# Patient Record
Sex: FEMALE | Race: WHITE | NOT HISPANIC OR LATINO | Employment: FULL TIME | URBAN - METROPOLITAN AREA
[De-identification: names, ages, dates, MRNs, and addresses within clinical notes are randomized per-mention and may not be internally consistent; named-entity substitution may affect disease eponyms.]

---

## 2017-09-15 ENCOUNTER — HOSPITAL ENCOUNTER (OUTPATIENT)
Dept: RADIOLOGY | Facility: MEDICAL CENTER | Age: 47
End: 2017-09-15
Attending: FAMILY MEDICINE
Payer: COMMERCIAL

## 2017-09-15 ENCOUNTER — HOSPITAL ENCOUNTER (EMERGENCY)
Facility: MEDICAL CENTER | Age: 47
End: 2017-09-15
Attending: EMERGENCY MEDICINE
Payer: COMMERCIAL

## 2017-09-15 ENCOUNTER — OFFICE VISIT (OUTPATIENT)
Dept: URGENT CARE | Facility: PHYSICIAN GROUP | Age: 47
End: 2017-09-15
Payer: COMMERCIAL

## 2017-09-15 ENCOUNTER — APPOINTMENT (OUTPATIENT)
Dept: RADIOLOGY | Facility: MEDICAL CENTER | Age: 47
End: 2017-09-15
Attending: EMERGENCY MEDICINE
Payer: COMMERCIAL

## 2017-09-15 VITALS
HEART RATE: 74 BPM | WEIGHT: 215 LBS | TEMPERATURE: 96.4 F | OXYGEN SATURATION: 100 % | DIASTOLIC BLOOD PRESSURE: 68 MMHG | RESPIRATION RATE: 18 BRPM | BODY MASS INDEX: 33.67 KG/M2 | SYSTOLIC BLOOD PRESSURE: 145 MMHG

## 2017-09-15 VITALS
DIASTOLIC BLOOD PRESSURE: 100 MMHG | HEIGHT: 67 IN | OXYGEN SATURATION: 100 % | BODY MASS INDEX: 32.96 KG/M2 | SYSTOLIC BLOOD PRESSURE: 140 MMHG | WEIGHT: 210 LBS | TEMPERATURE: 97.2 F | HEART RATE: 70 BPM

## 2017-09-15 DIAGNOSIS — Q62.11 HYDRONEPHROSIS WITH URETEROPELVIC JUNCTION (UPJ) OBSTRUCTION: ICD-10-CM

## 2017-09-15 DIAGNOSIS — N23 RENAL COLIC: ICD-10-CM

## 2017-09-15 DIAGNOSIS — R30.0 DYSURIA: ICD-10-CM

## 2017-09-15 DIAGNOSIS — N28.9 RENAL INSUFFICIENCY: ICD-10-CM

## 2017-09-15 DIAGNOSIS — R10.84 GENERALIZED ABDOMINAL PAIN: ICD-10-CM

## 2017-09-15 DIAGNOSIS — R16.0 LIVER MASS: ICD-10-CM

## 2017-09-15 DIAGNOSIS — N20.1 URETEROLITHIASIS: ICD-10-CM

## 2017-09-15 LAB
ANION GAP SERPL CALC-SCNC: 11 MMOL/L (ref 0–11.9)
APPEARANCE UR: NORMAL
BASOPHILS # BLD AUTO: 0.2 % (ref 0–1.8)
BASOPHILS # BLD: 0.04 K/UL (ref 0–0.12)
BILIRUB UR STRIP-MCNC: NORMAL MG/DL
BUN SERPL-MCNC: 21 MG/DL (ref 8–22)
CALCIUM SERPL-MCNC: 9.7 MG/DL (ref 8.5–10.5)
CHLORIDE SERPL-SCNC: 106 MMOL/L (ref 96–112)
CO2 SERPL-SCNC: 20 MMOL/L (ref 20–33)
COLOR UR AUTO: YELLOW
CREAT SERPL-MCNC: 1.41 MG/DL (ref 0.5–1.4)
EOSINOPHIL # BLD AUTO: 0 K/UL (ref 0–0.51)
EOSINOPHIL NFR BLD: 0 % (ref 0–6.9)
ERYTHROCYTE [DISTWIDTH] IN BLOOD BY AUTOMATED COUNT: 38.5 FL (ref 35.9–50)
GFR SERPL CREATININE-BSD FRML MDRD: 40 ML/MIN/1.73 M 2
GLUCOSE SERPL-MCNC: 117 MG/DL (ref 65–99)
GLUCOSE UR STRIP.AUTO-MCNC: NORMAL MG/DL
HCT VFR BLD AUTO: 43.2 % (ref 37–47)
HGB BLD-MCNC: 15.1 G/DL (ref 12–16)
IMM GRANULOCYTES # BLD AUTO: 0.09 K/UL (ref 0–0.11)
IMM GRANULOCYTES NFR BLD AUTO: 0.4 % (ref 0–0.9)
KETONES UR STRIP.AUTO-MCNC: NORMAL MG/DL
LEUKOCYTE ESTERASE UR QL STRIP.AUTO: NORMAL
LYMPHOCYTES # BLD AUTO: 1.35 K/UL (ref 1–4.8)
LYMPHOCYTES NFR BLD: 6.4 % (ref 22–41)
MCH RBC QN AUTO: 31.1 PG (ref 27–33)
MCHC RBC AUTO-ENTMCNC: 35 G/DL (ref 33.6–35)
MCV RBC AUTO: 88.9 FL (ref 81.4–97.8)
MONOCYTES # BLD AUTO: 0.8 K/UL (ref 0–0.85)
MONOCYTES NFR BLD AUTO: 3.8 % (ref 0–13.4)
NEUTROPHILS # BLD AUTO: 18.78 K/UL (ref 2–7.15)
NEUTROPHILS NFR BLD: 89.2 % (ref 44–72)
NITRITE UR QL STRIP.AUTO: NORMAL
NRBC # BLD AUTO: 0 K/UL
NRBC BLD AUTO-RTO: 0 /100 WBC
PH UR STRIP.AUTO: 6 [PH] (ref 5–8)
PLATELET # BLD AUTO: 386 K/UL (ref 164–446)
PMV BLD AUTO: 11.2 FL (ref 9–12.9)
POTASSIUM SERPL-SCNC: 4.4 MMOL/L (ref 3.6–5.5)
PROT UR QL STRIP: 30 MG/DL
RBC # BLD AUTO: 4.86 M/UL (ref 4.2–5.4)
RBC UR QL AUTO: NORMAL
SODIUM SERPL-SCNC: 137 MMOL/L (ref 135–145)
SP GR UR STRIP.AUTO: 1.03
UROBILINOGEN UR STRIP-MCNC: NORMAL MG/DL
WBC # BLD AUTO: 21.1 K/UL (ref 4.8–10.8)

## 2017-09-15 PROCEDURE — 700105 HCHG RX REV CODE 258: Performed by: EMERGENCY MEDICINE

## 2017-09-15 PROCEDURE — 700111 HCHG RX REV CODE 636 W/ 250 OVERRIDE (IP): Performed by: EMERGENCY MEDICINE

## 2017-09-15 PROCEDURE — 80048 BASIC METABOLIC PNL TOTAL CA: CPT

## 2017-09-15 PROCEDURE — 74177 CT ABD & PELVIS W/CONTRAST: CPT

## 2017-09-15 PROCEDURE — 96375 TX/PRO/DX INJ NEW DRUG ADDON: CPT

## 2017-09-15 PROCEDURE — 74000 DX-ABDOMEN-1 VIEW: CPT

## 2017-09-15 PROCEDURE — 99214 OFFICE O/P EST MOD 30 MIN: CPT | Performed by: FAMILY MEDICINE

## 2017-09-15 PROCEDURE — 99285 EMERGENCY DEPT VISIT HI MDM: CPT

## 2017-09-15 PROCEDURE — 700117 HCHG RX CONTRAST REV CODE 255: Performed by: FAMILY MEDICINE

## 2017-09-15 PROCEDURE — 700102 HCHG RX REV CODE 250 W/ 637 OVERRIDE(OP): Performed by: EMERGENCY MEDICINE

## 2017-09-15 PROCEDURE — 85025 COMPLETE CBC W/AUTO DIFF WBC: CPT

## 2017-09-15 PROCEDURE — A9270 NON-COVERED ITEM OR SERVICE: HCPCS | Performed by: EMERGENCY MEDICINE

## 2017-09-15 PROCEDURE — 96374 THER/PROPH/DIAG INJ IV PUSH: CPT

## 2017-09-15 PROCEDURE — 81002 URINALYSIS NONAUTO W/O SCOPE: CPT | Performed by: FAMILY MEDICINE

## 2017-09-15 RX ORDER — OXYCODONE HYDROCHLORIDE AND ACETAMINOPHEN 5; 325 MG/1; MG/1
1-2 TABLET ORAL EVERY 6 HOURS PRN
Qty: 10 TAB | Refills: 0 | Status: ON HOLD | OUTPATIENT
Start: 2017-09-15 | End: 2017-09-18

## 2017-09-15 RX ORDER — ONDANSETRON 2 MG/ML
4 INJECTION INTRAMUSCULAR; INTRAVENOUS
Status: DISCONTINUED | OUTPATIENT
Start: 2017-09-15 | End: 2017-09-15 | Stop reason: HOSPADM

## 2017-09-15 RX ORDER — PROMETHAZINE HYDROCHLORIDE 25 MG/ML
25 INJECTION, SOLUTION INTRAMUSCULAR; INTRAVENOUS ONCE
Status: COMPLETED | OUTPATIENT
Start: 2017-09-15 | End: 2017-09-15

## 2017-09-15 RX ORDER — KETOROLAC TROMETHAMINE 30 MG/ML
30 INJECTION, SOLUTION INTRAMUSCULAR; INTRAVENOUS ONCE
Status: COMPLETED | OUTPATIENT
Start: 2017-09-15 | End: 2017-09-15

## 2017-09-15 RX ORDER — TAMSULOSIN HYDROCHLORIDE 0.4 MG/1
0.4 CAPSULE ORAL ONCE
Status: COMPLETED | OUTPATIENT
Start: 2017-09-15 | End: 2017-09-15

## 2017-09-15 RX ORDER — KETOROLAC TROMETHAMINE 30 MG/ML
60 INJECTION, SOLUTION INTRAMUSCULAR; INTRAVENOUS ONCE
Status: COMPLETED | OUTPATIENT
Start: 2017-09-15 | End: 2017-09-15

## 2017-09-15 RX ORDER — SULFAMETHOXAZOLE AND TRIMETHOPRIM 800; 160 MG/1; MG/1
1 TABLET ORAL EVERY 12 HOURS
Qty: 6 TAB | Refills: 0 | Status: SHIPPED | OUTPATIENT
Start: 2017-09-15 | End: 2017-09-15

## 2017-09-15 RX ORDER — SULFAMETHOXAZOLE AND TRIMETHOPRIM 800; 160 MG/1; MG/1
1 TABLET ORAL EVERY 12 HOURS
Qty: 6 TAB | Refills: 0 | Status: SHIPPED | OUTPATIENT
Start: 2017-09-15 | End: 2017-09-15 | Stop reason: SDUPTHER

## 2017-09-15 RX ORDER — TAMSULOSIN HYDROCHLORIDE 0.4 MG/1
0.4 CAPSULE ORAL DAILY
Qty: 5 CAP | Refills: 0 | Status: SHIPPED | OUTPATIENT
Start: 2017-09-15 | End: 2017-11-03

## 2017-09-15 RX ORDER — SODIUM CHLORIDE 9 MG/ML
1000 INJECTION, SOLUTION INTRAVENOUS ONCE
Status: COMPLETED | OUTPATIENT
Start: 2017-09-15 | End: 2017-09-15

## 2017-09-15 RX ORDER — ONDANSETRON 2 MG/ML
4 INJECTION INTRAMUSCULAR; INTRAVENOUS ONCE
Status: COMPLETED | OUTPATIENT
Start: 2017-09-15 | End: 2017-09-15

## 2017-09-15 RX ORDER — TAMSULOSIN HYDROCHLORIDE 0.4 MG/1
0.4 CAPSULE ORAL ONCE
Status: DISCONTINUED | OUTPATIENT
Start: 2017-09-15 | End: 2017-09-15 | Stop reason: HOSPADM

## 2017-09-15 RX ORDER — ONDANSETRON 4 MG/1
4 TABLET, ORALLY DISINTEGRATING ORAL EVERY 8 HOURS PRN
Qty: 10 TAB | Refills: 0 | Status: SHIPPED | OUTPATIENT
Start: 2017-09-15 | End: 2017-11-03

## 2017-09-15 RX ADMIN — SODIUM CHLORIDE 1000 ML: 9 INJECTION, SOLUTION INTRAVENOUS at 18:53

## 2017-09-15 RX ADMIN — PROMETHAZINE HYDROCHLORIDE 25 MG: 25 INJECTION, SOLUTION INTRAMUSCULAR; INTRAVENOUS at 13:19

## 2017-09-15 RX ADMIN — HYDROMORPHONE HYDROCHLORIDE 1 MG: 1 INJECTION, SOLUTION INTRAMUSCULAR; INTRAVENOUS; SUBCUTANEOUS at 18:56

## 2017-09-15 RX ADMIN — IOHEXOL 50 ML: 240 INJECTION, SOLUTION INTRATHECAL; INTRAVASCULAR; INTRAVENOUS; ORAL at 12:27

## 2017-09-15 RX ADMIN — TAMSULOSIN HYDROCHLORIDE 0.4 MG: 0.4 CAPSULE ORAL at 20:37

## 2017-09-15 RX ADMIN — ONDANSETRON 4 MG: 2 INJECTION INTRAMUSCULAR; INTRAVENOUS at 18:54

## 2017-09-15 RX ADMIN — IOHEXOL 100 ML: 350 INJECTION, SOLUTION INTRAVENOUS at 12:26

## 2017-09-15 RX ADMIN — KETOROLAC TROMETHAMINE 60 MG: 30 INJECTION, SOLUTION INTRAMUSCULAR; INTRAVENOUS at 11:14

## 2017-09-15 RX ADMIN — KETOROLAC TROMETHAMINE 30 MG: 30 INJECTION, SOLUTION INTRAMUSCULAR at 18:56

## 2017-09-15 RX ADMIN — ONDANSETRON 4 MG: 2 INJECTION INTRAMUSCULAR; INTRAVENOUS at 10:46

## 2017-09-15 ASSESSMENT — ENCOUNTER SYMPTOMS
CHILLS: 0
FEVER: 0
EYE PAIN: 0
ABDOMINAL PAIN: 1
SORE THROAT: 0
MYALGIAS: 0
NAUSEA: 1
DIZZINESS: 0
WEIGHT LOSS: 0
SHORTNESS OF BREATH: 0
VOMITING: 1

## 2017-09-15 ASSESSMENT — PAIN SCALES - GENERAL
PAINLEVEL_OUTOF10: 9
PAINLEVEL_OUTOF10: 3

## 2017-09-15 NOTE — PROGRESS NOTES
"  Subjective:     Aurora Skinner a 47 y.o. female who presents for Abdominal Pain       Abdominal Pain   This is a new problem. The current episode started today. The onset quality is sudden. The problem occurs constantly. The problem has been rapidly worsening. The pain is located in the generalized abdominal region. The quality of the pain is colicky, aching and sharp. Associated symptoms include dysuria, nausea and vomiting. Pertinent negatives include no fever, frequency, hematuria, myalgias or weight loss.   No past medical history on file.  Past Surgical History:   Procedure Laterality Date   • ABDOMINAL EXPLORATION     • ABDOMINAL HYSTERECTOMY TOTAL       Social History     Social History   • Marital status: Unknown     Spouse name: N/A   • Number of children: N/A   • Years of education: N/A     Occupational History   • Not on file.     Social History Main Topics   • Smoking status: Never Smoker   • Smokeless tobacco: Never Used   • Alcohol use Yes   • Drug use: No   • Sexual activity: Not on file     Other Topics Concern   • Not on file     Social History Narrative   • No narrative on file    History reviewed. No pertinent family history. Review of Systems   Constitutional: Negative for chills, fever and weight loss.   HENT: Negative for sore throat.    Eyes: Negative for pain.   Respiratory: Negative for shortness of breath.    Cardiovascular: Negative for chest pain.   Gastrointestinal: Positive for abdominal pain, nausea and vomiting.   Genitourinary: Positive for dysuria. Negative for frequency and hematuria.   Musculoskeletal: Negative for myalgias.   Skin: Negative for rash.   Neurological: Negative for dizziness.     Allergies   Allergen Reactions   • Levaquin       Objective:   /100   Pulse 70   Temp 36.2 °C (97.2 °F)   Ht 1.702 m (5' 7\")   Wt 95.3 kg (210 lb)   SpO2 100%   BMI 32.89 kg/m²   Physical Exam   Constitutional: She is oriented to person, place, and time. She appears " well-developed and well-nourished. No distress.   HENT:   Head: Normocephalic and atraumatic.   Eyes: Conjunctivae and EOM are normal. Pupils are equal, round, and reactive to light.   Cardiovascular: Normal rate, regular rhythm, normal heart sounds and intact distal pulses.    No murmur heard.  Pulmonary/Chest: Effort normal and breath sounds normal. No respiratory distress.   Abdominal: Soft. Bowel sounds are normal. She exhibits no distension. There is tenderness in the suprapubic area. There is CVA tenderness.   Musculoskeletal: Normal range of motion.   Neurological: She is alert and oriented to person, place, and time. She has normal reflexes. No sensory deficit.   Skin: Skin is warm and dry.   Psychiatric: She has a normal mood and affect. Her behavior is normal.   Vitals reviewed.        Assessment/Plan:   Assessment    1. Ureterolithiasis  Discussed with Dr. Jaspreet Gilbert at [facility] emergency room. Patient to go there via POV per patient's request for further evaluation and pain control. Differential diagnosis, natural history, supportive care, and indications for immediate follow-up discussed.   - POCT Urinalysis  - Urine Culture; Future  - sulfamethoxazole-trimethoprim (BACTRIM DS) 800-160 MG tablet; Take 1 Tab by mouth every 12 hours for 3 days.  Dispense: 6 Tab; Refill: 0  - CT-ABDOMEN-PELVIS WITH; Future  - ondansetron (ZOFRAN) syringe/vial injection 4 mg; 2 mL by Intravenous route Once.  - ketorolac (TORADOL) injection 60 mg; 2 mL by Intramuscular route Once.  - promethazine (PHENERGAN) injection 25 mg; 1 mL by Intramuscular route Once.    2. Hydronephrosis with ureteropelvic junction (UPJ) obstruction  Differential diagnosis, natural history, supportive care, and indications for immediate follow-up discussed.   - POCT Urinalysis  - Urine Culture; Future  - sulfamethoxazole-trimethoprim (BACTRIM DS) 800-160 MG tablet; Take 1 Tab by mouth every 12 hours for 3 days.  Dispense: 6 Tab; Refill: 0  -  CT-ABDOMEN-PELVIS WITH; Future  - ondansetron (ZOFRAN) syringe/vial injection 4 mg; 2 mL by Intravenous route Once.  - ketorolac (TORADOL) injection 60 mg; 2 mL by Intramuscular route Once.  - promethazine (PHENERGAN) injection 25 mg; 1 mL by Intramuscular route Once.

## 2017-09-15 NOTE — PROGRESS NOTES
Aurora Tan is a 47 y.o. female here for a non-provider visit for Toradol and Phenergen injection.    Reason for injection: Abd pain  Order in MAR?: Yes  Patient supplied?:No  Minimum interval has been met for this injection (per MAR order): Yes    Order and dose verified by: JUAN  Patient tolerated injection and no adverse effects were observed or reported: Yes    # of Administrations remaining in MAR: 0

## 2017-09-15 NOTE — ED NOTES
To triage via w/c with report of   Chief Complaint   Patient presents with   • Sent from Urgent Care     for kidney stone and possible need for urology   pt in obvious discomfort at this time.

## 2017-09-15 NOTE — PATIENT INSTRUCTIONS
Urinary Tract Infection  A urinary tract infection (UTI) can occur any place along the urinary tract. The tract includes the kidneys, ureters, bladder, and urethra. A type of germ called bacteria often causes a UTI. UTIs are often helped with antibiotic medicine.   HOME CARE   · If given, take antibiotics as told by your doctor. Finish them even if you start to feel better.  · Drink enough fluids to keep your pee (urine) clear or pale yellow.  · Avoid tea, drinks with caffeine, and bubbly (carbonated) drinks.  · Pee often. Avoid holding your pee in for a long time.  · Pee before and after having sex (intercourse).  · Wipe from front to back after you poop (bowel movement) if you are a woman. Use each tissue only once.  GET HELP RIGHT AWAY IF:   · You have back pain.  · You have lower belly (abdominal) pain.  · You have chills.  · You feel sick to your stomach (nauseous).  · You throw up (vomit).  · Your burning or discomfort with peeing does not go away.  · You have a fever.  · Your symptoms are not better in 3 days.  MAKE SURE YOU:   · Understand these instructions.  · Will watch your condition.  · Will get help right away if you are not doing well or get worse.     This information is not intended to replace advice given to you by your health care provider. Make sure you discuss any questions you have with your health care provider.     Document Released: 06/05/2009 Document Revised: 01/08/2016 Document Reviewed: 07/18/2013  [employer] Patient Education ©2016 [employer]

## 2017-09-16 NOTE — ED NOTES
ERP at bedside.  Pt reports right sided flank pain/abdomen pain and N/V starting at approximately 0700.  Pt seen at Urgent Care, had CT and sent here for further evaluation and treatment.

## 2017-09-16 NOTE — ED PROVIDER NOTES
ED Provider Note    Scribed for Geronimo Shine M.D. by Gretta Ingram. 9/15/2017  6:38 PM    Primary care provider: Chico Aguillon M.D.  Means of arrival: walk-in  History obtained from: patient  History limited by: none    CHIEF COMPLAINT  Chief Complaint   Patient presents with   • Sent from Urgent Care     for kidney stone and possible need for urology       HPI  Aurora Tan is a 47 y.o. female who presents from Urgent Care with a diagnosed kidney stone. Patient was evaluated today at  with a CT scan after she presented with severe right sided flank pain, right sided abdominal pain, nausea and vomiting(4x) onset around 7AM this morning. The pain has been constant throughout the day. No blood thinners on board, and patient states no drug, tobacco or alcohol use. Abdominal surgeries include hysterectomy, , cyst removal. No history of diabetes or immune compromise.  No complaints of hematuria, fever, dysuria, urgency, frequency, chest pain, shortness of breath, cough, rash, extremity swelling.    REVIEW OF SYSTEMS  Pertinent positives include: right flank pain, right abdominal pain, nausea, vomiting.  Pertinent negatives include: hematuria, fever, dysuria, urgency, frequency, chest pain, shortness of breath, cough, rash, extremity swelling.  10+ systems reviewed and negative.      PAST MEDICAL HISTORY  Denies prior kidney stone or UTI      SOCIAL HISTORY  Social History   Substance Use Topics   • Smoking status: Never Smoker   • Smokeless tobacco: Never Used   • Alcohol use Yes     History   Drug Use No       SURGICAL HISTORY  Past Surgical History:   Procedure Laterality Date   • ABDOMINAL EXPLORATION     • ABDOMINAL HYSTERECTOMY TOTAL         CURRENT MEDICATIONS  Current Facility-Administered Medications   Medication Dose Route Frequency Provider Last Rate Last Dose   • ondansetron (ZOFRAN) syringe/vial injection 4 mg  4 mg Intravenous Q HOUR PRN Geronimo Shine M.D.   4 mg at 09/15/17 0699      Current Outpatient Prescriptions   Medication Sig Dispense Refill   • tamsulosin (FLOMAX) 0.4 MG capsule Take 1 Cap by mouth every day. 5 Cap 0   • ondansetron (ZOFRAN ODT) 4 MG TABLET DISPERSIBLE Take 1 Tab by mouth every 8 hours as needed for Nausea/Vomiting. 10 Tab 0   • oxycodone-acetaminophen (PERCOCET) 5-325 MG Tab Take 1-2 Tabs by mouth every 6 hours as needed (moderate to severe pain). 10 Tab 0   • estradiol (VIVELLE-DOT) 0.075 MG/24HR PTTW Apply 1 Patch to skin as directed See Admin Instructions.     • ibuprofen (MOTRIN) 600 MG TABS Take 1 Tab by mouth every 6 hours as needed for Mild Pain. 25 Tab 0       ALLERGIES  Allergies   Allergen Reactions   • Levaquin        PHYSICAL EXAM  VITAL SIGNS: BP (!) 179/91   Pulse 73   Temp (!) 35.8 °C (96.4 °F)   Resp 17   Wt 97.5 kg (215 lb)   SpO2 100%   BMI 33.67 kg/m²   Reviewed and hypertension otherwise normal    Constitutional: Well developed, Well nourished,uncomfortable appearing.  HENT: Normocephalic, atraumatic, bilateral external ears normal, oropharynx moist, No exudates or erythema.   Eyes: PERRLA, conjunctiva pink, no scleral icterus.   Cardiovascular: Regular rate and rhythm. No murmurs, rubs or gallops.   Respiratory: Lungs clear to auscultation bilaterally. No wheezes, rales, or rhonchi.   Gastrointestinal:  Abdomen soft, bilateral LQ tenderness, non distended.   Skin: No erythema, no rash.   Genitourinary: right sided CVA tenderness  Neurologic: Alert & oriented x 3, cranial nerves 2-12 intact by passive exam.  No focal deficit noted.  Psychiatric: Affect normal, Judgment normal, Mood normal.     DIFFERENTIAL DIAGNOSIS:  Hepatic mass, kidney stone.    RADIOLOGY/PROCEDURES  [id number] VIEW   Final Result      Right hydroureteronephrosis with a delayed nephrogram.      Previously seen calculus is not visualized on the current examination and may be obscured by contrast within the bladder.          LABORATORY:  Results for orders placed or  performed during the hospital encounter of 09/15/17   CBC WITH DIFFERENTIAL   Result Value Ref Range    WBC 21.1 (H) 4.8 - 10.8 K/uL    RBC 4.86 4.20 - 5.40 M/uL    Hemoglobin 15.1 12.0 - 16.0 g/dL    Hematocrit 43.2 37.0 - 47.0 %    MCV 88.9 81.4 - 97.8 fL    MCH 31.1 27.0 - 33.0 pg    MCHC 35.0 33.6 - 35.0 g/dL    RDW 38.5 35.9 - 50.0 fL    Platelet Count 386 164 - 446 K/uL    MPV 11.2 9.0 - 12.9 fL    Neutrophils-Polys 89.20 (H) 44.00 - 72.00 %    Lymphocytes 6.40 (L) 22.00 - 41.00 %    Monocytes 3.80 0.00 - 13.40 %    Eosinophils 0.00 0.00 - 6.90 %    Basophils 0.20 0.00 - 1.80 %    Immature Granulocytes 0.40 0.00 - 0.90 %    Nucleated RBC 0.00 /100 WBC    Neutrophils (Absolute) 18.78 (H) 2.00 - 7.15 K/uL    Lymphs (Absolute) 1.35 1.00 - 4.80 K/uL    Monos (Absolute) 0.80 0.00 - 0.85 K/uL    Eos (Absolute) 0.00 0.00 - 0.51 K/uL    Baso (Absolute) 0.04 0.00 - 0.12 K/uL    Immature Granulocytes (abs) 0.09 0.00 - 0.11 K/uL    NRBC (Absolute) 0.00 K/uL   BASIC METABOLIC PANEL   Result Value Ref Range    Sodium 137 135 - 145 mmol/L    Potassium 4.4 3.6 - 5.5 mmol/L    Chloride 106 96 - 112 mmol/L    Co2 20 20 - 33 mmol/L    Glucose 117 (H) 65 - 99 mg/dL    Bun 21 8 - 22 mg/dL    Creatinine 1.41 (H) 0.50 - 1.40 mg/dL    Calcium 9.7 8.5 - 10.5 mg/dL    Anion Gap 11.0 0.0 - 11.9         INTERVENTIONS:  Medications   ondansetron (ZOFRAN) syringe/vial injection 4 mg (4 mg Intravenous Given 9/15/17 6732)   NS infusion 1,000 mL (0 mL Intravenous Stopped 9/15/17 1953)   ketorolac (TORADOL) injection 30 mg (30 mg Intravenous Given 9/15/17 1856)   HYDROmorphone (DILAUDID) injection 1 mg (1 mg Intravenous Given 9/15/17 1856)     Response:improved discomfort.    COURSE & MEDICAL DECISION MAKING    6:38 PM - Patient seen and examined at bedside. Patient will be treated with 30mg  IV Toradol, 1mg IV Dilaudid, and 4mg IV Zofran for her symptoms. The patient will be resuscitated with 1L NS IV for dehydration from vomiting and to  help facilitate kidney stone expulsion.    Ordered abdomen xray, CBC, BMP to evaluate.     Review of medical records showed a UA completed prior to arrival in ED today with trace blood, no leukocytes, and no nitrates. CT scan completed at  showed right hydro ureter nephrosis, 3mm calculus at Right UVJ. They recommend RUQ US for 2,5cm density that is most likely fat.    Narc score of 60. 1 Rx for Tramadol.    8:18 PM I re-evaluated patient at bedside. She reports improved discomfort and nausea. I explained that her labs showed mild dehydration and a slightly elevated WBC most likely caused by her vomiting and discomfort. I explained that if she develops a fever, she needs to return to the ED immediately. I explained that she will be discharged with a Urology follow up, urine filter, and pain medications so that she can manage the symptoms until she passes the stone. Patient agrees with plan and will be discharged.    Well-appearing patient presents with renal colic with hydronephrosis and hydroureter on the right side due to a 3 mm UVJ stone. There is no evidence of pyelonephritis based on review of urinalysis from urgent care. There is no evidence of diverticulitis or appendicitis. Patient also has a mass in the liver for which follow-up ultrasound was recommended. Patient was notified of this recommendation for outpatient ultrasound.    PLAN:    I reviewed prescription monitoring program for patient's narcotic use before prescribing a scheduled drug.The patient will not drink alcohol nor drive with prescribed medications. The patient will return for new or worsening symptoms and is stable at the time of discharge.    The patient is referred to a primary physician for blood pressure management, diabetic screening, and for all other preventative health concerns.      New Prescriptions    ONDANSETRON (ZOFRAN ODT) 4 MG TABLET DISPERSIBLE    Take 1 Tab by mouth every 8 hours as needed for Nausea/Vomiting.     OXYCODONE-ACETAMINOPHEN (PERCOCET) 5-325 MG TAB    Take 1-2 Tabs by mouth every 6 hours as needed (moderate to severe pain).    TAMSULOSIN (FLOMAX) 0.4 MG CAPSULE    Take 1 Cap by mouth every day.     Kidney stone handout given  Return for uncontrolled pain, uncontrolled vomiting or pain and fever  Follow-up for outpatient ultrasound of possible liver mass    Kiet Eid M.D.  [address]  [facility] [address]  [phone]    Schedule an appointment as soon as possible for a visit in 4 days  As needed      CONDITION: good.    FINAL IMPRESSION  1. Renal colic    2. Hydronephrosis with ureteropelvic junction (UPJ) obstruction    3. Liver mass          Electronically signed by: Gretta Ingram, 9/15/2017 6:38 PM    The note accurately reflects work and decisions made by me.  Geronimo Shine  9/16/2017  12:40 AM

## 2017-09-16 NOTE — DISCHARGE INSTRUCTIONS
Kidney Stones  (Ureteral Lithiasis)    Drink average fluids, filter the urine saving any stones, take flomax to help pass the stone, use tylenol or percocet for pain not ibuprofen or naproxen.  Return to [facility] for pain and fever, uncontrolled pain or vomiting or ill appearance.  Followup with urology if not better in 4-5 days. Take Zofran as needed for nausea or vomiting.    You also have a small mass in the liver that is likely fat. The radiologist recommends an outpatient ultrasound by your primary doctor to assess this further.    You had a borderline or high normal blood pressure reading today.  This does not necessarily mean you have hypertension.  Please followup with your/a primary physician for comprehensive blood pressure evaluation and yearly fasting cholesterol assessment.  BP Readings from Last 3 Encounters:   09/15/17 (!) 179/91   09/15/17 140/100   10/18/16 128/98     .    The pain you are experiencing is caused by a stone located in the urinary tract system. These are the channels that collect the urine in your kidneys and deliver it to your bladder. These stones usually pass through the urinary tract and are expelled in the urine. The intense pain is caused by stone movement in those channels when the ureter goes into spasm around the stone.    FOLLOW THE INSTRUCTIONS AS LISTED BELOW:  Ø Increase your fluid intake.  Ø Strain all urine (strainer will be provided). Keep all particulate matter and stones for your caregiver to see. The stone causing the pain may be smaller than a BB.  Ø Take your pain medication as directed.  Ø Make a follow-up appointment with your caregiver as directed.  Ø You may require specialized follow-up x-rays. The absence of pain does not always mean that the stone has passed. It may have just stopped moving. If the urine remains completely obstructed, it can cause loss of kidney function or even complete destruction of the kidney. It is your responsibility that  x-rays and follow-ups are completed.  Ø If your stone does not pass on its own, additional measures may be taken by your caregiver to ensure its removal.    seek immediate medical care if:  Ø Pain can not be controlled with the prescribed medication.  Ø Fever develops. (Backed up urine is more likely to become infected.) You may use ibuprofen and/or acetaminophen for fever until seen by a caregiver.  Ø Pain continues for more than 18 hours.    [employer]® Patient Information ©2007 [employer].

## 2017-09-17 ENCOUNTER — APPOINTMENT (OUTPATIENT)
Dept: RADIOLOGY | Facility: MEDICAL CENTER | Age: 47
DRG: 872 | End: 2017-09-17
Attending: UROLOGY
Payer: COMMERCIAL

## 2017-09-17 ENCOUNTER — HOSPITAL ENCOUNTER (INPATIENT)
Facility: MEDICAL CENTER | Age: 47
LOS: 1 days | DRG: 872 | End: 2017-09-18
Attending: EMERGENCY MEDICINE | Admitting: HOSPITALIST
Payer: COMMERCIAL

## 2017-09-17 ENCOUNTER — RESOLUTE PROFESSIONAL BILLING HOSPITAL PROF FEE (OUTPATIENT)
Dept: HOSPITALIST | Facility: MEDICAL CENTER | Age: 47
End: 2017-09-17
Payer: COMMERCIAL

## 2017-09-17 ENCOUNTER — APPOINTMENT (OUTPATIENT)
Dept: RADIOLOGY | Facility: MEDICAL CENTER | Age: 47
DRG: 872 | End: 2017-09-17
Attending: EMERGENCY MEDICINE
Payer: COMMERCIAL

## 2017-09-17 DIAGNOSIS — N17.9 AKI (ACUTE KIDNEY INJURY) (HCC): ICD-10-CM

## 2017-09-17 DIAGNOSIS — R31.9 URINARY TRACT INFECTION WITH HEMATURIA, SITE UNSPECIFIED: ICD-10-CM

## 2017-09-17 DIAGNOSIS — A41.9 SEPSIS, DUE TO UNSPECIFIED ORGANISM: ICD-10-CM

## 2017-09-17 DIAGNOSIS — N39.0 URINARY TRACT INFECTION WITH HEMATURIA, SITE UNSPECIFIED: ICD-10-CM

## 2017-09-17 DIAGNOSIS — N13.2 URETERAL STONE WITH HYDRONEPHROSIS: ICD-10-CM

## 2017-09-17 PROBLEM — N13.9 OBSTRUCTIVE UROPATHY: Status: ACTIVE | Noted: 2017-09-17

## 2017-09-17 LAB
ALBUMIN SERPL BCP-MCNC: 4.2 G/DL (ref 3.2–4.9)
ALBUMIN/GLOB SERPL: 1.2 G/DL
ALP SERPL-CCNC: 78 U/L (ref 30–99)
ALT SERPL-CCNC: 10 U/L (ref 2–50)
ANION GAP SERPL CALC-SCNC: 9 MMOL/L (ref 0–11.9)
APPEARANCE UR: ABNORMAL
AST SERPL-CCNC: 13 U/L (ref 12–45)
BACTERIA #/AREA URNS HPF: ABNORMAL /HPF
BASOPHILS # BLD AUTO: 0.2 % (ref 0–1.8)
BASOPHILS # BLD: 0.04 K/UL (ref 0–0.12)
BILIRUB SERPL-MCNC: 0.8 MG/DL (ref 0.1–1.5)
BILIRUB UR QL STRIP.AUTO: NEGATIVE
BUN SERPL-MCNC: 18 MG/DL (ref 8–22)
CALCIUM SERPL-MCNC: 9.1 MG/DL (ref 8.5–10.5)
CHLORIDE SERPL-SCNC: 101 MMOL/L (ref 96–112)
CO2 SERPL-SCNC: 24 MMOL/L (ref 20–33)
COLOR UR: ABNORMAL
CREAT SERPL-MCNC: 1.75 MG/DL (ref 0.5–1.4)
EOSINOPHIL # BLD AUTO: 0.09 K/UL (ref 0–0.51)
EOSINOPHIL NFR BLD: 0.5 % (ref 0–6.9)
EPI CELLS #/AREA URNS HPF: ABNORMAL /HPF
ERYTHROCYTE [DISTWIDTH] IN BLOOD BY AUTOMATED COUNT: 39.5 FL (ref 35.9–50)
GFR SERPL CREATININE-BSD FRML MDRD: 31 ML/MIN/1.73 M 2
GLOBULIN SER CALC-MCNC: 3.4 G/DL (ref 1.9–3.5)
GLUCOSE SERPL-MCNC: 104 MG/DL (ref 65–99)
GLUCOSE UR STRIP.AUTO-MCNC: NEGATIVE MG/DL
HCG UR QL: NEGATIVE
HCT VFR BLD AUTO: 42.6 % (ref 37–47)
HGB BLD-MCNC: 14.8 G/DL (ref 12–16)
IMM GRANULOCYTES # BLD AUTO: 0.07 K/UL (ref 0–0.11)
IMM GRANULOCYTES NFR BLD AUTO: 0.4 % (ref 0–0.9)
KETONES UR STRIP.AUTO-MCNC: NEGATIVE MG/DL
LACTATE BLD-SCNC: 0.9 MMOL/L (ref 0.5–2)
LACTATE BLD-SCNC: 1.2 MMOL/L (ref 0.5–2)
LEUKOCYTE ESTERASE UR QL STRIP.AUTO: ABNORMAL
LYMPHOCYTES # BLD AUTO: 1.65 K/UL (ref 1–4.8)
LYMPHOCYTES NFR BLD: 8.9 % (ref 22–41)
MCH RBC QN AUTO: 31.4 PG (ref 27–33)
MCHC RBC AUTO-ENTMCNC: 34.7 G/DL (ref 33.6–35)
MCV RBC AUTO: 90.3 FL (ref 81.4–97.8)
MICRO URNS: ABNORMAL
MONOCYTES # BLD AUTO: 1.21 K/UL (ref 0–0.85)
MONOCYTES NFR BLD AUTO: 6.5 % (ref 0–13.4)
MUCOUS THREADS #/AREA URNS HPF: ABNORMAL /HPF
NEUTROPHILS # BLD AUTO: 15.57 K/UL (ref 2–7.15)
NEUTROPHILS NFR BLD: 83.5 % (ref 44–72)
NITRITE UR QL STRIP.AUTO: NEGATIVE
NRBC # BLD AUTO: 0 K/UL
NRBC BLD AUTO-RTO: 0 /100 WBC
PH UR STRIP.AUTO: 5.5 [PH]
PLATELET # BLD AUTO: 328 K/UL (ref 164–446)
PMV BLD AUTO: 10.9 FL (ref 9–12.9)
POTASSIUM SERPL-SCNC: 3.9 MMOL/L (ref 3.6–5.5)
PROT SERPL-MCNC: 7.6 G/DL (ref 6–8.2)
PROT UR QL STRIP: 300 MG/DL
RBC # BLD AUTO: 4.72 M/UL (ref 4.2–5.4)
RBC # URNS HPF: >150 /HPF
RBC UR QL AUTO: ABNORMAL
SODIUM SERPL-SCNC: 134 MMOL/L (ref 135–145)
SP GR UR REFRACTOMETRY: 1.02
SP GR UR STRIP.AUTO: 1.02
TRANS CELLS #/AREA URNS HPF: ABNORMAL /HPF
UROBILINOGEN UR STRIP.AUTO-MCNC: 0.2 MG/DL
WBC # BLD AUTO: 18.6 K/UL (ref 4.8–10.8)
WBC #/AREA URNS HPF: ABNORMAL /HPF

## 2017-09-17 PROCEDURE — BT111ZZ FLUOROSCOPY OF RIGHT KIDNEY USING LOW OSMOLAR CONTRAST: ICD-10-PCS | Performed by: UROLOGY

## 2017-09-17 PROCEDURE — 700111 HCHG RX REV CODE 636 W/ 250 OVERRIDE (IP)

## 2017-09-17 PROCEDURE — 500879 HCHG PACK, CYSTO: Performed by: UROLOGY

## 2017-09-17 PROCEDURE — 501329 HCHG SET, CYSTO IRRIG Y TUR: Performed by: UROLOGY

## 2017-09-17 PROCEDURE — 74420 UROGRAPHY RTRGR +-KUB: CPT

## 2017-09-17 PROCEDURE — 85025 COMPLETE CBC W/AUTO DIFF WBC: CPT

## 2017-09-17 PROCEDURE — 160002 HCHG RECOVERY MINUTES (STAT): Performed by: UROLOGY

## 2017-09-17 PROCEDURE — 74176 CT ABD & PELVIS W/O CONTRAST: CPT

## 2017-09-17 PROCEDURE — 160009 HCHG ANES TIME/MIN: Performed by: UROLOGY

## 2017-09-17 PROCEDURE — 80053 COMPREHEN METABOLIC PANEL: CPT

## 2017-09-17 PROCEDURE — 0TF68ZZ FRAGMENTATION IN RIGHT URETER, VIA NATURAL OR ARTIFICIAL OPENING ENDOSCOPIC: ICD-10-PCS | Performed by: UROLOGY

## 2017-09-17 PROCEDURE — 700105 HCHG RX REV CODE 258: Performed by: EMERGENCY MEDICINE

## 2017-09-17 PROCEDURE — A9270 NON-COVERED ITEM OR SERVICE: HCPCS | Performed by: HOSPITALIST

## 2017-09-17 PROCEDURE — C2617 STENT, NON-COR, TEM W/O DEL: HCPCS | Performed by: UROLOGY

## 2017-09-17 PROCEDURE — 160029 HCHG SURGERY MINUTES - 1ST 30 MINS LEVEL 4: Performed by: UROLOGY

## 2017-09-17 PROCEDURE — 700102 HCHG RX REV CODE 250 W/ 637 OVERRIDE(OP): Performed by: EMERGENCY MEDICINE

## 2017-09-17 PROCEDURE — 700105 HCHG RX REV CODE 258: Performed by: HOSPITALIST

## 2017-09-17 PROCEDURE — 700111 HCHG RX REV CODE 636 W/ 250 OVERRIDE (IP): Performed by: EMERGENCY MEDICINE

## 2017-09-17 PROCEDURE — A9270 NON-COVERED ITEM OR SERVICE: HCPCS | Performed by: EMERGENCY MEDICINE

## 2017-09-17 PROCEDURE — 99291 CRITICAL CARE FIRST HOUR: CPT

## 2017-09-17 PROCEDURE — 160041 HCHG SURGERY MINUTES - EA ADDL 1 MIN LEVEL 4: Performed by: UROLOGY

## 2017-09-17 PROCEDURE — A4357 BEDSIDE DRAINAGE BAG: HCPCS | Performed by: UROLOGY

## 2017-09-17 PROCEDURE — 99223 1ST HOSP IP/OBS HIGH 75: CPT | Performed by: HOSPITALIST

## 2017-09-17 PROCEDURE — 90791 PSYCH DIAGNOSTIC EVALUATION: CPT

## 2017-09-17 PROCEDURE — 87040 BLOOD CULTURE FOR BACTERIA: CPT

## 2017-09-17 PROCEDURE — 0T768DZ DILATION OF RIGHT URETER WITH INTRALUMINAL DEVICE, VIA NATURAL OR ARTIFICIAL OPENING ENDOSCOPIC: ICD-10-PCS | Performed by: UROLOGY

## 2017-09-17 PROCEDURE — 81001 URINALYSIS AUTO W/SCOPE: CPT

## 2017-09-17 PROCEDURE — 81025 URINE PREGNANCY TEST: CPT

## 2017-09-17 PROCEDURE — 700111 HCHG RX REV CODE 636 W/ 250 OVERRIDE (IP): Performed by: HOSPITALIST

## 2017-09-17 PROCEDURE — C1769 GUIDE WIRE: HCPCS | Performed by: UROLOGY

## 2017-09-17 PROCEDURE — C1758 CATHETER, URETERAL: HCPCS | Performed by: UROLOGY

## 2017-09-17 PROCEDURE — 160048 HCHG OR STATISTICAL LEVEL 1-5: Performed by: UROLOGY

## 2017-09-17 PROCEDURE — 83605 ASSAY OF LACTIC ACID: CPT | Mod: 91

## 2017-09-17 PROCEDURE — 160035 HCHG PACU - 1ST 60 MINS PHASE I: Performed by: UROLOGY

## 2017-09-17 PROCEDURE — 71010 DX-CHEST-PORTABLE (1 VIEW): CPT

## 2017-09-17 PROCEDURE — 700101 HCHG RX REV CODE 250: Performed by: UROLOGY

## 2017-09-17 PROCEDURE — 87086 URINE CULTURE/COLONY COUNT: CPT

## 2017-09-17 PROCEDURE — 770006 HCHG ROOM/CARE - MED/SURG/GYN SEMI*

## 2017-09-17 PROCEDURE — 700102 HCHG RX REV CODE 250 W/ 637 OVERRIDE(OP): Performed by: HOSPITALIST

## 2017-09-17 PROCEDURE — 74000 DX-ABDOMEN-1 VIEW: CPT

## 2017-09-17 PROCEDURE — 502240 HCHG MISC OR SUPPLY RC 0272: Performed by: UROLOGY

## 2017-09-17 DEVICE — STENT UROLOGICAL POLARIS 6X26  ULTRA: Type: IMPLANTABLE DEVICE | Status: FUNCTIONAL

## 2017-09-17 RX ORDER — HYDROCODONE BITARTRATE AND ACETAMINOPHEN 5; 325 MG/1; MG/1
2 TABLET ORAL ONCE
Status: COMPLETED | OUTPATIENT
Start: 2017-09-17 | End: 2017-09-17

## 2017-09-17 RX ORDER — ACETAMINOPHEN 325 MG/1
650 TABLET ORAL EVERY 4 HOURS PRN
Status: DISCONTINUED | OUTPATIENT
Start: 2017-09-17 | End: 2017-09-18 | Stop reason: HOSPADM

## 2017-09-17 RX ORDER — SODIUM CHLORIDE 9 MG/ML
500 INJECTION, SOLUTION INTRAVENOUS
Status: DISCONTINUED | OUTPATIENT
Start: 2017-09-17 | End: 2017-09-17

## 2017-09-17 RX ORDER — TAMSULOSIN HYDROCHLORIDE 0.4 MG/1
0.4 CAPSULE ORAL
Status: DISCONTINUED | OUTPATIENT
Start: 2017-09-17 | End: 2017-09-18 | Stop reason: HOSPADM

## 2017-09-17 RX ORDER — OXYCODONE HYDROCHLORIDE AND ACETAMINOPHEN 5; 325 MG/1; MG/1
1-2 TABLET ORAL EVERY 6 HOURS PRN
Status: DISCONTINUED | OUTPATIENT
Start: 2017-09-17 | End: 2017-09-17

## 2017-09-17 RX ORDER — ONDANSETRON 2 MG/ML
4 INJECTION INTRAMUSCULAR; INTRAVENOUS EVERY 4 HOURS PRN
Status: DISCONTINUED | OUTPATIENT
Start: 2017-09-17 | End: 2017-09-17

## 2017-09-17 RX ORDER — CEFTRIAXONE 2 G/1
2 INJECTION, POWDER, FOR SOLUTION INTRAMUSCULAR; INTRAVENOUS ONCE
Status: COMPLETED | OUTPATIENT
Start: 2017-09-17 | End: 2017-09-17

## 2017-09-17 RX ORDER — PROMETHAZINE HYDROCHLORIDE 25 MG/1
12.5-25 SUPPOSITORY RECTAL EVERY 4 HOURS PRN
Status: DISCONTINUED | OUTPATIENT
Start: 2017-09-17 | End: 2017-09-18 | Stop reason: HOSPADM

## 2017-09-17 RX ORDER — ESTRADIOL 0.05 MG/D
1 PATCH, EXTENDED RELEASE TRANSDERMAL
COMMUNITY
End: 2018-07-31

## 2017-09-17 RX ORDER — OXYCODONE HYDROCHLORIDE 5 MG/1
5 TABLET ORAL
Status: DISCONTINUED | OUTPATIENT
Start: 2017-09-17 | End: 2017-09-18 | Stop reason: HOSPADM

## 2017-09-17 RX ORDER — MORPHINE SULFATE 4 MG/ML
2 INJECTION, SOLUTION INTRAMUSCULAR; INTRAVENOUS
Status: DISCONTINUED | OUTPATIENT
Start: 2017-09-17 | End: 2017-09-18 | Stop reason: HOSPADM

## 2017-09-17 RX ORDER — ACETAMINOPHEN 325 MG/1
650 TABLET ORAL EVERY 6 HOURS PRN
Status: DISCONTINUED | OUTPATIENT
Start: 2017-09-17 | End: 2017-09-17

## 2017-09-17 RX ORDER — PROMETHAZINE HYDROCHLORIDE 25 MG/1
12.5-25 TABLET ORAL EVERY 4 HOURS PRN
Status: DISCONTINUED | OUTPATIENT
Start: 2017-09-17 | End: 2017-09-18 | Stop reason: HOSPADM

## 2017-09-17 RX ORDER — DEXTROSE MONOHYDRATE, SODIUM CHLORIDE, AND POTASSIUM CHLORIDE 50; 1.49; 4.5 G/1000ML; G/1000ML; G/1000ML
INJECTION, SOLUTION INTRAVENOUS CONTINUOUS
Status: DISCONTINUED | OUTPATIENT
Start: 2017-09-17 | End: 2017-09-18 | Stop reason: HOSPADM

## 2017-09-17 RX ORDER — SODIUM CHLORIDE 9 MG/ML
INJECTION, SOLUTION INTRAVENOUS CONTINUOUS
Status: DISCONTINUED | OUTPATIENT
Start: 2017-09-17 | End: 2017-09-17

## 2017-09-17 RX ORDER — BISACODYL 10 MG
10 SUPPOSITORY, RECTAL RECTAL
Status: DISCONTINUED | OUTPATIENT
Start: 2017-09-17 | End: 2017-09-18 | Stop reason: HOSPADM

## 2017-09-17 RX ORDER — SODIUM CHLORIDE 9 MG/ML
30 INJECTION, SOLUTION INTRAVENOUS
Status: DISCONTINUED | OUTPATIENT
Start: 2017-09-17 | End: 2017-09-17

## 2017-09-17 RX ORDER — ONDANSETRON 4 MG/1
4 TABLET, ORALLY DISINTEGRATING ORAL EVERY 4 HOURS PRN
Status: DISCONTINUED | OUTPATIENT
Start: 2017-09-17 | End: 2017-09-18 | Stop reason: HOSPADM

## 2017-09-17 RX ORDER — SODIUM CHLORIDE 9 MG/ML
30 INJECTION, SOLUTION INTRAVENOUS ONCE
Status: COMPLETED | OUTPATIENT
Start: 2017-09-17 | End: 2017-09-17

## 2017-09-17 RX ORDER — AMOXICILLIN 250 MG
2 CAPSULE ORAL 2 TIMES DAILY
Status: DISCONTINUED | OUTPATIENT
Start: 2017-09-17 | End: 2017-09-18 | Stop reason: HOSPADM

## 2017-09-17 RX ORDER — OXYCODONE HYDROCHLORIDE 5 MG/1
2.5 TABLET ORAL
Status: DISCONTINUED | OUTPATIENT
Start: 2017-09-17 | End: 2017-09-18 | Stop reason: HOSPADM

## 2017-09-17 RX ORDER — POLYETHYLENE GLYCOL 3350 17 G/17G
1 POWDER, FOR SOLUTION ORAL
Status: DISCONTINUED | OUTPATIENT
Start: 2017-09-17 | End: 2017-09-18 | Stop reason: HOSPADM

## 2017-09-17 RX ADMIN — ACETAMINOPHEN 650 MG: 325 TABLET, FILM COATED ORAL at 14:00

## 2017-09-17 RX ADMIN — OXYCODONE HYDROCHLORIDE 5 MG: 5 TABLET ORAL at 19:38

## 2017-09-17 RX ADMIN — CEFEPIME 2 G: 2 INJECTION, POWDER, FOR SOLUTION INTRAMUSCULAR; INTRAVENOUS at 12:52

## 2017-09-17 RX ADMIN — OXYCODONE HYDROCHLORIDE 5 MG: 5 TABLET ORAL at 13:37

## 2017-09-17 RX ADMIN — TAMSULOSIN HYDROCHLORIDE 0.4 MG: 0.4 CAPSULE ORAL at 10:29

## 2017-09-17 RX ADMIN — POTASSIUM CHLORIDE, DEXTROSE MONOHYDRATE AND SODIUM CHLORIDE: 150; 5; 450 INJECTION, SOLUTION INTRAVENOUS at 10:29

## 2017-09-17 RX ADMIN — HYDROCODONE BITARTRATE AND ACETAMINOPHEN 2 TABLET: 5; 325 TABLET ORAL at 01:58

## 2017-09-17 RX ADMIN — SODIUM CHLORIDE 2919 ML: 9 INJECTION, SOLUTION INTRAVENOUS at 01:35

## 2017-09-17 RX ADMIN — CEFTRIAXONE SODIUM 2 G: 2 INJECTION, POWDER, FOR SOLUTION INTRAMUSCULAR; INTRAVENOUS at 03:29

## 2017-09-17 ASSESSMENT — PAIN SCALES - GENERAL
PAINLEVEL_OUTOF10: 0
PAINLEVEL_OUTOF10: 0
PAINLEVEL_OUTOF10: 8
PAINLEVEL_OUTOF10: 0

## 2017-09-17 ASSESSMENT — LIFESTYLE VARIABLES
HAVE YOU EVER FELT YOU SHOULD CUT DOWN ON YOUR DRINKING: NO
HOW MANY TIMES IN THE PAST YEAR HAVE YOU HAD 5 OR MORE DRINKS IN A DAY: 0
TOTAL SCORE: 0
CONSUMPTION TOTAL: NEGATIVE
ON A TYPICAL DAY WHEN YOU DRINK ALCOHOL HOW MANY DRINKS DO YOU HAVE: .5
EVER FELT BAD OR GUILTY ABOUT YOUR DRINKING: NO
AVERAGE NUMBER OF DAYS PER WEEK YOU HAVE A DRINK CONTAINING ALCOHOL: 0
EVER HAD A DRINK FIRST THING IN THE MORNING TO STEADY YOUR NERVES TO GET RID OF A HANGOVER: NO
DO YOU DRINK ALCOHOL: NO
EVER_SMOKED: YES
TOTAL SCORE: 0
HAVE PEOPLE ANNOYED YOU BY CRITICIZING YOUR DRINKING: NO
DO YOU DRINK ALCOHOL: YES
TOTAL SCORE: 0

## 2017-09-17 ASSESSMENT — ENCOUNTER SYMPTOMS
FEVER: 1
VOMITING: 0
DIARRHEA: 0
MYALGIAS: 0
PHOTOPHOBIA: 0
CHILLS: 0
NAUSEA: 0
WHEEZING: 0
FOCAL WEAKNESS: 0
DEPRESSION: 0
DIZZINESS: 0
TINGLING: 0
PALPITATIONS: 0
SORE THROAT: 0
COUGH: 0
FLANK PAIN: 1
HEADACHES: 0
ABDOMINAL PAIN: 0
SHORTNESS OF BREATH: 0

## 2017-09-17 NOTE — ASSESSMENT & PLAN NOTE
Patient has leukocytosis, she is febrile, she is tachycardic, and she has evidence of end organ damage with acute kidney injury, fortunately she has no lactic acidosis but her blood pressures are borderline low. She will be admitted under the sepsis protocol with empiric antibiotic therapy and goal-directed therapy with IV fluid resuscitation. We will monitor her urine cultures for sensitivities and speciation.

## 2017-09-17 NOTE — ED NOTES
Chief Complaint   Patient presents with   • Fever   • Flank Pain     R side     /66   Pulse (!) 106   Temp (!) 38.6 °C (101.4 °F)   Resp 14   Wt 97.3 kg (214 lb 8.1 oz)   SpO2 95%   BMI 33.60 kg/m²     Pt ambulated into triage, complaints as above. Sepsis score of 3, protocol initiated. Charge RN notified.

## 2017-09-17 NOTE — H&P
Hospital Medicine History and Physical    Date of Service  9/17/2017    Chief Complaint  Chief Complaint   Patient presents with   • Fever   • Flank Pain     R side       History of Presenting Illness  47 y.o. female who presented on 9/17/2017 withFever and right-sided flank pain. She has no significant medical issues. Prior to this, she was in her usual state of health which is good. Over the last day, the patient had acute onset constant right-sided flank pain with subjective fevers. She was actually seen in the emergency room yesterday and was diagnosed with a 2-3 mm right-sided UVJ stone. Upon assessment in the ER, the patient is noted to be febrile, tachycardic, has significant leukocytosis, and acute kidney injury. UA is positive. She will be admitted for special's consultation and treatment of sepsis. Otherwise, patient denies any chest pain, shortness of breath, abdominal pain, diarrhea, or dysuria.      Primary Care Physician  Chico Aguillon M.D.    Consultants  Urology    Code Status  Full    Review of Systems  Review of Systems   Constitutional: Positive for fever. Negative for chills.   HENT: Negative for congestion and sore throat.    Eyes: Negative for photophobia.   Respiratory: Negative for cough, shortness of breath and wheezing.    Cardiovascular: Negative for chest pain and palpitations.   Gastrointestinal: Negative for abdominal pain, diarrhea, nausea and vomiting.   Genitourinary: Positive for flank pain. Negative for dysuria.   Musculoskeletal: Negative for myalgias.   Skin: Negative.    Neurological: Negative for dizziness, tingling, focal weakness and headaches.   Psychiatric/Behavioral: Negative for depression and suicidal ideas.        Past Medical History  No past medical history on file.    Surgical History  Past Surgical History:   Procedure Laterality Date   • ABDOMINAL EXPLORATION     • ABDOMINAL HYSTERECTOMY TOTAL         Medications  No current facility-administered medications on  file prior to encounter.      Current Outpatient Prescriptions on File Prior to Encounter   Medication Sig Dispense Refill   • tamsulosin (FLOMAX) 0.4 MG capsule Take 1 Cap by mouth every day. 5 Cap 0   • ondansetron (ZOFRAN ODT) 4 MG TABLET DISPERSIBLE Take 1 Tab by mouth every 8 hours as needed for Nausea/Vomiting. 10 Tab 0   • oxycodone-acetaminophen (PERCOCET) 5-325 MG Tab Take 1-2 Tabs by mouth every 6 hours as needed (moderate to severe pain). 10 Tab 0   • estradiol (VIVELLE-DOT) 0.075 MG/24HR PTTW Apply 1 Patch to skin as directed See Admin Instructions.     • ibuprofen (MOTRIN) 600 MG TABS Take 1 Tab by mouth every 6 hours as needed for Mild Pain. 25 Tab 0       Family History  History reviewed. No pertinent family history.    Social History  Social History   Substance Use Topics   • Smoking status: Never Smoker   • Smokeless tobacco: Never Used   • Alcohol use Yes       Allergies  Allergies   Allergen Reactions   • Levaquin         Physical Exam  Laboratory   Hemodynamics  Temp (24hrs), Av.2 °C (100.7 °F), Min:37.7 °C (99.9 °F), Max:38.6 °C (101.4 °F)   Temperature: (!) 38.6 °C (101.4 °F)  Pulse  Av  Min: 89  Max: 106 Heart Rate (Monitored): 89  Blood Pressure: 109/66, NIBP: 103/55      Respiratory      Respiration: 15, Pulse Oximetry: 97 %             Physical Exam   Constitutional: She is oriented to person, place, and time. No distress.   HENT:   Head: Normocephalic and atraumatic.   Right Ear: External ear normal.   Left Ear: External ear normal.   Eyes: EOM are normal. Right eye exhibits no discharge. Left eye exhibits no discharge.   Neck: Neck supple. No JVD present.   Cardiovascular: Normal rate, regular rhythm and normal heart sounds.    Pulmonary/Chest: Effort normal and breath sounds normal. No respiratory distress. She exhibits no tenderness.   Abdominal: Soft. Bowel sounds are normal. She exhibits no distension. There is no tenderness.   Musculoskeletal: Normal range of motion. She  exhibits no edema.   Neurological: She is alert and oriented to person, place, and time. No cranial nerve deficit.   Skin: Skin is warm and dry. She is not diaphoretic. No erythema.   Psychiatric: She has a normal mood and affect. Her behavior is normal.   Nursing note and vitals reviewed.      Recent Labs      09/15/17   1850  09/17/17   0129   WBC  21.1*  18.6*   RBC  4.86  4.72   HEMOGLOBIN  15.1  14.8   HEMATOCRIT  43.2  42.6   MCV  88.9  90.3   MCH  31.1  31.4   MCHC  35.0  34.7   RDW  38.5  39.5   PLATELETCT  386  328   MPV  11.2  10.9     Recent Labs      09/15/17   1850  09/17/17   0129   SODIUM  137  134*   POTASSIUM  4.4  3.9   CHLORIDE  106  101   CO2  20  24   GLUCOSE  117*  104*   BUN  21  18   CREATININE  1.41*  1.75*   CALCIUM  9.7  9.1     Recent Labs      09/15/17   1850  09/17/17   0129   ALTSGPT   --   10   ASTSGOT   --   13   ALKPHOSPHAT   --   78   TBILIRUBIN   --   0.8   GLUCOSE  117*  104*                 No results found for: TROPONINI    Imaging  Ct-abdomen-pelvis W/o    Result Date: 9/17/2017 9/17/2017 3:51 AM HISTORY/REASON FOR EXAM:  RLQ Pain. RIGHT flank pain, fever TECHNIQUE/EXAM DESCRIPTION: CT scan of the abdomen and pelvis without contrast. Noncontrast helical scanning was obtained from the diaphragmatic domes through the pubic symphysis. Low dose optimization technique was utilized for this CT exam including automated exposure control and adjustment of the mA and/or kV according to patient size. COMPARISON: 9/15/2017 FINDINGS: CT Abdomen: Visualized lung bases show mild dependent atelectasis. The liver is unremarkable. The spleen is unremarkable. Adrenal glands are unremarkable. The pancreas is unremarkable. Dense material present within the gallbladder, likely vicarious excretion. LEFT kidney is unremarkable. RIGHT kidney shows dilated collecting system and stranding.  RIGHT ureter is dilated with 2 mm stone in the distal ureter. CT Pelvis: Bladder is unremarkable. Appendix has a  normal appearance. No peritoneal fluid or pneumoperitoneum. Abdominal aorta is unremarkable. Postoperative change of lower abdominal wall. Stranding is seen in the RIGHT lower quadrant with fluid present dependently.     1.  2 mm distal RIGHT ureteral stone with moderate obstructive changes, worse than on prior exam. 2.  Edema is seen along the RIGHT ureter with minimal fluid present.     Ct-abdomen-pelvis With    Result Date: 9/15/2017  9/15/2017 12:03 PM HISTORY/REASON FOR EXAM:  Pain. Right lower quadrant pain TECHNIQUE/EXAM DESCRIPTION:  CT scan of the abdomen and pelvis with contrast. Contrast-enhanced helical scanning was obtained from the diaphragmatic domes through the pubic symphysis following the bolus administration of nonionic contrast without complication. 100 mL of Omnipaque 350 nonionic contrast was administered without complication. Low dose optimization technique was utilized for this CT exam including automated exposure control and adjustment of the mA and/or kV according to patient size. COMPARISON: No prior studies available. FINDINGS: Lung bases: There is mild bibasilar atelectasis. Abdomen: No free air is seen in the abdomen or pelvis. Hypodensity along the falciform ligament of the liver is likely related to focal fat. This is however somewhat ovoid and measures 2.5 x 1.9 cm. Gallbladder is unremarkable. Spleen is not enlarged. There is no  adrenal mass. There is no evidence of hydronephrosis on the left. There is moderate right hydroureteronephrosis with a delayed nephrogram and perinephric as well as periureteric stranding. There is a 3 mm calculus at the right UVJ. Pancreas appears unremarkable. The aorta aorta is not aneurysmal. There are small inguinal, retroperitoneal and mesenteric lymph nodes. There is no evidence of bowel obstruction. The colon is relatively decompressed, limiting evaluation. Visualized appendix and terminal ileum are unremarkable. Bladder is decompressed. Uterus is  surgically absent. Calcific densities in the pelvis likely represent phleboliths. Postsurgical changes are noted of the anterior abdominal wall. Right inguinal lymph nodes measure up to 1 cm in short axis dimension. Sclerotic foci in the pelvis likely represent bone islands. Degenerative changes are seen in the spine.     Moderate right hydroureteronephrosis with a delayed nephrogram, perinephric as well as periureteric stranding and a 3 mm calculus at the right UVJ. 2.5 cm hypodensity along the falciform ligament of the liver likely represents focal fat. As this is somewhat ovoid in appearance, further evaluation with right upper quadrant ultrasound is recommended to exclude an additional process.    [id number] View    Result Date: 9/17/2017 9/17/2017 2:06 AM HISTORY/REASON FOR EXAM:  Kidney stone, possible infection. TECHNIQUE/EXAM DESCRIPTION AND NUMBER OF VIEWS:  1 view(s) of the abdomen. COMPARISON: 9/15/2017 FINDINGS: No evidence for small bowel obstruction. Minimal residual contrast in the RIGHT renal collecting system, diminished from prior exam. No definite renal or ureteral stone demonstrated. No major bony abnormality is seen.     1.  Minimal residual contrast in the RIGHT renal collecting system, decreased from prior exam. 2.  No definite renal or ureteral stone.    Kc-[id number] View    Result Date: 9/15/2017  9/15/2017 7:20 AM HISTORY/REASON FOR EXAM:  Right Flank Pain. TECHNIQUE/EXAM DESCRIPTION AND NUMBER OF VIEWS:   1 views of the abdomen. COMPARISON: CT 9/15/2017 FINDINGS: There is right hydroureteronephrosis with a delayed nephrogram. Contrast is seen in the bladder. There is nonspecific bowel gas pattern. Previously noted calculus is not visualized.     Right hydroureteronephrosis with a delayed nephrogram. Previously seen calculus is not visualized on the current examination and may be obscured by contrast within the bladder.    Dx-chest-portable (1 View)    Result Date: 9/17/2017 9/17/2017  2:06 AM HISTORY/REASON FOR EXAM:  Fever, RIGHT flank pain, possible sepsis. TECHNIQUE/EXAM DESCRIPTION AND NUMBER OF VIEWS: Single portable view of the chest. COMPARISON: None FINDINGS: Cardiomediastinal contour is within normal limits. No focal pulmonary consolidation. No pleural fluid collection or pneumothorax. No major bony abnormality is seen.     No acute cardiopulmonary disease.     Assessment/Plan     Anticipate that patient will need Greater than 2 midnights for management of the discussed medical issues.    This dictation was created using voice recognition software. The accuracy of the dictation is limited to the abilities of the software. I expect there may be some errors of grammar and possibly content.    * Sepsis (CMS-[facility])- (present on admission)   Assessment & Plan    Patient has leukocytosis, she is febrile, she is tachycardic, and she has evidence of end organ damage with acute kidney injury, fortunately she has no lactic acidosis but her blood pressures are borderline low. She will be admitted under the sepsis protocol with empiric antibiotic therapy and goal-directed therapy with IV fluid resuscitation. We will monitor her urine cultures for sensitivities and speciation.        UTI (urinary tract infection)- (present on admission)   Assessment & Plan    Treatment as noted above, secondary to obstructive uropathy. Patient will be on IV cefepime and will monitor her urine cultures.        CECILIO (acute kidney injury) (CMS-[facility])- (present on admission)   Assessment & Plan    Secondary to infection as well as prerenal azotemia, she will receiving copious IV fluids and we will repeat renal function in the morning.        Obstructive uropathy- (present on admission)   Assessment & Plan    Treatments as noted above, Dr. Resendiz of urology has been consulted by the ER physician and will take the patient directly to the OR for possible stenting. Continue aggressive IV fluid resuscitation and strain urine.           Prophylaxis: Sequential compression devices for DVT prophylaxis, no PPI indicated, stool softeners ordered

## 2017-09-17 NOTE — OR SURGEON
Operative Report    PreOp Diagnosis: right ureteral stone    PostOp Diagnosis: 1. Right ureteral stone  2. Right ureteral stricture    Procedure(s):  CYSTOSCOPY - Wound Class: Dirty or Infected  URETEROSCOPY - Wound Class: Dirty or Infected  LASERTRIPSY - Wound Class: Dirty or Infected  STENT PLACEMENT - Wound Class: Dirty or Infected    Surgeon(s):  Kiet Eid M.D.    Anesthesiologist/Type of Anesthesia:  Anesthesiologist: Oscar Quiroz M.D./General    Surgical Staff:  Circulator: Elkin Rivera R.N.  Scrub Person: Michael Hopson    Specimens:  * No specimens in log *    Estimated Blood Loss: none    Findings: impacted 3mm stone at stricture    Complications: none        9/17/2017 8:33 AM Kiet Eid

## 2017-09-17 NOTE — ED NOTES
Pt. Updated on the POC to be transferred upstairs. Call light within reach. Will continue to monitor.

## 2017-09-17 NOTE — CONSULTS
DATE OF SERVICE:  09/17/2017    REASON FOR CONSULTATION:  Right ureteral calculus.    HISTORY OF PRESENT ILLNESS:  The patient has had a week or longer of ongoing   abdominal pain.  She had a CT scan done before, which showed a distal stone.    She represents with increasing pain and fever.  Followup CT scan shows   persistence of stone in the distal ureter with worsening hydronephrosis.  She   has not had prior stones.    PAST MEDICAL HISTORY:  No major illnesses.    MEDICATIONS:  None.    PAST SURGICAL HISTORY:  Abdominal hysterectomy.    PHYSICAL EXAMINATION:  GENERAL:  She is awake, alert.  She is in mild distress.  VITAL SIGNS:  T-max 101.4.  ABDOMEN:  Soft.  1-2+ right lower quadrant tenderness.    LABORATORY DATA:  White count of 18.6.  Creatinine 1.75.  Cultures pending.    Urinalysis with large blood and leukocytes.  CT scan, 2 mm distal right   ureteral calculus.    ASSESSMENT:  Obstructing stone with infection.    PLAN:  Will be for cystoscopy, short attempt at ureteroscopy, lithotripsy if   the stone is easily accessible, otherwise we will just plan for stent.  If it   is only a stent placement today, we will then go back for lithotripsy once the   infection is cleared up.       ____________________________________     MD ANNA SEVERINO / JERILYN    DD:  09/17/2017 07:37:44  DT:  09/17/2017 08:40:13    D#:  [id number]  Job#:  [id number]

## 2017-09-17 NOTE — OP REPORT
DATE OF SERVICE:  09/17/2017    PREOPERATIVE DIAGNOSIS:  A 3 mm distal right ureteral calculus.    POSTOPERATIVE DIAGNOSES:  1.  A 3 mm right distal ureteral calculus.  2.  Ureteral stricture.    PROCEDURE:  1.  Right ureteroscopy, laser lithotripsy.  2.  Dilation of ureteral stricture.  3.  Cystoscopy, indwelling stent placement.    OPERATIVE REPORT:  Patient was brought to the operating room, was given a   general anesthetic, placed in lithotomy position, prepped and draped in   sterile fashion.  Cystoscopy demonstrates normal bladder, right orifice   identified.  Wires attempted to be passed through the orifice was not   successful.  I then took the 6.9-Palauan ureteroscope, was able pass in the   distal ureter.  I could see the stone peeking through what was a strictured   area.  I had still attempted to pass a wire through the ureteroscope alongside   the stone was not successful.  Therefore, I opted to fragment the stone in   situ.  With the stone visualized, a 200 micron holmium laser fiber at 600   millijoules and 5 Hz was broken into fragments that would create some space   around the stone.  With that, I was able to get a wire through the scope up   alongside the stone up towards the kidney.  Over this, I passed an open ended   catheter.  There was some proximal tortuosity, then I used a curved tip wire   and we were able to get wire all the way to the kidney.  The open ended   catheter was passed up to the kidney.  Contrast injected opacify the renal   pelvis.  I then replaced the sensor wire.  Over this wire, I then dilated the   distal stricture with the Lily dilator.  I then took the 6.9-Palauan   ureteroscope and then repassed that and showed that the stricture was not   dilated.  There was some disrupted urothelium, which were then planned to heal   over a stent.  The fragments with this did also flushed down into the   bladder.  Over the wire, a 26 cm 6-Palauan double pigtail stent was placed    coiling in the renal pelvis distally in the bladder.  She is sent to recovery   in stable condition.    FINAL DIAGNOSES:  Status post right ureteroscopy, laser lithotripsy, dilation   of ureteral stricture, indwelling stent placement.       ____________________________________     MD ANNA SEVERINO / JERILYN    DD:  09/17/2017 08:37:03  DT:  09/17/2017 08:58:04    D#:  [id number]  Job#:  [id number]

## 2017-09-17 NOTE — PROGRESS NOTES
Attempted to update  Aly via his cell phone, no answer, left a message to include assigned room number.

## 2017-09-17 NOTE — ED PROVIDER NOTES
ED Provider Note  ED Provider Note    Primary care provider: Chico Aguillon M.D.  Means of arrival: POV  History obtained from: Patient    CHIEF COMPLAINT  Chief Complaint   Patient presents with   • Fever   • Flank Pain     R side     Seen at 1:06 AM.   HPI  Aurora Tan is a 47 y.o. female who presents to the Emergency Department With a fever and right lower quadrant abdominal pain.  She was seen at this facility just over 24 hours ago and was diagnosed with 3 mm right-sided UVJ stone. The patient notes the right lower quadrant abdominal pain is intermittent without modifying factors. This has remained well controlled with the pain medications given. She did notice gradual onset of a fever today with a T-max of 100.4 at home. At triage temperature was 101.4.    She endorses a moderate to severe gradual onset of headache. She denies neck pain, visual changes, photophobia, chest pain, cough, congestion, shortness of breath, nausea, vomiting, flank pain, back pain, dysuria. No history of impaired immunity.        REVIEW OF SYSTEMS  See HPI,   Remainder of ROS negative.   C.  PAST MEDICAL HISTORY       SURGICAL HISTORY   has a past surgical history that includes abdominal hysterectomy total and abdominal exploration.    SOCIAL HISTORY  Social History   Substance Use Topics   • Smoking status: Never Smoker   • Smokeless tobacco: Never Used   • Alcohol use Yes      History   Drug Use No       FAMILY HISTORY  History reviewed. No pertinent family history.    CURRENT MEDICATIONS  Reviewed.  See Encounter Summary.     ALLERGIES  Allergies   Allergen Reactions   • Levaquin        PHYSICAL EXAM  VITAL SIGNS: /66   Pulse (!) 106   Temp (!) 38.6 °C (101.4 °F)   Resp 14   Wt 97.3 kg (214 lb 8.1 oz)   SpO2 95%   BMI 33.60 kg/m²   Constitutional: Awake, alert in no apparent distress.  HENT: Normocephalic, Bilateral external ears normal. Nose normal.Neck is supple, full range of motion. Eyes: Conjunctiva  normal, non-icteric, EOMI.    Thorax & Lungs: Easy unlabored respirations, Clear to ascultation bilaterally.  Cardiovascular: Tachycardic,   Regular rhythm, No murmurs, rubs or gallops.  Abdomen:  Soft, mild right lower quadrant abdominal tenderness without rebound or guarding, nondistended, normal active bowel sounds.   :  No CVA tenderness  Skin: Visualized skin is  Dry, No erythema, No rash.   Musculoskeletal:   No cyanosis, clubbing or edema.  Neurologic: Alert, Grossly non-focal.   Psychiatric: Normal affect, Normal mood  Lymphatic:  No cervical LAD      RADIOLOGY  CT-ABDOMEN-PELVIS W/O   Final Result      1.  2 mm distal RIGHT ureteral stone with moderate obstructive changes, worse than on prior exam.   2.  Edema is seen along the RIGHT ureter with minimal fluid present.         DX-CHEST-PORTABLE (1 VIEW)   Final Result      No acute cardiopulmonary disease.      [id number] VIEW   Final Result      1.  Minimal residual contrast in the RIGHT renal collecting system, decreased from prior exam.   2.  No definite renal or ureteral stone.        The radiologist's interpretation of all radiological studies have been reviewed by me.    COURSE & MEDICAL DECISION MAKING  Pertinent Labs & Imaging studies reviewed. (See chart for details)    Differential diagnoses include but are not limited to: Sepsis, UTI, infected stone    1:06 AM - Patient seen and examined at bedside.    2:51 AM - Urology paged.    3:27 AM- Urology re- paged.   I performed a bedside ultrasound, I do not see any Obvious evidence of hydronephrosis. The patient has persistent right lower quadrant tenderness.    3:50 AM- reviewed case with Dr. Eid, at this time he recommends to repeat the CT scan.    4:25 AM - repeat CT shows worsening hydronephrosis and hydroureter, the case was discussed with the hospitalist, Dr. Herrera and Dr. Eid.  The patient will require a urethral stent. She is to be nothing by mouth.  IV antibiotics have been  refused. Patient's systolic blood pressure is now 123.      Decision Making:  This is a 47 y.o. year old female who presents with fevers, persistent right lower quadrant pain and recent diagnosis of a 3 mm UVJ stone. Initially I was hesitant to repeat the CT as her last imaging was less than 36 hours ago. She does have a persistent leukocytosis and slightly worsening creatinine. Urinalysis shows obvious infection.  KUB did not visualize the stone. Because of the small nature of the stone and uncertainty, I felt compelled to repeat the CT after discussion with urology.  It shows worsening hydronephrosis and hydroureter. In the setting of a fever, leukocytosis and worsening acute kidney injury, she will require emergent stent placement.        Discharge Medications:  New Prescriptions    No medications on file       The patient will be admitted to the hospitalist in guarded condition.    FINAL IMPRESSION  1. Ureteral stone with hydronephrosis    2. Urinary tract infection with hematuria, site unspecified    3. CECILIO (acute kidney injury) (CMS-[facility])    4. Sepsis, due to unspecified organism (CMS-[facility])

## 2017-09-17 NOTE — ASSESSMENT & PLAN NOTE
Secondary to infection as well as prerenal azotemia, she will receiving copious IV fluids and we will repeat renal function in the morning.

## 2017-09-17 NOTE — ASSESSMENT & PLAN NOTE
Treatment as noted above, secondary to obstructive uropathy. Patient will be on IV cefepime and will monitor her urine cultures.

## 2017-09-17 NOTE — ED NOTES
Pt transferred to Pre-Op, Omayra FOURNIER aware of pt's pending arrival, all belongings accounted for.

## 2017-09-17 NOTE — PROGRESS NOTES
Presents with small distal right stone. Now with fever suggesting infected stone. To OR for ureteroscopy and laser lithotripsy.

## 2017-09-17 NOTE — ASSESSMENT & PLAN NOTE
Treatments as noted above, Dr. Resendiz of urology has been consulted by the ER physician and will take the patient directly to the OR for possible stenting. Continue aggressive IV fluid resuscitation and strain urine.

## 2017-09-18 VITALS
HEIGHT: 67 IN | SYSTOLIC BLOOD PRESSURE: 106 MMHG | TEMPERATURE: 98.7 F | OXYGEN SATURATION: 94 % | BODY MASS INDEX: 33.74 KG/M2 | DIASTOLIC BLOOD PRESSURE: 69 MMHG | RESPIRATION RATE: 18 BRPM | WEIGHT: 215 LBS | HEART RATE: 72 BPM

## 2017-09-18 PROBLEM — N39.0 UTI (URINARY TRACT INFECTION): Status: RESOLVED | Noted: 2017-09-17 | Resolved: 2017-09-18

## 2017-09-18 PROBLEM — N17.9 AKI (ACUTE KIDNEY INJURY) (HCC): Status: RESOLVED | Noted: 2017-09-17 | Resolved: 2017-09-18

## 2017-09-18 PROBLEM — N13.9 OBSTRUCTIVE UROPATHY: Status: RESOLVED | Noted: 2017-09-17 | Resolved: 2017-09-18

## 2017-09-18 PROBLEM — A41.9 SEPSIS, UNSPECIFIED: Status: RESOLVED | Noted: 2017-09-17 | Resolved: 2017-09-18

## 2017-09-18 LAB
ANION GAP SERPL CALC-SCNC: 5 MMOL/L (ref 0–11.9)
BACTERIA UR CULT: NORMAL
BUN SERPL-MCNC: 8 MG/DL (ref 8–22)
CALCIUM SERPL-MCNC: 8.3 MG/DL (ref 8.5–10.5)
CHLORIDE SERPL-SCNC: 108 MMOL/L (ref 96–112)
CO2 SERPL-SCNC: 23 MMOL/L (ref 20–33)
CREAT SERPL-MCNC: 0.94 MG/DL (ref 0.5–1.4)
ERYTHROCYTE [DISTWIDTH] IN BLOOD BY AUTOMATED COUNT: 41.1 FL (ref 35.9–50)
GFR SERPL CREATININE-BSD FRML MDRD: >60 ML/MIN/1.73 M 2
GLUCOSE SERPL-MCNC: 122 MG/DL (ref 65–99)
HCT VFR BLD AUTO: 32.4 % (ref 37–47)
HGB BLD-MCNC: 10.9 G/DL (ref 12–16)
MCH RBC QN AUTO: 30.9 PG (ref 27–33)
MCHC RBC AUTO-ENTMCNC: 33.6 G/DL (ref 33.6–35)
MCV RBC AUTO: 91.8 FL (ref 81.4–97.8)
PLATELET # BLD AUTO: 239 K/UL (ref 164–446)
PMV BLD AUTO: 11.4 FL (ref 9–12.9)
POTASSIUM SERPL-SCNC: 3.8 MMOL/L (ref 3.6–5.5)
RBC # BLD AUTO: 3.53 M/UL (ref 4.2–5.4)
SIGNIFICANT IND 70042: NORMAL
SITE SITE: NORMAL
SODIUM SERPL-SCNC: 136 MMOL/L (ref 135–145)
SOURCE SOURCE: NORMAL
WBC # BLD AUTO: 11.2 K/UL (ref 4.8–10.8)

## 2017-09-18 PROCEDURE — 700111 HCHG RX REV CODE 636 W/ 250 OVERRIDE (IP): Performed by: HOSPITALIST

## 2017-09-18 PROCEDURE — 700101 HCHG RX REV CODE 250: Performed by: UROLOGY

## 2017-09-18 PROCEDURE — 36415 COLL VENOUS BLD VENIPUNCTURE: CPT

## 2017-09-18 PROCEDURE — 85027 COMPLETE CBC AUTOMATED: CPT

## 2017-09-18 PROCEDURE — 80048 BASIC METABOLIC PNL TOTAL CA: CPT

## 2017-09-18 PROCEDURE — 99239 HOSP IP/OBS DSCHRG MGMT >30: CPT | Performed by: HOSPITALIST

## 2017-09-18 PROCEDURE — 700102 HCHG RX REV CODE 250 W/ 637 OVERRIDE(OP): Performed by: HOSPITALIST

## 2017-09-18 PROCEDURE — A9270 NON-COVERED ITEM OR SERVICE: HCPCS | Performed by: HOSPITALIST

## 2017-09-18 PROCEDURE — 700105 HCHG RX REV CODE 258: Performed by: HOSPITALIST

## 2017-09-18 PROCEDURE — 96365 THER/PROPH/DIAG IV INF INIT: CPT

## 2017-09-18 RX ORDER — OXYCODONE HYDROCHLORIDE AND ACETAMINOPHEN 5; 325 MG/1; MG/1
1-2 TABLET ORAL EVERY 6 HOURS PRN
Qty: 10 TAB | Refills: 0 | Status: SHIPPED | OUTPATIENT
Start: 2017-09-18 | End: 2017-11-03

## 2017-09-18 RX ORDER — CEFDINIR 300 MG/1
300 CAPSULE ORAL 2 TIMES DAILY
Qty: 20 CAP | Refills: 0 | Status: SHIPPED | OUTPATIENT
Start: 2017-09-18 | End: 2017-11-03

## 2017-09-18 RX ADMIN — TAMSULOSIN HYDROCHLORIDE 0.4 MG: 0.4 CAPSULE ORAL at 08:30

## 2017-09-18 RX ADMIN — ACETAMINOPHEN 650 MG: 325 TABLET, FILM COATED ORAL at 00:12

## 2017-09-18 RX ADMIN — POTASSIUM CHLORIDE, DEXTROSE MONOHYDRATE AND SODIUM CHLORIDE: 150; 5; 450 INJECTION, SOLUTION INTRAVENOUS at 00:33

## 2017-09-18 RX ADMIN — POTASSIUM CHLORIDE, DEXTROSE MONOHYDRATE AND SODIUM CHLORIDE: 150; 5; 450 INJECTION, SOLUTION INTRAVENOUS at 08:31

## 2017-09-18 RX ADMIN — CEFEPIME 2 G: 2 INJECTION, POWDER, FOR SOLUTION INTRAMUSCULAR; INTRAVENOUS at 00:33

## 2017-09-18 ASSESSMENT — PAIN SCALES - GENERAL
PAINLEVEL_OUTOF10: 5
PAINLEVEL_OUTOF10: 4

## 2017-09-18 NOTE — PROGRESS NOTES
Patient educated on the use of the bed alarm, continues to refuse.  Bed is locked and in the lowest position, call light is within reach.  Demonstrated proper use of the call light.

## 2017-09-18 NOTE — DISCHARGE INSTRUCTIONS
Discharge Instructions    Discharged to home by car with relative. Discharged via wheelchair, hospital escort: Yes.  Special equipment needed: Not Applicable    Be sure to schedule a follow-up appointment with your primary care doctor or any specialists as instructed.     Discharge Plan:   Influenza Vaccine Indication: Patient Refuses    I understand that a diet low in cholesterol, fat, and sodium is recommended for good health. Unless I have been given specific instructions below for another diet, I accept this instruction as my diet prescription.   Other diet: Regualr      Special Instructions: None    · Is patient discharged on Warfarin / Coumadin?   No     · Is patient Post Blood Transfusion?  No    Depression / Suicide Risk    As you are discharged from this [facility] facility, it is important to learn how to keep safe from harming yourself.    Recognize the warning signs:  · Abrupt changes in personality, positive or negative- including increase in energy   · Giving away possessions  · Change in eating patterns- significant weight changes-  positive or negative  · Change in sleeping patterns- unable to sleep or sleeping all the time   · Unwillingness or inability to communicate  · Depression  · Unusual sadness, discouragement and loneliness  · Talk of wanting to die  · Neglect of personal appearance   · Rebelliousness- reckless behavior  · Withdrawal from people/activities they love  · Confusion- inability to concentrate     If you or a loved one observes any of these behaviors or has concerns about self-harm, here's what you can do:  · Talk about it- your feelings and reasons for harming yourself  · Remove any means that you might use to hurt yourself (examples: pills, rope, extension cords, firearm)  · Get professional help from the community (Mental Health, Substance Abuse, psychological counseling)  · Do not be alone:Call your Safe Contact- someone whom you trust who will be there for you.  · Call your  local CRISIS HOTLINE 313-0939 or 196-630-1056  · Call your local Children's Mobile Crisis Response Team [place] (015) 935-3427 or www.[place]  · Call the toll free National Suicide Prevention Hotlines   · National Suicide Prevention Lifeline 240-442-IFZK (6535)  · [facility] Line Network 800-SUICIDE (432-6834)    Ureteral Stent Implantation, Care After  Refer to this sheet in the next few weeks. These instructions provide you with information on caring for yourself after your procedure. Your health care provider may also give you more specific instructions. Your treatment has been planned according to current medical practices, but problems sometimes occur. Call your health care provider if you have any problems or questions after your procedure.  WHAT TO EXPECT AFTER THE PROCEDURE  You should be back to normal activity within 48 hours after the procedure. Nausea and vomiting may occur and are commonly the result of anesthesia.  It is common to experience sharp pain in the back or lower abdomen and penis with voiding. This is caused by movement of the ends of the stent with the act of urinating. It usually goes away within minutes after you have stopped urinating.  HOME CARE INSTRUCTIONS  Make sure to drink plenty of fluids. You may have small amounts of bleeding, causing your urine to be red. This is normal. Certain movements may trigger pain or a feeling that you need to urinate. You may be given medicines to prevent infection or bladder spasms. Be sure to take all medicines as directed. Only take over-the-counter or prescription medicines for pain, discomfort, or fever as directed by your health care provider. Do not take aspirin, as this can make bleeding worse.  Your stent will be left in until the blockage is resolved. This may take 2 weeks or longer, depending on the reason for stent implantation. You may have an X-ray exam to make sure your ureter is open and that the stent has not moved  out of position (migrated). The stent can be removed by your health care provider in the office. Medicines may be given for comfort while the stent is being removed. Be sure to keep all follow-up appointments so your health care provider can check that you are healing properly.  SEEK MEDICAL CARE IF:  · You experience increasing pain.  · Your pain medicine is not working.  SEEK IMMEDIATE MEDICAL CARE IF:  · Your urine is dark red or has blood clots.  · You are leaking urine (incontinent).  · You have a fever, chills, feeling sick to your stomach (nausea), or vomiting.  · Your pain is not relieved by pain medicine.  · The end of the stent comes out of the urethra.  · You are unable to urinate.     This information is not intended to replace advice given to you by your health care provider. Make sure you discuss any questions you have with your health care provider.     Document Released: 08/20/2014 Document Revised: 12/23/2014 Document Reviewed: 08/20/2014  [employer] Interactive Patient Education ©2016 [employer]          Kidney Stones  Kidney stones (urolithiasis) are deposits that form inside your kidneys. The intense pain is caused by the stone moving through the urinary tract. When the stone moves, the ureter goes into spasm around the stone. The stone is usually passed in the urine.   CAUSES   · A disorder that makes certain neck glands produce too much parathyroid hormone (primary hyperparathyroidism).  · A buildup of uric acid crystals, similar to gout in your joints.  · Narrowing (stricture) of the ureter.  · A kidney obstruction present at birth (congenital obstruction).  · Previous surgery on the kidney or ureters.  · Numerous kidney infections.  SYMPTOMS   · Feeling sick to your stomach (nauseous).  · Throwing up (vomiting).  · Blood in the urine (hematuria).  · Pain that usually spreads (radiates) to the groin.  · Frequency or urgency of urination.  DIAGNOSIS   · Taking a history and physical  exam.  · Blood or urine tests.  · CT scan.  · Occasionally, an examination of the inside of the urinary bladder (cystoscopy) is performed.  TREATMENT   · Observation.  · Increasing your fluid intake.  · Extracorporeal shock wave lithotripsy--This is a noninvasive procedure that uses shock waves to break up kidney stones.  · Surgery may be needed if you have severe pain or persistent obstruction. There are various surgical procedures. Most of the procedures are performed with the use of small instruments. Only small incisions are needed to accommodate these instruments, so recovery time is minimized.  The size, location, and chemical composition are all important variables that will determine the proper choice of action for you. Talk to your health care provider to better understand your situation so that you will minimize the risk of injury to yourself and your kidney.   HOME CARE INSTRUCTIONS   · Drink enough water and fluids to keep your urine clear or pale yellow. This will help you to pass the stone or stone fragments.  · Strain all urine through the provided strainer. Keep all particulate matter and stones for your health care provider to see. The stone causing the pain may be as small as a grain of salt. It is very important to use the strainer each and every time you pass your urine. The collection of your stone will allow your health care provider to analyze it and verify that a stone has actually passed. The stone analysis will often identify what you can do to reduce the incidence of recurrences.  · Only take over-the-counter or prescription medicines for pain, discomfort, or fever as directed by your health care provider.  · Make a follow-up appointment with your health care provider as directed.  · Get follow-up X-rays if required. The absence of pain does not always mean that the stone has passed. It may have only stopped moving. If the urine remains completely obstructed, it can cause loss of kidney  function or even complete destruction of the kidney. It is your responsibility to make sure X-rays and follow-ups are completed. Ultrasounds of the kidney can show blockages and the status of the kidney. Ultrasounds are not associated with any radiation and can be performed easily in a matter of minutes.  SEEK MEDICAL CARE IF:  · You experience pain that is progressive and unresponsive to any pain medicine you have been prescribed.  SEEK IMMEDIATE MEDICAL CARE IF:   · Pain cannot be controlled with the prescribed medicine.  · You have a fever or shaking chills.  · The severity or intensity of pain increases over 18 hours and is not relieved by pain medicine.  · You develop a new onset of abdominal pain.  · You feel faint or pass out.  · You are unable to urinate.  MAKE SURE YOU:   · Understand these instructions.  · Will watch your condition.  · Will get help right away if you are not doing well or get worse.     This information is not intended to replace advice given to you by your health care provider. Make sure you discuss any questions you have with your health care provider.     Document Released: 12/18/2006 Document Revised: 01/08/2016 Document Reviewed: 05/21/2014  [employer] Interactive Patient Education ©2016 [employer]

## 2017-09-18 NOTE — PROGRESS NOTES
Pt care assumed, discussed plan of care, report received, call light within reach and safety precautions in place.

## 2017-09-18 NOTE — PROGRESS NOTES
"Assumed care of patient.  She is alert and oriented times 4, complaining of pain of 8/10 in the right side of her abdomen.  Medicated per MAR.  VSS BP (!) 99/49   Pulse 90   Temp 37.2 °C (99 °F)   Resp 16   Ht 1.702 m (5' 7\")   Wt 97.5 kg (215 lb)   SpO2 95%   Breastfeeding? No   BMI 33.67 kg/m² .  Patient states she feels like she has a fever, will recheck oral temp., found to be 99.3 F  Patient has been hypotensive since admission, will continue to monitor vitals.  PIV in the LAC, patent,  running NS with 20K at 125mL/hr.  SCDs in place and activated.  Bed is locked and in the lowest position, call light is within reach.  All needs are met at this time, hourly rounding is in place.      "

## 2017-09-18 NOTE — CARE PLAN
Problem: Safety  Goal: Will remain free from injury  Outcome: PROGRESSING AS EXPECTED  Educated on use of call light    Problem: Pain Management  Goal: Pain level will decrease to patient's comfort goal  Outcome: PROGRESSING AS EXPECTED  Medicated per MAR

## 2017-09-18 NOTE — PROGRESS NOTES
"Urology Progress Note    Post op Day # 1. R CULTS    Overnight Events: None    S: No fevers, chills, nausea or vomiting.  Passing flatus.    O:   Blood pressure (!) 96/61, pulse 73, temperature 37.1 °C (98.8 °F), resp. rate 18, height 1.702 m (5' 7\"), weight 97.5 kg (215 lb), SpO2 95 %, not currently breastfeeding.  Recent Labs      09/15/17   1850  09/17/17   0129 09/18/17   0226   SODIUM  137  134*  136   POTASSIUM  4.4  3.9  3.8   CHLORIDE  106  101  108   CO2  20  24  23   GLUCOSE  117*  104*  122*   BUN  21  18  8   CREATININE  1.41*  1.75*  0.94   CALCIUM  9.7  9.1  8.3*     Recent Labs      09/15/17   1850  09/17/17   0129  09/18/17   0226   WBC  21.1*  18.6*  11.2*   RBC  4.86  4.72  3.53*   HEMOGLOBIN  15.1  14.8  10.9*   HEMATOCRIT  43.2  42.6  32.4*   MCV  88.9  90.3  91.8   MCH  31.1  31.4  30.9   MCHC  35.0  34.7  33.6   RDW  38.5  39.5  41.1   PLATELETCT  386  328  239   MPV  11.2  10.9  11.4         Intake/Output Summary (Last 24 hours) at 09/18/17 0929  Last data filed at 09/18/17 0400   Gross per 24 hour   Intake             1285 ml   Output             1100 ml   Net              185 ml       Exam:  Abdomen soft, benign.    Urine: clear      A/P:    Active Hospital Problems    Diagnosis   • Sepsis (CMS-[facility]) [A41.9]     Priority: High   • UTI (urinary tract infection) [N39.0]     Priority: High   • Obstructive uropathy [N13.9]   • CECILIO (acute kidney injury) (CMS-HCC) [N17.9]       Stable.   Feeling much better  DC home today  F/U with Dr Eid 2 weeks for stent removal  "

## 2017-09-19 NOTE — DISCHARGE SUMMARY
CHIEF COMPLAINT ON ADMISSION  Chief Complaint   Patient presents with   • Fever   • Flank Pain     R side       CODE STATUS  Prior    HPI & HOSPITAL COURSE  History of Presenting Illness  47 y.o. female who presented on 9/17/2017 withFever and right-sided flank pain. She has no significant medical issues. Prior to this, she was in her usual state of health which is good. Over the last day, the patient had acute onset constant right-sided flank pain with subjective fevers. She was actually seen in the emergency room yesterday and was diagnosed with a 2-3 mm right-sided UVJ stone. Upon assessment in the ER, the patient is noted to be febrile, tachycardic, has significant leukocytosis, and acute kidney injury. UA is positive. She will be admitted for special's consultation and treatment of sepsis. Otherwise, patient denies any chest pain, shortness of breath, abdominal pain, diarrhea, or dysuria.    She was taken to OR on 9/17- see OP report below    She was hydrate, given pain management, and iv antibiotics    She made and unexpected recovery and was stable on discharge on 9/18    Therefore, she is discharged in good and stable condition with close outpatient follow-up.    SPECIFIC OUTPATIENT FOLLOW-UP  Dr levy    DISCHARGE PROBLEM LIST  Principal Problem (Resolved):    Sepsis (CMS-HCC) POA: Yes  Active Problems:    * No active hospital problems. *  Resolved Problems:    UTI (urinary tract infection) POA: Yes    Obstructive uropathy POA: Yes    CECILIO (acute kidney injury) (CMS-HCC) POA: Yes      FOLLOW UP  No future appointments.  Kiet Levy M.D.  [id number]  [facility] [address]  [phone]    Schedule an appointment as soon as possible for a visit in 1 week  As needed    Chico Aguillon M.D.  [address] [facility] [id number] [phone]            MEDICATIONS ON DISCHARGE   Aurora Tan   Home Medication Instructions RADHA:[id number]    Printed on:09/19/17 0808   Medication  Information                      cefdinir (OMNICEF) 300 MG Cap  Take 1 Cap by mouth 2 times a day.             estradiol (MINIVELLE) 0.05 MG/24HR PATCH BIWEEKLY  Apply 1 Patch to skin as directed 2X A WEEK.             ondansetron (ZOFRAN ODT) 4 MG TABLET DISPERSIBLE  Take 1 Tab by mouth every 8 hours as needed for Nausea/Vomiting.             oxycodone-acetaminophen (PERCOCET) 5-325 MG Tab  Take 1-2 Tabs by mouth every 6 hours as needed (moderate to severe pain).             tamsulosin (FLOMAX) 0.4 MG capsule  Take 1 Cap by mouth every day.                 DIET  No orders of the defined types were placed in this encounter.      ACTIVITY  As tolerated.  Weight bearing as tolerated      CONSULTATIONS  urology    PROCEDURES  DATE OF SERVICE:  09/17/2017     PREOPERATIVE DIAGNOSIS:  A 3 mm distal right ureteral calculus.     POSTOPERATIVE DIAGNOSES:  1.  A 3 mm right distal ureteral calculus.  2.  Ureteral stricture.     PROCEDURE:  1.  Right ureteroscopy, laser lithotripsy.  2.  Dilation of ureteral stricture.  3.  Cystoscopy, indwelling stent placement.      Radiology    Status: Final result (Exam End: 9/17/2017  4:02 AM)   Imaging Previous Results     Open Hard Copy Result Report (Order #[id number] - CT-ABDOMEN-PELVIS W/O)   Narrative       9/17/2017 3:51 AM    HISTORY/REASON FOR EXAM:  RLQ Pain.  RIGHT flank pain, fever    TECHNIQUE/EXAM DESCRIPTION:  CT scan of the abdomen and pelvis without contrast.    Noncontrast helical scanning was obtained from the diaphragmatic domes through the pubic symphysis.    Low dose optimization technique was utilized for this CT exam including automated exposure control and adjustment of the mA and/or kV according to patient size.    COMPARISON: 9/15/2017    FINDINGS:  CT Abdomen:  Visualized lung bases show mild dependent atelectasis.  The liver is unremarkable.  The spleen is unremarkable.  Adrenal glands are unremarkable.  The pancreas is unremarkable.  Dense material present  within the gallbladder, likely vicarious excretion.  LEFT kidney is unremarkable.  RIGHT kidney shows dilated collecting system and stranding.  RIGHT ureter is dilated with 2 mm stone in the distal ureter.    CT Pelvis:  Bladder is unremarkable.  Appendix has a normal appearance.  No peritoneal fluid or pneumoperitoneum.  Abdominal aorta is unremarkable.  Postoperative change of lower abdominal wall.  Stranding is seen in the RIGHT lower quadrant with fluid present dependently.   Impression       1.  2 mm distal RIGHT ureteral stone with moderate obstructive changes, worse than on prior exam.  2.  Edema is seen along          LABORATORY  Lab Results   Component Value Date/Time    SODIUM 136 09/18/2017 02:26 AM    POTASSIUM 3.8 09/18/2017 02:26 AM    CHLORIDE 108 09/18/2017 02:26 AM    CO2 23 09/18/2017 02:26 AM    GLUCOSE 122 (H) 09/18/2017 02:26 AM    BUN 8 09/18/2017 02:26 AM    CREATININE 0.94 09/18/2017 02:26 AM        Lab Results   Component Value Date/Time    WBC 11.2 (H) 09/18/2017 02:26 AM    HEMOGLOBIN 10.9 (L) 09/18/2017 02:26 AM    HEMATOCRIT 32.4 (L) 09/18/2017 02:26 AM    PLATELETCT 239 09/18/2017 02:26 AM        Total time of the discharge process exceeds 38 minutes

## 2017-09-22 LAB
BACTERIA BLD CULT: NORMAL
BACTERIA BLD CULT: NORMAL
SIGNIFICANT IND 70042: NORMAL
SIGNIFICANT IND 70042: NORMAL
SITE SITE: NORMAL
SITE SITE: NORMAL
SOURCE SOURCE: NORMAL
SOURCE SOURCE: NORMAL

## 2017-09-28 ENCOUNTER — HOSPITAL ENCOUNTER (OUTPATIENT)
Dept: RADIOLOGY | Facility: MEDICAL CENTER | Age: 47
End: 2017-09-28
Attending: UROLOGY
Payer: COMMERCIAL

## 2017-09-28 DIAGNOSIS — N20.1 CALCULUS OF URETER: ICD-10-CM

## 2017-09-28 PROCEDURE — 74000 DX-ABDOMEN-1 VIEW: CPT

## 2017-11-03 ENCOUNTER — HOSPITAL ENCOUNTER (OUTPATIENT)
Facility: MEDICAL CENTER | Age: 47
End: 2017-11-03
Payer: COMMERCIAL

## 2017-11-03 ENCOUNTER — HOSPITAL ENCOUNTER (INPATIENT)
Facility: MEDICAL CENTER | Age: 47
LOS: 4 days | DRG: 281 | End: 2017-11-07
Attending: HOSPITALIST | Admitting: HOSPITALIST
Payer: COMMERCIAL

## 2017-11-03 ENCOUNTER — APPOINTMENT (OUTPATIENT)
Dept: RADIOLOGY | Facility: MEDICAL CENTER | Age: 47
End: 2017-11-03
Attending: NURSE PRACTITIONER
Payer: COMMERCIAL

## 2017-11-03 ENCOUNTER — APPOINTMENT (OUTPATIENT)
Dept: RADIOLOGY | Facility: MEDICAL CENTER | Age: 47
End: 2017-11-03
Attending: HOSPITALIST
Payer: COMMERCIAL

## 2017-11-03 ENCOUNTER — APPOINTMENT (OUTPATIENT)
Dept: RADIOLOGY | Facility: MEDICAL CENTER | Age: 47
End: 2017-11-03
Attending: EMERGENCY MEDICINE
Payer: COMMERCIAL

## 2017-11-03 ENCOUNTER — RESOLUTE PROFESSIONAL BILLING HOSPITAL PROF FEE (OUTPATIENT)
Dept: HOSPITALIST | Facility: MEDICAL CENTER | Age: 47
End: 2017-11-03
Payer: COMMERCIAL

## 2017-11-03 ENCOUNTER — HOSPITAL ENCOUNTER (OUTPATIENT)
Facility: MEDICAL CENTER | Age: 47
End: 2017-11-03
Attending: EMERGENCY MEDICINE | Admitting: HOSPITALIST
Payer: COMMERCIAL

## 2017-11-03 VITALS
BODY MASS INDEX: 32.04 KG/M2 | SYSTOLIC BLOOD PRESSURE: 132 MMHG | TEMPERATURE: 98.9 F | HEIGHT: 67 IN | WEIGHT: 204.15 LBS | RESPIRATION RATE: 18 BRPM | OXYGEN SATURATION: 98 % | HEART RATE: 73 BPM | DIASTOLIC BLOOD PRESSURE: 72 MMHG

## 2017-11-03 DIAGNOSIS — R07.9 CHEST PAIN, UNSPECIFIED TYPE: ICD-10-CM

## 2017-11-03 LAB
ALBUMIN SERPL BCP-MCNC: 4.2 G/DL (ref 3.2–4.9)
ALBUMIN/GLOB SERPL: 1.4 G/DL
ALP SERPL-CCNC: 75 U/L (ref 30–99)
ALT SERPL-CCNC: 13 U/L (ref 2–50)
ANION GAP SERPL CALC-SCNC: 8 MMOL/L (ref 0–11.9)
APTT PPP: 29.7 SEC (ref 24.7–36)
AST SERPL-CCNC: 17 U/L (ref 12–45)
BASOPHILS # BLD AUTO: 0.3 % (ref 0–1.8)
BASOPHILS # BLD: 0.04 K/UL (ref 0–0.12)
BILIRUB SERPL-MCNC: 0.7 MG/DL (ref 0.1–1.5)
BNP SERPL-MCNC: 64 PG/ML (ref 0–100)
BUN SERPL-MCNC: 17 MG/DL (ref 8–22)
CALCIUM SERPL-MCNC: 9.2 MG/DL (ref 8.4–10.2)
CHLORIDE SERPL-SCNC: 107 MMOL/L (ref 96–112)
CO2 SERPL-SCNC: 22 MMOL/L (ref 20–33)
CREAT SERPL-MCNC: 1.17 MG/DL (ref 0.5–1.4)
EKG IMPRESSION: NORMAL
EOSINOPHIL # BLD AUTO: 0.22 K/UL (ref 0–0.51)
EOSINOPHIL NFR BLD: 1.9 % (ref 0–6.9)
ERYTHROCYTE [DISTWIDTH] IN BLOOD BY AUTOMATED COUNT: 38.7 FL (ref 35.9–50)
GFR SERPL CREATININE-BSD FRML MDRD: 49 ML/MIN/1.73 M 2
GLOBULIN SER CALC-MCNC: 3.1 G/DL (ref 1.9–3.5)
GLUCOSE SERPL-MCNC: 114 MG/DL (ref 65–99)
HCT VFR BLD AUTO: 39.8 % (ref 37–47)
HGB BLD-MCNC: 14 G/DL (ref 12–16)
IMM GRANULOCYTES # BLD AUTO: 0.03 K/UL (ref 0–0.11)
IMM GRANULOCYTES NFR BLD AUTO: 0.3 % (ref 0–0.9)
INR PPP: 0.96 (ref 0.87–1.13)
LIPASE SERPL-CCNC: 35 U/L (ref 7–58)
LV EJECT FRACT  99904: 60
LV EJECT FRACT MOD 2C 99903: 56.07
LV EJECT FRACT MOD 4C 99902: 55.98
LV EJECT FRACT MOD BP 99901: 57.09
LYMPHOCYTES # BLD AUTO: 4.22 K/UL (ref 1–4.8)
LYMPHOCYTES NFR BLD: 36.9 % (ref 22–41)
MAGNESIUM SERPL-MCNC: 2.1 MG/DL (ref 1.5–2.5)
MCH RBC QN AUTO: 30.4 PG (ref 27–33)
MCHC RBC AUTO-ENTMCNC: 35.2 G/DL (ref 33.6–35)
MCV RBC AUTO: 86.5 FL (ref 81.4–97.8)
MONOCYTES # BLD AUTO: 0.93 K/UL (ref 0–0.85)
MONOCYTES NFR BLD AUTO: 8.1 % (ref 0–13.4)
NEUTROPHILS # BLD AUTO: 6.01 K/UL (ref 2–7.15)
NEUTROPHILS NFR BLD: 52.5 % (ref 44–72)
NRBC # BLD AUTO: 0 K/UL
NRBC BLD AUTO-RTO: 0 /100 WBC
PLATELET # BLD AUTO: 400 K/UL (ref 164–446)
PMV BLD AUTO: 10.8 FL (ref 9–12.9)
POTASSIUM SERPL-SCNC: 3.4 MMOL/L (ref 3.6–5.5)
PROT SERPL-MCNC: 7.3 G/DL (ref 6–8.2)
PROTHROMBIN TIME: 12.6 SEC (ref 12–14.6)
RBC # BLD AUTO: 4.6 M/UL (ref 4.2–5.4)
SODIUM SERPL-SCNC: 137 MMOL/L (ref 135–145)
TROPONIN I SERPL-MCNC: 0.49 NG/ML (ref 0–0.04)
TROPONIN I SERPL-MCNC: 0.78 NG/ML (ref 0–0.04)
TROPONIN I SERPL-MCNC: 0.99 NG/ML (ref 0–0.04)
TROPONIN I SERPL-MCNC: <0.02 NG/ML (ref 0–0.04)
TSH SERPL DL<=0.005 MIU/L-ACNC: 1.33 UIU/ML (ref 0.35–5.5)
WBC # BLD AUTO: 11.5 K/UL (ref 4.8–10.8)

## 2017-11-03 PROCEDURE — 71020 DX-CHEST-2 VIEWS: CPT

## 2017-11-03 PROCEDURE — 700111 HCHG RX REV CODE 636 W/ 250 OVERRIDE (IP): Performed by: HOSPITALIST

## 2017-11-03 PROCEDURE — 700105 HCHG RX REV CODE 258: Performed by: HOSPITALIST

## 2017-11-03 PROCEDURE — 83735 ASSAY OF MAGNESIUM: CPT

## 2017-11-03 PROCEDURE — A9270 NON-COVERED ITEM OR SERVICE: HCPCS | Performed by: HOSPITALIST

## 2017-11-03 PROCEDURE — 700102 HCHG RX REV CODE 250 W/ 637 OVERRIDE(OP): Performed by: HOSPITALIST

## 2017-11-03 PROCEDURE — 700102 HCHG RX REV CODE 250 W/ 637 OVERRIDE(OP)

## 2017-11-03 PROCEDURE — 84443 ASSAY THYROID STIM HORMONE: CPT

## 2017-11-03 PROCEDURE — 93005 ELECTROCARDIOGRAM TRACING: CPT | Performed by: HOSPITALIST

## 2017-11-03 PROCEDURE — 85730 THROMBOPLASTIN TIME PARTIAL: CPT

## 2017-11-03 PROCEDURE — 83880 ASSAY OF NATRIURETIC PEPTIDE: CPT

## 2017-11-03 PROCEDURE — 94760 N-INVAS EAR/PLS OXIMETRY 1: CPT

## 2017-11-03 PROCEDURE — 99285 EMERGENCY DEPT VISIT HI MDM: CPT

## 2017-11-03 PROCEDURE — A9270 NON-COVERED ITEM OR SERVICE: HCPCS | Performed by: EMERGENCY MEDICINE

## 2017-11-03 PROCEDURE — 93005 ELECTROCARDIOGRAM TRACING: CPT | Performed by: EMERGENCY MEDICINE

## 2017-11-03 PROCEDURE — G0378 HOSPITAL OBSERVATION PER HR: HCPCS

## 2017-11-03 PROCEDURE — 700111 HCHG RX REV CODE 636 W/ 250 OVERRIDE (IP): Performed by: EMERGENCY MEDICINE

## 2017-11-03 PROCEDURE — A9270 NON-COVERED ITEM OR SERVICE: HCPCS

## 2017-11-03 PROCEDURE — 71010 DX-CHEST-PORTABLE (1 VIEW): CPT

## 2017-11-03 PROCEDURE — 36415 COLL VENOUS BLD VENIPUNCTURE: CPT

## 2017-11-03 PROCEDURE — 99223 1ST HOSP IP/OBS HIGH 75: CPT | Performed by: HOSPITALIST

## 2017-11-03 PROCEDURE — 93308 TTE F-UP OR LMTD: CPT

## 2017-11-03 PROCEDURE — 700111 HCHG RX REV CODE 636 W/ 250 OVERRIDE (IP)

## 2017-11-03 PROCEDURE — 83690 ASSAY OF LIPASE: CPT

## 2017-11-03 PROCEDURE — 700102 HCHG RX REV CODE 250 W/ 637 OVERRIDE(OP): Performed by: EMERGENCY MEDICINE

## 2017-11-03 PROCEDURE — 96375 TX/PRO/DX INJ NEW DRUG ADDON: CPT

## 2017-11-03 PROCEDURE — 93308 TTE F-UP OR LMTD: CPT | Mod: 26 | Performed by: INTERNAL MEDICINE

## 2017-11-03 PROCEDURE — 80053 COMPREHEN METABOLIC PANEL: CPT

## 2017-11-03 PROCEDURE — 96374 THER/PROPH/DIAG INJ IV PUSH: CPT

## 2017-11-03 PROCEDURE — 84484 ASSAY OF TROPONIN QUANT: CPT

## 2017-11-03 PROCEDURE — 85610 PROTHROMBIN TIME: CPT

## 2017-11-03 PROCEDURE — 85025 COMPLETE CBC W/AUTO DIFF WBC: CPT

## 2017-11-03 PROCEDURE — 770020 HCHG ROOM/CARE - TELE (206)

## 2017-11-03 PROCEDURE — 93010 ELECTROCARDIOGRAM REPORT: CPT | Performed by: INTERNAL MEDICINE

## 2017-11-03 RX ORDER — NITROGLYCERIN 0.4 MG/1
0.4 TABLET SUBLINGUAL
Status: DISCONTINUED | OUTPATIENT
Start: 2017-11-03 | End: 2017-11-03 | Stop reason: HOSPADM

## 2017-11-03 RX ORDER — MORPHINE SULFATE 4 MG/ML
4 INJECTION, SOLUTION INTRAMUSCULAR; INTRAVENOUS ONCE
Status: COMPLETED | OUTPATIENT
Start: 2017-11-03 | End: 2017-11-03

## 2017-11-03 RX ORDER — HEPARIN SODIUM 1000 [USP'U]/ML
3200 INJECTION, SOLUTION INTRAVENOUS; SUBCUTANEOUS PRN
Status: DISCONTINUED | OUTPATIENT
Start: 2017-11-03 | End: 2017-11-03 | Stop reason: HOSPADM

## 2017-11-03 RX ORDER — PROMETHAZINE HYDROCHLORIDE 25 MG/1
12.5-25 TABLET ORAL EVERY 4 HOURS PRN
Status: DISCONTINUED | OUTPATIENT
Start: 2017-11-03 | End: 2017-11-07 | Stop reason: HOSPADM

## 2017-11-03 RX ORDER — NITROGLYCERIN 0.4 MG/1
0.4 TABLET SUBLINGUAL
Status: DISCONTINUED | OUTPATIENT
Start: 2017-11-03 | End: 2017-11-07 | Stop reason: HOSPADM

## 2017-11-03 RX ORDER — PROMETHAZINE HYDROCHLORIDE 25 MG/1
12.5-25 SUPPOSITORY RECTAL EVERY 4 HOURS PRN
Status: CANCELLED | OUTPATIENT
Start: 2017-11-03

## 2017-11-03 RX ORDER — ATORVASTATIN CALCIUM 40 MG/1
40 TABLET, FILM COATED ORAL
Status: DISCONTINUED | OUTPATIENT
Start: 2017-11-04 | End: 2017-11-07 | Stop reason: HOSPADM

## 2017-11-03 RX ORDER — ATORVASTATIN CALCIUM 40 MG/1
40 TABLET, FILM COATED ORAL
Status: DISCONTINUED | OUTPATIENT
Start: 2017-11-03 | End: 2017-11-03 | Stop reason: HOSPADM

## 2017-11-03 RX ORDER — NITROGLYCERIN 0.4 MG/1
TABLET SUBLINGUAL
Status: COMPLETED
Start: 2017-11-03 | End: 2017-11-03

## 2017-11-03 RX ORDER — HEPARIN SODIUM 1000 [USP'U]/ML
6000 INJECTION, SOLUTION INTRAVENOUS; SUBCUTANEOUS ONCE
Status: DISCONTINUED | OUTPATIENT
Start: 2017-11-03 | End: 2017-11-04

## 2017-11-03 RX ORDER — ONDANSETRON 4 MG/1
4 TABLET, ORALLY DISINTEGRATING ORAL EVERY 4 HOURS PRN
Status: DISCONTINUED | OUTPATIENT
Start: 2017-11-03 | End: 2017-11-07 | Stop reason: HOSPADM

## 2017-11-03 RX ORDER — PROMETHAZINE HYDROCHLORIDE 25 MG/1
12.5-25 TABLET ORAL EVERY 4 HOURS PRN
Status: DISCONTINUED | OUTPATIENT
Start: 2017-11-03 | End: 2017-11-03 | Stop reason: HOSPADM

## 2017-11-03 RX ORDER — SODIUM CHLORIDE 9 MG/ML
INJECTION, SOLUTION INTRAVENOUS CONTINUOUS
Status: CANCELLED | OUTPATIENT
Start: 2017-11-03

## 2017-11-03 RX ORDER — ONDANSETRON 2 MG/ML
4 INJECTION INTRAMUSCULAR; INTRAVENOUS EVERY 4 HOURS PRN
Status: CANCELLED | OUTPATIENT
Start: 2017-11-03

## 2017-11-03 RX ORDER — PROMETHAZINE HYDROCHLORIDE 25 MG/1
12.5-25 SUPPOSITORY RECTAL EVERY 4 HOURS PRN
Status: DISCONTINUED | OUTPATIENT
Start: 2017-11-03 | End: 2017-11-07 | Stop reason: HOSPADM

## 2017-11-03 RX ORDER — MORPHINE SULFATE 4 MG/ML
2 INJECTION, SOLUTION INTRAMUSCULAR; INTRAVENOUS EVERY 4 HOURS PRN
Status: DISCONTINUED | OUTPATIENT
Start: 2017-11-03 | End: 2017-11-07 | Stop reason: HOSPADM

## 2017-11-03 RX ORDER — HEPARIN SODIUM 1000 [USP'U]/ML
3200 INJECTION, SOLUTION INTRAVENOUS; SUBCUTANEOUS PRN
Status: DISCONTINUED | OUTPATIENT
Start: 2017-11-03 | End: 2017-11-04

## 2017-11-03 RX ORDER — ATORVASTATIN CALCIUM 40 MG/1
40 TABLET, FILM COATED ORAL
Status: CANCELLED | OUTPATIENT
Start: 2017-11-03

## 2017-11-03 RX ORDER — ONDANSETRON 2 MG/ML
4 INJECTION INTRAMUSCULAR; INTRAVENOUS EVERY 4 HOURS PRN
Status: DISCONTINUED | OUTPATIENT
Start: 2017-11-03 | End: 2017-11-04

## 2017-11-03 RX ORDER — ONDANSETRON 4 MG/1
4 TABLET, ORALLY DISINTEGRATING ORAL EVERY 4 HOURS PRN
Status: DISCONTINUED | OUTPATIENT
Start: 2017-11-03 | End: 2017-11-03 | Stop reason: HOSPADM

## 2017-11-03 RX ORDER — ASPIRIN 325 MG
325 TABLET ORAL DAILY
Status: DISCONTINUED | OUTPATIENT
Start: 2017-11-04 | End: 2017-11-03

## 2017-11-03 RX ORDER — ASPIRIN 81 MG/1
324 TABLET, CHEWABLE ORAL DAILY
Status: DISCONTINUED | OUTPATIENT
Start: 2017-11-04 | End: 2017-11-03

## 2017-11-03 RX ORDER — ASPIRIN 325 MG
325 TABLET ORAL ONCE
Status: COMPLETED | OUTPATIENT
Start: 2017-11-03 | End: 2017-11-03

## 2017-11-03 RX ORDER — PROMETHAZINE HYDROCHLORIDE 25 MG/1
12.5-25 TABLET ORAL EVERY 4 HOURS PRN
Status: CANCELLED | OUTPATIENT
Start: 2017-11-03

## 2017-11-03 RX ORDER — SODIUM CHLORIDE 9 MG/ML
INJECTION, SOLUTION INTRAVENOUS CONTINUOUS
Status: DISCONTINUED | OUTPATIENT
Start: 2017-11-04 | End: 2017-11-03 | Stop reason: HOSPADM

## 2017-11-03 RX ORDER — ONDANSETRON 2 MG/ML
4 INJECTION INTRAMUSCULAR; INTRAVENOUS EVERY 4 HOURS PRN
Status: DISCONTINUED | OUTPATIENT
Start: 2017-11-03 | End: 2017-11-03 | Stop reason: HOSPADM

## 2017-11-03 RX ORDER — ONDANSETRON 2 MG/ML
4 INJECTION INTRAMUSCULAR; INTRAVENOUS ONCE
Status: COMPLETED | OUTPATIENT
Start: 2017-11-03 | End: 2017-11-03

## 2017-11-03 RX ORDER — ASPIRIN 600 MG/1
300 SUPPOSITORY RECTAL DAILY
Status: DISCONTINUED | OUTPATIENT
Start: 2017-11-04 | End: 2017-11-03

## 2017-11-03 RX ORDER — NITROGLYCERIN 0.4 MG/1
0.4 TABLET SUBLINGUAL
Status: CANCELLED | OUTPATIENT
Start: 2017-11-03

## 2017-11-03 RX ORDER — SODIUM CHLORIDE 9 MG/ML
INJECTION, SOLUTION INTRAVENOUS CONTINUOUS
Status: DISCONTINUED | OUTPATIENT
Start: 2017-11-03 | End: 2017-11-03 | Stop reason: HOSPADM

## 2017-11-03 RX ORDER — HYDROCODONE BITARTRATE AND ACETAMINOPHEN 5; 325 MG/1; MG/1
1 TABLET ORAL EVERY 6 HOURS PRN
Status: DISCONTINUED | OUTPATIENT
Start: 2017-11-03 | End: 2017-11-07 | Stop reason: HOSPADM

## 2017-11-03 RX ORDER — ONDANSETRON 4 MG/1
4 TABLET, ORALLY DISINTEGRATING ORAL EVERY 4 HOURS PRN
Status: CANCELLED | OUTPATIENT
Start: 2017-11-03

## 2017-11-03 RX ORDER — HEPARIN SODIUM 1000 [USP'U]/ML
6000 INJECTION, SOLUTION INTRAVENOUS; SUBCUTANEOUS ONCE
Status: COMPLETED | OUTPATIENT
Start: 2017-11-03 | End: 2017-11-03

## 2017-11-03 RX ORDER — PROMETHAZINE HYDROCHLORIDE 25 MG/1
12.5-25 SUPPOSITORY RECTAL EVERY 4 HOURS PRN
Status: DISCONTINUED | OUTPATIENT
Start: 2017-11-03 | End: 2017-11-03 | Stop reason: HOSPADM

## 2017-11-03 RX ORDER — SODIUM CHLORIDE 9 MG/ML
INJECTION, SOLUTION INTRAVENOUS CONTINUOUS
Status: DISCONTINUED | OUTPATIENT
Start: 2017-11-03 | End: 2017-11-07

## 2017-11-03 RX ADMIN — HEPARIN SODIUM 1200 UNITS: 5000 INJECTION, SOLUTION INTRAVENOUS at 23:08

## 2017-11-03 RX ADMIN — HEPARIN SODIUM 6000 UNITS: 1000 INJECTION, SOLUTION INTRAVENOUS; SUBCUTANEOUS at 17:51

## 2017-11-03 RX ADMIN — HEPARIN SODIUM 1200 UNITS/HR: 5000 INJECTION, SOLUTION INTRAVENOUS at 19:26

## 2017-11-03 RX ADMIN — ONDANSETRON 4 MG: 2 INJECTION, SOLUTION INTRAMUSCULAR; INTRAVENOUS at 09:39

## 2017-11-03 RX ADMIN — NITROGLYCERIN 0.4 MG: 0.4 TABLET SUBLINGUAL at 09:25

## 2017-11-03 RX ADMIN — ATORVASTATIN CALCIUM 40 MG: 40 TABLET, FILM COATED ORAL at 20:37

## 2017-11-03 RX ADMIN — SODIUM CHLORIDE: 9 INJECTION, SOLUTION INTRAVENOUS at 22:45

## 2017-11-03 RX ADMIN — MORPHINE SULFATE 4 MG: 4 INJECTION INTRAVENOUS at 09:40

## 2017-11-03 RX ADMIN — HEPARIN SODIUM 1200 UNITS/HR: 5000 INJECTION, SOLUTION INTRAVENOUS at 17:52

## 2017-11-03 RX ADMIN — ASPIRIN 325 MG ORAL TABLET 325 MG: 325 PILL ORAL at 09:39

## 2017-11-03 RX ADMIN — SODIUM CHLORIDE: 9 INJECTION, SOLUTION INTRAVENOUS at 13:07

## 2017-11-03 ASSESSMENT — ENCOUNTER SYMPTOMS
NAUSEA: 0
SHORTNESS OF BREATH: 0
BACK PAIN: 0
HEARTBURN: 0
SORE THROAT: 0
PND: 0
ORTHOPNEA: 0
TREMORS: 0
NECK PAIN: 0
HEMOPTYSIS: 0
SENSORY CHANGE: 0
WEAKNESS: 0
CHILLS: 0
BLURRED VISION: 0
DIZZINESS: 0
PALPITATIONS: 0
PHOTOPHOBIA: 0
DEPRESSION: 0
DOUBLE VISION: 0
HEADACHES: 0
NERVOUS/ANXIOUS: 0
VOMITING: 0
FEVER: 0
SPUTUM PRODUCTION: 0
COUGH: 0
SPEECH CHANGE: 0
MEMORY LOSS: 0
EYE PAIN: 0
BLOOD IN STOOL: 0
CLAUDICATION: 0
CONSTIPATION: 0
STRIDOR: 0
TINGLING: 0
MYALGIAS: 0

## 2017-11-03 ASSESSMENT — PATIENT HEALTH QUESTIONNAIRE - PHQ9
SUM OF ALL RESPONSES TO PHQ QUESTIONS 1-9: 0
SUM OF ALL RESPONSES TO PHQ9 QUESTIONS 1 AND 2: 0
SUM OF ALL RESPONSES TO PHQ9 QUESTIONS 1 AND 2: 0
1. LITTLE INTEREST OR PLEASURE IN DOING THINGS: NOT AT ALL
2. FEELING DOWN, DEPRESSED, IRRITABLE, OR HOPELESS: NOT AT ALL
SUM OF ALL RESPONSES TO PHQ QUESTIONS 1-9: 0
2. FEELING DOWN, DEPRESSED, IRRITABLE, OR HOPELESS: NOT AT ALL
1. LITTLE INTEREST OR PLEASURE IN DOING THINGS: NOT AT ALL

## 2017-11-03 ASSESSMENT — LIFESTYLE VARIABLES
HAVE YOU EVER FELT YOU SHOULD CUT DOWN ON YOUR DRINKING: NO
TOTAL SCORE: 0
CONSUMPTION TOTAL: NEGATIVE
HOW MANY TIMES IN THE PAST YEAR HAVE YOU HAD 5 OR MORE DRINKS IN A DAY: 0
ALCOHOL_USE: YES
TOTAL SCORE: 0
AVERAGE NUMBER OF DAYS PER WEEK YOU HAVE A DRINK CONTAINING ALCOHOL: 0
EVER HAD A DRINK FIRST THING IN THE MORNING TO STEADY YOUR NERVES TO GET RID OF A HANGOVER: NO
EVER FELT BAD OR GUILTY ABOUT YOUR DRINKING: NO
ON A TYPICAL DAY WHEN YOU DRINK ALCOHOL HOW MANY DRINKS DO YOU HAVE: 0
EVER_SMOKED: YES
HAVE PEOPLE ANNOYED YOU BY CRITICIZING YOUR DRINKING: NO
TOTAL SCORE: 0

## 2017-11-03 ASSESSMENT — COGNITIVE AND FUNCTIONAL STATUS - GENERAL
SUGGESTED CMS G CODE MODIFIER MOBILITY: CH
SUGGESTED CMS G CODE MODIFIER DAILY ACTIVITY: CH
MOBILITY SCORE: 24
DAILY ACTIVITIY SCORE: 24

## 2017-11-03 ASSESSMENT — PAIN SCALES - GENERAL
PAINLEVEL_OUTOF10: 0
PAINLEVEL_OUTOF10: 0
PAINLEVEL_OUTOF10: 5
PAINLEVEL_OUTOF10: 0

## 2017-11-03 NOTE — CONSULTS
Cardiology Consult Note:    Pauline John  Date & Time note created:    11/3/2017   4:40 PM     Referring MD:  Dr. Shannan Dunham M.D.    Patient ID:   Name:             Aurora Tan   YOB: 1970  Age:                 47 y.o.  female   MRN:               [id number]                                                             Reason for Consult:      Chest pain with dynamic EKG changes and elevated troponin    History of Present Illness:    Patient is a pleasant 47-year-old female with no significant past medical history presenting to emergency room with complaints of a different sitting on the chest secondary to EKG changes as well as elevated troponin cardiology being consulted.    White patient was driving she felt chest heaviness/tightness like an elephant sitting on the chest radiating down to the jaw as well as shoulder. Associated with diaphoresis and dizziness but no loss of consciousness did experience nausea associated with chest pain. Initial chest pain has gone down after sublingual nitroglycerin. But still continues to have mild chest discomfort. Sometimes pain worse with deep breaths. Never had this kind of chest pain before. Denied any complaints of palpitations orthopnea or PND. Past few days have been very stressful at work.      Review of Systems:      Constitutional: Denies fevers, Denies weight changes  Eyes: Denies changes in vision, no eye pain  Ears/Nose/Throat/Mouth: Denies nasal congestion or sore throat   Cardiovascular: +ve chest pain, +ve palpitations   Respiratory: -ve shortness of breath , Denies cough  Gastrointestinal/Hepatic: Denies abdominal pain, nausea, vomiting, diarrhea, constipation or GI bleeding   Genitourinary: Denies dysuria or frequency  Musculoskeletal/Rheum: Denies  joint pain and swelling, -ve edema  Skin: Denies rash  Neurological: Denies headache, confusion, memory loss or focal weakness/parasthesias  Psychiatric: denies mood disorder    Endocrine: Erum thyroid problems  Heme/Oncology/Lymph Nodes: Denies enlarged lymph nodes, denies brusing or known bleeding disorder  All other systems were reviewed and are negative (AMA/CMS criteria)                Past Medical History:   History reviewed. No pertinent past medical history.  Active Hospital Problems    Diagnosis   • Chest pain [R07.9]     Priority: High       Past Surgical History:  Past Surgical History:   Procedure Laterality Date   • CYSTOSCOPY Right 9/17/2017    Procedure: CYSTOSCOPY;  Surgeon: iKet Eid M.D.;  Location: SURGERY [facility];  Service: Urology   • URETEROSCOPY Right 9/17/2017    Procedure: URETEROSCOPY;  Surgeon: Kiet Eid M.D.;  Location: SURGERY [facility];  Service: Urology   • LASERTRIPSY Right 9/17/2017    Procedure: LASERTRIPSY;  Surgeon: Kiet Eid M.D.;  Location: SURGERY [facility];  Service: Urology   • STENT PLACEMENT Right 9/17/2017    Procedure: STENT PLACEMENT;  Surgeon: Kiet Eid M.D.;  Location: SURGERY [facility];  Service: Urology   • ABDOMINAL EXPLORATION     • ABDOMINAL HYSTERECTOMY TOTAL         Hospital Medications:    Current Facility-Administered Medications:   •  nitroglycerin (NITROSTAT) tablet 0.4 mg, 0.4 mg, Sublingual, Q5 MIN PRN, Perez Valentin M.D., 0.4 mg at 11/03/17 0925  •  enoxaparin (LOVENOX) inj 40 mg, 40 mg, Subcutaneous, DAILY, Shannan Dunham M.D.  •  NS infusion, , Intravenous, Continuous, Shannan Dunham M.D., Last Rate: 100 mL/hr at 11/03/17 1307  •  ondansetron (ZOFRAN) syringe/vial injection 4 mg, 4 mg, Intravenous, Q4HRS PRN, Shannan Dunham M.D.  •  ondansetron (ZOFRAN ODT) dispertab 4 mg, 4 mg, Oral, Q4HRS PRN, Shannan Dunham M.D.  •  promethazine (PHENERGAN) tablet 12.5-25 mg, 12.5-25 mg, Oral, Q4HRS PRN, Shannan Portilloanchyan, M.D.  •  promethazine (PHENERGAN) suppository 12.5-25 mg, 12.5-25 mg, Rectal, Q4HRS Shannan GEORGE M.D.  •  prochlorperazine  "(COMPAZINE) injection 5-10 mg, 5-10 mg, Intravenous, Q4HRS PRN, Shannan Dunham M.D.  •  [START ON 11/4/2017] aspirin EC (ECOTRIN) tablet 325 mg, 325 mg, Oral, DAILY, Shannan Dunham M.D.  •  atorvastatin (LIPITOR) tablet 40 mg, 40 mg, Oral, QHS, Shannan Dunham M.D.    Current Outpatient Medications:  Prescriptions Prior to Admission   Medication Sig Dispense Refill Last Dose   • estradiol (MINIVELLE) 0.05 MG/24HR PATCH BIWEEKLY Apply 1 Patch to skin as directed 2X A WEEK.   11/2/2017 at  abdomen       Medication Allergy:  Allergies   Allergen Reactions   • Levaquin Itching       Family History:  History reviewed. No pertinent family history.   Dad passed away from lymphoma  Mom passed away due to pancreatic cancer    Social History:  Social History     Social History   • Marital status:      Spouse name: N/A   • Number of children: N/A   • Years of education: N/A     Occupational History   • Not on file.     Social History Main Topics   • Smoking status: Never Smoker   • Smokeless tobacco: Never Used   • Alcohol use Yes   • Drug use: No   • Sexual activity: Not on file     Other Topics Concern   • Not on file     Social History Narrative   • No narrative on file         Physical Exam:  Vitals/ General Appearance:   Weight/BMI: Body mass index is 31.97 kg/m².  Blood pressure 140/62, pulse 65, temperature 36.8 °C (98.3 °F), resp. rate 18, height 1.702 m (5' 7\"), weight 92.6 kg (204 lb 2.3 oz), SpO2 99 %, not currently breastfeeding.  Vitals:    11/03/17 1257 11/03/17 1316 11/03/17 1355 11/03/17 1556   BP:    140/62   Pulse:  65  65   Resp:  16 18 18   Temp:    36.8 °C (98.3 °F)   SpO2:  97% 99% 99%   Weight: 92.6 kg (204 lb 2.3 oz)      Height: 1.702 m (5' 7\")        Oxygen Therapy:  Pulse Oximetry: 99 %, O2 (LPM): 0, O2 Delivery: None (Room Air)    Constitutional:   Well developed, Well nourished, No acute distress  HENMT:  Normocephalic, Atraumatic, Oropharynx moist mucous membranes, No oral " exudates, Nose normal.  No thyromegaly.  Eyes:  EOMI, Conjunctiva normal, No discharge.  Neck:  Normal range of motion, No cervical tenderness,  no JVD.  Cardiovascular:  Normal heart rate, Normal rhythm, No murmurs, No rubs, No gallops.     Nonreproducible chest pain   Extremitites with intact distal pulses, no cyanosis, or edema.  Lungs:  Normal breath sounds, breath sounds clear to auscultation bilaterally,  no crackles, no wheezing.   Abdomen: Bowel sounds normal, Soft, No tenderness, No guarding, No rebound, No masses, No hepatosplenomegaly.  Skin: Warm, Dry, No erythema, No rash, no induration.  Neurologic: Alert & oriented x 3, No focal deficits noted, cranial nerves II through X are grossly intact.  Psychiatric: Affect normal, Judgment normal, Mood normal.      MDM (Data Review):     Records reviewed and summarized in current documentation    Lab Data Review:  Recent Results (from the past 24 hour(s))   EKG (ER)    Collection Time: 17  9:14 AM   Result Value Ref Range    Report       [facility] Emergency Dept[facility]    Test Date:  2017  Pt Name:    STEVAN RODRIGUEZ              Department: [facility]  MRN:        [id number]                      [facility]  Gender:     F                            Technician: EDVIN  :        1970                   Requested By:RETA HERNANDEZ  Order #:    [id number]                    Reading MD: RETA HERNANDEZ MD    Measurements  Intervals                                Axis  Rate:       52                           P:          52  MN:         147                          QRS:        44  QRSD:       103                          T:          64  QT:         446  QTc:        415    Interpretive Statements  Sinus rhythm  nonspecific st elevation inferior leads  Probable RV involvement, suggest recording right precordial leads  No previous ECG available for comparison    Electronically Signed On 11-3-2017 10:21:42 PDT by RETA HERNANDEZ  MD     CBC with Differential    Collection Time: 11/03/17  9:29 AM   Result Value Ref Range    WBC 11.5 (H) 4.8 - 10.8 K/uL    RBC 4.60 4.20 - 5.40 M/uL    Hemoglobin 14.0 12.0 - 16.0 g/dL    Hematocrit 39.8 37.0 - 47.0 %    MCV 86.5 81.4 - 97.8 fL    MCH 30.4 27.0 - 33.0 pg    MCHC 35.2 (H) 33.6 - 35.0 g/dL    RDW 38.7 35.9 - 50.0 fL    Platelet Count 400 164 - 446 K/uL    MPV 10.8 9.0 - 12.9 fL    Neutrophils-Polys 52.50 44.00 - 72.00 %    Lymphocytes 36.90 22.00 - 41.00 %    Monocytes 8.10 0.00 - 13.40 %    Eosinophils 1.90 0.00 - 6.90 %    Basophils 0.30 0.00 - 1.80 %    Immature Granulocytes 0.30 0.00 - 0.90 %    Nucleated RBC 0.00 /100 WBC    Neutrophils (Absolute) 6.01 2.00 - 7.15 K/uL    Lymphs (Absolute) 4.22 1.00 - 4.80 K/uL    Monos (Absolute) 0.93 (H) 0.00 - 0.85 K/uL    Eos (Absolute) 0.22 0.00 - 0.51 K/uL    Baso (Absolute) 0.04 0.00 - 0.12 K/uL    Immature Granulocytes (abs) 0.03 0.00 - 0.11 K/uL    NRBC (Absolute) 0.00 K/uL   Complete Metabolic Panel (CMP)    Collection Time: 11/03/17  9:29 AM   Result Value Ref Range    Sodium 137 135 - 145 mmol/L    Potassium 3.4 (L) 3.6 - 5.5 mmol/L    Chloride 107 96 - 112 mmol/L    Co2 22 20 - 33 mmol/L    Anion Gap 8.0 0.0 - 11.9    Glucose 114 (H) 65 - 99 mg/dL    Bun 17 8 - 22 mg/dL    Creatinine 1.17 0.50 - 1.40 mg/dL    Calcium 9.2 8.4 - 10.2 mg/dL    AST(SGOT) 17 12 - 45 U/L    ALT(SGPT) 13 2 - 50 U/L    Alkaline Phosphatase 75 30 - 99 U/L    Total Bilirubin 0.7 0.1 - 1.5 mg/dL    Albumin 4.2 3.2 - 4.9 g/dL    Total Protein 7.3 6.0 - 8.2 g/dL    Globulin 3.1 1.9 - 3.5 g/dL    A-G Ratio 1.4 g/dL   Btype Natriuretic Peptide (BNP)    Collection Time: 11/03/17  9:29 AM   Result Value Ref Range    B Natriuretic Peptide 64 0 - 100 pg/mL   Prothrombin Time (PT/INR)    Collection Time: 11/03/17  9:29 AM   Result Value Ref Range    PT 12.6 12.0 - 14.6 sec    INR 0.96 0.87 - 1.13   APTT    Collection Time: 11/03/17  9:29 AM   Result Value Ref Range    APTT 29.7 24.7  - 36.0 sec   Lipase    Collection Time: 17  9:29 AM   Result Value Ref Range    Lipase 35 7 - 58 U/L   Troponin STAT    Collection Time: 17  9:29 AM   Result Value Ref Range    Troponin I <0.02 0.00 - 0.04 ng/mL   ESTIMATED GFR    Collection Time: 17  9:29 AM   Result Value Ref Range    GFR If African American 60 >60 mL/min/1.73 m 2    GFR If Non African American 49 (A) >60 mL/min/1.73 m 2   EKG (ER)    Collection Time: 17  9:32 AM   Result Value Ref Range    Report       [facility] Emergency Dept.    Test Date:  2017  Pt Name:    STEVAN RODRIGUEZ              Department: [facility]  MRN:        [id number]                      Room:       [place] [address]  Gender:     F                            Technician: SCE  :        1970                   Requested By:RETA HERNANDEZ  Order #:    [id number]                    Reading MD: RETA HERNANDEZ MD    Measurements  Intervals                                Axis  Rate:       70                           P:          37  NY:         154                          QRS:        32  QRSD:       107                          T:          55  QT:         429  QTc:        463    Interpretive Statements  Sinus rhythm  ST elev, probable normal early repol pattern  Baseline wander in lead(s) V5  Compared to ECG 2017 09:14:21  ST (T wave) deviation now present      Electronically Signed On 11-3-2017 10:22:03 PDT by RETA HERNANDEZ MD     EKG (ER)    Collection Time: 17 10:08 AM   Result Value Ref Range    Report       [facility]    Test Date:  2017  Pt Name:    STEVAN RODRIGUEZ              Department: [facility]  MRN:        [id number]                      Room:       [place] [address]  Gender:     F                            Technician: [id number]  :        1970                   Requested By:RETA HERNANDEZ  Order #:    [id number]                    Reading MD: RETA HERNANDEZ  MD    Measurements  Intervals                                Axis  Rate:       63                           P:          36  OK:         148                          QRS:        19  QRSD:       108                          T:          20  QT:         422  QTc:        433    Interpretive Statements  Sinus rhythm  Compared to ECG 11/03/2017 09:32:07  ST (T wave) deviation no longer present    Electronically Signed On 11-3-2017 10:22:07 PDT by RETA HERNANDEZ MD     Troponin - STAT Once    Collection Time: 11/03/17  1:33 PM   Result Value Ref Range    Troponin I 0.49 (H) 0.00 - 0.04 ng/mL   TSH (Thyroid Stimulating Hormone)    Collection Time: 11/03/17  1:33 PM   Result Value Ref Range    TSH 1.330 0.350 - 5.500 uIU/mL   Magnesium    Collection Time: 11/03/17  1:33 PM   Result Value Ref Range    Magnesium 2.1 1.5 - 2.5 mg/dL       Imaging/Procedures Review:    Chest Xray:  Reviewed  No radiographic evidence of acute cardiopulmonary process    EKG:   Initial EKG showed less than 1 mm ST elevation in inferior leads with ST depression in one aVL and lead II  Repeat EKG showed resolution of ST segment changes as well as ST depressions.    MDM (Assessment and Plan):     Active Hospital Problems    Diagnosis   • Chest pain [R07.9]     Priority: High       Patient is a pleasant 47-year-old female with no significant past medical history presenting to emergency room with complaints of a different sitting on the chest secondary to EKG changes as well as elevated troponin cardiology being consulted.    NSTEMI ?  Due to history with dynamic EKG changes and elevated troponin will transfer patient up to [facility].  Stat echo to rule out any wall motion abnormalities.  Patient will be started on anticoagulation with heparin or Lovenox.  Start aspirin 325 mg daily.  Start metoprolol 25 mg BID.  Continue Lipitor 40 mg qhs.    NPO after midnight.    Thank you for allowing me to participate in taking care of patient.    Pauline  Josephine GRIMALDO.

## 2017-11-03 NOTE — PROGRESS NOTES
The Medication Reconciliation process has been completed by interviewing the patient    Allergies have been reviewed  Antibiotic use in 30 days - none    Home Pharmacy:  [facility] - [place]

## 2017-11-03 NOTE — PROGRESS NOTES
Admit profile completed, assessment complete, skin check complete. Declines pain and nausea at this time. Plan of care discussed.

## 2017-11-03 NOTE — ED NOTES
Awaits room assignment in no distress.   at BS and call light in reach, aware to call for any needs/changes.

## 2017-11-03 NOTE — ED PROVIDER NOTES
"ED Provider Note    CHIEF COMPLAINT  Chief Complaint   Patient presents with   • Chest Pain     while driving this morning, pain for the \"last couple days\"   • Arm Pain     L arm pain        HPI  Aurora Tan is a 47 y.o. female who presentsTo the ED complaining of chest pain, left arm pain. Patient states that she's been feeling \"on\" over the past couple of days. She describes a sensation of nausea. She describes a sensation of chest pain like \"the world sitting on her.\" She states that is about an 8/10 type discomfort on her chest with radiation to her left arm. This been going on for couple of days. Today she was driving her car and she just felt more \"weird,\" so she decided to come to the ED for evaluation. Upon arrival she states she feels nauseated. She has muscle aches and pains describes a sensation of chest pressure and pain as above. This been continuous in nature for 2 days. Patient describes chills but no fevers. Denies any other symptoms. The patient presents for evaluation.    REVIEW OF SYSTEMS  See HPI for further details. All other systems are negative.     PAST MEDICAL HISTORY  History reviewed. No pertinent past medical history.    FAMILY HISTORY  No family history on file.  Patient's family history has been discussed and is been found to be noncontributory to his present illness  SOCIAL HISTORY  Social History     Social History   • Marital status:      Spouse name: N/A   • Number of children: N/A   • Years of education: N/A     Social History Main Topics   • Smoking status: Never Smoker   • Smokeless tobacco: Never Used   • Alcohol use Yes   • Drug use: No   • Sexual activity: Not on file     Other Topics Concern   • Not on file     Social History Narrative   • No narrative on file      Chico Aguillon M.D.      SURGICAL HISTORY  Past Surgical History:   Procedure Laterality Date   • CYSTOSCOPY Right 9/17/2017    Procedure: CYSTOSCOPY;  Surgeon: Kiet Eid M.D.;  Location: " "SURGERY [facility];  Service: Urology   • URETEROSCOPY Right 9/17/2017    Procedure: URETEROSCOPY;  Surgeon: Kiet Eid M.D.;  Location: SURGERY [facility];  Service: Urology   • LASERTRIPSY Right 9/17/2017    Procedure: LASERTRIPSY;  Surgeon: Kiet Eid M.D.;  Location: SURGERY [facility];  Service: Urology   • STENT PLACEMENT Right 9/17/2017    Procedure: STENT PLACEMENT;  Surgeon: Kiet Eid M.D.;  Location: SURGERY [facility];  Service: Urology   • ABDOMINAL EXPLORATION     • ABDOMINAL HYSTERECTOMY TOTAL         CURRENT MEDICATIONS  Home Medications     Reviewed by Rich Topete (Pharmacy Tech) on 11/03/17 at 0932  Med List Status: Complete   Medication Last Dose Status   estradiol (MINIVELLE) 0.05 MG/24HR PATCH BIWEEKLY 11/2/2017 Active              No current facility-administered medications on file prior to encounter.      Current Outpatient Prescriptions on File Prior to Encounter   Medication Sig Dispense Refill   • estradiol (MINIVELLE) 0.05 MG/24HR PATCH BIWEEKLY Apply 1 Patch to skin as directed 2X A WEEK.           ALLERGIES  Allergies   Allergen Reactions   • Levaquin Itching       PHYSICAL EXAM  VITAL SIGNS: /80   Pulse 65   Temp 35.9 °C (96.6 °F)   Resp 18   Ht 1.702 m (5' 7\")   Wt 92.6 kg (204 lb 2.3 oz)   SpO2 99%   Breastfeeding? No   BMI 31.97 kg/m²    Pulse Oximetry was obtained. It showed a reading of Pulse Oximetry: 100 %.  I interpreted this asNot hypoxic.     Constitutional: Well developed, Well nourished, No acute distress, Non-toxic appearance.   HENT: Normocephalic, Atraumatic, Bilateral external ears normal, bilateral tympanic membranes normal, Oropharynx moist, No oral exudates, Nose normal.   Eyes: Pupils are equal round and react to light, extraocular motions are intact, conjunctiva is normal, there are no signs of exudate.   Neck: Supple, no cervical lymphadenopathy, no meningeal signs..   Lymphatic: No " lymphadenopathy noted.   Cardiovascular: Regular rate and rhythm without murmurs, rubs, rubs  Thorax & Lungs: Clear to auscultation bilaterally. No rhonchi, wheezes or rales  Abdomen: Soft, nontender, nondistended. Bowel sounds are present  Skin: Warm, Dry, No erythema,   Back: No tenderness, No CVA tenderness.   Extremities: Intact distal pulses, no clubbing, no cyanosis, no edema, nontender. Negative Homans sign  Neurologic: Alert & oriented x 3, Normal motor function, Normal sensory function, No focal deficits noted.     EKG  Interpreted below by myself    RADIOLOGY/PROCEDURES  DX-CHEST-PORTABLE (1 VIEW)   Final Result      No radiographic evidence of acute cardiopulmonary process.      NM-CARDIAC STRESS TEST    (Results Pending)       Results for orders placed or performed during the hospital encounter of 11/03/17   CBC with Differential   Result Value Ref Range    WBC 11.5 (H) 4.8 - 10.8 K/uL    RBC 4.60 4.20 - 5.40 M/uL    Hemoglobin 14.0 12.0 - 16.0 g/dL    Hematocrit 39.8 37.0 - 47.0 %    MCV 86.5 81.4 - 97.8 fL    MCH 30.4 27.0 - 33.0 pg    MCHC 35.2 (H) 33.6 - 35.0 g/dL    RDW 38.7 35.9 - 50.0 fL    Platelet Count 400 164 - 446 K/uL    MPV 10.8 9.0 - 12.9 fL    Neutrophils-Polys 52.50 44.00 - 72.00 %    Lymphocytes 36.90 22.00 - 41.00 %    Monocytes 8.10 0.00 - 13.40 %    Eosinophils 1.90 0.00 - 6.90 %    Basophils 0.30 0.00 - 1.80 %    Immature Granulocytes 0.30 0.00 - 0.90 %    Nucleated RBC 0.00 /100 WBC    Neutrophils (Absolute) 6.01 2.00 - 7.15 K/uL    Lymphs (Absolute) 4.22 1.00 - 4.80 K/uL    Monos (Absolute) 0.93 (H) 0.00 - 0.85 K/uL    Eos (Absolute) 0.22 0.00 - 0.51 K/uL    Baso (Absolute) 0.04 0.00 - 0.12 K/uL    Immature Granulocytes (abs) 0.03 0.00 - 0.11 K/uL    NRBC (Absolute) 0.00 K/uL   Complete Metabolic Panel (CMP)   Result Value Ref Range    Sodium 137 135 - 145 mmol/L    Potassium 3.4 (L) 3.6 - 5.5 mmol/L    Chloride 107 96 - 112 mmol/L    Co2 22 20 - 33 mmol/L    Anion Gap 8.0 0.0 -  11.9    Glucose 114 (H) 65 - 99 mg/dL    Bun 17 8 - 22 mg/dL    Creatinine 1.17 0.50 - 1.40 mg/dL    Calcium 9.2 8.4 - 10.2 mg/dL    AST(SGOT) 17 12 - 45 U/L    ALT(SGPT) 13 2 - 50 U/L    Alkaline Phosphatase 75 30 - 99 U/L    Total Bilirubin 0.7 0.1 - 1.5 mg/dL    Albumin 4.2 3.2 - 4.9 g/dL    Total Protein 7.3 6.0 - 8.2 g/dL    Globulin 3.1 1.9 - 3.5 g/dL    A-G Ratio 1.4 g/dL   Btype Natriuretic Peptide (BNP)   Result Value Ref Range    B Natriuretic Peptide 64 0 - 100 pg/mL   Prothrombin Time (PT/INR)   Result Value Ref Range    PT 12.6 12.0 - 14.6 sec    INR 0.96 0.87 - 1.13   APTT   Result Value Ref Range    APTT 29.7 24.7 - 36.0 sec   Lipase   Result Value Ref Range    Lipase 35 7 - 58 U/L   Troponin STAT   Result Value Ref Range    Troponin I <0.02 0.00 - 0.04 ng/mL   ESTIMATED GFR   Result Value Ref Range    GFR If African American 60 >60 mL/min/1.73 m 2    GFR If Non African American 49 (A) >60 mL/min/1.73 m 2   EKG (ER)   Result Value Ref Range    Report       [facility]    Test Date:  2017  Pt Name:    STEVAN RODRIGUEZ              Department: [facility]  MRN:        [id number]                      [facility]  Gender:     F                            Technician: EDVIN  :        1970                   Requested By:RETA HERNANDEZ  Order #:    [id number]                    Reading MD: RETA HERNANDEZ MD    Measurements  Intervals                                Axis  Rate:       52                           P:          52  IA:         147                          QRS:        44  QRSD:       103                          T:          64  QT:         446  QTc:        415    Interpretive Statements  Sinus rhythm  nonspecific st elevation inferior leads  Probable RV involvement, suggest recording right precordial leads  No previous ECG available for comparison    Electronically Signed On 11-3-2017 10:21:42 PDT by RETA HERNANDEZ MD     EKG (ER)   Result  Value Ref Range    Report       [facility] Dept[facility]    Test Date:  2017  Pt Name:    STEVAN RODRIGUEZ              Department: EDS  MRN:        [id number]                      Room:       [facility] 7  Gender:     F                            Technician: SCE  :        1970                   Requested By:RETA HERNANDEZ  Order #:    [id number]                    Reading MD: RETA HERNANDEZ MD    Measurements  Intervals                                Axis  Rate:       70                           P:          37  IN:         154                          QRS:        32  QRSD:       107                          T:          55  QT:         429  QTc:        463    Interpretive Statements  Sinus rhythm  ST elev, probable normal early repol pattern  Baseline wander in lead(s) V5  Compared to ECG 2017 09:14:21  ST (T wave) deviation now present      Electronically Signed On 11-3-2017 10:22:03 PDT by RETA HERNANDEZ MD     EKG (ER)   Result Value Ref Range    Report       [facility]    Test Date:  2017  Pt Name:    STEVAN RODRIGUEZ              Department: [facility]  MRN:        [id number]                      Room:       [place] [address]  Gender:     F                            Technician: [id number]  :        1970                   Requested By:RETA HERNANDEZ  Order #:    [id number]                    Reading MD: RETA HERNANDEZ MD    Measurements  Intervals                                Axis  Rate:       63                           P:          36  IN:         148                          QRS:        19  QRSD:       108                          T:          20  QT:         422  QTc:        433    Interpretive Statements  Sinus rhythm  Compared to ECG 2017 09:32:07  ST (T wave) deviation no longer present    Electronically Signed On 11-3-2017 10:22:07 PDT by RETA HERNANDEZ MD             COURSE & MEDICAL DECISION  MAKING  Pertinent Labs & Imaging studies reviewed. (See chart for details)  Patient presents to ED for evaluation. The patient is complaining of severe chest pain. Initial EKG has nonspecific ST elevations inferior leads. The patient was given nitroglycerin, morphine, chest pain-free to [place]. EKG showed no signs of ST elevations. Initial troponin is negative. At this point because of her symptomatology, I do feel the patient should be admitted to the hospital further risk stratification. Patient was given aspirin. Currently is chest pain-free    FINAL IMPRESSION  1. Chest pain, unspecified type             Electronically signed by: Perez Valentin, 11/3/2017 9:23 AM

## 2017-11-03 NOTE — ED NOTES
Report called to SHANTANU Bullard.  Aware pt is on the way to floor via [employer] w/ tele.  No changes in condition on departing ED.

## 2017-11-03 NOTE — ASSESSMENT & PLAN NOTE
We will rule out cardiac etiology.  Patient denies having active chest pain at this time, her CP did resolve with single dose of nitro.   Denies pleurtic chest pain. Patient is not hypoxic.  Initial Troponin is negative, we will trend q4-6 hours.  Initial EKG showed mild ST elevation in right precordial reads, but repeated was normal.   We have ordered an Treadmill + Isotope stress test.  Patient will be given aspirin and high intensity statin for cardiac protective measures.  Check lipid panel and TSH

## 2017-11-03 NOTE — ED NOTES
"Chief Complaint   Patient presents with   • Chest Pain     while driving this morning, pain for the \"last couple days\"   • Arm Pain     L arm pain     /80   Pulse (!) 57   Temp 35.9 °C (96.6 °F)   Resp (!) 24   Ht 1.702 m (5' 7\")   Wt 94.5 kg (208 lb 5.4 oz)   SpO2 100%   BMI 32.63 kg/m²     "

## 2017-11-03 NOTE — PROGRESS NOTES
Lindsey [id number] from Lab called with critical result of tropinin 0.78 at 1649. Critical lab result read back to Lindsey.   Dr. Dunham notified of critical lab result at 1652.  Critical lab result read back by Dr. Dunham.

## 2017-11-03 NOTE — H&P
"Hospital Medicine History and Physical    Date of Service  11/3/2017    Chief Complaint  Chief Complaint   Patient presents with   • Chest Pain     while driving this morning, pain for the \"last couple days\"   • Arm Pain     L arm pain       History of Presenting Illness  47 y.o. female with no past medical history  presented 11/3/2017 withComplaint of chest discomfort which started this morning patient was sitting really doing nothing strenuous when she realized she needed to go to her nail appointment when at that time she was driving she suddenly developed chest discomfort described as pressure like sensation as of an elephant was sitting on her chest with discomfort radiation to her left arm patient noticed mild numbness of her left arm which lasted about  more than an hour until she got to the ER. She also noticed that her discomfort including nausea diaphoresis and chest discomfort worsened with exertion, such as walking. However does not remember having any relieving factors noted. Overall patient is fairly healthy is not a smoker no significant family history of cardiac disease. Her initial workup including troponins are negative her EKGs did show mild nonspecific ST elevation in the right precordial leads however after we repeated EKG several times this was no longer apparent. Patient does not have any active chest pain at this time. Patient will be admitted in telemetry for closer monitoring including stress test for risk stratification.              Primary Care Physician  Pcp Not In Computer    Consultants  [facility]    Code Status  Code: Full code    Review of Systems  Review of Systems   Constitutional: Positive for malaise/fatigue. Negative for chills and fever.   HENT: Negative for congestion, hearing loss, sore throat and tinnitus.    Eyes: Negative for blurred vision, double vision, photophobia and pain.   Respiratory: Negative for cough, hemoptysis, sputum production, shortness of breath and stridor.  "   Cardiovascular: Negative for chest pain, palpitations, orthopnea, claudication and PND.   Gastrointestinal: Negative for blood in stool, constipation, heartburn, melena, nausea and vomiting.   Genitourinary: Negative for dysuria, frequency and urgency.   Musculoskeletal: Negative for back pain, myalgias and neck pain.   Neurological: Negative for dizziness, tingling, tremors, sensory change, speech change, weakness and headaches.   Psychiatric/Behavioral: Negative for depression, memory loss and suicidal ideas. The patient is not nervous/anxious.           Past Medical History  No past medical history on file.    Surgical History  Past Surgical History:   Procedure Laterality Date   • CYSTOSCOPY Right 9/17/2017    Procedure: CYSTOSCOPY;  Surgeon: Kiet Eid M.D.;  Location: SURGERY [facility];  Service: Urology   • URETEROSCOPY Right 9/17/2017    Procedure: URETEROSCOPY;  Surgeon: Kiet Eid M.D.;  Location: SURGERY [facility];  Service: Urology   • LASERTRIPSY Right 9/17/2017    Procedure: LASERTRIPSY;  Surgeon: Kiet Eid M.D.;  Location: SURGERY [facility];  Service: Urology   • STENT PLACEMENT Right 9/17/2017    Procedure: STENT PLACEMENT;  Surgeon: Kiet Eid M.D.;  Location: SURGERY [facility];  Service: Urology   • ABDOMINAL EXPLORATION     • ABDOMINAL HYSTERECTOMY TOTAL         Medications  No current facility-administered medications on file prior to encounter.      Current Outpatient Prescriptions on File Prior to Encounter   Medication Sig Dispense Refill   • estradiol (MINIVELLE) 0.05 MG/24HR PATCH BIWEEKLY Apply 1 Patch to skin as directed 2X A WEEK.         Family History  No family history on file.    Social History  Social History   Substance Use Topics   • Smoking status: Never Smoker   • Smokeless tobacco: Never Used   • Alcohol use Yes       Allergies  Allergies   Allergen Reactions   • Levaquin Itching        Physical Exam  Laboratory    Hemodynamics  Temp (24hrs), Av.9 °C (96.6 °F), Min:35.9 °C (96.6 °F), Max:35.9 °C (96.6 °F)   Temperature: 35.9 °C (96.6 °F)  Pulse  Av.4  Min: 57  Max: 71 Heart Rate (Monitored): 65  Blood Pressure: 139/80, NIBP: 137/83      Respiratory      Respiration: 15, Pulse Oximetry: 97 %             Physical Exam   Constitutional: She is oriented to person, place, and time. She appears well-developed and well-nourished. No distress.   HENT:   Head: Normocephalic and atraumatic.   Mouth/Throat: No oropharyngeal exudate.   Eyes: Conjunctivae are normal. Pupils are equal, round, and reactive to light. Right eye exhibits no discharge. No scleral icterus.   Neck: Neck supple. No JVD present. No thyromegaly present.   Cardiovascular: Normal rate and intact distal pulses.  Exam reveals no friction rub.    No murmur heard.  Pulmonary/Chest: Effort normal and breath sounds normal. No stridor. No respiratory distress. She has no wheezes. She has no rales.   Abdominal: Soft. Bowel sounds are normal. She exhibits no distension. There is no tenderness. There is no rebound.   Musculoskeletal: Normal range of motion. She exhibits no edema.   Neurological: She is alert and oriented to person, place, and time. No cranial nerve deficit.   Skin: Skin is warm. She is not diaphoretic. No erythema.   Psychiatric: She has a normal mood and affect. Her behavior is normal. Thought content normal.         Assessment/Plan  * Chest pain- (present on admission)   Assessment & Plan    We will rule out cardiac etiology.  Patient denies having active chest pain at this time, her CP did resolve with single dose of nitro.   Denies pleurtic chest pain. Patient is not hypoxic.  Initial Troponin is negative, we will trend q4-6 hours.  Initial EKG showed mild ST elevation in right precordial reads, but repeated was normal.   We have ordered an Treadmill + Isotope stress test.  Patient will be given aspirin and high intensity statin for cardiac  protective measures.  Check lipid panel and TSH            I anticipate this patient is appropriate for observation status at this time.    Prophylaxis: lovenox    Recent Labs      11/03/17 0929   WBC  11.5*   RBC  4.60   HEMOGLOBIN  14.0   HEMATOCRIT  39.8   MCV  86.5   MCH  30.4   MCHC  35.2*   RDW  38.7   PLATELETCT  400   MPV  10.8     Recent Labs      11/03/17 0929   SODIUM  137   POTASSIUM  3.4*   CHLORIDE  107   CO2  22   GLUCOSE  114*   BUN  17   CREATININE  1.17   CALCIUM  9.2     Recent Labs      11/03/17 0929   ALTSGPT  13   ASTSGOT  17   ALKPHOSPHAT  75   TBILIRUBIN  0.7   LIPASE  35   GLUCOSE  114*     Recent Labs      11/03/17 0929   APTT  29.7   INR  0.96     Recent Labs      11/03/17 0929   BNPBTYPENAT  64         Lab Results   Component Value Date    TROPONINI <0.02 11/03/2017       Imaging  DX-CHEST-PORTABLE (1 VIEW)   Final Result      No radiographic evidence of acute cardiopulmonary process.      CARDIAC STRESS TEST TREADMILL ONLY    (Results Pending)

## 2017-11-03 NOTE — FLOWSHEET NOTE
11/03/17 1355   Events/Summary/Plan   Events/Summary/Plan O2 protocol completed   Chest Exam   Respiration 18   Heart Rate (Monitored) 67   Breath Sounds   RUL Breath Sounds Clear   RML Breath Sounds Clear;Diminished   RLL Breath Sounds Diminished   GIO Breath Sounds Clear   LLL Breath Sounds Diminished   Oximetry   #Pulse Oximetry (Single Determination) Yes   Oxygen   Home O2 Use Prior To Admission? No   Pulse Oximetry 99 %   O2 (LPM) 0   O2 (FiO2) 21   O2 Daily Delivery Respiratory  Room Air with O2 Available

## 2017-11-04 ENCOUNTER — APPOINTMENT (OUTPATIENT)
Dept: RADIOLOGY | Facility: MEDICAL CENTER | Age: 47
DRG: 281 | End: 2017-11-04
Attending: INTERNAL MEDICINE
Payer: COMMERCIAL

## 2017-11-04 ENCOUNTER — APPOINTMENT (OUTPATIENT)
Dept: RADIOLOGY | Facility: MEDICAL CENTER | Age: 47
DRG: 281 | End: 2017-11-04
Attending: FAMILY MEDICINE
Payer: COMMERCIAL

## 2017-11-04 PROBLEM — R79.89 ELEVATED TROPONIN: Status: ACTIVE | Noted: 2017-11-04

## 2017-11-04 PROBLEM — N13.30 HYDRONEPHROSIS: Status: ACTIVE | Noted: 2017-11-04

## 2017-11-04 LAB
ALBUMIN SERPL BCP-MCNC: 3.6 G/DL (ref 3.2–4.9)
ALBUMIN/GLOB SERPL: 1.5 G/DL
ALP SERPL-CCNC: 65 U/L (ref 30–99)
ALT SERPL-CCNC: 9 U/L (ref 2–50)
ANION GAP SERPL CALC-SCNC: 8 MMOL/L (ref 0–11.9)
APTT PPP: 44.3 SEC (ref 24.7–36)
APTT PPP: 84.7 SEC (ref 24.7–36)
AST SERPL-CCNC: 16 U/L (ref 12–45)
BASOPHILS # BLD AUTO: 0.5 % (ref 0–1.8)
BASOPHILS # BLD: 0.05 K/UL (ref 0–0.12)
BILIRUB SERPL-MCNC: 0.4 MG/DL (ref 0.1–1.5)
BUN SERPL-MCNC: 16 MG/DL (ref 8–22)
CALCIUM SERPL-MCNC: 8.5 MG/DL (ref 8.5–10.5)
CHLORIDE SERPL-SCNC: 108 MMOL/L (ref 96–112)
CHOLEST SERPL-MCNC: 153 MG/DL (ref 100–199)
CO2 SERPL-SCNC: 22 MMOL/L (ref 20–33)
CREAT SERPL-MCNC: 1.11 MG/DL (ref 0.5–1.4)
EKG IMPRESSION: NORMAL
EOSINOPHIL # BLD AUTO: 0.26 K/UL (ref 0–0.51)
EOSINOPHIL NFR BLD: 2.5 % (ref 0–6.9)
ERYTHROCYTE [DISTWIDTH] IN BLOOD BY AUTOMATED COUNT: 39.7 FL (ref 35.9–50)
GFR SERPL CREATININE-BSD FRML MDRD: 52 ML/MIN/1.73 M 2
GLOBULIN SER CALC-MCNC: 2.4 G/DL (ref 1.9–3.5)
GLUCOSE SERPL-MCNC: 92 MG/DL (ref 65–99)
HCT VFR BLD AUTO: 33.6 % (ref 37–47)
HDLC SERPL-MCNC: 36 MG/DL
HGB BLD-MCNC: 11.4 G/DL (ref 12–16)
IMM GRANULOCYTES # BLD AUTO: 0.03 K/UL (ref 0–0.11)
IMM GRANULOCYTES NFR BLD AUTO: 0.3 % (ref 0–0.9)
LDLC SERPL CALC-MCNC: 86 MG/DL
LYMPHOCYTES # BLD AUTO: 3.27 K/UL (ref 1–4.8)
LYMPHOCYTES NFR BLD: 31.6 % (ref 22–41)
MCH RBC QN AUTO: 29.8 PG (ref 27–33)
MCHC RBC AUTO-ENTMCNC: 33.9 G/DL (ref 33.6–35)
MCV RBC AUTO: 88 FL (ref 81.4–97.8)
MONOCYTES # BLD AUTO: 0.77 K/UL (ref 0–0.85)
MONOCYTES NFR BLD AUTO: 7.4 % (ref 0–13.4)
NEUTROPHILS # BLD AUTO: 5.97 K/UL (ref 2–7.15)
NEUTROPHILS NFR BLD: 57.7 % (ref 44–72)
NRBC # BLD AUTO: 0 K/UL
NRBC BLD AUTO-RTO: 0 /100 WBC
PLATELET # BLD AUTO: 305 K/UL (ref 164–446)
PMV BLD AUTO: 11.2 FL (ref 9–12.9)
POTASSIUM SERPL-SCNC: 3.6 MMOL/L (ref 3.6–5.5)
PROT SERPL-MCNC: 6 G/DL (ref 6–8.2)
RBC # BLD AUTO: 3.82 M/UL (ref 4.2–5.4)
SODIUM SERPL-SCNC: 138 MMOL/L (ref 135–145)
TRIGL SERPL-MCNC: 156 MG/DL (ref 0–149)
TROPONIN I SERPL-MCNC: 0.55 NG/ML (ref 0–0.04)
WBC # BLD AUTO: 10.4 K/UL (ref 4.8–10.8)

## 2017-11-04 PROCEDURE — 700102 HCHG RX REV CODE 250 W/ 637 OVERRIDE(OP): Performed by: HOSPITALIST

## 2017-11-04 PROCEDURE — 84484 ASSAY OF TROPONIN QUANT: CPT

## 2017-11-04 PROCEDURE — 36415 COLL VENOUS BLD VENIPUNCTURE: CPT

## 2017-11-04 PROCEDURE — 700102 HCHG RX REV CODE 250 W/ 637 OVERRIDE(OP)

## 2017-11-04 PROCEDURE — 99152 MOD SED SAME PHYS/QHP 5/>YRS: CPT

## 2017-11-04 PROCEDURE — 700111 HCHG RX REV CODE 636 W/ 250 OVERRIDE (IP): Performed by: INTERNAL MEDICINE

## 2017-11-04 PROCEDURE — B2151ZZ FLUOROSCOPY OF LEFT HEART USING LOW OSMOLAR CONTRAST: ICD-10-PCS | Performed by: INTERNAL MEDICINE

## 2017-11-04 PROCEDURE — 71250 CT THORAX DX C-: CPT

## 2017-11-04 PROCEDURE — 700102 HCHG RX REV CODE 250 W/ 637 OVERRIDE(OP): Performed by: INTERNAL MEDICINE

## 2017-11-04 PROCEDURE — 700111 HCHG RX REV CODE 636 W/ 250 OVERRIDE (IP)

## 2017-11-04 PROCEDURE — 700102 HCHG RX REV CODE 250 W/ 637 OVERRIDE(OP): Performed by: FAMILY MEDICINE

## 2017-11-04 PROCEDURE — B2111ZZ FLUOROSCOPY OF MULTIPLE CORONARY ARTERIES USING LOW OSMOLAR CONTRAST: ICD-10-PCS | Performed by: INTERNAL MEDICINE

## 2017-11-04 PROCEDURE — C1769 GUIDE WIRE: HCPCS

## 2017-11-04 PROCEDURE — 93458 L HRT ARTERY/VENTRICLE ANGIO: CPT

## 2017-11-04 PROCEDURE — 80061 LIPID PANEL: CPT

## 2017-11-04 PROCEDURE — 700105 HCHG RX REV CODE 258: Performed by: FAMILY MEDICINE

## 2017-11-04 PROCEDURE — 80053 COMPREHEN METABOLIC PANEL: CPT

## 2017-11-04 PROCEDURE — A9270 NON-COVERED ITEM OR SERVICE: HCPCS | Performed by: INTERNAL MEDICINE

## 2017-11-04 PROCEDURE — 76775 US EXAM ABDO BACK WALL LIM: CPT

## 2017-11-04 PROCEDURE — A9270 NON-COVERED ITEM OR SERVICE: HCPCS | Performed by: HOSPITALIST

## 2017-11-04 PROCEDURE — 4A023N7 MEASUREMENT OF CARDIAC SAMPLING AND PRESSURE, LEFT HEART, PERCUTANEOUS APPROACH: ICD-10-PCS | Performed by: INTERNAL MEDICINE

## 2017-11-04 PROCEDURE — C1894 INTRO/SHEATH, NON-LASER: HCPCS

## 2017-11-04 PROCEDURE — 93970 EXTREMITY STUDY: CPT

## 2017-11-04 PROCEDURE — 93970 EXTREMITY STUDY: CPT | Mod: 26 | Performed by: SURGERY

## 2017-11-04 PROCEDURE — 770020 HCHG ROOM/CARE - TELE (206)

## 2017-11-04 PROCEDURE — 307093 HCHG TR BAND RADIAL

## 2017-11-04 PROCEDURE — 85730 THROMBOPLASTIN TIME PARTIAL: CPT

## 2017-11-04 PROCEDURE — 99233 SBSQ HOSP IP/OBS HIGH 50: CPT | Mod: 25 | Performed by: FAMILY MEDICINE

## 2017-11-04 PROCEDURE — 700101 HCHG RX REV CODE 250

## 2017-11-04 PROCEDURE — 85025 COMPLETE CBC W/AUTO DIFF WBC: CPT

## 2017-11-04 PROCEDURE — C1887 CATHETER, GUIDING: HCPCS

## 2017-11-04 PROCEDURE — 700117 HCHG RX CONTRAST REV CODE 255: Performed by: INTERNAL MEDICINE

## 2017-11-04 PROCEDURE — A9270 NON-COVERED ITEM OR SERVICE: HCPCS

## 2017-11-04 PROCEDURE — A9270 NON-COVERED ITEM OR SERVICE: HCPCS | Performed by: FAMILY MEDICINE

## 2017-11-04 RX ORDER — CLOPIDOGREL BISULFATE 75 MG/1
300 TABLET ORAL ONCE
Status: COMPLETED | OUTPATIENT
Start: 2017-11-04 | End: 2017-11-04

## 2017-11-04 RX ORDER — HALOPERIDOL 5 MG/ML
1 INJECTION INTRAMUSCULAR EVERY 6 HOURS PRN
Status: DISCONTINUED | OUTPATIENT
Start: 2017-11-04 | End: 2017-11-07 | Stop reason: HOSPADM

## 2017-11-04 RX ORDER — ONDANSETRON 2 MG/ML
4 INJECTION INTRAMUSCULAR; INTRAVENOUS EVERY 4 HOURS PRN
Status: DISCONTINUED | OUTPATIENT
Start: 2017-11-04 | End: 2017-11-07 | Stop reason: HOSPADM

## 2017-11-04 RX ORDER — DEXAMETHASONE SODIUM PHOSPHATE 4 MG/ML
4 INJECTION, SOLUTION INTRA-ARTICULAR; INTRALESIONAL; INTRAMUSCULAR; INTRAVENOUS; SOFT TISSUE
Status: DISCONTINUED | OUTPATIENT
Start: 2017-11-04 | End: 2017-11-07 | Stop reason: HOSPADM

## 2017-11-04 RX ORDER — LIDOCAINE HYDROCHLORIDE 20 MG/ML
INJECTION, SOLUTION INFILTRATION; PERINEURAL
Status: COMPLETED
Start: 2017-11-04 | End: 2017-11-04

## 2017-11-04 RX ORDER — SCOLOPAMINE TRANSDERMAL SYSTEM 1 MG/1
1 PATCH, EXTENDED RELEASE TRANSDERMAL
Status: DISCONTINUED | OUTPATIENT
Start: 2017-11-04 | End: 2017-11-07 | Stop reason: HOSPADM

## 2017-11-04 RX ORDER — HEPARIN SODIUM,PORCINE 1000/ML
VIAL (ML) INJECTION
Status: COMPLETED
Start: 2017-11-04 | End: 2017-11-04

## 2017-11-04 RX ORDER — VERAPAMIL HYDROCHLORIDE 2.5 MG/ML
INJECTION, SOLUTION INTRAVENOUS
Status: COMPLETED
Start: 2017-11-04 | End: 2017-11-04

## 2017-11-04 RX ORDER — ALPRAZOLAM 0.5 MG/1
1 TABLET ORAL ONCE
Status: COMPLETED | OUTPATIENT
Start: 2017-11-04 | End: 2017-11-04

## 2017-11-04 RX ORDER — DIPHENHYDRAMINE HYDROCHLORIDE 50 MG/ML
25 INJECTION INTRAMUSCULAR; INTRAVENOUS EVERY 6 HOURS PRN
Status: DISCONTINUED | OUTPATIENT
Start: 2017-11-04 | End: 2017-11-07 | Stop reason: HOSPADM

## 2017-11-04 RX ORDER — CLOPIDOGREL 300 MG/1
TABLET, FILM COATED ORAL
Status: COMPLETED
Start: 2017-11-04 | End: 2017-11-04

## 2017-11-04 RX ORDER — MIDAZOLAM HYDROCHLORIDE 1 MG/ML
INJECTION INTRAMUSCULAR; INTRAVENOUS
Status: COMPLETED
Start: 2017-11-04 | End: 2017-11-04

## 2017-11-04 RX ORDER — CLOPIDOGREL BISULFATE 75 MG/1
75 TABLET ORAL DAILY
Status: DISCONTINUED | OUTPATIENT
Start: 2017-11-05 | End: 2017-11-06

## 2017-11-04 RX ADMIN — LIDOCAINE HYDROCHLORIDE: 20 INJECTION, SOLUTION INFILTRATION; PERINEURAL at 18:15

## 2017-11-04 RX ADMIN — MIDAZOLAM 2 MG: 1 INJECTION INTRAMUSCULAR; INTRAVENOUS at 18:15

## 2017-11-04 RX ADMIN — ATORVASTATIN CALCIUM 40 MG: 40 TABLET, FILM COATED ORAL at 18:47

## 2017-11-04 RX ADMIN — FENTANYL CITRATE 75 MCG: 50 INJECTION, SOLUTION INTRAMUSCULAR; INTRAVENOUS at 18:15

## 2017-11-04 RX ADMIN — SODIUM CHLORIDE: 9 INJECTION, SOLUTION INTRAVENOUS at 09:30

## 2017-11-04 RX ADMIN — CLOPIDOGREL BISULFATE 300 MG: 300 TABLET, FILM COATED ORAL at 18:30

## 2017-11-04 RX ADMIN — CLOPIDOGREL BISULFATE 300 MG: 300 TABLET, FILM COATED ORAL at 18:23

## 2017-11-04 RX ADMIN — NITROGLYCERIN: 20 INJECTION INTRAVENOUS at 17:45

## 2017-11-04 RX ADMIN — SODIUM CHLORIDE: 9 INJECTION, SOLUTION INTRAVENOUS at 16:46

## 2017-11-04 RX ADMIN — HYDROCODONE BITARTRATE AND ACETAMINOPHEN 1 TABLET: 5; 325 TABLET ORAL at 22:25

## 2017-11-04 RX ADMIN — HEPARIN SODIUM: 1000 INJECTION, SOLUTION INTRAVENOUS; SUBCUTANEOUS at 18:16

## 2017-11-04 RX ADMIN — IOHEXOL 68 ML: 350 INJECTION, SOLUTION INTRAVENOUS at 18:30

## 2017-11-04 RX ADMIN — ASPIRIN 325 MG: 325 TABLET, DELAYED RELEASE ORAL at 08:30

## 2017-11-04 RX ADMIN — ONDANSETRON 4 MG: 2 INJECTION INTRAMUSCULAR; INTRAVENOUS at 19:47

## 2017-11-04 RX ADMIN — ALPRAZOLAM 1 MG: 0.5 TABLET ORAL at 22:36

## 2017-11-04 RX ADMIN — HYDROCODONE BITARTRATE AND ACETAMINOPHEN 1 TABLET: 5; 325 TABLET ORAL at 00:04

## 2017-11-04 RX ADMIN — HEPARIN SODIUM: 200 INJECTION, SOLUTION INTRAVENOUS at 17:45

## 2017-11-04 RX ADMIN — VERAPAMIL HYDROCHLORIDE 2.5 MG: 2.5 INJECTION, SOLUTION INTRAVENOUS at 18:16

## 2017-11-04 ASSESSMENT — PAIN SCALES - GENERAL
PAINLEVEL_OUTOF10: 0
PAINLEVEL_OUTOF10: 8
PAINLEVEL_OUTOF10: 9
PAINLEVEL_OUTOF10: 3
PAINLEVEL_OUTOF10: 5
PAINLEVEL_OUTOF10: 0

## 2017-11-04 ASSESSMENT — ENCOUNTER SYMPTOMS
COUGH: 0
HEARTBURN: 0
HEADACHES: 0
BRUISES/BLEEDS EASILY: 0
WHEEZING: 0
BLURRED VISION: 0
SORE THROAT: 0
NAUSEA: 0
DIARRHEA: 0
FEVER: 0
DIZZINESS: 0
SHORTNESS OF BREATH: 0
FLANK PAIN: 0
CHILLS: 0
WEAKNESS: 1
VOMITING: 0
ABDOMINAL PAIN: 0

## 2017-11-04 NOTE — PROGRESS NOTES
Bedside handoff received from Alpa. Pt. Stable, no acute distress noted, no complaints of pain. Heparin drip verified. Family at bedside. Safety precautions maintained, call light within reach, bed in lowest position. Will continue to monitor.

## 2017-11-04 NOTE — PROGRESS NOTES
Rosanne from Lab called with critical result of Troponin (0.99) at 2050. Critical lab result read back to Rosanne.   Dr. Dunham notified of critical lab result at 2055.  Critical lab result read back by Dr. Dunham.    No new orders

## 2017-11-04 NOTE — DISCHARGE SUMMARY
"CHIEF COMPLAINT ON ADMISSION  Chief Complaint   Patient presents with   • Chest Pain     while driving this morning, pain for the \"last couple days\"   • Arm Pain     L arm pain       CODE STATUS  Full Code    HPI & HOSPITAL COURSE  47 y.o. female with no past medical history  presented 11/3/2017 withComplaint of chest discomfort which started this morning patient was sitting really doing nothing strenuous when she realized she needed to go to her nail appointment when at that time she was driving she suddenly developed chest discomfort described as pressure like sensation as of an elephant was sitting on her chest with discomfort radiation to her left arm patient noticed mild numbness of her left arm which lasted about  more than an hour until she got to the ER. She also noticed that her discomfort including nausea diaphoresis and chest discomfort worsened with exertion, such as walking. However does not remember having any relieving factors noted. Overall patient is fairly healthy is not a smoker no significant family history of cardiac disease. Her initial workup including troponins are negative her EKGs did show mild nonspecific ST elevation in the right precordial leads however after we repeated EKG several times this was no longer apparent. Patient does not have any active chest pain at this time. Patient was admitted in telemetry for closer monitoring including stress test for risk stratification.     However, her 2nd troponin's came up as .49, I was uncomfortable given that patient is fairly healthy, and with these findings I was concerned for NSTEMI. Cardiology  was consulted and recommended heparin gtt and transfer to [facility] for consideration for cardiac catheterization depending on what her cardiac troponin's will show in subsequent repeats.  At this point Patient denies fevers/chills, chest pain, shortness of breath or nausea/vommiting.          Therefore, she is discharged in fair and " stable condition with close outpatient follow-up.    Total time of the discharge process exceeds 38 minutes

## 2017-11-04 NOTE — PROGRESS NOTES
Pt. Left facility in stable condition via [facility]. No acute distress noted, Transfer documents given to paramedics. Notified Janine of last tropnin results and that pt. Is on her way.

## 2017-11-04 NOTE — PROGRESS NOTES
Called report to Janine at [facility]. Pt. transfering to #[phone]. Nurse made aware that pt. Does not have 2nd IV for NS infusion. Advised I would attempt before pt. Is transferred.

## 2017-11-04 NOTE — PROGRESS NOTES
Dr. Nugent on floor and assessing pt. MD aware of pt pain suddenly increasing, sharp in nature, worse with movement and 8/10.

## 2017-11-04 NOTE — PROGRESS NOTES
Pt to room with ems, alert, c/o chest pain 5/10. Sharp in nature worse with movement. SR on monitor. Cont to monitor

## 2017-11-04 NOTE — PROGRESS NOTES
Cardiology Progress Note               Author: Luis A LUCAS Ryanne Date & Time created: 2017  11:04 AM     Interval History:  Transferred from [facility] yesterday for cardiac catheterization. She presented there with sudden onset of severe chest discomfort like someone standing on her chest associated with pain in her arms and diaphoresis. Denies seeing history of coronary disease no overt risk factors. Troponin peaked at 0.99 last night. Repeat troponin is pending.  The patient has been under considerable stress.  EKG notable for normal J-point elevation in lead 3.    Review of Systems   Respiratory: Negative for shortness of breath.    Cardiovascular: Positive for leg swelling. Negative for chest pain.   Endo/Heme/Allergies: Does not bruise/bleed easily.   All other systems reviewed and are negative.      Physical Exam   Constitutional: She is oriented to person, place, and time. She appears well-developed and well-nourished. No distress.   HENT:   Head: Atraumatic.   Eyes: Conjunctivae and EOM are normal. Pupils are equal, round, and reactive to light.   Neck: Neck supple. No JVD present.   Cardiovascular: Normal rate and regular rhythm.    No murmur heard.  Pulmonary/Chest: Effort normal and breath sounds normal. No respiratory distress. She has no wheezes. She has no rales.   Abdominal: Soft. There is no tenderness.   Musculoskeletal: She exhibits no edema.   Neurological: She is alert and oriented to person, place, and time.   Skin: Skin is warm and dry. She is not diaphoretic.       Hemodynamics:  Temp (24hrs), Av °C (98.6 °F), Min:36.9 °C (98.4 °F), Max:37.1 °C (98.7 °F)  Temperature: 36.9 °C (98.5 °F)  Pulse  Av.8  Min: 61  Max: 84   Blood Pressure: 124/75     Respiratory:    Respiration: 16, Pulse Oximetry: 93 %           Fluids:     Weight: 94.7 kg (208 lb 12.4 oz)  GI/Nutrition:  Orders Placed This Encounter   Procedures   • Diet NPO     Standing Status:   Standing     Number of  Occurrences:   1     Order Specific Question:   Restrict to:     Answer:   Sips with Medications [3]     Lab Results:  Recent Labs      11/03/17   0929  11/04/17   0153   WBC  11.5*  10.4   RBC  4.60  3.82*   HEMOGLOBIN  14.0  11.4*   HEMATOCRIT  39.8  33.6*   MCV  86.5  88.0   MCH  30.4  29.8   MCHC  35.2*  33.9   RDW  38.7  39.7   PLATELETCT  400  305   MPV  10.8  11.2     Recent Labs      11/03/17   0929  11/04/17   0153   SODIUM  137  138   POTASSIUM  3.4*  3.6   CHLORIDE  107  108   CO2  22  22   GLUCOSE  114*  92   BUN  17  16   CREATININE  1.17  1.11   CALCIUM  9.2  8.5     Recent Labs      11/03/17   0929  11/04/17   0529   APTT  29.7  84.7*   INR  0.96   --      Recent Labs      11/03/17   0929   BNPBTYPENAT  64     Recent Labs      11/03/17   0929  11/03/17   1333  11/03/17   1614  11/03/17 2001   TROPONINI  <0.02  0.49*  0.78*  0.99*   BNPBTYPENAT  64   --    --    --      Recent Labs      11/04/17   0153   TRIGLYCERIDE  156*   HDL  36*   LDL  86         Medical Decision Making, by Problem:  Active Hospital Problems    Diagnosis   • *Chest pain [R07.9]   • Elevated troponin [R74.8]   • CECILIO (acute kidney injury) (CMS-HCC) [N17.9]   • Hydronephrosis [N13.30]       Plan:  Patient has a history of chest pain quite compatible with angina. She's been under severe stress and certainly takotsubo stress myopathy is as concern.  She is scheduled for angiography later today. The procedure and inherent risks were explained including the risk of conscious sedation, heart attack, bleeding, and the possible need for coronary intervention.      Quality-Core Measures

## 2017-11-04 NOTE — PROGRESS NOTES
[facility] Hospitalist Progress Note    Date of Service: 2017    Chief Complaint  47 y.o. female admitted 11/3/2017 with Chest pain, Elevated troponin, Renal failure.    Interval Problem Update  CP - for cardiac cath, V/Q scan pending  Renal failure - CECILIO from previous Ureterolithiasis  Hydronephrosis - noted on CT    Consultants/Specialty  Cardio -     Disposition  For cardiac cath        Review of Systems   Constitutional: Positive for malaise/fatigue. Negative for chills and fever.   HENT: Negative for sore throat.    Eyes: Negative for blurred vision.   Respiratory: Negative for cough, shortness of breath and wheezing.    Cardiovascular: Positive for chest pain and leg swelling.   Gastrointestinal: Negative for abdominal pain, diarrhea, heartburn, nausea and vomiting.   Genitourinary: Negative for dysuria and flank pain.   Neurological: Positive for weakness. Negative for dizziness and headaches.      Physical Exam  Laboratory/Imaging   Hemodynamics  Temp (24hrs), Av °C (98.6 °F), Min:36.9 °C (98.4 °F), Max:37.1 °C (98.7 °F)   Temperature: 36.9 °C (98.5 °F)  Pulse  Av.8  Min: 61  Max: 84    Blood Pressure: 124/75      Respiratory      Respiration: 16, Pulse Oximetry: 93 %             Fluids    Intake/Output Summary (Last 24 hours) at 17 1045  Last data filed at 17 0500   Gross per 24 hour   Intake             1108 ml   Output                0 ml   Net             1108 ml       Nutrition  Orders Placed This Encounter   Procedures   • Diet NPO     Standing Status:   Standing     Number of Occurrences:   1     Order Specific Question:   Restrict to:     Answer:   Sips with Medications [3]     Physical Exam   Constitutional: She is oriented to person, place, and time. She appears well-developed and well-nourished.   HENT:   Head: Normocephalic and atraumatic.   Eyes: Conjunctivae are normal. Pupils are equal, round, and reactive to light.   Neck: No tracheal deviation present. No thyromegaly  present.   Cardiovascular: Normal rate and regular rhythm.    Pulmonary/Chest: Effort normal and breath sounds normal.   Abdominal: Soft. Bowel sounds are normal. She exhibits no distension. There is no tenderness.   Musculoskeletal: She exhibits edema.   Lymphadenopathy:     She has no cervical adenopathy.   Neurological: She is alert and oriented to person, place, and time.   Skin: Skin is warm and dry.   Nursing note and vitals reviewed.      Recent Labs      11/03/17 0929 11/04/17   0153   WBC  11.5*  10.4   RBC  4.60  3.82*   HEMOGLOBIN  14.0  11.4*   HEMATOCRIT  39.8  33.6*   MCV  86.5  88.0   MCH  30.4  29.8   MCHC  35.2*  33.9   RDW  38.7  39.7   PLATELETCT  400  305   MPV  10.8  11.2     Recent Labs      11/03/17   0929  11/04/17   0153   SODIUM  137  138   POTASSIUM  3.4*  3.6   CHLORIDE  107  108   CO2  22  22   GLUCOSE  114*  92   BUN  17  16   CREATININE  1.17  1.11   CALCIUM  9.2  8.5     Recent Labs      11/03/17   0929  11/04/17   0529   APTT  29.7  84.7*   INR  0.96   --      Recent Labs      11/03/17   0929   BNPBTYPENAT  64     Recent Labs      11/04/17   0153   TRIGLYCERIDE  156*   HDL  36*   LDL  86          Assessment/Plan     * Chest pain- (present on admission)   Assessment & Plan    Heparin drip, ASA, Lipitor  For V/Q scan, venous duplex  For cardiac cath          Elevated troponin- (present on admission)   Assessment & Plan    ASA  For cardiac cath        CECILIO (acute kidney injury) (CMS-HCC)- (present on admission)   Assessment & Plan    IVF NS  Check PTH        Hydronephrosis- (present on admission)   Assessment & Plan    Check US renal            Reviewed items::  EKG reviewed, Radiology images reviewed, Labs reviewed and Medications reviewed  Seth catheter::  No Seth  DVT prophylaxis pharmacological::  Heparin  Ulcer Prophylaxis::  No

## 2017-11-04 NOTE — PROGRESS NOTES
Patient seen and examined. Agree with admitting physicians plan. Patient currently complains of pleuritic chest pain. Positional. Worsening with deep inspiration. Can be controlled by pain medication. Patient currently is already on heparin drip. Cardiologist will see patient.     Possible pericarditis.    Need to rule out PE, order VQ scan.

## 2017-11-04 NOTE — PROGRESS NOTES
Direct admit from [facility]. Dr. Dunham accepting for Chest pain, NSTEMI. ADT signed and held by Dr. Dunham and will need to be released upon patient arrival to unit. Patient arriving via ground transport.

## 2017-11-05 ENCOUNTER — APPOINTMENT (OUTPATIENT)
Dept: RADIOLOGY | Facility: MEDICAL CENTER | Age: 47
DRG: 281 | End: 2017-11-05
Attending: INTERNAL MEDICINE
Payer: COMMERCIAL

## 2017-11-05 ENCOUNTER — APPOINTMENT (OUTPATIENT)
Dept: RADIOLOGY | Facility: MEDICAL CENTER | Age: 47
DRG: 281 | End: 2017-11-05
Attending: FAMILY MEDICINE
Payer: COMMERCIAL

## 2017-11-05 PROBLEM — E87.6 HYPOKALEMIA: Status: ACTIVE | Noted: 2017-11-05

## 2017-11-05 PROBLEM — I25.10 CAD (CORONARY ARTERY DISEASE): Status: ACTIVE | Noted: 2017-11-05

## 2017-11-05 PROBLEM — I21.4 NSTEMI (NON-ST ELEVATED MYOCARDIAL INFARCTION) (HCC): Status: ACTIVE | Noted: 2017-11-05

## 2017-11-05 LAB
ANION GAP SERPL CALC-SCNC: 8 MMOL/L (ref 0–11.9)
BASOPHILS # BLD AUTO: 0.5 % (ref 0–1.8)
BASOPHILS # BLD: 0.05 K/UL (ref 0–0.12)
BUN SERPL-MCNC: 12 MG/DL (ref 8–22)
CALCIUM SERPL-MCNC: 8.7 MG/DL (ref 8.5–10.5)
CHLORIDE SERPL-SCNC: 109 MMOL/L (ref 96–112)
CO2 SERPL-SCNC: 21 MMOL/L (ref 20–33)
CREAT SERPL-MCNC: 1.05 MG/DL (ref 0.5–1.4)
EOSINOPHIL # BLD AUTO: 0.33 K/UL (ref 0–0.51)
EOSINOPHIL NFR BLD: 3.2 % (ref 0–6.9)
ERYTHROCYTE [DISTWIDTH] IN BLOOD BY AUTOMATED COUNT: 40 FL (ref 35.9–50)
GFR SERPL CREATININE-BSD FRML MDRD: 56 ML/MIN/1.73 M 2
GLUCOSE SERPL-MCNC: 88 MG/DL (ref 65–99)
HCT VFR BLD AUTO: 35 % (ref 37–47)
HGB BLD-MCNC: 12 G/DL (ref 12–16)
IMM GRANULOCYTES # BLD AUTO: 0.03 K/UL (ref 0–0.11)
IMM GRANULOCYTES NFR BLD AUTO: 0.3 % (ref 0–0.9)
LYMPHOCYTES # BLD AUTO: 2.64 K/UL (ref 1–4.8)
LYMPHOCYTES NFR BLD: 25.3 % (ref 22–41)
MCH RBC QN AUTO: 30.5 PG (ref 27–33)
MCHC RBC AUTO-ENTMCNC: 34.3 G/DL (ref 33.6–35)
MCV RBC AUTO: 89.1 FL (ref 81.4–97.8)
MONOCYTES # BLD AUTO: 0.8 K/UL (ref 0–0.85)
MONOCYTES NFR BLD AUTO: 7.7 % (ref 0–13.4)
NEUTROPHILS # BLD AUTO: 6.59 K/UL (ref 2–7.15)
NEUTROPHILS NFR BLD: 63 % (ref 44–72)
NRBC # BLD AUTO: 0 K/UL
NRBC BLD AUTO-RTO: 0 /100 WBC
PLATELET # BLD AUTO: 302 K/UL (ref 164–446)
PMV BLD AUTO: 11.5 FL (ref 9–12.9)
POTASSIUM SERPL-SCNC: 3.5 MMOL/L (ref 3.6–5.5)
PTH-INTACT SERPL-MCNC: 53.9 PG/ML (ref 14–72)
RBC # BLD AUTO: 3.93 M/UL (ref 4.2–5.4)
SODIUM SERPL-SCNC: 138 MMOL/L (ref 135–145)
WBC # BLD AUTO: 10.4 K/UL (ref 4.8–10.8)

## 2017-11-05 PROCEDURE — 83970 ASSAY OF PARATHORMONE: CPT

## 2017-11-05 PROCEDURE — 36415 COLL VENOUS BLD VENIPUNCTURE: CPT

## 2017-11-05 PROCEDURE — 700102 HCHG RX REV CODE 250 W/ 637 OVERRIDE(OP): Performed by: FAMILY MEDICINE

## 2017-11-05 PROCEDURE — 76775 US EXAM ABDO BACK WALL LIM: CPT

## 2017-11-05 PROCEDURE — 700102 HCHG RX REV CODE 250 W/ 637 OVERRIDE(OP): Performed by: INTERNAL MEDICINE

## 2017-11-05 PROCEDURE — A9270 NON-COVERED ITEM OR SERVICE: HCPCS | Performed by: HOSPITALIST

## 2017-11-05 PROCEDURE — A9270 NON-COVERED ITEM OR SERVICE: HCPCS | Performed by: INTERNAL MEDICINE

## 2017-11-05 PROCEDURE — 78582 LUNG VENTILAT&PERFUS IMAGING: CPT

## 2017-11-05 PROCEDURE — 700102 HCHG RX REV CODE 250 W/ 637 OVERRIDE(OP): Performed by: HOSPITALIST

## 2017-11-05 PROCEDURE — A9270 NON-COVERED ITEM OR SERVICE: HCPCS | Performed by: FAMILY MEDICINE

## 2017-11-05 PROCEDURE — 80048 BASIC METABOLIC PNL TOTAL CA: CPT

## 2017-11-05 PROCEDURE — 85025 COMPLETE CBC W/AUTO DIFF WBC: CPT

## 2017-11-05 PROCEDURE — 99233 SBSQ HOSP IP/OBS HIGH 50: CPT | Performed by: FAMILY MEDICINE

## 2017-11-05 PROCEDURE — 770020 HCHG ROOM/CARE - TELE (206)

## 2017-11-05 PROCEDURE — 71020 DX-CHEST-2 VIEWS: CPT

## 2017-11-05 RX ORDER — POTASSIUM CHLORIDE 20 MEQ/1
20 TABLET, EXTENDED RELEASE ORAL DAILY
Status: DISCONTINUED | OUTPATIENT
Start: 2017-11-05 | End: 2017-11-06

## 2017-11-05 RX ADMIN — POTASSIUM CHLORIDE 20 MEQ: 1500 TABLET, EXTENDED RELEASE ORAL at 12:01

## 2017-11-05 RX ADMIN — ATORVASTATIN CALCIUM 40 MG: 40 TABLET, FILM COATED ORAL at 21:46

## 2017-11-05 RX ADMIN — CLOPIDOGREL 75 MG: 75 TABLET, FILM COATED ORAL at 12:00

## 2017-11-05 RX ADMIN — ASPIRIN 81 MG: 81 TABLET ORAL at 12:01

## 2017-11-05 ASSESSMENT — ENCOUNTER SYMPTOMS
BLURRED VISION: 0
WHEEZING: 0
FEVER: 0
ABDOMINAL PAIN: 0
ORTHOPNEA: 0
VOMITING: 0
WEAKNESS: 1
PND: 0
DIARRHEA: 0
SHORTNESS OF BREATH: 0
HEMOPTYSIS: 0
SORE THROAT: 0
HEADACHES: 0
HEARTBURN: 0
NAUSEA: 0
DIZZINESS: 0
CLAUDICATION: 0
CHILLS: 0
FLANK PAIN: 0
COUGH: 0
SPUTUM PRODUCTION: 0
PALPITATIONS: 0
SHORTNESS OF BREATH: 1

## 2017-11-05 ASSESSMENT — PAIN SCALES - GENERAL
PAINLEVEL_OUTOF10: 0
PAINLEVEL_OUTOF10: 0
PAINLEVEL_OUTOF10: 2
PAINLEVEL_OUTOF10: 0

## 2017-11-05 ASSESSMENT — LIFESTYLE VARIABLES
TOTAL SCORE: 0
ON A TYPICAL DAY WHEN YOU DRINK ALCOHOL HOW MANY DRINKS DO YOU HAVE: 1
HAVE PEOPLE ANNOYED YOU BY CRITICIZING YOUR DRINKING: NO
AVERAGE NUMBER OF DAYS PER WEEK YOU HAVE A DRINK CONTAINING ALCOHOL: 1
TOTAL SCORE: 0
ALCOHOL_USE: YES
HAVE YOU EVER FELT YOU SHOULD CUT DOWN ON YOUR DRINKING: NO
TOTAL SCORE: 0
HOW MANY TIMES IN THE PAST YEAR HAVE YOU HAD 5 OR MORE DRINKS IN A DAY: 0
EVER FELT BAD OR GUILTY ABOUT YOUR DRINKING: NO
CONSUMPTION TOTAL: NEGATIVE
EVER HAD A DRINK FIRST THING IN THE MORNING TO STEADY YOUR NERVES TO GET RID OF A HANGOVER: NO

## 2017-11-05 NOTE — CARE PLAN
Problem: Safety  Goal: Will remain free from injury  Outcome: PROGRESSING AS EXPECTED  Fall risk assessed every shift and fall precautions in place.  Pt educated to not get up without assistance.  Verbalized understanding.       Problem: Knowledge Deficit  Goal: Knowledge of disease process/condition, treatment plan, diagnostic tests, and medications will improve  Outcome: PROGRESSING AS EXPECTED  Pt educated regarding activity, diet, meds and plan of care. Verbalized understanding.

## 2017-11-05 NOTE — PROGRESS NOTES
Pt back from cath lab, CMS intact, denies pain, numbness or tingling in R hand.  R radial band in place with 13 ml of air present per cath RN.  Pt awake and alert, placed on monitor, verified with monitor room.O2 sensor placed on r finger.

## 2017-11-05 NOTE — CATH LAB
Immediate Post-Operative Note      PreOp Diagnosis: non STEMI    PostOp Diagnosis: same    Procedure(s) :  Coronary Angiography, Left Heart Catheterization, Left Ventriculography    Surgeon(s):  Luis A Pearl M.D.    Type of Anesthesia: Moderate Sedation    Specimen: None    Estimated Blood Loss: 10 cc's    Contrast Media:  68 cc's    Fluoro Time: 1.7 min    Xray DAP: 4948    Findings: nonobstructive CAD in BRIGITTE. Small area of ectasia proximal to Crux.  Normal LV    Complications: none      Luis A Pearl M.D.  11/4/2017 6:21 PM

## 2017-11-05 NOTE — PROGRESS NOTES
[facility] Hospitalist Progress Note    Date of Service: 2017    Chief Complaint  47 y.o. female admitted 11/3/2017 with NSTEMI, Renal failure.    Interval Problem Update  NSTEMI - cardiac cath done  CP - V/Q scan and venous duplex negative  Renal failure - crea better  Hydronephrosis - severe on US  Low K    Consultants/Specialty  Cardio - Ryanne  Urology - Redden consulted    Disposition  For rpt US        Review of Systems   Constitutional: Positive for malaise/fatigue. Negative for chills and fever.   HENT: Negative for sore throat.    Eyes: Negative for blurred vision.   Respiratory: Negative for cough, shortness of breath and wheezing.    Cardiovascular: Positive for chest pain and leg swelling.   Gastrointestinal: Negative for abdominal pain, diarrhea, heartburn, nausea and vomiting.   Genitourinary: Negative for dysuria and flank pain.   Neurological: Positive for weakness. Negative for dizziness and headaches.      Physical Exam  Laboratory/Imaging   Hemodynamics  Temp (24hrs), Av.8 °C (98.3 °F), Min:36.6 °C (97.8 °F), Max:37.1 °C (98.7 °F)   Temperature: 36.6 °C (97.8 °F)  Pulse  Av.9  Min: 61  Max: 84    Blood Pressure: 134/78      Respiratory      Respiration: 20, Pulse Oximetry: 98 %             Fluids    Intake/Output Summary (Last 24 hours) at 17 1420  Last data filed at 17 0500   Gross per 24 hour   Intake              120 ml   Output                0 ml   Net              120 ml       Nutrition  Orders Placed This Encounter   Procedures   • Diet Order     Standing Status:   Standing     Number of Occurrences:   1     Order Specific Question:   Diet:     Answer:   Cardiac [6]     Physical Exam   Constitutional: She is oriented to person, place, and time. She appears well-developed and well-nourished.   HENT:   Head: Normocephalic and atraumatic.   Eyes: Conjunctivae are normal. Pupils are equal, round, and reactive to light.   Neck: No tracheal deviation present. No thyromegaly  present.   Cardiovascular: Normal rate and regular rhythm.    Pulmonary/Chest: Effort normal and breath sounds normal.   Abdominal: Soft. Bowel sounds are normal. She exhibits no distension. There is no tenderness.   Musculoskeletal: She exhibits edema.   Lymphadenopathy:     She has no cervical adenopathy.   Neurological: She is alert and oriented to person, place, and time.   Skin: Skin is warm and dry.   Nursing note and vitals reviewed.      Recent Labs      11/03/17   0929 11/04/17 0153 11/05/17   0119   WBC  11.5*  10.4  10.4   RBC  4.60  3.82*  3.93*   HEMOGLOBIN  14.0  11.4*  12.0   HEMATOCRIT  39.8  33.6*  35.0*   MCV  86.5  88.0  89.1   MCH  30.4  29.8  30.5   MCHC  35.2*  33.9  34.3   RDW  38.7  39.7  40.0   PLATELETCT  400  305  302   MPV  10.8  11.2  11.5     Recent Labs      11/03/17   0929 11/04/17 0153 11/05/17   0119   SODIUM  137  138  138   POTASSIUM  3.4*  3.6  3.5*   CHLORIDE  107  108  109   CO2  22  22  21   GLUCOSE  114*  92  88   BUN  17  16  12   CREATININE  1.17  1.11  1.05   CALCIUM  9.2  8.5  8.7     Recent Labs      11/03/17   0929  11/04/17   0529  11/04/17   1136   APTT  29.7  84.7*  44.3*   INR  0.96   --    --      Recent Labs      11/03/17   0929   BNPBTYPENAT  64     Recent Labs      11/04/17   0153   TRIGLYCERIDE  156*   HDL  36*   LDL  86          Assessment/Plan     CAD (coronary artery disease)- (present on admission)   Assessment & Plan    ASA, Plavix, Lipitor  Nonobstructive   elongated area of 40-50% stenosis, distal right coronary artery distal to the crux and involving the proximal segment of the posterior left ventricular artery.        NSTEMI (non-ST elevated myocardial infarction) (CMS-HCC)- (present on admission)   Assessment & Plan    ASA, Plavix, Lipitor        CECILIO (acute kidney injury) (CMS-HCC)- (present on admission)   Assessment & Plan    IVF NS          Hypokalemia   Assessment & Plan    Kdur, follow bmp        Hydronephrosis- (present on admission)    Assessment & Plan    Severe  Urology consulted - rpt US with evaluation of Ureteral jet            Reviewed items::  EKG reviewed, Radiology images reviewed, Labs reviewed and Medications reviewed  Seth catheter::  No Seth  DVT prophylaxis pharmacological::  Heparin  Ulcer Prophylaxis::  No

## 2017-11-05 NOTE — ASSESSMENT & PLAN NOTE
ASA, Plavix, Lipitor  Nonobstructive   elongated area of 40-50% stenosis, distal right coronary artery distal to the crux and involving the proximal segment of the posterior left ventricular artery.

## 2017-11-05 NOTE — PROGRESS NOTES
Cardiology Progress Note               Author: Nishant Mendoza Date & Time created: 2017  7:21 AM       Consultation for substernal chest pain and evidence of a small non-STEMI (troponin 0.99)    Admitted with chest pain and EKG changes    History of no significant past medical history    Labs reviewed  Potassium 3.5  BP = 116/74  HR = 60's NSR          Coronary angiography 17 showed nonobstructive CAD, elongated area of 40-50% stenosis in the distal RCA involving the proximal segment of the posterior LV artery      Echocardiogram 11/3/17, LVEF 60%, normal regional wall motion      US with severe R hydronephrosis   Review of Systems   Respiratory: Positive for shortness of breath. Negative for cough, hemoptysis, sputum production and wheezing.    Cardiovascular: Positive for chest pain. Negative for palpitations, orthopnea, claudication, leg swelling and PND.       Physical Exam   Constitutional: She is oriented to person, place, and time.   HENT:   Head: Normocephalic.   Eyes: Conjunctivae are normal.   Neck: No JVD present. No thyromegaly present.   Cardiovascular: Normal rate and regular rhythm.    Pulses:       Carotid pulses are 2+ on the right side, and 2+ on the left side.       Radial pulses are 2+ on the right side, and 2+ on the left side.        Posterior tibial pulses are 2+ on the right side, and 2+ on the left side.   Pulmonary/Chest: She has no wheezes.   Abdominal: Soft.   Musculoskeletal: She exhibits no edema.   Neurological: She is alert and oriented to person, place, and time.   Skin: Skin is warm and dry.   Right radial catheter site clean/dry/intact  Good circulation       Hemodynamics:  Temp (24hrs), Av.9 °C (98.5 °F), Min:36.8 °C (98.2 °F), Max:37.1 °C (98.7 °F)  Temperature: 37.1 °C (98.7 °F)  Pulse  Av.9  Min: 61  Max: 84   Blood Pressure: 116/74     Respiratory:    Respiration: 16, Pulse Oximetry: 95 %           Fluids:     Weight: 102.1 kg (225 lb 1.4  oz)  GI/Nutrition:  Orders Placed This Encounter   Procedures   • Diet Order     Standing Status:   Standing     Number of Occurrences:   1     Order Specific Question:   Diet:     Answer:   Cardiac [6]     Lab Results:  Recent Labs      11/03/17 0929 11/04/17 0153 11/05/17   0119   WBC  11.5*  10.4  10.4   RBC  4.60  3.82*  3.93*   HEMOGLOBIN  14.0  11.4*  12.0   HEMATOCRIT  39.8  33.6*  35.0*   MCV  86.5  88.0  89.1   MCH  30.4  29.8  30.5   MCHC  35.2*  33.9  34.3   RDW  38.7  39.7  40.0   PLATELETCT  400  305  302   MPV  10.8  11.2  11.5     Recent Labs      11/03/17   0929 11/04/17 0153 11/05/17   0119   SODIUM  137  138  138   POTASSIUM  3.4*  3.6  3.5*   CHLORIDE  107  108  109   CO2  22  22  21   GLUCOSE  114*  92  88   BUN  17  16  12   CREATININE  1.17  1.11  1.05   CALCIUM  9.2  8.5  8.7     Recent Labs      11/03/17   0929  11/04/17   0529  11/04/17   1136   APTT  29.7  84.7*  44.3*   INR  0.96   --    --      Recent Labs      11/03/17 0929   BNPBTYPENAT  64     Recent Labs      11/03/17   0929   11/03/17   1614  11/03/17   2001  11/04/17   1136   TROPONINI  <0.02   < >  0.78*  0.99*  0.55*   BNPBTYPENAT  64   --    --    --    --     < > = values in this interval not displayed.     Recent Labs      11/04/17   0153   TRIGLYCERIDE  156*   HDL  36*   LDL  86         Medical Decision Making, by Problem:  Active Hospital Problems    Diagnosis   • *Chest pain [R07.9]   • Elevated troponin [R74.8]   • CECILIO (acute kidney injury) (CMS-HCC) [N17.9]   • Hydronephrosis [N13.30]       Assessment/Plan:    NSTEMI s/p L heart cath 11/4/17 showed nonobstructive CAD in BRIGITTE    Continue with aspirin, Plavix, Lipitor    Low potassium this morning, was replaced    Well preserved EF, 60%    No rhythm issues overnight    Monitor blood pressure at home    Okay for discharge from cardiac stand    C/o dyspnea with deep breathing, better when sitting up, going VQ lung scan this am, neg for DVT    Will schedule follow-up  appt with our cardiology office in 2 weeks      Reviewed items::  Medications reviewed and Labs reviewed

## 2017-11-05 NOTE — PROGRESS NOTES
"PT feels better after zofran, able to eat dinner that family brought. Pt states she saw a medical person that worked with her now  mother. Pt has been very tearful, sobbing. States \"I just can't get it together\". Pt reports having a similar exp after anesthesia in past. Pt also hypertensive at 105 diastolic.Paged MD for suggestions. hemoband off without complications. Re instructed to minimize wrist movement. Pt verb understanding.    "

## 2017-11-05 NOTE — PROGRESS NOTES
Assumed care at 0700. Bedside report received from Janine. Patient's chart and MAR reviewed. Pt complains of ongoing chest pain with deep breathing at this time. Pt is A & O 4. Patient was updated on plan of care for the day. Questions answered and concerns addressed.  Pt denies any additional needs at this time. White board updated. Call light, phone and personal belongings within reach.

## 2017-11-05 NOTE — PROCEDURES
DATE OF SERVICE:  11/04/2017    DATE OF PROCEDURE:  11/04/2017.    PROCEDURES:  1.  Selective coronary angiography.  2.  Left heart catheterization.  3.  Left ventriculography.    INDICATIONS:  The patient is a 47-year-old female admitted with substernal   chest pain and evidence of a small non-STEMI with troponin of 0.99.  She is   undergoing angiography at this time to determine the extent of her coronary   artery disease.    DESCRIPTION OF PROCEDURE:  The right wrist was sterilely prepped and draped in   the usual fashion and the area of the right radial artery was anesthetized   with 2% lidocaine.  Conscious sedation was obtained using Versed 1.5 mg and   fentanyl 50 mcg.  Patient received additional fentanyl and Versed during the   procedure.  Please refer to the nurse's notes for dosages and timing.    The right radial artery was easily entered with the aid of a SonoSite and a   6-Chinese sheath was placed.  Through this, a 5-Chinese Miguel catheter was   placed and advanced into the ostium of the left main coronary artery where   selective angiograms were performed.  The catheter was then manipulated into   the ostium of the right coronary artery where additional selective angiograms   were performed.  Finally, the catheter was placed across the aortic valve with   the aid of a guidewire and a left heart catheterization was performed.  This   was followed by left ventriculogram using 24 mL of contrast over 3 seconds.  A   left heart pullback was performed.  The catheter was removed.  The patient   initially received 2.5 mg of Versed and 100 mcg of nitroglycerin   intra-arterially once the sheath was placed.  She also received 5000 units of   intravenous heparin.  Prior to sheath removal, she received 100 mcg of   intraarterial nitroglycerin.    COMPLICATIONS:  There were no complications.    ESTIMATED BLOOD LOSS:  10 mL    FLUOROSCOPY TIME:  1.7 minutes.    X-RAY DOSE-AREA PRODUCT:  4948.    TOTAL CONTRAST  MEDIA:  68 mL of Omnipaque 350.    HEMODYNAMICS:  Revealed ejection fraction of 68%.  Rhythm was sinus throughout   the procedure.  Aortic pressure was 150/94 mmHg.  LV pressure is 154/22 mmHg.    LV pullback does not demonstrate a gradient.    Fluoroscopy of the heart is unremarkable.    Left ventriculogram demonstrates normal segmental wall motion with a   calculated ejection fraction of 62%.  There is no mitral insufficiency.    The right coronary artery is a dominant vessel giving rise to a moderate sized   PDA and a moderate sized posterior left ventricular artery.  There is a minor   plaque in the right coronary artery at the acute margin.  Just before the   crux, there is a small area of mild ectasia in the right coronary artery.  The   PDA is unremarkable.  Distal to the crux, the proximal segment of the   posterior left ventricular branch has an elongated segment of stenosis   measuring 40-50%.  There is GERRY-3 flow throughout the vessel.    The left main is free of disease and trifurcates into a small ramus   intermedius, the LAD, and the circumflex.  The left coronary tree is free of   disease.  The LAD supplies the apex and gives rise to a single large caliber   bifurcating diagonal artery.  The ramus intermedius is less than 2 mm in   diameter and is unremarkable.    The circumflex is also unremarkable and is approximately 3.5 mm in diameter   and gives rise to a small proximal atrial branch.  A second atrial branch   arises in the mid vessel.  Distally, the circumflex gives rise to a large   caliber first marginal artery and a small second marginal artery which is the   terminus of the circumflex.    IMPRESSION:  1.  Non-obstructive coronary artery disease with elongated area of 40-50%   stenosis.  The distal right coronary artery distal to the crux and involving   the proximal segment of the posterior left ventricular artery.  2.  Elevated left ventricular end diastolic pressure.  3.  Systemic  hypertension.  4.  Normal left ventricular contractility.       ____________________________________     MD TRIXIE MARINELLI / JERILYN    DD:  11/04/2017 18:32:37  DT:  11/04/2017 19:20:41    D#:  [id number]  Job#:  [id number]

## 2017-11-05 NOTE — CARE PLAN
Problem: Nutritional:  Goal: Patient to verbalize or demonstrate understanding of diet  Outcome: MET Date Met: 11/05/17  Pt declined cardiac diet education. Pt accepted handout

## 2017-11-05 NOTE — CARE PLAN
Problem: Safety  Goal: Will remain free from injury  Outcome: PROGRESSING AS EXPECTED  Pt educated on the importance fall prevention methods, such as treaded sock and the bed alarm. Pt stated they will use the call light prior to any attempts of ambulation. Ambulatory ability assessed, treaded socks in place, bed locked and in low position, frequent trips to bathroom offered, and call light and phone within reach.    Problem: Knowledge Deficit  Goal: Knowledge of disease process/condition, treatment plan, diagnostic tests, and medications will improve  Outcome: PROGRESSING AS EXPECTED  Pt educated regarding plan of care and medications. All questions answered.

## 2017-11-05 NOTE — PROGRESS NOTES
2 RN skin check with Luann FOURNIER. Pt has no redness or open areas. Ears/elbows and heels intact. Sacrum intact

## 2017-11-06 ENCOUNTER — APPOINTMENT (OUTPATIENT)
Dept: RADIOLOGY | Facility: MEDICAL CENTER | Age: 47
DRG: 281 | End: 2017-11-06
Attending: UROLOGY
Payer: COMMERCIAL

## 2017-11-06 LAB
ANION GAP SERPL CALC-SCNC: 9 MMOL/L (ref 0–11.9)
APPEARANCE UR: CLEAR
BACTERIA #/AREA URNS HPF: NEGATIVE /HPF
BASOPHILS # BLD AUTO: 0.6 % (ref 0–1.8)
BASOPHILS # BLD: 0.05 K/UL (ref 0–0.12)
BILIRUB UR QL STRIP.AUTO: NEGATIVE
BUN SERPL-MCNC: 15 MG/DL (ref 8–22)
CALCIUM SERPL-MCNC: 9.1 MG/DL (ref 8.5–10.5)
CHLORIDE SERPL-SCNC: 109 MMOL/L (ref 96–112)
CO2 SERPL-SCNC: 22 MMOL/L (ref 20–33)
COLOR UR: YELLOW
CREAT SERPL-MCNC: 1.21 MG/DL (ref 0.5–1.4)
EOSINOPHIL # BLD AUTO: 0.34 K/UL (ref 0–0.51)
EOSINOPHIL NFR BLD: 3.8 % (ref 0–6.9)
EPI CELLS #/AREA URNS HPF: NEGATIVE /HPF
ERYTHROCYTE [DISTWIDTH] IN BLOOD BY AUTOMATED COUNT: 39.5 FL (ref 35.9–50)
GFR SERPL CREATININE-BSD FRML MDRD: 47 ML/MIN/1.73 M 2
GLUCOSE SERPL-MCNC: 101 MG/DL (ref 65–99)
GLUCOSE UR STRIP.AUTO-MCNC: NEGATIVE MG/DL
HCT VFR BLD AUTO: 36.4 % (ref 37–47)
HGB BLD-MCNC: 12.4 G/DL (ref 12–16)
HYALINE CASTS #/AREA URNS LPF: NORMAL /LPF
IMM GRANULOCYTES # BLD AUTO: 0.03 K/UL (ref 0–0.11)
IMM GRANULOCYTES NFR BLD AUTO: 0.3 % (ref 0–0.9)
KETONES UR STRIP.AUTO-MCNC: NEGATIVE MG/DL
LEUKOCYTE ESTERASE UR QL STRIP.AUTO: ABNORMAL
LYMPHOCYTES # BLD AUTO: 2.55 K/UL (ref 1–4.8)
LYMPHOCYTES NFR BLD: 28.2 % (ref 22–41)
MCH RBC QN AUTO: 30 PG (ref 27–33)
MCHC RBC AUTO-ENTMCNC: 34.1 G/DL (ref 33.6–35)
MCV RBC AUTO: 87.9 FL (ref 81.4–97.8)
MICRO URNS: ABNORMAL
MONOCYTES # BLD AUTO: 0.66 K/UL (ref 0–0.85)
MONOCYTES NFR BLD AUTO: 7.3 % (ref 0–13.4)
NEUTROPHILS # BLD AUTO: 5.42 K/UL (ref 2–7.15)
NEUTROPHILS NFR BLD: 59.8 % (ref 44–72)
NITRITE UR QL STRIP.AUTO: NEGATIVE
NRBC # BLD AUTO: 0 K/UL
NRBC BLD AUTO-RTO: 0 /100 WBC
PH UR STRIP.AUTO: 7 [PH]
PLATELET # BLD AUTO: 311 K/UL (ref 164–446)
PMV BLD AUTO: 11.5 FL (ref 9–12.9)
POTASSIUM SERPL-SCNC: 3.8 MMOL/L (ref 3.6–5.5)
PROT UR QL STRIP: NEGATIVE MG/DL
RBC # BLD AUTO: 4.14 M/UL (ref 4.2–5.4)
RBC # URNS HPF: NORMAL /HPF
RBC UR QL AUTO: NEGATIVE
SODIUM SERPL-SCNC: 140 MMOL/L (ref 135–145)
SP GR UR STRIP.AUTO: 1.01
UROBILINOGEN UR STRIP.AUTO-MCNC: 0.2 MG/DL
WBC # BLD AUTO: 9.1 K/UL (ref 4.8–10.8)
WBC #/AREA URNS HPF: NORMAL /HPF

## 2017-11-06 PROCEDURE — 770020 HCHG ROOM/CARE - TELE (206)

## 2017-11-06 PROCEDURE — 700117 HCHG RX CONTRAST REV CODE 255: Performed by: UROLOGY

## 2017-11-06 PROCEDURE — 36415 COLL VENOUS BLD VENIPUNCTURE: CPT

## 2017-11-06 PROCEDURE — A9270 NON-COVERED ITEM OR SERVICE: HCPCS | Performed by: HOSPITALIST

## 2017-11-06 PROCEDURE — 700102 HCHG RX REV CODE 250 W/ 637 OVERRIDE(OP): Performed by: FAMILY MEDICINE

## 2017-11-06 PROCEDURE — 85025 COMPLETE CBC W/AUTO DIFF WBC: CPT

## 2017-11-06 PROCEDURE — 99232 SBSQ HOSP IP/OBS MODERATE 35: CPT | Performed by: FAMILY MEDICINE

## 2017-11-06 PROCEDURE — A9270 NON-COVERED ITEM OR SERVICE: HCPCS | Performed by: FAMILY MEDICINE

## 2017-11-06 PROCEDURE — 80048 BASIC METABOLIC PNL TOTAL CA: CPT

## 2017-11-06 PROCEDURE — G8982 BODY POS GOAL STATUS: HCPCS | Mod: CI

## 2017-11-06 PROCEDURE — G8983 BODY POS D/C STATUS: HCPCS | Mod: CI

## 2017-11-06 PROCEDURE — G8981 BODY POS CURRENT STATUS: HCPCS | Mod: CI

## 2017-11-06 PROCEDURE — 700102 HCHG RX REV CODE 250 W/ 637 OVERRIDE(OP): Performed by: HOSPITALIST

## 2017-11-06 PROCEDURE — 700105 HCHG RX REV CODE 258: Performed by: FAMILY MEDICINE

## 2017-11-06 PROCEDURE — 74178 CT ABD&PLV WO CNTR FLWD CNTR: CPT

## 2017-11-06 PROCEDURE — 81001 URINALYSIS AUTO W/SCOPE: CPT

## 2017-11-06 PROCEDURE — 97161 PT EVAL LOW COMPLEX 20 MIN: CPT

## 2017-11-06 RX ADMIN — IOHEXOL 100 ML: 350 INJECTION, SOLUTION INTRAVENOUS at 12:44

## 2017-11-06 RX ADMIN — ATORVASTATIN CALCIUM 40 MG: 40 TABLET, FILM COATED ORAL at 20:25

## 2017-11-06 RX ADMIN — ASPIRIN 81 MG: 81 TABLET, COATED ORAL at 13:23

## 2017-11-06 RX ADMIN — POTASSIUM CHLORIDE 20 MEQ: 1500 TABLET, EXTENDED RELEASE ORAL at 09:38

## 2017-11-06 RX ADMIN — SODIUM CHLORIDE: 9 INJECTION, SOLUTION INTRAVENOUS at 20:25

## 2017-11-06 ASSESSMENT — ENCOUNTER SYMPTOMS
NAUSEA: 0
BLURRED VISION: 0
FLANK PAIN: 0
CHILLS: 0
FEVER: 0
COUGH: 0
HEARTBURN: 0
ABDOMINAL PAIN: 0
DIARRHEA: 0
WHEEZING: 0
SHORTNESS OF BREATH: 0
SORE THROAT: 0
WEAKNESS: 1
VOMITING: 0
DIZZINESS: 0
HEADACHES: 0

## 2017-11-06 ASSESSMENT — GAIT ASSESSMENTS
DISTANCE (FEET): 500
GAIT LEVEL OF ASSIST: INDEPENDENT

## 2017-11-06 ASSESSMENT — COGNITIVE AND FUNCTIONAL STATUS - GENERAL
MOBILITY SCORE: 24
SUGGESTED CMS G CODE MODIFIER MOBILITY: CH

## 2017-11-06 ASSESSMENT — PAIN SCALES - GENERAL
PAINLEVEL_OUTOF10: 0

## 2017-11-06 NOTE — CARE PLAN
Problem: Safety  Goal: Will remain free from injury  Outcome: PROGRESSING AS EXPECTED  Pt remains free from injury. Bed in lowest position and locked. Nonskid footwear in place. Call light and personal belongings within reach. Hourly rounding.    Problem: Infection  Goal: Will remain free from infection  Outcome: PROGRESSING AS EXPECTED  Remains free from signs and symptoms of infection. Standard precautions implemented. Hand hygiene before and after contact with pt. Educated about importance of hand hygiene.

## 2017-11-06 NOTE — PROGRESS NOTES
Assumed pt care. Pt up to shower. Plan of care discussed with pt. Verbalizes understanding. Bed in lowest position, locked, and call light within reach. SR on monitor.

## 2017-11-06 NOTE — CARE PLAN
Problem: Knowledge Deficit  Goal: Knowledge of disease process/condition, treatment plan, diagnostic tests, and medications will improve  Outcome: PROGRESSING AS EXPECTED  Pt educated regarding plan of care and medications. All questions answered.

## 2017-11-06 NOTE — THERAPY
"Physical Therapy Evaluation completed.   Bed Mobility:  Supine to Sit: Modified Independent  Transfers: Sit to Stand: Independent  Gait: Level Of Assist: Independent with No Equipment Needed       Plan of Care: Patient with no further skilled PT needs in the acute care setting at this time  Discharge Recommendations: Equipment: No Equipment Needed. Post-acute therapy Discharge to home with outpatient for cardiac rehab services.    Pt s/p NSTEMI seen for PT cardiac rehab phase I eval. Pt with progressively elevating diastolic BP during increase in activity. Has been educated on cardiac precautions, RPE scale, walking program, and talk test. Pt appeared to internalize information and was receptive to information given. At this time, has no further acute skilled PT need. Please refer to OP cardiac rehab program when in appropriate stage of recovery.     See \"Rehab Therapy-Acute\" Patient Summary Report for complete documentation.     "

## 2017-11-06 NOTE — PROGRESS NOTES
[facility] Hospitalist Progress Note    Date of Service: 2017    Chief Complaint  47 y.o. female admitted 11/3/2017 with NSTEMI, Renal failure.    Interval Problem Update  NSTEMI - cardiac cath done  CP - V/Q scan and venous duplex negative  Renal failure - crea stable  Hydronephrosis - severe on US, Urology consulted      Consultants/Specialty  Cardio - signed off  Urology - [facility]    Disposition  Will need surgery        Review of Systems   Constitutional: Positive for malaise/fatigue. Negative for chills and fever.   HENT: Negative for sore throat.    Eyes: Negative for blurred vision.   Respiratory: Negative for cough, shortness of breath and wheezing.    Cardiovascular: Positive for chest pain and leg swelling.   Gastrointestinal: Negative for abdominal pain, diarrhea, heartburn, nausea and vomiting.   Genitourinary: Negative for dysuria and flank pain.   Neurological: Positive for weakness. Negative for dizziness and headaches.      Physical Exam  Laboratory/Imaging   Hemodynamics  Temp (24hrs), Av.8 °C (98.3 °F), Min:36.6 °C (97.8 °F), Max:37.2 °C (99 °F)   Temperature: 37.2 °C (99 °F)  Pulse  Av.6  Min: 61  Max: 84    Blood Pressure: 147/86      Respiratory      Respiration: 16, Pulse Oximetry: 96 %             Fluids    Intake/Output Summary (Last 24 hours) at 17 1041  Last data filed at 17 2145   Gross per 24 hour   Intake              240 ml   Output                0 ml   Net              240 ml       Nutrition  Orders Placed This Encounter   Procedures   • Diet Order     Standing Status:   Standing     Number of Occurrences:   1     Order Specific Question:   Diet:     Answer:   Cardiac [6]   • DIET NPO     Standing Status:   Standing     Number of Occurrences:   8     Order Specific Question:   Restrict to:     Answer:   Sips with Medications [3]     Comments:   after breakfast     Physical Exam   Constitutional: She is oriented to person, place, and time. She appears  well-developed and well-nourished.   HENT:   Head: Normocephalic and atraumatic.   Eyes: Conjunctivae are normal. Pupils are equal, round, and reactive to light.   Neck: No tracheal deviation present. No thyromegaly present.   Cardiovascular: Normal rate and regular rhythm.    Pulmonary/Chest: Effort normal and breath sounds normal.   Abdominal: Soft. Bowel sounds are normal. She exhibits no distension. There is no tenderness.   Musculoskeletal: She exhibits edema.   Lymphadenopathy:     She has no cervical adenopathy.   Neurological: She is alert and oriented to person, place, and time.   Skin: Skin is warm and dry.   Nursing note and vitals reviewed.      Recent Labs      11/04/17   0153  11/05/17   0119 11/06/17   0142   WBC  10.4  10.4  9.1   RBC  3.82*  3.93*  4.14*   HEMOGLOBIN  11.4*  12.0  12.4   HEMATOCRIT  33.6*  35.0*  36.4*   MCV  88.0  89.1  87.9   MCH  29.8  30.5  30.0   MCHC  33.9  34.3  34.1   RDW  39.7  40.0  39.5   PLATELETCT  305  302  311   MPV  11.2  11.5  11.5     Recent Labs      11/04/17   0153  11/05/17   0119  11/06/17   0142   SODIUM  138  138  140   POTASSIUM  3.6  3.5*  3.8   CHLORIDE  108  109  109   CO2  22  21  22   GLUCOSE  92  88  101*   BUN  16  12  15   CREATININE  1.11  1.05  1.21   CALCIUM  8.5  8.7  9.1     Recent Labs      11/04/17   0529  11/04/17   1136   APTT  84.7*  44.3*         Recent Labs      11/04/17   0153   TRIGLYCERIDE  156*   HDL  36*   LDL  86          Assessment/Plan     CAD (coronary artery disease)- (present on admission)   Assessment & Plan    ASA, Plavix, Lipitor  Nonobstructive   elongated area of 40-50% stenosis, distal right coronary artery distal to the crux and involving the proximal segment of the posterior left ventricular artery.        NSTEMI (non-ST elevated myocardial infarction) (CMS-HCC)- (present on admission)   Assessment & Plan    ASA, Plavix, Lipitor        CECILIO (acute kidney injury) (CMS-HCC)- (present on admission)   Assessment & Plan     IVF NS          Hypokalemia   Assessment & Plan    stop Kdur, follow bmp        Hydronephrosis- (present on admission)   Assessment & Plan    Severe  Urology following - possible surgery tomorrow            Reviewed items::  EKG reviewed, Radiology images reviewed, Labs reviewed and Medications reviewed  Seth catheter::  No Seth  DVT prophylaxis pharmacological::  Heparin  Ulcer Prophylaxis::  No

## 2017-11-06 NOTE — CONSULTS
DATE OF SERVICE:  11/05/2017    REASON FOR CONSULTATION:  Right hydronephrosis.    HISTORY OF PRESENT ILLNESS:  Patient was brought into the hospital for   myocardial infarction.  She while here has not had problems with flank pain or   urinary difficulties.  She does have a recent history of ureteral stricture   and ureteral stone.  This was managed by dilation of stricture and laser   lithotripsy of the stone.  She then had a stent placed.  The stent was removed   and our plan was outpatient evaluation to check for recurrent obstruction   with ultrasound.  She was here in the hospital.  Ultrasound was performed,   which does show right hydronephrosis.  She currently has no flank pain, no   voiding difficulties, no fever or chills.    PHYSICAL EXAMINATION:  GENERAL:  She is awake, alert.  She is in no distress.  ABDOMEN:  Soft, nontender, no masses.    LABORATORY DATA:  Creatinine is 1.21.  Urinalysis pending.  White count 9.0.    IMAGING:  Ultrasound of right hydronephrosis.    ASSESSMENT:  Recurrent right renal obstruction.  It is not clear if this is   from a new stone or from the stricture.  It is more likely the latter.    PLAN:  Plan will be for a CT urogram to assess for stone and the location of   the stricture.  Likely it has that she has a recurrent distal stricture.  I   will then plan for incision and dilation of the stricture as another attempt   to maintain patency of the collecting system.  Ultimately, if it is not   working, she may require reimplantation of the ureter.       ____________________________________     MD ANNA SEVERINO / JERILYN    DD:  11/06/2017 07:33:21  DT:  11/06/2017 07:53:16    D#:  [id number]  Job#:  [id number]    cc: MARYCRUZ VILLAR MD

## 2017-11-07 ENCOUNTER — APPOINTMENT (OUTPATIENT)
Dept: RADIOLOGY | Facility: MEDICAL CENTER | Age: 47
DRG: 281 | End: 2017-11-07
Attending: UROLOGY
Payer: COMMERCIAL

## 2017-11-07 VITALS
BODY MASS INDEX: 35.09 KG/M2 | WEIGHT: 223.55 LBS | SYSTOLIC BLOOD PRESSURE: 139 MMHG | HEIGHT: 67 IN | TEMPERATURE: 97.7 F | DIASTOLIC BLOOD PRESSURE: 72 MMHG | HEART RATE: 66 BPM | OXYGEN SATURATION: 98 % | RESPIRATION RATE: 16 BRPM

## 2017-11-07 PROBLEM — E87.6 HYPOKALEMIA: Status: RESOLVED | Noted: 2017-11-05 | Resolved: 2017-11-07

## 2017-11-07 PROBLEM — E03.9 HYPOTHYROIDISM: Status: ACTIVE | Noted: 2017-11-07

## 2017-11-07 PROBLEM — R07.9 CHEST PAIN: Status: ACTIVE | Noted: 2017-11-07

## 2017-11-07 PROBLEM — R07.9 CHEST PAIN: Status: RESOLVED | Noted: 2017-11-07 | Resolved: 2017-11-07

## 2017-11-07 PROBLEM — N17.9 AKI (ACUTE KIDNEY INJURY) (HCC): Status: RESOLVED | Noted: 2017-09-17 | Resolved: 2017-11-07

## 2017-11-07 LAB
ANION GAP SERPL CALC-SCNC: 8 MMOL/L (ref 0–11.9)
BASOPHILS # BLD AUTO: 0.5 % (ref 0–1.8)
BASOPHILS # BLD: 0.05 K/UL (ref 0–0.12)
BUN SERPL-MCNC: 12 MG/DL (ref 8–22)
CALCIUM SERPL-MCNC: 8.9 MG/DL (ref 8.5–10.5)
CHLORIDE SERPL-SCNC: 109 MMOL/L (ref 96–112)
CO2 SERPL-SCNC: 21 MMOL/L (ref 20–33)
CREAT SERPL-MCNC: 1.03 MG/DL (ref 0.5–1.4)
EOSINOPHIL # BLD AUTO: 0.35 K/UL (ref 0–0.51)
EOSINOPHIL NFR BLD: 3.5 % (ref 0–6.9)
ERYTHROCYTE [DISTWIDTH] IN BLOOD BY AUTOMATED COUNT: 39.5 FL (ref 35.9–50)
GFR SERPL CREATININE-BSD FRML MDRD: 57 ML/MIN/1.73 M 2
GLUCOSE SERPL-MCNC: 97 MG/DL (ref 65–99)
HCT VFR BLD AUTO: 36.8 % (ref 37–47)
HGB BLD-MCNC: 12.6 G/DL (ref 12–16)
IMM GRANULOCYTES # BLD AUTO: 0.03 K/UL (ref 0–0.11)
IMM GRANULOCYTES NFR BLD AUTO: 0.3 % (ref 0–0.9)
LYMPHOCYTES # BLD AUTO: 2.45 K/UL (ref 1–4.8)
LYMPHOCYTES NFR BLD: 24.2 % (ref 22–41)
MCH RBC QN AUTO: 30 PG (ref 27–33)
MCHC RBC AUTO-ENTMCNC: 34.2 G/DL (ref 33.6–35)
MCV RBC AUTO: 87.6 FL (ref 81.4–97.8)
MONOCYTES # BLD AUTO: 0.75 K/UL (ref 0–0.85)
MONOCYTES NFR BLD AUTO: 7.4 % (ref 0–13.4)
NEUTROPHILS # BLD AUTO: 6.48 K/UL (ref 2–7.15)
NEUTROPHILS NFR BLD: 64.1 % (ref 44–72)
NRBC # BLD AUTO: 0 K/UL
NRBC BLD AUTO-RTO: 0 /100 WBC
PLATELET # BLD AUTO: 350 K/UL (ref 164–446)
PMV BLD AUTO: 11.1 FL (ref 9–12.9)
POTASSIUM SERPL-SCNC: 3.7 MMOL/L (ref 3.6–5.5)
RBC # BLD AUTO: 4.2 M/UL (ref 4.2–5.4)
SODIUM SERPL-SCNC: 138 MMOL/L (ref 135–145)
WBC # BLD AUTO: 10.1 K/UL (ref 4.8–10.8)

## 2017-11-07 PROCEDURE — C1769 GUIDE WIRE: HCPCS | Performed by: UROLOGY

## 2017-11-07 PROCEDURE — 501329 HCHG SET, CYSTO IRRIG Y TUR: Performed by: UROLOGY

## 2017-11-07 PROCEDURE — 160002 HCHG RECOVERY MINUTES (STAT): Performed by: UROLOGY

## 2017-11-07 PROCEDURE — 160048 HCHG OR STATISTICAL LEVEL 1-5: Performed by: UROLOGY

## 2017-11-07 PROCEDURE — 160041 HCHG SURGERY MINUTES - EA ADDL 1 MIN LEVEL 4: Performed by: UROLOGY

## 2017-11-07 PROCEDURE — 700102 HCHG RX REV CODE 250 W/ 637 OVERRIDE(OP): Performed by: FAMILY MEDICINE

## 2017-11-07 PROCEDURE — 500879 HCHG PACK, CYSTO: Performed by: UROLOGY

## 2017-11-07 PROCEDURE — 0T768DZ DILATION OF RIGHT URETER WITH INTRALUMINAL DEVICE, VIA NATURAL OR ARTIFICIAL OPENING ENDOSCOPIC: ICD-10-PCS | Performed by: UROLOGY

## 2017-11-07 PROCEDURE — 160035 HCHG PACU - 1ST 60 MINS PHASE I: Performed by: UROLOGY

## 2017-11-07 PROCEDURE — 36415 COLL VENOUS BLD VENIPUNCTURE: CPT

## 2017-11-07 PROCEDURE — 85025 COMPLETE CBC W/AUTO DIFF WBC: CPT

## 2017-11-07 PROCEDURE — 160029 HCHG SURGERY MINUTES - 1ST 30 MINS LEVEL 4: Performed by: UROLOGY

## 2017-11-07 PROCEDURE — A4357 BEDSIDE DRAINAGE BAG: HCPCS | Performed by: UROLOGY

## 2017-11-07 PROCEDURE — C1758 CATHETER, URETERAL: HCPCS | Performed by: UROLOGY

## 2017-11-07 PROCEDURE — 88305 TISSUE EXAM BY PATHOLOGIST: CPT

## 2017-11-07 PROCEDURE — A9270 NON-COVERED ITEM OR SERVICE: HCPCS | Performed by: FAMILY MEDICINE

## 2017-11-07 PROCEDURE — 99239 HOSP IP/OBS DSCHRG MGMT >30: CPT | Performed by: INTERNAL MEDICINE

## 2017-11-07 PROCEDURE — 88112 CYTOPATH CELL ENHANCE TECH: CPT

## 2017-11-07 PROCEDURE — 700101 HCHG RX REV CODE 250

## 2017-11-07 PROCEDURE — 80048 BASIC METABOLIC PNL TOTAL CA: CPT

## 2017-11-07 PROCEDURE — 700111 HCHG RX REV CODE 636 W/ 250 OVERRIDE (IP)

## 2017-11-07 PROCEDURE — C2617 STENT, NON-COR, TEM W/O DEL: HCPCS | Performed by: UROLOGY

## 2017-11-07 PROCEDURE — 160009 HCHG ANES TIME/MIN: Performed by: UROLOGY

## 2017-11-07 PROCEDURE — BT1D1ZZ FLUOROSCOPY OF RIGHT KIDNEY, URETER AND BLADDER USING LOW OSMOLAR CONTRAST: ICD-10-PCS | Performed by: UROLOGY

## 2017-11-07 DEVICE — STENT PERCUFLEX PLUS 4.8X26: Type: IMPLANTABLE DEVICE | Site: URETER | Status: FUNCTIONAL

## 2017-11-07 RX ORDER — PHENAZOPYRIDINE HYDROCHLORIDE 200 MG/1
200 TABLET, FILM COATED ORAL 3 TIMES DAILY PRN
Status: DISCONTINUED | OUTPATIENT
Start: 2017-11-07 | End: 2017-11-07 | Stop reason: HOSPADM

## 2017-11-07 RX ORDER — ASPIRIN 81 MG/1
81 TABLET ORAL DAILY
Qty: 30 TAB | Refills: 3 | Status: ON HOLD | OUTPATIENT
Start: 2017-11-08 | End: 2018-01-10

## 2017-11-07 RX ORDER — CLOPIDOGREL BISULFATE 75 MG/1
75 TABLET ORAL DAILY
Qty: 30 TAB | Refills: 3 | Status: ON HOLD | OUTPATIENT
Start: 2017-11-08 | End: 2017-12-09

## 2017-11-07 RX ORDER — ATORVASTATIN CALCIUM 40 MG/1
40 TABLET, FILM COATED ORAL
Qty: 30 TAB | Refills: 3 | Status: ON HOLD | OUTPATIENT
Start: 2017-11-07 | End: 2018-03-21

## 2017-11-07 RX ADMIN — ASPIRIN 81 MG: 81 TABLET, COATED ORAL at 13:16

## 2017-11-07 ASSESSMENT — PAIN SCALES - GENERAL
PAINLEVEL_OUTOF10: 0

## 2017-11-07 ASSESSMENT — LIFESTYLE VARIABLES: EVER_SMOKED: NEVER

## 2017-11-07 NOTE — PROGRESS NOTES
Bedside report received, assumed pt care @4906. Pt A&Ox4. She denies any pain. POC discussed, she verbalized understanding. Pt up self and steady on her feet. Call light within reach

## 2017-11-07 NOTE — PROGRESS NOTES
Discharge Note    Patient discharged home. Spouse at bedside. No complaints of pain or nausea at this time. Patient has been out of bed with no dizziness or unsteadiness.  Discharge instructions provided to patient along with discharge appointments.  Patient and family verbalized no further questions at this time.

## 2017-11-07 NOTE — DISCHARGE SUMMARY
CHIEF COMPLAINT ON ADMISSION  No chief complaint on file.      CODE STATUS  Full Code    HPI & HOSPITAL COURSE  This is a 47 y.o. female here with chest pain. Found with NSTEMI. Cath showed non-obstructive CAD. Patient was discharged on medical management and cardiology followup.  Patient also had h/o of R ureteral stricture with stone s/p dilatation, lithotripsy, and stent placement with removal. Repeat renal US showed worsening R hydronephrosis. Urology was consulted and placed R ureteral indwelling stent with dilatation.    Therefore, she is discharged in good and stable condition with close outpatient follow-up.    SPECIFIC OUTPATIENT FOLLOW-UP  F/u urology  F/u cardiology    DISCHARGE PROBLEM LIST  Active Problems:    NSTEMI (non-ST elevated myocardial infarction) (CMS-HCC) POA: Yes    CAD (coronary artery disease) POA: Yes    Hydronephrosis POA: Yes    Hypothyroidism POA: Unknown  Resolved Problems:    CECILIO (acute kidney injury) (CMS-HCC) POA: Yes    Hypokalemia POA: No    Chest pain POA: Unknown      FOLLOW UP  Future Appointments  Date Time Provider Department Center   11/15/2017 9:15 AM Thony Hernandez M.D. [facility] None     No follow-up provider specified.    MEDICATIONS ON DISCHARGE   Aurora Tan   Home Medication Instructions RADHA:[id number]    Printed on:11/07/17 1150   Medication Information                      aspirin EC 81 MG EC tablet  Take 1 Tab by mouth every day.             atorvastatin (LIPITOR) 40 MG Tab  Take 1 Tab by mouth every bedtime.             clopidogrel (PLAVIX) 75 MG Tab  Take 1 Tab by mouth every day.             estradiol (MINIVELLE) 0.05 MG/24HR PATCH BIWEEKLY  Apply 1 Patch to skin as directed 2X A WEEK.                 DIET  Orders Placed This Encounter   Procedures   • DIET ORDER     Standing Status:   Standing     Number of Occurrences:   1     Order Specific Question:   Diet:     Answer:   Regular [1]       ACTIVITY  As tolerated.  Weight bearing as  tolerated      CONSULTATIONS  Cardiology   Urology    PROCEDURES  Cardiac cath  Cystoscopy, retrograde pyelogram, ureteroscopy with dilation of   stricture, and indwelling ureteral stent placement.    LABORATORY  Lab Results   Component Value Date/Time    SODIUM 138 11/07/2017 01:55 AM    POTASSIUM 3.7 11/07/2017 01:55 AM    CHLORIDE 109 11/07/2017 01:55 AM    CO2 21 11/07/2017 01:55 AM    GLUCOSE 97 11/07/2017 01:55 AM    BUN 12 11/07/2017 01:55 AM    CREATININE 1.03 11/07/2017 01:55 AM        Lab Results   Component Value Date/Time    WBC 10.1 11/07/2017 01:55 AM    HEMOGLOBIN 12.6 11/07/2017 01:55 AM    HEMATOCRIT 36.8 (L) 11/07/2017 01:55 AM    PLATELETCT 350 11/07/2017 01:55 AM        Total time of the discharge process exceeds 40 minutes

## 2017-11-07 NOTE — CARE PLAN
Problem: Safety  Goal: Will remain free from injury  Outcome: PROGRESSING AS EXPECTED  Education provided to patient on using the call bell and not to get up with out assistance.      Bed left in low position.  Bed and Chair alarms used.  Hourly rounding completed.  Personal Items left within reach.    Goal: Will remain free from falls  Outcome: PROGRESSING AS EXPECTED  Pt educated on the importance of CALL BEFORE YOU FALL.  Call bell and all necessary items in reach.  Hourly rounding focusing on the 3Ps completed by this RN with the assistance of Brittny ESQUEDA      Problem: Infection  Goal: Will remain free from infection  Outcome: PROGRESSING AS EXPECTED  Education provided on Antibiotics, Wound Care, Hand Washing and Labs as applicable to patient.

## 2017-11-07 NOTE — CARE PLAN
Problem: Venous Thromboembolism (VTW)/Deep Vein Thrombosis (DVT) Prevention:  Goal: Patient will participate in Venous Thrombosis (VTE)/Deep Vein Thrombosis (DVT)Prevention Measures  Outcome: PROGRESSING SLOWER THAN EXPECTED   11/06/17 2025   Mechanical/VTE Prophylaxis   Mechanical Prophylaxis  SCDs, Sequential Compression Device   SCDs, Sequential Compression Device Refused   OTHER   Risk Assessment Score 2   VTE RISK Moderate   Pt is not participating in VTE/DVT prevention measures. Education provided, but continues to refuse SCD's. Pt ambulates frequently    Problem: Pain Management  Goal: Pain level will decrease to patient's comfort goal  Outcome: PROGRESSING AS EXPECTED   11/06/17 0400 11/06/17 0730 11/06/17 2000   OTHER   Nurse Pain Scale 0 - 10  --  --  0   Non Verbal Scale  Calm;Sleeping;Unlabored Breathing --  --    Therapist Pain Assessment --  0;Nurse Notified --    Comfort Goal --  --  0   Pt remains free from pain

## 2017-11-07 NOTE — OP REPORT
DATE OF SERVICE:  11/07/2017    PREOPERATIVE DIAGNOSIS:  Recurrent right ureteral stricture.    POSTOPERATIVE DIAGNOSIS:  Recurrent right ureteral stricture with findings of   2 new proximal ureteral strictures.    PROCEDURES:  Cystoscopy, retrograde pyelogram, ureteroscopy with dilation of   stricture, and indwelling ureteral stent placement.    OPERATIVE REPORT:  Patient was brought to the operating room.  She was given a   general anesthetic.  She was placed in lithotomy position, prepped and draped   in sterile fashion.  Cystoscopy shows normal bladder.  Right ureteral orifice   was normal.  Retrograde pyelogram was performed shows the distal ureter,   pelvic ureter, lower abdominal, and mid ureter is okay and then in the   proximal ureter, there were 2 distinct areas of stricturing with a severely   dilated renal pelvis proximally.  A wire was then passed through the lower   stricture easily, but then it does get stuck in the ureter between the 2   strictures and does not find a way up to the renal pelvis.  I then took the   6.9-Lithuanian rigid ureteroscope.  This passes easily through the distal   stricture where she had had previously a stricture with a stone stuck behind   the distal ureter that area looked wide open and looked like normal urothelium   and had recovered from prior dilation and stent placement.  I first reached   the lower stricture.  It appeared to be bland, it was just big enough to pass   the wire, pushing the beak of the ureteroscope against it, easily opened it to   get into the ureter just above that.  Similarly, about a centimeter or 2   above that, was another similar-appearing stricture.  Knowing wire was there,   I was able to navigate that alongside the scope to aim it in through the area   of stricturing to maintain proper orientation and then going alongside the   wire again the beak of the scope pushes through the area of stricture and   again appears to be fairly bland.  There was  not a huge amount of resistance   to passing the scope.  This then enters the renal pelvis.  I then emptied the   renal pelvis with the contrast and then re-lavaged it with saline and then   sending the specimen for cytology.  I did not see anything that specifically   looked like carcinoma along the urothelium.  With the wire and then readvanced   through the scope into the renal pelvis, then stent placement, I then slowly   brought the ureteroscope back down towards the bladder.  I did look at 2 areas   of stricturing.  They at this point now appeared to be well opened.  The   strictures appeared to be bland with no obvious carcinoma.  Given I did not   have complete certainty of what was going on, I did not want to incise ___   full thickness or dilate them for concern if there was carcinoma here, I did   not want to extravasate into the retroperitoneum, but did feel least we now   had a well opened ureter at least to the size of about an 8-10 Korean.  The   lower stricture appeared similar, then the scope was withdrawn back into the   bladder.  Over the previously placed wire, I took a 26 cm 4.8-Korean ureteral   stent.  I chose a smaller stent because she had intolerance to the larger size   stent before and I felt this would at least be something to maintain patency   of the ureter and then if she did need more definitive care later, we can   always get a larger stent.  With that accomplished, our procedure was   concluded.  She is sent to recovery in stable condition.    FINAL DIAGNOSES:  Status post cystoscopy, retrograde pyelograms, dilation of   ureteral stricture, and indwelling ureteral stent placement.       ____________________________________     MD ANNA SEVERINO / JERILYN    DD:  11/07/2017 08:51:39  DT:  11/07/2017 10:06:13    D#:  [id number]  Job#:  [id number]    cc: LEV CROWLEY MD

## 2017-11-07 NOTE — PROGRESS NOTES
CT with hydro. No stone.    To OR today for evaluation, possible stent.    No change in health since yesterday.

## 2017-11-07 NOTE — OR SURGEON
Immediate Post OP Note    PreOp Diagnosis: ureteral stricture    PostOp Diagnosis: same, proximal    Procedure(s):  CYSTOSCOPY STENT PLACEMENT - Wound Class: Clean Contaminated  URETEROSCOPY - Wound Class: Clean Contaminated  URETHRAL DILATATION - Wound Class: Clean Contaminated    Surgeon(s):  Kiet Eid M.D.    Anesthesiologist/Type of Anesthesia:  Anesthesiologist: Cory Cabrera M.D./General    Surgical Staff:  Circulator: Kristan Viera R.N.  Scrub Person: Janine Bauer    Specimens:  cytology  Estimated Blood Loss: none    Findings: two proximal ureteral strictures, bland    Complications: none        11/7/2017 8:45 AM Kiet Eid

## 2017-11-07 NOTE — DISCHARGE INSTRUCTIONS
Discharge Instructions    Discharged to home by car with relative. Discharged via wheelchair, hospital escort: Yes.  Special equipment needed: Not Applicable    Be sure to schedule a follow-up appointment with your primary care doctor or any specialists as instructed.     Discharge Plan:   Diet Plan: Discussed  Activity Level: Discussed  Confirmed Follow up Appointment: Appointment Scheduled  Confirmed Symptoms Management: Discussed  Medication Reconciliation Updated: Yes  Influenza Vaccine Indication: Patient Refuses    I understand that a diet low in cholesterol, fat, and sodium is recommended for good health. Unless I have been given specific instructions below for another diet, I accept this instruction as my diet prescription.   Other diet:     Special Instructions: None    · Is patient discharged on Warfarin / Coumadin?   No     · Is patient Post Blood Transfusion?  No    Depression / Suicide Risk    As you are discharged from this [facility] facility, it is important to learn how to keep safe from harming yourself.    Recognize the warning signs:  · Abrupt changes in personality, positive or negative- including increase in energy   · Giving away possessions  · Change in eating patterns- significant weight changes-  positive or negative  · Change in sleeping patterns- unable to sleep or sleeping all the time   · Unwillingness or inability to communicate  · Depression  · Unusual sadness, discouragement and loneliness  · Talk of wanting to die  · Neglect of personal appearance   · Rebelliousness- reckless behavior  · Withdrawal from people/activities they love  · Confusion- inability to concentrate     If you or a loved one observes any of these behaviors or has concerns about self-harm, here's what you can do:  · Talk about it- your feelings and reasons for harming yourself  · Remove any means that you might use to hurt yourself (examples: pills, rope, extension cords, firearm)  · Get professional help from  the community (Mental Health, Substance Abuse, psychological counseling)  · Do not be alone:Call your Safe Contact- someone whom you trust who will be there for you.  · Call your local CRISIS HOTLINE 814-1139 or 361-526-7485  · Call your local Children's Mobile Crisis Response Team [place] (841) 595-3505 or www.[employer]  · Call the toll free National Suicide Prevention Hotlines   · National Suicide Prevention Lifeline 448-628-LEVU (6989)  · National Hope Line Network 800-SUICIDE (185-9737)

## 2017-11-07 NOTE — DISCHARGE PLANNING
Care Transition Team Assessment    Pt was admitted due to MI, which she reports was a result of extreme stress in her life. Pt is a  and states she works approximately 14 hours per day, but has done little to take care of herself emotionally or physically. Pt states that she will be utilizing a psychiatrist through her employer so she can work through some of these struggles.     Pt is fully independent in activities of daily living and self-care. Her primary mode of transportation is her car and she is able to transport herself. Pt's support is her , Aly, who is present with her at bedside. Pt denies history of HH or SNF, and she also indicates she has never needed DME or O2. Pt's primary care doctor is Dr. Francis in [place].     Information Source  Orientation : Oriented x 4  Information Given By: Patient  Informant's Name: Aurora Tan  Who is responsible for making decisions for patient? : Patient    Readmission Evaluation  Is this a readmission?: No    Elopement Risk  Legal Hold: No  Ambulatory or Self Mobile in Wheelchair: Yes  Disoriented: No  Psychiatric Symptoms: None  History of Wandering: No  Elopement this Admit: No  Vocalizing Wanting to Leave: No  Displays Behaviors, Body Language Wanting to Leave: No-Not at Risk for Elopement  Elopement Risk: Not at Risk for Elopement    Interdisciplinary Discharge Planning  Does Admitting Nurse Feel This Could be a Complex Discharge?: No  Lives with - Patient's Self Care Capacity: Spouse  Patient or legal guardian wants to designate a caregiver (see row info): No  Support Systems: Family Member(s)  Do You Take your Prescribed Medications Regularly: Yes  Able to Return to Previous ADL's: Yes  Mobility Issues: No  Prior Services: None  Patient Expects to be Discharged to:: home  Assistance Needed: Unknown at this Time  Durable Medical Equipment: Not Applicable    Discharge Preparedness  What is your plan after discharge?: Home health  care  What are your discharge supports?: Spouse  Prior Functional Level: Ambulatory, Drives Self, Independent with Activities of Daily Living, Independent with Medication Management  Difficulity with ADLs: None  Difficulity with IADLs: None    Functional Assesment  Prior Functional Level: Ambulatory, Drives Self, Independent with Activities of Daily Living, Independent with Medication Management    Finances  Financial Barriers to Discharge: No  Prescription Coverage: Yes    Vision / Hearing Impairment  Vision Impairment : No  Hearing Impairment : No    Values / Beliefs / Concerns  Values / Beliefs Concerns : No    Domestic Abuse  Have you ever been the victim of abuse or violence?: No  Physical Abuse or Sexual Abuse: No  Verbal Abuse or Emotional Abuse: No  Possible Abuse Reported to:: Not Applicable    Psychological Assessment  History of Psychiatric Problems: No    Discharge Risks or Barriers  Discharge risks or barriers?: No    Anticipated Discharge Information  Anticipated discharge disposition: Home  Discharge Address: [facility] [address] [place]  Discharge Contact Phone Number: [phone]

## 2017-11-07 NOTE — PROGRESS NOTES
Assumed pt care. Pt resting in bed. Family at bedside. Plan of care discussed with pt. Verbalizes understanding. Bed in lowest position, locked, and call light within reach. SR on monitor.

## 2017-11-09 ENCOUNTER — PATIENT OUTREACH (OUTPATIENT)
Dept: HEALTH INFORMATION MANAGEMENT | Facility: OTHER | Age: 47
End: 2017-11-09

## 2017-11-10 ENCOUNTER — OFFICE VISIT (OUTPATIENT)
Dept: MEDICAL GROUP | Facility: CLINIC | Age: 47
End: 2017-11-10
Payer: COMMERCIAL

## 2017-11-10 VITALS
OXYGEN SATURATION: 99 % | TEMPERATURE: 97.4 F | WEIGHT: 208 LBS | HEIGHT: 67 IN | RESPIRATION RATE: 16 BRPM | SYSTOLIC BLOOD PRESSURE: 90 MMHG | BODY MASS INDEX: 32.65 KG/M2 | DIASTOLIC BLOOD PRESSURE: 62 MMHG | HEART RATE: 80 BPM

## 2017-11-10 DIAGNOSIS — F41.9 ANXIETY: ICD-10-CM

## 2017-11-10 DIAGNOSIS — E66.9 OBESITY (BMI 30-39.9): ICD-10-CM

## 2017-11-10 DIAGNOSIS — Q62.11 HYDRONEPHROSIS WITH URETEROPELVIC JUNCTION (UPJ) OBSTRUCTION: ICD-10-CM

## 2017-11-10 DIAGNOSIS — Z09 HOSPITAL DISCHARGE FOLLOW-UP: ICD-10-CM

## 2017-11-10 DIAGNOSIS — I25.118 CORONARY ARTERY DISEASE OF NATIVE HEART WITH STABLE ANGINA PECTORIS, UNSPECIFIED VESSEL OR LESION TYPE (HCC): ICD-10-CM

## 2017-11-10 PROCEDURE — 99496 TRANSJ CARE MGMT HIGH F2F 7D: CPT | Performed by: NURSE PRACTITIONER

## 2017-11-10 RX ORDER — ALPRAZOLAM 0.25 MG/1
.25-.5 TABLET ORAL NIGHTLY PRN
Qty: 30 TAB | Refills: 0 | Status: ON HOLD | OUTPATIENT
Start: 2017-11-10 | End: 2018-03-21

## 2017-11-10 ASSESSMENT — ENCOUNTER SYMPTOMS
FEVER: 0
ABDOMINAL PAIN: 0
SPUTUM PRODUCTION: 0
MYALGIAS: 0
EYE PAIN: 0
DIZZINESS: 0
VOMITING: 0
SHORTNESS OF BREATH: 0
BLURRED VISION: 0
NERVOUS/ANXIOUS: 1
HEADACHES: 0
HEARTBURN: 0
INSOMNIA: 1
PALPITATIONS: 0
DEPRESSION: 0
NAUSEA: 0
FLANK PAIN: 1
CHILLS: 0
WEAKNESS: 0
WHEEZING: 0
FALLS: 0
COUGH: 0
FOCAL WEAKNESS: 0
CONSTIPATION: 0

## 2017-11-10 ASSESSMENT — PATIENT HEALTH QUESTIONNAIRE - PHQ9
CLINICAL INTERPRETATION OF PHQ2 SCORE: 1
SUM OF ALL RESPONSES TO PHQ QUESTIONS 1-9: 9
5. POOR APPETITE OR OVEREATING: 2 - MORE THAN HALF THE DAYS

## 2017-11-10 NOTE — PROGRESS NOTES
Subjective:     Aurora Tan is a 47 y.o. female who presents for Hospital Follow-up.  Chart reviewed. Discharge summary available for review: Yes   Date of discharge 11/7/2017.  48- hour post discharge RN call completed on 11/9/2017 and documented in the medical record by Paradise Rogers.    HPI: Recently hospitalized for  9/17-9/18 right flank pain, found 2-3 mm right UVJ stone.  11/3-11/4 --> transfer to [facility] 11/4-11/7 chest pain, found NSTEMI. Repeated US showed worsening R hydronephrosis.    Hydronephrosis  9/17 s/p stent placement with removal, lithotripsy  11/6 worsening R hydronephrosis s/p ureteral stent with dilatation on 11/7.  11/7 Right renal pelvis patho:         Neutrophils and occasional benign urothelial cells.         No dysplasia or malignancy identified.    Follow up with urologist on 11/27.    Since returning home, patient reports feeling better but still having some pain to her right flank area, no hematuria. Pt reports taking tylenol as needed with little help. However, the anxiety is the one that bothers her most which is also related to her insomnia. Pt reports given xanax in the hospital which really help. She does not like to take meds but really would like to go back to her normal life as soon as possible. She works as  and she is self employed with high level of stress.     The patient denied weakness; no difficulty taking care of self at home.  Patient reports taking medications as instructed.    CAD (coronary artery disease)  s/p cath on 11/4 non obstructing CAD, on ASA, plavix, lipitor.   Normal Echo.  Cards follow up on 11/15.     Follow up with cardiologist on 11/15.    Obesity (BMI 30-39.9)  She and her  was having  but due to busy work that they stop doing it for a while. She reports will definitely resume her previous work out schedule after she feels better.     PCP follow up on 11/20.    Patient Active Problem List    Diagnosis  "Date Noted   • NSTEMI (non-ST elevated myocardial infarction) (CMS-[facility]) 11/05/2017     Priority: High   • CAD (coronary artery disease) 11/05/2017     Priority: High   • Hydronephrosis 11/04/2017     Priority: Medium   • Obesity (BMI 30-39.9) 11/10/2017         Allergies:   Levaquin    Social History:  Social History   Substance Use Topics   • Smoking status: Never Smoker   • Smokeless tobacco: Never Used   • Alcohol use Yes        ROS:  Review of Systems   Constitutional: Negative for chills, fever and malaise/fatigue.   HENT: Negative for hearing loss and tinnitus.    Eyes: Negative for blurred vision and pain.   Respiratory: Negative for cough, sputum production, shortness of breath and wheezing.    Cardiovascular: Negative for chest pain, palpitations and leg swelling.   Gastrointestinal: Negative for abdominal pain, constipation (When out of hospital, finally have BM yesterday. resolved.  ), heartburn, nausea and vomiting.   Genitourinary: Positive for flank pain. Negative for dysuria, frequency and urgency. Hematuria: still having some right flank pain but better.   Musculoskeletal: Positive for joint pain (some knee pain, might having arthritis, chronic, pt to follow up with PCP). Negative for falls and myalgias.   Skin: Negative for rash.   Neurological: Negative for dizziness, focal weakness, weakness and headaches.   Psychiatric/Behavioral: Negative for depression and suicidal ideas. The patient is nervous/anxious and has insomnia.         Objective:     Blood pressure (!) 90/62, pulse 80, temperature 36.3 °C (97.4 °F), resp. rate 16, height 1.702 m (5' 7\"), weight 94.3 kg (208 lb), SpO2 99 %.     Physical Exam:  Physical Exam   Constitutional: She is oriented to person, place, and time and well-developed, well-nourished, and in no distress.   HENT:   Head: Normocephalic and atraumatic.   Eyes: Conjunctivae are normal.   Neck: Neck supple. No JVD present. No thyromegaly present.   Cardiovascular: Normal " rate and regular rhythm.    No murmur heard.  Pulmonary/Chest: Effort normal and breath sounds normal. No respiratory distress. She has no wheezes.   Abdominal: Soft. Bowel sounds are normal. She exhibits no distension. There is no tenderness.   Musculoskeletal: Normal range of motion. She exhibits no edema.   Neurological: She is alert and oriented to person, place, and time. Gait normal.   Skin: Skin is warm. No erythema.   Nursing note and vitals reviewed.        Assessment and Plan:     1. Hospital discharge follow-up  Hospitalization and results reviewed with patient. High risk conditions requiring teaching or care coordination were identified and addressed.The patient demonstrate understanding of admission and underlying conditions. The patient understands discharge instructions and when to seek medical attention. Medications reviewed including instructions regarding high risk medications, dosing and side effects.    The patient is able to safely adhere to ADL/IADL, treatment and medication regimen, self-manage of high-risk conditions? Yes   The patient requires physical therapy/home health/DME referral? No   The patient requires referral to care coordination/behavioral health/social work?  No   Patient requires referral for pharmacy consult? No   Advance directive/POLST on file?  No   Required counseled on advance directive?  No     2. Obesity (BMI 30-39.9)  - Patient identified as having weight management issue.  Appropriate orders and counseling given.  Follow up with PCP on 11/20    3. Coronary artery disease of native heart with stable angina pectoris, unspecified vessel or lesion type (CMS-HCC)  ASA, Plavix, statin  No HTN, No DM. Obesity discussed.  - Patient identified as having weight management issue.  Appropriate orders and counseling given.  - Follow up with cardiologist on 11/15    4. Anxiety  - alprazolam (XANAX) 0.25 MG Tab; Take 1-2 Tabs by mouth at bedtime as needed for Sleep.  Dispense: 30  Tab; Refill: 0    5. Hydronephrosis with ureteropelvic junction (UPJ) obstruction  S/p stent placement. Urologist follow up on 11/27.      Medication Reconciliation  Medication list at end of encounter:   Current Outpatient Prescriptions   Medication Sig Dispense Refill   • alprazolam (XANAX) 0.25 MG Tab Take 1-2 Tabs by mouth at bedtime as needed for Sleep. 30 Tab 0   • aspirin EC 81 MG EC tablet Take 1 Tab by mouth every day. 30 Tab 3   • atorvastatin (LIPITOR) 40 MG Tab Take 1 Tab by mouth every bedtime. 30 Tab 3   • clopidogrel (PLAVIX) 75 MG Tab Take 1 Tab by mouth every day. 30 Tab 3   • estradiol (MINIVELLE) 0.05 MG/24HR PATCH BIWEEKLY Apply 1 Patch to skin as directed 2X A WEEK.       No current facility-administered medications for this visit.        Primary care follow-up:  New health conditions identified during hospitalization? Yes   Labs/pathology/imaging requires future PCP follow-up?  No   Changes to medications during hospitalization or today? Yes     Recommended followup: No Follow-up on file. with Pcp Pt States None   Future Appointments       Provider Department [place]    11/10/2017 8:40 AM ANDREA Drew [facility]    11/15/2017 9:15 AM Thony Hernandez M.D. [facility]           Patient Instruction  Patient offered educational material on discharge diagnosis and management of symptoms/red flags. Patient instructed to keep follow-up appointments and to bring written questions and and actual medications to each office visit. Patient instructed to call PCP/specialist with any problems/questions/concerns. Patient verbalizes understanding and has no further questions at this time.    Face-to-face transitional care management services with moderate complexity medical decision making.

## 2017-11-10 NOTE — ASSESSMENT & PLAN NOTE
s/p cath on 11/4 non obstructing CAD, on ASA, plavix, lipitor.   Normal Echo.  Cards follow up on 11/15.     Follow up with cardiologist on 11/15.

## 2017-11-10 NOTE — ASSESSMENT & PLAN NOTE
She and her  was having  but due to busy work that they stop doing it for a while. She reports will definitely resume her previous work out schedule after she feels better.

## 2017-11-10 NOTE — PROGRESS NOTES
POST DISCHARGE CALL MADE BY Paradise Rogers.  Discharge Date:11/7/2017   Date of Outreach Call: 11/9/2017 10:04 AM  Now that you're home, how are you doing? Fair  Comment:Patient reports a lot of stress related to her  job. States she is also having discomfort and pain where   urethral stent placed. Patient is waiting on call back from  urology office.  Do you have questions about your medications? No  Comment:Patient reports she is taking tylenol for pain  from urethral stent.    Did you fill your medications? Yes    Do you have a follow-up appointment scheduled?Yes  Comment:Discharge Clinic on 11/10/17, PCP on November 20th.    Discharging Department: Telemetry 8    Number of Attempts: 1  Current or previous attempts completed within two business days of discharge? Yes  Provided education regarding treatment plan, medication, self-management, ADLs? Yes  Has patient completed Advance Directive? If yes, advise them to bring to appointment. No  Care Manager phone number provided? Yes  Is there anything else I can help you with? No

## 2017-11-10 NOTE — ASSESSMENT & PLAN NOTE
9/17 s/p stent placement with removal, lithotripsy  11/6 worsening R hydronephrosis s/p ureteral stent with dilatation on 11/7.  11/7 Right renal pelvis patho:         Neutrophils and occasional benign urothelial cells.         No dysplasia or malignancy identified.    Follow up with urologist on 11/27.    Since returning home, patient reports feeling better but still having some pain to her right flank area, no hematuria. Pt reports taking tylenol as needed with little help. However, the anxiety is the one that bothers her most which is also related to her insomnia. Pt reports given xanax in the hospital which really help. She does not like to take meds but really would like to go back to her normal life as soon as possible. She works as  and she is self employed with high level of stress.     The patient denied weakness; no difficulty taking care of self at home.  Patient reports taking medications as instructed.

## 2017-11-10 NOTE — PATIENT INSTRUCTIONS
If you need further assistance, or have any questions; concerns or lingering symptoms before seeing your Primary Care Provider or specialist.     Do not hesitate to contact us.     Please contact us at the Post-Hospital Follow Up Program at [phone].   Our offices hours are Monday-Friday 8 am-5 pm.

## 2017-11-15 ENCOUNTER — TELEPHONE (OUTPATIENT)
Dept: CARDIOLOGY | Facility: MEDICAL CENTER | Age: 47
End: 2017-11-15

## 2017-11-15 ENCOUNTER — OFFICE VISIT (OUTPATIENT)
Dept: CARDIOLOGY | Facility: MEDICAL CENTER | Age: 47
End: 2017-11-15
Payer: COMMERCIAL

## 2017-11-15 VITALS
BODY MASS INDEX: 32.33 KG/M2 | DIASTOLIC BLOOD PRESSURE: 70 MMHG | HEART RATE: 85 BPM | OXYGEN SATURATION: 97 % | SYSTOLIC BLOOD PRESSURE: 104 MMHG | HEIGHT: 67 IN | WEIGHT: 206 LBS

## 2017-11-15 DIAGNOSIS — I21.4 NSTEMI (NON-ST ELEVATED MYOCARDIAL INFARCTION) (HCC): ICD-10-CM

## 2017-11-15 DIAGNOSIS — I25.10 CORONARY ARTERY DISEASE INVOLVING NATIVE HEART WITHOUT ANGINA PECTORIS, UNSPECIFIED VESSEL OR LESION TYPE: ICD-10-CM

## 2017-11-15 PROCEDURE — 99213 OFFICE O/P EST LOW 20 MIN: CPT | Performed by: INTERNAL MEDICINE

## 2017-11-15 NOTE — PROGRESS NOTES
Subjective:   Aurora Tan is a 47 y.o. female who presents today For hospital follow-up after being admitted for right hydronephrosis and concomitant demand non-STEMI. She underwent coronary angiography 11/2017 by Dr. Pearl showing nonobstructive CAD. She was started on medical therapy she has a preserved ejection fraction since discharge she has not had any cardiovascular symptoms.    Denies any other cardiovascular symptoms including chest pain, shortness of breath, dyspnea on exertion, lightheadedness, syncope or presyncope, lower extremity edema, PND, orthopnea or palpitations.      History reviewed. No pertinent past medical history.  Past Surgical History:   Procedure Laterality Date   • URETEROSCOPY Right 11/7/2017    Procedure: URETEROSCOPY;  Surgeon: Kiet Eid M.D.;  Location: [facility];  Service: Urology   • LASERTRIPSY Right 11/7/2017    Procedure: LASERTRIPSY- LITHO;  Surgeon: Kiet Eid M.D.;  Location: [facility];  Service: Urology   • URETHRAL DILATATION Right 11/7/2017    Procedure: URETHRAL DILATATION;  Surgeon: Kiet Eid M.D.;  Location: [facility];  Service: Urology   • CYSTOSCOPY STENT PLACEMENT Right 11/7/2017    Procedure: CYSTOSCOPY STENT PLACEMENT;  Surgeon: Kiet Eid M.D.;  Location: [facility];  Service: Urology   • CYSTOSCOPY Right 9/17/2017    Procedure: CYSTOSCOPY;  Surgeon: Kiet Eid M.D.;  Location: [facility];  Service: Urology   • URETEROSCOPY Right 9/17/2017    Procedure: URETEROSCOPY;  Surgeon: Kiet Eid M.D.;  Location: [facility];  Service: Urology   • LASERTRIPSY Right 9/17/2017    Procedure: LASERTRIPSY;  Surgeon: Kiet Eid M.D.;  Location: [facility];  Service: Urology   • STENT PLACEMENT Right 9/17/2017    Procedure: STENT PLACEMENT;  Surgeon: Kiet Eid M.D.;  Location: [facility];  Service:  "Urology   • ABDOMINAL EXPLORATION     • ABDOMINAL HYSTERECTOMY TOTAL       History reviewed. No pertinent family history.  History   Smoking Status   • Never Smoker   Smokeless Tobacco   • Never Used     Allergies   Allergen Reactions   • Levaquin Itching     Outpatient Encounter Prescriptions as of 11/15/2017   Medication Sig Dispense Refill   • alprazolam (XANAX) 0.25 MG Tab Take 1-2 Tabs by mouth at bedtime as needed for Sleep. 30 Tab 0   • aspirin EC 81 MG EC tablet Take 1 Tab by mouth every day. 30 Tab 3   • atorvastatin (LIPITOR) 40 MG Tab Take 1 Tab by mouth every bedtime. 30 Tab 3   • clopidogrel (PLAVIX) 75 MG Tab Take 1 Tab by mouth every day. 30 Tab 3   • estradiol (MINIVELLE) 0.05 MG/24HR PATCH BIWEEKLY Apply 1 Patch to skin as directed 2X A WEEK.       No facility-administered encounter medications on file as of 11/15/2017.      Review of Systems   All other systems reviewed and are negative.       Objective:   /70   Pulse 85   Ht 1.702 m (5' 7\")   Wt 93.4 kg (206 lb)   SpO2 97%   BMI 32.26 kg/m²     Physical Exam   Constitutional: She is oriented to person, place, and time. She appears well-developed and well-nourished. No distress.   HENT:   Head: Normocephalic and atraumatic.   Mouth/Throat: Oropharynx is clear and moist. No oropharyngeal exudate.   Eyes: Conjunctivae are normal. Pupils are equal, round, and reactive to light. No scleral icterus.   Neck: Normal range of motion. Neck supple. No JVD present. No thyromegaly present.   Cardiovascular: Normal rate, regular rhythm, normal heart sounds and intact distal pulses.  Exam reveals no gallop and no friction rub.    No murmur heard.  Pulses:       Carotid pulses are 2+ on the right side, and 2+ on the left side.       Radial pulses are 2+ on the right side, and 2+ on the left side.        Popliteal pulses are 2+ on the right side, and 2+ on the left side.        Dorsalis pedis pulses are 2+ on the right side, and 2+ on the left side.     "    Posterior tibial pulses are 2+ on the right side, and 2+ on the left side.   Pulmonary/Chest: Effort normal and breath sounds normal. She has no wheezes. She has no rales.   Abdominal: Soft. Bowel sounds are normal. She exhibits no distension. There is no tenderness.   Musculoskeletal: She exhibits no edema or tenderness.   Neurological: She is alert and oriented to person, place, and time. No cranial nerve deficit (Cranial nerves II through XII grossly intact).   Skin: Skin is warm and dry. No rash noted. She is not diaphoretic. No erythema.   Psychiatric: She has a normal mood and affect. Her behavior is normal.   Vitals reviewed.      Assessment:     1. Coronary artery disease involving native heart without angina pectoris, unspecified vessel or lesion type  REFERRAL TO INTENSIVE CARDIAC REHAB/CARDIAC REHAB    LIPID PROFILE    COMP METABOLIC PANEL   2. NSTEMI (non-ST elevated myocardial infarction) (CMS-HCC)  REFERRAL TO INTENSIVE CARDIAC REHAB/CARDIAC REHAB       Medical Decision Making:  Today's Assessment / Status / Plan:     She'll demand non-STEMI in the context of hydronephrosis with preserved ejection fraction and nonobstructive CAD. She is on dual antiplatelet therapy which I recommend she continue for 1 year and statin. Her blood pressure as low as she does not have a history of hypertension and therefore she is not on an ACE inhibitor or beta blocker at this time. She has no symptoms. We discussed pathophysiology of CAD and the events surrounding her event as well as her medical regimen in detail. She is a  and his return to work. I do recommend completing cardiac rehab.    1. Labs in 3 months  2. Cardiac rehab increased diet exercise and weight loss attempts  3. Follow-up in 6 months

## 2017-11-16 NOTE — TELEPHONE ENCOUNTER
Referral to Intensive Cardiac Rehab   Etienne Mir   Sent: Wed November 15, 2017  2:33 PM   To: Lou Case R.N.                  Message     The referral has been sent to [facility].      [phone]     Noted.

## 2017-11-21 ENCOUNTER — NON-PROVIDER VISIT (OUTPATIENT)
Dept: CARDIOLOGY | Facility: MEDICAL CENTER | Age: 47
End: 2017-11-21
Payer: COMMERCIAL

## 2017-11-21 ENCOUNTER — HOSPITAL ENCOUNTER (OUTPATIENT)
Dept: RADIOLOGY | Facility: MEDICAL CENTER | Age: 47
End: 2017-11-21
Attending: UROLOGY
Payer: COMMERCIAL

## 2017-11-21 ENCOUNTER — HOSPITAL ENCOUNTER (OUTPATIENT)
Dept: LAB | Facility: MEDICAL CENTER | Age: 47
End: 2017-11-21
Attending: UROLOGY
Payer: COMMERCIAL

## 2017-11-21 VITALS
SYSTOLIC BLOOD PRESSURE: 127 MMHG | DIASTOLIC BLOOD PRESSURE: 82 MMHG | WEIGHT: 205.5 LBS | HEART RATE: 86 BPM | BODY MASS INDEX: 32.25 KG/M2 | HEIGHT: 67 IN

## 2017-11-21 DIAGNOSIS — I21.4 NSTEMI (NON-ST ELEVATED MYOCARDIAL INFARCTION) (HCC): ICD-10-CM

## 2017-11-21 DIAGNOSIS — N20.1 CALCULUS OF URETER: ICD-10-CM

## 2017-11-21 DIAGNOSIS — I25.10 CORONARY ARTERY DISEASE INVOLVING NATIVE HEART WITHOUT ANGINA PECTORIS, UNSPECIFIED VESSEL OR LESION TYPE: ICD-10-CM

## 2017-11-21 LAB
ALBUMIN SERPL BCP-MCNC: 4.4 G/DL (ref 3.2–4.9)
ALBUMIN/GLOB SERPL: 1.4 G/DL
ALP SERPL-CCNC: 89 U/L (ref 30–99)
ALT SERPL-CCNC: 15 U/L (ref 2–50)
ANION GAP SERPL CALC-SCNC: 9 MMOL/L (ref 0–11.9)
AST SERPL-CCNC: 17 U/L (ref 12–45)
BASOPHILS # BLD AUTO: 0.8 % (ref 0–1.8)
BASOPHILS # BLD: 0.07 K/UL (ref 0–0.12)
BILIRUB SERPL-MCNC: 0.5 MG/DL (ref 0.1–1.5)
BUN SERPL-MCNC: 16 MG/DL (ref 8–22)
CALCIUM SERPL-MCNC: 9.8 MG/DL (ref 8.5–10.5)
CHLORIDE SERPL-SCNC: 104 MMOL/L (ref 96–112)
CO2 SERPL-SCNC: 24 MMOL/L (ref 20–33)
CREAT SERPL-MCNC: 1.04 MG/DL (ref 0.5–1.4)
EOSINOPHIL # BLD AUTO: 0.44 K/UL (ref 0–0.51)
EOSINOPHIL NFR BLD: 5.2 % (ref 0–6.9)
ERYTHROCYTE [DISTWIDTH] IN BLOOD BY AUTOMATED COUNT: 42.2 FL (ref 35.9–50)
GFR SERPL CREATININE-BSD FRML MDRD: 56 ML/MIN/1.73 M 2
GLOBULIN SER CALC-MCNC: 3.2 G/DL (ref 1.9–3.5)
GLUCOSE SERPL-MCNC: 93 MG/DL (ref 65–99)
HCT VFR BLD AUTO: 44.5 % (ref 37–47)
HGB BLD-MCNC: 14.4 G/DL (ref 12–16)
IMM GRANULOCYTES # BLD AUTO: 0.02 K/UL (ref 0–0.11)
IMM GRANULOCYTES NFR BLD AUTO: 0.2 % (ref 0–0.9)
LYMPHOCYTES # BLD AUTO: 2.08 K/UL (ref 1–4.8)
LYMPHOCYTES NFR BLD: 24.4 % (ref 22–41)
MCH RBC QN AUTO: 29.6 PG (ref 27–33)
MCHC RBC AUTO-ENTMCNC: 32.4 G/DL (ref 33.6–35)
MCV RBC AUTO: 91.4 FL (ref 81.4–97.8)
MONOCYTES # BLD AUTO: 0.47 K/UL (ref 0–0.85)
MONOCYTES NFR BLD AUTO: 5.5 % (ref 0–13.4)
NEUTROPHILS # BLD AUTO: 5.44 K/UL (ref 2–7.15)
NEUTROPHILS NFR BLD: 63.9 % (ref 44–72)
NRBC # BLD AUTO: 0 K/UL
NRBC BLD AUTO-RTO: 0 /100 WBC
PLATELET # BLD AUTO: 432 K/UL (ref 164–446)
PMV BLD AUTO: 11.4 FL (ref 9–12.9)
POTASSIUM SERPL-SCNC: 4.1 MMOL/L (ref 3.6–5.5)
PROT SERPL-MCNC: 7.6 G/DL (ref 6–8.2)
RBC # BLD AUTO: 4.87 M/UL (ref 4.2–5.4)
SODIUM SERPL-SCNC: 137 MMOL/L (ref 135–145)
T4 FREE SERPL-MCNC: 0.87 NG/DL (ref 0.53–1.43)
TSH SERPL DL<=0.005 MIU/L-ACNC: 1.72 UIU/ML (ref 0.3–3.7)
WBC # BLD AUTO: 8.5 K/UL (ref 4.8–10.8)

## 2017-11-21 PROCEDURE — 84439 ASSAY OF FREE THYROXINE: CPT

## 2017-11-21 PROCEDURE — 85025 COMPLETE CBC W/AUTO DIFF WBC: CPT

## 2017-11-21 PROCEDURE — G0422 INTENS CARDIAC REHAB W/EXERC: HCPCS | Performed by: FAMILY MEDICINE

## 2017-11-21 PROCEDURE — 80053 COMPREHEN METABOLIC PANEL: CPT

## 2017-11-21 PROCEDURE — 36415 COLL VENOUS BLD VENIPUNCTURE: CPT

## 2017-11-21 PROCEDURE — 74000 DX-ABDOMEN-1 VIEW: CPT

## 2017-11-21 PROCEDURE — G0423 INTENS CARDIAC REHAB NO EXER: HCPCS | Mod: 59 | Performed by: FAMILY MEDICINE

## 2017-11-21 PROCEDURE — 84443 ASSAY THYROID STIM HORMONE: CPT

## 2017-11-21 ASSESSMENT — PATIENT HEALTH QUESTIONNAIRE - PHQ9: SUM OF ALL RESPONSES TO PHQ QUESTIONS 1-9: 16

## 2017-11-21 ASSESSMENT — PAIN SCALES - GENERAL: PAINLEVEL: 5=MODERATE PAIN

## 2017-11-21 NOTE — Clinical Note
Initial orientation ITP.  Patient apprehensive about starting the program: see note PHQ-9 at 16, stress is an issue for her.

## 2017-11-22 NOTE — PROGRESS NOTES
Cardiac Rehab Individual Treatment Plan Assessment: Initial 17 Session #     1   MRN: [id number]   Allergies: Levaquin   Patient Name: Aurora Tan : 1970 Risk Stratification: High    Diagnoses:   1. Coronary artery disease involving native heart without angina pectoris, unspecified vessel or lesion type    2. NSTEMI (non-ST elevated myocardial infarction) (CMS-[facility])     Age: 47 y.o. Physician: ANDREA Valdez    Date of Event: 17 Specialist: David   Risk Factors:  Hyperlipidemia, Stress, Age, Female > 55   Exercise Nutrition Education Psychosocial   Stages of change Stages of change Stages of change Stages of change   Preparation Preparation Preparation Preparation   Fitness Test Lipids Learning Barriers Outcome Survey Tools   DIST: 1594  Available: Yes Learning Barriers: None     Max HR: 109 Date: 17 Family Support PHQ-9: 16   RPE: 7 Total: 153 mg/dL Yes Nutrition Screen: 61 %   SPO2: 97 % Tri mg/dL Lives: Spouse Intervention   MET: 4.38 HDL: 36 mg/dL Tobacco Use Behavioral Health Consult: Yes   EF= 60 (2017) LDL: 86 mg/dL History   Smoking Status   • Never Smoker   Smokeless Tobacco   • Never Used      Physician Referral: Yes   Ambulatory Status Diabetes Smoking Intervention Identifies Stressors: Yes   Fall Risk Assessed: Yes Diabetes: No Smoking Cessation Referral: N/A Drug Intervention: No   Exercise Ambulatory Status Assist Devices: None HbA1C:  (na) Date:  (na) Ind. Education / Counseling: N/A Education   Exercise Prescription Monitors BS at Home: No Tobacco Adjunct: N/A Psychosocial Education: Coping Techniques, Positive Support System, S/S Depression   Mode: Treadmill, NuStep, UBE, Airdyne   Frequency: na Random BS: 97 (2017) Education Intervention Target Goal   Frequency:  3 days/week Weight Management Healthy Heart Education: Class Schedule Given, Medications Reviewed, Patient Education Binder Provided, Risk Factors Discussed, Videos  "Viewed per Donnell Assess presence or absence of depression using a valid screening: Yes   Duration:  30-45 Weight: 93.2 kg (205 lb 8 oz) Target Goal Use Stress Management: Yes   Intensity:  11-13 RPE Height: 170.2 cm (5' 7.01\") Complete Tobacco Cessation: Complete Tobacco Cessation: N/A Adverse Events: Yes   METS:  3.0-5.0 BMI (Calculated): 32.18 Educate / Review and have understanding of cardiac disease prevention: Educate / Review and have understanding of cardiac disease prevention: Yes Unexpected Events: Yes   Progression:  ^ increments of 1-5 to THR/RPE as tolerated Goal weight: 180 Medication Compliance: Yes    THR: THR: Other (see comment) (119-129) History   Alcohol Use   • Yes         Angina with Exercise?  Angina with Exercise: No      Resistance Training?  Resistance Training: Yes      Hypertension      Diagnosed with HTN: Yes Intervention      Resting BP: 127/82 Dietician Consult/Class: Pending (scheduled)      Peak Ex BP: 132/82 Nurse/Patient Discussion: Yes     Intervention Diabetes Ed Referral: N/A     Home Exercise:  Yes Discuss Maintenance /Wt Loss: Yes     Mode: Walk, Gym Attend [employer]: Pending (scheduled)     Duration: 30 min Dietary Goal: >=58 rate your plate, low sodium     Frequency: 7 days active Education     Education Nutrition Education: Healthy Eating, Sodium Reduction     Equipment Orientation, Exercise Safety, S/S to Report, RPE Scale, Warm Up / Cool Down, Physically Active Target Goal     Target Goal LDL-C < 100 if trig. > 200:  N/A       Start Individual Exercise Rx Yes LDL-C < 70 for high risk patient:  Yes       BP < 140/90 or < 130/80 if DM or CKD Yes Non HDL-C Should be < 130:  Yes       Aerobic activity 30 + min / day 5 days / wk Yes HbA1C < 7%: Yes         BMI < 25: No (<30)       Notes: Monitor all, no limitations with ROM  Initial Assessment:  Heart Sounds: S1S2 WNL       Lung Sounds: clear throughout bilaterally       Edema: none present       Right Peripheral " "Pulses:  Carotid: 2+  Radial: 2+  Dorsalis Pedis: 2+  Posterior Tibialis: 2+   Left Peripheral Pulses:  Carotid: 2+  Radial: 2+  Dorsalis Pedis: 2+  Posterior Tibialis: 2+    Incision: none present      Color: pink       Frame Size: Medium        Cognitive Assessment: A&O X 4, no cognitive deficits reported by patient      Fall Assessment:    Gait: steady  Balance: steady  Upper Body: no pain or limitations with ROM  Lower Body: no pain or limitations with ROM   Recent Falls: none reported   Current Medications and Vaccinations: Reviewed  Patient Stated Goals: \"I need to start exercising, I would like to lose weight, I would like to eat healthier, I need ideas on how to reduce stress.\"  Other: Socorro comes in today apprehensive about the program and time contraints as her job is very busy.  She believes that her cardiac event was a result of her hydronephrosis entirely but understands after talking that she still had a cardiac event that needs to be addressed. She is wanting to start exercising but has not started yet, mostly because she has pain from the stent in her ureter which will be removed this Monday.  She is agreeable to start exercise here, has a gym membership and also has a treadmill at home that she is agreeable to use, using the same regimen that she is doing here.  She says that she doesn't eat regularly and that she doesn't eat unhealthy but skips several meals.  She needs ideas on how and what to eat on the run.  She looks forward to learning from our program.  She says that stress is a big issue for her and the only relief that she has is at the end of the day when she can relax and watch her shows in the evening.  Her PHQ-9 came out to 16.  She is encouraged to watch the mental health videos for ideas on how to manage stress and she is encouraged to take a few minutes in the morning to prepare for her day with some deep breathing.  She will have her stent removed from her ureter Monday and will start " the program this Wednesday.

## 2017-11-22 NOTE — PROGRESS NOTES
Date: 11/21/2017    Initial Individual Treatment Plan review for the [facility] Intensive Cardiac Rehabilitation (ICR) Program.    Aurora Tan, 47 y.o. female, with qualifying diagnosis/diagnoses of recent Myocardial Infarction.  Co-morbid conditions include Hyperlipidemia, kidney disease and stress.     Primary Care Provider: ANDREA Valdez    Heart Specialist (s): Dr. Hernandez    Patient has successfully completed 1 Exercise Sessions, and an appropriate number of corresponding Education Sessions.  Patient is (continues to be) an excellent candidate for the Replaced by [facility] Intensive Cardiac Rehabilitation (ICR) Program.    I will review the Individual Treatment Plan again at 30 day post-initiation of ICR.    Rommel Arboleda MD, Merged with [facility]  , [facility]

## 2017-11-29 ENCOUNTER — NON-PROVIDER VISIT (OUTPATIENT)
Dept: CARDIOLOGY | Facility: MEDICAL CENTER | Age: 47
End: 2017-11-29
Payer: COMMERCIAL

## 2017-11-29 VITALS
HEART RATE: 76 BPM | DIASTOLIC BLOOD PRESSURE: 61 MMHG | WEIGHT: 205.5 LBS | SYSTOLIC BLOOD PRESSURE: 118 MMHG | BODY MASS INDEX: 32.25 KG/M2 | HEIGHT: 67 IN

## 2017-11-29 DIAGNOSIS — I21.4 NSTEMI (NON-ST ELEVATED MYOCARDIAL INFARCTION) (HCC): ICD-10-CM

## 2017-11-29 PROCEDURE — G0422 INTENS CARDIAC REHAB W/EXERC: HCPCS | Performed by: FAMILY MEDICINE

## 2017-11-29 PROCEDURE — G0423 INTENS CARDIAC REHAB NO EXER: HCPCS | Mod: 59 | Performed by: FAMILY MEDICINE

## 2017-11-29 ASSESSMENT — PAIN SCALES - GENERAL: PAINLEVEL: 5=MODERATE PAIN

## 2017-11-30 ENCOUNTER — NON-PROVIDER VISIT (OUTPATIENT)
Dept: CARDIOLOGY | Facility: MEDICAL CENTER | Age: 47
End: 2017-11-30
Payer: COMMERCIAL

## 2017-11-30 VITALS
DIASTOLIC BLOOD PRESSURE: 88 MMHG | HEIGHT: 67 IN | WEIGHT: 203 LBS | HEART RATE: 90 BPM | SYSTOLIC BLOOD PRESSURE: 137 MMHG | BODY MASS INDEX: 31.86 KG/M2

## 2017-11-30 DIAGNOSIS — I21.4 NSTEMI (NON-ST ELEVATED MYOCARDIAL INFARCTION) (HCC): ICD-10-CM

## 2017-11-30 PROCEDURE — G0422 INTENS CARDIAC REHAB W/EXERC: HCPCS | Performed by: FAMILY MEDICINE

## 2017-11-30 PROCEDURE — G0423 INTENS CARDIAC REHAB NO EXER: HCPCS | Mod: 59 | Performed by: FAMILY MEDICINE

## 2017-11-30 ASSESSMENT — PAIN SCALES - GENERAL: PAINLEVEL: NO PAIN

## 2017-12-04 ENCOUNTER — NON-PROVIDER VISIT (OUTPATIENT)
Dept: CARDIOLOGY | Facility: MEDICAL CENTER | Age: 47
End: 2017-12-04
Payer: COMMERCIAL

## 2017-12-04 VITALS
SYSTOLIC BLOOD PRESSURE: 148 MMHG | WEIGHT: 206 LBS | DIASTOLIC BLOOD PRESSURE: 80 MMHG | HEIGHT: 67 IN | HEART RATE: 80 BPM | BODY MASS INDEX: 32.33 KG/M2

## 2017-12-04 DIAGNOSIS — I21.4 NSTEMI (NON-ST ELEVATED MYOCARDIAL INFARCTION) (HCC): ICD-10-CM

## 2017-12-04 PROCEDURE — G0423 INTENS CARDIAC REHAB NO EXER: HCPCS | Mod: 59 | Performed by: FAMILY MEDICINE

## 2017-12-04 PROCEDURE — G0422 INTENS CARDIAC REHAB W/EXERC: HCPCS | Performed by: FAMILY MEDICINE

## 2017-12-04 ASSESSMENT — PAIN SCALES - GENERAL: PAINLEVEL: NO PAIN

## 2017-12-05 NOTE — NON-PROVIDER
Aurora Tan attended the following workshop: 3:30-4:30pm  Label Reading     Workshop included education on:  How to read a nutrition label and determine accurate healthful information.     Lecture was attended and patient questions addressed. The patient will continue workshops and nutrition education.   I will follow-up with patient at one-on-one TBD.

## 2017-12-06 ENCOUNTER — NON-PROVIDER VISIT (OUTPATIENT)
Dept: CARDIOLOGY | Facility: MEDICAL CENTER | Age: 47
End: 2017-12-06
Payer: COMMERCIAL

## 2017-12-06 VITALS
DIASTOLIC BLOOD PRESSURE: 75 MMHG | WEIGHT: 205 LBS | BODY MASS INDEX: 32.18 KG/M2 | SYSTOLIC BLOOD PRESSURE: 141 MMHG | HEIGHT: 67 IN | HEART RATE: 85 BPM

## 2017-12-06 DIAGNOSIS — I21.4 NSTEMI (NON-ST ELEVATED MYOCARDIAL INFARCTION) (HCC): ICD-10-CM

## 2017-12-06 PROCEDURE — G0423 INTENS CARDIAC REHAB NO EXER: HCPCS | Mod: 59 | Performed by: FAMILY MEDICINE

## 2017-12-06 PROCEDURE — G0422 INTENS CARDIAC REHAB W/EXERC: HCPCS | Performed by: FAMILY MEDICINE

## 2017-12-06 ASSESSMENT — PAIN SCALES - GENERAL: PAINLEVEL: NO PAIN

## 2017-12-08 ENCOUNTER — APPOINTMENT (OUTPATIENT)
Dept: RADIOLOGY | Facility: MEDICAL CENTER | Age: 47
DRG: 694 | End: 2017-12-08
Attending: EMERGENCY MEDICINE
Payer: COMMERCIAL

## 2017-12-08 ENCOUNTER — HOSPITAL ENCOUNTER (INPATIENT)
Facility: MEDICAL CENTER | Age: 47
LOS: 1 days | DRG: 694 | End: 2017-12-09
Attending: EMERGENCY MEDICINE | Admitting: HOSPITALIST
Payer: COMMERCIAL

## 2017-12-08 ENCOUNTER — RESOLUTE PROFESSIONAL BILLING HOSPITAL PROF FEE (OUTPATIENT)
Dept: HOSPITALIST | Facility: MEDICAL CENTER | Age: 47
End: 2017-12-08
Payer: COMMERCIAL

## 2017-12-08 DIAGNOSIS — Q62.11 HYDRONEPHROSIS WITH URETEROPELVIC JUNCTION (UPJ) OBSTRUCTION: ICD-10-CM

## 2017-12-08 DIAGNOSIS — J11.1 INFLUENZA: ICD-10-CM

## 2017-12-08 DIAGNOSIS — N39.0 ACUTE UTI: ICD-10-CM

## 2017-12-08 PROBLEM — N13.9 OBSTRUCTIVE UROPATHY: Status: ACTIVE | Noted: 2017-12-08

## 2017-12-08 LAB
ALBUMIN SERPL BCP-MCNC: 4.8 G/DL (ref 3.2–4.9)
ALBUMIN/GLOB SERPL: 1.8 G/DL
ALP SERPL-CCNC: 80 U/L (ref 30–99)
ALT SERPL-CCNC: 16 U/L (ref 2–50)
ANION GAP SERPL CALC-SCNC: 10 MMOL/L (ref 0–11.9)
APPEARANCE UR: CLEAR
AST SERPL-CCNC: 17 U/L (ref 12–45)
BACTERIA #/AREA URNS HPF: ABNORMAL /HPF
BASOPHILS # BLD AUTO: 0.3 % (ref 0–1.8)
BASOPHILS # BLD: 0.02 K/UL (ref 0–0.12)
BILIRUB SERPL-MCNC: 0.5 MG/DL (ref 0.1–1.5)
BILIRUB UR QL STRIP.AUTO: NEGATIVE
BUN SERPL-MCNC: 9 MG/DL (ref 8–22)
CALCIUM SERPL-MCNC: 9.9 MG/DL (ref 8.5–10.5)
CHLORIDE SERPL-SCNC: 104 MMOL/L (ref 96–112)
CO2 SERPL-SCNC: 21 MMOL/L (ref 20–33)
COLOR UR: YELLOW
CREAT SERPL-MCNC: 1.1 MG/DL (ref 0.5–1.4)
EOSINOPHIL # BLD AUTO: 0.06 K/UL (ref 0–0.51)
EOSINOPHIL NFR BLD: 1 % (ref 0–6.9)
EPI CELLS #/AREA URNS HPF: ABNORMAL /HPF
ERYTHROCYTE [DISTWIDTH] IN BLOOD BY AUTOMATED COUNT: 39.8 FL (ref 35.9–50)
FLUAV RNA SPEC QL NAA+PROBE: POSITIVE
FLUBV RNA SPEC QL NAA+PROBE: NEGATIVE
GFR SERPL CREATININE-BSD FRML MDRD: 53 ML/MIN/1.73 M 2
GLOBULIN SER CALC-MCNC: 2.6 G/DL (ref 1.9–3.5)
GLUCOSE SERPL-MCNC: 92 MG/DL (ref 65–99)
GLUCOSE UR STRIP.AUTO-MCNC: NEGATIVE MG/DL
HCT VFR BLD AUTO: 42.2 % (ref 37–47)
HGB BLD-MCNC: 14.3 G/DL (ref 12–16)
HYALINE CASTS #/AREA URNS LPF: ABNORMAL /LPF
IMM GRANULOCYTES # BLD AUTO: 0.01 K/UL (ref 0–0.11)
IMM GRANULOCYTES NFR BLD AUTO: 0.2 % (ref 0–0.9)
KETONES UR STRIP.AUTO-MCNC: NEGATIVE MG/DL
LACTATE BLD-SCNC: 1.1 MMOL/L (ref 0.5–2)
LACTATE BLD-SCNC: 1.1 MMOL/L (ref 0.5–2)
LEUKOCYTE ESTERASE UR QL STRIP.AUTO: ABNORMAL
LYMPHOCYTES # BLD AUTO: 0.64 K/UL (ref 1–4.8)
LYMPHOCYTES NFR BLD: 11.1 % (ref 22–41)
MCH RBC QN AUTO: 29.5 PG (ref 27–33)
MCHC RBC AUTO-ENTMCNC: 33.9 G/DL (ref 33.6–35)
MCV RBC AUTO: 87 FL (ref 81.4–97.8)
MICRO URNS: ABNORMAL
MONOCYTES # BLD AUTO: 0.54 K/UL (ref 0–0.85)
MONOCYTES NFR BLD AUTO: 9.4 % (ref 0–13.4)
NEUTROPHILS # BLD AUTO: 4.47 K/UL (ref 2–7.15)
NEUTROPHILS NFR BLD: 78 % (ref 44–72)
NITRITE UR QL STRIP.AUTO: NEGATIVE
NRBC # BLD AUTO: 0 K/UL
NRBC BLD AUTO-RTO: 0 /100 WBC
PH UR STRIP.AUTO: 8.5 [PH]
PLATELET # BLD AUTO: 284 K/UL (ref 164–446)
PMV BLD AUTO: 10.8 FL (ref 9–12.9)
POTASSIUM SERPL-SCNC: 3.8 MMOL/L (ref 3.6–5.5)
PROT SERPL-MCNC: 7.4 G/DL (ref 6–8.2)
PROT UR QL STRIP: NEGATIVE MG/DL
RBC # BLD AUTO: 4.85 M/UL (ref 4.2–5.4)
RBC # URNS HPF: ABNORMAL /HPF
RBC UR QL AUTO: NEGATIVE
SODIUM SERPL-SCNC: 135 MMOL/L (ref 135–145)
SP GR UR STRIP.AUTO: 1.01
UROBILINOGEN UR STRIP.AUTO-MCNC: 0.2 MG/DL
WBC # BLD AUTO: 5.7 K/UL (ref 4.8–10.8)
WBC #/AREA URNS HPF: ABNORMAL /HPF

## 2017-12-08 PROCEDURE — 83605 ASSAY OF LACTIC ACID: CPT

## 2017-12-08 PROCEDURE — 96374 THER/PROPH/DIAG INJ IV PUSH: CPT

## 2017-12-08 PROCEDURE — 700102 HCHG RX REV CODE 250 W/ 637 OVERRIDE(OP)

## 2017-12-08 PROCEDURE — 700102 HCHG RX REV CODE 250 W/ 637 OVERRIDE(OP): Performed by: HOSPITALIST

## 2017-12-08 PROCEDURE — 85025 COMPLETE CBC W/AUTO DIFF WBC: CPT

## 2017-12-08 PROCEDURE — 71010 DX-CHEST-PORTABLE (1 VIEW): CPT

## 2017-12-08 PROCEDURE — A9270 NON-COVERED ITEM OR SERVICE: HCPCS | Performed by: HOSPITALIST

## 2017-12-08 PROCEDURE — 99223 1ST HOSP IP/OBS HIGH 75: CPT | Performed by: HOSPITALIST

## 2017-12-08 PROCEDURE — 87086 URINE CULTURE/COLONY COUNT: CPT

## 2017-12-08 PROCEDURE — 87502 INFLUENZA DNA AMP PROBE: CPT

## 2017-12-08 PROCEDURE — 80053 COMPREHEN METABOLIC PANEL: CPT

## 2017-12-08 PROCEDURE — 96361 HYDRATE IV INFUSION ADD-ON: CPT

## 2017-12-08 PROCEDURE — 700105 HCHG RX REV CODE 258: Performed by: EMERGENCY MEDICINE

## 2017-12-08 PROCEDURE — 700111 HCHG RX REV CODE 636 W/ 250 OVERRIDE (IP): Performed by: EMERGENCY MEDICINE

## 2017-12-08 PROCEDURE — 770006 HCHG ROOM/CARE - MED/SURG/GYN SEMI*

## 2017-12-08 PROCEDURE — 74176 CT ABD & PELVIS W/O CONTRAST: CPT

## 2017-12-08 PROCEDURE — 81001 URINALYSIS AUTO W/SCOPE: CPT

## 2017-12-08 PROCEDURE — 87040 BLOOD CULTURE FOR BACTERIA: CPT | Mod: 91

## 2017-12-08 PROCEDURE — 700105 HCHG RX REV CODE 258: Performed by: HOSPITALIST

## 2017-12-08 PROCEDURE — 99285 EMERGENCY DEPT VISIT HI MDM: CPT

## 2017-12-08 PROCEDURE — A9270 NON-COVERED ITEM OR SERVICE: HCPCS

## 2017-12-08 PROCEDURE — 94760 N-INVAS EAR/PLS OXIMETRY 1: CPT

## 2017-12-08 RX ORDER — PROMETHAZINE HYDROCHLORIDE 25 MG/1
12.5-25 SUPPOSITORY RECTAL EVERY 4 HOURS PRN
Status: DISCONTINUED | OUTPATIENT
Start: 2017-12-08 | End: 2017-12-09 | Stop reason: HOSPADM

## 2017-12-08 RX ORDER — POLYETHYLENE GLYCOL 3350 17 G/17G
1 POWDER, FOR SOLUTION ORAL
Status: DISCONTINUED | OUTPATIENT
Start: 2017-12-08 | End: 2017-12-09 | Stop reason: HOSPADM

## 2017-12-08 RX ORDER — SODIUM CHLORIDE 9 MG/ML
INJECTION, SOLUTION INTRAVENOUS CONTINUOUS
Status: DISCONTINUED | OUTPATIENT
Start: 2017-12-08 | End: 2017-12-09 | Stop reason: HOSPADM

## 2017-12-08 RX ORDER — ALPRAZOLAM 0.25 MG/1
0.25 TABLET ORAL NIGHTLY PRN
Status: DISCONTINUED | OUTPATIENT
Start: 2017-12-08 | End: 2017-12-09 | Stop reason: HOSPADM

## 2017-12-08 RX ORDER — SODIUM CHLORIDE 9 MG/ML
500 INJECTION, SOLUTION INTRAVENOUS
Status: DISCONTINUED | OUTPATIENT
Start: 2017-12-08 | End: 2017-12-09 | Stop reason: HOSPADM

## 2017-12-08 RX ORDER — ONDANSETRON 2 MG/ML
4 INJECTION INTRAMUSCULAR; INTRAVENOUS EVERY 4 HOURS PRN
Status: DISCONTINUED | OUTPATIENT
Start: 2017-12-08 | End: 2017-12-09 | Stop reason: HOSPADM

## 2017-12-08 RX ORDER — ACETAMINOPHEN 325 MG/1
650 TABLET ORAL ONCE
Status: COMPLETED | OUTPATIENT
Start: 2017-12-08 | End: 2017-12-08

## 2017-12-08 RX ORDER — ATORVASTATIN CALCIUM 40 MG/1
40 TABLET, FILM COATED ORAL
Status: DISCONTINUED | OUTPATIENT
Start: 2017-12-08 | End: 2017-12-09 | Stop reason: HOSPADM

## 2017-12-08 RX ORDER — ALPRAZOLAM 0.25 MG/1
.25-.5 TABLET ORAL NIGHTLY PRN
Status: DISCONTINUED | OUTPATIENT
Start: 2017-12-08 | End: 2017-12-08

## 2017-12-08 RX ORDER — ALPRAZOLAM 0.5 MG/1
0.5 TABLET ORAL NIGHTLY PRN
Status: DISCONTINUED | OUTPATIENT
Start: 2017-12-08 | End: 2017-12-09 | Stop reason: HOSPADM

## 2017-12-08 RX ORDER — MORPHINE SULFATE 4 MG/ML
4 INJECTION, SOLUTION INTRAMUSCULAR; INTRAVENOUS
Status: DISCONTINUED | OUTPATIENT
Start: 2017-12-08 | End: 2017-12-09

## 2017-12-08 RX ORDER — OSELTAMIVIR PHOSPHATE 75 MG/1
75 CAPSULE ORAL EVERY 12 HOURS
Status: DISCONTINUED | OUTPATIENT
Start: 2017-12-08 | End: 2017-12-09 | Stop reason: HOSPADM

## 2017-12-08 RX ORDER — CEFTRIAXONE 2 G/1
2 INJECTION, POWDER, FOR SOLUTION INTRAMUSCULAR; INTRAVENOUS ONCE
Status: COMPLETED | OUTPATIENT
Start: 2017-12-08 | End: 2017-12-08

## 2017-12-08 RX ORDER — OXYCODONE HYDROCHLORIDE 5 MG/1
5 TABLET ORAL
Status: DISCONTINUED | OUTPATIENT
Start: 2017-12-08 | End: 2017-12-09

## 2017-12-08 RX ORDER — ACETAMINOPHEN 325 MG/1
650 TABLET ORAL EVERY 6 HOURS PRN
Status: DISCONTINUED | OUTPATIENT
Start: 2017-12-08 | End: 2017-12-09

## 2017-12-08 RX ORDER — BISACODYL 10 MG
10 SUPPOSITORY, RECTAL RECTAL
Status: DISCONTINUED | OUTPATIENT
Start: 2017-12-08 | End: 2017-12-09 | Stop reason: HOSPADM

## 2017-12-08 RX ORDER — SODIUM CHLORIDE 9 MG/ML
1000 INJECTION, SOLUTION INTRAVENOUS ONCE
Status: COMPLETED | OUTPATIENT
Start: 2017-12-08 | End: 2017-12-08

## 2017-12-08 RX ORDER — ONDANSETRON 4 MG/1
4 TABLET, ORALLY DISINTEGRATING ORAL EVERY 4 HOURS PRN
Status: DISCONTINUED | OUTPATIENT
Start: 2017-12-08 | End: 2017-12-09 | Stop reason: HOSPADM

## 2017-12-08 RX ORDER — PROMETHAZINE HYDROCHLORIDE 25 MG/1
12.5-25 TABLET ORAL EVERY 4 HOURS PRN
Status: DISCONTINUED | OUTPATIENT
Start: 2017-12-08 | End: 2017-12-09 | Stop reason: HOSPADM

## 2017-12-08 RX ORDER — CLOPIDOGREL BISULFATE 75 MG/1
75 TABLET ORAL DAILY
Status: DISCONTINUED | OUTPATIENT
Start: 2017-12-09 | End: 2017-12-09 | Stop reason: HOSPADM

## 2017-12-08 RX ORDER — AMOXICILLIN 250 MG
2 CAPSULE ORAL 2 TIMES DAILY
Status: DISCONTINUED | OUTPATIENT
Start: 2017-12-08 | End: 2017-12-09 | Stop reason: HOSPADM

## 2017-12-08 RX ORDER — OXYCODONE HYDROCHLORIDE 10 MG/1
10 TABLET ORAL
Status: DISCONTINUED | OUTPATIENT
Start: 2017-12-08 | End: 2017-12-09

## 2017-12-08 RX ADMIN — OSELTAMIVIR PHOSPHATE 75 MG: 75 CAPSULE ORAL at 21:00

## 2017-12-08 RX ADMIN — OXYCODONE HYDROCHLORIDE 5 MG: 5 TABLET ORAL at 23:43

## 2017-12-08 RX ADMIN — ATORVASTATIN CALCIUM 40 MG: 40 TABLET, FILM COATED ORAL at 23:19

## 2017-12-08 RX ADMIN — SODIUM CHLORIDE 1000 ML: 9 INJECTION, SOLUTION INTRAVENOUS at 18:20

## 2017-12-08 RX ADMIN — ACETAMINOPHEN 650 MG: 325 TABLET, FILM COATED ORAL at 18:02

## 2017-12-08 RX ADMIN — SODIUM CHLORIDE: 9 INJECTION, SOLUTION INTRAVENOUS at 23:17

## 2017-12-08 RX ADMIN — CEFTRIAXONE SODIUM 2 G: 2 INJECTION, POWDER, FOR SOLUTION INTRAMUSCULAR; INTRAVENOUS at 18:16

## 2017-12-08 ASSESSMENT — PAIN SCALES - GENERAL
PAINLEVEL_OUTOF10: 8
PAINLEVEL_OUTOF10: 7

## 2017-12-08 ASSESSMENT — LIFESTYLE VARIABLES
TOTAL SCORE: 0
ALCOHOL_USE: YES
AVERAGE NUMBER OF DAYS PER WEEK YOU HAVE A DRINK CONTAINING ALCOHOL: 0
EVER FELT BAD OR GUILTY ABOUT YOUR DRINKING: NO
EVER HAD A DRINK FIRST THING IN THE MORNING TO STEADY YOUR NERVES TO GET RID OF A HANGOVER: NO
ON A TYPICAL DAY WHEN YOU DRINK ALCOHOL HOW MANY DRINKS DO YOU HAVE: 1
HAVE YOU EVER FELT YOU SHOULD CUT DOWN ON YOUR DRINKING: NO
CONSUMPTION TOTAL: NEGATIVE
EVER_SMOKED: YES
TOTAL SCORE: 0
HAVE PEOPLE ANNOYED YOU BY CRITICIZING YOUR DRINKING: NO
HOW MANY TIMES IN THE PAST YEAR HAVE YOU HAD 5 OR MORE DRINKS IN A DAY: 0
TOTAL SCORE: 0

## 2017-12-08 ASSESSMENT — PATIENT HEALTH QUESTIONNAIRE - PHQ9
2. FEELING DOWN, DEPRESSED, IRRITABLE, OR HOPELESS: NOT AT ALL
SUM OF ALL RESPONSES TO PHQ QUESTIONS 1-9: 0
SUM OF ALL RESPONSES TO PHQ9 QUESTIONS 1 AND 2: 0
1. LITTLE INTEREST OR PLEASURE IN DOING THINGS: NOT AT ALL

## 2017-12-08 ASSESSMENT — ENCOUNTER SYMPTOMS
FEVER: 1
FLANK PAIN: 1
SORE THROAT: 1
SPUTUM PRODUCTION: 0
COUGH: 1

## 2017-12-09 ENCOUNTER — APPOINTMENT (OUTPATIENT)
Dept: RADIOLOGY | Facility: MEDICAL CENTER | Age: 47
DRG: 694 | End: 2017-12-09
Attending: HOSPITALIST
Payer: COMMERCIAL

## 2017-12-09 VITALS
SYSTOLIC BLOOD PRESSURE: 130 MMHG | TEMPERATURE: 99.4 F | WEIGHT: 203.48 LBS | OXYGEN SATURATION: 95 % | HEART RATE: 82 BPM | RESPIRATION RATE: 17 BRPM | BODY MASS INDEX: 31.86 KG/M2 | DIASTOLIC BLOOD PRESSURE: 85 MMHG

## 2017-12-09 PROBLEM — G47.00 INSOMNIA DISORDER: Status: ACTIVE | Noted: 2017-12-09

## 2017-12-09 LAB
ANION GAP SERPL CALC-SCNC: 9 MMOL/L (ref 0–11.9)
BUN SERPL-MCNC: 8 MG/DL (ref 8–22)
CALCIUM SERPL-MCNC: 8.6 MG/DL (ref 8.5–10.5)
CHLORIDE SERPL-SCNC: 109 MMOL/L (ref 96–112)
CO2 SERPL-SCNC: 20 MMOL/L (ref 20–33)
CREAT SERPL-MCNC: 1.04 MG/DL (ref 0.5–1.4)
ERYTHROCYTE [DISTWIDTH] IN BLOOD BY AUTOMATED COUNT: 41 FL (ref 35.9–50)
GFR SERPL CREATININE-BSD FRML MDRD: 56 ML/MIN/1.73 M 2
GLUCOSE SERPL-MCNC: 95 MG/DL (ref 65–99)
HCT VFR BLD AUTO: 37.6 % (ref 37–47)
HGB BLD-MCNC: 12.8 G/DL (ref 12–16)
INR PPP: 1.1 (ref 0.87–1.13)
MCH RBC QN AUTO: 30 PG (ref 27–33)
MCHC RBC AUTO-ENTMCNC: 34 G/DL (ref 33.6–35)
MCV RBC AUTO: 88.1 FL (ref 81.4–97.8)
PLATELET # BLD AUTO: 241 K/UL (ref 164–446)
PMV BLD AUTO: 10.8 FL (ref 9–12.9)
POTASSIUM SERPL-SCNC: 3.8 MMOL/L (ref 3.6–5.5)
PROTHROMBIN TIME: 13.9 SEC (ref 12–14.6)
RBC # BLD AUTO: 4.27 M/UL (ref 4.2–5.4)
SODIUM SERPL-SCNC: 138 MMOL/L (ref 135–145)
WBC # BLD AUTO: 4.3 K/UL (ref 4.8–10.8)

## 2017-12-09 PROCEDURE — 700102 HCHG RX REV CODE 250 W/ 637 OVERRIDE(OP): Performed by: HOSPITALIST

## 2017-12-09 PROCEDURE — 99239 HOSP IP/OBS DSCHRG MGMT >30: CPT | Performed by: HOSPITALIST

## 2017-12-09 PROCEDURE — 36415 COLL VENOUS BLD VENIPUNCTURE: CPT

## 2017-12-09 PROCEDURE — 700111 HCHG RX REV CODE 636 W/ 250 OVERRIDE (IP): Performed by: HOSPITALIST

## 2017-12-09 PROCEDURE — A9270 NON-COVERED ITEM OR SERVICE: HCPCS | Performed by: HOSPITALIST

## 2017-12-09 PROCEDURE — 85027 COMPLETE CBC AUTOMATED: CPT

## 2017-12-09 PROCEDURE — 80048 BASIC METABOLIC PNL TOTAL CA: CPT

## 2017-12-09 PROCEDURE — 85610 PROTHROMBIN TIME: CPT

## 2017-12-09 RX ORDER — ACETAMINOPHEN 325 MG/1
650 TABLET ORAL EVERY 6 HOURS PRN
Qty: 30 TAB | Refills: 0 | COMMUNITY
Start: 2017-12-09 | End: 2018-03-14

## 2017-12-09 RX ORDER — OSELTAMIVIR PHOSPHATE 75 MG/1
75 CAPSULE ORAL EVERY 12 HOURS
Qty: 8 CAP | Refills: 0 | Status: ON HOLD | OUTPATIENT
Start: 2017-12-09 | End: 2018-01-10

## 2017-12-09 RX ORDER — CEFDINIR 250 MG/5ML
250 POWDER, FOR SUSPENSION ORAL 2 TIMES DAILY
Qty: 100 ML | Refills: 0 | Status: ON HOLD | OUTPATIENT
Start: 2017-12-09 | End: 2018-01-10

## 2017-12-09 RX ORDER — OXYCODONE HYDROCHLORIDE 5 MG/1
5 TABLET ORAL EVERY 6 HOURS PRN
Status: DISCONTINUED | OUTPATIENT
Start: 2017-12-09 | End: 2017-12-09 | Stop reason: HOSPADM

## 2017-12-09 RX ORDER — HEPARIN SODIUM 5000 [USP'U]/ML
5000 INJECTION, SOLUTION INTRAVENOUS; SUBCUTANEOUS EVERY 8 HOURS
Status: DISCONTINUED | OUTPATIENT
Start: 2017-12-09 | End: 2017-12-09 | Stop reason: HOSPADM

## 2017-12-09 RX ORDER — ACETAMINOPHEN 325 MG/1
650 TABLET ORAL EVERY 6 HOURS PRN
Status: DISCONTINUED | OUTPATIENT
Start: 2017-12-09 | End: 2017-12-09 | Stop reason: HOSPADM

## 2017-12-09 RX ORDER — OXYCODONE HYDROCHLORIDE 10 MG/1
10 TABLET ORAL EVERY 6 HOURS PRN
Status: DISCONTINUED | OUTPATIENT
Start: 2017-12-09 | End: 2017-12-09 | Stop reason: HOSPADM

## 2017-12-09 RX ORDER — MORPHINE SULFATE 4 MG/ML
1-3 INJECTION, SOLUTION INTRAMUSCULAR; INTRAVENOUS
Status: DISCONTINUED | OUTPATIENT
Start: 2017-12-09 | End: 2017-12-09 | Stop reason: HOSPADM

## 2017-12-09 RX ORDER — CLOPIDOGREL BISULFATE 75 MG/1
75 TABLET ORAL DAILY
Qty: 30 TAB | Refills: 3 | Status: ON HOLD | OUTPATIENT
Start: 2017-12-09 | End: 2018-01-10

## 2017-12-09 RX ADMIN — CEFTRIAXONE 2 G: 2 INJECTION, POWDER, FOR SOLUTION INTRAMUSCULAR; INTRAVENOUS at 08:48

## 2017-12-09 RX ADMIN — OSELTAMIVIR PHOSPHATE 75 MG: 75 CAPSULE ORAL at 08:45

## 2017-12-09 RX ADMIN — OXYCODONE HYDROCHLORIDE 5 MG: 5 TABLET ORAL at 11:43

## 2017-12-09 ASSESSMENT — ENCOUNTER SYMPTOMS
DIZZINESS: 0
FOCAL WEAKNESS: 0
SHORTNESS OF BREATH: 0
FEVER: 1
VOMITING: 0
ABDOMINAL PAIN: 0
WHEEZING: 0
DIARRHEA: 0
SORE THROAT: 0
FLANK PAIN: 1
DEPRESSION: 0
COUGH: 0
PHOTOPHOBIA: 0
PALPITATIONS: 0
HEADACHES: 0
MYALGIAS: 0
NAUSEA: 0
CHILLS: 1
TINGLING: 0

## 2017-12-09 ASSESSMENT — PAIN SCALES - GENERAL: PAINLEVEL_OUTOF10: 3

## 2017-12-09 NOTE — ASSESSMENT & PLAN NOTE
Patient was diagnosed with nonobstructive coronary artery disease during her last hospitalization which was complicated by a NSTEMI.  She is currently chest pain-free, continue home Lipitor and Plavix.

## 2017-12-09 NOTE — ED PROVIDER NOTES
ED Provider Note    Scribed for Oscar Acevedo M.D. by Lisa Da Silva. 12/8/2017, 6:07 PM.    Primary care provider: ANDERA Valdez  Means of arrival: walk in   History obtained from: patient   History limited by: none     CHIEF COMPLAINT  Chief Complaint   Patient presents with   • Fever   • Pyelonephritis       HPI  Aurora Tan is a 47 y.o. female who presents to the Emergency Department for evaluation of a fever with associated fatigue onset today. Patient was referred to the ED by her urologist out of concern for sepsis. Patient reports recent history of sepsis following kidney stone diagnosis in September. She has been closely followed by urology, Dr. Watson, since her sepsis diagnosis. Patient reports having a stent placed twice since September. Once for kidney stone removal and a second time for ureteral scarring. Her last urethral stent was removed 11 days ago. She denies pain with urination but reports frequent urination with associated right flank pain. Patient reports urinating multiple times every hour with very little output. She is currently taking Plavix and Aspirin. Patient denies runny nose but reports sore throat and dry cough. Patient also reports having an MI on November 3rd with cholesterol noted in her arteries but no stent placement.        REVIEW OF SYSTEMS  Review of Systems   Constitutional: Positive for fever and malaise/fatigue.   HENT: Positive for sore throat. Negative for congestion.    Respiratory: Positive for cough. Negative for sputum production.    Genitourinary: Positive for flank pain, frequency and urgency. Negative for dysuria.   All other systems reviewed and are negative.  C.       PAST MEDICAL HISTORY   has a past medical history of Heart attack and Renal disorder.      SURGICAL HISTORY   has a past surgical history that includes abdominal hysterectomy total; abdominal exploration; cystoscopy (Right, 9/17/2017); ureteroscopy (Right,  9/17/2017); lasertripsy (Right, 9/17/2017); stent placement (Right, 9/17/2017); ureteroscopy (Right, 11/7/2017); lasertripsy (Right, 11/7/2017); urethral dilatation (Right, 11/7/2017); and cystoscopy stent placement (Right, 11/7/2017).      SOCIAL HISTORY  Social History   Substance Use Topics   • Smoking status: Never Smoker   • Smokeless tobacco: Never Used   • Alcohol use Yes      Comment: socially/rare      History   Drug Use No       FAMILY HISTORY  History reviewed. No pertinent family history.      CURRENT MEDICATIONS  Home Medications     Reviewed by Rich Arambula (Pharmacy Tech) on 12/08/17 at 2003  Med List Status: Complete   Medication Last Dose Status   alprazolam (XANAX) 0.25 MG Tab 12/7/2017 Active   aspirin EC 81 MG EC tablet 12/8/2017 Active   atorvastatin (LIPITOR) 40 MG Tab 12/7/2017 Active   clopidogrel (PLAVIX) 75 MG Tab 12/8/2017 Active   estradiol (MINIVELLE) 0.05 MG/24HR PATCH BIWEEKLY 12/6/2017 Active                ALLERGIES  Allergies   Allergen Reactions   • Levofloxacin Itching         PHYSICAL EXAM  VITAL SIGNS: /105   Pulse (!) 106   Temp (!) 39.2 °C (102.5 °F) (Oral)   Resp 20   Wt 92.3 kg (203 lb 7.8 oz)   SpO2 96%   BMI 31.86 kg/m²   Vitals reviewed.  Constitutional: Well developed, Well nourished, No acute distress, Non-toxic appearance.   HENT: Normocephalic, Atraumatic, Bilateral external ears normal, Oropharynx moist, No oral exudates, Nose normal.   Eyes: PERRL, EOMI, Conjunctiva normal, No discharge.   Neck: Normal range of motion, No tenderness, Supple, No stridor.   Cardiovascular: Tachycardic, Normal rhythm, No murmurs, No rubs, No gallops.   Thorax & Lungs: Normal breath sounds, No respiratory distress, No wheezing, No chest tenderness.   Abdomen: Bowel sounds normal, Soft, No tenderness  Skin: Warm, Dry, No erythema, No rash.   Back: Right CVA tenderness.   Musculoskeletal: Good range of motion in all major joints. No edema. No tenderness to palpation or  major deformities noted.   Neurologic: Alert, Normal motor function, Normal sensory function, No focal deficits noted.   Psychiatric: Affect normal        LABS  Results for orders placed or performed during the hospital encounter of 12/08/17   Lactic acid (lactate)   Result Value Ref Range    Lactic Acid 1.1 0.5 - 2.0 mmol/L   CBC WITH DIFFERENTIAL   Result Value Ref Range    WBC 5.7 4.8 - 10.8 K/uL    RBC 4.85 4.20 - 5.40 M/uL    Hemoglobin 14.3 12.0 - 16.0 g/dL    Hematocrit 42.2 37.0 - 47.0 %    MCV 87.0 81.4 - 97.8 fL    MCH 29.5 27.0 - 33.0 pg    MCHC 33.9 33.6 - 35.0 g/dL    RDW 39.8 35.9 - 50.0 fL    Platelet Count 284 164 - 446 K/uL    MPV 10.8 9.0 - 12.9 fL    Neutrophils-Polys 78.00 (H) 44.00 - 72.00 %    Lymphocytes 11.10 (L) 22.00 - 41.00 %    Monocytes 9.40 0.00 - 13.40 %    Eosinophils 1.00 0.00 - 6.90 %    Basophils 0.30 0.00 - 1.80 %    Immature Granulocytes 0.20 0.00 - 0.90 %    Nucleated RBC 0.00 /100 WBC    Neutrophils (Absolute) 4.47 2.00 - 7.15 K/uL    Lymphs (Absolute) 0.64 (L) 1.00 - 4.80 K/uL    Monos (Absolute) 0.54 0.00 - 0.85 K/uL    Eos (Absolute) 0.06 0.00 - 0.51 K/uL    Baso (Absolute) 0.02 0.00 - 0.12 K/uL    Immature Granulocytes (abs) 0.01 0.00 - 0.11 K/uL    NRBC (Absolute) 0.00 K/uL   COMP METABOLIC PANEL   Result Value Ref Range    Sodium 135 135 - 145 mmol/L    Potassium 3.8 3.6 - 5.5 mmol/L    Chloride 104 96 - 112 mmol/L    Co2 21 20 - 33 mmol/L    Anion Gap 10.0 0.0 - 11.9    Glucose 92 65 - 99 mg/dL    Bun 9 8 - 22 mg/dL    Creatinine 1.10 0.50 - 1.40 mg/dL    Calcium 9.9 8.5 - 10.5 mg/dL    AST(SGOT) 17 12 - 45 U/L    ALT(SGPT) 16 2 - 50 U/L    Alkaline Phosphatase 80 30 - 99 U/L    Total Bilirubin 0.5 0.1 - 1.5 mg/dL    Albumin 4.8 3.2 - 4.9 g/dL    Total Protein 7.4 6.0 - 8.2 g/dL    Globulin 2.6 1.9 - 3.5 g/dL    A-G Ratio 1.8 g/dL   URINALYSIS   Result Value Ref Range    Color Yellow     Character Clear     Specific Gravity 1.008 <1.035    Ph 8.5 (A) 5.0 - 8.0    Glucose  Negative Negative mg/dL    Ketones Negative Negative mg/dL    Protein Negative Negative mg/dL    Bilirubin Negative Negative    Urobilinogen, Urine 0.2 Negative    Nitrite Negative Negative    Leukocyte Esterase Moderate (A) Negative    Occult Blood Negative Negative    Micro Urine Req Microscopic    URINE MICROSCOPIC (W/UA)   Result Value Ref Range    WBC 10-20 (A) /hpf    RBC 0-2 /hpf    Bacteria Few (A) None /hpf    Epithelial Cells Few /hpf    Hyaline Cast 0-2 /lpf   ESTIMATED GFR   Result Value Ref Range    GFR If African American >60 >60 mL/min/1.73 m 2    GFR If Non  53 (A) >60 mL/min/1.73 m 2   INFLUENZA A/B BY PCR   Result Value Ref Range    Influenza virus A RNA POSITIVE (A) Negative    Influenza virus B, PCR Negative Negative   All labs reviewed by me.        RADIOLOGY  CT-RENAL COLIC EVALUATION(A/P W/O)   Final Result      1.  Persistent moderate to severe RIGHT hydronephrosis.   2.  Wall thickening of the proximal RIGHT ureter again seen may indicate inflammation/infection.   3.  No obstructing ureteral stone identified.   4.  Normal appendix.      DX-CHEST-PORTABLE (1 VIEW)   Final Result      No acute cardiopulmonary disease.      The radiologist's interpretation of all radiological studies have been reviewed by me.      COURSE & MEDICAL DECISION MAKING  Pertinent Labs & Imaging studies reviewed. (See chart for details)    Obtained and reviewed past medical records.    6:07 PM Patient seen and examined at bedside. Ordered for CT renal, DX chest, lactic acid, urine microscopic, estimated gfr, lactic acid, CBC, CMP, urinalysis, urine culture and blood culture to evaluate. Patient will be treated with IV fluids secondary to tachycardia, Tylenol 650 mg and Rocephin 2 g for her symptoms.      7:45 PM Paged urology.     7:50 PM Spoke with Dr. Rangel, urology, who agrees to see the patient for percutaneous nephrostomy To be done in the morning.    Antibiotics.  He'll be admitted to the hospital  for continued workup and treatment.  No signs of sepsis at this time.    8:03 PM Consult with Dr. Herrera, hospitalist, who agrees to admit the patient.     8:04 PM Patient reevaluated at bedside. Discussed plan of care with the patient. She agrees to admission.       DISPOSITION:  Patient will be admitted to Dr. Herrera in guarded condition.      FINAL IMPRESSION  1. Hydronephrosis with ureteropelvic junction (UPJ) obstruction    2. Acute UTI    3. Influenza           I, Lisa Da Silva (Scribe), am scribing for, and in the presence of, Oscar Acevedo M.D..  Electronically signed by: Lisa Da Silva (Monicaibsisi), 12/8/2017  Oscar DAIGLE M.D. personally performed the services described in this documentation, as scribed by Lisa Da Silva in my presence, and it is both accurate and complete.    The note accurately reflects work and decisions made by me.  Oscar Acevedo  12/8/2017  8:29 PM

## 2017-12-09 NOTE — ASSESSMENT & PLAN NOTE
The patient has a history of obstructive uropathy with stones and has undergone previous stent placements. She may also have some type of underlying stricture.  of urology was consulted and recommended a percutaneous nephrostomy tube placement which I have ordered for the morning. I look forward to any further recommendations from him. In the meantime, I will place her on IV fluids and treat her underlying infection. She will receive symptomatic management for pain and we will monitor her urine cultures for sensitivities and speciation. She'll be kept nothing by mouth at midnight for the procedure tomorrow.

## 2017-12-09 NOTE — DISCHARGE INSTRUCTIONS
Discharge Instructions    Discharged to home by car with friend. Discharged via wheelchair, hospital escort: Yes.  Special equipment needed: Not Applicable    Be sure to schedule a follow-up appointment with your primary care doctor or any specialists as instructed.     Discharge Plan:   Diet Plan: Discussed  Activity Level: Discussed  Confirmed Follow up Appointment: Patient to Call and Schedule Appointment  Confirmed Symptoms Management: Discussed  Medication Reconciliation Updated: Yes    I understand that a diet low in cholesterol, fat, and sodium is recommended for good health. Unless I have been given specific instructions below for another diet, I accept this instruction as my diet prescription.   Other diet: Regular    Special Instructions:   1600 Kcal cardiac diet.   Gradual increase in activity.   Follow up with Dr. CYNTHIA Thacker (PCP) 2-3 wks, Dr. JUANA Eid () in 2-3 days.     · Is patient discharged on Warfarin / Coumadin?   No     · Is patient Post Blood Transfusion?  No    Depression / Suicide Risk    As [facility] are [facility], it is important to learn how to keep safe from harming yourself.    Recognize the warning signs:  · Abrupt changes in personality, positive or negative- including increase in energy   · Giving away possessions  · Change in eating patterns- significant weight changes-  positive or negative  · Change in sleeping patterns- unable to sleep or sleeping all the time   · Unwillingness or inability to communicate  · Depression  · Unusual sadness, discouragement and loneliness  · Talk of wanting to die  · Neglect of personal appearance   · Rebelliousness- reckless behavior  · Withdrawal from people/activities they love  · Confusion- inability to concentrate     If you or a loved one observes any of these behaviors or has concerns about self-harm, here's what you can do:  · Talk about it- your feelings and reasons for harming yourself  · Remove any means  "that you might use to hurt yourself (examples: pills, rope, extension cords, firearm)  · Get professional help from the community (Mental Health, Substance Abuse, psychological counseling)  · Do not be alone:Call your Safe Contact- someone whom you trust who will be there for you.  · Call your local CRISIS HOTLINE 535-6022 or 020-205-8244  · Call your local Children's Mobile Crisis Response Team [place] (927) 714-1812 or wwwVisiprise  · Call the toll free National Suicide Prevention Hotlines   · National Suicide Prevention Lifeline 995-088-BNNG (0379)  · National Hope Line Network 800-SUICIDE (154-8616)      Influenza A (H1N1)  H1N1 formerly called \"swine flu\" is a new influenza virus causing sickness in people. The H1N1 virus is different from seasonal influenza viruses. However, the H1N1 symptoms are similar to seasonal influenza and it is spread from person to person. You may be at higher risk for serious problems if you have underlying serious medical conditions. The CDC and the World Health Organization are following reported cases around the world.  CAUSES   · The flu is thought to spread mainly person-to-person through coughing or sneezing of infected people.  · A person may become infected by touching something with the virus on it and then touching their mouth or nose.  SYMPTOMS   · Fever.  · Headache.  · Tiredness.  · Cough.  · Sore throat.  · Runny or stuffy nose.  · Body aches.  · Diarrhea and vomiting  These symptoms are referred to as \"flu-like symptoms.\" A lot of different illnesses, including the common cold, may have similar symptoms.  DIAGNOSIS   · There are tests that can tell if you have the H1N1 virus.  · Confirmed cases of H1N1 will be reported to the state or local health department.  · A doctor's exam may be needed to tell whether you have an infection that is a complication of the flu.  HOME CARE INSTRUCTIONS   · Stay informed. Visit the CDC website for current recommendations. " Visit www.cdc.gov/G7N6ooy/. You may also call 1-830-ZUR-INFO (1-432.102.1960).  · Get help early if you develop any of the above symptoms.  · If you are at high risk from complications of the flu, talk to your caregiver as soon as you develop flu-like symptoms. Those at higher risk for complications include:  · People 65 years or older.  · People with chronic medical conditions.  · Pregnant women.  · Young children.  · Your caregiver may recommend antiviral medicine to help treat the flu.  · If you get the flu, get plenty of rest, drink enough water and fluids to keep your urine clear or pale yellow, and avoid using alcohol or tobacco.  · You may take over-the-counter medicine to relieve the symptoms of the flu if your caregiver approves. (Never give aspirin to children or teenagers who have flu-like symptoms, particularly fever).  TREATMENT   If you do get sick, antiviral drugs are available. These drugs can make your illness milder and make you feel better faster. Treatment should start soon after illness starts. It is only effective if taken within the first day of becoming ill. Only your caregiver can prescribe antiviral medication.   PREVENTION   · Cover your nose and mouth with a tissue or your arm when you cough or sneeze. Throw the tissue away.  · Wash your hands often with soap and warm water, especially after you cough or sneeze. Alcohol-based  are also effective against germs.  · Avoid touching your eyes, nose or mouth. This is one way germs spread.  · Try to avoid contact with sick people. Follow public health advice regarding school closures. Avoid crowds.  · Stay home if you get sick. Limit contact with others to keep from infecting them. People infected with the H1N1 virus may be able to infect others anywhere from 1 day before feeling sick to 5-7 days after getting flu symptoms.  · An H1N1 vaccine is available to help protect against the virus. In addition to the H1N1 vaccine, you will need  to be vaccinated for seasonal influenza. The H1N1 and seasonal vaccines may be given on the same day. The CDC especially recommends the H1N1 vaccine for:  · Pregnant women.  · People who live with or care for children younger than 6 months of age.  · Health care and emergency services personnel.  · Persons between the ages of 6 months through 24 years of age.  · People from ages 25 through 64 years who are at higher risk for H1N1 because of chronic health disorders or immune system problems.  FACEMASKS  In community and home settings, the use of facemasks and N95 respirators are not normally recommended. In certain circumstances, a facemask or N95 respirator may be used for persons at increased risk of severe illness from influenza. Your caregiver can give additional recommendations for facemask use.  IN CHILDREN, EMERGENCY WARNING SIGNS THAT NEED URGENT MEDICAL CARE:  · Fast breathing or trouble breathing.  · Bluish skin color.  · Not drinking enough fluids.  · Not waking up or not interacting normally.  · Being so fussy that the child does not want to be held.  · Your child has an oral temperature above 102° F (38.9° C), not controlled by medicine.  · Your baby is older than 3 months with a rectal temperature of 102° F (38.9° C) or higher.  · Your baby is 3 months old or younger with a rectal temperature of 100.4° F (38° C) or higher.  · Flu-like symptoms improve but then return with fever and worse cough.  IN ADULTS, EMERGENCY WARNING SIGNS THAT NEED URGENT MEDICAL CARE:  · Difficulty breathing or shortness of breath.  · Pain or pressure in the chest or abdomen.  · Sudden dizziness.  · Confusion.  · Severe or persistent vomiting.  · Bluish color.  · You have a oral temperature above 102° F (38.9° C), not controlled by medicine.  · Flu-like symptoms improve but return with fever and worse cough.  SEEK IMMEDIATE MEDICAL CARE IF:   You or someone you know is experiencing any of the above symptoms. When you arrive at  "the emergency center, report that you think you have the flu. You may be asked to wear a mask and/or sit in a secluded area to protect others from getting sick.  MAKE SURE YOU:   · Understand these instructions.  · Will watch your condition.  · Will get help right away if you are not doing well or get worse.  Some of this information courtesy of the CDC.   Document Released: 06/05/2009 Document Revised: 03/11/2013 Document Reviewed: 06/05/2009  ExitCare® Patient Information ©2014 [employer].    Influenza Facts  Flu (influenza) is a contagious respiratory illness caused by the influenza viruses. It can cause mild to severe illness. While most healthy people recover from the flu without specific treatment and without complications, older people, young children, and people with certain health conditions are at higher risk for serious complications from the flu, including death.  CAUSES   · The flu virus is spread from person to person by respiratory droplets from coughing and sneezing.   · A person can also become infected by touching an object or surface with a virus on it and then touching their mouth, eye or nose.   · Adults may be able to infect others from 1 day before symptoms occur and up to 7 days after getting sick. So it is possible to give someone the flu even before you know you are sick and continue to infect others while you are sick.   SYMPTOMS   · Fever (usually high).   · Headache.   · Tiredness (can be extreme).   · Cough.   · Sore throat.   · Runny or stuffy nose.   · Body aches.   · Diarrhea and vomiting may also occur, particularly in children.   · These symptoms are referred to as \"flu-like symptoms\". A lot of different illnesses, including the common cold, can have similar symptoms.   DIAGNOSIS   · There are tests that can determine if you have the flu as long you are tested within the first 2 or 3 days of illness.   · A doctor's exam and additional tests may be needed to identify if you have " a disease that is a complicating the flu.   RISKS AND COMPLICATIONS   Some of the complications caused by the flu include:  · Bacterial pneumonia or progressive pneumonia caused by the flu virus.   · Loss of body fluids (dehydration).   · Worsening of chronic medical conditions, such as heart failure, asthma, or diabetes.   · Sinus problems and ear infections.   HOME CARE INSTRUCTIONS   · Seek medical care early on.   · If you are at high risk from complications of the flu, consult your health-care provider as soon as you develop flu-like symptoms. Those at high risk for complications include:   · People 65 years or older.   · People with chronic medical conditions, including diabetes.   · Pregnant women.   · Young children.   · Your caregiver may recommend use of an antiviral medication to help treat the flu.   · If you get the flu, get plenty of rest, drink a lot of liquids, and avoid using alcohol and tobacco.   · You can take over-the-counter medications to relieve the symptoms of the flu if your caregiver approves. (Never give aspirin to children or teenagers who have flu-like symptoms, particularly fever).   PREVENTION   The single best way to prevent the flu is to get a flu vaccine each fall. Other measures that can help protect against the flu are:  · Antiviral Medications   · A number of antiviral drugs are approved for use in preventing the flu. These are prescription medications, and a doctor should be consulted before they are used.   · Habits for Good Health   · Cover your nose and mouth with a tissue when you cough or sneeze, throw the tissue away after you use it.   · Wash your hands often with soap and water, especially after you cough or sneeze. If you are not near water, use an alcohol-based hand .   · Avoid people who are sick.   · If you get the flu, stay home from work or school. Avoid contact with other people so that you do not make them sick, too.   · Try not to touch your eyes, nose,  or mouth as germs ore often spread this way.   IN CHILDREN, EMERGENCY WARNING SIGNS THAT NEED URGENT MEDICAL ATTENTION:  · Fast breathing or trouble breathing.   · Bluish skin color.   · Not drinking enough fluids.   · Not waking up or not interacting.   · Being so irritable that the child does not want to be held.   · Flu-like symptoms improve but then return with fever and worse cough.   · Fever with a rash.   IN ADULTS, EMERGENCY WARNING SIGNS THAT NEED URGENT MEDICAL ATTENTION:  · Difficulty breathing or shortness of breath.   · Pain or pressure in the chest or abdomen.   · Sudden dizziness.   · Confusion.   · Severe or persistent vomiting.   SEEK IMMEDIATE MEDICAL CARE IF:   You or someone you know is experiencing any of the symptoms above. When you arrive at the emergency center,report that you think you have the flu. You may be asked to wear a mask and/or sit in a secluded area to protect others from getting sick.  MAKE SURE YOU:   · Understand these instructions.   · Monitor your condition.   · Seek medical care if you are getting worse, or not improving.   Document Released: 12/20/2004 Document Revised: 03/11/2013 Document Reviewed: 09/16/2010  ExitCare® Patient Information ©2013 [employer].

## 2017-12-09 NOTE — PROGRESS NOTES
Report received from ER.  Assessment complete.  A&O x 4. Patient calls appropriately.  Denies numbness and tingling. Moves extremities.   Patient up with self. Steady gait.   Patient has 7/10 pain. MAR medication given.  Denies N&V. NPO at midnight.  + void  Patient denies SOB.  Review plan with of care with patient. Call light and personal belongings with in reach. Hourly rounding in place. All needs met at this time.

## 2017-12-09 NOTE — ED NOTES
Pt brought back from lobby.     C/o fever and increased weakness. Pt states hx of stent placement to R kidney in Sept and than again in Nov 2017. Pt states last stent was removed 11 days ago. Pt states she called her urologist and was told to come to ED for sepsis workup. Pt states she last took tylenol at noon today for fever.     Pt also c/o R flank pain and urinary urgency. A/o x4, speaking in full sentences.

## 2017-12-09 NOTE — ED NOTES
Pt greeted at change of shift, breathing even and unlabored, pt denies significant pain or discomfort at time of assessment. Updated with poc.

## 2017-12-09 NOTE — H&P
" Hospital Medicine History and Physical    Date of Service  12/8/2017    Chief Complaint  Chief Complaint   Patient presents with   • Fever   • Pyelonephritis       History of Presenting Illness  47 y.o. female who presented on 12/8/2017 with Back pain and fever. The patient carries a history of known kidney stone and was hospitalized one month ago for stent placement. Her hospitalization at that time had been uncomplicated by a NSTEMI and subsequent diagnosis of nonobstructive cardiomyopathy. Patient reports that yesterday, she has had right lower back pain and subjective fever. She's had decreased energy, malaise and fatigue. She tried to take all of yesterday off to recover and then attempted to go back to work today but just could not \"last\". She's had a dry nonproductive cough with congestion and shortness of breath. She states that she did not receive a flu shot this year. Upon assessment in the ER, the patient is noted to have right-sided hydronephrosis with a positive urinalysis. Additionally, she is influenza positive.        Primary Care Physician  MICHAELA ValdezRSTEPHANIE.    Consultants  Dr. Rangel    Code Status  Full    Review of Systems  Review of Systems   Constitutional: Positive for chills, fever and malaise/fatigue.   HENT: Negative for congestion and sore throat.    Eyes: Negative for photophobia.   Respiratory: Negative for cough, shortness of breath and wheezing.    Cardiovascular: Negative for chest pain and palpitations.   Gastrointestinal: Negative for abdominal pain, diarrhea, nausea and vomiting.   Genitourinary: Positive for flank pain. Negative for dysuria.   Musculoskeletal: Negative for myalgias.   Skin: Negative.    Neurological: Negative for dizziness, tingling, focal weakness and headaches.   Psychiatric/Behavioral: Negative for depression and suicidal ideas.        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       Surgical History  Past Surgical History:   Procedure Laterality Date   • URETEROSCOPY Right 11/7/2017    Procedure: URETEROSCOPY;  Surgeon: Kiet Eid M.D.;  Location: SURGERY [facility];  Service: Urology   • LASERTRIPSY Right 11/7/2017    Procedure: LASERTRIPSY- LITHO;  Surgeon: Kiet Eid M.D.;  Location: SURGERY [facility];  Service: Urology   • URETHRAL DILATATION Right 11/7/2017    Procedure: URETHRAL DILATATION;  Surgeon: Kiet Eid M.D.;  Location: SURGERY [facility];  Service: Urology   • CYSTOSCOPY STENT PLACEMENT Right 11/7/2017    Procedure: CYSTOSCOPY STENT PLACEMENT;  Surgeon: Kiet Eid M.D.;  Location: SURGERY [facility];  Service: Urology   • CYSTOSCOPY Right 9/17/2017    Procedure: CYSTOSCOPY;  Surgeon: Kiet Eid M.D.;  Location: SURGERY [facility];  Service: Urology   • URETEROSCOPY Right 9/17/2017    Procedure: URETEROSCOPY;  Surgeon: Kiet Eid M.D.;  Location: SURGERY [facility];  Service: Urology   • LASERTRIPSY Right 9/17/2017    Procedure: LASERTRIPSY;  Surgeon: Kiet Eid M.D.;  Location: SURGERY [facility];  Service: Urology   • STENT PLACEMENT Right 9/17/2017    Procedure: STENT PLACEMENT;  Surgeon: Kiet Eid M.D.;  Location: SURGERY [facility];  Service: Urology   • ABDOMINAL EXPLORATION     • ABDOMINAL HYSTERECTOMY TOTAL         Medications  No current facility-administered medications on file prior to encounter.      Current Outpatient Prescriptions on File Prior to Encounter   Medication Sig Dispense Refill   • alprazolam (XANAX) 0.25 MG Tab Take 1-2 Tabs by mouth at bedtime as needed for Sleep. 30 Tab 0   • aspirin EC 81 MG EC tablet Take 1 Tab by mouth every day. 30 Tab 3   • atorvastatin (LIPITOR) 40 MG Tab Take 1 Tab by mouth every bedtime. 30 Tab 3   • clopidogrel (PLAVIX) 75 MG Tab Take 1 Tab by mouth every day. 30 Tab 3   •  estradiol (MINIVELLE) 0.05 MG/24HR PATCH BIWEEKLY Apply 1 Patch to skin as directed every Wednesday.         Family History  History reviewed. No pertinent family history.    Social History  Social History   Substance Use Topics   • Smoking status: Never Smoker   • Smokeless tobacco: Never Used   • Alcohol use Yes      Comment: socially/rare       Allergies  Allergies   Allergen Reactions   • Levofloxacin Itching        Physical Exam  Laboratory   Hemodynamics  Temp (24hrs), Av.2 °C (102.5 °F), Min:39.2 °C (102.5 °F), Max:39.2 °C (102.5 °F)   Temperature: (!) 39.2 °C (102.5 °F)  Pulse  Av  Min: 92  Max: 106 Heart Rate (Monitored): 92  Blood Pressure: 144/105, NIBP: 143/87      Respiratory      Respiration: 16, Pulse Oximetry: 99 %             Physical Exam   Constitutional: She is oriented to person, place, and time. No distress.   HENT:   Head: Normocephalic and atraumatic.   Right Ear: External ear normal.   Left Ear: External ear normal.   Eyes: EOM are normal. Right eye exhibits no discharge. Left eye exhibits no discharge.   Neck: Neck supple. No JVD present.   Cardiovascular: Normal rate, regular rhythm and normal heart sounds.    Pulmonary/Chest: Effort normal and breath sounds normal. No respiratory distress. She exhibits no tenderness.   Abdominal: Soft. Bowel sounds are normal. She exhibits no distension. There is no tenderness.   Musculoskeletal: Normal range of motion. She exhibits no edema.   Neurological: She is alert and oriented to person, place, and time. No cranial nerve deficit.   Skin: Skin is warm and dry. She is not diaphoretic. No erythema.   Psychiatric: She has a normal mood and affect. Her behavior is normal.   Nursing note and vitals reviewed.    Capillary refill less than 3 seconds, distal pulses intact    Recent Labs      17   1750   WBC  5.7   RBC  4.85   HEMOGLOBIN  14.3   HEMATOCRIT  42.2   MCV  87.0   MCH  29.5   MCHC  33.9   RDW  39.8   PLATELETCT  284   MPV  10.8      Recent Labs      12/08/17   1750   SODIUM  135   POTASSIUM  3.8   CHLORIDE  104   CO2  21   GLUCOSE  92   BUN  9   CREATININE  1.10   CALCIUM  9.9     Recent Labs      12/08/17   1750   ALTSGPT  16   ASTSGOT  17   ALKPHOSPHAT  80   TBILIRUBIN  0.5   GLUCOSE  92                 Lab Results   Component Value Date    TROPONINI 0.55 (H) 11/04/2017       Imaging  Ct-renal Colic Evaluation(a/p W/o)    Result Date: 12/8/2017 12/8/2017 6:47 PM HISTORY/REASON FOR EXAM:  FEVER PYELONEPHRITIS. TECHNIQUE/EXAM DESCRIPTION AND NUMBER OF VIEWS: CT scan renal/colic without contrast. 5 mm helical images of the abdomen and pelvis were obtained from the diaphragmatic domes through the pubic symphysis. Low dose optimization technique was utilized for this CT exam including automated exposure control and adjustment of the mA and/or kV according to patient size. COMPARISON: 11/6/2017 FINDINGS: The liver is unremarkable. The spleen is unremarkable. Adrenal glands are unremarkable. The pancreas is unremarkable. The gallbladder is unremarkable. LEFT kidney is unremarkable. RIGHT kidney again shows moderate hydronephrosis with dilation and wall thickening of the proximal ureter.  No obstructing stone identified. Bladder is unremarkable. Appendix has a normal appearance. Sparse colonic diverticula. No peritoneal fluid or pneumoperitoneum. Abdominal aorta is unremarkable. No major bony abnormality is seen.     1.  Persistent moderate to severe RIGHT hydronephrosis. 2.  Wall thickening of the proximal RIGHT ureter again seen may indicate inflammation/infection. 3.  No obstructing ureteral stone identified. 4.  Normal appendix.    [id number] View    Result Date: 11/21/2017 11/21/2017 8:28 AM HISTORY/REASON FOR EXAM:  Ureteral stone.  History of ureteral strictures. TECHNIQUE/EXAM DESCRIPTION AND NUMBER OF VIEWS:  1 view(s) of the abdomen. COMPARISON: 9/28/2017 FINDINGS: RIGHT ureteral stent again present. Mildly increased colonic stool.  No evidence for small bowel obstruction. No definite renal or ureteral stone. Calcified phleboliths in the pelvis.     1.  RIGHT ureteral stent in place. 2.  No definite renal or ureteral stone.    Dx-chest-portable (1 View)    Result Date: 12/8/2017 12/8/2017 6:04 PM HISTORY/REASON FOR EXAM:  Possible sepsis. TECHNIQUE/EXAM DESCRIPTION AND NUMBER OF VIEWS: Single portable view of the chest. COMPARISON: 11/5/2017 FINDINGS: Cardiomediastinal contour is within normal limits. No focal pulmonary consolidation. No pleural fluid collection or pneumothorax. No major bony abnormality is seen.     No acute cardiopulmonary disease.     Assessment/Plan     Anticipate that patient will needGreater than 2 midnights for management of the discussed medical issues.    This dictation was created using voice recognition software. The accuracy of the dictation is limited to the abilities of the software. I expect there may be some errors of grammar and possibly content.    * Obstructive uropathy- (present on admission)   Assessment & Plan    The patient has a history of obstructive uropathy with stones and has undergone previous stent placements. She may also have some type of underlying stricture.  of urology was consulted and recommended a percutaneous nephrostomy tube placement which I have ordered for the morning. I look forward to any further recommendations from him. In the meantime, I will place her on IV fluids and treat her underlying infection. She will receive symptomatic management for pain and we will monitor her urine cultures for sensitivities and speciation. She'll be kept nothing by mouth at midnight for the procedure tomorrow.         UTI (urinary tract infection)- (present on admission)   Assessment & Plan    Treatment as noted above        Influenza   Assessment & Plan    Patient did not get a flu shot this year, I have started her on Tamiflu.        CAD (coronary artery disease)- (present on admission)    Assessment & Plan    Patient was diagnosed with nonobstructive coronary artery disease during her last hospitalization which was complicated by a NSTEMI.  She is currently chest pain-free, continue home Lipitor and Plavix.          Prophylaxis: Sequential compression devices for DVT prophylaxis, no PPI indicated, stool softeners ordered

## 2017-12-09 NOTE — DISCHARGE SUMMARY
"CHIEF COMPLAINT ON ADMISSION  Chief Complaint   Patient presents with   • Fever   • Pyelonephritis       CODE STATUS  Full Code    HPI & HOSPITAL COURSE  47 y.o. female who presented on 12/8/2017 with Back pain and fever. The patient carries a history of known kidney stone and was hospitalized one month ago for stent placement. Her hospitalization at that time had been uncomplicated by a NSTEMI and subsequent diagnosis of nonobstructive cardiomyopathy. Patient reports that yesterday, she has had right lower back pain and subjective fever. She's had decreased energy, malaise and fatigue. She tried to take all of yesterday off to recover and then attempted to go back to work today but just could not \"last\". She's had a dry nonproductive cough with congestion and shortness of breath. She states that she did not receive a flu shot this year. Upon assessment in the ER, the patient is noted to have right-sided hydronephrosis with a positive urinalysis. Additionally, she is influenza positive.    Pt went to IR suite for nephrostomy tube placement this AM.  However, because she had been on Plavix and ASA the procedure was deferred due to high risk of bleeding.  It was recommended that she'd be off of Plavix for 5-7 days before any surgical procedure.  As a consequence she requested that this procedure be deferred to outpatient after she's been off Plavix.  UA indicated infection.  UCX and blood cultures were drawn.  She was started on ceftriaxone.  She also was found to be positive for influenza A.  Tamiflu was initiated.  At her insistence we will discharge her home c PO antibiotic and follow up c here urologist, Dr. JUANA Eid in 2-3 days for possible surgical intervention.     Therefore, she is discharged in stable condition with close outpatient follow-up.    SPECIFIC OUTPATIENT FOLLOW-UP  Dr. JUANA Eid () in 2-3 days.    DISCHARGE PROBLEM LIST  Principal Problem:    Obstructive uropathy POA: Yes  Active " Problems:    UTI (urinary tract infection) POA: Yes    Influenza POA: Unknown    CAD (coronary artery disease) POA: Yes    Insomnia disorder POA: Unknown  Resolved Problems:    * No resolved hospital problems. *      FOLLOW UP  Future Appointments  Date Time Provider Department Center   12/11/2017 7:00 AM [facility]   12/11/2017 3:30 PM [facility] EDUCATION RM [facility]   12/11/2017 4:30 PM ICR RN [facility]   12/13/2017 3:30 PM [facility] EDUCATION RM [facility]   12/13/2017 4:30 PM ICR RN [facility]   12/15/2017 3:30 PM [facility] EDUCATION RM [facility]   12/15/2017 4:30 PM ICR RN [facility]   12/18/2017 3:30 PM [facility] EDUCATION RM [facility]   12/18/2017 4:30 PM ICR RN [facility]   12/20/2017 3:30 PM ICR RD [facility]   12/20/2017 4:30 PM ICR RN [facility]   12/22/2017 3:30 PM ICR EDUCATION RM [facility]CR [facility]   12/22/2017 4:30 PM ICR RN [facility]CR [facility]   12/27/2017 3:30 PM I[facility] EDUCATION RM [facility]   12/27/2017 4:30 PM ICR RN [facility]   12/29/2017 3:30 PM ICR EDUCATION RM [facility]CR [facility]   12/29/2017 4:30 PM ICR RN [facility]CR [facility]     CYNTHIA Shaver (PCP) 2-3 wks.      MEDICATIONS ON DISCHARGE   Aurora Tan   Home Medication Instructions RADHA:[id number]    Printed on:12/09/17 1032   Medication Information                      acetaminophen (TYLENOL) 325 MG Tab  Take 2 Tabs by mouth every 6 hours as needed for Fever or Moderate Pain (Temp >101.5F).             alprazolam (XANAX) 0.25 MG Tab  Take 1-2 Tabs by mouth at bedtime as needed for Sleep.             aspirin EC 81 MG EC tablet  Take 1 Tab by mouth every day.             atorvastatin (LIPITOR) 40 MG Tab  Take 1 Tab by mouth every bedtime.             clopidogrel (PLAVIX) 75 MG Tab  Take 1 Tab by mouth every day.             estradiol (MINIVELLE) 0.05 MG/24HR PATCH BIWEEKLY  Apply 1 Patch to skin as directed every Wednesday.             oseltamivir (TAMIFLU) 75 MG Cap  Take 1 Cap by mouth every 12 hours.                Cefdinir 250 mg/5mL PO BID x 10 days.      DIET  1600 Kcal cardiac diet.    ACTIVITY  Gradual increase in activity.      CONSULTATIONS  Dr. NORA Rangel -    PROCEDURES  CT-RENAL COLIC EVALUATION(A/P W/O)   Final Result      1.  Persistent moderate to severe RIGHT hydronephrosis.   2.  Wall thickening of the proximal RIGHT ureter again seen may indicate inflammation/infection.   3.  No obstructing ureteral stone identified.   4.  Normal appendix.      DX-CHEST-PORTABLE (1 VIEW)   Final Result      No acute cardiopulmonary disease.      IR-NEPHROSTOGRAM W/ NEW TUBE PLACEMENT (ALL RADIOLOGY) RIGHT    (Results Pending)         LABORATORY  Lab Results   Component Value Date/Time    SODIUM 138 12/09/2017 05:48 AM    POTASSIUM 3.8 12/09/2017 05:48 AM    CHLORIDE 109 12/09/2017 05:48 AM    CO2 20 12/09/2017 05:48 AM    GLUCOSE 95 12/09/2017 05:48 AM    BUN 8 12/09/2017 05:48 AM    CREATININE 1.04 12/09/2017 05:48 AM        Lab Results   Component Value Date/Time    WBC 4.3 (L) 12/09/2017 05:48 AM    HEMOGLOBIN 12.8 12/09/2017 05:48 AM    HEMATOCRIT 37.6 12/09/2017 05:48 AM    PLATELETCT 241 12/09/2017 05:48 AM        Total time of the discharge process exceeds 35 min.

## 2017-12-09 NOTE — ED NOTES
Med rec updated and complete.  Allergies reviewed.  Met with pt at bedside and dicussed  Current   Medications and last doses taken.  Pt stated that she only applied her estradiol  Once a week.  Pt denies antibiotic use in last 30 days.

## 2017-12-09 NOTE — PROGRESS NOTES
Following a long discussion of the risks percutaneous nephrostomy tube placement with the patient, the patient has elected to delay placement of a percutaneous nephrostomy tube until her Plavix has been held for the requisite 5 days to reduce the risk of bleeding complications. The procedure can be performed on outpatient basis this coming week. I felt that given her chronic ongoing obstruction and lack of active infection related to her obstruction, this was a safe and appropriate course of action. I recommended that she continue taking aspirin. This plan was discussed with Dr. Tristan on 12/9/2017 at 0951 hours. Dr. Rangel was also notified of this plan.

## 2017-12-09 NOTE — CARE PLAN
Problem: Pain Management  Goal: Pain level will decrease to patient's comfort goal  Outcome: PROGRESSING AS EXPECTED  Pt declines pain at this time. Will continue to monitor for any nonverbal/verbal signs of pain.       Problem: Safety  Goal: Will remain free from injury  Outcome: PROGRESSING AS EXPECTED  No injuries or falls at this time. Will continue to reinforce safety precautions at this time.       Problem: Infection  Goal: Will remain free from infection  Outcome: PROGRESSING AS EXPECTED  No signs of infection at this time. Pt afebrile. Will continue to monitor for S+S of infection.

## 2017-12-09 NOTE — ED NOTES
Pt to triage after being sent by urology for possible sepsis. Pt with fever and weakness. Pt states she had been treated for infected kidney stone. Charge RN aware of pt

## 2017-12-09 NOTE — PROGRESS NOTES
Discussed D/C instructions with patient and family. No further question at this time. Removed PIV. Pt to follow up with Dr. Eid in 2-3 days.

## 2017-12-10 LAB
BACTERIA UR CULT: NORMAL
SIGNIFICANT IND 70042: NORMAL
SITE SITE: NORMAL
SOURCE SOURCE: NORMAL

## 2017-12-11 ENCOUNTER — TELEPHONE (OUTPATIENT)
Dept: CARDIOLOGY | Facility: MEDICAL CENTER | Age: 47
End: 2017-12-11

## 2017-12-11 NOTE — LETTER
PROCEDURE/SURGERY CLEARANCE FORM      Encounter Date: 12/11/2017    Patient: Aurora Tan  YOB: 1970    CARDIOLOGIST:  Dr. Hernandez    REFERRING DOCTOR:  Dr. Eid  [facility] Fax #[phone]    The patient may proceed to planned surgical intervention with a low (<1%) risk of bj-procedural cardiovascular events.     OK to hold plavix per urology and resume following procedure.                                 MD Signature   Thony Hernandez MD

## 2017-12-11 NOTE — TELEPHONE ENCOUNTER
S/w Bryanna at [facility]. States that while pt was in hospital she was told to hold Plavix for 5 days prior to percutaneous nephrostomy tube. Per Bryanna, pt has been off plavix since 12/9 per Dr. Geronimo Lazaro's recommendation. They are unable to officially schedule pt until clearance is received from Dr. Hernandez. Educated that I will follow up tomorrow once TW is back in the office.     Dr. Trevino. Please advise on clearance. Thank you.     ----- Message from Kamala Ritchie sent at 12/11/2017  2:21 PM PST -----  Regarding: [facility] needs call back asap about clearance  CHING/Lou    Please call Bryanna at [facility] at [phone] asap. Their fax number is [phone]. Patient is scheduled for a procedure on 12/14 and they need to get clearance for the patient to stop Plavix.

## 2017-12-12 ENCOUNTER — PATIENT OUTREACH (OUTPATIENT)
Dept: HEALTH INFORMATION MANAGEMENT | Facility: OTHER | Age: 47
End: 2017-12-12

## 2017-12-12 ENCOUNTER — HOSPITAL ENCOUNTER (OUTPATIENT)
Facility: MEDICAL CENTER | Age: 47
End: 2017-12-12
Attending: UROLOGY | Admitting: UROLOGY
Payer: COMMERCIAL

## 2017-12-12 NOTE — PROGRESS NOTES
12/12/2017 0935 - Discharge Outreach - received call from patient - expressing frustration about her status with her kidney and lack of response from her urologist office. States she is afraid of losing her kidney. Allowed to vent and suggested she call urologist back and try to talk with [facility]. If unable to talk with [facility] or MD, to return to ED.

## 2017-12-12 NOTE — TELEPHONE ENCOUNTER
The patient may proceed to planned surgical intervention with a low (<1%) risk of bj-procedural cardiovascular events.    OK to hold plavix per urology and resume following procedure.    Thx

## 2017-12-12 NOTE — TELEPHONE ENCOUNTER
Clearance letter faxed to [facility] @[phone] attn: Bryanna.    Cath karina, Zabrina, notified of note and Dr. Hernandez's recommendation.

## 2017-12-13 ENCOUNTER — HOSPITAL ENCOUNTER (OUTPATIENT)
Dept: CARDIOLOGY | Facility: MEDICAL CENTER | Age: 47
End: 2017-12-13
Attending: UROLOGY | Admitting: UROLOGY
Payer: COMMERCIAL

## 2017-12-13 VITALS — WEIGHT: 200 LBS | BODY MASS INDEX: 31.39 KG/M2 | HEIGHT: 67 IN

## 2017-12-14 ENCOUNTER — APPOINTMENT (OUTPATIENT)
Dept: RADIOLOGY | Facility: MEDICAL CENTER | Age: 47
End: 2017-12-14
Attending: UROLOGY
Payer: COMMERCIAL

## 2017-12-14 ENCOUNTER — HOSPITAL ENCOUNTER (OUTPATIENT)
Facility: MEDICAL CENTER | Age: 47
End: 2017-12-14
Attending: UROLOGY | Admitting: UROLOGY
Payer: COMMERCIAL

## 2017-12-14 VITALS
HEART RATE: 68 BPM | DIASTOLIC BLOOD PRESSURE: 82 MMHG | BODY MASS INDEX: 31.39 KG/M2 | RESPIRATION RATE: 15 BRPM | SYSTOLIC BLOOD PRESSURE: 149 MMHG | HEIGHT: 67 IN | WEIGHT: 200 LBS | OXYGEN SATURATION: 99 %

## 2017-12-14 DIAGNOSIS — N13.39 OTHER HYDRONEPHROSIS: ICD-10-CM

## 2017-12-14 LAB
APPEARANCE FLD: CLEAR
BODY FLD TYPE: NORMAL
COLOR FLD: NORMAL
CSF COMMENTS 1658: NORMAL
CYTOLOGY REG CYTOL: NORMAL
EOSINOPHIL NFR FLD: 3 %
LYMPHOCYTES NFR FLD: 9 %
MONONUC CELLS NFR FLD: 7 %
NEUTROPHILS NFR FLD: 81 %
RBC # FLD: 0 CELLS/UL
WBC # FLD: 101 CELLS/UL

## 2017-12-14 PROCEDURE — 700111 HCHG RX REV CODE 636 W/ 250 OVERRIDE (IP)

## 2017-12-14 PROCEDURE — 700101 HCHG RX REV CODE 250

## 2017-12-14 PROCEDURE — 89051 BODY FLUID CELL COUNT: CPT

## 2017-12-14 PROCEDURE — 87205 SMEAR GRAM STAIN: CPT

## 2017-12-14 PROCEDURE — C1729 CATH, DRAINAGE: HCPCS

## 2017-12-14 PROCEDURE — 160002 HCHG RECOVERY MINUTES (STAT)

## 2017-12-14 PROCEDURE — 87086 URINE CULTURE/COLONY COUNT: CPT

## 2017-12-14 PROCEDURE — 88112 CYTOPATH CELL ENHANCE TECH: CPT

## 2017-12-14 RX ORDER — MIDAZOLAM HYDROCHLORIDE 1 MG/ML
INJECTION INTRAMUSCULAR; INTRAVENOUS
Status: COMPLETED
Start: 2017-12-14 | End: 2017-12-14

## 2017-12-14 RX ORDER — MIDAZOLAM HYDROCHLORIDE 1 MG/ML
.5-2 INJECTION INTRAMUSCULAR; INTRAVENOUS PRN
Status: DISCONTINUED | OUTPATIENT
Start: 2017-12-14 | End: 2017-12-14 | Stop reason: HOSPADM

## 2017-12-14 RX ORDER — CLINDAMYCIN PHOSPHATE 600 MG/50ML
600 INJECTION, SOLUTION INTRAVENOUS ONCE
Status: COMPLETED | OUTPATIENT
Start: 2017-12-14 | End: 2017-12-14

## 2017-12-14 RX ORDER — SODIUM CHLORIDE 9 MG/ML
500 INJECTION, SOLUTION INTRAVENOUS
Status: DISCONTINUED | OUTPATIENT
Start: 2017-12-14 | End: 2017-12-14 | Stop reason: HOSPADM

## 2017-12-14 RX ORDER — CLINDAMYCIN PHOSPHATE 150 MG/ML
INJECTION, SOLUTION INTRAVENOUS
Status: COMPLETED
Start: 2017-12-14 | End: 2017-12-14

## 2017-12-14 RX ORDER — OXYCODONE HYDROCHLORIDE 5 MG/1
10 TABLET ORAL
Status: DISCONTINUED | OUTPATIENT
Start: 2017-12-14 | End: 2017-12-14 | Stop reason: HOSPADM

## 2017-12-14 RX ORDER — OXYCODONE HYDROCHLORIDE 5 MG/1
5 TABLET ORAL
Status: DISCONTINUED | OUTPATIENT
Start: 2017-12-14 | End: 2017-12-14 | Stop reason: HOSPADM

## 2017-12-14 RX ORDER — ONDANSETRON 2 MG/ML
4 INJECTION INTRAMUSCULAR; INTRAVENOUS PRN
Status: DISCONTINUED | OUTPATIENT
Start: 2017-12-14 | End: 2017-12-14 | Stop reason: HOSPADM

## 2017-12-14 RX ORDER — HYDROMORPHONE HYDROCHLORIDE 2 MG/ML
0.5 INJECTION, SOLUTION INTRAMUSCULAR; INTRAVENOUS; SUBCUTANEOUS
Status: DISCONTINUED | OUTPATIENT
Start: 2017-12-14 | End: 2017-12-14 | Stop reason: HOSPADM

## 2017-12-14 RX ADMIN — FENTANYL CITRATE 25 MCG: 50 INJECTION, SOLUTION INTRAMUSCULAR; INTRAVENOUS at 13:52

## 2017-12-14 RX ADMIN — MIDAZOLAM HYDROCHLORIDE 2 MG: 1 INJECTION INTRAMUSCULAR; INTRAVENOUS at 13:52

## 2017-12-14 RX ADMIN — MIDAZOLAM 2 MG: 1 INJECTION INTRAMUSCULAR; INTRAVENOUS at 13:52

## 2017-12-14 RX ADMIN — CLINDAMYCIN PHOSPHATE 600 MG: 600 INJECTION, SOLUTION INTRAVENOUS at 13:54

## 2017-12-14 RX ADMIN — MIDAZOLAM HYDROCHLORIDE 2 MG: 1 INJECTION INTRAMUSCULAR; INTRAVENOUS at 14:02

## 2017-12-14 ASSESSMENT — PAIN SCALES - GENERAL: PAINLEVEL_OUTOF10: 0

## 2017-12-14 NOTE — OR SURGEON
Immediate Post- Operative Note        PostOp Diagnosis: Rt Renal Obstruction    Procedure(s):Rt NephTube    Estimated Blood Loss: Less than 5 ml        Complications: None            12/14/2017     1:30 PM     Esdras Newman

## 2017-12-14 NOTE — PROGRESS NOTES
Discharge instructions reviewed with patient and family, all questions answered. IV removed and belongings returned.  Patient ambulated out in a stable condition.       ACTIVITY: Rest and take it easy for the first 24 hours.  A responsible adult is recommended to remain with you during that time.  It is normal to feel sleepy.  We encourage you to not do anything that requires balance, judgment or coordination.    MILD FLU-LIKE SYMPTOMS ARE NORMAL. YOU MAY EXPERIENCE GENERALIZED MUSCLE ACHES, THROAT IRRITATION, HEADACHE AND/OR SOME NAUSEA.    FOR 24 HOURS DO NOT:  Drive, operate machinery or run household appliances.  Drink beer or alcoholic beverages.   Make important decisions or sign legal documents.    SPECIAL INSTRUCTIONS: No baths or hot tube while nephrostomy tube is in place. Keep site dry clean and free from infections. Do not pull on tube. Go to nearest emergency room if any fever or chills.      DIET: To avoid nausea, slowly advance diet as tolerated, avoiding spicy or greasy foods for the first day.  Add more substantial food to your diet according to your physician's instructions.  Babies can be fed formula or breast milk as soon as they are hungry.  INCREASE FLUIDS AND FIBER TO AVOID CONSTIPATION.    SURGICAL DRESSING/BATHING: No baths or hot tube while nephrostomy tube is in place. Keep site dry clean and free from infections    FOLLOW-UP APPOINTMENT:  A follow-up appointment should be arranged with your doctor in 1 week; call to schedule.    You should CALL YOUR PHYSICIAN if you develop:  Fever greater than 101 degrees F.  Pain not relieved by medication, or persistent nausea or vomiting.  Excessive bleeding (blood soaking through dressing) or unexpected drainage from the wound.  Extreme redness or swelling around the incision site, drainage of pus or foul smelling drainage.  Inability to urinate or empty your bladder within 8 hours.  Problems with breathing or chest pain.    You should call 911 if  you develop problems with breathing or chest pain.  If you are unable to contact your doctor or surgical center, you should go to the nearest emergency room or urgent care center.      If any questions arise, call your doctor.  If your doctor is not available, please feel free to call the Surgical Center at [phone]  .  The Center is open Monday through Friday from 7AM to 7PM.  You can also call the HEALTH HOTLINE open 24 hours/day, 7 days/week and speak to a nurse at [phone], or toll free at [phone].    A registered nurse may call you a few days after your surgery to see how you are doing after your procedure.    MEDICATIONS: Resume taking daily medication.  Take prescribed pain medication with food.  If no medication is prescribed, you may take non-aspirin pain medication if needed.  PAIN MEDICATION CAN BE VERY CONSTIPATING.  Take a stool softener or laxative such as senokot, pericolace, or milk of magnesia if needed.      If your physician has prescribed pain medication that includes Acetaminophen (Tylenol), do not take additional Acetaminophen (Tylenol) while taking the prescribed medication.    Depression / Suicide Risk    As you are discharged from this [facility] facility, it is important to learn how to keep safe from harming yourself.    Recognize the warning signs:  · Abrupt changes in personality, positive or negative- including increase in energy   · Giving away possessions  · Change in eating patterns- significant weight changes-  positive or negative  · Change in sleeping patterns- unable to sleep or sleeping all the time   · Unwillingness or inability to communicate  · Depression  · Unusual sadness, discouragement and loneliness  · Talk of wanting to die  · Neglect of personal appearance   · Rebelliousness- reckless behavior  · Withdrawal from people/activities they love  · Confusion- inability to concentrate     If you or a loved one observes any of these behaviors or has  concerns about self-harm, here's what you can do:  · Talk about it- your feelings and reasons for harming yourself  · Remove any means that you might use to hurt yourself (examples: pills, rope, extension cords, firearm)  · Get professional help from the community (Mental Health, Substance Abuse, psychological counseling)  · Do not be alone:Call your Safe Contact- someone whom you trust who will be there for you.  · Call your local CRISIS HOTLINE 668-8931 or 094-642-2974  · Call your local Children's Mobile Crisis Response Team [place] (967) 299-8632 or www.[employer] [place]  · Call the toll free National Suicide Prevention Hotlines   · National Suicide Prevention Lifeline 269-484-RXBO (8216)  · National Hope Line Network 800-SUICIDE (935-0560)    Nephrostomy   Care After  Refer to this sheet in the next few weeks. These instructions provide you with information on caring for yourself after your procedure. Your caregiver may also give you specific instructions. Your treatment has been planned according to current medical practices, but problems sometimes occur. Call your caregiver if you have any problems or questions after your procedure.  HOME CARE INSTRUCTIONS  · Avoid taking aspirin or non-steroidal anti-inflammatory drugs (NSAIDs).  · Only take over-the-counter or prescription medicines for pain, discomfort, or fever as directed by your caregiver.  · Take your antibiotics as directed. Finish them even if you start to feel better.  · Rest for the remainder of the day. Do not operate machinery, drive, or make legal decisions for 24 hours after your procedure.  · Have someone drive you home.  · You may resume your usual diet after the procedure. Drink extra fluids while the catheter is in place. Avoid alcoholic beverages for 24 hours after the procedure.  · Keep the skin around your catheter dry.  · You may shower. Cover the area with plastic wrap and tape the edges of the plastic wrap to your skin so your  skin remains dry under the plastic. If the area does get wet, dry the skin completely.  · Avoid baths or swimming.  SEEK MEDICAL CARE IF:   · Redness, increased soreness, or swelling develops.  · Your symptoms persist after several days or worsen.  · If you seeblood in the urine (hematuria) for over 48 hours.  SEEK IMMEDIATE MEDICAL CARE IF:   · Your flexible tube (catheter) accidentally gets pulled out.  · You have chills or increased pain.  · You have an oral temperature above 102° F (38.9° C), not controlled by medicine.  · The skin breaks down around the catheter.  · Your catheter stops draining.The catheter may be blocked.  · There is leakage of urine around catheter.  Document Released: 08/10/2005 Document Revised: 03/11/2013 Document Reviewed: 02/16/2010  ExitCare® Patient Information ©2014 [employer].      Percutaneous Nephrostomy Home Guide  A nephrostomy tube allows urine to leave your body when a medical condition prevents it from leaving your kidney normally. Urine is normally carried from the kidneys to the bladder through narrow tubes called ureters. The ureter can become obstructed due to conditions such as kidney stones, tumors, infection, or blood clots. A nephrostomy tube is a hollow, flexible tube placed into the kidney to restore the flow of urine. The tube is placed on the right or left side of your lower back and is connected to an external drainage bag.  PERSONAL HYGIENE  · You may shower unless otherwise told by your health care provider. Prepare for a shower by placing a plastic covering over the nephrostomy tube dressing.  · Change the dressing immediately after showering. Make sure your skin around the nephrostomy tube exit site is dry.  · Avoid immersing your nephrostomy tube in water, such as taking a bath or swimming.  NEPHROSTOMY TUBE CARE  · Your nephrostomy tube is connected to a leg bag or bedside drainage bag. Always keep your tubing, leg bag, or bedside drainage bag below the  level of your kidney so that your urine drains freely.  · Your activity is not restricted as long as your activity does not pull or tug on your tube.  · During the day, if you are connecting your nephrostomy tube to a leg bag, ensure that your tubing does not have any kinks and that your urine is draining freely. One helpful technique to prevent kinking or inadvertent dislodging of your nephrostomy tubing is to gently wrap an elastic bandage over the tubing. This will help to secure the tubing in place. Make sure there is no tension on the tubing so it does not become dislodged.  · At night, you may want to connect your nephrostomy tube to a larger bedside drainage bag.  EMPTYING THE LEG BAG OR BEDSIDE DRAINAGE BAG   The leg bag or bedside drainage bag should be emptied when it becomes  full and before going to sleep. Most leg bags and bedside drainage bags have a drain at the bottom that allows urine to be emptied.  2. Hold the leg bag or bedside drainage bag over a toilet or collection container. Use a measuring container if you are directed to measure your urine.  3. Open the drain and allow the urine to drain.  4. Once all the urine is drained from the leg bag or bedside drainage bag, close the drain fully to avoid urine leakage.  5. Flush urine down toilet. If a collection container was used, rinse the container.  NEPHROSTOMY TUBE EXIT SITE CARE  The exit site for your nephrostomy tube is covered with a bandage (dressing). Clean your exit site and change your dressing as directed by your health care provider or if your dressing becomes wet.  Supplies Needed:  · Mild soap and water.  · 4×4 inch (10x10 cm) split gauze pads.  · 4×4 inch (10x10 cm) gauze pads.  · Paper tape.  Exit Site Care and Dressing Change:For the first 2 weeks after having a nephrostomy tube inserted, you should change the dressing every day. After 2 weeks, you can change the dressing 2 times per week, whenever the dressing becomes wet, or as  told by your health care provider. Because of the location of your nephrostomy tube, you may need help from another person to complete dressing changes. The steps to changing a dressing are:  1. Wash hands well with soap and water.  2. Gently remove the tapes and dressing from around the nephrostomy tube. Be careful not to pull on the tube while removing the dressing. Avoid using scissors to remove the dressing since this may lead to accidental damage to the tube.  3. Wash the skin around the tube with the mild soap and water, rinse well, and dry with a clean cloth.  4. Inspect the skin around the drain for redness, swelling, and foul-smelling yellow or green discharge.  5. If the drain was sutured to the skin, inspect the suture to verify that it is still anchored in the skin.  6. Place two split gauze pads in and around the tube exit site. Do not apply ointments or alcohol to the site.  7. Place a gauze pad on top of the split gauze pad.  8. Coil the tube on top of the gauze. The tubing should rest on the gauze, not on the skin.  9. Place tape around each edge of the gauze pad.  10. Secure the nephrostomy tubing. Ensure that the nephrostomy tube does not kink or become pinched. The tubing should rest on the gauze pad and not on the skin.  11. Dispose of used supplies properly.  FLUSHING YOUR NEPHROSTOMY TUBE   Flush your nephrostomy tube as directed by your health care provider. Flushing of a nephrostomy tube is easier if a three-way stopcock is placed between the tube and the leg bag or bedside drainage bag. One connection of the three-way stopcock connects to your tube, the second connects to the leg bag or bedside drainage bag, and the third connection is usually covered with a cap. The three-way stopcock lever points to the direction on the stopcock that is closed to flow. Normally, the lever points in the direction of the cap to allow urine to drain from the tube to the leg bag or bedside drainage  bag.  Supplies Needed:  · Rubbing alcohol wipe.  · 10 mL 0.9% saline syringe.  Flushing Your Nephrostomy Tube  1. Gather needed supplies.  2. Move the lever of the three-way stopcock so that it points toward the leg bag or bedside drainage bag.  3. Clean the cap with a rubbing alcohol wipe and then screw the tip of a 10 mL 0.9% saline syringe onto the cap.  4. Using the syringe plunger, slowly push the 10 mL 0.9% saline in the syringe over 5-10 seconds. If resistance is met or pain occurs while pushing, stop pushing the saline immediately.  5. Remove the syringe from the cap.  6. Return the stopcock lever to the usual position which is pointing in the direction of the cap.  7. Dispose of used supplies properly.  REPLACING YOUR LEG BAG OR BEDSIDE DRAINAGE BAG   Replace your leg bag or bedside drainage bag, three-way stopcock, and any extension tubing as directed by your health care provider. Make sure you always have an extra drainage bag and connecting tubing available.  1. Empty urine from your leg bag or bedside drainage bag.  2. Gather new leg bag or bedside drainage bag, three-way stopcock, and any extension tubing.  3. Remove the leg bag or bedside drainage bag, three-way stopcock, and any extension tubing from the nephrostomy tube.  4. Attach the new leg bag or bedside drainage bag, three-way stopcock, and any extension tubing to the nephrostomy tube.  5. Dispose of the used leg bag or bedside drainage bag, three-way stopcock, and any extension tubing from the nephrostomy tube.  SEEK IMMEDIATE MEDICAL CARE IF:  · You have a fever.  · You have abdominal pain during the first week after nephrostomy tube insertion.  · You have new appearance of blood in your urine.  · You have drainage, redness, swelling or pain at your nephrostomy tube insertion site.  · You have difficulty or pain with flushing the tube.  · You notice a decrease in your urine output not explained by drinking less fluids.  · Your nephrostomy  tube comes out or the suture securing the tube comes free.     This information is not intended to replace advice given to you by your health care provider. Make sure you discuss any questions you have with your health care provider.     Document Released: 10/08/2014 Document Revised: 12/23/2014 Document Reviewed: 10/08/2014  Elsevier Interactive Patient Education ©2016 [employer]

## 2017-12-14 NOTE — PROGRESS NOTES
OP IR RN note:    Right nephrostomy tube placement by MD Newman assisted by RT Carlos,Right flank access site;  Tube placed   BS Flexima Regular Nephrostomy catheter system 10 Fr x 25 cm   REF# [id number]   LOT# [id number]  Samples sent to lab  Site Marked and Confirmed with MD and RN pre procedure   Patient tolerated procedure, hemodynamically stable; pt awake and talking post procedure; see MAR for vitals   patient transported back to OP IR pod 1 on monitor

## 2017-12-14 NOTE — DISCHARGE INSTRUCTIONS
ACTIVITY: Rest and take it easy for the first 24 hours.  A responsible adult is recommended to remain with you during that time.  It is normal to feel sleepy.  We encourage you to not do anything that requires balance, judgment or coordination.    MILD FLU-LIKE SYMPTOMS ARE NORMAL. YOU MAY EXPERIENCE GENERALIZED MUSCLE ACHES, THROAT IRRITATION, HEADACHE AND/OR SOME NAUSEA.    FOR 24 HOURS DO NOT:  Drive, operate machinery or run household appliances.  Drink beer or alcoholic beverages.   Make important decisions or sign legal documents.    SPECIAL INSTRUCTIONS: No baths or hot tube while nephrostomy tube is in place. Keep site dry clean and free from infections. Do not pull on tube. Go to nearest emergency room if any fever or chills.      DIET: To avoid nausea, slowly advance diet as tolerated, avoiding spicy or greasy foods for the first day.  Add more substantial food to your diet according to your physician's instructions.  Babies can be fed formula or breast milk as soon as they are hungry.  INCREASE FLUIDS AND FIBER TO AVOID CONSTIPATION.    SURGICAL DRESSING/BATHING: No baths or hot tube while nephrostomy tube is in place. Keep site dry clean and free from infections    FOLLOW-UP APPOINTMENT:  A follow-up appointment should be arranged with your doctor in 1 week; call to schedule.    You should CALL YOUR PHYSICIAN if you develop:  Fever greater than 101 degrees F.  Pain not relieved by medication, or persistent nausea or vomiting.  Excessive bleeding (blood soaking through dressing) or unexpected drainage from the wound.  Extreme redness or swelling around the incision site, drainage of pus or foul smelling drainage.  Inability to urinate or empty your bladder within 8 hours.  Problems with breathing or chest pain.    You should call 911 if you develop problems with breathing or chest pain.  If you are unable to contact your doctor or surgical center, you should go to the nearest emergency room or urgent  care center.      If any questions arise, call your doctor.  If your doctor is not available, please feel free to call the Surgical Center at [phone]  .  The Center is open Monday through Friday from 7AM to 7PM.  You can also call the HEALTH HOTLINE open 24 hours/day, 7 days/week and speak to a nurse at [phone], or toll free at [phone].    A registered nurse may call you a few days after your surgery to see how you are doing after your procedure.    MEDICATIONS: Resume taking daily medication.  Take prescribed pain medication with food.  If no medication is prescribed, you may take non-aspirin pain medication if needed.  PAIN MEDICATION CAN BE VERY CONSTIPATING.  Take a stool softener or laxative such as senokot, pericolace, or milk of magnesia if needed.      If your physician has prescribed pain medication that includes Acetaminophen (Tylenol), do not take additional Acetaminophen (Tylenol) while taking the prescribed medication.    Depression / Suicide Risk    As you are discharged from this [facility] facility, it is important to learn how to keep safe from harming yourself.    Recognize the warning signs:  · Abrupt changes in personality, positive or negative- including increase in energy   · Giving away possessions  · Change in eating patterns- significant weight changes-  positive or negative  · Change in sleeping patterns- unable to sleep or sleeping all the time   · Unwillingness or inability to communicate  · Depression  · Unusual sadness, discouragement and loneliness  · Talk of wanting to die  · Neglect of personal appearance   · Rebelliousness- reckless behavior  · Withdrawal from people/activities they love  · Confusion- inability to concentrate     If you or a loved one observes any of these behaviors or has concerns about self-harm, here's what you can do:  · Talk about it- your feelings and reasons for harming yourself  · Remove any means that you might use to hurt yourself  (examples: pills, rope, extension cords, firearm)  · Get professional help from the community (Mental Health, Substance Abuse, psychological counseling)  · Do not be alone:Call your Safe Contact- someone whom you trust who will be there for you.  · Call your local CRISIS HOTLINE 725-5454 or 688-444-8000  · Call your local Children's Mobile Crisis Response Team [place] (382) 209-7653 or www.AgileMD  · Call the toll free National Suicide Prevention Hotlines   · National Suicide Prevention Lifeline 627-678-XENA (3877)  · National Hope Line Network 800-SUICIDE (298-5026)    Nephrostomy   Care After  Refer to this sheet in the next few weeks. These instructions provide you with information on caring for yourself after your procedure. Your caregiver may also give you specific instructions. Your treatment has been planned according to current medical practices, but problems sometimes occur. Call your caregiver if you have any problems or questions after your procedure.  HOME CARE INSTRUCTIONS  · Avoid taking aspirin or non-steroidal anti-inflammatory drugs (NSAIDs).  · Only take over-the-counter or prescription medicines for pain, discomfort, or fever as directed by your caregiver.  · Take your antibiotics as directed. Finish them even if you start to feel better.  · Rest for the remainder of the day. Do not operate machinery, drive, or make legal decisions for 24 hours after your procedure.  · Have someone drive you home.  · You may resume your usual diet after the procedure. Drink extra fluids while the catheter is in place. Avoid alcoholic beverages for 24 hours after the procedure.  · Keep the skin around your catheter dry.  · You may shower. Cover the area with plastic wrap and tape the edges of the plastic wrap to your skin so your skin remains dry under the plastic. If the area does get wet, dry the skin completely.  · Avoid baths or swimming.  SEEK MEDICAL CARE IF:   · Redness, increased soreness, or  swelling develops.  · Your symptoms persist after several days or worsen.  · If you seeblood in the urine (hematuria) for over 48 hours.  SEEK IMMEDIATE MEDICAL CARE IF:   · Your flexible tube (catheter) accidentally gets pulled out.  · You have chills or increased pain.  · You have an oral temperature above 102° F (38.9° C), not controlled by medicine.  · The skin breaks down around the catheter.  · Your catheter stops draining.The catheter may be blocked.  · There is leakage of urine around catheter.  Document Released: 08/10/2005 Document Revised: 03/11/2013 Document Reviewed: 02/16/2010  ExitCare® Patient Information ©2014 [employer].      Percutaneous Nephrostomy Home Guide  A nephrostomy tube allows urine to leave your body when a medical condition prevents it from leaving your kidney normally. Urine is normally carried from the kidneys to the bladder through narrow tubes called ureters. The ureter can become obstructed due to conditions such as kidney stones, tumors, infection, or blood clots. A nephrostomy tube is a hollow, flexible tube placed into the kidney to restore the flow of urine. The tube is placed on the right or left side of your lower back and is connected to an external drainage bag.  PERSONAL HYGIENE  · You may shower unless otherwise told by your health care provider. Prepare for a shower by placing a plastic covering over the nephrostomy tube dressing.  · Change the dressing immediately after showering. Make sure your skin around the nephrostomy tube exit site is dry.  · Avoid immersing your nephrostomy tube in water, such as taking a bath or swimming.  NEPHROSTOMY TUBE CARE  · Your nephrostomy tube is connected to a leg bag or bedside drainage bag. Always keep your tubing, leg bag, or bedside drainage bag below the level of your kidney so that your urine drains freely.  · Your activity is not restricted as long as your activity does not pull or tug on your tube.  · During the day, if you  are connecting your nephrostomy tube to a leg bag, ensure that your tubing does not have any kinks and that your urine is draining freely. One helpful technique to prevent kinking or inadvertent dislodging of your nephrostomy tubing is to gently wrap an elastic bandage over the tubing. This will help to secure the tubing in place. Make sure there is no tension on the tubing so it does not become dislodged.  · At night, you may want to connect your nephrostomy tube to a larger bedside drainage bag.  EMPTYING THE LEG BAG OR BEDSIDE DRAINAGE BAG   The leg bag or bedside drainage bag should be emptied when it becomes  full and before going to sleep. Most leg bags and bedside drainage bags have a drain at the bottom that allows urine to be emptied.  2. Hold the leg bag or bedside drainage bag over a toilet or collection container. Use a measuring container if you are directed to measure your urine.  3. Open the drain and allow the urine to drain.  4. Once all the urine is drained from the leg bag or bedside drainage bag, close the drain fully to avoid urine leakage.  5. Flush urine down toilet. If a collection container was used, rinse the container.  NEPHROSTOMY TUBE EXIT SITE CARE  The exit site for your nephrostomy tube is covered with a bandage (dressing). Clean your exit site and change your dressing as directed by your health care provider or if your dressing becomes wet.  Supplies Needed:  · Mild soap and water.  · 4×4 inch (10x10 cm) split gauze pads.  · 4×4 inch (10x10 cm) gauze pads.  · Paper tape.  Exit Site Care and Dressing Change:For the first 2 weeks after having a nephrostomy tube inserted, you should change the dressing every day. After 2 weeks, you can change the dressing 2 times per week, whenever the dressing becomes wet, or as told by your health care provider. Because of the location of your nephrostomy tube, you may need help from another person to complete dressing changes. The steps to changing a  dressing are:  1. Wash hands well with soap and water.  2. Gently remove the tapes and dressing from around the nephrostomy tube. Be careful not to pull on the tube while removing the dressing. Avoid using scissors to remove the dressing since this may lead to accidental damage to the tube.  3. Wash the skin around the tube with the mild soap and water, rinse well, and dry with a clean cloth.  4. Inspect the skin around the drain for redness, swelling, and foul-smelling yellow or green discharge.  5. If the drain was sutured to the skin, inspect the suture to verify that it is still anchored in the skin.  6. Place two split gauze pads in and around the tube exit site. Do not apply ointments or alcohol to the site.  7. Place a gauze pad on top of the split gauze pad.  8. Coil the tube on top of the gauze. The tubing should rest on the gauze, not on the skin.  9. Place tape around each edge of the gauze pad.  10. Secure the nephrostomy tubing. Ensure that the nephrostomy tube does not kink or become pinched. The tubing should rest on the gauze pad and not on the skin.  11. Dispose of used supplies properly.  FLUSHING YOUR NEPHROSTOMY TUBE   Flush your nephrostomy tube as directed by your health care provider. Flushing of a nephrostomy tube is easier if a three-way stopcock is placed between the tube and the leg bag or bedside drainage bag. One connection of the three-way stopcock connects to your tube, the second connects to the leg bag or bedside drainage bag, and the third connection is usually covered with a cap. The three-way stopcock lever points to the direction on the stopcock that is closed to flow. Normally, the lever points in the direction of the cap to allow urine to drain from the tube to the leg bag or bedside drainage bag.  Supplies Needed:  · Rubbing alcohol wipe.  · 10 mL 0.9% saline syringe.  Flushing Your Nephrostomy Tube  1. Gather needed supplies.  2. Move the lever of the three-way stopcock so  that it points toward the leg bag or bedside drainage bag.  3. Clean the cap with a rubbing alcohol wipe and then screw the tip of a 10 mL 0.9% saline syringe onto the cap.  4. Using the syringe plunger, slowly push the 10 mL 0.9% saline in the syringe over 5-10 seconds. If resistance is met or pain occurs while pushing, stop pushing the saline immediately.  5. Remove the syringe from the cap.  6. Return the stopcock lever to the usual position which is pointing in the direction of the cap.  7. Dispose of used supplies properly.  REPLACING YOUR LEG BAG OR BEDSIDE DRAINAGE BAG   Replace your leg bag or bedside drainage bag, three-way stopcock, and any extension tubing as directed by your health care provider. Make sure you always have an extra drainage bag and connecting tubing available.  1. Empty urine from your leg bag or bedside drainage bag.  2. Gather new leg bag or bedside drainage bag, three-way stopcock, and any extension tubing.  3. Remove the leg bag or bedside drainage bag, three-way stopcock, and any extension tubing from the nephrostomy tube.  4. Attach the new leg bag or bedside drainage bag, three-way stopcock, and any extension tubing to the nephrostomy tube.  5. Dispose of the used leg bag or bedside drainage bag, three-way stopcock, and any extension tubing from the nephrostomy tube.  SEEK IMMEDIATE MEDICAL CARE IF:  · You have a fever.  · You have abdominal pain during the first week after nephrostomy tube insertion.  · You have new appearance of blood in your urine.  · You have drainage, redness, swelling or pain at your nephrostomy tube insertion site.  · You have difficulty or pain with flushing the tube.  · You notice a decrease in your urine output not explained by drinking less fluids.  · Your nephrostomy tube comes out or the suture securing the tube comes free.     This information is not intended to replace advice given to you by your health care provider. Make sure you discuss any  questions you have with your health care provider.     Document Released: 10/08/2014 Document Revised: 12/23/2014 Document Reviewed: 10/08/2014  Elsevier Interactive Patient Education ©2016 [employer]

## 2017-12-15 LAB
GRAM STN SPEC: NORMAL
SIGNIFICANT IND 70042: NORMAL
SITE SITE: NORMAL
SOURCE SOURCE: NORMAL

## 2017-12-16 LAB
BACTERIA UR CULT: NORMAL
SIGNIFICANT IND 70042: NORMAL
SITE SITE: NORMAL
SOURCE SOURCE: NORMAL

## 2017-12-27 ENCOUNTER — NON-PROVIDER VISIT (OUTPATIENT)
Dept: CARDIOLOGY | Facility: MEDICAL CENTER | Age: 47
End: 2017-12-27

## 2017-12-27 NOTE — PROGRESS NOTES
"Date: 12/27/2017    DIscharge Individual Treatment Plan review for the [facility] Intensive Cardiac Rehabilitation (ICR) Program.    Aurora Tan, 47 y.o. female, with qualifying diagnosis/diagnoses of recent Myocardial Infarction.  Co-morbid conditions include Hyperlipidemia, kidney disease and stress.      Primary Care Provider: ANDREA Valdez     Heart Specialist (s): Dr. Hernandez    Patient has successfully completed just 5 Exercise Sessions, and an appropriate number of corresponding Education Sessions.  Patient has been an excellent candidate for the [facility] Intensive Cardiac Rehabilitation (ICR) Program.    Per [facility] Staff: \"...Socorro is withdrawing from the program at this time related to her need for nephrology treatment at [place]...\"    Rommel Arboleda MD, [facility]  , [facility] Intensive Cardiac Rehabilitation (ICR) Program    "

## 2017-12-27 NOTE — PROGRESS NOTES
Individualized Treatment Plan      Cardiac Rehab Individual Treatment Plan Assessment: Discharge 17 Session #     5   MRN: [id number]   Allergies: Levaquin   Patient Name: Aurora Tan : 1970 Risk Stratification: High    Diagnoses: No diagnosis found. Age: 47 y.o. Physician: ANDREA Valdez    Date of Event: 17 Specialist: Trice   Risk Factors:  Hyperlipidemia, Stress, Age, Female > 55   Exercise Nutrition Education Psychosocial   Stages of change Stages of change Stages of change Stages of change   Relapse Relapse Relapse Relapse   Fitness Test Lipids Learning Barriers Outcome Survey Tools   DIST:            Max HR:   Date:   Family Support     RPE:   Total:         SPO2:   Trig:     Intervention   MET:   HDL:   Tobacco Use     EF=   LDL:   History   Smoking Status   • Former Smoker   • Packs/day: 0.25   • Years: 10.00   • Types: Cigarettes   • Quit date: 2013   Smokeless Tobacco   • Never Used          Ambulatory Status Diabetes Smoking Intervention                       Education   Exercise Prescription         Mode: Treadmill, NuStep, UBE, Airdyne       Education Intervention Target Goal   Frequency:  3 days/week Weight Management       Duration:  30-45   Target Goal     Intensity:  11-13 RPE   Complete Tobacco Cessation:       METS:  3.0-5.0   Educate / Review and have understanding of cardiac disease prevention:       Progression:  ^ increments of 1-5 to THR/RPE as tolerated        THR: THR: Other (see comment) (119-129) History   Alcohol Use   • Yes     Comment: socially/rare         Angina with Exercise?  Angina with Exercise: No      Resistance Training?  Resistance Training: Yes      Hypertension        Intervention                       Intervention       Home Exercise:          Mode:         Duration:         Frequency:   Education     Education         Target Goal     Target Goal LDL-C < 100 if trig.  > 200:          Start Individual Exercise Rx   LDL-C < 70 for high risk patient:          BP < 140/90 or < 130/80 if DM or CKD   Non HDL-C Should be < 130:          Aerobic activity 30 + min / day 5 days / wk                     Notes: Socorro is withdrawing from the program at this time related to her need for nephrology treatment at [place].

## 2018-01-05 ENCOUNTER — TELEPHONE (OUTPATIENT)
Dept: CARDIOLOGY | Facility: MEDICAL CENTER | Age: 48
End: 2018-01-05

## 2018-01-05 NOTE — TELEPHONE ENCOUNTER
S/w pt. At this time she explains that she is under a lot of stress from kidney issues. Per Urologist, she was told to stop taking plavix due to bleeding in urine. She now has Nephrostomy tube placed and once she restarted Plavix after procedure she noticed bloody urine. Pt is very frustrated in Urologist and feels that no one cares about her.     Pt reports that she has been trying to take Xanax at night to help her sleep, but has noticed even when she is just resting HR gets up to 116 bpm. Reassured pt that stress and anxiety can increase heart rate. She now has a fitbit that she plans on monitoring HR.     Pt would just like to know Dr. Hernandez's recommendation on Plavix. She is very worried about her heart. Reassured pt that I would FU with TW on questions and concerns.     To Dr. Hernandez. Please advise.     ----- Message from Chloé Ortiz sent at 1/5/2018 10:41 AM PST -----  Regarding: racing heart for a week  Contact: [phone]  CHING/shayna    Pt calling to report racing heart, pulse at rest 116 bpm & has been this way for a good week.  Pt has not been taking blood thinners due to blood in her urine that is frightening her.  Please call pt asap at [phone] or cell# [phone]

## 2018-01-09 ENCOUNTER — APPOINTMENT (OUTPATIENT)
Dept: RADIOLOGY | Facility: MEDICAL CENTER | Age: 48
End: 2018-01-09
Attending: UROLOGY
Payer: COMMERCIAL

## 2018-01-09 ENCOUNTER — HOSPITAL ENCOUNTER (OUTPATIENT)
Facility: MEDICAL CENTER | Age: 48
End: 2018-01-09
Attending: UROLOGY | Admitting: UROLOGY
Payer: COMMERCIAL

## 2018-01-09 VITALS
HEART RATE: 99 BPM | OXYGEN SATURATION: 92 % | BODY MASS INDEX: 31.38 KG/M2 | SYSTOLIC BLOOD PRESSURE: 118 MMHG | TEMPERATURE: 99.6 F | HEIGHT: 67 IN | WEIGHT: 199.96 LBS | RESPIRATION RATE: 17 BRPM | DIASTOLIC BLOOD PRESSURE: 67 MMHG

## 2018-01-09 DIAGNOSIS — N20.1 CALCULUS OF URETER: ICD-10-CM

## 2018-01-09 DIAGNOSIS — N13.30 HYDRONEPHROSIS, UNSPECIFIED HYDRONEPHROSIS TYPE: ICD-10-CM

## 2018-01-09 LAB
ERYTHROCYTE [DISTWIDTH] IN BLOOD BY AUTOMATED COUNT: 40.4 FL (ref 35.9–50)
HCT VFR BLD AUTO: 37.7 % (ref 37–47)
HGB BLD-MCNC: 12.9 G/DL (ref 12–16)
INR PPP: 0.99 (ref 0.87–1.13)
MCH RBC QN AUTO: 30.1 PG (ref 27–33)
MCHC RBC AUTO-ENTMCNC: 34.2 G/DL (ref 33.6–35)
MCV RBC AUTO: 87.9 FL (ref 81.4–97.8)
PLATELET # BLD AUTO: 335 K/UL (ref 164–446)
PMV BLD AUTO: 11 FL (ref 9–12.9)
PROTHROMBIN TIME: 12.8 SEC (ref 12–14.6)
RBC # BLD AUTO: 4.29 M/UL (ref 4.2–5.4)
WBC # BLD AUTO: 10.3 K/UL (ref 4.8–10.8)

## 2018-01-09 PROCEDURE — 700111 HCHG RX REV CODE 636 W/ 250 OVERRIDE (IP)

## 2018-01-09 PROCEDURE — 160002 HCHG RECOVERY MINUTES (STAT)

## 2018-01-09 PROCEDURE — 85027 COMPLETE CBC AUTOMATED: CPT

## 2018-01-09 PROCEDURE — 85610 PROTHROMBIN TIME: CPT

## 2018-01-09 PROCEDURE — C1729 CATH, DRAINAGE: HCPCS

## 2018-01-09 PROCEDURE — 88104 CYTOPATH FL NONGYN SMEARS: CPT

## 2018-01-09 RX ORDER — MIDAZOLAM HYDROCHLORIDE 1 MG/ML
.5-2 INJECTION INTRAMUSCULAR; INTRAVENOUS PRN
Status: ACTIVE | OUTPATIENT
Start: 2018-01-09 | End: 2018-01-09

## 2018-01-09 RX ORDER — SODIUM CHLORIDE 9 MG/ML
INJECTION, SOLUTION INTRAVENOUS
Status: DISCONTINUED | OUTPATIENT
Start: 2018-01-09 | End: 2018-01-09 | Stop reason: HOSPADM

## 2018-01-09 RX ORDER — MIDAZOLAM HYDROCHLORIDE 1 MG/ML
INJECTION INTRAMUSCULAR; INTRAVENOUS
Status: COMPLETED
Start: 2018-01-09 | End: 2018-01-09

## 2018-01-09 RX ORDER — HYDROMORPHONE HYDROCHLORIDE 2 MG/ML
0.5 INJECTION, SOLUTION INTRAMUSCULAR; INTRAVENOUS; SUBCUTANEOUS
Status: DISCONTINUED | OUTPATIENT
Start: 2018-01-09 | End: 2018-01-09 | Stop reason: HOSPADM

## 2018-01-09 RX ORDER — SODIUM CHLORIDE 9 MG/ML
500 INJECTION, SOLUTION INTRAVENOUS
Status: ACTIVE | OUTPATIENT
Start: 2018-01-09 | End: 2018-01-09

## 2018-01-09 RX ORDER — ONDANSETRON 2 MG/ML
4 INJECTION INTRAMUSCULAR; INTRAVENOUS PRN
Status: ACTIVE | OUTPATIENT
Start: 2018-01-09 | End: 2018-01-09

## 2018-01-09 RX ORDER — OXYCODONE HYDROCHLORIDE 5 MG/1
10 TABLET ORAL
Status: DISCONTINUED | OUTPATIENT
Start: 2018-01-09 | End: 2018-01-09 | Stop reason: HOSPADM

## 2018-01-09 RX ORDER — ONDANSETRON 2 MG/ML
INJECTION INTRAMUSCULAR; INTRAVENOUS
Status: COMPLETED
Start: 2018-01-09 | End: 2018-01-09

## 2018-01-09 RX ORDER — OXYCODONE HYDROCHLORIDE 5 MG/1
5 TABLET ORAL
Status: DISCONTINUED | OUTPATIENT
Start: 2018-01-09 | End: 2018-01-09 | Stop reason: HOSPADM

## 2018-01-09 RX ADMIN — MIDAZOLAM HYDROCHLORIDE 1 MG: 1 INJECTION INTRAMUSCULAR; INTRAVENOUS at 11:21

## 2018-01-09 RX ADMIN — SODIUM CHLORIDE: 9 INJECTION, SOLUTION INTRAVENOUS at 09:15

## 2018-01-09 RX ADMIN — MIDAZOLAM HYDROCHLORIDE 2 MG: 1 INJECTION INTRAMUSCULAR; INTRAVENOUS at 11:35

## 2018-01-09 RX ADMIN — MIDAZOLAM 1 MG: 1 INJECTION INTRAMUSCULAR; INTRAVENOUS at 11:21

## 2018-01-09 RX ADMIN — ONDANSETRON: 2 INJECTION INTRAMUSCULAR; INTRAVENOUS at 13:30

## 2018-01-09 RX ADMIN — MIDAZOLAM HYDROCHLORIDE 1 MG: 1 INJECTION INTRAMUSCULAR; INTRAVENOUS at 11:30

## 2018-01-09 RX ADMIN — FENTANYL CITRATE 50 MCG: 50 INJECTION, SOLUTION INTRAMUSCULAR; INTRAVENOUS at 11:21

## 2018-01-09 ASSESSMENT — PAIN SCALES - GENERAL
PAINLEVEL_OUTOF10: 0

## 2018-01-09 NOTE — DISCHARGE INSTRUCTIONS
ACTIVITY: Rest and take it easy for the first 24 hours.  A responsible adult is recommended to remain with you during that time.  It is normal to feel sleepy.  We encourage you to not do anything that requires balance, judgment or coordination.    MILD FLU-LIKE SYMPTOMS ARE NORMAL. YOU MAY EXPERIENCE GENERALIZED MUSCLE ACHES, THROAT IRRITATION, HEADACHE AND/OR SOME NAUSEA.    FOR 24 HOURS DO NOT:  Drive, operate machinery or run household appliances.  Drink beer or alcoholic beverages.   Make important decisions or sign legal documents.    SPECIAL INSTRUCTIONS: follow up with primary care physician as needed  If you experience chest pain, shortness of breath call 911 return to ER  Call Dr pedroza with any questions [phone]    DIET: To avoid nausea, slowly advance diet as tolerated, avoiding spicy or greasy foods for the first day.  Add more substantial food to your diet according to your physician's instructions.  Babies can be fed formula or breast milk as soon as they are hungry.  INCREASE FLUIDS AND FIBER TO AVOID CONSTIPATION.    SURGICAL DRESSING/BATHING: keep dressing clean dry intact.    FOLLOW-UP APPOINTMENT:  A follow-up appointment should be arranged with your doctor in [phone]; call to schedule.    You should CALL YOUR PHYSICIAN if you develop:  Fever greater than 101 degrees F.  Pain not relieved by medication, or persistent nausea or vomiting.  Excessive bleeding (blood soaking through dressing) or unexpected drainage from the wound.  Extreme redness or swelling around the incision site, drainage of pus or foul smelling drainage.  Inability to urinate or empty your bladder within 8 hours.  Problems with breathing or chest pain.    You should call 911 if you develop problems with breathing or chest pain.  If you are unable to contact your doctor or surgical center, you should go to the nearest emergency room or urgent care center.  Physician's telephone #: [phone]    If any questions arise, call your  doctor.  If your doctor is not available, please feel free to call the Surgical Center at [phone].  The Center is open Monday through Friday from 7AM to 7PM.  You can also call the HEALTH HOTLINE open 24 hours/day, 7 days/week and speak to a nurse at [phone], or toll free at [phone].    A registered nurse may call you a few days after your surgery to see how you are doing after your procedure.    MEDICATIONS: Resume taking daily medication.  Take prescribed pain medication with food.  If no medication is prescribed, you may take non-aspirin pain medication if needed.  PAIN MEDICATION CAN BE VERY CONSTIPATING.  Take a stool softener or laxative such as senokot, pericolace, or milk of magnesia if needed.  Biopsy  A biopsy is a procedure in which small samples of tissue are removed from the body. The tissue is examined under a microscope. A biopsy may be done to determine the cause (diagnosis) of a condition or mass (tumor). A biopsy may also be done to determine the best treatment for you. In some instances, a biopsy may be performed on normal tissue to determine if cancer has spread or if a transplanted organ is being rejected. There are 2 ways to obtain samples:  · Fine needle biopsy. Samples are removed using a thin needle inserted through the skin.   · Open biopsy. Samples are removed after a cut (incision) is made through the skin.   LET YOUR CAREGIVER KNOW ABOUT:  · Allergies to food or medicine.   · Medicines taken, including vitamins, herbs, eyedrops, over-the-counter medicines, and creams.   · Use of steroids (by mouth or creams).   · Previous problems with anesthetics or numbing medicines.   · History of bleeding problems or blood clots.   · Previous surgery.   · Other health problems, including diabetes and kidney problems.   · Possibility of pregnancy, if this applies.   RISKS AND COMPLICATIONS  · Bleeding from the biopsy site. The risk of bleeding is higher if you have a bleeding  disorder or are taking any blood thinning medicines (anticoagulants).   · Infection.   · Injury to organs or structures near the biopsy site.   · Chronic pain at the biopsy site. This is defined as pain that lasts for more than 3 months.   · Very rarely, a second biopsy may be required if not enough tissue was collected during the first biopsy.   BEFORE THE PROCEDURE  Ask your caregiver what time you need to arrive for your procedure. Ask your caregiver whether you need to stop eating or drinking (fast) before your procedure. Ask your caregiver about changing or stopping your regular medicines. A blood sample may be done to determine your blood clotting time. Medicine may be given to help you relax (sedative).  PROCEDURE  During a fine needle biopsy, you will be awake during the procedure. You will be positioned to allow the best possible access to the biopsy site. Let your caregiver know if the position is not comfortable. The biopsy site will be cleaned. A needle is inserted through your skin. You may feel mild discomfort during this procedure. The needle is withdrawn once tissue samples have been removed. Pressure may be applied to the biopsy site to reduce swelling and to ensure that bleeding has stopped. The samples will be sent to be examined.  During an open biopsy, you may be given medicine that numbs the area (local anesthetic) or medicine that makes you sleep (general anesthetic). An incision is made through the skin. A tissue sample or the entire mass is removed. The sample or mass will be sent to be examined. Sometimes, the sample or mass may be examined during the procedure. If the sample or mass contains cancer cells, further tissue or structures may be removed. The incision is then closed with stitches (sutures) or skin glue (adhesive).  AFTER THE PROCEDURE  Your recovery will be assessed and monitored. If there are no problems, you should be able to go home shortly after the procedure (outpatient).  You will need to arrange for someone to drive you home if you received a sedative or pain relieving medicine during the procedure. Ask when your test results will be ready. Make sure you get your test results.  Document Released: 12/15/2001 Document Revised: 03/11/2013 Document Reviewed: 06/14/2012  ExitCare® Patient Information ©2013 [employer].Nephrostomy   Care After  Refer to this sheet in the next few weeks. These instructions provide you with information on caring for yourself after your procedure. Your caregiver may also give you specific instructions. Your treatment has been planned according to current medical practices, but problems sometimes occur. Call your caregiver if you have any problems or questions after your procedure.  HOME CARE INSTRUCTIONS  · Avoid taking aspirin or non-steroidal anti-inflammatory drugs (NSAIDs).  · Only take over-the-counter or prescription medicines for pain, discomfort, or fever as directed by your caregiver.  · Take your antibiotics as directed. Finish them even if you start to feel better.  · Rest for the remainder of the day. Do not operate machinery, drive, or make legal decisions for 24 hours after your procedure.  · Have someone drive you home.  · You may resume your usual diet after the procedure. Drink extra fluids while the catheter is in place. Avoid alcoholic beverages for 24 hours after the procedure.  · Keep the skin around your catheter dry.  · You may shower. Cover the area with plastic wrap and tape the edges of the plastic wrap to your skin so your skin remains dry under the plastic. If the area does get wet, dry the skin completely.  · Avoid baths or swimming.  SEEK MEDICAL CARE IF:   · Redness, increased soreness, or swelling develops.  · Your symptoms persist after several days or worsen.  · If you seeblood in the urine (hematuria) for over 48 hours.  SEEK IMMEDIATE MEDICAL CARE IF:   · Your flexible tube (catheter) accidentally gets pulled out.  · You  have chills or increased pain.  · You have an oral temperature above 102° F (38.9° C), not controlled by medicine.  · The skin breaks down around the catheter.  · Your catheter stops draining.The catheter may be blocked.  · There is leakage of urine around catheter.  Document Released: 08/10/2005 Document Revised: 03/11/2013 Document Reviewed: 02/16/2010  ExitCare® Patient Information ©2014 [employer].      If your physician has prescribed pain medication that includes Acetaminophen (Tylenol), do not take additional Acetaminophen (Tylenol) while taking the prescribed medication.    Depression / Suicide Risk    As you are discharged from this [facility] facility, it is important to learn how to keep safe from harming yourself.    Recognize the warning signs:  · Abrupt changes in personality, positive or negative- including increase in energy   · Giving away possessions  · Change in eating patterns- significant weight changes-  positive or negative  · Change in sleeping patterns- unable to sleep or sleeping all the time   · Unwillingness or inability to communicate  · Depression  · Unusual sadness, discouragement and loneliness  · Talk of wanting to die  · Neglect of personal appearance   · Rebelliousness- reckless behavior  · Withdrawal from people/activities they love  · Confusion- inability to concentrate     If you or a loved one observes any of these behaviors or has concerns about self-harm, here's what you can do:  · Talk about it- your feelings and reasons for harming yourself  · Remove any means that you might use to hurt yourself (examples: pills, rope, extension cords, firearm)  · Get professional help from the community (Mental Health, Substance Abuse, psychological counseling)  · Do not be alone:Call your Safe Contact- someone whom you trust who will be there for you.  · Call your local CRISIS HOTLINE 056-3042 or 247-185-4302  · Call your local Children's Mobile Crisis Response Team [facility]  (692) 424-2715 or [email or web address]  · Call the toll free National Suicide Prevention Hotlines   · National Suicide Prevention Lifeline 918-550-EVYD (8370)  · National [place] Line Network 800-SUICIDE (067-1780)

## 2018-01-09 NOTE — OR SURGEON
Immediate Post- Operative Note        PostOp Diagnosis: RT URETERAL STRICTURE      Procedure(s): RT NEPH TUBE EXCHANGE AND BRUSH BIOPSY OF RT UPJ STRICTURE      Estimated Blood Loss: Less than 5 ml        Complications: None            1/9/2018     12:42 PM     Esdras Newman

## 2018-01-09 NOTE — PROGRESS NOTES
IR Procedure Note:    Site Marked and Confirmed with MD, patient and RN pre procedure. Consents signed for right nephrostogram and right nephrostomy tube exchange, and brush biopsy of right ureter.    Pt to IR2 conscious alert and oriented, without supplemental oxygen. No shortness of breath or dyspnea noted. Pt's skin is pink warm dry with pulses palpable to all extremities.    Dr. Newman performed right nephrostogram and right nephrostomy tube exchange, and brush biopsy of right ureter. Brush biopsy slides and brush to pathology  The pt tolerated the procedure well; ETCo2 baseline 41, with consistent waveform during the procedure.  Sutures, tegaderm and gauze applied to right back, CDI and soft.  Pt alert, oriented and verbally appropriate post procedure, vital signs stable during procedure and transport, see flow sheet for vital signs.  Report given to SHANTANU Hawkins.  RN transported pt to PPU with Sao2 monitor = 95%.

## 2018-01-09 NOTE — OR NURSING
1215 patient arrived from IR s/p RT NEPH TUBE EXCHANGE AND BRUSH BIOPSY OF RT UPJ STRICTURE patient aaox4, denies pain, s.o.b, plan of care discussed with patient and family agreeable. Vss, tube in place with minimal bloody drainage.  1346 patient tolerating oral fluids and snacks.  1350 criteria met to dc patient out of ppu. Ok to dc per Dr pedroza.  1402 discharge instructions given to patient, patient and family verbalize understanding of the orders, iv discontinued tip intact.   1414 patient escorted via w/c with all her personal belongings.

## 2018-01-10 ENCOUNTER — RESOLUTE PROFESSIONAL BILLING HOSPITAL PROF FEE (OUTPATIENT)
Dept: HOSPITALIST | Facility: MEDICAL CENTER | Age: 48
End: 2018-01-10
Payer: COMMERCIAL

## 2018-01-10 ENCOUNTER — APPOINTMENT (OUTPATIENT)
Dept: RADIOLOGY | Facility: MEDICAL CENTER | Age: 48
DRG: 871 | End: 2018-01-10
Attending: UROLOGY
Payer: COMMERCIAL

## 2018-01-10 ENCOUNTER — HOSPITAL ENCOUNTER (INPATIENT)
Facility: MEDICAL CENTER | Age: 48
LOS: 1 days | DRG: 871 | End: 2018-01-11
Attending: EMERGENCY MEDICINE | Admitting: HOSPITALIST
Payer: COMMERCIAL

## 2018-01-10 DIAGNOSIS — N12 PYELONEPHRITIS: ICD-10-CM

## 2018-01-10 PROBLEM — R52 ACUTE PAIN: Status: ACTIVE | Noted: 2018-01-10

## 2018-01-10 LAB
ALBUMIN SERPL BCP-MCNC: 4.4 G/DL (ref 3.2–4.9)
ALBUMIN/GLOB SERPL: 1.5 G/DL
ALP SERPL-CCNC: 78 U/L (ref 30–99)
ALT SERPL-CCNC: 13 U/L (ref 2–50)
ANION GAP SERPL CALC-SCNC: 8 MMOL/L (ref 0–11.9)
APPEARANCE UR: ABNORMAL
APPEARANCE UR: CLEAR
AST SERPL-CCNC: 20 U/L (ref 12–45)
BACTERIA #/AREA URNS HPF: NEGATIVE /HPF
BASOPHILS # BLD AUTO: 0.5 % (ref 0–1.8)
BASOPHILS # BLD: 0.05 K/UL (ref 0–0.12)
BILIRUB SERPL-MCNC: 0.7 MG/DL (ref 0.1–1.5)
BILIRUB UR QL STRIP.AUTO: NEGATIVE
BILIRUB UR QL STRIP.AUTO: NEGATIVE
BUN SERPL-MCNC: 15 MG/DL (ref 8–22)
CALCIUM SERPL-MCNC: 9.4 MG/DL (ref 8.5–10.5)
CHLORIDE SERPL-SCNC: 101 MMOL/L (ref 96–112)
CO2 SERPL-SCNC: 25 MMOL/L (ref 20–33)
COLOR UR: YELLOW
COLOR UR: YELLOW
CREAT SERPL-MCNC: 1.12 MG/DL (ref 0.5–1.4)
CRP SERPL HS-MCNC: 13.33 MG/DL (ref 0–0.75)
CULTURE IF INDICATED INDCX: NO UA CULTURE
EOSINOPHIL # BLD AUTO: 0.03 K/UL (ref 0–0.51)
EOSINOPHIL NFR BLD: 0.3 % (ref 0–6.9)
EPI CELLS #/AREA URNS HPF: NEGATIVE /HPF
ERYTHROCYTE [DISTWIDTH] IN BLOOD BY AUTOMATED COUNT: 41.3 FL (ref 35.9–50)
ERYTHROCYTE [SEDIMENTATION RATE] IN BLOOD BY WESTERGREN METHOD: 41 MM/HOUR (ref 0–20)
GLOBULIN SER CALC-MCNC: 3 G/DL (ref 1.9–3.5)
GLUCOSE SERPL-MCNC: 90 MG/DL (ref 65–99)
GLUCOSE UR STRIP.AUTO-MCNC: NEGATIVE MG/DL
GLUCOSE UR STRIP.AUTO-MCNC: NEGATIVE MG/DL
HCT VFR BLD AUTO: 41.8 % (ref 37–47)
HGB BLD-MCNC: 13.8 G/DL (ref 12–16)
HYALINE CASTS #/AREA URNS LPF: ABNORMAL /LPF
IMM GRANULOCYTES # BLD AUTO: 0.04 K/UL (ref 0–0.11)
IMM GRANULOCYTES NFR BLD AUTO: 0.4 % (ref 0–0.9)
KETONES UR STRIP.AUTO-MCNC: NEGATIVE MG/DL
KETONES UR STRIP.AUTO-MCNC: NEGATIVE MG/DL
LACTATE BLD-SCNC: 1.3 MMOL/L (ref 0.5–2)
LEUKOCYTE ESTERASE UR QL STRIP.AUTO: ABNORMAL
LEUKOCYTE ESTERASE UR QL STRIP.AUTO: NEGATIVE
LYMPHOCYTES # BLD AUTO: 1.16 K/UL (ref 1–4.8)
LYMPHOCYTES NFR BLD: 11.4 % (ref 22–41)
MCH RBC QN AUTO: 29.3 PG (ref 27–33)
MCHC RBC AUTO-ENTMCNC: 33 G/DL (ref 33.6–35)
MCV RBC AUTO: 88.7 FL (ref 81.4–97.8)
MICRO URNS: ABNORMAL
MICRO URNS: NORMAL
MONOCYTES # BLD AUTO: 0.39 K/UL (ref 0–0.85)
MONOCYTES NFR BLD AUTO: 3.8 % (ref 0–13.4)
NEUTROPHILS # BLD AUTO: 8.5 K/UL (ref 2–7.15)
NEUTROPHILS NFR BLD: 83.6 % (ref 44–72)
NITRITE UR QL STRIP.AUTO: NEGATIVE
NITRITE UR QL STRIP.AUTO: NEGATIVE
NRBC # BLD AUTO: 0 K/UL
NRBC BLD-RTO: 0 /100 WBC
PH UR STRIP.AUTO: 6.5 [PH]
PH UR STRIP.AUTO: 6.5 [PH]
PLATELET # BLD AUTO: 313 K/UL (ref 164–446)
PMV BLD AUTO: 10.9 FL (ref 9–12.9)
POTASSIUM SERPL-SCNC: 4.1 MMOL/L (ref 3.6–5.5)
PROT SERPL-MCNC: 7.4 G/DL (ref 6–8.2)
PROT UR QL STRIP: 100 MG/DL
PROT UR QL STRIP: NEGATIVE MG/DL
RBC # BLD AUTO: 4.71 M/UL (ref 4.2–5.4)
RBC # URNS HPF: ABNORMAL /HPF
RBC UR QL AUTO: ABNORMAL
RBC UR QL AUTO: NEGATIVE
SODIUM SERPL-SCNC: 134 MMOL/L (ref 135–145)
SP GR UR STRIP.AUTO: 1.01
SP GR UR STRIP.AUTO: 1.01
UROBILINOGEN UR STRIP.AUTO-MCNC: 0.2 MG/DL
UROBILINOGEN UR STRIP.AUTO-MCNC: 0.2 MG/DL
WBC # BLD AUTO: 10.2 K/UL (ref 4.8–10.8)
WBC #/AREA URNS HPF: ABNORMAL /HPF

## 2018-01-10 PROCEDURE — 96374 THER/PROPH/DIAG INJ IV PUSH: CPT

## 2018-01-10 PROCEDURE — 86038 ANTINUCLEAR ANTIBODIES: CPT

## 2018-01-10 PROCEDURE — 83605 ASSAY OF LACTIC ACID: CPT

## 2018-01-10 PROCEDURE — A9270 NON-COVERED ITEM OR SERVICE: HCPCS | Performed by: HOSPITALIST

## 2018-01-10 PROCEDURE — 86225 DNA ANTIBODY NATIVE: CPT

## 2018-01-10 PROCEDURE — 86140 C-REACTIVE PROTEIN: CPT

## 2018-01-10 PROCEDURE — 700105 HCHG RX REV CODE 258: Performed by: HOSPITALIST

## 2018-01-10 PROCEDURE — 700111 HCHG RX REV CODE 636 W/ 250 OVERRIDE (IP): Performed by: INTERNAL MEDICINE

## 2018-01-10 PROCEDURE — 80053 COMPREHEN METABOLIC PANEL: CPT

## 2018-01-10 PROCEDURE — 700102 HCHG RX REV CODE 250 W/ 637 OVERRIDE(OP): Performed by: HOSPITALIST

## 2018-01-10 PROCEDURE — 99223 1ST HOSP IP/OBS HIGH 75: CPT | Performed by: HOSPITALIST

## 2018-01-10 PROCEDURE — 99285 EMERGENCY DEPT VISIT HI MDM: CPT

## 2018-01-10 PROCEDURE — 36415 COLL VENOUS BLD VENIPUNCTURE: CPT

## 2018-01-10 PROCEDURE — 83036 HEMOGLOBIN GLYCOSYLATED A1C: CPT

## 2018-01-10 PROCEDURE — 770006 HCHG ROOM/CARE - MED/SURG/GYN SEMI*

## 2018-01-10 PROCEDURE — 700105 HCHG RX REV CODE 258: Performed by: EMERGENCY MEDICINE

## 2018-01-10 PROCEDURE — A9270 NON-COVERED ITEM OR SERVICE: HCPCS | Performed by: INTERNAL MEDICINE

## 2018-01-10 PROCEDURE — 86235 NUCLEAR ANTIGEN ANTIBODY: CPT | Mod: 91

## 2018-01-10 PROCEDURE — 87086 URINE CULTURE/COLONY COUNT: CPT

## 2018-01-10 PROCEDURE — 96375 TX/PRO/DX INJ NEW DRUG ADDON: CPT

## 2018-01-10 PROCEDURE — 700111 HCHG RX REV CODE 636 W/ 250 OVERRIDE (IP): Performed by: HOSPITALIST

## 2018-01-10 PROCEDURE — 87040 BLOOD CULTURE FOR BACTERIA: CPT

## 2018-01-10 PROCEDURE — 700111 HCHG RX REV CODE 636 W/ 250 OVERRIDE (IP): Performed by: EMERGENCY MEDICINE

## 2018-01-10 PROCEDURE — 85652 RBC SED RATE AUTOMATED: CPT

## 2018-01-10 PROCEDURE — 85025 COMPLETE CBC W/AUTO DIFF WBC: CPT

## 2018-01-10 PROCEDURE — 700102 HCHG RX REV CODE 250 W/ 637 OVERRIDE(OP): Performed by: INTERNAL MEDICINE

## 2018-01-10 PROCEDURE — 81003 URINALYSIS AUTO W/O SCOPE: CPT

## 2018-01-10 RX ORDER — SODIUM CHLORIDE, SODIUM LACTATE, POTASSIUM CHLORIDE, CALCIUM CHLORIDE 600; 310; 30; 20 MG/100ML; MG/100ML; MG/100ML; MG/100ML
1000 INJECTION, SOLUTION INTRAVENOUS ONCE
Status: COMPLETED | OUTPATIENT
Start: 2018-01-10 | End: 2018-01-10

## 2018-01-10 RX ORDER — ASPIRIN 325 MG
325 TABLET ORAL DAILY
Status: ON HOLD | COMMUNITY
End: 2018-01-11

## 2018-01-10 RX ORDER — OXYCODONE HYDROCHLORIDE 10 MG/1
10 TABLET ORAL
Status: DISCONTINUED | OUTPATIENT
Start: 2018-01-10 | End: 2018-01-11 | Stop reason: HOSPADM

## 2018-01-10 RX ORDER — BISACODYL 10 MG
10 SUPPOSITORY, RECTAL RECTAL
Status: DISCONTINUED | OUTPATIENT
Start: 2018-01-10 | End: 2018-01-11 | Stop reason: HOSPADM

## 2018-01-10 RX ORDER — ONDANSETRON 2 MG/ML
4 INJECTION INTRAMUSCULAR; INTRAVENOUS ONCE
Status: COMPLETED | OUTPATIENT
Start: 2018-01-10 | End: 2018-01-10

## 2018-01-10 RX ORDER — ZOLPIDEM TARTRATE 5 MG/1
5 TABLET ORAL
Status: DISCONTINUED | OUTPATIENT
Start: 2018-01-10 | End: 2018-01-11 | Stop reason: HOSPADM

## 2018-01-10 RX ORDER — KETOROLAC TROMETHAMINE 30 MG/ML
30 INJECTION, SOLUTION INTRAMUSCULAR; INTRAVENOUS EVERY 6 HOURS
Status: DISPENSED | OUTPATIENT
Start: 2018-01-10 | End: 2018-01-11

## 2018-01-10 RX ORDER — CLONIDINE HYDROCHLORIDE 0.1 MG/1
0.1 TABLET ORAL EVERY 6 HOURS PRN
Status: DISCONTINUED | OUTPATIENT
Start: 2018-01-10 | End: 2018-01-11 | Stop reason: HOSPADM

## 2018-01-10 RX ORDER — OXYCODONE HYDROCHLORIDE 5 MG/1
5 TABLET ORAL
Status: DISCONTINUED | OUTPATIENT
Start: 2018-01-10 | End: 2018-01-10

## 2018-01-10 RX ORDER — OSELTAMIVIR PHOSPHATE 75 MG/1
75 CAPSULE ORAL EVERY 12 HOURS
Status: DISCONTINUED | OUTPATIENT
Start: 2018-01-10 | End: 2018-01-10

## 2018-01-10 RX ORDER — MORPHINE SULFATE 4 MG/ML
2 INJECTION, SOLUTION INTRAMUSCULAR; INTRAVENOUS
Status: DISCONTINUED | OUTPATIENT
Start: 2018-01-10 | End: 2018-01-10

## 2018-01-10 RX ORDER — AMOXICILLIN 250 MG
2 CAPSULE ORAL 2 TIMES DAILY
Status: DISCONTINUED | OUTPATIENT
Start: 2018-01-10 | End: 2018-01-11 | Stop reason: HOSPADM

## 2018-01-10 RX ORDER — OXYCODONE HYDROCHLORIDE 5 MG/1
2.5 TABLET ORAL
Status: DISCONTINUED | OUTPATIENT
Start: 2018-01-10 | End: 2018-01-10

## 2018-01-10 RX ORDER — CLOPIDOGREL BISULFATE 75 MG/1
75 TABLET ORAL DAILY
Status: DISCONTINUED | OUTPATIENT
Start: 2018-01-10 | End: 2018-01-11 | Stop reason: HOSPADM

## 2018-01-10 RX ORDER — OXYCODONE HYDROCHLORIDE 5 MG/1
5 TABLET ORAL ONCE
Status: COMPLETED | OUTPATIENT
Start: 2018-01-10 | End: 2018-01-10

## 2018-01-10 RX ORDER — PROMETHAZINE HYDROCHLORIDE 25 MG/1
12.5-25 SUPPOSITORY RECTAL EVERY 4 HOURS PRN
Status: DISCONTINUED | OUTPATIENT
Start: 2018-01-10 | End: 2018-01-11 | Stop reason: HOSPADM

## 2018-01-10 RX ORDER — POLYETHYLENE GLYCOL 3350 17 G/17G
1 POWDER, FOR SOLUTION ORAL
Status: DISCONTINUED | OUTPATIENT
Start: 2018-01-10 | End: 2018-01-11 | Stop reason: HOSPADM

## 2018-01-10 RX ORDER — ATORVASTATIN CALCIUM 40 MG/1
40 TABLET, FILM COATED ORAL
Status: DISCONTINUED | OUTPATIENT
Start: 2018-01-10 | End: 2018-01-11 | Stop reason: HOSPADM

## 2018-01-10 RX ORDER — SODIUM CHLORIDE 9 MG/ML
INJECTION, SOLUTION INTRAVENOUS CONTINUOUS
Status: DISCONTINUED | OUTPATIENT
Start: 2018-01-10 | End: 2018-01-11 | Stop reason: HOSPADM

## 2018-01-10 RX ORDER — ONDANSETRON 4 MG/1
4 TABLET, ORALLY DISINTEGRATING ORAL EVERY 4 HOURS PRN
Status: DISCONTINUED | OUTPATIENT
Start: 2018-01-10 | End: 2018-01-11 | Stop reason: HOSPADM

## 2018-01-10 RX ORDER — ONDANSETRON 2 MG/ML
4 INJECTION INTRAMUSCULAR; INTRAVENOUS EVERY 4 HOURS PRN
Status: DISCONTINUED | OUTPATIENT
Start: 2018-01-10 | End: 2018-01-11 | Stop reason: HOSPADM

## 2018-01-10 RX ORDER — HYDROMORPHONE HYDROCHLORIDE 2 MG/ML
1 INJECTION, SOLUTION INTRAMUSCULAR; INTRAVENOUS; SUBCUTANEOUS
Status: DISCONTINUED | OUTPATIENT
Start: 2018-01-10 | End: 2018-01-11 | Stop reason: HOSPADM

## 2018-01-10 RX ORDER — CEFTRIAXONE 2 G/1
2 INJECTION, POWDER, FOR SOLUTION INTRAMUSCULAR; INTRAVENOUS ONCE
Status: COMPLETED | OUTPATIENT
Start: 2018-01-10 | End: 2018-01-10

## 2018-01-10 RX ORDER — OXYCODONE HYDROCHLORIDE 10 MG/1
20 TABLET ORAL
Status: DISCONTINUED | OUTPATIENT
Start: 2018-01-10 | End: 2018-01-11 | Stop reason: HOSPADM

## 2018-01-10 RX ORDER — PROMETHAZINE HYDROCHLORIDE 25 MG/1
12.5-25 TABLET ORAL EVERY 4 HOURS PRN
Status: DISCONTINUED | OUTPATIENT
Start: 2018-01-10 | End: 2018-01-11 | Stop reason: HOSPADM

## 2018-01-10 RX ORDER — CELECOXIB 200 MG/1
200 CAPSULE ORAL 2 TIMES DAILY WITH MEALS
Status: DISCONTINUED | OUTPATIENT
Start: 2018-01-11 | End: 2018-01-11 | Stop reason: HOSPADM

## 2018-01-10 RX ORDER — ACETAMINOPHEN 500 MG
1000 TABLET ORAL EVERY 6 HOURS
Status: DISCONTINUED | OUTPATIENT
Start: 2018-01-10 | End: 2018-01-11 | Stop reason: HOSPADM

## 2018-01-10 RX ORDER — ACETAMINOPHEN 325 MG/1
650 TABLET ORAL EVERY 6 HOURS PRN
Status: DISCONTINUED | OUTPATIENT
Start: 2018-01-10 | End: 2018-01-11 | Stop reason: HOSPADM

## 2018-01-10 RX ADMIN — KETOROLAC TROMETHAMINE 30 MG: 30 INJECTION, SOLUTION INTRAMUSCULAR at 13:19

## 2018-01-10 RX ADMIN — CEFEPIME 2 G: 2 INJECTION, POWDER, FOR SOLUTION INTRAMUSCULAR; INTRAVENOUS at 11:37

## 2018-01-10 RX ADMIN — ACETAMINOPHEN 650 MG: 325 TABLET, FILM COATED ORAL at 06:12

## 2018-01-10 RX ADMIN — ASPIRIN 81 MG: 81 TABLET, COATED ORAL at 08:40

## 2018-01-10 RX ADMIN — ACETAMINOPHEN 1000 MG: 500 TABLET ORAL at 18:00

## 2018-01-10 RX ADMIN — ACETAMINOPHEN 650 MG: 325 TABLET, FILM COATED ORAL at 11:44

## 2018-01-10 RX ADMIN — OXYCODONE HYDROCHLORIDE 5 MG: 5 TABLET ORAL at 08:40

## 2018-01-10 RX ADMIN — GENTAMICIN SULFATE 300 MG: 40 INJECTION, SOLUTION INTRAMUSCULAR; INTRAVENOUS at 06:44

## 2018-01-10 RX ADMIN — CEFTRIAXONE SODIUM 2 G: 2 INJECTION, POWDER, FOR SOLUTION INTRAMUSCULAR; INTRAVENOUS at 04:34

## 2018-01-10 RX ADMIN — OXYCODONE HYDROCHLORIDE 5 MG: 5 TABLET ORAL at 11:36

## 2018-01-10 RX ADMIN — KETOROLAC TROMETHAMINE 30 MG: 30 INJECTION, SOLUTION INTRAMUSCULAR at 18:00

## 2018-01-10 RX ADMIN — OXYCODONE HYDROCHLORIDE 5 MG: 5 TABLET ORAL at 11:47

## 2018-01-10 RX ADMIN — ONDANSETRON 4 MG: 2 INJECTION INTRAMUSCULAR; INTRAVENOUS at 02:50

## 2018-01-10 RX ADMIN — ONDANSETRON 4 MG: 2 INJECTION INTRAMUSCULAR; INTRAVENOUS at 18:17

## 2018-01-10 RX ADMIN — CEFEPIME 2 G: 2 INJECTION, POWDER, FOR SOLUTION INTRAMUSCULAR; INTRAVENOUS at 21:12

## 2018-01-10 RX ADMIN — SODIUM CHLORIDE: 9 INJECTION, SOLUTION INTRAVENOUS at 18:09

## 2018-01-10 RX ADMIN — ATORVASTATIN CALCIUM 40 MG: 40 TABLET, FILM COATED ORAL at 21:11

## 2018-01-10 RX ADMIN — OXYCODONE HYDROCHLORIDE 10 MG: 10 TABLET ORAL at 15:57

## 2018-01-10 RX ADMIN — SODIUM CHLORIDE: 9 INJECTION, SOLUTION INTRAVENOUS at 06:12

## 2018-01-10 RX ADMIN — ZOLPIDEM TARTRATE 5 MG: 5 TABLET, FILM COATED ORAL at 21:11

## 2018-01-10 RX ADMIN — SODIUM CHLORIDE, POTASSIUM CHLORIDE, SODIUM LACTATE AND CALCIUM CHLORIDE 1000 ML: 600; 310; 30; 20 INJECTION, SOLUTION INTRAVENOUS at 03:05

## 2018-01-10 RX ADMIN — FENTANYL CITRATE 50 MCG: 50 INJECTION, SOLUTION INTRAMUSCULAR; INTRAVENOUS at 02:50

## 2018-01-10 ASSESSMENT — PAIN SCALES - GENERAL
PAINLEVEL_OUTOF10: 7
PAINLEVEL_OUTOF10: 9
PAINLEVEL_OUTOF10: 9
PAINLEVEL_OUTOF10: 3
PAINLEVEL_OUTOF10: 10
PAINLEVEL_OUTOF10: 4

## 2018-01-10 ASSESSMENT — ENCOUNTER SYMPTOMS
CONSTIPATION: 0
DIARRHEA: 0
CHILLS: 1
FEVER: 1
VOMITING: 1
PALPITATIONS: 0
COUGH: 0
SPUTUM PRODUCTION: 0
SHORTNESS OF BREATH: 0
DOUBLE VISION: 0
HEADACHES: 0
NAUSEA: 1
ABDOMINAL PAIN: 1
BLURRED VISION: 0

## 2018-01-10 ASSESSMENT — PATIENT HEALTH QUESTIONNAIRE - PHQ9
SUM OF ALL RESPONSES TO PHQ QUESTIONS 1-9: 0
1. LITTLE INTEREST OR PLEASURE IN DOING THINGS: NOT AT ALL
2. FEELING DOWN, DEPRESSED, IRRITABLE, OR HOPELESS: NOT AT ALL
SUM OF ALL RESPONSES TO PHQ9 QUESTIONS 1 AND 2: 0

## 2018-01-10 ASSESSMENT — LIFESTYLE VARIABLES
EVER_SMOKED: YES
ALCOHOL_USE: NO

## 2018-01-10 NOTE — PROGRESS NOTES
Received shift report from lani RN and assumed care of this pt at 0715. Pt AOx4, calm, resting in bed. Pt denies pain. Pt is up self, steady. Does call appropriately. Bed alarm is off per pt refusal. PIV assessed and is patent, CDI, running IVF. Pt is on RA with SaO2 >90%. Pt denies and needs, questions, concerns at this time. Discussed POC for pain management, monitoring VS/labs, mobility, day time routine, comfort, and safety. Patient has call light and personal belongings within reach. Safety and fall precautions in place. Reviewed orders, notes, labs, and test results. Hourly rounding in place with RN rounding on odd hours and CNA on even hours.

## 2018-01-10 NOTE — DIETARY
Nutrition Services:  Pt seen for poor PO/wt loss per admit screen. Pt reports poor intake and wt loss for past several months d/t decreased appetite and related to current illness of pyelonephritis, UBW is ~ 220#. Current wt 92.3 kg/ 203 lbs per stand up scale wt today. Pt with 8% wt loss and decreased intake over past couple months which are characteristics of non severe (moderate) malnutrition. Pt reports PO intake remains poor while in hospital, minimal intake today. Discussed tips for optimizing nutrition, pt agreeable to snacks. Labs/Meds reviewed. No skin breakdown documented.     Plan/Rec: Will add high protein snacks three times daily.  Nutrition Representative to see pt daily for snacks/meal preferences as appropriate with diet order. RD following.

## 2018-01-10 NOTE — ASSESSMENT & PLAN NOTE
Right hydronephrosis  Likely due to recently replaced nephrostomy.  Antibiotic therapy and follow up culture results to adjust.

## 2018-01-10 NOTE — TELEPHONE ENCOUNTER
She does not have a cardiac stent and her Plavix is for medical therapy only. Therefore it is completely reasonable to stop it if there is any bleeding issue whatsoever until the bleeding issue is resolved.    I hope she feels better.

## 2018-01-10 NOTE — ASSESSMENT & PLAN NOTE
Due to possible scar tissue. Has had multiple bouts of pyelonephritis/UTI from the same.  Nephrostomy tube in place. Plan for further outpatient specialty follow up at [place].

## 2018-01-10 NOTE — ASSESSMENT & PLAN NOTE
Patient is intolerant to morphine causes itching  5 mg oxycodone thus far has not addressed her pain adequately  Pain orders revised  Morphine intolerance added to her allergy list

## 2018-01-10 NOTE — PROGRESS NOTES
Admitted earlier this morning-  47-year-old  female with complicated urologic history.  She originally had obstructive nephrolithiasis, she then had distal ureteral stricture presumed secondary to scar tissue  More recently she was diagnosed with proximal ureteral stricture  She has had stents  She recently had nephrostomy  She was having bloody output from nephrostomy and fevers greater than 102 with increasing flank pain  was already on Omnicef when she presented    RN indicates patient does not tolerate morphine-itching  Discussed plan of care, ALOS with patient-spouse at bedside   Clarified diagnostic studies  Pain regimen altered.  Output from urostomy is now clear yellow

## 2018-01-10 NOTE — ASSESSMENT & PLAN NOTE
DURING PRIOR ADMISSION- presumed demand ischemia.  Follows cardiology who has recommended anticoagulation for at least 1 year.

## 2018-01-10 NOTE — ED PROVIDER NOTES
ED Provider Note    ER PROVIDER NOTE      CHIEF COMPLAINT  Chief Complaint   Patient presents with   • Post-Op Pain     replaced RT side nephrostomy yesterday (1/9)   • Fever     102.9F at home, took tylenol    • Abdominal Pain     RT sided abd pain   • Flank Pain     RT side at surgery site       HPI  Aurora Tan is a 47 y.o. female who presents to the emergency department complaining ofRight sided flank pain as well as fever. Patient had her right sided nephrostomy tube replaced yesterday, has had some increasing pain at that site since. She does have some sharp radiation to the front as well She also developed fever yesterday that has persisted to today, 102.9 at home. She denies any carol dysuria but states has had a strong odor to her urine and she actually urinates, not from the nephrostomy tube and bag.  She's had some nausea as well as an episode of emesis. She denies any cough, chest pain, sore throat or other symptoms    REVIEW OF SYSTEMS  Pertinent positives include fever. Pertinent negatives include no cough. See HPI for details. All other systems reviewed and are negative.    PAST MEDICAL HISTORY   has a past medical history of Heart attack (11/03/2017) and Renal disorder.    SURGICAL HISTORY   has a past surgical history that includes abdominal hysterectomy total; abdominal exploration; cystoscopy (Right, 9/17/2017); ureteroscopy (Right, 9/17/2017); lasertripsy (Right, 9/17/2017); stent placement (Right, 9/17/2017); ureteroscopy (Right, 11/7/2017); lasertripsy (Right, 11/7/2017); urethral dilatation (Right, 11/7/2017); and cystoscopy stent placement (Right, 11/7/2017).    FAMILY HISTORY  Family History   Problem Relation Age of Onset   • Cancer Mother    • Cancer Father    • Heart Disease Brother    • Diabetes Maternal Grandmother    • Cancer Maternal Grandfather    • Cancer Paternal Grandmother    • Cancer Paternal Grandfather        SOCIAL HISTORY  Social History     Social History   •  "Marital status:      Spouse name: N/A   • Number of children: N/A   • Years of education: N/A     Social History Main Topics   • Smoking status: Former Smoker     Packs/day: 0.25     Years: 10.00     Types: Cigarettes     Quit date: 4/13/2013   • Smokeless tobacco: Never Used   • Alcohol use Yes      Comment: socially/rare   • Drug use: No   • Sexual activity: Not on file     Other Topics Concern   • Not on file     Social History Narrative   • No narrative on file      History   Drug Use No       CURRENT MEDICATIONS  Home Medications    **Home medications have not yet been reviewed for this encounter**         ALLERGIES  Allergies   Allergen Reactions   • Levaquin Hives, Shortness of Breath and Itching       PHYSICAL EXAM  VITAL SIGNS: /68   Pulse 92   Temp 37.9 °C (100.2 °F) Comment: 100.5F Oral  Resp (!) 0   Ht 1.702 m (5' 7\")   Wt 92.3 kg (203 lb 7.8 oz)   SpO2 90%   BMI 31.87 kg/m²   Pulse ox interpretation: I interpret this pulse ox as normal.    Constitutional: Alert, Uncomfortable  HENT: No signs of trauma, Bilateral external ears normal, Nose normal, mildly dry mucous membranes.   Eyes: Pupils are equal and reactive, Conjunctiva normal, Non-icteric.   Neck: Normal range of motion, No tenderness, Supple, No stridor.   Lymphatic: No lymphadenopathy noted.   Cardiovascular: Tachycardic, no murmurs.   Thorax & Lungs: Normal breath sounds, No respiratory distress, No wheezing, No chest tenderness.   Abdomen: Bowel sounds normal, Soft, No tenderness, No masses, No pulsatile masses. No peritoneal signs.  Skin: Warm, Dry, No erythema, No rash.   Back: No bony tenderness, right-sided nephrostomy tube in place, no erythema, blood-tinged urine in draining into the bag, mildly tender on site, no left-sided CVA tenderness  Extremities: Intact distal pulses, No edema, No tenderness, No cyanosis,   Musculoskeletal: Good range of motion in all major joints. No tenderness to palpation or major " "deformities noted.   Neurologic: Alert , Normal motor function, Normal sensory function, No focal deficits noted.   Psychiatric: Affect normal, Judgment normal, Mood normal.     DIAGNOSTIC STUDIES / PROCEDURES        LABS  Labs Reviewed   CBC WITH DIFFERENTIAL - Abnormal; Notable for the following:        Result Value    MCHC 33.0 (*)     Neutrophils-Polys 83.60 (*)     Lymphocytes 11.40 (*)     Neutrophils (Absolute) 8.50 (*)     All other components within normal limits   COMP METABOLIC PANEL - Abnormal; Notable for the following:     Sodium 134 (*)     All other components within normal limits   URINALYSIS - Abnormal; Notable for the following:     Character Cloudy (*)     Protein 100 (*)     Leukocyte Esterase Large (*)     Occult Blood Large (*)     All other components within normal limits    Narrative:     Indication for culture:->Emergency Room Patient   URINE MICROSCOPIC (W/UA) - Abnormal; Notable for the following:     RBC 20-50 (*)     All other components within normal limits    Narrative:     Indication for culture:->Emergency Room Patient   ESTIMATED GFR - Abnormal; Notable for the following:     GFR If Non  52 (*)     All other components within normal limits   LACTIC ACID   BLOOD CULTURE    Narrative:     1 of 2 for Blood Culture x 2 sites order. Per Hospital  Policy: Only change Specimen Src: to \"Line\" if specified by  physician order.   URINE CULTURE(NEW)    Narrative:     Indication for culture:->Emergency Room Patient   URINALYSIS,CULTURE IF INDICATED   LACTIC ACID   BLOOD CULTURE       All labs reviewed by me.    RADIOLOGY  No orders to display     The radiologist's interpretation of all radiological studies have been reviewed by me.    COURSE & MEDICAL DECISION MAKING  Nursing notes, VS, PMSFHx reviewed in chart.    1:50 AM Patient seen and examined at bedside. Patient will be treated withFentanyl and Zofran, as well as IV fluids as she does appear dehydrated with her " tachycardia and dry mucous membranes as well as her nausea and vomiting. Ordered for sepsis bundle to evaluate her symptoms.     Patient reevaluated, she is much more comfortable, still pending urinalysis    Urinalysis resulted, discussed case with Dr. Pardo, he recommends admission and IV antibiotics    Ordered for ceftriaxone, discussed plan for admission and course with patient    Discussed case with Dr. Baron for admission    Decision Making:  This is a 47 y.o. female presenting with fever and flank pain after recent nephrostomy tube change. She does appear to have an upper urinary tract/kidney infection and with her indwelling nephrostomy tube, she'll be admitted for IV antibiotics and further care.  Initially tachycardic as well as febrile but no hypotension or lactic acidosis to suggest severe sepsis at this time      Patient admitted in stable condition    FINAL IMPRESSION  1. Pyelonephritis         The note accurately reflects work and decisions made by me.  Darren Rivera  1/10/2018  5:35 AM

## 2018-01-10 NOTE — H&P
Hospital Medicine History and Physical    Date of Service  1/10/2018    Chief Complaint  Chief Complaint   Patient presents with   • Post-Op Pain     replaced RT side nephrostomy yesterday (1/9)   • Fever     102.9F at home, took tylenol    • Abdominal Pain     RT sided abd pain   • Flank Pain     RT side at surgery site       History of Presenting Illness  47 y.o. female With history of recurrent urinary tract infection in the setting of obstructive uropathy, was in her usual state of health until approximately one day prior to admission. She was scheduled to have replacement of her nephrostomy tube on the right side, and post-procedurally she noted severe fever. This concerned her, as she has in the past had sepsis from the same, and presented to the emergency department for further evaluation. She currently denies any headache or vision changes, she has no chest pain, she does feel short of breath, she denies any nausea or vomiting currently but did have some earlier, she denies any diarrhea or constipation. She has recurrent fever and chills.       Primary Care Physician  Shawnee Shaver, A.P.R.N.    Consultants  None    Code Status  Full code    Review of Systems  Review of Systems   Constitutional: Positive for chills and fever.   Eyes: Negative for blurred vision and double vision.   Respiratory: Negative for cough, sputum production and shortness of breath.    Cardiovascular: Negative for chest pain and palpitations.   Gastrointestinal: Positive for abdominal pain, nausea and vomiting. Negative for constipation and diarrhea.   Genitourinary: Negative for dysuria.   Neurological: Negative for headaches.        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       Surgical History  Past Surgical History:   Procedure Laterality Date   • URETEROSCOPY Right 11/7/2017    Procedure: URETEROSCOPY;   Surgeon: Kiet Eid M.D.;  Location: SURGERY [facility];  Service: Urology   • LASERTRIPSY Right 11/7/2017    Procedure: LASERTRIPSY- LITHO;  Surgeon: Kiet Eid M.D.;  Location: SURGERY [facility];  Service: Urology   • URETHRAL DILATATION Right 11/7/2017    Procedure: URETHRAL DILATATION;  Surgeon: Kiet Eid M.D.;  Location: SURGERY [facility];  Service: Urology   • CYSTOSCOPY STENT PLACEMENT Right 11/7/2017    Procedure: CYSTOSCOPY STENT PLACEMENT;  Surgeon: Kiet Eid M.D.;  Location: SURGERY [facility];  Service: Urology   • CYSTOSCOPY Right 9/17/2017    Procedure: CYSTOSCOPY;  Surgeon: Kiet Eid M.D.;  Location: SURGERY [facility];  Service: Urology   • URETEROSCOPY Right 9/17/2017    Procedure: URETEROSCOPY;  Surgeon: Kiet Eid M.D.;  Location: SURGERY [facility];  Service: Urology   • LASERTRIPSY Right 9/17/2017    Procedure: LASERTRIPSY;  Surgeon: Kiet Eid M.D.;  Location: SURGERY [facility];  Service: Urology   • STENT PLACEMENT Right 9/17/2017    Procedure: STENT PLACEMENT;  Surgeon: Kiet Eid M.D.;  Location: SURGERY [facility];  Service: Urology   • ABDOMINAL EXPLORATION     • ABDOMINAL HYSTERECTOMY TOTAL         Medications  No current facility-administered medications on file prior to encounter.      Current Outpatient Prescriptions on File Prior to Encounter   Medication Sig Dispense Refill   • oseltamivir (TAMIFLU) 75 MG Cap Take 1 Cap by mouth every 12 hours. 8 Cap 0   • acetaminophen (TYLENOL) 325 MG Tab Take 2 Tabs by mouth every 6 hours as needed for Fever or Moderate Pain (Temp >101.5F). 30 Tab 0   • clopidogrel (PLAVIX) 75 MG Tab Take 1 Tab by mouth every day. 30 Tab 3   • cefdinir (OMNICEF) 250 MG/5ML suspension Take 5 mL by mouth 2 times a day. 100 mL 0   • alprazolam (XANAX) 0.25 MG Tab Take 1-2 Tabs by mouth at bedtime as needed for Sleep. 30 Tab 0   • aspirin EC 81 MG EC tablet  Take 1 Tab by mouth every day. 30 Tab 3   • atorvastatin (LIPITOR) 40 MG Tab Take 1 Tab by mouth every bedtime. 30 Tab 3   • estradiol (MINIVELLE) 0.05 MG/24HR PATCH BIWEEKLY Apply 1 Patch to skin as directed every Wednesday.         Family History  Family History   Problem Relation Age of Onset   • Cancer Mother    • Cancer Father    • Heart Disease Brother    • Diabetes Maternal Grandmother    • Cancer Maternal Grandfather    • Cancer Paternal Grandmother    • Cancer Paternal Grandfather        Social History  Social History   Substance Use Topics   • Smoking status: Former Smoker     Packs/day: 0.25     Years: 10.00     Types: Cigarettes     Quit date: 2013   • Smokeless tobacco: Never Used   • Alcohol use Yes      Comment: socially/rare       Allergies  Allergies   Allergen Reactions   • Levaquin Hives, Shortness of Breath and Itching        Physical Exam  Laboratory   Hemodynamics  Temp (24hrs), Av.9 °C (100.2 °F), Min:37.9 °C (100.2 °F), Max:37.9 °C (100.2 °F)   Temperature: 37.9 °C (100.2 °F) (100.5F Oral)  Pulse  Av  Min: 95  Max: 103 Heart Rate (Monitored): 97  Blood Pressure: 123/68, NIBP: 118/60      Respiratory      Respiration: (!) 22, Pulse Oximetry: 95 %             Physical Exam   Constitutional: She is oriented to person, place, and time. She appears well-developed and well-nourished. No distress.   HENT:   Head: Normocephalic and atraumatic.   Eyes: Pupils are equal, round, and reactive to light.   Neck: Normal range of motion. Neck supple. No tracheal deviation present. No thyromegaly present.   Cardiovascular: Normal rate, regular rhythm and normal heart sounds.  Exam reveals no gallop and no friction rub.    No murmur heard.  Pulmonary/Chest: Effort normal and breath sounds normal. No respiratory distress. She has no wheezes. She has no rales.   Abdominal: Soft. Bowel sounds are normal. She exhibits no distension. There is no tenderness. There is no rebound.   Nephrostomy tube in  place on right side   Musculoskeletal: Normal range of motion. She exhibits no edema.   Lymphadenopathy:     She has no cervical adenopathy.   Neurological: She is alert and oriented to person, place, and time. No cranial nerve deficit.   Skin: Skin is warm and dry. She is not diaphoretic.   Nursing note and vitals reviewed.      Recent Labs      01/09/18   0905  01/10/18   0215   WBC  10.3  10.2   RBC  4.29  4.71   HEMOGLOBIN  12.9  13.8   HEMATOCRIT  37.7  41.8   MCV  87.9  88.7   MCH  30.1  29.3   MCHC  34.2  33.0*   RDW  40.4  41.3   PLATELETCT  335  313   MPV  11.0  10.9     Recent Labs      01/10/18   0215   SODIUM  134*   POTASSIUM  4.1   CHLORIDE  101   CO2  25   GLUCOSE  90   BUN  15   CREATININE  1.12   CALCIUM  9.4     Recent Labs      01/10/18   0215   ALTSGPT  13   ASTSGOT  20   ALKPHOSPHAT  78   TBILIRUBIN  0.7   GLUCOSE  90     Recent Labs      01/09/18   0905   INR  0.99             Lab Results   Component Value Date    TROPONINI 0.55 (H) 11/04/2017     Urinalysis:    Lab Results  Component Value Date/Time   SPECGRAVITY 1.010 01/10/2018 0215   SPECGRAVITY 1.006 01/10/2018 0215   GLUCOSEUR Negative 01/10/2018 0215   GLUCOSEUR Negative 01/10/2018 0215   KETONES Negative 01/10/2018 0215   KETONES Negative 01/10/2018 0215   NITRITE Negative 01/10/2018 0215   NITRITE Negative 01/10/2018 0215   WBCURINE Packed WBC 01/10/2018 0215   RBCURINE 20-50 (A) 01/10/2018 0215   BACTERIA Negative 01/10/2018 0215   EPITHELCELL Negative 01/10/2018 0215        Imaging  No new imaging for review.    Assessment/Plan     I anticipate this patient will require at least two midnights for appropriate medical management, necessitating inpatient admission.    * Pyelonephritis   Assessment & Plan    Likely due to recently replaced nephrostomy.  Antibiotic therapy and follow up culture results to adjust.         Obstructive uropathy- (present on admission)   Assessment & Plan    Due to possible scar tissue. Has had multiple bouts  of pyelonephritis/UTI from the same.  Nephrostomy tube in place. Plan for further outpatient specialty follow up at [place].          NSTEMI (non-ST elevated myocardial infarction) (CMS-HCC)- (present on admission)   Assessment & Plan    Likely demand ischemia from sepsis on prior admission.  Follows cardiology who has recommended anticoagulation for at least 1 year.          Obesity (BMI 30-39.9)- (present on admission)   Assessment & Plan    Body mass index is 31.87 kg/m².              VTE prophylaxis: SCDS Lovenox.

## 2018-01-10 NOTE — PROGRESS NOTES
Med rec complete per pt. Pt states she was advised to resume plavix 2 days after procedure, however, was told to stop by cardiologist.

## 2018-01-10 NOTE — ED NOTES
"Chief Complaint   Patient presents with   • Post-Op Pain     replaced RT side nephrostomy yesterday (1/9)   • Fever     102.9F at home, took tylenol    • Abdominal Pain     RT sided abd pain   • Flank Pain     RT side at surgery site     Pt amb to triage with above.   HR noted. Pt returned to lobby. Educated on triage process and to inform staff of any changes.     /68   Pulse (!) 103   Temp 37.9 °C (100.2 °F) Comment: 100.5F Oral  Resp 20   Ht 1.702 m (5' 7\")   Wt 92.3 kg (203 lb 7.8 oz)   SpO2 97%   BMI 31.87 kg/m²     "

## 2018-01-10 NOTE — ASSESSMENT & PLAN NOTE
This is sepsis (without associated acute organ dysfunction).   Patient met criteria- temperature, HR, BP  Source complicated UTI and Pyelonephritis

## 2018-01-11 ENCOUNTER — APPOINTMENT (OUTPATIENT)
Dept: RADIOLOGY | Facility: MEDICAL CENTER | Age: 48
DRG: 871 | End: 2018-01-11
Attending: UROLOGY
Payer: COMMERCIAL

## 2018-01-11 VITALS
HEIGHT: 67 IN | TEMPERATURE: 98.8 F | RESPIRATION RATE: 16 BRPM | BODY MASS INDEX: 31.94 KG/M2 | HEART RATE: 74 BPM | WEIGHT: 203.48 LBS | SYSTOLIC BLOOD PRESSURE: 130 MMHG | OXYGEN SATURATION: 97 % | DIASTOLIC BLOOD PRESSURE: 62 MMHG

## 2018-01-11 LAB
ANION GAP SERPL CALC-SCNC: 6 MMOL/L (ref 0–11.9)
BASOPHILS # BLD AUTO: 0.6 % (ref 0–1.8)
BASOPHILS # BLD: 0.03 K/UL (ref 0–0.12)
BUN SERPL-MCNC: 10 MG/DL (ref 8–22)
CALCIUM SERPL-MCNC: 8.3 MG/DL (ref 8.5–10.5)
CHLORIDE SERPL-SCNC: 106 MMOL/L (ref 96–112)
CO2 SERPL-SCNC: 25 MMOL/L (ref 20–33)
CREAT SERPL-MCNC: 1.04 MG/DL (ref 0.5–1.4)
EOSINOPHIL # BLD AUTO: 0.19 K/UL (ref 0–0.51)
EOSINOPHIL NFR BLD: 3.9 % (ref 0–6.9)
ERYTHROCYTE [DISTWIDTH] IN BLOOD BY AUTOMATED COUNT: 41.8 FL (ref 35.9–50)
EST. AVERAGE GLUCOSE BLD GHB EST-MCNC: 100 MG/DL
GLUCOSE SERPL-MCNC: 103 MG/DL (ref 65–99)
HBA1C MFR BLD: 5.1 % (ref 0–5.6)
HCT VFR BLD AUTO: 35.7 % (ref 37–47)
HGB BLD-MCNC: 11.7 G/DL (ref 12–16)
IMM GRANULOCYTES # BLD AUTO: 0.02 K/UL (ref 0–0.11)
IMM GRANULOCYTES NFR BLD AUTO: 0.4 % (ref 0–0.9)
LYMPHOCYTES # BLD AUTO: 0.99 K/UL (ref 1–4.8)
LYMPHOCYTES NFR BLD: 20.1 % (ref 22–41)
MCH RBC QN AUTO: 29.5 PG (ref 27–33)
MCHC RBC AUTO-ENTMCNC: 32.8 G/DL (ref 33.6–35)
MCV RBC AUTO: 89.9 FL (ref 81.4–97.8)
MONOCYTES # BLD AUTO: 0.51 K/UL (ref 0–0.85)
MONOCYTES NFR BLD AUTO: 10.4 % (ref 0–13.4)
NEUTROPHILS # BLD AUTO: 3.18 K/UL (ref 2–7.15)
NEUTROPHILS NFR BLD: 64.6 % (ref 44–72)
NRBC # BLD AUTO: 0 K/UL
NRBC BLD-RTO: 0 /100 WBC
PLATELET # BLD AUTO: 245 K/UL (ref 164–446)
PMV BLD AUTO: 11.1 FL (ref 9–12.9)
POTASSIUM SERPL-SCNC: 3.7 MMOL/L (ref 3.6–5.5)
RBC # BLD AUTO: 3.97 M/UL (ref 4.2–5.4)
SODIUM SERPL-SCNC: 137 MMOL/L (ref 135–145)
WBC # BLD AUTO: 4.9 K/UL (ref 4.8–10.8)

## 2018-01-11 PROCEDURE — 700111 HCHG RX REV CODE 636 W/ 250 OVERRIDE (IP): Performed by: INTERNAL MEDICINE

## 2018-01-11 PROCEDURE — 36415 COLL VENOUS BLD VENIPUNCTURE: CPT

## 2018-01-11 PROCEDURE — 700102 HCHG RX REV CODE 250 W/ 637 OVERRIDE(OP): Performed by: INTERNAL MEDICINE

## 2018-01-11 PROCEDURE — 700101 HCHG RX REV CODE 250: Performed by: INTERNAL MEDICINE

## 2018-01-11 PROCEDURE — 85025 COMPLETE CBC W/AUTO DIFF WBC: CPT

## 2018-01-11 PROCEDURE — A9270 NON-COVERED ITEM OR SERVICE: HCPCS | Performed by: INTERNAL MEDICINE

## 2018-01-11 PROCEDURE — 99239 HOSP IP/OBS DSCHRG MGMT >30: CPT | Performed by: INTERNAL MEDICINE

## 2018-01-11 PROCEDURE — 700105 HCHG RX REV CODE 258: Performed by: HOSPITALIST

## 2018-01-11 PROCEDURE — A9270 NON-COVERED ITEM OR SERVICE: HCPCS | Performed by: HOSPITALIST

## 2018-01-11 PROCEDURE — 700117 HCHG RX CONTRAST REV CODE 255: Performed by: UROLOGY

## 2018-01-11 PROCEDURE — 80048 BASIC METABOLIC PNL TOTAL CA: CPT

## 2018-01-11 PROCEDURE — 700102 HCHG RX REV CODE 250 W/ 637 OVERRIDE(OP): Performed by: HOSPITALIST

## 2018-01-11 PROCEDURE — 50431 NJX PX NFROSGRM &/URTRGRM: CPT | Mod: RT

## 2018-01-11 RX ORDER — GRANULES FOR ORAL 3 G/1
3 POWDER ORAL ONCE
Status: COMPLETED | OUTPATIENT
Start: 2018-01-11 | End: 2018-01-11

## 2018-01-11 RX ADMIN — CEFEPIME 2 G: 2 INJECTION, POWDER, FOR SOLUTION INTRAMUSCULAR; INTRAVENOUS at 09:07

## 2018-01-11 RX ADMIN — ACETAMINOPHEN 1000 MG: 500 TABLET ORAL at 00:31

## 2018-01-11 RX ADMIN — FOSFOMYCIN TROMETHAMINE 3 G: 3 POWDER ORAL at 15:24

## 2018-01-11 RX ADMIN — OXYCODONE HYDROCHLORIDE 10 MG: 10 TABLET ORAL at 14:32

## 2018-01-11 RX ADMIN — OXYCODONE HYDROCHLORIDE 10 MG: 10 TABLET ORAL at 06:13

## 2018-01-11 RX ADMIN — ASPIRIN 81 MG: 81 TABLET, COATED ORAL at 08:58

## 2018-01-11 RX ADMIN — KETOROLAC TROMETHAMINE 30 MG: 30 INJECTION, SOLUTION INTRAMUSCULAR at 00:31

## 2018-01-11 RX ADMIN — IOHEXOL 30 ML: 300 INJECTION, SOLUTION INTRAVENOUS at 10:50

## 2018-01-11 RX ADMIN — SODIUM CHLORIDE: 9 INJECTION, SOLUTION INTRAVENOUS at 06:14

## 2018-01-11 ASSESSMENT — PAIN SCALES - GENERAL
PAINLEVEL_OUTOF10: 8
PAINLEVEL_OUTOF10: 7
PAINLEVEL_OUTOF10: ASSUMED PAIN PRESENT
PAINLEVEL_OUTOF10: 3

## 2018-01-11 NOTE — TELEPHONE ENCOUNTER
L/m educating pt on Dr. Hernandez's recommendations. Informed her to please call to discuss more in detail or if she has any questions.

## 2018-01-11 NOTE — CARE PLAN
Problem: Safety  Goal: Will remain free from falls  Outcome: PROGRESSING AS EXPECTED  Pt has had no fall. Pt is up self with a steady gait.    Problem: Venous Thromboembolism (VTW)/Deep Vein Thrombosis (DVT) Prevention:  Goal: Patient will participate in Venous Thrombosis (VTE)/Deep Vein Thrombosis (DVT)Prevention Measures  Outcome: PROGRESSING AS EXPECTED  No s/s of DVT. Pt has SCDs on. Pt will start lovenox tomorrow. Pt walks occasionally.

## 2018-01-11 NOTE — WOUND TEAM
Wound consult placed for urostomy and patient has nephrostomy tube.  Discussed with staff RN directing her to follow drain protocol for care.  No involvement by wound team at this time.

## 2018-01-11 NOTE — PROGRESS NOTES
"pAt 0730 Patient was in tears requesting another room. Her roommate was on the phone scheduling a \"cruize\" and she \"can't take it anymore\". This patient has been upset throughout the day regarding the scheduling of the procedure, medications that have bee prescribed and still the roommate situation. Charge RN notified of the need to move and there is a plan. Pt informed of POC and agrees  "

## 2018-01-11 NOTE — PROGRESS NOTES
Reviewed patients complicate history and images   · S/p URS in September, 2017 for small 3mm ureteral stone, tight distal UO vs distal stricture dilated with scope.   · Developed MI in 11/2017 found to have hydroureter, no stone, thickening proximal ureter - possible stenosis?  · RPG showed no distal stricture, two proximal ureteral strictures which were passivley dilated with scope, stent place.  · Recurrent strictures and hydro after stent removed requiring neph tube on 12/2017.    A/P   47F s/p URS in 9/2017 with two short proximal ureteral strictures, neph tube in place, has not had stricture incision or dilation. Discussed options including balloon dilation vs laser incision of strictures from both antegrade and retrograde approaches, discussed back up procedures including ileal ureter/autotransplant.  Before planning these we need to get a good nephrostogram done to help better stage the strictures and operative planning. Discussed history with patient and cousre of stricture disease and the sometimes difficult nature of treating.  For now I will schedule nephrostogram for tomorrow and ask IR to get good images with distended pelvis and hopeful staging of at least the most proximal stricture. I noted rare etiologies, namely RPF and have ordered ESR, CRP and SHIRA, but I think this is unlikely cause.   - schedule nephrostogrma in AM with images saved  - ESR, CRP, SHIRA  - Will discuss endoscopic dilation/incision of stricture

## 2018-01-11 NOTE — CARE PLAN
Problem: Knowledge Deficit  Goal: Knowledge of disease process/condition, treatment plan, diagnostic tests, and medications will improve  Outcome: PROGRESSING AS EXPECTED  Urology MD in to see pt; Pt will be NPO 1/11 @ 0000 for IR procedure; Pt states understanding and agreement w POC    Problem: Pain Management  Goal: Pain level will decrease to patient's comfort goal  Outcome: PROGRESSING AS EXPECTED  Pt reports pain is well managed w oxycodone 10mg PO ~Q4 hours prn per MAR

## 2018-01-11 NOTE — CARE PLAN
Problem: Communication  Goal: The ability to communicate needs accurately and effectively will improve    Intervention: Educate patient and significant other/support system about the plan of care, procedures, treatments, medications and allow for questions  White board updated with [facility] staff and return time.  PT notified of hourly rounding and unit routine.   Appropriate signs in place at doorway for pt.       Problem: Discharge Barriers/Planning  Goal: Patient's continuum of care needs will be met  Outcome: PROGRESSING AS EXPECTED

## 2018-01-12 LAB
BACTERIA UR CULT: NORMAL
SIGNIFICANT IND 70042: NORMAL
SITE SITE: NORMAL
SOURCE SOURCE: NORMAL

## 2018-01-12 NOTE — DISCHARGE SUMMARY
CHIEF COMPLAINT ON ADMISSION  Chief Complaint   Patient presents with   • Post-Op Pain     replaced RT side nephrostomy yesterday (1/9)   • Fever     102.9F at home, took tylenol    • Abdominal Pain     RT sided abd pain   • Flank Pain     RT side at surgery site       CODE STATUS  Prior    HPI & HOSPITAL COURSE  47-year-old  female with complicated urologic history.  She originally had obstructive nephrolithiasis, she then had distal ureteral stricture presumed secondary to scar tissue. More recently she was diagnosed with proximal ureteral stricture.She has had ureteral stents.  She recently had nephrostomy. In November she haslo had NSTEMI (max trop .99) normal EF and single vessel 40-50% disease. She was placed on plavix, asa and lipitor. Her urologist feels that hematuria is not desirable in setting of ureteral stricture and would prefer she not be on the antiplatelet agents, given the findings on Echo and catheterization, I think the stoppage of these antiplatelet agents is reasonable.   She was having bloody output from nephrostomy and fevers greater than 102 with increasing flank pain  was already on Omnicef when she presented-    Outpatient cultures grew enterobacter. Resistant to Cefuroxime but sensitive to Cefepime, Rocephin.  She is allergic to Levaquin.  Bactrim was the only other PO option listed on the report.  Options discussed w/ Dr Maldonado, single dose Monurol was given before DC. Culture in house was negative (in vitro growth may have been suppressed by Omnicef)    Patient's hematuria cleared and her fever resolved. She was seen by Terry Maldonado MD - Urology. She had contrast via the nephrostomy confirming proximal obstruction.    Of note 3 hrs after receiving Monurol she had emesis at home- 3 hrs is more than sufficient time for the Monurol to have passed beyond her stomach.    Therefore, she is discharged in good and stable condition with close outpatient follow-up.    SPECIFIC  OUTPATIENT FOLLOW-UP  Urology    DISCHARGE PROBLEM LIST  Principal Problem:    Pyelonephritis POA: Yes  Active Problems:    Sepsis(995.91) POA: Yes    NSTEMI (non-ST elevated myocardial infarction) (CMS-HCC) POA: Yes    Obstructive uropathy POA: Yes    Obesity (BMI 30-39.9) POA: Yes    Acute pain POA: Yes  Resolved Problems:    * No resolved hospital problems. *      FOLLOW UP  No future appointments.  SHIRA Valdez.P.R.NHeena  [address] [place] [id number]  [phone]            MEDICATIONS ON DISCHARGE   Aurora Tan   Home Medication Instructions RADHA:[id number]    Printed on:01/12/18 1028   Medication Information                      acetaminophen (TYLENOL) 325 MG Tab  Take 2 Tabs by mouth every 6 hours as needed for Fever or Moderate Pain (Temp >101.5F).             alprazolam (XANAX) 0.25 MG Tab  Take 1-2 Tabs by mouth at bedtime as needed for Sleep.             atorvastatin (LIPITOR) 40 MG Tab  Take 1 Tab by mouth every bedtime.             estradiol (MINIVELLE) 0.05 MG/24HR PATCH BIWEEKLY  Apply 1 Patch to skin as directed every Wednesday.                 DIET  Regular    ACTIVITY  As tolerated      CONSULTATIONS  Urology- Terry Maldonado MD    PROCEDURES  none      Lab Results   Component Value Date/Time    SODIUM 137 01/11/2018 01:59 AM    POTASSIUM 3.7 01/11/2018 01:59 AM    CHLORIDE 106 01/11/2018 01:59 AM    CO2 25 01/11/2018 01:59 AM    GLUCOSE 103 (H) 01/11/2018 01:59 AM    BUN 10 01/11/2018 01:59 AM    CREATININE 1.04 01/11/2018 01:59 AM        Lab Results   Component Value Date/Time    WBC 4.9 01/11/2018 01:59 AM    HEMOGLOBIN 11.7 (L) 01/11/2018 01:59 AM    HEMATOCRIT 35.7 (L) 01/11/2018 01:59 AM    PLATELETCT 245 01/11/2018 01:59 AM        Total time of the discharge process exceeds 45 minutes

## 2018-01-12 NOTE — PROGRESS NOTES
"Urology Progress Note      This progress note is for 1/11/2017-Late chart entry - I was unable to access chart and write note on that day.        Interval Events: Pt sitting upright in bed states she feels good.  Had nephrostogram earlier today that showed obstruction of the proximal right ureter     S: No fevers, chills, nausea or vomiting. + flatus.    O:   Blood pressure 130/62, pulse 74, temperature 37.1 °C (98.8 °F), resp. rate 16, height 1.702 m (5' 7\"), weight 92.3 kg (203 lb 7.8 oz), SpO2 97 %, not currently breastfeeding.  Recent Labs      01/10/18   0215  01/11/18   0159   SODIUM  134*  137   POTASSIUM  4.1  3.7   CHLORIDE  101  106   CO2  25  25   GLUCOSE  90  103*   BUN  15  10   CREATININE  1.12  1.04   CALCIUM  9.4  8.3*     Recent Labs      01/10/18   0215  01/11/18   0159   WBC  10.2  4.9   RBC  4.71  3.97*   HEMOGLOBIN  13.8  11.7*   HEMATOCRIT  41.8  35.7*   MCV  88.7  89.9   MCH  29.3  29.5   MCHC  33.0*  32.8*   RDW  41.3  41.8   PLATELETCT  313  245   MPV  10.9  11.1       No intake or output data in the 24 hours ending 01/12/18 0954    Exam:  Abdomen soft, benign.  Incisions clean, dry, intact.   Urine: clear, nephrostomy tube clear. No pain , fever or chills.       A/P:    Active Hospital Problems    Diagnosis   • Obstructive uropathy [N13.9]     Priority: High   • NSTEMI (non-ST elevated myocardial infarction) (CMS-HCC) [I21.4]     Priority: High   • Sepsis(995.91) [A41.9]     Priority: High   • Pyelonephritis [N12]   • Acute pain [R52]   • Obesity (BMI 30-39.9) [E66.9]       Stable.   Okay to DC home with follow up in office with Dr. Maldonado [facility] [place].  Discussed plan of care with pt Md, Rn.  Pt will likely require intervention at [facility]. Dr. Maldonado to coordinate.    Home instruction/education discussed.  Pt will be DC'd by hospitalist- Medications per hospitalist.   CRISTOPHER Bingham.  "

## 2018-01-12 NOTE — PROGRESS NOTES
Pt discharged home, escorted out by nursing. All personal belongings sent with the patient upon discharge. Discharge instructions and prescriptions explained with verbal understanding provided by the patient. All needs met and all questions answered. PIV removed and documented.

## 2018-01-12 NOTE — DISCHARGE INSTRUCTIONS
Follow up with Dr Stewart in 2 weeks  If you have any fevers, chills, Nephrostomy tube does not drain pleas call Dr Stewart [phone]   Dr Stewart's office is located at [address] [place] [address]      Discharge Instructions    Discharged to home by car with relative. Discharged via wheelchair, hospital escort: Yes.  Special equipment needed: Not Applicable and Cane    Be sure to schedule a follow-up appointment with your primary care doctor or any specialists as instructed.     Discharge Plan:   Influenza Vaccine Indication: Patient Refuses    I understand that a diet low in cholesterol, fat, and sodium is recommended for good health. Unless I have been given specific instructions below for another diet, I accept this instruction as my diet prescription.   Other diet: regular    Special Instructions: None    · Is patient discharged on Warfarin / Coumadin?   No     · Is patient Post Blood Transfusion?  No    Depression / Suicide Risk    As you are discharged from this [facility] facility, it is important to learn how to keep safe from harming yourself.    Recognize the warning signs:  · Abrupt changes in personality, positive or negative- including increase in energy   · Giving away possessions  · Change in eating patterns- significant weight changes-  positive or negative  · Change in sleeping patterns- unable to sleep or sleeping all the time   · Unwillingness or inability to communicate  · Depression  · Unusual sadness, discouragement and loneliness  · Talk of wanting to die  · Neglect of personal appearance   · Rebelliousness- reckless behavior  · Withdrawal from people/activities they love  · Confusion- inability to concentrate     If you or a loved one observes any of these behaviors or has concerns about self-harm, here's what you can do:  · Talk about it- your feelings and reasons for harming yourself  · Remove any means that you might use to hurt yourself (examples: pills, rope, extension cords,  firearm)  · Get professional help from the community (Mental Health, Substance Abuse, psychological counseling)  · Do not be alone:Call your Safe Contact- someone whom you trust who will be there for you.  · Call your local CRISIS HOTLINE 237-8096 or 170-295-9205  · Call your local Children's Mobile Crisis Response Team [place] (497) 668-8487 or www.[employer]  · Call the toll free National Suicide Prevention Hotlines   · National Suicide Prevention Lifeline 530-584-TQYF (5752)  · National Hope Line Network 800-SUICIDE (229-5685)

## 2018-01-13 LAB — NUCLEAR IGG SER QL IA: NORMAL

## 2018-01-15 LAB
BACTERIA BLD CULT: NORMAL
SIGNIFICANT IND 70042: NORMAL
SITE SITE: NORMAL
SOURCE SOURCE: NORMAL

## 2018-01-22 NOTE — ED NOTES
Admitting doctor at bedside.   Patient resting in bed with eyes closed, lying supine. Respirations regular and unlabored.

## 2018-01-31 ENCOUNTER — HOSPITAL ENCOUNTER (OUTPATIENT)
Facility: MEDICAL CENTER | Age: 48
End: 2018-01-31
Attending: UROLOGY | Admitting: UROLOGY
Payer: COMMERCIAL

## 2018-01-31 ENCOUNTER — APPOINTMENT (OUTPATIENT)
Dept: RADIOLOGY | Facility: MEDICAL CENTER | Age: 48
End: 2018-01-31
Attending: UROLOGY
Payer: COMMERCIAL

## 2018-01-31 VITALS
DIASTOLIC BLOOD PRESSURE: 70 MMHG | HEIGHT: 67 IN | RESPIRATION RATE: 14 BRPM | TEMPERATURE: 98.2 F | OXYGEN SATURATION: 98 % | BODY MASS INDEX: 31.21 KG/M2 | SYSTOLIC BLOOD PRESSURE: 111 MMHG | WEIGHT: 198.85 LBS | HEART RATE: 69 BPM

## 2018-01-31 DIAGNOSIS — N13.5 URETERAL STRICTURE, RIGHT: Primary | ICD-10-CM

## 2018-01-31 DIAGNOSIS — N13.9 OBSTRUCTIVE UROPATHY: ICD-10-CM

## 2018-01-31 DIAGNOSIS — N13.1 HYDRONEPHROSIS WITH URETERAL STRICTURE, NOT ELSEWHERE CLASSIFIED: ICD-10-CM

## 2018-01-31 PROCEDURE — C1769 GUIDE WIRE: HCPCS | Performed by: UROLOGY

## 2018-01-31 PROCEDURE — C2617 STENT, NON-COR, TEM W/O DEL: HCPCS | Performed by: UROLOGY

## 2018-01-31 PROCEDURE — 160048 HCHG OR STATISTICAL LEVEL 1-5: Performed by: UROLOGY

## 2018-01-31 PROCEDURE — 160009 HCHG ANES TIME/MIN: Performed by: UROLOGY

## 2018-01-31 PROCEDURE — 700101 HCHG RX REV CODE 250

## 2018-01-31 PROCEDURE — 160035 HCHG PACU - 1ST 60 MINS PHASE I: Performed by: UROLOGY

## 2018-01-31 PROCEDURE — 160036 HCHG PACU - EA ADDL 30 MINS PHASE I: Performed by: UROLOGY

## 2018-01-31 PROCEDURE — 700111 HCHG RX REV CODE 636 W/ 250 OVERRIDE (IP)

## 2018-01-31 PROCEDURE — 160041 HCHG SURGERY MINUTES - EA ADDL 1 MIN LEVEL 4: Performed by: UROLOGY

## 2018-01-31 PROCEDURE — 500880 HCHG PACK, CYSTO W/SEP LEGGINGS: Performed by: UROLOGY

## 2018-01-31 PROCEDURE — 160029 HCHG SURGERY MINUTES - 1ST 30 MINS LEVEL 4: Performed by: UROLOGY

## 2018-01-31 PROCEDURE — A9270 NON-COVERED ITEM OR SERVICE: HCPCS

## 2018-01-31 PROCEDURE — 160046 HCHG PACU - 1ST 60 MINS PHASE II: Performed by: UROLOGY

## 2018-01-31 PROCEDURE — 160025 RECOVERY II MINUTES (STATS): Performed by: UROLOGY

## 2018-01-31 PROCEDURE — 700102 HCHG RX REV CODE 250 W/ 637 OVERRIDE(OP)

## 2018-01-31 PROCEDURE — C1758 CATHETER, URETERAL: HCPCS | Performed by: UROLOGY

## 2018-01-31 PROCEDURE — 160002 HCHG RECOVERY MINUTES (STAT): Performed by: UROLOGY

## 2018-01-31 PROCEDURE — 74420 UROGRAPHY RTRGR +-KUB: CPT

## 2018-01-31 PROCEDURE — 502240 HCHG MISC OR SUPPLY RC 0272: Performed by: UROLOGY

## 2018-01-31 PROCEDURE — 501329 HCHG SET, CYSTO IRRIG Y TUR: Performed by: UROLOGY

## 2018-01-31 DEVICE — STENT UROLOGICAL POLARIS 8X26  ULTRA: Type: IMPLANTABLE DEVICE | Status: FUNCTIONAL

## 2018-01-31 RX ORDER — PHENAZOPYRIDINE HYDROCHLORIDE 200 MG/1
200 TABLET, FILM COATED ORAL 3 TIMES DAILY PRN
Qty: 9 TAB | Refills: 0 | Status: SHIPPED | OUTPATIENT
Start: 2018-01-31 | End: 2018-02-03

## 2018-01-31 RX ORDER — POLYETHYLENE GLYCOL 3350 17 G/17G
17 POWDER, FOR SOLUTION ORAL DAILY
Qty: 30 EACH | Refills: 0 | Status: SHIPPED | OUTPATIENT
Start: 2018-01-31 | End: 2018-03-14

## 2018-01-31 RX ORDER — OXYBUTYNIN CHLORIDE 10 MG/1
10 TABLET, EXTENDED RELEASE ORAL
Qty: 60 TAB | Refills: 1 | Status: SHIPPED | OUTPATIENT
Start: 2018-01-31 | End: 2018-03-14

## 2018-01-31 RX ORDER — LIDOCAINE HYDROCHLORIDE 10 MG/ML
INJECTION, SOLUTION INFILTRATION; PERINEURAL
Status: COMPLETED
Start: 2018-01-31 | End: 2018-01-31

## 2018-01-31 RX ORDER — MEPERIDINE HYDROCHLORIDE 25 MG/ML
INJECTION INTRAMUSCULAR; INTRAVENOUS; SUBCUTANEOUS
Status: COMPLETED
Start: 2018-01-31 | End: 2018-01-31

## 2018-01-31 RX ORDER — SODIUM CHLORIDE, SODIUM LACTATE, POTASSIUM CHLORIDE, CALCIUM CHLORIDE 600; 310; 30; 20 MG/100ML; MG/100ML; MG/100ML; MG/100ML
INJECTION, SOLUTION INTRAVENOUS CONTINUOUS
Status: DISCONTINUED | OUTPATIENT
Start: 2018-01-31 | End: 2018-01-31 | Stop reason: HOSPADM

## 2018-01-31 RX ORDER — LIDOCAINE AND PRILOCAINE 25; 25 MG/G; MG/G
1 CREAM TOPICAL
Status: DISCONTINUED | OUTPATIENT
Start: 2018-01-31 | End: 2018-01-31 | Stop reason: HOSPADM

## 2018-01-31 RX ORDER — LIDOCAINE HYDROCHLORIDE 10 MG/ML
0.5 INJECTION, SOLUTION INFILTRATION; PERINEURAL
Status: DISCONTINUED | OUTPATIENT
Start: 2018-01-31 | End: 2018-01-31 | Stop reason: HOSPADM

## 2018-01-31 RX ORDER — TAMSULOSIN HYDROCHLORIDE 0.4 MG/1
0.4 CAPSULE ORAL DAILY
Qty: 30 CAP | Refills: 1 | Status: ON HOLD | OUTPATIENT
Start: 2018-01-31 | End: 2018-03-21

## 2018-01-31 RX ORDER — NITROFURANTOIN 25; 75 MG/1; MG/1
100 CAPSULE ORAL DAILY
Status: ON HOLD | COMMUNITY
Start: 2018-01-17 | End: 2018-03-21

## 2018-01-31 RX ORDER — OXYCODONE HCL 5 MG/5 ML
SOLUTION, ORAL ORAL
Status: COMPLETED
Start: 2018-01-31 | End: 2018-01-31

## 2018-01-31 RX ADMIN — LIDOCAINE HYDROCHLORIDE 0.5 ML: 10 INJECTION, SOLUTION INFILTRATION; PERINEURAL at 09:30

## 2018-01-31 RX ADMIN — SODIUM CHLORIDE, SODIUM LACTATE, POTASSIUM CHLORIDE, CALCIUM CHLORIDE: 600; 310; 30; 20 INJECTION, SOLUTION INTRAVENOUS at 09:30

## 2018-01-31 RX ADMIN — OXYCODONE HYDROCHLORIDE 5 MG: 5 SOLUTION ORAL at 12:20

## 2018-01-31 RX ADMIN — MEPERIDINE HYDROCHLORIDE 25 MG: 25 INJECTION INTRAMUSCULAR; INTRAVENOUS; SUBCUTANEOUS at 12:23

## 2018-01-31 ASSESSMENT — PAIN SCALES - GENERAL
PAINLEVEL_OUTOF10: 2
PAINLEVEL_OUTOF10: 0
PAINLEVEL_OUTOF10: 0
PAINLEVEL_OUTOF10: 2

## 2018-01-31 NOTE — DISCHARGE INSTRUCTIONS
ACTIVITY: Rest and take it easy for the first 24 hours.  A responsible adult is recommended to remain with you during that time.  It is normal to feel sleepy.  We encourage you to not do anything that requires balance, judgment or coordination.    MILD FLU-LIKE SYMPTOMS ARE NORMAL. YOU MAY EXPERIENCE GENERALIZED MUSCLE ACHES, THROAT IRRITATION, HEADACHE AND/OR SOME NAUSEA.    FOR 24 HOURS DO NOT:  Drive, operate machinery or run household appliances.  Drink beer or alcoholic beverages.   Make important decisions or sign legal documents.    SPECIAL INSTRUCTIONS & SURGICAL DRESSING/BATHING: Dr. Maldonado' Discharge Instructions FOLLOWING Ureteroscopy and dilation of ureteral stricture       DIET:   You can resume your regular diet. We encourage you to eat well-balanced and nutritious meals.       ACTIVITY:   Please restrain from strenuous activity or heavy lifting (more than 10 pounds) for the next week.  Please walk daily as much as tolerated, making exercise a part of your daily life. Do not drive while using narcotics for pain control.       WOUND CARE:   1. Please keep your nephrostomy tube capped and care for it as you were before, but it does not need to hooked to bag.  If you have fevers or severe R flank pain this can be returned to bag drainage.   2. You have an internal ureteral stent OFF strings. We will need to remove this stent in in 6-8 weeks at repeat surgery if you have good drainage of the kidney. Ideally we will have you keep your nephrostomy tube until that time.     MEDICATIONS:   1. Please use over the counter Tylenol or Ibuprofen for pain control as needed   2. You may take 3 days of pyridium as prescribed for numbing the bladder and burning with urination.   3. You have been prescribed oxybutynin prn to help with bladder spasms or burning with urination. Please hold this if having difficulty urinating however.   4. If you are using aspirin, Plavix, or coumadin, please don't restart these  medications until two days after your discharge if you are not having large amounts of blood in the urine.     FOLLOW-UP:   We will call you to schedule your follow up surgery in about 6-8 weeks with a preop visit to discuss operative plan to hopefully remove stent. If you have not heard from us in 1-2 business days, please call [phone] to schedule your follow-up appointment. You may also contact this number if you have questions or concerns that can be answered by Dr. Maldonado' staff.       WARNING SIGNS:   Fever greater than 101 degrees Fahrenheit, chills, nausea or vomiting, Large amount of clots in urine that make it difficult to urinate or for urine to drain from poe, increasing pain, or abdominal swelling. If you are experiencing these symptoms, call the Urology Clinic or go to your local PCP or emergency room.     It is normal to see blood in your urine for up to 2 weeks even from surgery. The urine may clear up entirely, and then turn bloody again a few days later depending on your activity level; do not be alarmed. However, if you experience severe pain or tenderness, have a lot of increased bleeding, or find that you are unable to urinate because of large clots, please notify your doctor immediately       MEDICAL HELP DURING NORMAL BUSINESS HOURS   Between the hours of 7 AM and 7 PM, please call [phone] to speak with Dr. Terry Maldonado' staff.     MEDICAL HELP AFTER HOURS   If you have a serious emergency such as chest pain, shortness of breath, relentless pain you should call [phone]. For other urgent problems after hours you may contact the urology physician on call by phoning the [phone]. You may also visit the Emergency room at local hospital for help.   For non-emergent problems such as prescription refills or routine questions, please do your best to contact us during normal business hours. This after-hours number should be used for urgent or emergent questions only.    DIET: To avoid  nausea, slowly advance diet as tolerated, avoiding spicy or greasy foods for the first day.  Add more substantial food to your diet according to your physician's instructions.  Babies can be fed formula or breast milk as soon as they are hungry.  INCREASE FLUIDS AND FIBER TO AVOID CONSTIPATION.    FOLLOW-UP APPOINTMENT:  A follow-up appointment should be arranged with your doctor in 1-2 weeks; call to schedule.      You should call 911 if you develop problems with breathing or chest pain.  If you are unable to contact your doctor or surgical center, you should go to the nearest emergency room or urgent care center.  Physician's telephone #: Dr. Maldonado [phone]    If any questions arise, call your doctor.  If your doctor is not available, please feel free to call the Surgical Center at [phone].  The Center is open Monday through Friday from 7AM to 7PM.  You can also call the [employer] HOTLINE open 24 hours/day, 7 days/week and speak to a nurse at [phone], or toll free at [phone].    A registered nurse may call you a few days after your surgery to see how you are doing after your procedure.    MEDICATIONS: Resume taking daily medication.  Take prescribed pain medication with food.  If no medication is prescribed, you may take non-aspirin pain medication if needed.  PAIN MEDICATION CAN BE VERY CONSTIPATING.  Take a stool softener or laxative such as senokot, pericolace, or milk of magnesia if needed.    Prescription given for *medications sent to pharmacy*.  Last pain medication given at *12:20pm*.    If your physician has prescribed pain medication that includes Acetaminophen (Tylenol), do not take additional Acetaminophen (Tylenol) while taking the prescribed medication.    Depression / Suicide Risk    As you are discharged from this [facility] facility, it is important to learn how to keep safe from harming yourself.    Recognize the warning signs:  · Abrupt changes in personality, positive  or negative- including increase in energy   · Giving away possessions  · Change in eating patterns- significant weight changes-  positive or negative  · Change in sleeping patterns- unable to sleep or sleeping all the time   · Unwillingness or inability to communicate  · Depression  · Unusual sadness, discouragement and loneliness  · Talk of wanting to die  · Neglect of personal appearance   · Rebelliousness- reckless behavior  · Withdrawal from people/activities they love  · Confusion- inability to concentrate     If you or a loved one observes any of these behaviors or has concerns about self-harm, here's what you can do:  · Talk about it- your feelings and reasons for harming yourself  · Remove any means that you might use to hurt yourself (examples: pills, rope, extension cords, firearm)  · Get professional help from the community (Mental Health, Substance Abuse, psychological counseling)  · Do not be alone:Call your Safe Contact- someone whom you trust who will be there for you.  · Call your local CRISIS HOTLINE 407-2578 or 351-514-8571  · Call your local Children's Mobile Crisis Response Team [place] (512) 989-5524 or www.[employer]  · Call the toll free National Suicide Prevention Hotlines   · National Suicide Prevention Lifeline 115-532-NDAN (1865)  · National Hope Line Network 800-SUICIDE (658-9751)

## 2018-01-31 NOTE — OR NURSING
VSS.  Oximetry WNL on RA.  Patient c/o mild but napoleon urinary discomfort.  Taking PO without incident.  Right nephrostomy tube intact.

## 2018-01-31 NOTE — OP REPORT
Urologic Surgery Operative Report     Date of Procedure: 1/31/2018    Pre-op Diagnosis: 1. Right proximal ureteral stricture  2. Right renal colic   3. Status post right nephrostomy tube for obstructive uropathy   4. Heart attack status post cardiac catheterization in November 2017    Post-op Diagnosis: Same as above   Procedure: 1. Cystoscopy, Diagnostic right Ureteroscopy, dilation of ureteral stricture with baloon and with laser incision. - COMPLEX  2. Right retrograde pyelogram with simultaneous antegrade nephrostogram    Surgeon Terry Maldonado MD   Assistant: None   Anesthesia: LMA, General   Estimated Blood Loss: Minimal   IV fluids Proximally 2 L Crystalloid   Specimens: 1. None    Complications: None   Condition: Stable, procedure well tolerated    Drains: 1. 8Fx 26 F JJ stent (off strings)  2. No poe   Disposition:  1. PACU, discharge after voiding  2. f/u 6-8 weeks for repeat procedure with antegrade nephrostogram and removal of stent. If good antegrade drainage will leave stent removed for drainage will redilate and replace stent. Nephrostomy tube capped for now but to return to gravity drainage if fevers or severe flank pain develop postop.   Findings 1. Right persistent severe ureteral stricture and narrowing for about 7-9 cm in length right up to the UPJ.  There is following on retrograde pyelogram of the right ureter with a wisp of contrast which does not go up to the kidney.  The kidney remains hydronephrotic on antegrade nephrostogram.  2. Very complex case involving an excessive amount of time trying to get a wire up into the kidney from retrograde. Eventually with a wire rigid ureteroscope the wire was descended into the right renal pelvis. More meticulous time was spent dilating the ureter twice with a 18 Kosovan and 21 Kosovan balloon dilator as well as incising with the laser 3 stenotic areas along the stricture which had a web of whitish scar tissue short caliber.       Indication:    Aurora Tan is a 47 y.o. female with complex history including initially presenting with a small 3 mm stone in her distal right ureter with postoperative complications including an MI a month later as well as recurrent right ureteral stenosis and stricture which has recurred despite intervention in the past. She has been with nephrostomy tube to gravity drainage for the last several months, and we discussed proceeding with diagnostic ureteroscopy possible balloon or laser dilation of her ureteral stricture.  After a full discussion of alternatives, risks, and benefits the patient consented to proceeding with Ureteroscopy, laser lithotripsy and possible JJ stent placement on the respective side.  She understands the risk of inability to access ureter, the need for secondary procedures,  that she may have stent discomfort until this is removed, bleeding, infection, ureteral injury or stricture, bladder injury, post op urinary retention requiring poe catheter, and the general pulmonary and cardiovascular risks associated with anesthesia.     Procedure Details:   The risks, benefits, treatment options, potential complications and expected outcomes were discussed with the patient. The patient concurred with the proposed plan, giving informed consent. The patient was taken to the operating room, identified as Aurora Tan and the procedure verified. A timeout was held and the above information confirmed.      2G ancef was administered prior to the start of the procedure based on previous urine cultures. Sequential compression devices were placed for deep venous thrombosis prophylaxis. The patient was placed supine after induction of a general anesthetic. Both legs were placed in Juan stirrups in the standard lithotomy position. The perineal area was prepped and draped in a sterile fashion. A 22 Swedish rigid cystoscope was inserted into the urethra and the bladder was emptied and then distended with  sterile saline.     I 1st attempted placement of both a sensor wire and angled zip wire up into the renal pelvis however there was clearly an area of narrowing in the axillary ureter and neither wire wouldn't get past this.  Using a dual-lumen access catheter I did shoot a retrograde pyelogram and antegrade nephrostogram simultaneously which did show findings of a 7-9 cm picture from the right UPJ down to the mid proximal ureter.  A wire was placed to the point of stricture, and our semirigid ureteroscope was inserted up the ureter to this point. This point about 15-20 minutes was spent trying to get the scope up the very narrowed ureter with aid of the zip wire which was eventually successful and her scope was navigated through the narrowed area over the wire. It was clearly narrow and did take some pressure not excessive.    At this point our wire was left in place as a safety wire. Then used the 18 Montenegrin by 10 cm ureteral balloon dilator to dilate the area of stricture under fluoroscopic guidance it was no waste noted in the balloon and after a minute of pressure this was taken down and removed.  I did reinsert the semirigid ureteroscope to reevaluate the area which did show repeat pyelogram that there was contrast freely flowing that the area was sterile still slightly narrowed compared to the rest of the ureter.  I did note about 3 areas with mild scar tissue and I used the laser to incise these areas to help fully open the ureter.  I then re-dated the area using a 21 Montenegrin by 10 cm balloon dilator. Again there was no waste balloon dilator was taken down and removed. Antegrade nephrostogram at this point did show some antegrade drainage of contrast.    Returning to rigid cystoscopy, an 8 Montenegrin by 26 cm double-J stent was placed off strings. A good curl noted in the renal pelvis fluoroscopically and in the bladder endoscopically.  The bladder was emptied in the case was terminated the patient brought PACU in  stable condition and I was present and scrubbed for the entirety of the procedure.    I  was present, scrubbed, and participated in all aspects of the operation.         Terry Maldonado MD  [address] [place] [address] [id number]  [place] [id number] [phone]

## 2018-03-08 ENCOUNTER — HOSPITAL ENCOUNTER (OUTPATIENT)
Facility: MEDICAL CENTER | Age: 48
End: 2018-03-08
Attending: UROLOGY
Payer: COMMERCIAL

## 2018-03-08 PROCEDURE — 87186 SC STD MICRODIL/AGAR DIL: CPT | Mod: 91

## 2018-03-08 PROCEDURE — 87077 CULTURE AEROBIC IDENTIFY: CPT

## 2018-03-08 PROCEDURE — 87205 SMEAR GRAM STAIN: CPT

## 2018-03-08 PROCEDURE — 87086 URINE CULTURE/COLONY COUNT: CPT | Mod: 91

## 2018-03-11 LAB
BACTERIA UR CULT: NORMAL
GRAM STN SPEC: NORMAL
SIGNIFICANT IND 70042: NORMAL
SIGNIFICANT IND 70042: NORMAL
SITE SITE: NORMAL
SITE SITE: NORMAL
SOURCE SOURCE: NORMAL
SOURCE SOURCE: NORMAL

## 2018-03-14 DIAGNOSIS — Z01.810 PRE-OPERATIVE CARDIOVASCULAR EXAMINATION: ICD-10-CM

## 2018-03-14 DIAGNOSIS — Z01.812 PRE-OPERATIVE LABORATORY EXAMINATION: ICD-10-CM

## 2018-03-14 LAB
ANION GAP SERPL CALC-SCNC: 7 MMOL/L (ref 0–11.9)
APPEARANCE UR: ABNORMAL
BACTERIA #/AREA URNS HPF: NEGATIVE /HPF
BILIRUB UR QL STRIP.AUTO: NEGATIVE
BUN SERPL-MCNC: 16 MG/DL (ref 8–22)
CALCIUM SERPL-MCNC: 10 MG/DL (ref 8.5–10.5)
CHLORIDE SERPL-SCNC: 103 MMOL/L (ref 96–112)
CO2 SERPL-SCNC: 27 MMOL/L (ref 20–33)
COLOR UR: YELLOW
CREAT SERPL-MCNC: 0.95 MG/DL (ref 0.5–1.4)
EKG IMPRESSION: NORMAL
EPI CELLS #/AREA URNS HPF: ABNORMAL /HPF
ERYTHROCYTE [DISTWIDTH] IN BLOOD BY AUTOMATED COUNT: 43.3 FL (ref 35.9–50)
GLUCOSE SERPL-MCNC: 86 MG/DL (ref 65–99)
GLUCOSE UR STRIP.AUTO-MCNC: NEGATIVE MG/DL
HCT VFR BLD AUTO: 47.3 % (ref 37–47)
HGB BLD-MCNC: 15.5 G/DL (ref 12–16)
HYALINE CASTS #/AREA URNS LPF: ABNORMAL /LPF
KETONES UR STRIP.AUTO-MCNC: NEGATIVE MG/DL
LEUKOCYTE ESTERASE UR QL STRIP.AUTO: ABNORMAL
MCH RBC QN AUTO: 29.8 PG (ref 27–33)
MCHC RBC AUTO-ENTMCNC: 32.8 G/DL (ref 33.6–35)
MCV RBC AUTO: 91 FL (ref 81.4–97.8)
MICRO URNS: ABNORMAL
NITRITE UR QL STRIP.AUTO: NEGATIVE
PH UR STRIP.AUTO: 6.5 [PH]
PLATELET # BLD AUTO: 388 K/UL (ref 164–446)
PMV BLD AUTO: 11.1 FL (ref 9–12.9)
POTASSIUM SERPL-SCNC: 4.1 MMOL/L (ref 3.6–5.5)
PROT UR QL STRIP: NEGATIVE MG/DL
RBC # BLD AUTO: 5.2 M/UL (ref 4.2–5.4)
RBC # URNS HPF: ABNORMAL /HPF
RBC UR QL AUTO: ABNORMAL
SODIUM SERPL-SCNC: 137 MMOL/L (ref 135–145)
SP GR UR STRIP.AUTO: 1.01
UROBILINOGEN UR STRIP.AUTO-MCNC: 0.2 MG/DL
WBC # BLD AUTO: 8 K/UL (ref 4.8–10.8)
WBC #/AREA URNS HPF: ABNORMAL /HPF

## 2018-03-14 PROCEDURE — 36415 COLL VENOUS BLD VENIPUNCTURE: CPT

## 2018-03-14 PROCEDURE — 85027 COMPLETE CBC AUTOMATED: CPT

## 2018-03-14 PROCEDURE — 87086 URINE CULTURE/COLONY COUNT: CPT

## 2018-03-14 PROCEDURE — 93010 ELECTROCARDIOGRAM REPORT: CPT | Performed by: INTERNAL MEDICINE

## 2018-03-14 PROCEDURE — 81001 URINALYSIS AUTO W/SCOPE: CPT

## 2018-03-14 PROCEDURE — 93005 ELECTROCARDIOGRAM TRACING: CPT

## 2018-03-14 PROCEDURE — 80048 BASIC METABOLIC PNL TOTAL CA: CPT

## 2018-03-14 NOTE — OR NURSING
Spoke with Luann at Dr Maldonado office to tell her we do not do urine culture from nephrostomy tube. She will call the patient and do the sample in the office

## 2018-03-15 LAB
BACTERIA UR CULT: ABNORMAL
SIGNIFICANT IND 70042: ABNORMAL
SITE SITE: ABNORMAL
SOURCE SOURCE: ABNORMAL

## 2018-03-16 LAB
BACTERIA UR CULT: NORMAL
SIGNIFICANT IND 70042: NORMAL
SITE SITE: NORMAL
SOURCE SOURCE: NORMAL

## 2018-03-21 ENCOUNTER — HOSPITAL ENCOUNTER (OUTPATIENT)
Facility: MEDICAL CENTER | Age: 48
End: 2018-03-21
Attending: UROLOGY | Admitting: UROLOGY
Payer: COMMERCIAL

## 2018-03-21 ENCOUNTER — APPOINTMENT (OUTPATIENT)
Dept: RADIOLOGY | Facility: MEDICAL CENTER | Age: 48
End: 2018-03-21
Attending: UROLOGY
Payer: COMMERCIAL

## 2018-03-21 VITALS
HEART RATE: 82 BPM | OXYGEN SATURATION: 96 % | RESPIRATION RATE: 18 BRPM | DIASTOLIC BLOOD PRESSURE: 61 MMHG | HEIGHT: 67 IN | SYSTOLIC BLOOD PRESSURE: 115 MMHG | WEIGHT: 206.79 LBS | TEMPERATURE: 97.7 F | BODY MASS INDEX: 32.46 KG/M2

## 2018-03-21 DIAGNOSIS — N13.5 URETERAL STRICTURE, RIGHT: Primary | ICD-10-CM

## 2018-03-21 DIAGNOSIS — N13.1 HYDRONEPHROSIS WITH URETERAL STRICTURE, NOT ELSEWHERE CLASSIFIED: ICD-10-CM

## 2018-03-21 PROCEDURE — 160048 HCHG OR STATISTICAL LEVEL 1-5: Performed by: UROLOGY

## 2018-03-21 PROCEDURE — C1769 GUIDE WIRE: HCPCS | Performed by: UROLOGY

## 2018-03-21 PROCEDURE — 501329 HCHG SET, CYSTO IRRIG Y TUR: Performed by: UROLOGY

## 2018-03-21 PROCEDURE — 700101 HCHG RX REV CODE 250

## 2018-03-21 PROCEDURE — 160035 HCHG PACU - 1ST 60 MINS PHASE I: Performed by: UROLOGY

## 2018-03-21 PROCEDURE — 160009 HCHG ANES TIME/MIN: Performed by: UROLOGY

## 2018-03-21 PROCEDURE — 74420 UROGRAPHY RTRGR +-KUB: CPT

## 2018-03-21 PROCEDURE — 160039 HCHG SURGERY MINUTES - EA ADDL 1 MIN LEVEL 3: Performed by: UROLOGY

## 2018-03-21 PROCEDURE — 160046 HCHG PACU - 1ST 60 MINS PHASE II: Performed by: UROLOGY

## 2018-03-21 PROCEDURE — 160002 HCHG RECOVERY MINUTES (STAT): Performed by: UROLOGY

## 2018-03-21 PROCEDURE — 160028 HCHG SURGERY MINUTES - 1ST 30 MINS LEVEL 3: Performed by: UROLOGY

## 2018-03-21 PROCEDURE — 700111 HCHG RX REV CODE 636 W/ 250 OVERRIDE (IP)

## 2018-03-21 PROCEDURE — 160025 RECOVERY II MINUTES (STATS): Performed by: UROLOGY

## 2018-03-21 PROCEDURE — 500880 HCHG PACK, CYSTO W/SEP LEGGINGS: Performed by: UROLOGY

## 2018-03-21 RX ORDER — POLYETHYLENE GLYCOL 3350 17 G/17G
17 POWDER, FOR SOLUTION ORAL DAILY
Qty: 30 EACH | Refills: 0 | Status: SHIPPED | OUTPATIENT
Start: 2018-03-21 | End: 2018-04-02

## 2018-03-21 RX ORDER — SULFAMETHOXAZOLE AND TRIMETHOPRIM 800; 160 MG/1; MG/1
1 TABLET ORAL 2 TIMES DAILY
COMMUNITY
Start: 2018-03-16 | End: 2018-04-02

## 2018-03-21 RX ORDER — PHENAZOPYRIDINE HYDROCHLORIDE 200 MG/1
200 TABLET, FILM COATED ORAL 3 TIMES DAILY PRN
Qty: 9 TAB | Refills: 0 | Status: SHIPPED | OUTPATIENT
Start: 2018-03-21 | End: 2018-03-24

## 2018-03-21 RX ORDER — SODIUM CHLORIDE, SODIUM LACTATE, POTASSIUM CHLORIDE, CALCIUM CHLORIDE 600; 310; 30; 20 MG/100ML; MG/100ML; MG/100ML; MG/100ML
INJECTION, SOLUTION INTRAVENOUS CONTINUOUS
Status: DISCONTINUED | OUTPATIENT
Start: 2018-03-21 | End: 2018-03-21 | Stop reason: HOSPADM

## 2018-03-21 RX ORDER — TAMSULOSIN HYDROCHLORIDE 0.4 MG/1
0.4 CAPSULE ORAL DAILY
Qty: 30 CAP | Refills: 1 | Status: SHIPPED | OUTPATIENT
Start: 2018-03-21 | End: 2018-04-25

## 2018-03-21 RX ORDER — LIDOCAINE HYDROCHLORIDE 10 MG/ML
INJECTION, SOLUTION INFILTRATION; PERINEURAL
Status: COMPLETED
Start: 2018-03-21 | End: 2018-03-21

## 2018-03-21 RX ORDER — OXYCODONE HYDROCHLORIDE 5 MG/1
5-10 TABLET ORAL EVERY 4 HOURS PRN
Qty: 30 TAB | Refills: 0 | Status: SHIPPED | OUTPATIENT
Start: 2018-03-21 | End: 2018-03-26

## 2018-03-21 RX ORDER — LIDOCAINE HYDROCHLORIDE 10 MG/ML
0.5 INJECTION, SOLUTION INFILTRATION; PERINEURAL
Status: DISCONTINUED | OUTPATIENT
Start: 2018-03-21 | End: 2018-03-21 | Stop reason: HOSPADM

## 2018-03-21 RX ORDER — OXYBUTYNIN CHLORIDE 10 MG/1
10 TABLET, EXTENDED RELEASE ORAL
Qty: 30 TAB | Refills: 1 | Status: SHIPPED | OUTPATIENT
Start: 2018-03-21 | End: 2018-04-25

## 2018-03-21 RX ORDER — LIDOCAINE AND PRILOCAINE 25; 25 MG/G; MG/G
1 CREAM TOPICAL
Status: DISCONTINUED | OUTPATIENT
Start: 2018-03-21 | End: 2018-03-21 | Stop reason: HOSPADM

## 2018-03-21 RX ADMIN — LIDOCAINE HYDROCHLORIDE 0.5 ML: 10 INJECTION, SOLUTION INFILTRATION; PERINEURAL at 11:30

## 2018-03-21 RX ADMIN — SODIUM CHLORIDE, SODIUM LACTATE, POTASSIUM CHLORIDE, CALCIUM CHLORIDE: 600; 310; 30; 20 INJECTION, SOLUTION INTRAVENOUS at 11:45

## 2018-03-21 ASSESSMENT — PAIN SCALES - GENERAL
PAINLEVEL_OUTOF10: 2
PAINLEVEL_OUTOF10: 2
PAINLEVEL_OUTOF10: 0
PAINLEVEL_OUTOF10: 2
PAINLEVEL_OUTOF10: 0

## 2018-03-21 NOTE — DISCHARGE INSTRUCTIONS
ACTIVITY: Rest and take it easy for the first 24 hours.  A responsible adult is recommended to remain with you during that time.  It is normal to feel sleepy.  We encourage you to not do anything that requires balance, judgment or coordination.    MILD FLU-LIKE SYMPTOMS ARE NORMAL. YOU MAY EXPERIENCE GENERALIZED MUSCLE ACHES, THROAT IRRITATION, HEADACHE AND/OR SOME NAUSEA.    FOR 24 HOURS DO NOT:  Drive, operate machinery or run household appliances.  Drink beer or alcoholic beverages.   Make important decisions or sign legal documents.    SPECIAL INSTRUCTIONS: Cystoscopy, Care After  Refer to this sheet in the next few weeks. These instructions provide you with information on caring for yourself after your procedure. Your caregiver may also give you more specific instructions. Your treatment has been planned according to current medical practices, but problems sometimes occur. Call your caregiver if you have any problems or questions after your procedure.  HOME CARE INSTRUCTIONS   Things you can do to ease any discomfort after your procedure include:  · Drinking enough water and fluids to keep your urine clear or pale yellow.  · Taking a warm bath to relieve any burning feelings.  SEEK IMMEDIATE MEDICAL CARE IF:   · You have an increase in blood in your urine.  · You notice blood clots in your urine.  · You have difficulty passing urine.  · You have the chills.  · You have abdominal pain.  · You have a fever or persistent symptoms for more than 2-3 days.  · You have a fever and your symptoms suddenly get worse.  MAKE SURE YOU:   · Understand these instructions.  · Will watch your condition.  · Will get help right away if you are not doing well or get worse.     This information is not intended to replace advice given to you by your health care provider. Make sure you discuss any questions you have with your health care provider.     Document Released: 07/07/2006 Document Revised: 01/08/2016 Document Reviewed:  06/10/2013  [employer] Patient Education ©2016 [employer]      DIET: To avoid nausea, slowly advance diet as tolerated, avoiding spicy or greasy foods for the first day.  Add more substantial food to your diet according to your physician's instructions.  Babies can be fed formula or breast milk as soon as they are hungry.  INCREASE FLUIDS AND FIBER TO AVOID CONSTIPATION.    SURGICAL DRESSING/BATHING: Follow MD instruction    FOLLOW-UP APPOINTMENT:  A follow-up appointment should be arranged with your doctor in 1-2 weeks; call to schedule.    You should CALL YOUR PHYSICIAN if you develop:  Fever greater than 101 degrees F.  Pain not relieved by medication, or persistent nausea or vomiting.  Excessive bleeding (blood soaking through dressing) or unexpected drainage from the wound.  Extreme redness or swelling around the incision site, drainage of pus or foul smelling drainage.  Inability to urinate or empty your bladder within 8 hours.  Problems with breathing or chest pain.    You should call 911 if you develop problems with breathing or chest pain.  If you are unable to contact your doctor or surgical center, you should go to the nearest emergency room or urgent care center.  Physician's telephone #: Dr. Maldonado [phone]    If any questions arise, call your doctor.  If your doctor is not available, please feel free to call the Surgical Center at [phone].  The Center is open Monday through Friday from 7AM to 7PM.  You can also call the [employer] HOTLINE open 24 hours/day, 7 days/week and speak to a nurse at [phone], or toll free at [phone].    A registered nurse may call you a few days after your surgery to see how you are doing after your procedure.    MEDICATIONS: Resume taking daily medication.  Take prescribed pain medication with food.  If no medication is prescribed, you may take non-aspirin pain medication if needed.  PAIN MEDICATION CAN BE VERY CONSTIPATING.  Take a stool  softener or laxative such as senokot, pericolace, or milk of magnesia if needed.    Prescription given for Ditropan, Oxycodone, Pyridium, Miralax, Flomax . Pain medication may be taken anytime.     If your physician has prescribed pain medication that includes Acetaminophen (Tylenol), do not take additional Acetaminophen (Tylenol) while taking the prescribed medication.    Depression / Suicide Risk    As you are discharged from this [facility] facility, it is important to learn how to keep safe from harming yourself.    Recognize the warning signs:  · Abrupt changes in personality, positive or negative- including increase in energy   · Giving away possessions  · Change in eating patterns- significant weight changes-  positive or negative  · Change in sleeping patterns- unable to sleep or sleeping all the time   · Unwillingness or inability to communicate  · Depression  · Unusual sadness, discouragement and loneliness  · Talk of wanting to die  · Neglect of personal appearance   · Rebelliousness- reckless behavior  · Withdrawal from people/activities they love  · Confusion- inability to concentrate     If you or a loved one observes any of these behaviors or has concerns about self-harm, here's what you can do:  · Talk about it- your feelings and reasons for harming yourself  · Remove any means that you might use to hurt yourself (examples: pills, rope, extension cords, firearm)  · Get professional help from the community (Mental Health, Substance Abuse, psychological counseling)  · Do not be alone:Call your Safe Contact- someone whom you trust who will be there for you.  · Call your local CRISIS HOTLINE 743-5058 or 414-895-5415  · Call your local Children's Mobile Crisis Response Team [place] (781) 394-5959 or www.[employer]  · Call the toll free National Suicide Prevention Hotlines   · National Suicide Prevention Lifeline 691-318-IUPX (5694)  · National Hope Line Network 800-SUICIDE (813-6299)

## 2018-03-21 NOTE — OR NURSING
D/c orders received. IV dc'd. Pt changed into clothing with assistance. Discharge instructions given; pt and family verbalized understanding and questions answered. Prescriptions with pt. Pt dc'd in w/c with hospital escort in stable condition.

## 2018-03-21 NOTE — OP REPORT
Urologic Surgery Operative Report     Date of Procedure: 3/21/2018    Pre-op Diagnosis: 1. Right proximal ureteral stricture  2. Right renal colic   3. Status post right nephrostomy tube for obstructive uropathy   4. Heart attack status post cardiac catheterization in November 2017    Post-op Diagnosis: Same as above   Procedure: 1. Cystoscopy, Diagnostic right Ureteroscopy  2. Right retrograde pyelogram  3. Right antegrade nephrostogram   Surgeon Terry Maldonado MD   Assistant: None   Anesthesia: LMA, General   Estimated Blood Loss: Minimal   IV fluids <1L Crystalloid   Specimens: 1. none   Complications: None   Condition: Stable, procedure well tolerated    Drains: 1. No stent  2. Right nephrostomy tube   Disposition:  1. PACU, discharge after voiding  2. f/u nephrostogram in 2 weeks with exchnage of nephrostomy tube if there is no good antegrade drainage vs removal of tube if no pain with it clamped and some antegrade drainage noted.    Findings 1. R mild presisten narrowing for about 7cm of proximal ureter on pyelograms, but with some antegrade drainage. Scope was able to be passed up ureter without any resistance, and after removal of wires and stents there was some drainage of contrast antegrade.  Her neph tube was left clamped. Ureteroscopy still showed some areas of mild narrowing but without any resistance for repassage of scope.       Indication:   Aurora Tan is a 48 y.o. female with initially presenting with a small 3 mm stone in her distal right ureter with postoperative complications including an MI a month later as well as recurrent right ureteral stenosis and stricture which has recurred despite intervention in the past. She has been with nephrostomy tube to gravity drainage for the last several months.  Longitudinally 31st she did undergo cystoscopy right ureteroscopy with balloon dilation of long proximal ureteral stricture as well as laser incision of 3 small stenotic areas of her  ureter. She has had a 8 x 26 Haitian stent in since that time without medications. She presents today for removal of stent with retrograde pyelogram and possible removal stent if we can confirm antegrade drainage.  After a full discussion of alternatives, risks, and benefits the patient consented to proceeding with Ureteroscopy, laser lithotripsy and possible JJ stent placement on the respective side.  She understands the risk of inability to access ureter, the need for secondary procedures,  that she may have stent discomfort until this is removed, bleeding, infection, ureteral injury or stricture, bladder injury, post op urinary retention requiring poe catheter, and the general pulmonary and cardiovascular risks associated with anesthesia.     Procedure Details:   The risks, benefits, treatment options, potential complications and expected outcomes were discussed with the patient. The patient concurred with the proposed plan, giving informed consent. The patient was taken to the operating room, identified as Aurora Moodye and the procedure verified. A timeout was held and the above information confirmed.      1g ampicillin and 240 of gent was administered prior to the start of the procedure based on previous urine cultures. Sequential compression devices were placed for deep venous thrombosis prophylaxis. The patient was placed supine after induction of a general anesthetic. Both legs were placed in Juan stirrups in the standard lithotomy position. The perineal area was prepped and draped in a sterile fashion. A 22 Haitian rigid cystoscope was inserted into the urethra and the bladder was emptied and then distended with sterile saline.     We then grasped the distal end of JJ stent and removed it until distal tip was just outside urethral meatus.  We then passed a wire through stent up into renal pelvis under flouroscopic vision.  We then used a dual lumen catheter to and her nephrostomy tube to shoot an  up and down o gram and I did note that there was although limited patency of the previously strictured area through which some contrast did seem to pass. I did decide to survey the area using the flexible ureteroscope which was inserted up over a 2nd wire.  This showed no areas of suspicious tumor or other mucosal abnormalities. The scope did pass up without any resistance. Repeat surveillance of the previously strictured area showed that the scope would pass repeatedly without any resistance.  At this point I decided to give her a trial of observation was removed the scope was removed the wire removed and the case terminated. I will leave her nephrostomy tube capped if she has flank pain infection or other issues will have this put to gravity bag.    I  was present, scrubbed, and participated in all aspects of the operation.         Terry Maldonado MD  [facility] [place] [facility] [id number]  [place] [id number] [phone]

## 2018-03-21 NOTE — PROGRESS NOTES
The Medication Reconciliation process has been completed by interviewing the patient    Allergies have been reviewed  Antibiotic use in 30 days - Septra     Home [facility]:  [facility]

## 2018-03-23 ENCOUNTER — APPOINTMENT (OUTPATIENT)
Dept: RADIOLOGY | Facility: MEDICAL CENTER | Age: 48
End: 2018-03-23
Attending: EMERGENCY MEDICINE
Payer: COMMERCIAL

## 2018-03-23 ENCOUNTER — HOSPITAL ENCOUNTER (EMERGENCY)
Facility: MEDICAL CENTER | Age: 48
End: 2018-03-23
Attending: EMERGENCY MEDICINE
Payer: COMMERCIAL

## 2018-03-23 VITALS
HEIGHT: 67 IN | TEMPERATURE: 97.4 F | WEIGHT: 209.88 LBS | SYSTOLIC BLOOD PRESSURE: 112 MMHG | RESPIRATION RATE: 16 BRPM | HEART RATE: 78 BPM | DIASTOLIC BLOOD PRESSURE: 58 MMHG | BODY MASS INDEX: 32.94 KG/M2 | OXYGEN SATURATION: 94 %

## 2018-03-23 DIAGNOSIS — N39.0 ACUTE UTI: ICD-10-CM

## 2018-03-23 DIAGNOSIS — Q62.10 URETERAL STENOSIS: ICD-10-CM

## 2018-03-23 DIAGNOSIS — M79.604 LEG PAIN, DIFFUSE, RIGHT: ICD-10-CM

## 2018-03-23 LAB
ANION GAP SERPL CALC-SCNC: 9 MMOL/L (ref 0–11.9)
APPEARANCE UR: CLEAR
BACTERIA #/AREA URNS HPF: NEGATIVE /HPF
BASOPHILS # BLD AUTO: 0.7 % (ref 0–1.8)
BASOPHILS # BLD: 0.04 K/UL (ref 0–0.12)
BILIRUB UR QL STRIP.AUTO: NEGATIVE
BUN SERPL-MCNC: 14 MG/DL (ref 8–22)
CALCIUM SERPL-MCNC: 9.4 MG/DL (ref 8.5–10.5)
CHLORIDE SERPL-SCNC: 105 MMOL/L (ref 96–112)
CO2 SERPL-SCNC: 21 MMOL/L (ref 20–33)
COLOR UR: ABNORMAL
CREAT SERPL-MCNC: 1.2 MG/DL (ref 0.5–1.4)
CULTURE IF INDICATED INDCX: YES UA CULTURE
EOSINOPHIL # BLD AUTO: 0.24 K/UL (ref 0–0.51)
EOSINOPHIL NFR BLD: 3.9 % (ref 0–6.9)
EPI CELLS #/AREA URNS HPF: ABNORMAL /HPF
ERYTHROCYTE [DISTWIDTH] IN BLOOD BY AUTOMATED COUNT: 41.7 FL (ref 35.9–50)
GLUCOSE SERPL-MCNC: 99 MG/DL (ref 65–99)
GLUCOSE UR STRIP.AUTO-MCNC: NEGATIVE MG/DL
HCT VFR BLD AUTO: 42.2 % (ref 37–47)
HGB BLD-MCNC: 14.4 G/DL (ref 12–16)
HYALINE CASTS #/AREA URNS LPF: ABNORMAL /LPF
IMM GRANULOCYTES # BLD AUTO: 0.03 K/UL (ref 0–0.11)
IMM GRANULOCYTES NFR BLD AUTO: 0.5 % (ref 0–0.9)
KETONES UR STRIP.AUTO-MCNC: NEGATIVE MG/DL
LACTATE BLD-SCNC: 0.9 MMOL/L (ref 0.5–2)
LEUKOCYTE ESTERASE UR QL STRIP.AUTO: ABNORMAL
LYMPHOCYTES # BLD AUTO: 2.11 K/UL (ref 1–4.8)
LYMPHOCYTES NFR BLD: 34.6 % (ref 22–41)
MCH RBC QN AUTO: 30.4 PG (ref 27–33)
MCHC RBC AUTO-ENTMCNC: 34.1 G/DL (ref 33.6–35)
MCV RBC AUTO: 89 FL (ref 81.4–97.8)
MICRO URNS: ABNORMAL
MONOCYTES # BLD AUTO: 0.91 K/UL (ref 0–0.85)
MONOCYTES NFR BLD AUTO: 14.9 % (ref 0–13.4)
NEUTROPHILS # BLD AUTO: 2.76 K/UL (ref 2–7.15)
NEUTROPHILS NFR BLD: 45.4 % (ref 44–72)
NITRITE UR QL STRIP.AUTO: POSITIVE
NRBC # BLD AUTO: 0 K/UL
NRBC BLD-RTO: 0 /100 WBC
PH UR STRIP.AUTO: 5.5 [PH]
PLATELET # BLD AUTO: 311 K/UL (ref 164–446)
PMV BLD AUTO: 10.6 FL (ref 9–12.9)
POTASSIUM SERPL-SCNC: 4.4 MMOL/L (ref 3.6–5.5)
PROT UR QL STRIP: 30 MG/DL
RBC # BLD AUTO: 4.74 M/UL (ref 4.2–5.4)
RBC # URNS HPF: ABNORMAL /HPF
RBC UR QL AUTO: ABNORMAL
SODIUM SERPL-SCNC: 135 MMOL/L (ref 135–145)
SP GR UR STRIP.AUTO: 1.01
UROBILINOGEN UR STRIP.AUTO-MCNC: 1 MG/DL
WBC # BLD AUTO: 6.1 K/UL (ref 4.8–10.8)
WBC #/AREA URNS HPF: ABNORMAL /HPF

## 2018-03-23 PROCEDURE — 80048 BASIC METABOLIC PNL TOTAL CA: CPT

## 2018-03-23 PROCEDURE — 36415 COLL VENOUS BLD VENIPUNCTURE: CPT

## 2018-03-23 PROCEDURE — 93970 EXTREMITY STUDY: CPT

## 2018-03-23 PROCEDURE — 87040 BLOOD CULTURE FOR BACTERIA: CPT

## 2018-03-23 PROCEDURE — 99284 EMERGENCY DEPT VISIT MOD MDM: CPT

## 2018-03-23 PROCEDURE — 81001 URINALYSIS AUTO W/SCOPE: CPT

## 2018-03-23 PROCEDURE — 83605 ASSAY OF LACTIC ACID: CPT

## 2018-03-23 PROCEDURE — 700111 HCHG RX REV CODE 636 W/ 250 OVERRIDE (IP): Performed by: EMERGENCY MEDICINE

## 2018-03-23 PROCEDURE — 96374 THER/PROPH/DIAG INJ IV PUSH: CPT

## 2018-03-23 PROCEDURE — 85025 COMPLETE CBC W/AUTO DIFF WBC: CPT

## 2018-03-23 PROCEDURE — 87086 URINE CULTURE/COLONY COUNT: CPT

## 2018-03-23 PROCEDURE — 76775 US EXAM ABDO BACK WALL LIM: CPT

## 2018-03-23 RX ORDER — AMOXICILLIN AND CLAVULANATE POTASSIUM 875; 125 MG/1; MG/1
1 TABLET, FILM COATED ORAL 2 TIMES DAILY
Qty: 14 TAB | Refills: 0 | Status: SHIPPED | OUTPATIENT
Start: 2018-03-23 | End: 2018-03-30

## 2018-03-23 RX ORDER — CEFTRIAXONE 2 G/1
2 INJECTION, POWDER, FOR SOLUTION INTRAMUSCULAR; INTRAVENOUS ONCE
Status: COMPLETED | OUTPATIENT
Start: 2018-03-23 | End: 2018-03-23

## 2018-03-23 RX ADMIN — CEFTRIAXONE SODIUM 2 G: 2 INJECTION, POWDER, FOR SOLUTION INTRAMUSCULAR; INTRAVENOUS at 11:07

## 2018-03-23 ASSESSMENT — PAIN SCALES - GENERAL
PAINLEVEL_OUTOF10: 5
PAINLEVEL_OUTOF10: 5

## 2018-03-23 NOTE — ED PROVIDER NOTES
ED Provider Note    Scribed for Zabrina Haley M.D. by Akbar Ta. 3/23/2018, 8:55 AM.    Primary care provider: ANDREA Valdez  Means of arrival: walk in  History obtained from: patient  History limited by: none    CHIEF COMPLAINT  Chief Complaint   Patient presents with   • Post-Op Complications   • Flank Pain     R flank   • Leg Pain     R leg       HPI  Aurora Tan is a 48 y.o. female who presents to the Emergency Department complaining of severe right leg pain starting yesterday. She reports associated right flank pain.  Alejandro has a history of right sided kidney stones after which she became septic. Her urologist performed a stent placement and noted a ureter narrowing. He recommended that the patient attach her nephrostomy bag. Patient reports that after she attached the bag, she devolved severe left leg pain. She consulted with her urologist, Dr. Regalado, who recommended that she come to the ED for evaluation. Patient has a history of hysterectomy, oophorectomy. She denies fever.     REVIEW OF SYSTEMS  Pertinent positives include leg pain, flank pain. Pertinent negatives include no fever. All other systems reviewed and negative.   C.    PAST MEDICAL HISTORY   has a past medical history of Heart attack (11/03/2017); Heart burn; High cholesterol; and Renal disorder.    SURGICAL HISTORY   has a past surgical history that includes abdominal exploration; cystoscopy (Right, 9/17/2017); ureteroscopy (Right, 9/17/2017); lasertripsy (Right, 9/17/2017); stent placement (Right, 9/17/2017); ureteroscopy (Right, 11/7/2017); lasertripsy (Right, 11/7/2017); urethral dilatation (Right, 11/7/2017); cystoscopy stent placement (Right, 11/7/2017); abdominal hysterectomy total; primary c section; cystoscopy (N/A, 1/31/2018); retrogrades (Right, 1/31/2018); pyelogram (Right, 1/31/2018); ureteroscopy (Right, 1/31/2018); lasertripsy (Right, 1/31/2018); stent placement (Right, 1/31/2018);  cystoscopy stent placement (Right, 3/21/2018); ureteroscopy (Right, 3/21/2018); retrogrades (Right, 3/21/2018); and pyelogram (Right, 3/21/2018).    SOCIAL HISTORY  Social History   Substance Use Topics   • Smoking status: Former Smoker     Packs/day: 0.25     Years: 10.00     Types: Cigarettes     Quit date: 4/13/2013   • Smokeless tobacco: Never Used   • Alcohol use No      Comment: socially/rare      History   Drug Use No       FAMILY HISTORY  Family History   Problem Relation Age of Onset   • Cancer Mother    • Cancer Father    • Heart Disease Brother    • Diabetes Maternal Grandmother    • Cancer Maternal Grandfather    • Cancer Paternal Grandmother    • Cancer Paternal Grandfather        CURRENT MEDICATIONS  Current Outpatient Prescriptions:   •  sulfamethoxazole-trimethoprim (BACTRIM DS) 800-160 MG tablet, Take 1 Tab by mouth 2 times a day. 7 day course started 3/16/18, Disp: , Rfl:   •  phenazopyridine (PYRIDIUM) 200 MG Tab, Take 1 Tab by mouth 3 times a day as needed for up to 3 days., Disp: 9 Tab, Rfl: 0  •  oxyCODONE immediate-release (ROXICODONE) 5 MG Tab, Take 1-2 Tabs by mouth every four hours as needed for Severe Pain (moderate to severe pain.) for up to 5 days., Disp: 30 Tab, Rfl: 0  •  oxybutynin SR (DITROPAN-XL) 10 MG CR tablet, Take 1 Tab by mouth 1 time daily as needed (bladder spasms/stent pain.). For stent pain/bladder spasms. Stop if having difficulty peeing., Disp: 30 Tab, Rfl: 1  •  tamsulosin (FLOMAX) 0.4 MG capsule, Take 1 Cap by mouth every day. For relief from stent or ureteral pain., Disp: 30 Cap, Rfl: 1  •  polyethylene glycol/lytes (MIRALAX) Pack, Take 1 Packet by mouth every day. Please take to prevent constipation following your procedure.  If having loose stools this can be held., Disp: 30 Each, Rfl: 0  •  estradiol (MINIVELLE) 0.05 MG/24HR PATCH BIWEEKLY, Apply 1 Patch to skin as directed every Tuesday., Disp: , Rfl:     ALLERGIES  Allergies   Allergen Reactions   • Levaquin  "Hives, Shortness of Breath and Itching     Rxn - 9/11/01   • Morphine Itching       PHYSICAL EXAM  VITAL SIGNS: /69   Pulse 75   Temp 36.3 °C (97.4 °F) (Temporal)   Resp 14   Ht 1.702 m (5' 7\")   Wt 95.2 kg (209 lb 14.1 oz)   LMP  (LMP Unknown)   SpO2 98%   BMI 32.87 kg/m²     Constitutional:  Sitting up in bed, appears uncomfortable, able to answer questions  HENT: Nose is normal in appearance without rhinorrhea, external ears are normal,  moist mucous membranes  Eyes: Anicteric,  pupils are equal round and reactive, there is no conjunctival drainage or pallor   Neck: The trachea is midline, there is no obvious mass or meningeal signs  Cardiovascular: Equal radial pulsation,   Thorax & Lungs: Respiratory rate and effort are normal. There is normal chest excursion with respiration.   Abdomen: Abdomen is normal in appearance, Nephrostomy tube in place in right flank.  :  No CVA tenderness to palpation  Musculoskeletal: No deformities noted in all 4 extremities. Actively moves all 4 extremities  Skin: Visualized skin is warm, no erythema, no rash.  Neurologic:  Cranial nerves II through XII are intact there is no focal abnormality noted.  Psychiatric: Normal mood and mentation    DIAGNOSTIC STUDIES / PROCEDURES    LABS  Results for orders placed or performed during the hospital encounter of 03/23/18   CBC WITH DIFFERENTIAL   Result Value Ref Range    WBC 6.1 4.8 - 10.8 K/uL    RBC 4.74 4.20 - 5.40 M/uL    Hemoglobin 14.4 12.0 - 16.0 g/dL    Hematocrit 42.2 37.0 - 47.0 %    MCV 89.0 81.4 - 97.8 fL    MCH 30.4 27.0 - 33.0 pg    MCHC 34.1 33.6 - 35.0 g/dL    RDW 41.7 35.9 - 50.0 fL    Platelet Count 311 164 - 446 K/uL    MPV 10.6 9.0 - 12.9 fL    Neutrophils-Polys 45.40 44.00 - 72.00 %    Lymphocytes 34.60 22.00 - 41.00 %    Monocytes 14.90 (H) 0.00 - 13.40 %    Eosinophils 3.90 0.00 - 6.90 %    Basophils 0.70 0.00 - 1.80 %    Immature Granulocytes 0.50 0.00 - 0.90 %    Nucleated RBC 0.00 /100 WBC    " Neutrophils (Absolute) 2.76 2.00 - 7.15 K/uL    Lymphs (Absolute) 2.11 1.00 - 4.80 K/uL    Monos (Absolute) 0.91 (H) 0.00 - 0.85 K/uL    Eos (Absolute) 0.24 0.00 - 0.51 K/uL    Baso (Absolute) 0.04 0.00 - 0.12 K/uL    Immature Granulocytes (abs) 0.03 0.00 - 0.11 K/uL    NRBC (Absolute) 0.00 K/uL   LACTIC ACID   Result Value Ref Range    Lactic Acid 0.9 0.5 - 2.0 mmol/L   URINALYSIS,CULTURE IF INDICATED   Result Value Ref Range    Color DK Yellow     Character Clear     Specific Gravity 1.009 <1.035    Ph 5.5 5.0 - 8.0    Glucose Negative Negative mg/dL    Ketones Negative Negative mg/dL    Protein 30 (A) Negative mg/dL    Bilirubin Negative Negative    Urobilinogen, Urine 1.0 Negative    Nitrite Positive (A) Negative    Leukocyte Esterase Moderate (A) Negative    Occult Blood Moderate (A) Negative    Micro Urine Req Microscopic     Culture Indicated Yes UA Culture   URINE MICROSCOPIC (W/UA)   Result Value Ref Range    WBC 5-10 (A) /hpf    RBC 5-10 (A) /hpf    Bacteria Negative None /hpf    Epithelial Cells Few /hpf    Hyaline Cast 6-10 (A) /lpf   BASIC METABOLIC PANEL   Result Value Ref Range    Sodium 135 135 - 145 mmol/L    Potassium 4.4 3.6 - 5.5 mmol/L    Chloride 105 96 - 112 mmol/L    Co2 21 20 - 33 mmol/L    Glucose 99 65 - 99 mg/dL    Bun 14 8 - 22 mg/dL    Creatinine 1.20 0.50 - 1.40 mg/dL    Calcium 9.4 8.5 - 10.5 mg/dL    Anion Gap 9.0 0.0 - 11.9   ESTIMATED GFR   Result Value Ref Range    GFR If  58 (A) >60 mL/min/1.73 m 2    GFR If Non  48 (A) >60 mL/min/1.73 m 2   All labs reviewed by me.    RADIOLOGY  LE VENOUS DUPLEX (Specify in Comments Left, Right Or Bilateral)   Final Result      US-RENAL   Final Result      Placement right ureteral stent in decompression right renal collecting system. Mild residual right upper pole caliectasis.   No left hydronephrosis.      The radiologist's interpretation of all radiological studies have been reviewed by me.    COURSE & MEDICAL  DECISION MAKING  Nursing notes and vital signs were reviewed. (See chart for details)  The patient's  records were reviewed, history was obtained from the patient;     The patient presents with leg pain, flank pain, and the differential diagnosis includes but is not limited to post operative complications, DVT, kidney stone, hydro, ureteral obstruction, pyelo.       8:55 AM - Based on her complex medical history including a history of cancer, she will be evaluated with ultrasound and radiology exposure avoided. I will consult with her urologist to determine plan of care. Patient verbalizes understanding and agreement to this plan of care. Initial orders in the Emergency Department included LE venous duplex, US renal, BMP, CBC with differential, Blood culture, Lactic acid, Urine culture, Urinalysis, Urine microscope, Urine culture.    10:10 AM - ED testing reveals no elevated WBC, Lactic, or hydronephrosis. Mildly infected urine, with nitrites. Paged urology    10:33 AM - Patient reevaluated at bedside. Discussed her lab results thus far. We are still waiting for urology consult. She is waiting with no requests or needs at this time.     10:47 AM - I discussed the patient's case and the above findings with Dr. Castillo (urology) who recommends that she be treated with Rocephin placed on Augmentin and follow up with Fabricio next week    11:00 AM - Recheck: Patient re-evaluated at beside. Discussed treatment plan. Patient will be discharged with instructions and provided with strict return precautions. Patient will be sent home with a prescription for Augmentin. Advised to follow up with Dr. Regalado next week. Instructed to return to Emergency Department immediately if any new or worsening symptoms.    The patient was discharged home with an information sheet on UTI and told to return immediately for any signs or symptoms listed, but specifically if she develops fever, numbness. The patient agreed to the discharge  precautions and follow-up plan which is documented in EPIC.    The patient will return for new or worsening symptoms and is stable at the time of discharge.    DISPOSITION:  Patient will be discharged home in stable condition.    FOLLOW UP:  Terry Maldonado M.D.  [address] [place] [id number]  [phone]    Schedule an appointment as soon as possible for a visit  appoitnment for next week, sooner if any worsening      OUTPATIENT MEDICATIONS:  New Prescriptions    AMOXICILLIN-CLAVULANATE (AUGMENTIN) 875-125 MG TAB    Take 1 Tab by mouth 2 times a day for 7 days.     FINAL IMPRESSION  1. Ureteral stenosis    2. Acute UTI    3. Leg pain, diffuse, right          I, Akbar Ta (Scribe), am scribing for, and in the presence of, Zabrina Haley M.D..    Electronically signed by: Akbar Ta (Scribe), 3/23/2018    I, Zabrina Haley M.D. personally performed the services described in this documentation, as scribed by Akbar Ta in my presence, and it is both accurate and complete.    The note accurately reflects work and decisions made by me.  Zabrina Haley  3/23/2018  11:23 AM

## 2018-03-23 NOTE — DISCHARGE INSTRUCTIONS
Acute Urinary Retention, Female  Urinary retention means you are unable to pee completely or at all (empty your bladder).  Follow these instructions at home:  · Drink enough fluids to keep your pee (urine) clear or pale yellow.  · If you are sent home with a tube that drains the bladder (catheter), there will be a drainage bag attached to it. There are two types of bags. One is big that you can wear at night without having to empty it. One is smaller and needs to be emptied more often.  ¨ Keep the drainage bag emptied.  ¨ Keep the drainage bag lower than the tube.  · Only take medicine as told by your doctor.  Contact a doctor if:  · You have a low-grade fever.  · You have spasms or you are leaking pee when you have spasms.  Get help right away if:  · You have chills or a fever.  · Your catheter stops draining pee.  · Your catheter falls out.  · You have increased bleeding that does not stop after you have rested and increased the amount of fluids you had been drinking.  This information is not intended to replace advice given to you by your health care provider. Make sure you discuss any questions you have with your health care provider.  Document Released: 06/05/2009 Document Revised: 05/25/2017 Document Reviewed: 05/29/2014  [employer] Interactive Patient Education © 2017 [employer]

## 2018-03-23 NOTE — ED TRIAGE NOTES
Ambulates to triage  Chief Complaint   Patient presents with   • Post-Op Complications   • Flank Pain     R flank   • Leg Pain     R leg     Pt has been having pain since September, had a kidney stone removed, but since then has had stents and nephrostomy tubes.  Pt said she had her stent removed and had the nephrostomy tube in without a bag attached.  She had a fever yesterday, attached the bag, the fever went away, is having good output, but her R flank and R leg is in pain.  Pt spoke with her urologist and was told to come here.

## 2018-03-23 NOTE — ED NOTES
Patient given discharge instructions, rx x 1, return precautions, and follow up recommendations. All questions answered.

## 2018-03-25 LAB
BACTERIA UR CULT: NORMAL
SIGNIFICANT IND 70042: NORMAL
SITE SITE: NORMAL
SOURCE SOURCE: NORMAL

## 2018-03-28 LAB
BACTERIA BLD CULT: NORMAL
SIGNIFICANT IND 70042: NORMAL
SITE SITE: NORMAL
SOURCE SOURCE: NORMAL

## 2018-04-02 ENCOUNTER — APPOINTMENT (OUTPATIENT)
Dept: ADMISSIONS | Facility: MEDICAL CENTER | Age: 48
End: 2018-04-02
Attending: UROLOGY
Payer: COMMERCIAL

## 2018-04-02 RX ORDER — ACETAMINOPHEN 500 MG
500-1000 TABLET ORAL EVERY 6 HOURS PRN
COMMUNITY
End: 2018-04-25

## 2018-04-04 ENCOUNTER — APPOINTMENT (OUTPATIENT)
Dept: RADIOLOGY | Facility: MEDICAL CENTER | Age: 48
End: 2018-04-04
Attending: UROLOGY
Payer: COMMERCIAL

## 2018-04-04 ENCOUNTER — HOSPITAL ENCOUNTER (OUTPATIENT)
Facility: MEDICAL CENTER | Age: 48
End: 2018-04-04
Attending: UROLOGY | Admitting: UROLOGY
Payer: COMMERCIAL

## 2018-04-04 VITALS
HEIGHT: 67 IN | OXYGEN SATURATION: 97 % | TEMPERATURE: 97.1 F | BODY MASS INDEX: 32.94 KG/M2 | SYSTOLIC BLOOD PRESSURE: 114 MMHG | RESPIRATION RATE: 19 BRPM | DIASTOLIC BLOOD PRESSURE: 67 MMHG | WEIGHT: 209.88 LBS | HEART RATE: 56 BPM

## 2018-04-04 DIAGNOSIS — N13.1 HYDRONEPHROSIS WITH URETERAL STRICTURE: ICD-10-CM

## 2018-04-04 PROCEDURE — 99153 MOD SED SAME PHYS/QHP EA: CPT

## 2018-04-04 PROCEDURE — 700111 HCHG RX REV CODE 636 W/ 250 OVERRIDE (IP)

## 2018-04-04 PROCEDURE — 160002 HCHG RECOVERY MINUTES (STAT)

## 2018-04-04 PROCEDURE — 700101 HCHG RX REV CODE 250

## 2018-04-04 RX ORDER — ONDANSETRON 2 MG/ML
4 INJECTION INTRAMUSCULAR; INTRAVENOUS PRN
Status: ACTIVE | OUTPATIENT
Start: 2018-04-04 | End: 2018-04-04

## 2018-04-04 RX ORDER — MIDAZOLAM HYDROCHLORIDE 1 MG/ML
.5-2 INJECTION INTRAMUSCULAR; INTRAVENOUS PRN
Status: ACTIVE | OUTPATIENT
Start: 2018-04-04 | End: 2018-04-04

## 2018-04-04 RX ORDER — MIDAZOLAM HYDROCHLORIDE 1 MG/ML
INJECTION INTRAMUSCULAR; INTRAVENOUS
Status: COMPLETED
Start: 2018-04-04 | End: 2018-04-04

## 2018-04-04 RX ORDER — SODIUM CHLORIDE 9 MG/ML
INJECTION, SOLUTION INTRAVENOUS
Status: DISCONTINUED | OUTPATIENT
Start: 2018-04-04 | End: 2018-04-09 | Stop reason: HOSPADM

## 2018-04-04 RX ORDER — LIDOCAINE HYDROCHLORIDE 10 MG/ML
INJECTION, SOLUTION INFILTRATION; PERINEURAL
Status: DISPENSED
Start: 2018-04-04 | End: 2018-04-04

## 2018-04-04 RX ORDER — SODIUM CHLORIDE 9 MG/ML
500 INJECTION, SOLUTION INTRAVENOUS
Status: ACTIVE | OUTPATIENT
Start: 2018-04-04 | End: 2018-04-04

## 2018-04-04 RX ADMIN — MIDAZOLAM HYDROCHLORIDE 1 MG: 1 INJECTION INTRAMUSCULAR; INTRAVENOUS at 11:05

## 2018-04-04 RX ADMIN — MIDAZOLAM HYDROCHLORIDE 1 MG: 1 INJECTION INTRAMUSCULAR; INTRAVENOUS at 11:14

## 2018-04-04 RX ADMIN — FENTANYL CITRATE 50 MCG: 50 INJECTION, SOLUTION INTRAMUSCULAR; INTRAVENOUS at 10:50

## 2018-04-04 RX ADMIN — MIDAZOLAM 2 MG: 1 INJECTION INTRAMUSCULAR; INTRAVENOUS at 10:50

## 2018-04-04 RX ADMIN — MIDAZOLAM HYDROCHLORIDE 2 MG: 1 INJECTION INTRAMUSCULAR; INTRAVENOUS at 10:57

## 2018-04-04 RX ADMIN — MIDAZOLAM HYDROCHLORIDE 2 MG: 1 INJECTION INTRAMUSCULAR; INTRAVENOUS at 10:50

## 2018-04-04 ASSESSMENT — PAIN SCALES - GENERAL
PAINLEVEL_OUTOF10: 0
PAINLEVEL_OUTOF10: 0

## 2018-04-04 NOTE — DISCHARGE INSTRUCTIONS
ACTIVITY: Rest and take it easy for the first 24 hours.  A responsible adult is recommended to remain with you during that time.  It is normal to feel sleepy.  We encourage you to not do anything that requires balance, judgment or coordination.    MILD FLU-LIKE SYMPTOMS ARE NORMAL. YOU MAY EXPERIENCE GENERALIZED MUSCLE ACHES, THROAT IRRITATION, HEADACHE AND/OR SOME NAUSEA.    FOR 24 HOURS DO NOT:  Drive, operate machinery or run household appliances.  Drink beer or alcoholic beverages.   Make important decisions or sign legal documents.    SPECIAL INSTRUCTIONS: follow up with primary care physician as needed  If you experience chest pain, shortness of breath call 911 return to ER   Resume your home medications  Education Cart   Action                  Actions Text Size Language Print Duplicate Transcripts       Personalize with patient's name:   Personalize with special instructions:           31933  Percutaneous Nephrostomy     Percutaneous nephrostomy is a procedure where a small tube (catheter) is put through your skin into your kidney to drain your urine. This procedure is done by a specially trained doctor called an interventional radiologist.  Why percutaneous nephrostomy is done  Percutaneous nephrostomy may be needed when a kidney or a tube (ureter) leading from a kidney to your bladder gets blocked. This can happen because of kidney stones, tumors, or another cause. The blockage can cause a backup of urine in the kidney. This procedure is done to stop pain, infection, and kidney damage.  Risks of percutaneous nephrostomy  All procedures have some risks. Possible risks of this procedure include:  · Bleeding of your kidney  · Blockage of the catheter  · Blood infection (sepsis)  · Kidney infection  · Problems because of the X-ray dye (contrast medium). These include allergic reaction or kidney damage.  · Skin infection around the catheter site  · The catheter may need to be replaced if it is used for a long  time. The same procedure is used.  · Urine leak  · X-ray radiation exposure, which is considered to be low level and safe   Getting ready for your procedure  Tell your healthcare provider if you:  · Are allergic to X-ray dye or other medicines  · Are breastfeeding  · Are pregnant or think you may be pregnant  Tell your healthcare provider about any recent illnesses, all medical conditions, and all medicines you take. You may need to stop taking some or all of them before your procedure. This includes:  · All prescription medicines  · Any street drugs you may use  · Herbs, vitamins, and other supplements   · Over-the-counter medicines that don’t need a prescription, including aspirin and ibuprofen   Also be sure to:  · Follow any directions you’re given for not eating or drinking before your procedure.  · Follow any other instructions from your healthcare provider.  · Plan to have a relative or friend drive you home after your procedure. You can’t drive yourself.  During your procedure  · You will change into a hospital gown.  · An IV line is put into your hand or arm to give you fluids and medicines. You will then lie on your stomach on an X-ray table. You may be given medicine to help you relax and make you feel sleepy.  · The skin on your lower back is numbed with an injection of local anesthesia.  · The radiologist will use CT scan, ultrasound, fluoroscopy, or X-ray images as a guide. He or she will insert a needle through your lower back into your kidney. X-ray dye may be injected through this needle into your kidney. This fluid makes your kidney easier to see on X-ray images. The X-ray images can show exactly where your kidney or ureter is blocked.  · The needle is then replaced with a thin tube called a drainage catheter. The catheter is attached to a drainage bag. This bag collects the urine that drains from your kidney. The catheter may be stitched (sutured) or taped to your skin. This helps keep it in place  and stop it from moving.  · The entire procedure takes about 1 to 2 hours.  After your procedure  The catheter will stay in place until the problem that caused the urine buildup is treated. This may be for as little as a day or as long as a few weeks or months. The bag is secured to your leg so you can walk around. During the time the catheter is in place you should:  · Keep the skin around the catheter clean and dry.  · Be careful not to move or knock the catheter out of place. Make sure that the drainage bag is secured firmly to your leg.  · Empty the drainage bag often. This keeps the weight of the bag from pulling on the catheter.  When to call your healthcare provider  Call your healthcare provider if your urine becomes cloudy or smells bad, or if you develop fever or chills.   © 1722-7555 [employer]. [address] [place] [address]. All rights reserved. This information is not intended as a substitute for professional medical care. Always follow your healthcare professional's instructions.             Education Cart   Action                  Actions Text Size Language Print Duplicate Transcripts       Personalize with patient's name:   Personalize with special instructions:           [id number]  Percutaneous Nephrostomy     Percutaneous nephrostomy is a procedure where a small tube (catheter) is put through your skin into your kidney to drain your urine. This procedure is done by a specially trained doctor called an interventional radiologist.  Why percutaneous nephrostomy is done  Percutaneous nephrostomy may be needed when a kidney or a tube (ureter) leading from a kidney to your bladder gets blocked. This can happen because of kidney stones, tumors, or another cause. The blockage can cause a backup of urine in the kidney. This procedure is done to stop pain, infection, and kidney damage.  Risks of percutaneous nephrostomy  All procedures have some risks. Possible risks of this procedure  include:  · Bleeding of your kidney  · Blockage of the catheter  · Blood infection (sepsis)  · Kidney infection  · Problems because of the X-ray dye (contrast medium). These include allergic reaction or kidney damage.  · Skin infection around the catheter site  · The catheter may need to be replaced if it is used for a long time. The same procedure is used.  · Urine leak  · X-ray radiation exposure, which is considered to be low level and safe   Getting ready for your procedure  Tell your healthcare provider if you:  · Are allergic to X-ray dye or other medicines  · Are breastfeeding  · Are pregnant or think you may be pregnant  Tell your healthcare provider about any recent illnesses, all medical conditions, and all medicines you take. You may need to stop taking some or all of them before your procedure. This includes:  · All prescription medicines  · Any street drugs you may use  · Herbs, vitamins, and other supplements   · Over-the-counter medicines that don’t need a prescription, including aspirin and ibuprofen   Also be sure to:  · Follow any directions you’re given for not eating or drinking before your procedure.  · Follow any other instructions from your healthcare provider.  · Plan to have a relative or friend drive you home after your procedure. You can’t drive yourself.  During your procedure  · You will change into a hospital gown.  · An IV line is put into your hand or arm to give you fluids and medicines. You will then lie on your stomach on an X-ray table. You may be given medicine to help you relax and make you feel sleepy.  · The skin on your lower back is numbed with an injection of local anesthesia.  · The radiologist will use CT scan, ultrasound, fluoroscopy, or X-ray images as a guide. He or she will insert a needle through your lower back into your kidney. X-ray dye may be injected through this needle into your kidney. This fluid makes your kidney easier to see on X-ray images. The X-ray  images can show exactly where your kidney or ureter is blocked.  · The needle is then replaced with a thin tube called a drainage catheter. The catheter is attached to a drainage bag. This bag collects the urine that drains from your kidney. The catheter may be stitched (sutured) or taped to your skin. This helps keep it in place and stop it from moving.  · The entire procedure takes about 1 to 2 hours.  After your procedure  The catheter will stay in place until the problem that caused the urine buildup is treated. This may be for as little as a day or as long as a few weeks or months. The bag is secured to your leg so you can walk around. During the time the catheter is in place you should:  · Keep the skin around the catheter clean and dry.  · Be careful not to move or knock the catheter out of place. Make sure that the drainage bag is secured firmly to your leg.  · Empty the drainage bag often. This keeps the weight of the bag from pulling on the catheter.  When to call your healthcare provider  Call your healthcare provider if your urine becomes cloudy or smells bad, or if you develop fever or chills.   © 1406-5651 [employer]. [address] [place], [place] [id number]. All rights reserved. This information is not intended as a substitute for professional medical care. Always follow your healthcare professional's instructions.               DIET: To avoid nausea, slowly advance diet as tolerated, avoiding spicy or greasy foods for the first day.  Add more substantial food to your diet according to your physician's instructions.  Babies can be fed formula or breast milk as soon as they are hungry.  INCREASE FLUIDS AND FIBER TO AVOID CONSTIPATION.    SURGICAL DRESSING/BATHING: keep dressing clean dry intact    FOLLOW-UP APPOINTMENT:  A follow-up appointment should be arranged with your doctor in [phone]; call to schedule.    You should CALL YOUR PHYSICIAN if you develop:  Fever greater than 101  degrees F.  Pain not relieved by medication, or persistent nausea or vomiting.  Excessive bleeding (blood soaking through dressing) or unexpected drainage from the wound.  Extreme redness or swelling around the incision site, drainage of pus or foul smelling drainage.  Inability to urinate or empty your bladder within 8 hours.  Problems with breathing or chest pain.    You should call 911 if you develop problems with breathing or chest pain.  If you are unable to contact your doctor or surgical center, you should go to the nearest emergency room or urgent care center.  Physician's telephone #: [phone]    If any questions arise, call your doctor.  If your doctor is not available, please feel free to call the Surgical Center at [phone].  The Center is open Monday through Friday from 7AM to 7PM.  You can also call the [facility]OTLINE open 24 hours/day, 7 days/week and speak to a nurse at [phone], or toll free at [phone].    A registered nurse may call you a few days after your surgery to see how you are doing after your procedure.    MEDICATIONS: Resume taking daily medication.  Take prescribed pain medication with food.  If no medication is prescribed, you may take non-aspirin pain medication if needed.  PAIN MEDICATION CAN BE VERY CONSTIPATING.  Take a stool softener or laxative such as senokot, pericolace, or milk of magnesia if needed.t    If your physician has prescribed pain medication that includes Acetaminophen (Tylenol), do not take additional Acetaminophen (Tylenol) while taking the prescribed medication.    Depression / Suicide Risk    As you are discharged from this [facility] facility, it is important to learn how to keep safe from harming yourself.    Recognize the warning signs:  · Abrupt changes in personality, positive or negative- including increase in energy   · Giving away possessions  · Change in eating patterns- significant weight changes-  positive or negative  · Change in  sleeping patterns- unable to sleep or sleeping all the time   · Unwillingness or inability to communicate  · Depression  · Unusual sadness, discouragement and loneliness  · Talk of wanting to die  · Neglect of personal appearance   · Rebelliousness- reckless behavior  · Withdrawal from people/activities they love  · Confusion- inability to concentrate     If you or a loved one observes any of these behaviors or has concerns about self-harm, here's what you can do:  · Talk about it- your feelings and reasons for harming yourself  · Remove any means that you might use to hurt yourself (examples: pills, rope, extension cords, firearm)  · Get professional help from the community (Mental Health, Substance Abuse, psychological counseling)  · Do not be alone:Call your Safe Contact- someone whom you trust who will be there for you.  · Call your local CRISIS HOTLINE 288-7348 or 268-065-1300  · Call your local Children's Mobile Crisis Response Team [place] (483) 592-3988 or www.[employer]  · Call the toll free National Suicide Prevention Hotlines   · National Suicide Prevention Lifeline 141-171-MIUV (9714)  · National Hope Line Network 800-SUICIDE (140-1039)

## 2018-04-04 NOTE — OR SURGEON
Immediate Post- Operative Note    PostOp Diagnosis: HX UROLITHIASIS, RIGHT PROXIMAL URETERAL STRICTURE      Procedure(s): RIGHT PERCUTANEOUS NEPHROSTOMY EXCHANGE AND NEPHROSTOGRAM WITH FLUOROSCOPIC   GUIDANCE    RIGHT PROXIMAL URETERAL STRICTURE AGAIN SEEN ON NEPHROSTOGRAM AND URETEROGRAM PERFORMED VIA 4F KUMPE CATHETER    NEW 10F PERC NEPH PLACED VIA EXISTING ACCESS (EXCHANGE)      Estimated Blood Loss:  0 CC        Complications: NONE          4/4/2018     11:46 AM     Pato Moran

## 2018-04-04 NOTE — PROGRESS NOTES
IR Procedure RN's Note:     RIGHT nephrostogram with possible tube exchange or upsize done by Dr. Moran; existing RIGHT nephrostomy tube access site; pt assessed upon arrival, pt A/Ox4, well aware of the procedure; existing 10F nephrostomy tube d/c and exchanged to a "[employer] Flexima Regular 10F x 25cm REF#[id number] LOT#[id number]; pt appears comfortable throughout the procedure with no signs of distress or discomfort; ETCO2 with the use of anesthesia machine used, and ranged between 14 mmHg to 33 mmHg throughout the procedure, baseline is 33 mmHg with good waveform; access site CDI, soft, with no signs of hematoma or bleeding at this time; nephrostomy tube connected to a collection bag; telephone report given to Briseyda HIGUERA pt awake post procedure and during transport and on RA; pt and family transported back to [facility] for recovery.

## 2018-04-04 NOTE — OR NURSING
1129 patient arrived from IR s/p RIGHT PERCUTANEOUS NEPHROSTOMY EXCHANGE AND NEPHROSTOGRAM WITH FLUOROSCOPIC   GUIDANCE, patient awake alert, denies pain, s.o.b, vss, plan of care discussed with patient and family agreeable.  1203 patient tolerating oral fluids  1234 discharge instructions given to patient, patient verbalize understanding of the orders, iv discontinued tip intact, surgical site dressing clean  1308 patient escorted via w/c with all her personal belongings.

## 2018-04-25 DIAGNOSIS — Z01.812 PRE-PROCEDURAL LABORATORY EXAMINATION: ICD-10-CM

## 2018-04-25 LAB
ALBUMIN SERPL BCP-MCNC: 4.4 G/DL (ref 3.2–4.9)
ALBUMIN/GLOB SERPL: 1.3 G/DL
ALP SERPL-CCNC: 89 U/L (ref 30–99)
ALT SERPL-CCNC: 13 U/L (ref 2–50)
ANION GAP SERPL CALC-SCNC: 9 MMOL/L (ref 0–11.9)
APTT PPP: 35.1 SEC (ref 24.7–36)
AST SERPL-CCNC: 18 U/L (ref 12–45)
BASOPHILS # BLD AUTO: 0.5 % (ref 0–1.8)
BASOPHILS # BLD: 0.05 K/UL (ref 0–0.12)
BILIRUB SERPL-MCNC: 0.4 MG/DL (ref 0.1–1.5)
BUN SERPL-MCNC: 11 MG/DL (ref 8–22)
CALCIUM SERPL-MCNC: 9.9 MG/DL (ref 8.5–10.5)
CHLORIDE SERPL-SCNC: 104 MMOL/L (ref 96–112)
CO2 SERPL-SCNC: 25 MMOL/L (ref 20–33)
CREAT SERPL-MCNC: 0.95 MG/DL (ref 0.5–1.4)
EOSINOPHIL # BLD AUTO: 0.3 K/UL (ref 0–0.51)
EOSINOPHIL NFR BLD: 2.9 % (ref 0–6.9)
ERYTHROCYTE [DISTWIDTH] IN BLOOD BY AUTOMATED COUNT: 41 FL (ref 35.9–50)
GLOBULIN SER CALC-MCNC: 3.5 G/DL (ref 1.9–3.5)
GLUCOSE SERPL-MCNC: 85 MG/DL (ref 65–99)
HCT VFR BLD AUTO: 44 % (ref 37–47)
HGB BLD-MCNC: 14.7 G/DL (ref 12–16)
IMM GRANULOCYTES # BLD AUTO: 0.04 K/UL (ref 0–0.11)
IMM GRANULOCYTES NFR BLD AUTO: 0.4 % (ref 0–0.9)
INR PPP: 1.05 (ref 0.87–1.13)
LYMPHOCYTES # BLD AUTO: 2.34 K/UL (ref 1–4.8)
LYMPHOCYTES NFR BLD: 22.5 % (ref 22–41)
MCH RBC QN AUTO: 30.2 PG (ref 27–33)
MCHC RBC AUTO-ENTMCNC: 33.4 G/DL (ref 33.6–35)
MCV RBC AUTO: 90.5 FL (ref 81.4–97.8)
MONOCYTES # BLD AUTO: 0.74 K/UL (ref 0–0.85)
MONOCYTES NFR BLD AUTO: 7.1 % (ref 0–13.4)
NEUTROPHILS # BLD AUTO: 6.93 K/UL (ref 2–7.15)
NEUTROPHILS NFR BLD: 66.6 % (ref 44–72)
NRBC # BLD AUTO: 0 K/UL
NRBC BLD-RTO: 0 /100 WBC
PLATELET # BLD AUTO: 471 K/UL (ref 164–446)
PMV BLD AUTO: 11.2 FL (ref 9–12.9)
POTASSIUM SERPL-SCNC: 4.4 MMOL/L (ref 3.6–5.5)
PROT SERPL-MCNC: 7.9 G/DL (ref 6–8.2)
PROTHROMBIN TIME: 13.4 SEC (ref 12–14.6)
RBC # BLD AUTO: 4.86 M/UL (ref 4.2–5.4)
SODIUM SERPL-SCNC: 138 MMOL/L (ref 135–145)
WBC # BLD AUTO: 10.4 K/UL (ref 4.8–10.8)

## 2018-04-25 PROCEDURE — 85025 COMPLETE CBC W/AUTO DIFF WBC: CPT

## 2018-04-25 PROCEDURE — 36415 COLL VENOUS BLD VENIPUNCTURE: CPT

## 2018-04-25 PROCEDURE — 85730 THROMBOPLASTIN TIME PARTIAL: CPT

## 2018-04-25 PROCEDURE — 80053 COMPREHEN METABOLIC PANEL: CPT

## 2018-04-25 PROCEDURE — 85610 PROTHROMBIN TIME: CPT

## 2018-04-25 NOTE — OR NURSING
Pt refused to give a urine sample today at her pre-admit appt. stating she is currently doing a 24 hour urine collection & that she gave a urine sample at Dr. Rangel's office on 4-19-18. Spoke with  Sujatha at [facility] regarding pt. not leaving a urine sample today who in turn stated she verbally spoke with Sariah Rangel's MA & asked if they want it repeated. Sujatha stated that Sariah said that she is waiting to hear from Dr. Rangel & will contact patient at home if they want another urine sample & that they will have her come in to their office for this.

## 2018-04-30 ENCOUNTER — HOSPITAL ENCOUNTER (INPATIENT)
Facility: MEDICAL CENTER | Age: 48
LOS: 7 days | DRG: 660 | End: 2018-05-07
Attending: UROLOGY | Admitting: UROLOGY
Payer: COMMERCIAL

## 2018-04-30 DIAGNOSIS — G89.18 POSTOPERATIVE PAIN: ICD-10-CM

## 2018-04-30 LAB
ABO GROUP BLD: NORMAL
ABO GROUP BLD: NORMAL
ANION GAP SERPL CALC-SCNC: 11 MMOL/L (ref 0–11.9)
BLD GP AB SCN SERPL QL: NORMAL
BUN SERPL-MCNC: 16 MG/DL (ref 8–22)
CALCIUM SERPL-MCNC: 8.2 MG/DL (ref 8.5–10.5)
CHLORIDE SERPL-SCNC: 104 MMOL/L (ref 96–112)
CO2 SERPL-SCNC: 19 MMOL/L (ref 20–33)
CREAT SERPL-MCNC: 1.15 MG/DL (ref 0.5–1.4)
ERYTHROCYTE [DISTWIDTH] IN BLOOD BY AUTOMATED COUNT: 38.5 FL (ref 35.9–50)
GLUCOSE SERPL-MCNC: 215 MG/DL (ref 65–99)
HCT VFR BLD AUTO: 44.4 % (ref 37–47)
HGB BLD-MCNC: 15 G/DL (ref 12–16)
MCH RBC QN AUTO: 29.9 PG (ref 27–33)
MCHC RBC AUTO-ENTMCNC: 33.8 G/DL (ref 33.6–35)
MCV RBC AUTO: 88.6 FL (ref 81.4–97.8)
PLATELET # BLD AUTO: 384 K/UL (ref 164–446)
PMV BLD AUTO: 10.5 FL (ref 9–12.9)
POTASSIUM SERPL-SCNC: 5.5 MMOL/L (ref 3.6–5.5)
RBC # BLD AUTO: 5.01 M/UL (ref 4.2–5.4)
RH BLD: NORMAL
RH BLD: NORMAL
SODIUM SERPL-SCNC: 134 MMOL/L (ref 135–145)
WBC # BLD AUTO: 15.5 K/UL (ref 4.8–10.8)

## 2018-04-30 PROCEDURE — 700102 HCHG RX REV CODE 250 W/ 637 OVERRIDE(OP)

## 2018-04-30 PROCEDURE — 700111 HCHG RX REV CODE 636 W/ 250 OVERRIDE (IP): Performed by: UROLOGY

## 2018-04-30 PROCEDURE — 700105 HCHG RX REV CODE 258: Performed by: UROLOGY

## 2018-04-30 PROCEDURE — 500122 HCHG BOVIE, BLADE: Performed by: UROLOGY

## 2018-04-30 PROCEDURE — 0DNU0ZZ RELEASE OMENTUM, OPEN APPROACH: ICD-10-PCS | Performed by: UROLOGY

## 2018-04-30 PROCEDURE — 160041 HCHG SURGERY MINUTES - EA ADDL 1 MIN LEVEL 4: Performed by: UROLOGY

## 2018-04-30 PROCEDURE — 85027 COMPLETE CBC AUTOMATED: CPT

## 2018-04-30 PROCEDURE — 160029 HCHG SURGERY MINUTES - 1ST 30 MINS LEVEL 4: Performed by: UROLOGY

## 2018-04-30 PROCEDURE — 0DNW0ZZ RELEASE PERITONEUM, OPEN APPROACH: ICD-10-PCS | Performed by: UROLOGY

## 2018-04-30 PROCEDURE — 700111 HCHG RX REV CODE 636 W/ 250 OVERRIDE (IP)

## 2018-04-30 PROCEDURE — 500371 HCHG DRAIN, BLAKE 10MM: Performed by: UROLOGY

## 2018-04-30 PROCEDURE — C1758 CATHETER, URETERAL: HCPCS | Performed by: UROLOGY

## 2018-04-30 PROCEDURE — C1765 ADHESION BARRIER: HCPCS | Performed by: UROLOGY

## 2018-04-30 PROCEDURE — 88305 TISSUE EXAM BY PATHOLOGIST: CPT

## 2018-04-30 PROCEDURE — A9270 NON-COVERED ITEM OR SERVICE: HCPCS

## 2018-04-30 PROCEDURE — 700102 HCHG RX REV CODE 250 W/ 637 OVERRIDE(OP): Performed by: UROLOGY

## 2018-04-30 PROCEDURE — 80048 BASIC METABOLIC PNL TOTAL CA: CPT

## 2018-04-30 PROCEDURE — 160002 HCHG RECOVERY MINUTES (STAT): Performed by: UROLOGY

## 2018-04-30 PROCEDURE — 110454 HCHG SHELL REV 250: Performed by: UROLOGY

## 2018-04-30 PROCEDURE — 700101 HCHG RX REV CODE 250

## 2018-04-30 PROCEDURE — 86901 BLOOD TYPING SEROLOGIC RH(D): CPT

## 2018-04-30 PROCEDURE — 501838 HCHG SUTURE GENERAL: Performed by: UROLOGY

## 2018-04-30 PROCEDURE — C2617 STENT, NON-COR, TEM W/O DEL: HCPCS | Performed by: UROLOGY

## 2018-04-30 PROCEDURE — 86900 BLOOD TYPING SEROLOGIC ABO: CPT

## 2018-04-30 PROCEDURE — 160009 HCHG ANES TIME/MIN: Performed by: UROLOGY

## 2018-04-30 PROCEDURE — 86850 RBC ANTIBODY SCREEN: CPT

## 2018-04-30 PROCEDURE — 500697 HCHG HEMOCLIP, LARGE (ORANGE): Performed by: UROLOGY

## 2018-04-30 PROCEDURE — 500389 HCHG DRAIN, RESERVOIR SUCT JP 100CC: Performed by: UROLOGY

## 2018-04-30 PROCEDURE — A4358 URINARY LEG OR ABDOMEN BAG: HCPCS | Performed by: UROLOGY

## 2018-04-30 PROCEDURE — 501445 HCHG STAPLER, SKIN DISP: Performed by: UROLOGY

## 2018-04-30 PROCEDURE — 0T730DZ DILATION OF RIGHT KIDNEY PELVIS WITH INTRALUMINAL DEVICE, OPEN APPROACH: ICD-10-PCS | Performed by: UROLOGY

## 2018-04-30 PROCEDURE — 0TB60ZZ EXCISION OF RIGHT URETER, OPEN APPROACH: ICD-10-PCS | Performed by: UROLOGY

## 2018-04-30 PROCEDURE — 501461: Performed by: UROLOGY

## 2018-04-30 PROCEDURE — 770001 HCHG ROOM/CARE - MED/SURG/GYN PRIV*

## 2018-04-30 PROCEDURE — 160036 HCHG PACU - EA ADDL 30 MINS PHASE I: Performed by: UROLOGY

## 2018-04-30 PROCEDURE — 501460 HCHG STAPLER, TA55 35LD: Performed by: UROLOGY

## 2018-04-30 PROCEDURE — C1769 GUIDE WIRE: HCPCS | Performed by: UROLOGY

## 2018-04-30 PROCEDURE — 36415 COLL VENOUS BLD VENIPUNCTURE: CPT

## 2018-04-30 PROCEDURE — 501631 HCHG TUBE, NG FEEDING 8FR 15: Performed by: UROLOGY

## 2018-04-30 PROCEDURE — 0T160ZC BYPASS RIGHT URETER TO ILEOCUTANEOUS, OPEN APPROACH: ICD-10-PCS | Performed by: UROLOGY

## 2018-04-30 PROCEDURE — 700101 HCHG RX REV CODE 250: Performed by: UROLOGY

## 2018-04-30 PROCEDURE — A6402 STERILE GAUZE <= 16 SQ IN: HCPCS | Performed by: UROLOGY

## 2018-04-30 PROCEDURE — A9270 NON-COVERED ITEM OR SERVICE: HCPCS | Performed by: UROLOGY

## 2018-04-30 PROCEDURE — 500698 HCHG HEMOCLIP, MEDIUM: Performed by: UROLOGY

## 2018-04-30 PROCEDURE — 160048 HCHG OR STATISTICAL LEVEL 1-5: Performed by: UROLOGY

## 2018-04-30 PROCEDURE — 160035 HCHG PACU - 1ST 60 MINS PHASE I: Performed by: UROLOGY

## 2018-04-30 PROCEDURE — A4338 INDWELLING CATHETER LATEX: HCPCS | Performed by: UROLOGY

## 2018-04-30 PROCEDURE — 502704 HCHG DEVICE, LIGASURE IMPACT: Performed by: UROLOGY

## 2018-04-30 DEVICE — STENT UROLOGICAL POLARIS 6X26  ULTRA: Type: IMPLANTABLE DEVICE | Status: FUNCTIONAL

## 2018-04-30 RX ORDER — HALOPERIDOL 5 MG/ML
1 INJECTION INTRAMUSCULAR EVERY 6 HOURS PRN
Status: DISCONTINUED | OUTPATIENT
Start: 2018-04-30 | End: 2018-05-07 | Stop reason: HOSPADM

## 2018-04-30 RX ORDER — LIDOCAINE HYDROCHLORIDE 10 MG/ML
INJECTION, SOLUTION INFILTRATION; PERINEURAL
Status: COMPLETED
Start: 2018-04-30 | End: 2018-04-30

## 2018-04-30 RX ORDER — ACETAMINOPHEN 500 MG
1000 TABLET ORAL EVERY 6 HOURS
Status: DISCONTINUED | OUTPATIENT
Start: 2018-04-30 | End: 2018-04-30

## 2018-04-30 RX ORDER — DEXAMETHASONE SODIUM PHOSPHATE 4 MG/ML
4 INJECTION, SOLUTION INTRA-ARTICULAR; INTRALESIONAL; INTRAMUSCULAR; INTRAVENOUS; SOFT TISSUE
Status: DISCONTINUED | OUTPATIENT
Start: 2018-04-30 | End: 2018-05-07 | Stop reason: HOSPADM

## 2018-04-30 RX ORDER — ACETAMINOPHEN 650 MG/1
SUPPOSITORY RECTAL
Status: COMPLETED
Start: 2018-04-30 | End: 2018-04-30

## 2018-04-30 RX ORDER — HEPARIN SODIUM 5000 [USP'U]/ML
5000 INJECTION, SOLUTION INTRAVENOUS; SUBCUTANEOUS EVERY 8 HOURS
Status: DISCONTINUED | OUTPATIENT
Start: 2018-04-30 | End: 2018-05-07 | Stop reason: HOSPADM

## 2018-04-30 RX ORDER — ACETAMINOPHEN 650 MG/1
975 SUPPOSITORY RECTAL EVERY 6 HOURS
Status: DISCONTINUED | OUTPATIENT
Start: 2018-04-30 | End: 2018-04-30

## 2018-04-30 RX ORDER — SODIUM CHLORIDE, SODIUM LACTATE, POTASSIUM CHLORIDE, CALCIUM CHLORIDE 600; 310; 30; 20 MG/100ML; MG/100ML; MG/100ML; MG/100ML
INJECTION, SOLUTION INTRAVENOUS CONTINUOUS
Status: DISCONTINUED | OUTPATIENT
Start: 2018-04-30 | End: 2018-05-03

## 2018-04-30 RX ORDER — NALOXONE HYDROCHLORIDE 0.4 MG/ML
0.1 INJECTION, SOLUTION INTRAMUSCULAR; INTRAVENOUS; SUBCUTANEOUS
Status: DISCONTINUED | OUTPATIENT
Start: 2018-04-30 | End: 2018-05-07 | Stop reason: HOSPADM

## 2018-04-30 RX ORDER — OXYCODONE HYDROCHLORIDE 10 MG/1
10 TABLET ORAL
Status: DISCONTINUED | OUTPATIENT
Start: 2018-04-30 | End: 2018-05-07 | Stop reason: HOSPADM

## 2018-04-30 RX ORDER — MAGNESIUM HYDROXIDE 1200 MG/15ML
LIQUID ORAL
Status: COMPLETED | OUTPATIENT
Start: 2018-04-30 | End: 2018-04-30

## 2018-04-30 RX ORDER — SODIUM CHLORIDE, SODIUM LACTATE, POTASSIUM CHLORIDE, CALCIUM CHLORIDE 600; 310; 30; 20 MG/100ML; MG/100ML; MG/100ML; MG/100ML
INJECTION, SOLUTION INTRAVENOUS CONTINUOUS
Status: DISCONTINUED | OUTPATIENT
Start: 2018-04-30 | End: 2018-05-06

## 2018-04-30 RX ORDER — HYDROMORPHONE HYDROCHLORIDE 2 MG/ML
0.5 INJECTION, SOLUTION INTRAMUSCULAR; INTRAVENOUS; SUBCUTANEOUS
Status: DISCONTINUED | OUTPATIENT
Start: 2018-04-30 | End: 2018-04-30

## 2018-04-30 RX ORDER — METOCLOPRAMIDE HYDROCHLORIDE 5 MG/ML
10 INJECTION INTRAMUSCULAR; INTRAVENOUS EVERY 4 HOURS PRN
Status: DISCONTINUED | OUTPATIENT
Start: 2018-04-30 | End: 2018-05-03

## 2018-04-30 RX ORDER — ACETAMINOPHEN 500 MG
1000 TABLET ORAL EVERY 6 HOURS
Status: DISPENSED | OUTPATIENT
Start: 2018-05-01 | End: 2018-05-05

## 2018-04-30 RX ORDER — ACETAMINOPHEN 500 MG
1000 TABLET ORAL
COMMUNITY
End: 2018-05-21

## 2018-04-30 RX ORDER — ONDANSETRON 2 MG/ML
4 INJECTION INTRAMUSCULAR; INTRAVENOUS EVERY 4 HOURS PRN
Status: DISCONTINUED | OUTPATIENT
Start: 2018-04-30 | End: 2018-04-30

## 2018-04-30 RX ORDER — LIDOCAINE HYDROCHLORIDE 10 MG/ML
0.5 INJECTION, SOLUTION INFILTRATION; PERINEURAL
Status: ACTIVE | OUTPATIENT
Start: 2018-04-30 | End: 2018-05-01

## 2018-04-30 RX ORDER — BISACODYL 10 MG
10 SUPPOSITORY, RECTAL RECTAL
Status: DISCONTINUED | OUTPATIENT
Start: 2018-04-30 | End: 2018-05-07 | Stop reason: HOSPADM

## 2018-04-30 RX ORDER — SODIUM CHLORIDE, SODIUM LACTATE, POTASSIUM CHLORIDE, CALCIUM CHLORIDE 600; 310; 30; 20 MG/100ML; MG/100ML; MG/100ML; MG/100ML
250 INJECTION, SOLUTION INTRAVENOUS PRN
Status: DISCONTINUED | OUTPATIENT
Start: 2018-04-30 | End: 2018-05-07 | Stop reason: HOSPADM

## 2018-04-30 RX ORDER — ESTRADIOL 0.05 MG/D
1 PATCH, EXTENDED RELEASE TRANSDERMAL
Status: DISCONTINUED | OUTPATIENT
Start: 2018-05-03 | End: 2018-05-02

## 2018-04-30 RX ORDER — SCOLOPAMINE TRANSDERMAL SYSTEM 1 MG/1
1 PATCH, EXTENDED RELEASE TRANSDERMAL
Status: DISCONTINUED | OUTPATIENT
Start: 2018-04-30 | End: 2018-05-07 | Stop reason: HOSPADM

## 2018-04-30 RX ORDER — ONDANSETRON 2 MG/ML
4 INJECTION INTRAMUSCULAR; INTRAVENOUS EVERY 6 HOURS PRN
Status: DISCONTINUED | OUTPATIENT
Start: 2018-04-30 | End: 2018-05-07 | Stop reason: HOSPADM

## 2018-04-30 RX ORDER — SULFAMETHOXAZOLE AND TRIMETHOPRIM 800; 160 MG/1; MG/1
1 TABLET ORAL 2 TIMES DAILY
Status: ON HOLD | COMMUNITY
Start: 2018-04-26 | End: 2018-05-07

## 2018-04-30 RX ORDER — KETOROLAC TROMETHAMINE 30 MG/ML
30 INJECTION, SOLUTION INTRAMUSCULAR; INTRAVENOUS EVERY 6 HOURS
Status: COMPLETED | OUTPATIENT
Start: 2018-05-01 | End: 2018-05-03

## 2018-04-30 RX ORDER — POLYETHYLENE GLYCOL 3350 17 G/17G
1 POWDER, FOR SOLUTION ORAL DAILY
Status: DISCONTINUED | OUTPATIENT
Start: 2018-05-01 | End: 2018-05-07 | Stop reason: HOSPADM

## 2018-04-30 RX ORDER — AMOXICILLIN 250 MG
2 CAPSULE ORAL
Status: DISCONTINUED | OUTPATIENT
Start: 2018-04-30 | End: 2018-05-07 | Stop reason: HOSPADM

## 2018-04-30 RX ORDER — GABAPENTIN 300 MG/1
300 CAPSULE ORAL EVERY EVENING
Status: DISCONTINUED | OUTPATIENT
Start: 2018-04-30 | End: 2018-05-07 | Stop reason: HOSPADM

## 2018-04-30 RX ORDER — DIPHENHYDRAMINE HYDROCHLORIDE 50 MG/ML
25 INJECTION INTRAMUSCULAR; INTRAVENOUS EVERY 6 HOURS PRN
Status: DISCONTINUED | OUTPATIENT
Start: 2018-04-30 | End: 2018-05-01

## 2018-04-30 RX ORDER — OXYCODONE HYDROCHLORIDE 5 MG/1
5 TABLET ORAL
Status: DISCONTINUED | OUTPATIENT
Start: 2018-04-30 | End: 2018-05-07 | Stop reason: HOSPADM

## 2018-04-30 RX ADMIN — ACETAMINOPHEN 325 MG: 650 SUPPOSITORY RECTAL at 21:15

## 2018-04-30 RX ADMIN — ROPIVACAINE HYDROCHLORIDE: 10 INJECTION, SOLUTION EPIDURAL at 21:05

## 2018-04-30 RX ADMIN — KETOROLAC TROMETHAMINE 30 MG: 30 INJECTION, SOLUTION INTRAMUSCULAR at 23:37

## 2018-04-30 RX ADMIN — CEFAZOLIN SODIUM 1 G: 1 INJECTION, SOLUTION INTRAVENOUS at 23:38

## 2018-04-30 RX ADMIN — HEPARIN SODIUM 5000 UNITS: 5000 INJECTION, SOLUTION INTRAVENOUS; SUBCUTANEOUS at 23:37

## 2018-04-30 RX ADMIN — SODIUM CHLORIDE, POTASSIUM CHLORIDE, SODIUM LACTATE AND CALCIUM CHLORIDE: 600; 310; 30; 20 INJECTION, SOLUTION INTRAVENOUS at 23:39

## 2018-04-30 RX ADMIN — LIDOCAINE HYDROCHLORIDE 0.5 ML: 10 INJECTION, SOLUTION INFILTRATION; PERINEURAL at 12:43

## 2018-04-30 RX ADMIN — ACETAMINOPHEN 650 MG: 650 SUPPOSITORY RECTAL at 21:15

## 2018-04-30 RX ADMIN — ONDANSETRON 4 MG: 2 INJECTION, SOLUTION INTRAMUSCULAR; INTRAVENOUS at 23:37

## 2018-04-30 RX ADMIN — SODIUM CHLORIDE, POTASSIUM CHLORIDE, SODIUM LACTATE AND CALCIUM CHLORIDE: 600; 310; 30; 20 INJECTION, SOLUTION INTRAVENOUS at 12:43

## 2018-04-30 RX ADMIN — GABAPENTIN 300 MG: 300 CAPSULE ORAL at 23:38

## 2018-04-30 ASSESSMENT — PAIN SCALES - GENERAL
PAINLEVEL_OUTOF10: 3
PAINLEVEL_OUTOF10: 0
PAINLEVEL_OUTOF10: 5

## 2018-04-30 ASSESSMENT — PATIENT HEALTH QUESTIONNAIRE - PHQ9
1. LITTLE INTEREST OR PLEASURE IN DOING THINGS: NOT AT ALL
SUM OF ALL RESPONSES TO PHQ9 QUESTIONS 1 AND 2: 0
2. FEELING DOWN, DEPRESSED, IRRITABLE, OR HOPELESS: NOT AT ALL

## 2018-04-30 ASSESSMENT — LIFESTYLE VARIABLES
PACK_YEARS: 12
CONSUMPTION TOTAL: INCOMPLETE
HAVE YOU EVER FELT YOU SHOULD CUT DOWN ON YOUR DRINKING: NO
ALCOHOL_USE: YES
TOTAL SCORE: 0
HAVE PEOPLE ANNOYED YOU BY CRITICIZING YOUR DRINKING: NO
TOTAL SCORE: 0
EVER FELT BAD OR GUILTY ABOUT YOUR DRINKING: NO
TOTAL SCORE: 0
EVER HAD A DRINK FIRST THING IN THE MORNING TO STEADY YOUR NERVES TO GET RID OF A HANGOVER: NO
EVER_SMOKED: YES

## 2018-05-01 LAB
ANION GAP SERPL CALC-SCNC: 8 MMOL/L (ref 0–11.9)
BUN SERPL-MCNC: 19 MG/DL (ref 8–22)
CALCIUM SERPL-MCNC: 8.6 MG/DL (ref 8.5–10.5)
CHLORIDE SERPL-SCNC: 103 MMOL/L (ref 96–112)
CO2 SERPL-SCNC: 23 MMOL/L (ref 20–33)
CREAT SERPL-MCNC: 1.35 MG/DL (ref 0.5–1.4)
ERYTHROCYTE [DISTWIDTH] IN BLOOD BY AUTOMATED COUNT: 40.2 FL (ref 35.9–50)
GLUCOSE SERPL-MCNC: 147 MG/DL (ref 65–99)
HCT VFR BLD AUTO: 39.5 % (ref 37–47)
HGB BLD-MCNC: 13.2 G/DL (ref 12–16)
MCH RBC QN AUTO: 30 PG (ref 27–33)
MCHC RBC AUTO-ENTMCNC: 33.4 G/DL (ref 33.6–35)
MCV RBC AUTO: 89.8 FL (ref 81.4–97.8)
PLATELET # BLD AUTO: 452 K/UL (ref 164–446)
PMV BLD AUTO: 10.5 FL (ref 9–12.9)
POTASSIUM SERPL-SCNC: 5.1 MMOL/L (ref 3.6–5.5)
RBC # BLD AUTO: 4.4 M/UL (ref 4.2–5.4)
SODIUM SERPL-SCNC: 134 MMOL/L (ref 135–145)
WBC # BLD AUTO: 15.1 K/UL (ref 4.8–10.8)

## 2018-05-01 PROCEDURE — A9270 NON-COVERED ITEM OR SERVICE: HCPCS | Performed by: UROLOGY

## 2018-05-01 PROCEDURE — 770001 HCHG ROOM/CARE - MED/SURG/GYN PRIV*

## 2018-05-01 PROCEDURE — 85027 COMPLETE CBC AUTOMATED: CPT

## 2018-05-01 PROCEDURE — 700102 HCHG RX REV CODE 250 W/ 637 OVERRIDE(OP): Performed by: UROLOGY

## 2018-05-01 PROCEDURE — 80048 BASIC METABOLIC PNL TOTAL CA: CPT

## 2018-05-01 PROCEDURE — 700111 HCHG RX REV CODE 636 W/ 250 OVERRIDE (IP): Performed by: UROLOGY

## 2018-05-01 PROCEDURE — 700105 HCHG RX REV CODE 258: Performed by: UROLOGY

## 2018-05-01 PROCEDURE — 36415 COLL VENOUS BLD VENIPUNCTURE: CPT

## 2018-05-01 RX ORDER — DIPHENHYDRAMINE HCL 25 MG
25 TABLET ORAL EVERY 6 HOURS PRN
Status: DISCONTINUED | OUTPATIENT
Start: 2018-05-01 | End: 2018-05-01

## 2018-05-01 RX ORDER — DIPHENHYDRAMINE HYDROCHLORIDE 50 MG/ML
25 INJECTION INTRAMUSCULAR; INTRAVENOUS EVERY 6 HOURS PRN
Status: DISCONTINUED | OUTPATIENT
Start: 2018-05-01 | End: 2018-05-07 | Stop reason: HOSPADM

## 2018-05-01 RX ORDER — DIPHENHYDRAMINE HCL 25 MG
25 TABLET ORAL EVERY 6 HOURS PRN
Status: DISCONTINUED | OUTPATIENT
Start: 2018-05-01 | End: 2018-05-07 | Stop reason: HOSPADM

## 2018-05-01 RX ADMIN — ACETAMINOPHEN 1000 MG: 500 TABLET ORAL at 20:48

## 2018-05-01 RX ADMIN — DIPHENHYDRAMINE HYDROCHLORIDE 25 MG: 50 INJECTION, SOLUTION INTRAMUSCULAR; INTRAVENOUS at 03:01

## 2018-05-01 RX ADMIN — ACETAMINOPHEN 1000 MG: 500 TABLET ORAL at 15:02

## 2018-05-01 RX ADMIN — CEFAZOLIN SODIUM 1 G: 1 INJECTION, SOLUTION INTRAVENOUS at 14:15

## 2018-05-01 RX ADMIN — KETOROLAC TROMETHAMINE 30 MG: 30 INJECTION, SOLUTION INTRAMUSCULAR at 17:57

## 2018-05-01 RX ADMIN — KETOROLAC TROMETHAMINE 30 MG: 30 INJECTION, SOLUTION INTRAMUSCULAR at 23:40

## 2018-05-01 RX ADMIN — KETOROLAC TROMETHAMINE 30 MG: 30 INJECTION, SOLUTION INTRAMUSCULAR at 12:17

## 2018-05-01 RX ADMIN — ACETAMINOPHEN 1000 MG: 500 TABLET ORAL at 05:32

## 2018-05-01 RX ADMIN — ACETAMINOPHEN 1000 MG: 500 TABLET ORAL at 23:41

## 2018-05-01 RX ADMIN — DIPHENHYDRAMINE HCL 25 MG: 25 TABLET ORAL at 12:17

## 2018-05-01 RX ADMIN — CEFAZOLIN SODIUM 1 G: 1 INJECTION, SOLUTION INTRAVENOUS at 05:32

## 2018-05-01 RX ADMIN — HEPARIN SODIUM 5000 UNITS: 5000 INJECTION, SOLUTION INTRAVENOUS; SUBCUTANEOUS at 05:32

## 2018-05-01 RX ADMIN — ONDANSETRON 4 MG: 2 INJECTION, SOLUTION INTRAMUSCULAR; INTRAVENOUS at 20:49

## 2018-05-01 RX ADMIN — SODIUM CHLORIDE, POTASSIUM CHLORIDE, SODIUM LACTATE AND CALCIUM CHLORIDE: 600; 310; 30; 20 INJECTION, SOLUTION INTRAVENOUS at 23:41

## 2018-05-01 RX ADMIN — HEPARIN SODIUM 5000 UNITS: 5000 INJECTION, SOLUTION INTRAVENOUS; SUBCUTANEOUS at 23:40

## 2018-05-01 RX ADMIN — CEFAZOLIN SODIUM 1 G: 1 INJECTION, SOLUTION INTRAVENOUS at 20:49

## 2018-05-01 RX ADMIN — METOCLOPRAMIDE 10 MG: 5 INJECTION, SOLUTION INTRAMUSCULAR; INTRAVENOUS at 16:16

## 2018-05-01 RX ADMIN — HEPARIN SODIUM 5000 UNITS: 5000 INJECTION, SOLUTION INTRAVENOUS; SUBCUTANEOUS at 16:17

## 2018-05-01 RX ADMIN — GABAPENTIN 300 MG: 300 CAPSULE ORAL at 20:49

## 2018-05-01 RX ADMIN — SODIUM CHLORIDE, POTASSIUM CHLORIDE, SODIUM LACTATE AND CALCIUM CHLORIDE: 600; 310; 30; 20 INJECTION, SOLUTION INTRAVENOUS at 14:14

## 2018-05-01 RX ADMIN — KETOROLAC TROMETHAMINE 30 MG: 30 INJECTION, SOLUTION INTRAMUSCULAR at 05:31

## 2018-05-01 ASSESSMENT — LIFESTYLE VARIABLES
ALCOHOL_USE: NO
HAVE PEOPLE ANNOYED YOU BY CRITICIZING YOUR DRINKING: NO
TOTAL SCORE: 0
EVER FELT BAD OR GUILTY ABOUT YOUR DRINKING: NO
CONSUMPTION TOTAL: NEGATIVE
ON A TYPICAL DAY WHEN YOU DRINK ALCOHOL HOW MANY DRINKS DO YOU HAVE: 0
TOTAL SCORE: 0
HOW MANY TIMES IN THE PAST YEAR HAVE YOU HAD 5 OR MORE DRINKS IN A DAY: 0
AVERAGE NUMBER OF DAYS PER WEEK YOU HAVE A DRINK CONTAINING ALCOHOL: 0
HAVE YOU EVER FELT YOU SHOULD CUT DOWN ON YOUR DRINKING: NO
EVER HAD A DRINK FIRST THING IN THE MORNING TO STEADY YOUR NERVES TO GET RID OF A HANGOVER: NO
TOTAL SCORE: 0

## 2018-05-01 ASSESSMENT — PAIN SCALES - GENERAL
PAINLEVEL_OUTOF10: 5
PAINLEVEL_OUTOF10: 4
PAINLEVEL_OUTOF10: 5
PAINLEVEL_OUTOF10: 5
PAINLEVEL_OUTOF10: 4

## 2018-05-01 ASSESSMENT — ENCOUNTER SYMPTOMS
NEUROLOGICAL NEGATIVE: 1
CARDIOVASCULAR NEGATIVE: 1
EYES NEGATIVE: 1
RESPIRATORY NEGATIVE: 1
PSYCHIATRIC NEGATIVE: 1
CONSTITUTIONAL NEGATIVE: 1
GASTROINTESTINAL NEGATIVE: 1

## 2018-05-01 NOTE — CARE PLAN
Problem: Safety  Goal: Will remain free from falls  Outcome: PROGRESSING AS EXPECTED  Educated pt and Spouse about calling for any and all needs.     Problem: Infection  Goal: Will remain free from infection    Intervention: Implement standard precautions and perform hand washing before and after patient contact  Educated pt and spouse to perform hand hygiene after any and all activities.

## 2018-05-01 NOTE — PROGRESS NOTES
Bedside report completed.   Pt AOx4. Fatigued.  at bedside.   Pain rated 5/10. Bolus button used as needed.     Epidural assessed- scant bloody drainage present around insertion site. Tape reinforced. PCEA settings verified. Bolus button within reach- pt educated on bolus button use. Q4 neuro checks in place. + CMS. Strong pulses. No numbness or tingling     Anesthesiologist and surgeon rounding at bedside this morning.     FIDEL assessed- moderate serosanguineous drainage, dressing is CDI. Bulb compressed.     Nephrostomy tube assessed- dressing reinforced. Draining scant, clear, yellow fluid.     Urinary poe assessed- bag dated, stat lock on, good urine output.    Abdomen assessed- initial dressings in place- CDI. Abdomen soft and appropriately tender. Bowel sounds normoactive x4. + gas    SCD's on. Bed in lowest and locked position. Hospital socks on. Hourly rounding in place. Personal belongings within reach.     POC discussed and all questions answered at this time.         + No PIV, infiltrated, awaiting US guided IV+

## 2018-05-01 NOTE — PROGRESS NOTES
"Pt arrived to the floor received report from PACU RN. /77   Pulse 91   Temp 37.1 °C (98.8 °F)   Resp 13   Ht 1.702 m (5' 7\")   Wt 91.5 kg (201 lb 11.5 oz)   LMP  (LMP Unknown)   SpO2 95%   BMI 31.59 kg/m²     Pt awake and responding appropriately. Bed in lowest position.     2 RN skin check performed. Right elbow has very small Excoriated spot. Midline incision, RLQ FIDEL. R Flank nephrostomy tube from Dep 2017, And an Epidural.   "

## 2018-05-01 NOTE — OR NURSING
Pt calm and sleeping at this time, otherwise AA/Ox4. VSS. Dressing to abdomen, CDI. CMS+. Pt moves all extremities. Pt denies pain at this time. Epidural drip in place. Pt denies nausea or vomiting. Intact FIDEL drain to abdomen with minimal drainage in collection bulb. Intact nephrostomy to right flank area. Intact indwelling catheter with adequate urine output. SCDs in place. Report given to SHANTANU Peterson. Pt's  updated.    Pt via bed, accompanied by transport, was transferred to [facility]. All belongings sent with Pt.

## 2018-05-01 NOTE — OP REPORT
DATE OF SERVICE:  04/30/2018    PREOPERATIVE DIAGNOSES:  1.  Right multifocal proximal ureteral stricture with failed laser treatment   and balloon dilatation.  2.  Indwelling right nephrostomy tube.  3.  Recurrent urinary tract infection.  4.  History of urolithiasis.    POSTOPERATIVE DIAGNOSES:  1.  Right multifocal proximal ureteral stricture with failed laser treatment   and balloon dilatation.  2.  Indwelling right nephrostomy tube.  3.  Recurrent urinary tract infection.  4.  History of urolithiasis.    PROCEDURES PERFORMED:  1.  Exploratory laparotomy with extensive lysis of intra-abdominal and   intrapelvic bowel adhesions.  2.  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  3.  Right 6-Ugandan x 26 cm double-J stent placement.    SURGEON:  Kapil Rangel MD    ASSISTING SURGEON:  Terry Strange MD    ANESTHESIA:  General and epidural.    ANESTHESIOLOGIST:  Tanja Meza MD    COMPLICATIONS:  None.    ESTIMATED BLOOD LOSS:  150 mL.    SPECIMENS:  Right proximal ureteral segment and ureter to pathology for   permanent section.    DRAINS:  A 10 mm flat fluted Fito-Marsh drain in the right retroperitoneum   and 6-Ugandan x 26 cm double-J stent in the right ileal ureter and a 16-Ugandan   Seth to gravity and then the 12-Ugandan indwelling nephrostomy tube previously   positioned to gravity drainage.    INDICATIONS:  The patient is a 48-year-old woman with a history of urinary   stone disease.  She underwent treatment and subsequently had developed   ureteral obstruction where she was found to have a stricture.  This stricture   has been previously treated with balloon dilatation and laser incision with   stent placement.  Despite this, the stricture has recurred and she now has a   3.5-4 cm segment of proximal ureter, which is strictured.  She has an   indwelling nephrostomy tube and she is seen in consultation  by myself for   discussion regarding management options.    At the consultation, I discussed with the patient her options are nephrectomy,   autotransplantation or an ileal ureter.  Autotransplantation may be difficult   because the proximal ureter is involved and because of the prior treatment,   this may lead to a complicated reimplant with autotransplantation.  We did   discuss the option of ileal ureter or nephrectomy.  A creatinine clearance was   determined from the right kidney, which was 31 mL per minute, suggesting the   kidney is worth saving and she has preferred to have the kidney saved.  Prior   to surgery, I had a lengthy discussion in the presence of her family and with   the patient regarding the risks of the procedure including, but not limited to   the fact that it is a major operative procedure and there are both   intraoperative, perioperative and late term risks.  We discussed that with the   surgery in general it is performed through a midline incision.  Because she   has had 4  sections and 2 other abdominal surgeries as well as the   ureteral work, there is likely to be adhesions and this would require open   surgery.  I discussed the fact that a midline incision will be used.  With the   incision, there is a risk of wound infection in 2-3% of cases.  There is a   risk of hernia as a late complication of major abdominal surgery.  With the   prior operation, she may have dense adhesions putting her at risk to have   recurrent adhesions or enterotomies, which can lead to fistula formation or   other problems.  Within an ileal ureter, it is necessary to have adequate   length to bridge from the renal pelvis to the bladder and with the anastomosis   of the kidney, there is a risk of urine leak or stricture and subsequently   urinary fistula.  With connection to the bladder, there is a potential risk of   leak and fistula and with the ileal ureter, it does require a bowel   anastomosis and  certainly there is a risk of obstruction or anastomotic   failure with subsequent leak requiring reoperation.  With a major surgery   involving both the genitourinary and gastrointestinal system, there is the   potential risk for enteral vesicle or fistula and we discussed the fact that   with ileal ureter, she may have chronic colonization of enterococcus in the   urinary system as well as a risk of mucusuria, which can be lifelong.  In   addition, we discussed the fact that technically it may not be possible to   perform the procedure in which case a nephrectomy would be performed with the   subsequent risk of bleeding and the potential requirement of transfusion with   the risk of acquiring hepatitis B, C, or HIV.  With removal of the kidney,   there is a potential risk of requiring transient hemodialysis, although this   would be rare as I calculated her creatinine clearance on the other side to be   44 mL per minute.  In addition, the patient has sought second opinion at   [facility] with Dr. Clarke Cuello who also discussed the treatment   options and recommended proceeding with ileal ureter.  Finally, I discussed   with the patient the perioperative risk of deep vein thrombosis, pulmonary   embolism, aspiration pneumonia, heart attack, stroke and death.  An informed   consent was given to me by the patient in the presence of her family to   proceed with exploratory laparotomy, lysis of adhesions and ileal ureter   reconstruction versus nephrectomy.    DESCRIPTION OF DETAIL:  After informed consent was obtained, the patient had   an epidural catheter placed by Dr. Meza in the preoperative holding area.    She was marked on her right thigh with my initial letter Y for proper site   surgery.  She is brought to the operating room and placed in supine.    Bilateral sequential compression devices are in place and activated and a   16-Northern Irish Seth catheter was inserted with sterile technique.  The  operative   area was Betadine prepped and draped in the usual sterile fashion and she was   positioned supine on the table with a slight break at the level of the   anterior superior iliac spine.  At this point in time, after a general   endotracheal anesthetic had been administered in a balanced fashion and the   patient received cefotetan 2 g IV piggyback, a surgical time-out was called   and all members of the operative team agree as to the patient's name and   procedure to be performed and the fact that an exploratory laparotomy, lysis   of adhesions and ileal ureter.  Her fluoroscopic images are available   confirming the right side with a moderate hydronephrosis and it was confirmed   that she also has a nephrostomy tube in place, which is left to gravity   drainage.  At this point in time, without objections to the time-out,   attention was directed towards procedure.  I performed a midline incision,   dissecting from the pubic symphysis and her old Pfannenstiel incision where   she had had 4 C-sections up around the umbilicus with a curvilinear incision   around the umbilicus on her right side and extended the incision up to   approximately 1/3rd of the distance from the xiphoid to the umbilicus.  I left   it at this area and dissected near the umbilicus to enter the abdomen to try   and avoid the adhesions down in the pelvis where she had had the bulk of her   surgery and had another transverse right lower quadrant incision previously   performed.  Upon entering the abdomen, it is clear that the patient has dense   adhesions or omentum stuck to the abdominal wall.  It was taken down with   sharp dissection.  Retraction was placed and then dissection was carried.  She   is noted to have extensive adhesions to the abdominal wall and then in the   pelvis or bowel, much of the small bowel was stuck down in the pelvis.  This   was completely mobilized.  Her bladder was actually filled with 400 mL of   sterile  water to distend the bladder and allow us to create a plane mobilizing   the bowel out of the pelvis and taking down all of the adhesions in the   ileum.  At this point in time, the colon was identified.  Her appendix   identified and is normal.  Bowel was swept out and then the right side of the   bladder was mobilized as this is the side where the reimplant will be   performed.  At this point in time, I proceeded to find the ureter at the level   of the bifurcation of iliac vessels and a [place] retractor was positioned   and dissection was carried around the right side of the bladder.  I did take   down the superior vesicle artery, which is basically obliterated on the right   side.  This was taken down with the LigaSure and this allowed mobility of the   right bladder.  The bladder was then allowed to drain and attention was   directed towards placing a vessel loop around the ureter and then I used this   to dissect up superiorly.  The patient had dense fibrosis.  I incised the   white line of Toldt, mobilizing the colon and the appendix superiorly and then   dissected up almost to the ligament of Treitz and the mesentery to free this   up anteriorly.  I then proceeded to incise the peritoneum between Gerota's   fascia and then dissected and swept the duodenum from a lateral to medial   position, dissecting down to the level of the vena cava.  I was able to   identify the gonadal vein on the cava and then mobilized the tissues.  At this   point in time, retraction was positioned.  Moist laps were placed and the   inferior of the pole of the kidney was dissected, I could see the parenchyma   and then utilizing ureter with a vessel loop, I dissected around this and   dissected superiorly.  There was dense fibrosis involving about 4.5 cm of the   proximal ureter.  The ureter was stuck what appeared to be lateral to the   cava.  Because the gonadal vein was present, it was dissected free.  It was   triply clipped on  the cava side and doubly clipped on the other side to avoid   any traction pull outs due to a very difficult dissection.  I would note that   the amount of dissection in the pelvis of the small bowel was significant, but   no enterotomies were encountered and the dissection of the ureter was   prolonged and exceedingly difficult as it extended up into the hilum with   dense periureteral fibrosis essentially utilizing a right angle in the tissue   and cautery very carefully next to the cava.  However, I was able to mobilize   superiorly, at one point entered the ureter at which point we were able to   insert a guidewire into the ureter and advance it up in the renal pelvis.  I   then used this and incised the ureter all the way up to the renal pelvis.    Once the renal pelvis was identified, the proximal ureter was transected and   the renal pelvis was itself very inflamed and a dense rind was present, I had   to sharply dissect with sharp tenotomies and use a scalpel to create enough   tissue around the renal pelvis including the normal renal pelvis tissue to   create an anastomotic site.  After performing all this dissection, I evaluated   this and felt that an anastomosis could be accomplished, although it would be   extremely difficult and proceeded now at this point in time to evaluate the   mobility of the bowel.  The bowel was highly mobile.  I used a Sylvester on the   terminal ileum, the avascular plane of Treves was identified, it was incised   with Bovie cautery and utilizing LigaSure, the mesentery was taken down and   approximately a 25 cm segment of ileum was isolated.  LAWRENCE gastrointestinal   stapler was used to fire across the bowel edges and then the bowel was kept in   its orientation, marked for the purposes of evaluation, actually took off the   staple line on the proximal portion of bowel, which would be anastomosed to   the kidney and then irrigated the bowel and the bowel prep was good.  There    was some mag citrate type material and some mucus removed, but after this had   been irrigated in the kidney basin, attention was directed towards the bowel   anastomosis.  I did a stapled anastomosis incising the bowel on the   antimesenteric border, LAWRENCE stapler was advanced, it was fired, it was about a   3 or 3.5 cm anastomosis and the end was closed with the TIA stapler.  After   placing 3 Allis on the end, it was fired, the redundant tissue was cut and   then utilizing 3-0 silk sutures in an interrupted fashion, the fat overlying   the anastomotic site was reapproximated, then proceeded to close the   mesenteric trap with a running 3-0 Vicryl.  After this was closed, the ileum,   which was kept in a retroperitoneal position, was now positioned superiorly   and I proceeded to dissect the remaining portion of the distal ureter down   into the pelvis, at which point it was doubly clipped and then the remaining   ureter was transected, so the ureter was submitted as a proximal ureteral   stricture, which had actually been entered surgically and then the distal   ureter was sent in association as the specimen.  At this point in time,   attention was directed towards evaluation of the anastomosis, it was clear   that this would need to be accomplished with a parachute type ring down due to   the inflammation to avoid tearing the tissue, and at this point in time, the   ends of the bowel were prepared and then sutures were placed, approximately 12   sutures, in the renal pelvis.  The opening was approximately 2.5 cm in size.    These sutures were all position from outside in and marked and then on the   bowel, they were placed accordingly from the inside out.  I then proceeded to   open the distal end of the ileum, which would be anastomosed to the bladder   and passed a 16-Danish Seth in through the bowel and put it down into the   kidney, I inflated the balloon with 3 mL of sterile water, also had the nurse    irrigate the nephrostomy tube and could see that the only extravasation was at   the level of the renal pelvis.  At this point in time, with the Seth in   place, I took a [place] and brought this down to the anastomosis to take   tension off it.  I then tied the posterior sutures down, which were 4-0 Vicryl   on a RB1 needle.  All positioned with loupe magnification.  These sutures   were tied down posteriorly, approximately 6 of them and then we tied down the   anterior sutures, after which irrigation was performed.  There was one leak   medially where there was slight leak, which was closed with a figure-of-eight   on a 4-0 Vicryl on a RB1 needle.  After this was placed, there was really   minimal leakage despite high pressure distention.  I left the Seth in place   and then secured the ileum to take any traction off the superior anastomosis   at which point attention was directed towards the bladder.  The bladder was   distended.  The detrusor serosa was incised and then the bladder was emptied.    Methylene blue was positioned in the bladder.  This allowed the mucosal edges   to be seen nicely.  The cut end of the ileum was then transected to remove   the staple line and then anastomosis was performed with interrupted sutures   mucosal to mucosal apposition with 3-0 Vicryl suture in an interrupted   fashion.  However, prior to completing this anastomosis, I did remove the   Seth catheter and passed up a 6-Macedonian x 26 cm double-J stent, it was passed   up and palpated into the renal pelvis after the guidewire was withdrawn, this   appeared to go across the anastomosis and then after the posterior component   of the vesical ileal anastomosis was complete, the stent was positioned in the   bladder.  The anastomosis was then complete after which the bladder was   refilled.  There was no significant leak.  Seth catheter was left to gravity   and then attention was directed towards placing a 10 mm flat fluted  drain into   the right retroperitoneum.  This was tunneled through a separate stab   incision inferiorly and then cut to an appropriate length, was placed in the   retroperitoneum at which point attention was now directed towards evaluating   the anastomosis, which appeared intact.  There was no significant leakage.    The colon was now placed with the appendix over its anatomic position.  The   anastomosis was kept superiorly and the majority of the bowel was kept out of   the pelvis.  The omentum was brought down over the bowel as much as could be   performed and then at this point in time because of the dense adhesions, I   elected to place a Seprafilm over the bowel.  Once the Seprafilm was   positioned and hemostasis was excellent, attention was directed towards   closure.  The drain was secured to the skin with a 3-0 nylon suture and then   the skin or the fascia was closed with a looped 0 PDS suture after this was   tied and the knot at the superior portion of the wound, it was buried with a   3-0 Vicryl suture.  I then proceeded to reapproximate the skin with staples.    After the staples were applied, sponge, instrument and needle count had   previously performed, which were correct x2.  The nephrostomy tube was left to   gravity drainage.  The Seth catheter was left to gravity drainage and the   Fito-Marsh drain was connected to bulb suction.  At the end of the case, a   dressing was applied.  The patient tolerated the procedure well without   complication.  She was extubated in the operating room and transferred to   recovery room where she arrived in stable condition.  The estimated blood loss   was 150 mL.       ____________________________________     MD OXANA ADKINS / JERILYN    DD:  05/01/2018 13:23:20  DT:  05/01/2018 15:23:52    D#:  [id number]  Job#:  [id number]

## 2018-05-01 NOTE — OR SURGEON
Immediate Post OP Note    PreOp Diagnosis: Right Proximal ureteral stricture    PostOp Diagnosis: As above    Procedure(s):  EXPLORATORY LAPAROTOMY WITH EXTENSIVE LYSIS OF ADHESIONS - Clean Contaminated  ILEAL URETER INTERPOSITION WITH COMPLEX DISSECTION OF THE RENAL PELVIS  6 Kiswahili x 26 cm stent    Surgeon(s):  BRIANNA Gage M.D.    Anesthesiologist/Type of Anesthesia:  Anesthesiologist: Tanja Meza M.D./General ETT/Epidural catheter    Surgical Staff:  Circulator: Lizzette Valerio RSTEPHANIE.  Relief Circulator: Rebecca Allne R.N.; Coco Tejeda R.N.; Irasema Winters R.N.  Relief Scrub: Erendira Figueroa  Scrub Person: Daphney Beltre; Terry Pressley    Specimens removed if any:  A: Proximal ureter    Estimated Blood Loss: 150ml    Findings: Extensive adhesion in the pelvis and dense periureteral fibrosis adjacent to the renal pelvis    Complications: None  Drains: 16 Iraqi poe to gravity                10mm flat fluted drain to bulb suction in the right retroperitoneum                6 Iraqi x 26cm stent in the right ureter.        4/30/2018 8:44 PM Kapil Rangel M.D.

## 2018-05-01 NOTE — PROGRESS NOTES
Pt reporting some neck pain this evening. Epidural assessed- it appears to have moved out one line. Dressing reinforced. Pt reporting a new onset of a headache when sitting up in bed.             Hugo Elizalde paged to update on condition.

## 2018-05-01 NOTE — PROGRESS NOTES
Surgical Progress Note    Author: Kapil Rangel Date & Time created: 2018   7:57 AM     Interval Events:  Post operative day #1 status post extensive lysis of adhesions and ileal ureter.  Review of Systems   Constitutional: Negative.    HENT: Negative.    Eyes: Negative.    Respiratory: Negative.    Cardiovascular: Negative.    Gastrointestinal: Negative.    Genitourinary: Negative.    Skin: Positive for itching.   Neurological: Negative.    Endo/Heme/Allergies: Negative.    Psychiatric/Behavioral: Negative.      Hemodynamics:  Temp (24hrs), Av.7 °C (98 °F), Min:36.1 °C (97 °F), Max:37.1 °C (98.8 °F)  Temperature: 36.8 °C (98.3 °F)  Pulse  Av  Min: 65  Max: 96Heart Rate (Monitored): 92  Blood Pressure: 110/54, NIBP: 108/53     Respiratory:    Respiration: 18, Pulse Oximetry: 94 %           Neuro:  GCS       Fluids:    Intake/Output Summary (Last 24 hours) at 18 0757  Last data filed at 18 0700   Gross per 24 hour   Intake           4252.8 ml   Output              560 ml   Net           3692.8 ml     Weight: 91.5 kg (201 lb 11.5 oz)  Current Diet Order   Procedures   • DIET ORDER     Physical Exam  Labs:  Recent Results (from the past 24 hour(s))   COD (ADULT)    Collection Time: 18 12:43 PM   Result Value Ref Range    ABO Grouping Only B     Rh Grouping Only POS     Antibody Screen-Cod NEG    ABO AND RH CONFIRMATION    Collection Time: 18 12:43 PM   Result Value Ref Range    ABO Grouping Only B     Second Rh Group POS    CBC WITHOUT DIFFERENTIAL    Collection Time: 18  9:50 PM   Result Value Ref Range    WBC 15.5 (H) 4.8 - 10.8 K/uL    RBC 5.01 4.20 - 5.40 M/uL    Hemoglobin 15.0 12.0 - 16.0 g/dL    Hematocrit 44.4 37.0 - 47.0 %    MCV 88.6 81.4 - 97.8 fL    MCH 29.9 27.0 - 33.0 pg    MCHC 33.8 33.6 - 35.0 g/dL    RDW 38.5 35.9 - 50.0 fL    Platelet Count 384 164 - 446 K/uL    MPV 10.5 9.0 - 12.9 fL   BASIC METABOLIC PANEL    Collection Time: 18  9:50 PM   Result Value  Ref Range    Sodium 134 (L) 135 - 145 mmol/L    Potassium 5.5 3.6 - 5.5 mmol/L    Chloride 104 96 - 112 mmol/L    Co2 19 (L) 20 - 33 mmol/L    Glucose 215 (H) 65 - 99 mg/dL    Bun 16 8 - 22 mg/dL    Creatinine 1.15 0.50 - 1.40 mg/dL    Calcium 8.2 (L) 8.5 - 10.5 mg/dL    Anion Gap 11.0 0.0 - 11.9   ESTIMATED GFR    Collection Time: 04/30/18  9:50 PM   Result Value Ref Range    GFR If African American >60 >60 mL/min/1.73 m 2    GFR If Non African American 50 (A) >60 mL/min/1.73 m 2   CBC WITHOUT DIFFERENTIAL    Collection Time: 05/01/18  4:39 AM   Result Value Ref Range    WBC 15.1 (H) 4.8 - 10.8 K/uL    RBC 4.40 4.20 - 5.40 M/uL    Hemoglobin 13.2 12.0 - 16.0 g/dL    Hematocrit 39.5 37.0 - 47.0 %    MCV 89.8 81.4 - 97.8 fL    MCH 30.0 27.0 - 33.0 pg    MCHC 33.4 (L) 33.6 - 35.0 g/dL    RDW 40.2 35.9 - 50.0 fL    Platelet Count 452 (H) 164 - 446 K/uL    MPV 10.5 9.0 - 12.9 fL   BASIC METABOLIC PANEL    Collection Time: 05/01/18  4:39 AM   Result Value Ref Range    Sodium 134 (L) 135 - 145 mmol/L    Potassium 5.1 3.6 - 5.5 mmol/L    Chloride 103 96 - 112 mmol/L    Co2 23 20 - 33 mmol/L    Glucose 147 (H) 65 - 99 mg/dL    Bun 19 8 - 22 mg/dL    Creatinine 1.35 0.50 - 1.40 mg/dL    Calcium 8.6 8.5 - 10.5 mg/dL    Anion Gap 8.0 0.0 - 11.9   ESTIMATED GFR    Collection Time: 05/01/18  4:39 AM   Result Value Ref Range    GFR If African American 50 (A) >60 mL/min/1.73 m 2    GFR If Non  42 (A) >60 mL/min/1.73 m 2     Medical Decision Making, by Problem:  There are no active hospital problems to display for this patient.    Plan:  Status post ileal ureter.  Doing well with poe, nephrostomy and drain in place.  Diet: clears     Quality Measures:  Quality-Core Measures    Discussed patient condition with Family and RN

## 2018-05-02 LAB
ANION GAP SERPL CALC-SCNC: 3 MMOL/L (ref 0–11.9)
BUN SERPL-MCNC: 14 MG/DL (ref 8–22)
CALCIUM SERPL-MCNC: 8.5 MG/DL (ref 8.5–10.5)
CHLORIDE SERPL-SCNC: 104 MMOL/L (ref 96–112)
CO2 SERPL-SCNC: 29 MMOL/L (ref 20–33)
CREAT SERPL-MCNC: 1.18 MG/DL (ref 0.5–1.4)
ERYTHROCYTE [DISTWIDTH] IN BLOOD BY AUTOMATED COUNT: 41.1 FL (ref 35.9–50)
GLUCOSE SERPL-MCNC: 99 MG/DL (ref 65–99)
HCT VFR BLD AUTO: 32.8 % (ref 37–47)
HGB BLD-MCNC: 10.7 G/DL (ref 12–16)
MCH RBC QN AUTO: 30 PG (ref 27–33)
MCHC RBC AUTO-ENTMCNC: 32.6 G/DL (ref 33.6–35)
MCV RBC AUTO: 91.9 FL (ref 81.4–97.8)
PLATELET # BLD AUTO: 331 K/UL (ref 164–446)
PMV BLD AUTO: 10.7 FL (ref 9–12.9)
POTASSIUM SERPL-SCNC: 4.2 MMOL/L (ref 3.6–5.5)
RBC # BLD AUTO: 3.57 M/UL (ref 4.2–5.4)
SODIUM SERPL-SCNC: 136 MMOL/L (ref 135–145)
WBC # BLD AUTO: 12 K/UL (ref 4.8–10.8)

## 2018-05-02 PROCEDURE — 700102 HCHG RX REV CODE 250 W/ 637 OVERRIDE(OP): Performed by: UROLOGY

## 2018-05-02 PROCEDURE — 85027 COMPLETE CBC AUTOMATED: CPT

## 2018-05-02 PROCEDURE — 700111 HCHG RX REV CODE 636 W/ 250 OVERRIDE (IP): Performed by: UROLOGY

## 2018-05-02 PROCEDURE — 700102 HCHG RX REV CODE 250 W/ 637 OVERRIDE(OP): Performed by: ANESTHESIOLOGY

## 2018-05-02 PROCEDURE — A9270 NON-COVERED ITEM OR SERVICE: HCPCS | Performed by: UROLOGY

## 2018-05-02 PROCEDURE — 700105 HCHG RX REV CODE 258: Performed by: UROLOGY

## 2018-05-02 PROCEDURE — A9270 NON-COVERED ITEM OR SERVICE: HCPCS | Performed by: ANESTHESIOLOGY

## 2018-05-02 PROCEDURE — 770001 HCHG ROOM/CARE - MED/SURG/GYN PRIV*

## 2018-05-02 PROCEDURE — 80048 BASIC METABOLIC PNL TOTAL CA: CPT

## 2018-05-02 PROCEDURE — 36415 COLL VENOUS BLD VENIPUNCTURE: CPT

## 2018-05-02 RX ORDER — ESTRADIOL 0.05 MG/D
1 PATCH, EXTENDED RELEASE TRANSDERMAL
Status: DISCONTINUED | OUTPATIENT
Start: 2018-05-03 | End: 2018-05-07 | Stop reason: HOSPADM

## 2018-05-02 RX ORDER — CYCLOBENZAPRINE HCL 10 MG
10 TABLET ORAL EVERY 6 HOURS PRN
Status: DISCONTINUED | OUTPATIENT
Start: 2018-05-02 | End: 2018-05-07 | Stop reason: HOSPADM

## 2018-05-02 RX ADMIN — CEFAZOLIN SODIUM 1 G: 1 INJECTION, SOLUTION INTRAVENOUS at 23:56

## 2018-05-02 RX ADMIN — ONDANSETRON 4 MG: 2 INJECTION, SOLUTION INTRAMUSCULAR; INTRAVENOUS at 11:34

## 2018-05-02 RX ADMIN — ROPIVACAINE HYDROCHLORIDE: 10 INJECTION, SOLUTION EPIDURAL at 04:37

## 2018-05-02 RX ADMIN — ACETAMINOPHEN 1000 MG: 500 TABLET ORAL at 05:49

## 2018-05-02 RX ADMIN — HEPARIN SODIUM 5000 UNITS: 5000 INJECTION, SOLUTION INTRAVENOUS; SUBCUTANEOUS at 10:00

## 2018-05-02 RX ADMIN — ACETAMINOPHEN 1000 MG: 500 TABLET ORAL at 11:28

## 2018-05-02 RX ADMIN — CEFAZOLIN SODIUM 1 G: 1 INJECTION, SOLUTION INTRAVENOUS at 05:49

## 2018-05-02 RX ADMIN — ACETAMINOPHEN 1000 MG: 500 TABLET ORAL at 18:17

## 2018-05-02 RX ADMIN — KETOROLAC TROMETHAMINE 30 MG: 30 INJECTION, SOLUTION INTRAMUSCULAR at 05:49

## 2018-05-02 RX ADMIN — SODIUM CHLORIDE, POTASSIUM CHLORIDE, SODIUM LACTATE AND CALCIUM CHLORIDE: 600; 310; 30; 20 INJECTION, SOLUTION INTRAVENOUS at 07:47

## 2018-05-02 RX ADMIN — DIPHENHYDRAMINE HCL 25 MG: 25 TABLET ORAL at 16:16

## 2018-05-02 RX ADMIN — CEFAZOLIN SODIUM 1 G: 1 INJECTION, SOLUTION INTRAVENOUS at 13:59

## 2018-05-02 RX ADMIN — CYCLOBENZAPRINE 10 MG: 10 TABLET, FILM COATED ORAL at 10:00

## 2018-05-02 RX ADMIN — GABAPENTIN 300 MG: 300 CAPSULE ORAL at 20:56

## 2018-05-02 RX ADMIN — KETOROLAC TROMETHAMINE 30 MG: 30 INJECTION, SOLUTION INTRAMUSCULAR at 11:29

## 2018-05-02 RX ADMIN — SODIUM CHLORIDE, POTASSIUM CHLORIDE, SODIUM LACTATE AND CALCIUM CHLORIDE: 600; 310; 30; 20 INJECTION, SOLUTION INTRAVENOUS at 16:14

## 2018-05-02 RX ADMIN — KETOROLAC TROMETHAMINE 30 MG: 30 INJECTION, SOLUTION INTRAMUSCULAR at 18:17

## 2018-05-02 RX ADMIN — KETOROLAC TROMETHAMINE 30 MG: 30 INJECTION, SOLUTION INTRAMUSCULAR at 23:56

## 2018-05-02 RX ADMIN — ACETAMINOPHEN 1000 MG: 500 TABLET ORAL at 23:56

## 2018-05-02 ASSESSMENT — PAIN SCALES - GENERAL
PAINLEVEL_OUTOF10: 4
PAINLEVEL_OUTOF10: 3
PAINLEVEL_OUTOF10: 7
PAINLEVEL_OUTOF10: 3
PAINLEVEL_OUTOF10: 6
PAINLEVEL_OUTOF10: 3

## 2018-05-02 ASSESSMENT — PATIENT HEALTH QUESTIONNAIRE - PHQ9
2. FEELING DOWN, DEPRESSED, IRRITABLE, OR HOPELESS: NOT AT ALL
SUM OF ALL RESPONSES TO PHQ9 QUESTIONS 1 AND 2: 0
1. LITTLE INTEREST OR PLEASURE IN DOING THINGS: NOT AT ALL

## 2018-05-02 ASSESSMENT — ENCOUNTER SYMPTOMS
GASTROINTESTINAL NEGATIVE: 1
RESPIRATORY NEGATIVE: 1
CONSTITUTIONAL NEGATIVE: 1
MUSCULOSKELETAL NEGATIVE: 1
HEADACHES: 1
EYES NEGATIVE: 1
CARDIOVASCULAR NEGATIVE: 1
PSYCHIATRIC NEGATIVE: 1

## 2018-05-02 NOTE — PROGRESS NOTES
Pt complained of a Head ache and numbness at upper lower extremities (upper thigh). Dr. Hernandez contacted. No new orders provided.  Stated as long as she is still on a Clear liquid diet and getting relief from the epidural catheter that we should leave it in and have an assessment done during morning rounds.

## 2018-05-02 NOTE — PROGRESS NOTES
Report received.  Assumed Care.  Pt in bed, 402. AOx4, responds appropriately.  Denies pain, SOB.  Plan of care discussed.  Explained importance of calling before getting OOB and pt verbalizes understanding.  Call light and belongings within reach, treaded slipper socks on, bed alarm in use, bed in lowest position.  Will monitor frequently.

## 2018-05-02 NOTE — PROGRESS NOTES
"Assumed care of patient from night shift RN.  Patient is alert and oriented times 4, states pain of 7/10, epidural with bolus button available.  VSS /53   Pulse 81   Temp 36.2 °C (97.2 °F)   Resp 18   Ht 1.702 m (5' 7\")   Wt 91.5 kg (201 lb 11.5 oz)   LMP 09/01/2009   SpO2 99%   Breastfeeding? No   BMI 31.59 kg/m²   PIV in the R forearm, patent and running LR at 125mL/hr.  Epidural in place, Dilaudid and Ropivicaine, 6mL/hr.  Epidural location noted to have some old drainage and slight fluid around insertion site.  Observed with night RN, anesthesia was notified this AM, no changes since MD notified.  Midline incision with island dressing, CDI.  FIDEL drain to the R abdomen.  Nephrostomy tube to the R.  Seth catheter, stat lock in place and draining to gravity.  Clear liquid diet, unable to tolerate due to n/v.  Last BM 4/30, bowel protocol in place when patient is tolerating PO intake.  On 1L with saturations in the mid to high 90s.  Patient was reported to be a standby assist.  POC discussed for the day, bed is locked and in the lowest position, call light is within reach.  All needs are met at this time, hourly rounding is in place.  "

## 2018-05-02 NOTE — PROGRESS NOTES
"Urology Progress Note    Post op Day # 2  .Right multifocal proximal ureteral stricture with failed laser treatment   and balloon dilatation.  2.  Indwelling right nephrostomy tube.  3.  Recurrent urinary tract infection.  4.  History of urolithiasis.     PROCEDURES PERFORMED:  1.  Exploratory laparotomy with extensive lysis of intra-abdominal and   intrapelvic bowel adhesions.  2.  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  3.  Right 6-Georgian x 26 cm double-J stent placement    Interval Events: nausea last night.      S: No fevers, chills,+  nausea some vomiting last night.   + flatus. Pt complaining of head ache and neck pain that range from 2-8 on scale (1-10).      O:   Blood pressure 112/55, pulse 85, temperature 36.5 °C (97.7 °F), resp. rate 17, height 1.702 m (5' 7\"), weight 91.5 kg (201 lb 11.5 oz), last menstrual period 09/01/2009, SpO2 96 %, not currently breastfeeding.  Recent Labs      04/30/18 2150  05/01/18   0439  05/02/18   0453   SODIUM  134*  134*  136   POTASSIUM  5.5  5.1  4.2   CHLORIDE  104  103  104   CO2  19*  23  29   GLUCOSE  215*  147*  99   BUN  16  19  14   CREATININE  1.15  1.35  1.18   CALCIUM  8.2*  8.6  8.5     Recent Labs      04/30/18 2150 05/01/18   0439  05/02/18   0453   WBC  15.5*  15.1*  12.0*   RBC  5.01  4.40  3.57*   HEMOGLOBIN  15.0  13.2  10.7*   HEMATOCRIT  44.4  39.5  32.8*   MCV  88.6  89.8  91.9   MCH  29.9  30.0  30.0   MCHC  33.8  33.4*  32.6*   RDW  38.5  40.2  41.1   PLATELETCT  384  452*  331   MPV  10.5  10.5  10.7         Intake/Output Summary (Last 24 hours) at 05/02/18 1003  Last data filed at 05/02/18 0739   Gross per 24 hour   Intake             2740 ml   Output             3670 ml   Net             -930 ml       Physical Exam   Constitutional: She is oriented to person, place, and time and well-developed, well-nourished, and in no distress. No " distress.   HENT:   Head: Normocephalic and atraumatic.   Eyes: Pupils are equal, round, and reactive to light.   Neck: Normal range of motion. Neck supple.   Cardiovascular: Normal rate, regular rhythm, normal heart sounds and intact distal pulses.  Exam reveals no friction rub.    No murmur heard.  Pulmonary/Chest: Breath sounds normal. No respiratory distress. She has no wheezes. She has no rales. She exhibits tenderness.   Chest tender to deep inspiration   Abdominal: Bowel sounds are normal. There is no tenderness.   Slightly distended. Sutures intact    Genitourinary:   Genitourinary Comments: Nephrostomy tube R side.  Draining clear yellow urine.  Seth to down drain clear yellow urine.  FIDEL draining clear red fluid   Musculoskeletal: She exhibits no edema or deformity.   Neurological: She is alert and oriented to person, place, and time.   Skin: Skin is warm and dry. She is not diaphoretic.   Psychiatric: Affect normal.     A/P:    .Right multifocal proximal ureteral stricture with failed laser treatment   and balloon dilatation.  2.  Indwelling right nephrostomy tube.  3.  Recurrent urinary tract infection.  4.  History of urolithiasis.    Stable.   Ambulate, IS.  Clear fluids until nausea resolves.    Once epidural Dcd then we will start her on Toradol with narcotics.    Discussed plan of care with PT, MD Rangel and RN.  Care is directed by ANDREA Jacobs

## 2018-05-02 NOTE — CARE PLAN
Problem: Knowledge Deficit  Goal: Knowledge of disease process/condition, treatment plan, diagnostic tests, and medications will improve  Outcome: PROGRESSING AS EXPECTED  Discussed Epidural function and the importance of being carefull and mindful while changing positions. Epidural has already moved 1cm since insertion. Pt stated understanding. Epidural will not change position throughout shift.     Problem: Mobility  Goal: Risk for activity intolerance will decrease    Intervention: Encourage patient to increase activity level in collaboration with Interdisciplinary Team  Pt ambulated x1 today. Educated pt to increase her effort. Pt stated understanding. Pt agreed to ambulate x3 during day tomorrow.

## 2018-05-02 NOTE — CARE PLAN
Problem: Safety  Goal: Will remain free from injury  Outcome: PROGRESSING AS EXPECTED  Patient educated on the importance of calling a staff member before getting out of bed. Patient verbalizes understanding and patient calling appropriately. Bed in lowest position, call light and other belongings within reach, treaded socks on, and hourly rounding in place.     Problem: Pain Management  Goal: Pain level will decrease to patient's comfort goal  Outcome: PROGRESSING AS EXPECTED  Pt educated on the 1-10 pain scale. Pt verbalizes understanding. Pt states that current drug regimen is effectively controlling pain.     Epidural and oral pain meds in place

## 2018-05-02 NOTE — CARE PLAN
Problem: Safety  Goal: Will remain free from injury  Outcome: PROGRESSING AS EXPECTED  Educated on use of call light and not to get out of bed without staff present.  Patient demonstrates proper use of call light    Problem: Pain Management  Goal: Pain level will decrease to patient's comfort goal  Outcome: PROGRESSING AS EXPECTED  Patient with headache, medicated per MAR.  Will consult anesthesia today if continues.

## 2018-05-03 LAB
ANION GAP SERPL CALC-SCNC: 4 MMOL/L (ref 0–11.9)
BUN SERPL-MCNC: 10 MG/DL (ref 8–22)
CALCIUM SERPL-MCNC: 8.3 MG/DL (ref 8.5–10.5)
CHLORIDE SERPL-SCNC: 108 MMOL/L (ref 96–112)
CO2 SERPL-SCNC: 27 MMOL/L (ref 20–33)
CREAT SERPL-MCNC: 1 MG/DL (ref 0.5–1.4)
ERYTHROCYTE [DISTWIDTH] IN BLOOD BY AUTOMATED COUNT: 41.2 FL (ref 35.9–50)
GLUCOSE SERPL-MCNC: 82 MG/DL (ref 65–99)
HCT VFR BLD AUTO: 28.9 % (ref 37–47)
HGB BLD-MCNC: 9.6 G/DL (ref 12–16)
MCH RBC QN AUTO: 30.4 PG (ref 27–33)
MCHC RBC AUTO-ENTMCNC: 33.2 G/DL (ref 33.6–35)
MCV RBC AUTO: 91.5 FL (ref 81.4–97.8)
PLATELET # BLD AUTO: 324 K/UL (ref 164–446)
PMV BLD AUTO: 10.5 FL (ref 9–12.9)
POTASSIUM SERPL-SCNC: 4 MMOL/L (ref 3.6–5.5)
RBC # BLD AUTO: 3.16 M/UL (ref 4.2–5.4)
SODIUM SERPL-SCNC: 139 MMOL/L (ref 135–145)
WBC # BLD AUTO: 9.5 K/UL (ref 4.8–10.8)

## 2018-05-03 PROCEDURE — 700111 HCHG RX REV CODE 636 W/ 250 OVERRIDE (IP): Performed by: UROLOGY

## 2018-05-03 PROCEDURE — 700105 HCHG RX REV CODE 258: Performed by: UROLOGY

## 2018-05-03 PROCEDURE — 700102 HCHG RX REV CODE 250 W/ 637 OVERRIDE(OP): Performed by: ANESTHESIOLOGY

## 2018-05-03 PROCEDURE — 700102 HCHG RX REV CODE 250 W/ 637 OVERRIDE(OP): Performed by: UROLOGY

## 2018-05-03 PROCEDURE — A9270 NON-COVERED ITEM OR SERVICE: HCPCS | Performed by: UROLOGY

## 2018-05-03 PROCEDURE — 770001 HCHG ROOM/CARE - MED/SURG/GYN PRIV*

## 2018-05-03 PROCEDURE — 36415 COLL VENOUS BLD VENIPUNCTURE: CPT

## 2018-05-03 PROCEDURE — A9270 NON-COVERED ITEM OR SERVICE: HCPCS | Performed by: ANESTHESIOLOGY

## 2018-05-03 PROCEDURE — 80048 BASIC METABOLIC PNL TOTAL CA: CPT

## 2018-05-03 PROCEDURE — 85027 COMPLETE CBC AUTOMATED: CPT

## 2018-05-03 RX ORDER — METOCLOPRAMIDE 10 MG/1
10 TABLET ORAL EVERY 6 HOURS PRN
Status: DISCONTINUED | OUTPATIENT
Start: 2018-05-03 | End: 2018-05-07 | Stop reason: HOSPADM

## 2018-05-03 RX ORDER — OMEPRAZOLE 20 MG/1
20 CAPSULE, DELAYED RELEASE ORAL DAILY
Status: COMPLETED | OUTPATIENT
Start: 2018-05-03 | End: 2018-05-07

## 2018-05-03 RX ADMIN — ACETAMINOPHEN 1000 MG: 500 TABLET ORAL at 06:03

## 2018-05-03 RX ADMIN — METOCLOPRAMIDE HYDROCHLORIDE 10 MG: 10 TABLET ORAL at 17:34

## 2018-05-03 RX ADMIN — OXYCODONE HYDROCHLORIDE 10 MG: 10 TABLET ORAL at 18:20

## 2018-05-03 RX ADMIN — OXYCODONE HYDROCHLORIDE 10 MG: 10 TABLET ORAL at 21:45

## 2018-05-03 RX ADMIN — CYCLOBENZAPRINE 10 MG: 10 TABLET, FILM COATED ORAL at 14:10

## 2018-05-03 RX ADMIN — OMEPRAZOLE 20 MG: 20 CAPSULE, DELAYED RELEASE ORAL at 17:34

## 2018-05-03 RX ADMIN — ACETAMINOPHEN 1000 MG: 500 TABLET ORAL at 11:23

## 2018-05-03 RX ADMIN — KETOROLAC TROMETHAMINE 30 MG: 30 INJECTION, SOLUTION INTRAMUSCULAR at 11:23

## 2018-05-03 RX ADMIN — ESTRADIOL 1 PATCH: 0.05 PATCH, EXTENDED RELEASE TRANSDERMAL at 10:11

## 2018-05-03 RX ADMIN — KETOROLAC TROMETHAMINE 30 MG: 30 INJECTION, SOLUTION INTRAMUSCULAR at 06:03

## 2018-05-03 RX ADMIN — CYCLOBENZAPRINE 10 MG: 10 TABLET, FILM COATED ORAL at 21:45

## 2018-05-03 RX ADMIN — CEFAZOLIN SODIUM 1 G: 1 INJECTION, SOLUTION INTRAVENOUS at 14:06

## 2018-05-03 RX ADMIN — SODIUM CHLORIDE, POTASSIUM CHLORIDE, SODIUM LACTATE AND CALCIUM CHLORIDE: 600; 310; 30; 20 INJECTION, SOLUTION INTRAVENOUS at 12:51

## 2018-05-03 RX ADMIN — KETOROLAC TROMETHAMINE 30 MG: 30 INJECTION, SOLUTION INTRAMUSCULAR at 17:34

## 2018-05-03 RX ADMIN — OXYCODONE HYDROCHLORIDE 10 MG: 10 TABLET ORAL at 14:55

## 2018-05-03 RX ADMIN — SODIUM CHLORIDE, POTASSIUM CHLORIDE, SODIUM LACTATE AND CALCIUM CHLORIDE: 600; 310; 30; 20 INJECTION, SOLUTION INTRAVENOUS at 21:52

## 2018-05-03 RX ADMIN — SODIUM CHLORIDE, POTASSIUM CHLORIDE, SODIUM LACTATE AND CALCIUM CHLORIDE: 600; 310; 30; 20 INJECTION, SOLUTION INTRAVENOUS at 06:04

## 2018-05-03 RX ADMIN — GABAPENTIN 300 MG: 300 CAPSULE ORAL at 21:45

## 2018-05-03 RX ADMIN — POLYETHYLENE GLYCOL 3350 1 PACKET: 17 POWDER, FOR SOLUTION ORAL at 10:11

## 2018-05-03 RX ADMIN — ACETAMINOPHEN 1000 MG: 500 TABLET ORAL at 23:43

## 2018-05-03 RX ADMIN — ACETAMINOPHEN 1000 MG: 500 TABLET ORAL at 17:34

## 2018-05-03 RX ADMIN — CEFAZOLIN SODIUM 1 G: 1 INJECTION, SOLUTION INTRAVENOUS at 06:05

## 2018-05-03 RX ADMIN — CEFAZOLIN SODIUM 1 G: 1 INJECTION, SOLUTION INTRAVENOUS at 21:45

## 2018-05-03 RX ADMIN — DIPHENHYDRAMINE HCL 25 MG: 25 TABLET ORAL at 00:04

## 2018-05-03 RX ADMIN — HEPARIN SODIUM 5000 UNITS: 5000 INJECTION, SOLUTION INTRAVENOUS; SUBCUTANEOUS at 23:42

## 2018-05-03 ASSESSMENT — ENCOUNTER SYMPTOMS
HEARTBURN: 1
PSYCHIATRIC NEGATIVE: 1
MUSCULOSKELETAL NEGATIVE: 1
HEADACHES: 1
RESPIRATORY NEGATIVE: 1
CONSTITUTIONAL NEGATIVE: 1
EYES NEGATIVE: 1
CARDIOVASCULAR NEGATIVE: 1

## 2018-05-03 ASSESSMENT — PAIN SCALES - GENERAL
PAINLEVEL_OUTOF10: 8
PAINLEVEL_OUTOF10: 8
PAINLEVEL_OUTOF10: 4
PAINLEVEL_OUTOF10: 3
PAINLEVEL_OUTOF10: 2
PAINLEVEL_OUTOF10: 4
PAINLEVEL_OUTOF10: 7
PAINLEVEL_OUTOF10: 4
PAINLEVEL_OUTOF10: 6
PAINLEVEL_OUTOF10: 3

## 2018-05-03 NOTE — PROGRESS NOTES
Surgical Progress Note    Author: Kapil Rangel Date & Time created: 2018   7:58 PM     Interval Events:  Patient has had a headache for last 12 hours. Passing flatus all day and has clear urine from the folye and the nephrostomy. Denies severe pain.  Review of Systems   Constitutional: Negative.    HENT: Negative.    Eyes: Negative.    Respiratory: Negative.    Cardiovascular: Negative.    Gastrointestinal: Negative.    Genitourinary: Negative.    Musculoskeletal: Negative.    Skin: Positive for itching.   Neurological: Positive for headaches.   Endo/Heme/Allergies: Negative.    Psychiatric/Behavioral: Negative.      Hemodynamics:  Temp (24hrs), Av.4 °C (97.5 °F), Min:36.1 °C (97 °F), Max:36.7 °C (98 °F)  Temperature: 36.7 °C (98 °F)  Pulse  Av.6  Min: 65  Max: 96   Blood Pressure: 109/66     Respiratory:    Respiration: 17, Pulse Oximetry: 100 %           Neuro:  GCS       Fluids:    Intake/Output Summary (Last 24 hours) at 18  Last data filed at 18   Gross per 24 hour   Intake           3625.9 ml   Output             3495 ml   Net            130.9 ml        Current Diet Order   Procedures   • DIET ORDER     Physical Exam   Constitutional: She is oriented to person, place, and time. She appears well-developed and well-nourished.   HENT:   Head: Normocephalic and atraumatic.   Mouth/Throat: Oropharynx is clear and moist.   Eyes: Conjunctivae and EOM are normal. Pupils are equal, round, and reactive to light.   Neck: Normal range of motion. Neck supple.   Cardiovascular: Normal rate, regular rhythm and normal heart sounds.    Pulmonary/Chest: Effort normal and breath sounds normal.   Abdominal: Soft. Bowel sounds are normal.   Musculoskeletal: Normal range of motion.   Neurological: She is alert and oriented to person, place, and time.   Skin: Skin is warm.   Psychiatric: She has a normal mood and affect. Her behavior is normal. Judgment and thought content normal.     Labs:  Recent  Results (from the past 24 hour(s))   CBC WITHOUT DIFFERENTIAL    Collection Time: 05/02/18  4:53 AM   Result Value Ref Range    WBC 12.0 (H) 4.8 - 10.8 K/uL    RBC 3.57 (L) 4.20 - 5.40 M/uL    Hemoglobin 10.7 (L) 12.0 - 16.0 g/dL    Hematocrit 32.8 (L) 37.0 - 47.0 %    MCV 91.9 81.4 - 97.8 fL    MCH 30.0 27.0 - 33.0 pg    MCHC 32.6 (L) 33.6 - 35.0 g/dL    RDW 41.1 35.9 - 50.0 fL    Platelet Count 331 164 - 446 K/uL    MPV 10.7 9.0 - 12.9 fL   BASIC METABOLIC PANEL    Collection Time: 05/02/18  4:53 AM   Result Value Ref Range    Sodium 136 135 - 145 mmol/L    Potassium 4.2 3.6 - 5.5 mmol/L    Chloride 104 96 - 112 mmol/L    Co2 29 20 - 33 mmol/L    Glucose 99 65 - 99 mg/dL    Bun 14 8 - 22 mg/dL    Creatinine 1.18 0.50 - 1.40 mg/dL    Calcium 8.5 8.5 - 10.5 mg/dL    Anion Gap 3.0 0.0 - 11.9   ESTIMATED GFR    Collection Time: 05/02/18  4:53 AM   Result Value Ref Range    GFR If  59 (A) >60 mL/min/1.73 m 2    GFR If Non African American 49 (A) >60 mL/min/1.73 m 2     Medical Decision Making, by Problem:  There are no active hospital problems to display for this patient.    Plan:  Patient is passing flatus and will advance diet in AM.  Remove epidural in AM and then use torodol and IV morphine PRN.  Plan is to clamp the nephrostomy tube in the next 24-48 hours and if FIDEL output increases can put back to gravity drainage.  Seth to gravity and monitor outputs from drains    Quality Measures:  Quality-Core Measures   Reviewed items::  Labs reviewed and Medications reviewed      Discussed patient condition with patient, RN and .

## 2018-05-03 NOTE — PROGRESS NOTES
Epidural assessed- appears to have moved out another cm.   Pt has headache. Experiencing hot flashes.     Associated anesthesiologist paged.     Dr. Wolf to call back      [id number]- No call back. No MD has rounded. Page sent out again    [id number]-  called. Given the OK to pull epidural and start PO pain meds.   Flexeril on MAR as needed for neck pain. 1500 dose of schedule Heparin will be held- resume at 2300

## 2018-05-03 NOTE — CARE PLAN
Problem: Safety  Goal: Will remain free from falls  Outcome: PROGRESSING AS EXPECTED  Patient educated on the importance of calling a staff member before getting out of bed. Patient verbalizes understanding and patient calling appropriately. Bed in lowest position, call light and other belongings within reach, treaded socks on, and hourly rounding in place. Bed alarm off- pt calling appropriately.     Problem: Urinary Elimination:  Goal: Ability to reestablish a normal urinary elimination pattern will improve  Outcome: PROGRESSING SLOWER THAN EXPECTED  Urinary poe catheter is intermittently occluded with mucus- irrigation needed.     Problem: Mobility  Goal: Risk for activity intolerance will decrease  Outcome: PROGRESSING SLOWER THAN EXPECTED  Pt not ambulating as often as needed- will evaluate today

## 2018-05-03 NOTE — CARE PLAN
Problem: Urinary Elimination:  Goal: Ability to reestablish a normal urinary elimination pattern will improve    Intervention: Evaluate need to continue indwelling urinary catheter  The patient's poe catheter stops draining urine.  The  Is aware and told the day RN to flush it periodically.  This RN has had to flush it twice.        Problem: Pain Management  Goal: Pain level will decrease to patient's comfort goal  Outcome: PROGRESSING AS EXPECTED  Patient's pain is well controlled with epidural.

## 2018-05-03 NOTE — PROGRESS NOTES
Pt alert and oriented. Epidural catheter remains intact with some old drainage around the site. Running at 6ml/hr. Pain controlled. Denies any numbness/tingling. Able to ambulate.

## 2018-05-03 NOTE — PROGRESS NOTES
"Assumed care at 1900 received report from day shift RN.  SHIRA&Ox4.    4/10 Pain patient encouraged to use epidural button as needed--patient states her pain is a \"head ache\", denies nausea,   tolerating clear liquid diet,   Patient has a poe that gets clogged and needs to be flushed frequently, + flatus and hyperactive bowel sounds,  FIDEL drain draining sanguinous fluid  Changed midline line dressing.        ambulating with one assist.  Patient's  is in the room      Patient call light within reach, bed in the lowest position, hourly rounding in place.  POC updated.    "

## 2018-05-03 NOTE — PROGRESS NOTES
Epidural discontinued by this RN.   Pt tolerated well. No resistance met. Sterile dressing in place.

## 2018-05-03 NOTE — PROGRESS NOTES
Patient with no urine output of the poe for 2 hours.  Bladder scan reveals 300mL in bladder.  MD paged.  [id number]:  Dr Eid notified, orders to irrigate the poe and if ineffective, replace the poe.

## 2018-05-03 NOTE — PROGRESS NOTES
Bedside report completed.   AOx4  Pain rated 8/10 in her head- pt repositioned for comfort. Eating her breakfast now. Requesting caffeine.     Lungs clear- On room air this morning. IS education completed.     Tingling reported in her right upper thigh. All other CMS intact. Q4 neuro checks in place.     Epidural assessed- dressing was reinforced again. No new drainage noted around epidural site. No redness. No swelling. PCEA settings verified. Pt not requiring bolus button.     Nephrostomy tube assessed- scant drainage present in bag- dressing reinforced.     FIDEL drain assessed- scant serousangineous drainage present. Dressing is CDI.     Urinary poe assessed- stat lock in place. Draining small, yellow urine with some mucus present in bag. Will monitor urine output closely and irrigate as needed.     SCD's on. Call light within reach.     POC- possible d/c of epidural following orders from MD

## 2018-05-04 LAB
ERYTHROCYTE [DISTWIDTH] IN BLOOD BY AUTOMATED COUNT: 41 FL (ref 35.9–50)
HCT VFR BLD AUTO: 30.5 % (ref 37–47)
HGB BLD-MCNC: 9.9 G/DL (ref 12–16)
MCH RBC QN AUTO: 29.5 PG (ref 27–33)
MCHC RBC AUTO-ENTMCNC: 32.5 G/DL (ref 33.6–35)
MCV RBC AUTO: 90.8 FL (ref 81.4–97.8)
PLATELET # BLD AUTO: 392 K/UL (ref 164–446)
PMV BLD AUTO: 10.3 FL (ref 9–12.9)
RBC # BLD AUTO: 3.36 M/UL (ref 4.2–5.4)
WBC # BLD AUTO: 9.6 K/UL (ref 4.8–10.8)

## 2018-05-04 PROCEDURE — A9270 NON-COVERED ITEM OR SERVICE: HCPCS | Performed by: ANESTHESIOLOGY

## 2018-05-04 PROCEDURE — 700111 HCHG RX REV CODE 636 W/ 250 OVERRIDE (IP): Performed by: NURSE PRACTITIONER

## 2018-05-04 PROCEDURE — 700111 HCHG RX REV CODE 636 W/ 250 OVERRIDE (IP): Performed by: UROLOGY

## 2018-05-04 PROCEDURE — 700105 HCHG RX REV CODE 258: Performed by: UROLOGY

## 2018-05-04 PROCEDURE — 700102 HCHG RX REV CODE 250 W/ 637 OVERRIDE(OP): Performed by: UROLOGY

## 2018-05-04 PROCEDURE — 770001 HCHG ROOM/CARE - MED/SURG/GYN PRIV*

## 2018-05-04 PROCEDURE — 36415 COLL VENOUS BLD VENIPUNCTURE: CPT

## 2018-05-04 PROCEDURE — 85027 COMPLETE CBC AUTOMATED: CPT

## 2018-05-04 PROCEDURE — 700102 HCHG RX REV CODE 250 W/ 637 OVERRIDE(OP): Performed by: ANESTHESIOLOGY

## 2018-05-04 PROCEDURE — A9270 NON-COVERED ITEM OR SERVICE: HCPCS | Performed by: UROLOGY

## 2018-05-04 RX ORDER — KETOROLAC TROMETHAMINE 30 MG/ML
30 INJECTION, SOLUTION INTRAMUSCULAR; INTRAVENOUS EVERY 6 HOURS PRN
Status: DISCONTINUED | OUTPATIENT
Start: 2018-05-04 | End: 2018-05-07 | Stop reason: HOSPADM

## 2018-05-04 RX ADMIN — HYDROMORPHONE HYDROCHLORIDE 0.5 MG: 10 INJECTION, SOLUTION INTRAMUSCULAR; INTRAVENOUS; SUBCUTANEOUS at 17:34

## 2018-05-04 RX ADMIN — SODIUM CHLORIDE, POTASSIUM CHLORIDE, SODIUM LACTATE AND CALCIUM CHLORIDE: 600; 310; 30; 20 INJECTION, SOLUTION INTRAVENOUS at 17:33

## 2018-05-04 RX ADMIN — OXYCODONE HYDROCHLORIDE 10 MG: 10 TABLET ORAL at 15:32

## 2018-05-04 RX ADMIN — ACETAMINOPHEN 1000 MG: 500 TABLET ORAL at 17:32

## 2018-05-04 RX ADMIN — CYCLOBENZAPRINE 10 MG: 10 TABLET, FILM COATED ORAL at 04:44

## 2018-05-04 RX ADMIN — CYCLOBENZAPRINE 10 MG: 10 TABLET, FILM COATED ORAL at 21:09

## 2018-05-04 RX ADMIN — DIPHENHYDRAMINE HYDROCHLORIDE 25 MG: 50 INJECTION, SOLUTION INTRAMUSCULAR; INTRAVENOUS at 09:08

## 2018-05-04 RX ADMIN — HEPARIN SODIUM 5000 UNITS: 5000 INJECTION, SOLUTION INTRAVENOUS; SUBCUTANEOUS at 15:32

## 2018-05-04 RX ADMIN — GABAPENTIN 300 MG: 300 CAPSULE ORAL at 21:09

## 2018-05-04 RX ADMIN — HYDROMORPHONE HYDROCHLORIDE 0.5 MG: 10 INJECTION, SOLUTION INTRAMUSCULAR; INTRAVENOUS; SUBCUTANEOUS at 10:46

## 2018-05-04 RX ADMIN — OXYCODONE HYDROCHLORIDE 10 MG: 10 TABLET ORAL at 19:19

## 2018-05-04 RX ADMIN — OXYCODONE HYDROCHLORIDE 10 MG: 10 TABLET ORAL at 00:50

## 2018-05-04 RX ADMIN — KETOROLAC TROMETHAMINE 30 MG: 30 INJECTION, SOLUTION INTRAMUSCULAR at 21:08

## 2018-05-04 RX ADMIN — OXYCODONE HYDROCHLORIDE 10 MG: 10 TABLET ORAL at 12:21

## 2018-05-04 RX ADMIN — HYDROMORPHONE HYDROCHLORIDE 0.5 MG: 10 INJECTION, SOLUTION INTRAMUSCULAR; INTRAVENOUS; SUBCUTANEOUS at 23:03

## 2018-05-04 RX ADMIN — SCOPALAMINE 1 PATCH: 1 PATCH, EXTENDED RELEASE TRANSDERMAL at 19:20

## 2018-05-04 RX ADMIN — POLYETHYLENE GLYCOL 3350 1 PACKET: 17 POWDER, FOR SOLUTION ORAL at 09:06

## 2018-05-04 RX ADMIN — CEFAZOLIN SODIUM 1 G: 1 INJECTION, SOLUTION INTRAVENOUS at 15:33

## 2018-05-04 RX ADMIN — CEFAZOLIN SODIUM 1 G: 1 INJECTION, SOLUTION INTRAVENOUS at 21:09

## 2018-05-04 RX ADMIN — SODIUM CHLORIDE, POTASSIUM CHLORIDE, SODIUM LACTATE AND CALCIUM CHLORIDE 1000 ML: 600; 310; 30; 20 INJECTION, SOLUTION INTRAVENOUS at 09:10

## 2018-05-04 RX ADMIN — ACETAMINOPHEN 1000 MG: 500 TABLET ORAL at 12:21

## 2018-05-04 RX ADMIN — ONDANSETRON 4 MG: 2 INJECTION, SOLUTION INTRAMUSCULAR; INTRAVENOUS at 05:54

## 2018-05-04 RX ADMIN — OMEPRAZOLE 20 MG: 20 CAPSULE, DELAYED RELEASE ORAL at 09:06

## 2018-05-04 RX ADMIN — OXYCODONE HYDROCHLORIDE 10 MG: 10 TABLET ORAL at 09:06

## 2018-05-04 RX ADMIN — HYDROMORPHONE HYDROCHLORIDE 0.5 MG: 10 INJECTION, SOLUTION INTRAMUSCULAR; INTRAVENOUS; SUBCUTANEOUS at 05:53

## 2018-05-04 RX ADMIN — CEFAZOLIN SODIUM 1 G: 1 INJECTION, SOLUTION INTRAVENOUS at 06:02

## 2018-05-04 RX ADMIN — HEPARIN SODIUM 5000 UNITS: 5000 INJECTION, SOLUTION INTRAVENOUS; SUBCUTANEOUS at 06:02

## 2018-05-04 RX ADMIN — ONDANSETRON 4 MG: 2 INJECTION, SOLUTION INTRAMUSCULAR; INTRAVENOUS at 17:33

## 2018-05-04 ASSESSMENT — PAIN SCALES - GENERAL
PAINLEVEL_OUTOF10: 8
PAINLEVEL_OUTOF10: 7
PAINLEVEL_OUTOF10: 7
PAINLEVEL_OUTOF10: 3
PAINLEVEL_OUTOF10: 8
PAINLEVEL_OUTOF10: 4

## 2018-05-04 NOTE — PROGRESS NOTES
Assumed care at 1900 received report from day shift RN.  SHIRA&Ox4.    8/10 Pain in her head and abdomen, denies nausea,   tolerating full liquid diet,   patient has poe, + flatus,    FIDEL drain has large clot in the bulb but continues to drain serous fluid to suction.    Nephrostomy tube has no output.      Patient's  is in room    Midline dressing is clean dry and intact.      Patient is a stand by assist       Patient call light within reach, bed in the lowest position, hourly rounding in place.  POC updated on white board.

## 2018-05-04 NOTE — PROGRESS NOTES
"Urology Progress Note    Post op Day # 4   status post right ileal ureter.   Interval Events: Pt with headache during the night and nausea.  Pt poe clogged off with mucus and required flushing.     S: No fevers, chills, denies nausea at this time. + for abdominal pain.  + flatus.    O:   Blood pressure 131/78, pulse 82, temperature 36.3 °C (97.4 °F), resp. rate 17, height 1.702 m (5' 7\"), weight 91.5 kg (201 lb 11.5 oz), last menstrual period 09/01/2009, SpO2 98 %, not currently breastfeeding.  Recent Labs      05/02/18   0453  05/03/18   0509   SODIUM  136  139   POTASSIUM  4.2  4.0   CHLORIDE  104  108   CO2  29  27   GLUCOSE  99  82   BUN  14  10   CREATININE  1.18  1.00   CALCIUM  8.5  8.3*     Recent Labs      05/02/18   0453  05/03/18   0509  05/04/18   0326   WBC  12.0*  9.5  9.6   RBC  3.57*  3.16*  3.36*   HEMOGLOBIN  10.7*  9.6*  9.9*   HEMATOCRIT  32.8*  28.9*  30.5*   MCV  91.9  91.5  90.8   MCH  30.0  30.4  29.5   MCHC  32.6*  33.2*  32.5*   RDW  41.1  41.2  41.0   PLATELETCT  331  324  392   MPV  10.7  10.5  10.3         Intake/Output Summary (Last 24 hours) at 05/04/18 1436  Last data filed at 05/04/18 1201   Gross per 24 hour   Intake             3000 ml   Output             3975 ml   Net             -975 ml       Physical Exam   Constitutional: She is oriented to person, place, and time and well-developed, well-nourished, and in no distress. No distress.   HENT:   Head: Normocephalic and atraumatic.   Eyes: Pupils are equal, round, and reactive to light.   Neck: Normal range of motion. Neck supple.   Cardiovascular: Normal rate, regular rhythm, normal heart sounds and intact distal pulses.  Exam reveals no friction rub.    No murmur heard.  Pulmonary/Chest: Breath sounds normal. No respiratory distress. She has no wheezes. She has no rales.     Abdominal: Bowel sounds are normal. + tenderness  Slightly distended. Sutures intact    Genitourinary:   Genitourinary Comments: Nephrostomy tube R side.  " Draining scant clear yellow urine.  Poe to down drain clear yellow urine with sediment. FIDEL draining serosang fluid   Musculoskeletal: She exhibits no edema or deformity.   Neurological: She is alert and oriented to person, place, and time.   Skin: Skin is warm and dry. She is not diaphoretic.   Psychiatric: Affect normal.     A/P:  There are no active hospital problems to display for this patient.      Stable.   Ambulate, IS.  Advance diet as tolerated  Flush poe cath BID.  RN to teach pt and family how to flush catheter. We will start B& O suppository for bladder spasms/abdominal pain.  Will consider Toradol as well.   FIDEL drain will be evaluated for serum creatinine.  Pt will DC home with poe and Nephrostomy tube    Discussed plan of care with PT, MD Rangel and RN.  Care is directed by MD Claire Palacios, [email or web address].

## 2018-05-04 NOTE — CARE PLAN
Problem: Urinary Elimination:  Goal: Ability to reestablish a normal urinary elimination pattern will improve  Outcome: PROGRESSING SLOWER THAN EXPECTED  When patient feels that her bladder is full, RN will flush sterile water into catheter and try to break up the mucus.  Dr. Rangel is aware.      Problem: Pain Management  Goal: Pain level will decrease to patient's comfort goal  Outcome: PROGRESSING AS EXPECTED  Patient's pain is relieved by the medications on the MAR.  Educated patient that pain medication is not scheduled but if she needs them just to call.

## 2018-05-04 NOTE — PROGRESS NOTES
Pt not feeling well this morning. Pt experiencing bladder distention, pain in her vagina. Seth drainage is minimal.     Headache has persisted over the last few days. New drainage noted on her epidural dressing.     Pt and family concerned and requesting a doctor at bedside.     Mel Palacios page sent out.

## 2018-05-04 NOTE — PROGRESS NOTES
"Pt not feeling very well this morning.  Patient had emesis on the floor by her bed.  This RN medicated per MAR for the nausea as well as medicated per MAR for 8/10 pain.  Patient refused  Acetaminophen at this time because she did not want to take anything by mouth.      Patient has a lot anxiety this morning, \"I can't get sick, something is wrong, I shouldn't feel like this,\"    RN sat with patient listening to her concerns.   RN told the patient's blood results this morning with the decrease in WBC and an increase in RBC.  And this RN will write down patient's concern for the day RN to talk to doctor about  "

## 2018-05-04 NOTE — PROGRESS NOTES
Bedside report completed  AOx4  Pain rated 8/10- medicated per MAR    Midline assessed- dressing is CDI  FIDEL assessed- dressing is CDI, putting out moderate, serous fluid. No blood anymore.   Nephrostomy tube assessed- dressing is CDI, taped in place. No drainage.     Urinary poe irrigated- mucus present in drainage tube. Good urine output.       Bowel sounds hypoactive x4. + gas. LBM:4/30. Bowel protocol in place and pt walking.       Lungs clear. On room air.       SCD's on.     POC discussed. Questions answered.

## 2018-05-04 NOTE — PROGRESS NOTES
Surgical Progress Note    Author: Kapil Rangel Date & Time created: 5/3/2018   5:05 PM     Interval Events:  Post operative day # 3 status post right ileal ureter. Patient has less pain but still with headache  Review of Systems   Constitutional: Negative.    HENT: Negative.    Eyes: Negative.    Respiratory: Negative.    Cardiovascular: Negative.    Gastrointestinal: Positive for heartburn.   Genitourinary: Negative.    Musculoskeletal: Negative.    Skin: Positive for itching.   Neurological: Positive for headaches.   Endo/Heme/Allergies: Negative.    Psychiatric/Behavioral: Negative.      Hemodynamics:  Temp (24hrs), Av.7 °C (98 °F), Min:36.3 °C (97.4 °F), Max:37.1 °C (98.7 °F)  Temperature: 36.6 °C (97.9 °F)  Pulse  Av.4  Min: 65  Max: 96   Blood Pressure: 118/72     Respiratory:    Respiration: 18, Pulse Oximetry: 98 %           Neuro:  GCS       Fluids:    Intake/Output Summary (Last 24 hours) at 18 1705  Last data filed at 18 1600   Gross per 24 hour   Intake           2248.9 ml   Output             3950 ml   Net          -1701.1 ml        Current Diet Order   Procedures   • DIET ORDER     Physical Exam   Constitutional: She is oriented to person, place, and time. She appears well-developed and well-nourished.   HENT:   Head: Normocephalic and atraumatic.   Eyes: Conjunctivae are normal. Pupils are equal, round, and reactive to light.   Neck: Normal range of motion.   Cardiovascular: Normal rate, regular rhythm, normal heart sounds and intact distal pulses.    Pulmonary/Chest: Effort normal and breath sounds normal.   Abdominal: Soft. Bowel sounds are normal.   Musculoskeletal: Normal range of motion.   Neurological: She is alert and oriented to person, place, and time.   Skin: Skin is warm and dry.   Psychiatric: She has a normal mood and affect. Her behavior is normal. Judgment and thought content normal.     Labs:  Recent Results (from the past 24 hour(s))   CBC WITHOUT DIFFERENTIAL     Collection Time: 05/03/18  5:09 AM   Result Value Ref Range    WBC 9.5 4.8 - 10.8 K/uL    RBC 3.16 (L) 4.20 - 5.40 M/uL    Hemoglobin 9.6 (L) 12.0 - 16.0 g/dL    Hematocrit 28.9 (L) 37.0 - 47.0 %    MCV 91.5 81.4 - 97.8 fL    MCH 30.4 27.0 - 33.0 pg    MCHC 33.2 (L) 33.6 - 35.0 g/dL    RDW 41.2 35.9 - 50.0 fL    Platelet Count 324 164 - 446 K/uL    MPV 10.5 9.0 - 12.9 fL   BASIC METABOLIC PANEL    Collection Time: 05/03/18  5:09 AM   Result Value Ref Range    Sodium 139 135 - 145 mmol/L    Potassium 4.0 3.6 - 5.5 mmol/L    Chloride 108 96 - 112 mmol/L    Co2 27 20 - 33 mmol/L    Glucose 82 65 - 99 mg/dL    Bun 10 8 - 22 mg/dL    Creatinine 1.00 0.50 - 1.40 mg/dL    Calcium 8.3 (L) 8.5 - 10.5 mg/dL    Anion Gap 4.0 0.0 - 11.9   ESTIMATED GFR    Collection Time: 05/03/18  5:09 AM   Result Value Ref Range    GFR If African American >60 >60 mL/min/1.73 m 2    GFR If Non African American 59 (A) >60 mL/min/1.73 m 2     Medical Decision Making, by Problem:  There are no active hospital problems to display for this patient.    Plan:  The right percutaneous nephrostomy is not putting out much urine so will leave open as urine is going down ileal ureter.  Developed mucous retention last night and is having intermittent bladder irrigation.      Quality Measures:  Quality-Core Measures    Discussed patient condition with RN and Patient

## 2018-05-05 LAB
ERYTHROCYTE [DISTWIDTH] IN BLOOD BY AUTOMATED COUNT: 40.7 FL (ref 35.9–50)
HCT VFR BLD AUTO: 32 % (ref 37–47)
HGB BLD-MCNC: 10.4 G/DL (ref 12–16)
MCH RBC QN AUTO: 29.4 PG (ref 27–33)
MCHC RBC AUTO-ENTMCNC: 32.5 G/DL (ref 33.6–35)
MCV RBC AUTO: 90.4 FL (ref 81.4–97.8)
PLATELET # BLD AUTO: 447 K/UL (ref 164–446)
PMV BLD AUTO: 10 FL (ref 9–12.9)
RBC # BLD AUTO: 3.54 M/UL (ref 4.2–5.4)
WBC # BLD AUTO: 8.2 K/UL (ref 4.8–10.8)

## 2018-05-05 PROCEDURE — 700105 HCHG RX REV CODE 258: Performed by: UROLOGY

## 2018-05-05 PROCEDURE — A9270 NON-COVERED ITEM OR SERVICE: HCPCS | Performed by: ANESTHESIOLOGY

## 2018-05-05 PROCEDURE — A9270 NON-COVERED ITEM OR SERVICE: HCPCS | Performed by: UROLOGY

## 2018-05-05 PROCEDURE — 700102 HCHG RX REV CODE 250 W/ 637 OVERRIDE(OP): Performed by: ANESTHESIOLOGY

## 2018-05-05 PROCEDURE — 700102 HCHG RX REV CODE 250 W/ 637 OVERRIDE(OP): Performed by: UROLOGY

## 2018-05-05 PROCEDURE — 700111 HCHG RX REV CODE 636 W/ 250 OVERRIDE (IP): Performed by: UROLOGY

## 2018-05-05 PROCEDURE — 770001 HCHG ROOM/CARE - MED/SURG/GYN PRIV*

## 2018-05-05 PROCEDURE — 85027 COMPLETE CBC AUTOMATED: CPT

## 2018-05-05 PROCEDURE — 36415 COLL VENOUS BLD VENIPUNCTURE: CPT

## 2018-05-05 RX ADMIN — ACETAMINOPHEN 1000 MG: 500 TABLET ORAL at 11:32

## 2018-05-05 RX ADMIN — OXYCODONE HYDROCHLORIDE 10 MG: 10 TABLET ORAL at 05:29

## 2018-05-05 RX ADMIN — ACETAMINOPHEN 1000 MG: 500 TABLET ORAL at 17:32

## 2018-05-05 RX ADMIN — CYCLOBENZAPRINE 10 MG: 10 TABLET, FILM COATED ORAL at 16:22

## 2018-05-05 RX ADMIN — OXYCODONE HYDROCHLORIDE 10 MG: 10 TABLET ORAL at 20:41

## 2018-05-05 RX ADMIN — OXYCODONE HYDROCHLORIDE 10 MG: 10 TABLET ORAL at 08:27

## 2018-05-05 RX ADMIN — CEFAZOLIN SODIUM 1 G: 1 INJECTION, SOLUTION INTRAVENOUS at 05:33

## 2018-05-05 RX ADMIN — ACETAMINOPHEN 1000 MG: 500 TABLET ORAL at 03:25

## 2018-05-05 RX ADMIN — GABAPENTIN 300 MG: 300 CAPSULE ORAL at 20:41

## 2018-05-05 RX ADMIN — HEPARIN SODIUM 5000 UNITS: 5000 INJECTION, SOLUTION INTRAVENOUS; SUBCUTANEOUS at 14:25

## 2018-05-05 RX ADMIN — POLYETHYLENE GLYCOL 3350 1 PACKET: 17 POWDER, FOR SOLUTION ORAL at 08:27

## 2018-05-05 RX ADMIN — HEPARIN SODIUM 5000 UNITS: 5000 INJECTION, SOLUTION INTRAVENOUS; SUBCUTANEOUS at 20:41

## 2018-05-05 RX ADMIN — OXYCODONE HYDROCHLORIDE 10 MG: 10 TABLET ORAL at 03:25

## 2018-05-05 RX ADMIN — ACETAMINOPHEN 1000 MG: 500 TABLET ORAL at 05:29

## 2018-05-05 RX ADMIN — OXYCODONE HYDROCHLORIDE 10 MG: 10 TABLET ORAL at 11:32

## 2018-05-05 RX ADMIN — OXYCODONE HYDROCHLORIDE 10 MG: 10 TABLET ORAL at 17:33

## 2018-05-05 RX ADMIN — OXYCODONE HYDROCHLORIDE 10 MG: 10 TABLET ORAL at 14:27

## 2018-05-05 RX ADMIN — SODIUM CHLORIDE, POTASSIUM CHLORIDE, SODIUM LACTATE AND CALCIUM CHLORIDE: 600; 310; 30; 20 INJECTION, SOLUTION INTRAVENOUS at 08:27

## 2018-05-05 RX ADMIN — OMEPRAZOLE 20 MG: 20 CAPSULE, DELAYED RELEASE ORAL at 08:27

## 2018-05-05 RX ADMIN — CYCLOBENZAPRINE 10 MG: 10 TABLET, FILM COATED ORAL at 05:29

## 2018-05-05 RX ADMIN — STANDARDIZED SENNA CONCENTRATE AND DOCUSATE SODIUM 2 TABLET: 8.6; 5 TABLET, FILM COATED ORAL at 03:25

## 2018-05-05 ASSESSMENT — PAIN SCALES - GENERAL
PAINLEVEL_OUTOF10: 7
PAINLEVEL_OUTOF10: 7
PAINLEVEL_OUTOF10: 5
PAINLEVEL_OUTOF10: 4
PAINLEVEL_OUTOF10: 7
PAINLEVEL_OUTOF10: 7
PAINLEVEL_OUTOF10: 9

## 2018-05-05 NOTE — PROGRESS NOTES
Report received, poc discussed, assumed care of pt.   Call light in reach, hourly rounding in place.   Pt gets up SBA.   Full liq diet.  + output from poe. LBM 4/30.   Oxy/dilaudid/flexeril for pain.  R neph tube. R FIDEL drain.  No further needs.

## 2018-05-05 NOTE — PROGRESS NOTES
Assume pt care. Pt a/o x3, VSS, No acute issues overnight. Pt rounds Q1-2hr with assessment of 4p's. Seth care given. IV assessment and care given. Drain care and assessment given. Pt education provided base on admission, care plan, current medications, and PRN. Pt has right neph tube. Right FIDEL drain to self suction. Seth present with other for urology sx. epideral PRN removed from previous shifts, pt CO headaches, some relief with laying down and PRN meds given. Mid abd incision closed with staples, island dressing CDI. Pt denies SOB. Pt + flatus and hypoactive BS. Seth irrigated per order.     PLAN - DCP for home.

## 2018-05-05 NOTE — PROGRESS NOTES
"Urology Progress Note       Post op Day # 5    Right multifocal proximal ureteral stricture with failed laser treatment   and balloon dilatation.  2.  Indwelling right nephrostomy tube.  3.  Recurrent urinary tract infection.  4.  History of urolithiasis.     PROCEDURES PERFORMED:  1.  Exploratory laparotomy with extensive lysis of intra-abdominal and   intrapelvic bowel adhesions.  2.  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  3.  Right 6-Yoruba x 26 cm double-J stent placement.         Interval Events: Pt with headache during the night and nausea.  Pt poe clogged off with mucus and required flushing.      S: No fevers, chills, denies nausea at this time. + for abdominal pain.  + flatus. Right arm appears infiltrated from IV.  Pt states she feels good     O:   Blood pressure 112/65, pulse 65, temperature 36.3 °C (97.3 °F), resp. rate 18, height 1.702 m (5' 7\"), weight 91.5 kg (201 lb 11.5 oz), last menstrual period 09/01/2009, SpO2 99 %, not currently breastfeeding.  Recent Labs      05/03/18   0509   SODIUM  139   POTASSIUM  4.0   CHLORIDE  108   CO2  27   GLUCOSE  82   BUN  10   CREATININE  1.00   CALCIUM  8.3*     Recent Labs      05/03/18   0509  05/04/18   0326  05/05/18   0226   WBC  9.5  9.6  8.2   RBC  3.16*  3.36*  3.54*   HEMOGLOBIN  9.6*  9.9*  10.4*   HEMATOCRIT  28.9*  30.5*  32.0*   MCV  91.5  90.8  90.4   MCH  30.4  29.5  29.4   MCHC  33.2*  32.5*  32.5*   RDW  41.2  41.0  40.7   PLATELETCT  324  392  447*   MPV  10.5  10.3  10.0         Intake/Output Summary (Last 24 hours) at 05/05/18 1319  Last data filed at 05/05/18 0806   Gross per 24 hour   Intake             1920 ml   Output             2940 ml   Net            -1020 ml       Exam:  Abdomen soft, benign.  Incisions clean, dry, intact.   Urine: clear to down drain, FIDEL draining serosang. Nephrostomy tube draining clear yellow " urine.    A/P:  There are no active hospital problems to display for this patient.      Stable.   Ambulate, IS.  diet as tolerated  Continue to flush poe cath BID.    RN to teach pt and family how to flush catheter.    FIDEL drain will be evaluated for serum creatinine- pending   Pt will DC home with poe and Nephrostomy tube     Discussed plan of care with PT, MD Rangel and RN.  Care is directed by MD Claire Palacios, SHIRA.P.R.N.

## 2018-05-05 NOTE — CARE PLAN
Problem: Pain Management  Goal: Pain level will decrease to patient’s comfort goal  Outcome: PROGRESSING AS EXPECTED  Pt has pain in abdomen, receiving PRN pain meds per MAR, repositioned for comfort. Non-pharmacologic options offered.    Problem: Urinary Elimination:  Goal: Ability to reestablish a normal urinary elimination pattern will improve  Outcome: PROGRESSING AS EXPECTED  Poe in place for uro surgery with active order. Poe care given as needed. Irrigated qshift. No signs of infection. To educate family about irrigating poe when pt goes home.

## 2018-05-06 LAB
BODY FLD TYPE: NORMAL
CREAT FLD-MCNC: 0.7 MG/DL
ERYTHROCYTE [DISTWIDTH] IN BLOOD BY AUTOMATED COUNT: 40.6 FL (ref 35.9–50)
HCT VFR BLD AUTO: 33.4 % (ref 37–47)
HGB BLD-MCNC: 10.9 G/DL (ref 12–16)
MCH RBC QN AUTO: 29.6 PG (ref 27–33)
MCHC RBC AUTO-ENTMCNC: 32.6 G/DL (ref 33.6–35)
MCV RBC AUTO: 90.8 FL (ref 81.4–97.8)
PLATELET # BLD AUTO: 455 K/UL (ref 164–446)
PMV BLD AUTO: 10.2 FL (ref 9–12.9)
RBC # BLD AUTO: 3.68 M/UL (ref 4.2–5.4)
WBC # BLD AUTO: 10.5 K/UL (ref 4.8–10.8)

## 2018-05-06 PROCEDURE — A9270 NON-COVERED ITEM OR SERVICE: HCPCS | Performed by: NURSE PRACTITIONER

## 2018-05-06 PROCEDURE — A9270 NON-COVERED ITEM OR SERVICE: HCPCS | Performed by: ANESTHESIOLOGY

## 2018-05-06 PROCEDURE — 82570 ASSAY OF URINE CREATININE: CPT

## 2018-05-06 PROCEDURE — 700102 HCHG RX REV CODE 250 W/ 637 OVERRIDE(OP): Performed by: NURSE PRACTITIONER

## 2018-05-06 PROCEDURE — 700102 HCHG RX REV CODE 250 W/ 637 OVERRIDE(OP): Performed by: ANESTHESIOLOGY

## 2018-05-06 PROCEDURE — 770001 HCHG ROOM/CARE - MED/SURG/GYN PRIV*

## 2018-05-06 PROCEDURE — 700111 HCHG RX REV CODE 636 W/ 250 OVERRIDE (IP): Performed by: UROLOGY

## 2018-05-06 PROCEDURE — 700102 HCHG RX REV CODE 250 W/ 637 OVERRIDE(OP): Performed by: UROLOGY

## 2018-05-06 PROCEDURE — 85027 COMPLETE CBC AUTOMATED: CPT

## 2018-05-06 PROCEDURE — A9270 NON-COVERED ITEM OR SERVICE: HCPCS | Performed by: UROLOGY

## 2018-05-06 PROCEDURE — 36415 COLL VENOUS BLD VENIPUNCTURE: CPT

## 2018-05-06 RX ORDER — ACETAMINOPHEN 325 MG/1
650 TABLET ORAL EVERY 4 HOURS PRN
Status: DISCONTINUED | OUTPATIENT
Start: 2018-05-06 | End: 2018-05-07 | Stop reason: HOSPADM

## 2018-05-06 RX ORDER — GUAIFENESIN 600 MG/1
600 TABLET, EXTENDED RELEASE ORAL EVERY 12 HOURS
Status: DISCONTINUED | OUTPATIENT
Start: 2018-05-06 | End: 2018-05-07 | Stop reason: HOSPADM

## 2018-05-06 RX ORDER — ALPRAZOLAM 0.5 MG/1
0.5 TABLET ORAL 2 TIMES DAILY PRN
Status: DISCONTINUED | OUTPATIENT
Start: 2018-05-06 | End: 2018-05-07 | Stop reason: HOSPADM

## 2018-05-06 RX ADMIN — OXYCODONE HYDROCHLORIDE 10 MG: 10 TABLET ORAL at 08:32

## 2018-05-06 RX ADMIN — BISACODYL 10 MG: 10 SUPPOSITORY RECTAL at 11:32

## 2018-05-06 RX ADMIN — GABAPENTIN 300 MG: 300 CAPSULE ORAL at 21:11

## 2018-05-06 RX ADMIN — HEPARIN SODIUM 5000 UNITS: 5000 INJECTION, SOLUTION INTRAVENOUS; SUBCUTANEOUS at 04:55

## 2018-05-06 RX ADMIN — POLYETHYLENE GLYCOL 3350 1 PACKET: 17 POWDER, FOR SOLUTION ORAL at 08:32

## 2018-05-06 RX ADMIN — HEPARIN SODIUM 5000 UNITS: 5000 INJECTION, SOLUTION INTRAVENOUS; SUBCUTANEOUS at 14:44

## 2018-05-06 RX ADMIN — OXYCODONE HYDROCHLORIDE 10 MG: 10 TABLET ORAL at 11:42

## 2018-05-06 RX ADMIN — ALPRAZOLAM 0.5 MG: 0.5 TABLET ORAL at 12:11

## 2018-05-06 RX ADMIN — OXYCODONE HYDROCHLORIDE 10 MG: 10 TABLET ORAL at 21:11

## 2018-05-06 RX ADMIN — HEPARIN SODIUM 5000 UNITS: 5000 INJECTION, SOLUTION INTRAVENOUS; SUBCUTANEOUS at 21:11

## 2018-05-06 RX ADMIN — GUAIFENESIN 600 MG: 600 TABLET, EXTENDED RELEASE ORAL at 21:11

## 2018-05-06 RX ADMIN — ACETAMINOPHEN 650 MG: 325 TABLET, FILM COATED ORAL at 14:47

## 2018-05-06 RX ADMIN — OMEPRAZOLE 20 MG: 20 CAPSULE, DELAYED RELEASE ORAL at 08:32

## 2018-05-06 RX ADMIN — CYCLOBENZAPRINE 10 MG: 10 TABLET, FILM COATED ORAL at 00:02

## 2018-05-06 RX ADMIN — OXYCODONE HYDROCHLORIDE 10 MG: 10 TABLET ORAL at 00:02

## 2018-05-06 RX ADMIN — OXYCODONE HYDROCHLORIDE 10 MG: 10 TABLET ORAL at 14:43

## 2018-05-06 RX ADMIN — ESTRADIOL 1 PATCH: 0.05 PATCH, EXTENDED RELEASE TRANSDERMAL at 21:15

## 2018-05-06 RX ADMIN — OXYCODONE HYDROCHLORIDE 10 MG: 10 TABLET ORAL at 04:55

## 2018-05-06 RX ADMIN — GUAIFENESIN 600 MG: 600 TABLET, EXTENDED RELEASE ORAL at 12:10

## 2018-05-06 RX ADMIN — OXYCODONE HYDROCHLORIDE 10 MG: 10 TABLET ORAL at 17:46

## 2018-05-06 ASSESSMENT — PATIENT HEALTH QUESTIONNAIRE - PHQ9
SUM OF ALL RESPONSES TO PHQ9 QUESTIONS 1 AND 2: 0
2. FEELING DOWN, DEPRESSED, IRRITABLE, OR HOPELESS: NOT AT ALL
1. LITTLE INTEREST OR PLEASURE IN DOING THINGS: NOT AT ALL

## 2018-05-06 ASSESSMENT — PAIN SCALES - GENERAL
PAINLEVEL_OUTOF10: 7

## 2018-05-06 NOTE — PROGRESS NOTES
Pt A&O x's 4, VSS, pain is a 7/10 per pain scale, medicated per MAR. Pt has moderate cloudy output from poe, irrigated per active order, scant +output from nephro tube with DIP in place to R flank area, CDI. R FIDEL drain with purulent output, sample sent per active order. Midline abdominal incision with staples, MARIAH, well approximated. Pt has + large BM, denies any N/V.  Pt became extremely anxious when giving poe education,  arrived then arrived at bedside. Family called to see if they could visit, entered room to see if pt was ok to have visitors and pt's  became verbally aggressive and began to shout. After leaving the room, pt's  approached me in the hallway and continued yelling. He claimed that his wife was not woken up for her am pain medication and continued to yell obscenities in the olsen. Charge nurse and Urology APN notified and intervened. Pt and  both consoled and assured that pt will be woken up for pain medication and that night shift will also accommodate.  POC discussed with pt, all questions a concerns have been addressed. Bed in locked/lowest position, call light and personal items within reach, will continue to monitor.

## 2018-05-06 NOTE — CARE PLAN
Problem: Pain Management  Goal: Pain level will decrease to patient’s comfort goal  Outcome: PROGRESSING AS EXPECTED  Pt has pain in abdomen, receiving PRN pain meds per MAR, tolerating PO pain meds at this time     Problem: Urinary Elimination:  Goal: Ability to reestablish a normal urinary elimination pattern will improve  Outcome: PROGRESSING AS EXPECTED  Seth and nephro tube in place with + UO

## 2018-05-06 NOTE — PROGRESS NOTES
Assume pt care. Pt a/o x3, VSS, No acute issues overnight. Pt rounds Q1-2hr with assessment of 4p's. Poe care given. IV assessment and care given. Drain care and assessment given. Pt education provided base on admission, care plan, current medications, and PRN. Pt has right neph tube. Right FIDEL drain to self suction. Poe present with other for urology sx. epideral PRN removed from previous shifts, pt CO headaches, some relief with laying down and PRN meds given. Mid abd incision closed with staples, island dressing CDI. Pt denies SOB. Pt + flatus and hypoactive BS. Poe irrigated per order.      POD #5    PLAN - DCP for home with poe and neph tube

## 2018-05-06 NOTE — CARE PLAN
Problem: Safety  Goal: Will remain free from injury  Outcome: PROGRESSING AS EXPECTED    Goal: Will remain free from falls  Outcome: PROGRESSING AS EXPECTED      Problem: Pain Management  Goal: Pain level will decrease to patient's comfort goal  Outcome: PROGRESSING AS EXPECTED      Problem: Mobility  Goal: Risk for activity intolerance will decrease  Outcome: PROGRESSING AS EXPECTED

## 2018-05-06 NOTE — PROGRESS NOTES
"Urology Progress Note    P   Post op Day # 6     Right multifocal proximal ureteral stricture with failed laser treatment   and balloon dilatation.  2.  Indwelling right nephrostomy tube.  3.  Recurrent urinary tract infection.  4.  History of urolithiasis.     PROCEDURES PERFORMED:  1.  Exploratory laparotomy with extensive lysis of intra-abdominal and   intrapelvic bowel adhesions.  2.  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  3.  Right 6-German x 26 cm double-J stent placement.         Interval Events: Pt poe accidentally was damaged and new poe placed.  Pt poe coninues to clot off with mucus and requires flushing to maintain patency.      S: No fevers, chills, denies nausea at this time. + for abdominal pain.  + flatus. Right arm appears infiltrated from IV.  Pt states she feels good   O:   Blood pressure 139/73, pulse 98, temperature 35.9 °C (96.7 °F), resp. rate 20, height 1.702 m (5' 7\"), weight 91.5 kg (201 lb 11.5 oz), last menstrual period 09/01/2009, SpO2 96 %, not currently breastfeeding.      Recent Labs      05/04/18   0326  05/05/18   0226  05/06/18   0140   WBC  9.6  8.2  10.5   RBC  3.36*  3.54*  3.68*   HEMOGLOBIN  9.9*  10.4*  10.9*   HEMATOCRIT  30.5*  32.0*  33.4*   MCV  90.8  90.4  90.8   MCH  29.5  29.4  29.6   MCHC  32.5*  32.5*  32.6*   RDW  41.0  40.7  40.6   PLATELETCT  392  447*  455*   MPV  10.3  10.0  10.2         Intake/Output Summary (Last 24 hours) at 05/06/18 1127  Last data filed at 05/06/18 0834   Gross per 24 hour   Intake             1285 ml   Output             3670 ml   Net            -2385 ml       Physical Exam   Constitutional: She is oriented to person, place, and time. No distress.   HENT:   Head: Normocephalic and atraumatic.   Eyes: Pupils are equal, round, and reactive to light.   Neck: Normal range of motion. Neck supple.   Cardiovascular: Normal rate and " regular rhythm.  Exam reveals no friction rub.    No murmur heard.  Pulmonary/Chest: Breath sounds normal. No respiratory distress. She has no wheezes. She has no rales. She exhibits no tenderness.   Abdominal: Soft. Bowel sounds are normal. There is tenderness.   Sutures intact well approximated  FIDEL drainage clear- little drainage.   Nephrostomy tube- clear yellow  Poe catheter with clear yellow urine with mucus   Genitourinary: Vagina normal, uterus normal and cervix normal.   Musculoskeletal: Normal range of motion.   Neurological: She is alert and oriented to person, place, and time. Gait normal.   Skin: Skin is warm and dry. She is not diaphoretic.   Psychiatric: Affect normal.          A/P:  There are no active hospital problems to display for this patient.    Stable.   Ambulate, IS.  Continue to flush poe cath BID and PRN  RN to teach pt and family how to flush catheter.   Mucinex ordered to help with mucus in urine.  Pt should dc home with mucinex as well or take 400mg BID     Plan to DC home tomorrow- P would like to have BM prior to discharge.   FIDEL drain creatinine pending- if normal and low output then DC drain P/T discharge  Pt will DC home with poe and Nephrostomy tube  Pt to will need staples removed at end of the week ( 5/7-11)   She will need follow up with MD Rangel in 3 weeks with antegrade nephrostogram and cystogram     Discussed plan of care with PT, MD Adrian and RN.  Care is directed by MD Claire Palacios, SIHRA.P.R.MANN.

## 2018-05-07 VITALS
HEIGHT: 67 IN | BODY MASS INDEX: 31.66 KG/M2 | WEIGHT: 201.72 LBS | TEMPERATURE: 98.5 F | HEART RATE: 87 BPM | RESPIRATION RATE: 18 BRPM | OXYGEN SATURATION: 95 % | SYSTOLIC BLOOD PRESSURE: 117 MMHG | DIASTOLIC BLOOD PRESSURE: 70 MMHG

## 2018-05-07 LAB
ERYTHROCYTE [DISTWIDTH] IN BLOOD BY AUTOMATED COUNT: 40.9 FL (ref 35.9–50)
HCT VFR BLD AUTO: 34.7 % (ref 37–47)
HGB BLD-MCNC: 11.3 G/DL (ref 12–16)
MCH RBC QN AUTO: 29.4 PG (ref 27–33)
MCHC RBC AUTO-ENTMCNC: 32.6 G/DL (ref 33.6–35)
MCV RBC AUTO: 90.1 FL (ref 81.4–97.8)
PLATELET # BLD AUTO: 469 K/UL (ref 164–446)
PMV BLD AUTO: 9.7 FL (ref 9–12.9)
RBC # BLD AUTO: 3.85 M/UL (ref 4.2–5.4)
WBC # BLD AUTO: 11.6 K/UL (ref 4.8–10.8)

## 2018-05-07 PROCEDURE — A9270 NON-COVERED ITEM OR SERVICE: HCPCS | Performed by: UROLOGY

## 2018-05-07 PROCEDURE — 700102 HCHG RX REV CODE 250 W/ 637 OVERRIDE(OP): Performed by: UROLOGY

## 2018-05-07 PROCEDURE — 36415 COLL VENOUS BLD VENIPUNCTURE: CPT

## 2018-05-07 PROCEDURE — 700111 HCHG RX REV CODE 636 W/ 250 OVERRIDE (IP): Performed by: UROLOGY

## 2018-05-07 PROCEDURE — 700102 HCHG RX REV CODE 250 W/ 637 OVERRIDE(OP): Performed by: NURSE PRACTITIONER

## 2018-05-07 PROCEDURE — 85027 COMPLETE CBC AUTOMATED: CPT

## 2018-05-07 PROCEDURE — A9270 NON-COVERED ITEM OR SERVICE: HCPCS | Performed by: NURSE PRACTITIONER

## 2018-05-07 RX ORDER — GUAIFENESIN 600 MG/1
600 TABLET, EXTENDED RELEASE ORAL EVERY 12 HOURS
Qty: 28 TAB | Refills: 1 | Status: ON HOLD | OUTPATIENT
Start: 2018-05-07 | End: 2018-09-26 | Stop reason: CLARIF

## 2018-05-07 RX ORDER — OXYCODONE HYDROCHLORIDE 5 MG/1
5 TABLET ORAL
Qty: 20 TAB | Refills: 0 | Status: SHIPPED | OUTPATIENT
Start: 2018-05-07 | End: 2018-05-14

## 2018-05-07 RX ADMIN — HEPARIN SODIUM 5000 UNITS: 5000 INJECTION, SOLUTION INTRAVENOUS; SUBCUTANEOUS at 06:20

## 2018-05-07 RX ADMIN — OXYCODONE HYDROCHLORIDE 5 MG: 5 TABLET ORAL at 06:22

## 2018-05-07 RX ADMIN — GUAIFENESIN 600 MG: 600 TABLET, EXTENDED RELEASE ORAL at 09:27

## 2018-05-07 RX ADMIN — OXYCODONE HYDROCHLORIDE 5 MG: 5 TABLET ORAL at 12:41

## 2018-05-07 RX ADMIN — OMEPRAZOLE 20 MG: 20 CAPSULE, DELAYED RELEASE ORAL at 09:27

## 2018-05-07 RX ADMIN — OXYCODONE HYDROCHLORIDE 5 MG: 5 TABLET ORAL at 09:27

## 2018-05-07 RX ADMIN — OXYCODONE HYDROCHLORIDE 10 MG: 10 TABLET ORAL at 00:11

## 2018-05-07 RX ADMIN — OXYCODONE HYDROCHLORIDE 10 MG: 10 TABLET ORAL at 03:08

## 2018-05-07 ASSESSMENT — PAIN SCALES - GENERAL
PAINLEVEL_OUTOF10: 8
PAINLEVEL_OUTOF10: 8
PAINLEVEL_OUTOF10: 7
PAINLEVEL_OUTOF10: 6
PAINLEVEL_OUTOF10: 8
PAINLEVEL_OUTOF10: 5
PAINLEVEL_OUTOF10: 5

## 2018-05-07 NOTE — DISCHARGE INSTRUCTIONS
Discharge Instructions    Discharged to home by car with relative. Discharged via wheelchair, hospital escort: Yes.  Special equipment needed: Not Applicable    Be sure to schedule a follow-up appointment with your primary care doctor or any specialists as instructed.     Discharge Plan:   Smoking Cessation Offered: Patient Refused  Influenza Vaccine Indication: Patient Refuses    I understand that a diet low in cholesterol, fat, and sodium is recommended for good health. Unless I have been given specific instructions below for another diet, I accept this instruction as my diet prescription.   Other diet: follow your regular diet as tolerated    Special Instructions:   Staples out in office later this week   Antegrade nephrostogram and cystogram in 3 weeks      · Is patient discharged on Warfarin / Coumadin?   No     Depression / Suicide Risk    As you are discharged from this [facility] facility, it is important to learn how to keep safe from harming yourself.    Recognize the warning signs:  · Abrupt changes in personality, positive or negative- including increase in energy   · Giving away possessions  · Change in eating patterns- significant weight changes-  positive or negative  · Change in sleeping patterns- unable to sleep or sleeping all the time   · Unwillingness or inability to communicate  · Depression  · Unusual sadness, discouragement and loneliness  · Talk of wanting to die  · Neglect of personal appearance   · Rebelliousness- reckless behavior  · Withdrawal from people/activities they love  · Confusion- inability to concentrate     If you or a loved one observes any of these behaviors or has concerns about self-harm, here's what you can do:  · Talk about it- your feelings and reasons for harming yourself  · Remove any means that you might use to hurt yourself (examples: pills, rope, extension cords, firearm)  · Get professional help from the community (Mental Health, Substance Abuse, psychological  counseling)  · Do not be alone:Call your Safe Contact- someone whom you trust who will be there for you.  · Call your local CRISIS HOTLINE 143-6539 or 196-013-9120  · Call your local Children's Mobile Crisis Response Team [place] (656) 291-8141 or www.Newsvine  · Call the toll free National Suicide Prevention Hotlines   · National Suicide Prevention Lifeline 421-986-ZKOK (9964)  · National Hope Line Network 800-SUICIDE (929-7814)    Seth Catheter Care, Adult  A Seth catheter is a soft, flexible tube. This tube is placed into your bladder to drain pee (urine). If you go home with this catheter in place, follow the instructions below.  TAKING CARE OF THE CATHETER  1. Wash your hands with soap and water.  2. Put soap and water on a clean washcloth.  ¨ Clean the skin where the tube goes into your body.  § Clean away from the tube site.  § Never wipe toward the tube.  § Clean the area using a circular motion.  ¨ Remove all the soap. Pat the area dry with a clean towel. For males, reposition the skin that covers the end of the penis (foreskin).  3. Attach the tube to your leg with tape or a leg strap. Do not stretch the tube tight. If you are using tape, remove any stickiness left behind by past tape you used.  4. Keep the drainage bag below your hips. Keep it off the floor.  5. Check your tube during the day. Make sure it is working and draining. Make sure the tube does not curl, twist, or bend.  6. Do not pull on the tube or try to take it out.  TAKING CARE OF THE DRAINAGE BAGS  You will have a large overnight drainage bag and a small leg bag. You may wear the overnight bag any time. Never wear the small bag at night. Follow the directions below.  Emptying the Drainage Bag  Empty your drainage bag when it is  -½ full or at least 2-3 times a day.  2. Wash your hands with soap and water.  3. Keep the drainage bag below your hips.  4. Hold the dirty bag over the toilet or clean container.  5. Open the pour  spout at the bottom of the bag. Empty the pee into the toilet or container. Do not let the pour spout touch anything.  6. Clean the pour spout with a gauze pad or cotton ball that has rubbing alcohol on it.  7. Close the pour spout.  8. Attach the bag to your leg with tape or a leg strap.  9. Wash your hands well.  Changing the Drainage Bag  Change your bag once a month or sooner if it starts to smell or look dirty.   2. Wash your hands with soap and water.  3. Pinch the rubber tube so that pee does not spill out.  4. Disconnect the catheter tube from the drainage tube at the connection valve. Do not let the tubes touch anything.  5. Clean the end of the catheter tube with an alcohol wipe. Clean the end of a the drainage tube with a different alcohol wipe.  6. Connect the catheter tube to the drainage tube of the clean drainage bag.  7. Attach the new bag to the leg with tape or a leg strap. Avoid attaching the new bag too tightly.  8. Wash your hands well.  Cleaning the Drainage Bag  2. Wash your hands with soap and water.  3. Wash the bag in warm, soapy water.  4. Rinse the bag with warm water.  5. Fill the bag with a mixture of white vinegar and water (1 cup vinegar to 1 quart warm water [.2 liter vinegar to 1 liter warm water]). Close the bag and soak it for 30 minutes in the solution.  6. Rinse the bag with warm water.  7. Hang the bag to dry with the pour spout open and hanging downward.  8. Store the clean bag (once it is dry) in a clean plastic bag.  9. Wash your hands well.  PREVENT INFECTION  · Wash your hands before and after touching your tube.  · Take showers every day. Wash the skin where the tube enters your body. Do not take baths. Replace wet leg straps with dry ones, if this applies.  · Do not use powders, sprays, or lotions on the genital area. Only use creams, lotions, or ointments as told by your doctor.  · For females, wipe from front to back after going to the bathroom.  · Drink enough fluids  to keep your pee clear or pale yellow unless you are told not to have too much fluid (fluid restriction).  · Do not let the drainage bag or tubing touch or lie on the floor.  · Wear cotton underwear to keep the area dry.  GET HELP IF:  · Your pee is cloudy or smells unusually bad.  · Your tube becomes clogged.  · You are not draining pee into the bag or your bladder feels full.  · Your tube starts to leak.  GET HELP RIGHT AWAY IF:  · You have pain, puffiness (swelling), redness, or yellowish-white fluid (pus) where the tube enters the body.  · You have pain in the belly (abdomen), legs, lower back, or bladder.  · You have a fever.  · You see blood fill the tube, or your pee is pink or red.  · You feel sick to your stomach (nauseous), throw up (vomit), or have chills.  · Your tube gets pulled out.  MAKE SURE YOU:   · Understand these instructions.  · Will watch your condition.  · Will get help right away if you are not doing well or get worse.     This information is not intended to replace advice given to you by your health care provider. Make sure you discuss any questions you have with your health care provider.     Document Released: 04/14/2014 Document Revised: 01/08/2016 Document Reviewed: 04/14/2014  [employer] Interactive Patient Education ©2016 [employer]    Percutaneous Nephrostomy Home Guide  A nephrostomy tube allows urine to leave your body when a medical condition prevents it from leaving your kidney normally. Urine is normally carried from the kidneys to the bladder through narrow tubes called ureters. The ureter can become obstructed due to conditions such as kidney stones, tumors, infection, or blood clots. A nephrostomy tube is a hollow, flexible tube placed into the kidney to restore the flow of urine. The tube is placed on the right or left side of your lower back and is connected to an external drainage bag.  PERSONAL HYGIENE  · You may shower unless otherwise told by your health care provider.  Prepare for a shower by placing a plastic covering over the nephrostomy tube dressing.  · Change the dressing immediately after showering. Make sure your skin around the nephrostomy tube exit site is dry.  · Avoid immersing your nephrostomy tube in water, such as taking a bath or swimming.  NEPHROSTOMY TUBE CARE  · Your nephrostomy tube is connected to a leg bag or bedside drainage bag. Always keep your tubing, leg bag, or bedside drainage bag below the level of your kidney so that your urine drains freely.  · Your activity is not restricted as long as your activity does not pull or tug on your tube.  · During the day, if you are connecting your nephrostomy tube to a leg bag, ensure that your tubing does not have any kinks and that your urine is draining freely. One helpful technique to prevent kinking or inadvertent dislodging of your nephrostomy tubing is to gently wrap an elastic bandage over the tubing. This will help to secure the tubing in place. Make sure there is no tension on the tubing so it does not become dislodged.  · At night, you may want to connect your nephrostomy tube to a larger bedside drainage bag.  EMPTYING THE LEG BAG OR BEDSIDE DRAINAGE BAG   The leg bag or bedside drainage bag should be emptied when it becomes  full and before going to sleep. Most leg bags and bedside drainage bags have a drain at the bottom that allows urine to be emptied.  9. Hold the leg bag or bedside drainage bag over a toilet or collection container. Use a measuring container if you are directed to measure your urine.  10. Open the drain and allow the urine to drain.  11. Once all the urine is drained from the leg bag or bedside drainage bag, close the drain fully to avoid urine leakage.  12. Flush urine down toilet. If a collection container was used, rinse the container.  NEPHROSTOMY TUBE EXIT SITE CARE  The exit site for your nephrostomy tube is covered with a bandage (dressing). Clean your exit site and change your  dressing as directed by your health care provider or if your dressing becomes wet.  Supplies Needed:  · Mild soap and water.  · 4×4 inch (10x10 cm) split gauze pads.  · 4×4 inch (10x10 cm) gauze pads.  · Paper tape.  Exit Site Care and Dressing Change:For the first 2 weeks after having a nephrostomy tube inserted, you should change the dressing every day. After 2 weeks, you can change the dressing 2 times per week, whenever the dressing becomes wet, or as told by your health care provider. Because of the location of your nephrostomy tube, you may need help from another person to complete dressing changes. The steps to changing a dressing are:  1. Wash hands well with soap and water.  2. Gently remove the tapes and dressing from around the nephrostomy tube. Be careful not to pull on the tube while removing the dressing. Avoid using scissors to remove the dressing since this may lead to accidental damage to the tube.  3. Wash the skin around the tube with the mild soap and water, rinse well, and dry with a clean cloth.  4. Inspect the skin around the drain for redness, swelling, and foul-smelling yellow or green discharge.  5. If the drain was sutured to the skin, inspect the suture to verify that it is still anchored in the skin.  6. Place two split gauze pads in and around the tube exit site. Do not apply ointments or alcohol to the site.  7. Place a gauze pad on top of the split gauze pad.  8. Coil the tube on top of the gauze. The tubing should rest on the gauze, not on the skin.  9. Place tape around each edge of the gauze pad.  10. Secure the nephrostomy tubing. Ensure that the nephrostomy tube does not kink or become pinched. The tubing should rest on the gauze pad and not on the skin.  11. Dispose of used supplies properly.  FLUSHING YOUR NEPHROSTOMY TUBE   Flush your nephrostomy tube as directed by your health care provider. Flushing of a nephrostomy tube is easier if a three-way stopcock is placed between  the tube and the leg bag or bedside drainage bag. One connection of the three-way stopcock connects to your tube, the second connects to the leg bag or bedside drainage bag, and the third connection is usually covered with a cap. The three-way stopcock lever points to the direction on the stopcock that is closed to flow. Normally, the lever points in the direction of the cap to allow urine to drain from the tube to the leg bag or bedside drainage bag.  Supplies Needed:  · Rubbing alcohol wipe.  · 10 mL 0.9% saline syringe.  Flushing Your Nephrostomy Tube  1. Gather needed supplies.  2. Move the lever of the three-way stopcock so that it points toward the leg bag or bedside drainage bag.  3. Clean the cap with a rubbing alcohol wipe and then screw the tip of a 10 mL 0.9% saline syringe onto the cap.  4. Using the syringe plunger, slowly push the 10 mL 0.9% saline in the syringe over 5-10 seconds. If resistance is met or pain occurs while pushing, stop pushing the saline immediately.  5. Remove the syringe from the cap.  6. Return the stopcock lever to the usual position which is pointing in the direction of the cap.  7. Dispose of used supplies properly.  REPLACING YOUR LEG BAG OR BEDSIDE DRAINAGE BAG   Replace your leg bag or bedside drainage bag, three-way stopcock, and any extension tubing as directed by your health care provider. Make sure you always have an extra drainage bag and connecting tubing available.  1. Empty urine from your leg bag or bedside drainage bag.  2. Gather new leg bag or bedside drainage bag, three-way stopcock, and any extension tubing.  3. Remove the leg bag or bedside drainage bag, three-way stopcock, and any extension tubing from the nephrostomy tube.  4. Attach the new leg bag or bedside drainage bag, three-way stopcock, and any extension tubing to the nephrostomy tube.  5. Dispose of the used leg bag or bedside drainage bag, three-way stopcock, and any extension tubing from the  nephrostomy tube.  SEEK IMMEDIATE MEDICAL CARE IF:  · You have a fever.  · You have abdominal pain during the first week after nephrostomy tube insertion.  · You have new appearance of blood in your urine.  · You have drainage, redness, swelling or pain at your nephrostomy tube insertion site.  · You have difficulty or pain with flushing the tube.  · You notice a decrease in your urine output not explained by drinking less fluids.  · Your nephrostomy tube comes out or the suture securing the tube comes free.     This information is not intended to replace advice given to you by your health care provider. Make sure you discuss any questions you have with your health care provider.     Document Released: 10/08/2014 Document Revised: 12/23/2014 Document Reviewed: 10/08/2014  [employer] Interactive Patient Education ©2016 [employer].    Incision Care, Adult  An incision is a cut that a doctor makes in your skin for surgery (for a procedure). Most times, these cuts are closed after surgery. Your cut from surgery may be closed with stitches (sutures), staples, skin glue, or skin tape (adhesive strips). You may need to return to your doctor to have stitches or staples taken out. This may happen many days or many weeks after your surgery. The cut needs to be well cared for so it does not get infected.  How to care for your cut  Cut care  · Follow instructions from your doctor about how to take care of your cut. Make sure you:  ¨ Wash your hands with soap and water before you change your bandage (dressing). If you cannot use soap and water, use hand .  ¨ Change your bandage as told by your doctor.  ¨ Leave stitches, skin glue, or skin tape in place. They may need to stay in place for 2 weeks or longer. If tape strips get loose and curl up, you may trim the loose edges. Do not remove tape strips completely unless your doctor says it is okay.  · Check your cut area every day for signs of infection. Check for:  ¨ More  redness, swelling, or pain.  ¨ More fluid or blood.  ¨ Warmth.  ¨ Pus or a bad smell.  · Ask your doctor how to clean the cut. This may include:  ¨ Using mild soap and water.  ¨ Using a clean towel to pat the cut dry after you clean it.  ¨ Putting a cream or ointment on the cut. Do this only as told by your doctor.  ¨ Covering the cut with a clean bandage.  · Ask your doctor when you can leave the cut uncovered.  · Do not take baths, swim, or use a hot tub until your doctor says it is okay. Ask your doctor if you can take showers. You may only be allowed to take sponge baths for bathing.  Medicines  · If you were prescribed an antibiotic medicine, cream, or ointment, take the antibiotic or put it on the cut as told by your doctor. Do not stop taking or putting on the antibiotic even if your condition gets better.  · Take over-the-counter and prescription medicines only as told by your doctor.  General instructions  · Limit movement around your cut. This helps healing.  ¨ Avoid straining, lifting, or exercise for the first month, or for as long as told by your doctor.  ¨ Follow instructions from your doctor about going back to your normal activities.  ¨ Ask your doctor what activities are safe.  · Protect your cut from the sun when you are outside for the first 6 months, or for as long as told by your doctor. Put on sunscreen around the scar or cover up the scar.  · Keep all follow-up visits as told by your doctor. This is important.  Contact a doctor if:  · Your have more redness, swelling, or pain around the cut.  · You have more fluid or blood coming from the cut.  · Your cut feels warm to the touch.  · You have pus or a bad smell coming from the cut.  · You have a fever or shaking chills.  · You feel sick to your stomach (nauseous) or you throw up (vomit).  · You are dizzy.  · Your stitches or staples come undone.  Get help right away if:  · You have a red streak coming from your cut.  · Your cut bleeds through  the bandage and the bleeding does not stop with gentle pressure.  · The edges of your cut open up and separate.  · You have very bad (severe) pain.  · You have a rash.  · You are confused.  · You pass out (faint).  · You have trouble breathing and you have a fast heartbeat.  This information is not intended to replace advice given to you by your health care provider. Make sure you discuss any questions you have with your health care provider.  Document Released: 03/11/2013 Document Revised: 08/25/2017 Document Reviewed: 08/25/2017  [employer] Interactive Patient Education © 2017 [employer]

## 2018-05-07 NOTE — PROGRESS NOTES
Patient to discharge home this afternoon.   FIDEL drain removed per active order.  Dressing placed, patient tolerated well.  Pain controlled with oxy 5 throughout the day so far.   Supplies given for home poe care and for flushing per request.   R nephrostomy tube - plug given per patient request.

## 2018-05-07 NOTE — PROGRESS NOTES
Report received from night RN, assumed Care.   Patient is AOx4, responds appropriately.      Pain controlled at this time, transitioning to 5mg tabs Q3 prn per patient request, tolerating this far this AM.  R nephrostomy tube in place with scant output.   R FIDEL in place with scant output.   Seth in place, yellow urine present with small bits of mucous present.    Midline incision with staples, approximated and open to air.  Patient is tolerating diet, denies nausea/vomiting. + flatus  Up self with steady gait.  IS max pull 2000.    Plan of care discussed, all questions answered.    Patient anxious to discharge today.    Call light and belongings within reach, treaded slipper socks on, bed in lowest locked position.  Hourly rounding in place, all needs met at this time

## 2018-05-07 NOTE — PROGRESS NOTES
Assumed care at 1845. Pt A&ox 4.  at bedside  Calm and pleasant this evening  Feeling much better.  Abd midline staples MARIAH. jpx 1  r nephrostomy tube in place  Seth in place with clear output  Tolerating diet. bm today  Ambulated the halls with family  O2 on RA. Pain controlled  Call light within reach. Hourly rounding in place

## 2018-05-07 NOTE — CARE PLAN
Problem: Safety  Goal: Will remain free from injury  Updated about POC. Reinforce call light use. Pt acknowledged understanding    Problem: Urinary Elimination:  Goal: Ability to reestablish a normal urinary elimination pattern will improve  Seth with adequate output. Clear urine.     Problem: Pain Management  Goal: Pain level will decrease to patient's comfort goal  Pain controlled with oxycodone 10mg q3 hrs

## 2018-05-07 NOTE — PROGRESS NOTES
Patient discharged home with .    Signed prescriptions given to patient-educated.  Discharge education provided, all questions answered.   Verbalized understanding of discharge education.   Wheeled out with all personal belongings collected from room.   Patient emptied poe with this RN prior to discharge.

## 2018-05-12 NOTE — DISCHARGE SUMMARY
DATE OF ADMISSION:  04/30/2018    DATE OF DISCHARGE:  05/07/2018    ADMITTING DIAGNOSIS:  Right proximal ureteral stricture.    SURGICAL PROCEDURE:  Exploratory laparotomy with extensive lysis of adhesions,   ileal ureter interposition with complex dissection of the renal pelvis and   stent placement.    SURGEON:  Kapil Rangel MD    OPERATIVE COURSE:  Please see the operative report.    HOSPITAL COURSE:  She had an uneventful recovery.  She has been up ambulating.    Pain is controlled.  She does want her nephrostomy tube capped before   discharge, tolerating a regular diet.    PHYSICAL EXAMINATION:  VITAL SIGNS:  Have remained stable throughout her admission.  GENERAL:  She is awake, alert and oriented, in no acute distress.  HEART:  Regular rate and rhythm.  LUNGS:  Clear to auscultation.  ABDOMEN:  Positive bowel sounds.  Sutures are well approximated.  FIDEL drainage   is minimal.  Nephrostomy tube is clear yellow.  Seth catheter with clear   yellow urine and some mucus.  EXTREMITIES:  No clubbing, cyanosis or edema.    ASSESSMENT AND PLAN:  She will be discharged home today and our office will   arrange follow up with Dr. Rangel.       ____________________________________     NAHID LEDESMA / JERILYN    DD:  05/12/2018 12:25:26  DT:  05/12/2018 13:28:07    D#:  [id number]  Job#:  [id number]

## 2018-05-17 ENCOUNTER — HOSPITAL ENCOUNTER (OUTPATIENT)
Dept: RADIOLOGY | Facility: MEDICAL CENTER | Age: 48
End: 2018-05-17
Attending: UROLOGY
Payer: COMMERCIAL

## 2018-05-17 DIAGNOSIS — N13.1 HYDRONEPHROSIS WITH URETERAL STRICTURE: ICD-10-CM

## 2018-05-17 PROCEDURE — 700117 HCHG RX CONTRAST REV CODE 255: Performed by: UROLOGY

## 2018-05-17 PROCEDURE — 51600 INJECTION FOR BLADDER X-RAY: CPT

## 2018-05-17 RX ADMIN — IOHEXOL 200 ML: 240 INJECTION, SOLUTION INTRATHECAL; INTRAVASCULAR; INTRAVENOUS; ORAL at 11:30

## 2018-05-17 RX ADMIN — IOHEXOL 100 ML: 240 INJECTION, SOLUTION INTRATHECAL; INTRAVASCULAR; INTRAVENOUS; ORAL at 11:30

## 2018-05-21 ENCOUNTER — APPOINTMENT (OUTPATIENT)
Dept: RADIOLOGY | Facility: MEDICAL CENTER | Age: 48
DRG: 872 | End: 2018-05-21
Attending: EMERGENCY MEDICINE
Payer: COMMERCIAL

## 2018-05-21 ENCOUNTER — HOSPITAL ENCOUNTER (INPATIENT)
Facility: MEDICAL CENTER | Age: 48
LOS: 1 days | DRG: 872 | End: 2018-05-22
Attending: EMERGENCY MEDICINE | Admitting: HOSPITALIST
Payer: COMMERCIAL

## 2018-05-21 DIAGNOSIS — N12 PYELONEPHRITIS: ICD-10-CM

## 2018-05-21 LAB
ALBUMIN SERPL BCP-MCNC: 4.2 G/DL (ref 3.2–4.9)
ALBUMIN/GLOB SERPL: 1.5 G/DL
ALP SERPL-CCNC: 66 U/L (ref 30–99)
ALT SERPL-CCNC: 9 U/L (ref 2–50)
ANION GAP SERPL CALC-SCNC: 13 MMOL/L (ref 0–11.9)
APPEARANCE UR: ABNORMAL
AST SERPL-CCNC: 9 U/L (ref 12–45)
BACTERIA #/AREA URNS HPF: ABNORMAL /HPF
BASOPHILS # BLD AUTO: 0.7 % (ref 0–1.8)
BASOPHILS # BLD: 0.08 K/UL (ref 0–0.12)
BILIRUB SERPL-MCNC: 0.4 MG/DL (ref 0.1–1.5)
BILIRUB UR QL STRIP.AUTO: NEGATIVE
BUN SERPL-MCNC: 11 MG/DL (ref 8–22)
CALCIUM SERPL-MCNC: 9.1 MG/DL (ref 8.5–10.5)
CHLORIDE SERPL-SCNC: 103 MMOL/L (ref 96–112)
CO2 SERPL-SCNC: 20 MMOL/L (ref 20–33)
COLOR UR: YELLOW
CREAT SERPL-MCNC: 0.83 MG/DL (ref 0.5–1.4)
EOSINOPHIL # BLD AUTO: 0.25 K/UL (ref 0–0.51)
EOSINOPHIL NFR BLD: 2.1 % (ref 0–6.9)
EPI CELLS #/AREA URNS HPF: NEGATIVE /HPF
ERYTHROCYTE [DISTWIDTH] IN BLOOD BY AUTOMATED COUNT: 39.4 FL (ref 35.9–50)
EST. AVERAGE GLUCOSE BLD GHB EST-MCNC: 114 MG/DL
GLOBULIN SER CALC-MCNC: 2.8 G/DL (ref 1.9–3.5)
GLUCOSE SERPL-MCNC: 99 MG/DL (ref 65–99)
GLUCOSE UR STRIP.AUTO-MCNC: NEGATIVE MG/DL
HBA1C MFR BLD: 5.6 % (ref 0–5.6)
HCT VFR BLD AUTO: 36.2 % (ref 37–47)
HGB BLD-MCNC: 12 G/DL (ref 12–16)
HYALINE CASTS #/AREA URNS LPF: ABNORMAL /LPF
IMM GRANULOCYTES # BLD AUTO: 0.04 K/UL (ref 0–0.11)
IMM GRANULOCYTES NFR BLD AUTO: 0.3 % (ref 0–0.9)
KETONES UR STRIP.AUTO-MCNC: NEGATIVE MG/DL
LACTATE BLD-SCNC: 1.2 MMOL/L (ref 0.5–2)
LEUKOCYTE ESTERASE UR QL STRIP.AUTO: ABNORMAL
LYMPHOCYTES # BLD AUTO: 2.53 K/UL (ref 1–4.8)
LYMPHOCYTES NFR BLD: 21.5 % (ref 22–41)
MCH RBC QN AUTO: 28.4 PG (ref 27–33)
MCHC RBC AUTO-ENTMCNC: 33.1 G/DL (ref 33.6–35)
MCV RBC AUTO: 85.8 FL (ref 81.4–97.8)
MICRO URNS: ABNORMAL
MONOCYTES # BLD AUTO: 0.91 K/UL (ref 0–0.85)
MONOCYTES NFR BLD AUTO: 7.7 % (ref 0–13.4)
NEUTROPHILS # BLD AUTO: 7.94 K/UL (ref 2–7.15)
NEUTROPHILS NFR BLD: 67.7 % (ref 44–72)
NITRITE UR QL STRIP.AUTO: POSITIVE
NRBC # BLD AUTO: 0 K/UL
NRBC BLD-RTO: 0 /100 WBC
PH UR STRIP.AUTO: 7 [PH]
PLATELET # BLD AUTO: 627 K/UL (ref 164–446)
PMV BLD AUTO: 10.6 FL (ref 9–12.9)
POTASSIUM SERPL-SCNC: 3.8 MMOL/L (ref 3.6–5.5)
PROT SERPL-MCNC: 7 G/DL (ref 6–8.2)
PROT UR QL STRIP: NEGATIVE MG/DL
RBC # BLD AUTO: 4.22 M/UL (ref 4.2–5.4)
RBC # URNS HPF: ABNORMAL /HPF
RBC UR QL AUTO: ABNORMAL
SODIUM SERPL-SCNC: 136 MMOL/L (ref 135–145)
SP GR UR STRIP.AUTO: 1.01
UROBILINOGEN UR STRIP.AUTO-MCNC: 0.2 MG/DL
WBC # BLD AUTO: 11.8 K/UL (ref 4.8–10.8)
WBC #/AREA URNS HPF: ABNORMAL /HPF

## 2018-05-21 PROCEDURE — 85025 COMPLETE CBC W/AUTO DIFF WBC: CPT

## 2018-05-21 PROCEDURE — A9270 NON-COVERED ITEM OR SERVICE: HCPCS | Performed by: EMERGENCY MEDICINE

## 2018-05-21 PROCEDURE — 700117 HCHG RX CONTRAST REV CODE 255: Performed by: EMERGENCY MEDICINE

## 2018-05-21 PROCEDURE — 87086 URINE CULTURE/COLONY COUNT: CPT

## 2018-05-21 PROCEDURE — 99285 EMERGENCY DEPT VISIT HI MDM: CPT

## 2018-05-21 PROCEDURE — 80053 COMPREHEN METABOLIC PANEL: CPT

## 2018-05-21 PROCEDURE — 74177 CT ABD & PELVIS W/CONTRAST: CPT

## 2018-05-21 PROCEDURE — 99223 1ST HOSP IP/OBS HIGH 75: CPT | Performed by: HOSPITALIST

## 2018-05-21 PROCEDURE — 87077 CULTURE AEROBIC IDENTIFY: CPT

## 2018-05-21 PROCEDURE — 83036 HEMOGLOBIN GLYCOSYLATED A1C: CPT

## 2018-05-21 PROCEDURE — 770020 HCHG ROOM/CARE - TELE (206)

## 2018-05-21 PROCEDURE — 700102 HCHG RX REV CODE 250 W/ 637 OVERRIDE(OP): Performed by: EMERGENCY MEDICINE

## 2018-05-21 PROCEDURE — 700111 HCHG RX REV CODE 636 W/ 250 OVERRIDE (IP): Performed by: EMERGENCY MEDICINE

## 2018-05-21 PROCEDURE — 87040 BLOOD CULTURE FOR BACTERIA: CPT

## 2018-05-21 PROCEDURE — 96374 THER/PROPH/DIAG INJ IV PUSH: CPT

## 2018-05-21 PROCEDURE — 71045 X-RAY EXAM CHEST 1 VIEW: CPT

## 2018-05-21 PROCEDURE — 36415 COLL VENOUS BLD VENIPUNCTURE: CPT

## 2018-05-21 PROCEDURE — 81001 URINALYSIS AUTO W/SCOPE: CPT

## 2018-05-21 PROCEDURE — 83605 ASSAY OF LACTIC ACID: CPT

## 2018-05-21 RX ORDER — AMOXICILLIN 250 MG
2 CAPSULE ORAL 2 TIMES DAILY
Status: DISCONTINUED | OUTPATIENT
Start: 2018-05-21 | End: 2018-05-22 | Stop reason: HOSPADM

## 2018-05-21 RX ORDER — ACETAMINOPHEN 325 MG/1
650 TABLET ORAL EVERY 6 HOURS PRN
Status: DISCONTINUED | OUTPATIENT
Start: 2018-05-21 | End: 2018-05-22 | Stop reason: HOSPADM

## 2018-05-21 RX ORDER — ACETAMINOPHEN 325 MG/1
650 TABLET ORAL ONCE
Status: COMPLETED | OUTPATIENT
Start: 2018-05-21 | End: 2018-05-21

## 2018-05-21 RX ORDER — GUAIFENESIN 600 MG/1
600 TABLET, EXTENDED RELEASE ORAL EVERY 12 HOURS
Status: DISCONTINUED | OUTPATIENT
Start: 2018-05-21 | End: 2018-05-22 | Stop reason: HOSPADM

## 2018-05-21 RX ORDER — CEFTRIAXONE 1 G/1
1 INJECTION, POWDER, FOR SOLUTION INTRAMUSCULAR; INTRAVENOUS ONCE
Status: COMPLETED | OUTPATIENT
Start: 2018-05-21 | End: 2018-05-21

## 2018-05-21 RX ORDER — PROMETHAZINE HYDROCHLORIDE 25 MG/1
12.5-25 TABLET ORAL EVERY 4 HOURS PRN
Status: DISCONTINUED | OUTPATIENT
Start: 2018-05-21 | End: 2018-05-22 | Stop reason: HOSPADM

## 2018-05-21 RX ORDER — SODIUM CHLORIDE 9 MG/ML
500 INJECTION, SOLUTION INTRAVENOUS
Status: DISCONTINUED | OUTPATIENT
Start: 2018-05-21 | End: 2018-05-22 | Stop reason: HOSPADM

## 2018-05-21 RX ORDER — POLYETHYLENE GLYCOL 3350 17 G/17G
1 POWDER, FOR SOLUTION ORAL
Status: DISCONTINUED | OUTPATIENT
Start: 2018-05-21 | End: 2018-05-22 | Stop reason: HOSPADM

## 2018-05-21 RX ORDER — BISACODYL 10 MG
10 SUPPOSITORY, RECTAL RECTAL
Status: DISCONTINUED | OUTPATIENT
Start: 2018-05-21 | End: 2018-05-22 | Stop reason: HOSPADM

## 2018-05-21 RX ORDER — ONDANSETRON 4 MG/1
4 TABLET, ORALLY DISINTEGRATING ORAL EVERY 4 HOURS PRN
Status: DISCONTINUED | OUTPATIENT
Start: 2018-05-21 | End: 2018-05-22 | Stop reason: HOSPADM

## 2018-05-21 RX ORDER — SODIUM CHLORIDE 9 MG/ML
INJECTION, SOLUTION INTRAVENOUS CONTINUOUS
Status: DISCONTINUED | OUTPATIENT
Start: 2018-05-21 | End: 2018-05-22

## 2018-05-21 RX ORDER — ONDANSETRON 2 MG/ML
4 INJECTION INTRAMUSCULAR; INTRAVENOUS EVERY 4 HOURS PRN
Status: DISCONTINUED | OUTPATIENT
Start: 2018-05-21 | End: 2018-05-22 | Stop reason: HOSPADM

## 2018-05-21 RX ORDER — ESTRADIOL 0.05 MG/D
1 PATCH, EXTENDED RELEASE TRANSDERMAL
Status: DISCONTINUED | OUTPATIENT
Start: 2018-05-22 | End: 2018-05-22 | Stop reason: HOSPADM

## 2018-05-21 RX ORDER — PROMETHAZINE HYDROCHLORIDE 25 MG/1
12.5-25 SUPPOSITORY RECTAL EVERY 4 HOURS PRN
Status: DISCONTINUED | OUTPATIENT
Start: 2018-05-21 | End: 2018-05-22 | Stop reason: HOSPADM

## 2018-05-21 RX ORDER — CLONIDINE HYDROCHLORIDE 0.1 MG/1
0.1 TABLET ORAL EVERY 6 HOURS PRN
Status: DISCONTINUED | OUTPATIENT
Start: 2018-05-21 | End: 2018-05-22 | Stop reason: HOSPADM

## 2018-05-21 RX ADMIN — IOHEXOL 100 ML: 350 INJECTION, SOLUTION INTRAVENOUS at 20:58

## 2018-05-21 RX ADMIN — CEFTRIAXONE SODIUM 1 G: 1 INJECTION, POWDER, FOR SOLUTION INTRAMUSCULAR; INTRAVENOUS at 19:01

## 2018-05-21 RX ADMIN — ACETAMINOPHEN 650 MG: 325 TABLET, FILM COATED ORAL at 18:59

## 2018-05-21 ASSESSMENT — ENCOUNTER SYMPTOMS
NEUROLOGICAL NEGATIVE: 1
GASTROINTESTINAL NEGATIVE: 1
CARDIOVASCULAR NEGATIVE: 1
CHILLS: 1
EYES NEGATIVE: 1
BACK PAIN: 1
FEVER: 1
PSYCHIATRIC NEGATIVE: 1
RESPIRATORY NEGATIVE: 1
FLANK PAIN: 1

## 2018-05-21 ASSESSMENT — LIFESTYLE VARIABLES: DO YOU DRINK ALCOHOL: NO

## 2018-05-21 ASSESSMENT — PAIN SCALES - GENERAL: PAINLEVEL_OUTOF10: 0

## 2018-05-21 NOTE — ED TRIAGE NOTES
"49 y/o female ambulate to triage   Chief Complaint   Patient presents with   • Abdominal Pain   • Back Pain   • Head Ache   • Fever     Pt states 3 weeks ago she had surgery \"they attached my small intestines to my right kidney\"  "

## 2018-05-22 VITALS
OXYGEN SATURATION: 98 % | DIASTOLIC BLOOD PRESSURE: 65 MMHG | TEMPERATURE: 99.9 F | SYSTOLIC BLOOD PRESSURE: 112 MMHG | HEART RATE: 90 BPM | WEIGHT: 193.34 LBS | RESPIRATION RATE: 18 BRPM | BODY MASS INDEX: 30.28 KG/M2

## 2018-05-22 PROBLEM — A41.9 SEPSIS (HCC): Status: RESOLVED | Noted: 2017-09-17 | Resolved: 2018-05-22

## 2018-05-22 LAB
ANION GAP SERPL CALC-SCNC: 13 MMOL/L (ref 0–11.9)
BASOPHILS # BLD AUTO: 0.7 % (ref 0–1.8)
BASOPHILS # BLD: 0.07 K/UL (ref 0–0.12)
BUN SERPL-MCNC: 11 MG/DL (ref 8–22)
CALCIUM SERPL-MCNC: 9.5 MG/DL (ref 8.5–10.5)
CHLORIDE SERPL-SCNC: 103 MMOL/L (ref 96–112)
CO2 SERPL-SCNC: 22 MMOL/L (ref 20–33)
CREAT SERPL-MCNC: 0.96 MG/DL (ref 0.5–1.4)
EOSINOPHIL # BLD AUTO: 0.21 K/UL (ref 0–0.51)
EOSINOPHIL NFR BLD: 2 % (ref 0–6.9)
ERYTHROCYTE [DISTWIDTH] IN BLOOD BY AUTOMATED COUNT: 39.9 FL (ref 35.9–50)
GLUCOSE SERPL-MCNC: 94 MG/DL (ref 65–99)
HCT VFR BLD AUTO: 39 % (ref 37–47)
HGB BLD-MCNC: 12.9 G/DL (ref 12–16)
IMM GRANULOCYTES # BLD AUTO: 0.05 K/UL (ref 0–0.11)
IMM GRANULOCYTES NFR BLD AUTO: 0.5 % (ref 0–0.9)
LACTATE BLD-SCNC: 1.3 MMOL/L (ref 0.5–2)
LYMPHOCYTES # BLD AUTO: 2.35 K/UL (ref 1–4.8)
LYMPHOCYTES NFR BLD: 22.8 % (ref 22–41)
MCH RBC QN AUTO: 28.8 PG (ref 27–33)
MCHC RBC AUTO-ENTMCNC: 33.1 G/DL (ref 33.6–35)
MCV RBC AUTO: 87.1 FL (ref 81.4–97.8)
MONOCYTES # BLD AUTO: 0.95 K/UL (ref 0–0.85)
MONOCYTES NFR BLD AUTO: 9.2 % (ref 0–13.4)
NEUTROPHILS # BLD AUTO: 6.68 K/UL (ref 2–7.15)
NEUTROPHILS NFR BLD: 64.8 % (ref 44–72)
NRBC # BLD AUTO: 0 K/UL
NRBC BLD-RTO: 0 /100 WBC
PLATELET # BLD AUTO: 527 K/UL (ref 164–446)
PMV BLD AUTO: 10.3 FL (ref 9–12.9)
POTASSIUM SERPL-SCNC: 3.8 MMOL/L (ref 3.6–5.5)
RBC # BLD AUTO: 4.48 M/UL (ref 4.2–5.4)
SODIUM SERPL-SCNC: 138 MMOL/L (ref 135–145)
WBC # BLD AUTO: 10.3 K/UL (ref 4.8–10.8)

## 2018-05-22 PROCEDURE — A9270 NON-COVERED ITEM OR SERVICE: HCPCS | Performed by: HOSPITALIST

## 2018-05-22 PROCEDURE — 99239 HOSP IP/OBS DSCHRG MGMT >30: CPT | Performed by: HOSPITALIST

## 2018-05-22 PROCEDURE — 36415 COLL VENOUS BLD VENIPUNCTURE: CPT

## 2018-05-22 PROCEDURE — 700102 HCHG RX REV CODE 250 W/ 637 OVERRIDE(OP): Performed by: HOSPITALIST

## 2018-05-22 PROCEDURE — 700105 HCHG RX REV CODE 258: Performed by: HOSPITALIST

## 2018-05-22 PROCEDURE — 83605 ASSAY OF LACTIC ACID: CPT

## 2018-05-22 PROCEDURE — 85025 COMPLETE CBC W/AUTO DIFF WBC: CPT

## 2018-05-22 PROCEDURE — 80048 BASIC METABOLIC PNL TOTAL CA: CPT

## 2018-05-22 PROCEDURE — 700111 HCHG RX REV CODE 636 W/ 250 OVERRIDE (IP): Performed by: HOSPITALIST

## 2018-05-22 RX ADMIN — GUAIFENESIN 600 MG: 600 TABLET, EXTENDED RELEASE ORAL at 00:09

## 2018-05-22 RX ADMIN — CEFTRIAXONE 2 G: 2 INJECTION, POWDER, FOR SOLUTION INTRAMUSCULAR; INTRAVENOUS at 09:08

## 2018-05-22 RX ADMIN — SODIUM CHLORIDE: 9 INJECTION, SOLUTION INTRAVENOUS at 00:14

## 2018-05-22 RX ADMIN — ACETAMINOPHEN 650 MG: 325 TABLET, FILM COATED ORAL at 00:44

## 2018-05-22 RX ADMIN — GUAIFENESIN 600 MG: 600 TABLET, EXTENDED RELEASE ORAL at 05:46

## 2018-05-22 ASSESSMENT — PAIN SCALES - GENERAL
PAINLEVEL_OUTOF10: 3
PAINLEVEL_OUTOF10: 7

## 2018-05-22 ASSESSMENT — LIFESTYLE VARIABLES: EVER_SMOKED: YES

## 2018-05-22 NOTE — DISCHARGE INSTRUCTIONS
Discharge Instructions    Discharged to home by car with relative. Discharged via walking, hospital escort: Refused.  Special equipment needed: Not Applicable    Be sure to schedule a follow-up appointment with your primary care doctor or any specialists as instructed.     Discharge Plan:   Influenza Vaccine Indication: Patient Refuses    I understand that a diet low in cholesterol, fat, and sodium is recommended for good health. Unless I have been given specific instructions below for another diet, I accept this instruction as my diet prescription.   Other diet: regular    Special Instructions: None    · Is patient discharged on Warfarin / Coumadin?   No     Depression / Suicide Risk    As you are discharged from this [facility] facility, it is important to learn how to keep safe from harming yourself.    Recognize the warning signs:  · Abrupt changes in personality, positive or negative- including increase in energy   · Giving away possessions  · Change in eating patterns- significant weight changes-  positive or negative  · Change in sleeping patterns- unable to sleep or sleeping all the time   · Unwillingness or inability to communicate  · Depression  · Unusual sadness, discouragement and loneliness  · Talk of wanting to die  · Neglect of personal appearance   · Rebelliousness- reckless behavior  · Withdrawal from people/activities they love  · Confusion- inability to concentrate     If you or a loved one observes any of these behaviors or has concerns about self-harm, here's what you can do:  · Talk about it- your feelings and reasons for harming yourself  · Remove any means that you might use to hurt yourself (examples: pills, rope, extension cords, firearm)  · Get professional help from the community (Mental Health, Substance Abuse, psychological counseling)  · Do not be alone:Call your Safe Contact- someone whom you trust who will be there for you.  · Call your local CRISIS HOTLINE 644-0323 or  405.258.4760  · Call your local Children's Mobile Crisis Response Team [place] (504) 437-4354 or www.ilab  · Call the toll free National Suicide Prevention Hotlines   · National Suicide Prevention Lifeline 007-382-XLFU (7275)  · [employer] Line Network 800-SUICIDE (003-3623)      Urinary Tract Infection, Adult  Introduction  A urinary tract infection (UTI) is an infection of any part of the urinary tract. The urinary tract includes the:  · Kidneys.  · Ureters.  · Bladder.  · Urethra.  These organs make, store, and get rid of pee (urine) in the body.  Follow these instructions at home:  · Take over-the-counter and prescription medicines only as told by your doctor.  · If you were prescribed an antibiotic medicine, take it as told by your doctor. Do not stop taking the antibiotic even if you start to feel better.  · Avoid the following drinks:  ¨ Alcohol.  ¨ Caffeine.  ¨ Tea.  ¨ Carbonated drinks.  · Drink enough fluid to keep your pee clear or pale yellow.  · Keep all follow-up visits as told by your doctor. This is important.  · Make sure to:  ¨ Empty your bladder often and completely. Do not to hold pee for long periods of time.  ¨ Empty your bladder before and after sex.  ¨ Wipe from front to back after a bowel movement if you are female. Use each tissue one time when you wipe.  Contact a doctor if:  · You have back pain.  · You have a fever.  · You feel sick to your stomach (nauseous).  · You throw up (vomit).  · Your symptoms do not get better after 3 days.  · Your symptoms go away and then come back.  Get help right away if:  · You have very bad back pain.  · You have very bad lower belly (abdominal) pain.  · You are throwing up and cannot keep down any medicines or water.  This information is not intended to replace advice given to you by your health care provider. Make sure you discuss any questions you have with your health care provider.  Document Released: 06/05/2009 Document Revised:  05/25/2017 Document Reviewed: 11/07/2016  © 2017 [employer]

## 2018-05-22 NOTE — ED NOTES
Pt remains febrile denies taking tylenol. Pt advised to refrain from using blankets at this time. Call light in reach.   IV placed second set bc collected.

## 2018-05-22 NOTE — ASSESSMENT & PLAN NOTE
Required surgical intervention with placement of ileal conduit in place of ureter.  Appreciate urology evaluation/consultation.

## 2018-05-22 NOTE — ASSESSMENT & PLAN NOTE
Complicated in the setting of ileal conduit, stent in place.  Start IV antibiotics, plan for urologic evaluation, CT abdomen and pelvis with contrast to evaluate for any leak from the previous surgery.

## 2018-05-22 NOTE — ED NOTES
Med rec updated and complete.  Allergies reviewed.  Pt stated that last week nephrostomy tube was removed  In the office and she was given one dose of an antibiotic.  Pt unable to recall the name of it.

## 2018-05-22 NOTE — PROGRESS NOTES
Urology Progress Note    Overnight Events: None    S: No fevers, chills, nausea or vomiting.  Feeling better today.  On Rocephin pending UC    O:   Blood pressure 112/65, pulse 90, temperature 37.7 °C (99.9 °F), resp. rate 18, weight 87.7 kg (193 lb 5.5 oz), last menstrual period 05/22/2009, SpO2 98 %, not currently breastfeeding.  Recent Labs      05/21/18   1700  05/22/18   0016   SODIUM  136  138   POTASSIUM  3.8  3.8   CHLORIDE  103  103   CO2  20  22   GLUCOSE  99  94   BUN  11  11   CREATININE  0.83  0.96   CALCIUM  9.1  9.5     Recent Labs      05/21/18   1700  05/22/18   0016   WBC  11.8*  10.3   RBC  4.22  4.48   HEMOGLOBIN  12.0  12.9   HEMATOCRIT  36.2*  39.0   MCV  85.8  87.1   MCH  28.4  28.8   MCHC  33.1*  33.1*   RDW  39.4  39.9   PLATELETCT  627*  527*   MPV  10.6  10.3       No intake or output data in the 24 hours ending 05/22/18 1019    Exam:  Abdomen soft, benign.    No CVAT      A/P:    Active Hospital Problems    Diagnosis   • UTI (urinary tract infection) [N39.0]     Priority: High   • Ureteral stricture, right [N13.5]   • Obesity (BMI 30-39.9) [E66.9]       PLAN:  1.  Pt is cleared for discharge from urology standpoint.  She will f/u in Dr Garrett office daily for Rocephin IM pending culture results.  She understands the importance of follow up.   2.  POC discussed with Dr Rangel, pt and family and RN.

## 2018-05-22 NOTE — PROGRESS NOTES
Discharge orders received. All lines and monitors discontinued. Reviewed discharge paperwork with patient. No questions at this time. Patient discharged via walking at 1050.

## 2018-05-22 NOTE — PROGRESS NOTES
Bedside report completed, assumed pt care.  Pt resting in bed, A&Ox4.  Reports of pain in right flank, declines intervention.  Discussed POC with pt.  Call light within reach, bed alarm on.

## 2018-05-22 NOTE — PROGRESS NOTES
Report received in ED. Pt transported to room 712-1 on zoll monitor. Placed on tele box, monitor room verified SR in 90's. Complaint of 7/10 pain in R flank. Medicated per MAR. Ambulated to bed, 2 RN skin check completed. Pt oriented to room and unit routine. POC discussed, all questions answered.

## 2018-05-22 NOTE — ED PROVIDER NOTES
"ED Provider Note    CHIEF COMPLAINT  Chief Complaint   Patient presents with   • Abdominal Pain   • Back Pain   • Head Ache   • Fever       HPI  Aurora Tan is a 48 y.o. female who presents to the emergency department complaining of right flank pain as well as fever and chills. The patient says that she's had chills all day long and the temperature as high as 101.9. She also tells me she was just discharged from the hospital 2 weeks ago after having surgery on her right ureter and she still has a stent in place on the right side. Please see chart REVIEW below. The patient doesn't recognize any other exacerbating or alleviating factors or precipitating events, pain is moderate in intensity. She also has generalized body aches and headache.      REVIEW OF SYSTEMS no chest pain no hemoptysis no shortness of breath. 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   • Renal disorder 04/25/2018    \"Nephrostomy tube in place\"   • Snoring 04/25/2018    Has not had a sleep study   • Urinary incontinence        FAMILY HISTORY  Family History   Problem Relation Age of Onset   • Cancer Mother    • Cancer Father    • Heart Disease Brother    • Diabetes Maternal Grandmother    • Cancer Maternal Grandfather    • Cancer Paternal Grandmother    • Cancer Paternal Grandfather        SOCIAL HISTORY  Social History     Social History   • Marital status:      Spouse name: N/A   • Number of children: N/A   • Years of education: N/A     Social History Main Topics   • Smoking status: Former Smoker     Packs/day: 0.25     Years: 10.00     Types: " Cigarettes     Quit date: 4/13/2013   • Smokeless tobacco: Never Used   • Alcohol use No      Comment: 1/month   • Drug use: No   • Sexual activity: Not on file     Other Topics Concern   • Not on file     Social History Narrative   • No narrative on file       SURGICAL HISTORY  Past Surgical History:   Procedure Laterality Date   • EXPLORATORY LAPAROTOMY Right 4/30/2018    Procedure: EXPLORATORY LAPAROTOMY W/ILEAL URETER;  Surgeon: Kapil Rangel M.D.;  Location: SURGERY [facility];  Service: Urology   • LYSIS ADHESIONS GENERAL  4/30/2018    Procedure: LYSIS ADHESIONS GENERAL;  Surgeon: Kapil Rangel M.D.;  Location: SURGERY [facility];  Service: Urology   • CYSTOSCOPY STENT PLACEMENT Right 3/21/2018    Procedure: CYSTOSCOPY - STENT REMOVAL;  Surgeon: Terry Maldonado M.D.;  Location: SURGERY [facility];  Service: Urology   • URETEROSCOPY Right 3/21/2018    Procedure: URETEROSCOPY- DIAGNOSTIC;  Surgeon: Terry Maldonado M.D.;  Location: SURGERY [facility];  Service: Urology   • RETROGRADES Right 3/21/2018    Procedure: RETROGRADES;  Surgeon: Terry Maldonado M.D.;  Location: SURGERY [facility];  Service: Urology   • PYELOGRAM Right 3/21/2018    Procedure: PYELOGRAM;  Surgeon: Terry Maldonado M.D.;  Location: SURGERY [facility];  Service: Urology   • CYSTOSCOPY N/A 1/31/2018    Procedure: CYSTOSCOPY;  Surgeon: Terry Maldonado M.D.;  Location: SURGERY [facility];  Service: Urology   • RETROGRADES Right 1/31/2018    Procedure: RETROGRADES;  Surgeon: Terry Maldonado M.D.;  Location: SURGERY [facility];  Service: Urology   • PYELOGRAM Right 1/31/2018    Procedure: PYELOGRAM;  Surgeon: Terry Maldonado M.D.;  Location: SURGERY [facility];  Service: Urology   • URETEROSCOPY Right 1/31/2018    Procedure: URETEROSCOPY, INCISIONAL OR DILATION OF STRICTURE  ;  Surgeon: Terry Maldonado M.D.;  Location: SURGERY [facility];  Service: Urology   • LASERTRIPSY Right 1/31/2018     Procedure: LASERTRIPSY;  Surgeon: Terry Maldonado M.D.;  Location: SURGERY [facility];  Service: Urology   • STENT PLACEMENT Right 1/31/2018    Procedure: STENT PLACEMENT;  Surgeon: Terry Maldonado M.D.;  Location: SURGERY [facility];  Service: Urology   • URETEROSCOPY Right 11/7/2017    Procedure: URETEROSCOPY;  Surgeon: Kiet Eid M.D.;  Location: SURGERY [facility];  Service: Urology   • LASERTRIPSY Right 11/7/2017    Procedure: LASERTRIPSY- LITHO;  Surgeon: Kiet Eid M.D.;  Location: SURGERY [facility];  Service: Urology   • URETHRAL DILATATION Right 11/7/2017    Procedure: URETHRAL DILATATION;  Surgeon: Kiet Eid M.D.;  Location: SURGERY [facility];  Service: Urology   • CYSTOSCOPY STENT PLACEMENT Right 11/7/2017    Procedure: CYSTOSCOPY STENT PLACEMENT;  Surgeon: Kiet Eid M.D.;  Location: SURGERY [facility];  Service: Urology   • CYSTOSCOPY Right 9/17/2017    Procedure: CYSTOSCOPY;  Surgeon: Kiet Eid M.D.;  Location: SURGERY [facility];  Service: Urology   • URETEROSCOPY Right 9/17/2017    Procedure: URETEROSCOPY;  Surgeon: Kiet Eid M.D.;  Location: SURGERY [facility];  Service: Urology   • LASERTRIPSY Right 9/17/2017    Procedure: LASERTRIPSY;  Surgeon: Kiet Eid M.D.;  Location: SURGERY [facility];  Service: Urology   • STENT PLACEMENT Right 9/17/2017    Procedure: STENT PLACEMENT;  Surgeon: Kiet Eid M.D.;  Location: SURGERY [facility];  Service: Urology   • ABDOMINAL HYSTERECTOMY TOTAL  2009   • OTHER ABDOMINAL SURGERY  2009    abdominal tumor    • OTHER ABDOMINAL SURGERY  2009   • ABDOMINAL EXPLORATION     • OTHER      eye surgeries X6   • OTHER      ear surgeries X5   • PRIMARY C SECTION      1989/1991/1997/1999   • TONSILLECTOMY         CURRENT MEDICATIONS  Home Medications     Reviewed by Rich Arambula (Pharmacy Tech) on 05/21/18 at 1927  Med List Status: Complete    Medication Last Dose Status   estradiol (MINIVELLE) 0.05 MG/24HR PATCH BIWEEKLY 5/15/2018 Active   guaiFENesin LA (MUCINEX) 600 MG TABLET SR 12 HR 5/21/2018 Active                ALLERGIES  Allergies   Allergen Reactions   • Levaquin Hives, Shortness of Breath and Itching     Rxn - 9/11/01   • Morphine Itching       PHYSICAL EXAM  VITAL SIGNS: /75   Pulse (!) 102   Temp (!) 38.1 °C (100.5 °F)   Resp 20   Wt 88.8 kg (195 lb 12.3 oz)   LMP  (LMP Unknown)   SpO2 95%   BMI 30.66 kg/m²    Oxygen saturation is interpreted as adequate  Constitutional: Awake verbal and nontoxic appearing  HENT: Mucous membranes are dry  Eyes: No erythema or discharge or jaundice  Neck: Trachea midline no JVD no meningeal findings  Cardiovascular: Regular tachycardia  Lungs: Clear and equal bilaterally with no apparent difficulty breathing  Abdomen/Back: Soft and diffusely mildly tender but no peritoneal findings anteriorly. She does have right CVA tenderness.  Skin: Warm and dry  Musculoskeletal: No leg edema or calf tenderness  Neurologic: Awake verbal moving all extremities    CHART REVIEW  I reviewed the patient's discharge summary from May 7, 2018 and the patient had surgery for a right proximal ureter stricture and in the operating room she is noted to have extensive adhesions which were lysed and she had an ileal ureter interposition placed and also a stent placed.    Laboratory  CBC shows white blood cell count of 11.8 hemoglobin is adequate at 12 basic metabolic panels unremarkable LFTs are unremarkable and urinalysis is positive for nitrate and leukocyte esterase with 100-150 white blood cells and a few bacteria in the microscopic exam    Radiology  CT-ABDOMEN-PELVIS WITH   Final Result      1.  Interval RIGHT ileal conduit creation   2.  Ongoing moderate RIGHT hydronephrosis   3.  Slightly delayed RIGHT nephrogram likely indicates some amount of ongoing physiologic obstruction. Underlying infection is not excluded.    4.  5.3 x 2.8 cm RIGHT retroperitoneal fluid collection, likely a small postsurgical lymphocele or seroma though this could be a urinoma.      Findings were discussed with MANNIE CASTELLANOS on 5/21/2018 9:08 PM.               DX-CHEST-PORTABLE (1 VIEW)   Final Result      No acute cardiac or pulmonary abnormality is noted.            MEDICAL DECISION MAKING and DISPOSITION  In the emergency department an IV was established the patient was given intravenous fluids and ceftriaxone and oral Tylenol. I reviewed the case with Dr. Pham who will provide urology consultation and with the hospitalist and the patient is admitted to the hospitalist service for further evaluation and treatment    IMPRESSION  1. Pyelonephritis  2. Status post right ureter surgery         Electronically signed by: Mannie Castellanos, 5/21/2018 9:44 PM

## 2018-05-22 NOTE — H&P
" Hospital Medicine History and Physical    Date of Service  5/21/2018    Chief Complaint  Chief Complaint   Patient presents with   • Abdominal Pain   • Back Pain   • Head Ache   • Fever       History of Presenting Illness  48 y.o. Female with history of nephrolithiasis, complicated by multiple episodes of obstruction, requiring stents which also did not work, eventuating in placement of an ileal conduit.  She reports that this occurred 3 weeks prior to admission, and that 4 days prior to admission she had removal of all drains and poe catheter.  She initially was doing well until the day of admission, starting in the morning she began to feel very cold, with shaking chills.  She also had dysuria.  She was able to complete a half day of work before feeling too sick to continue, and subsequently presented to the emergency department for further evaluation.  She has no other complaints.       Primary Care Physician  CRISTOPHER Valdez.    Consultants  Urology Dr. Pham     Code Status  Full Code     Review of Systems  Review of Systems   Constitutional: Positive for chills, fever and malaise/fatigue.   HENT: Negative.    Eyes: Negative.    Respiratory: Negative.    Cardiovascular: Negative.    Gastrointestinal: Negative.    Genitourinary: Positive for dysuria, flank pain and frequency.   Musculoskeletal: Positive for back pain.   Skin: Negative.    Neurological: Negative.    Endo/Heme/Allergies: Negative.    Psychiatric/Behavioral: Negative.         Past Medical History  Past Medical History:   Diagnosis Date   • Renal disorder 04/25/2018    \"Nephrostomy tube in place\"   • Breath shortness 04/25/2018    \"In the past, not a current problem\"   • Snoring 04/25/2018    Has not had a sleep study   • Heart burn 04/25/2018   • Pain 04/25/2018    \"Abdomen pain right side\"   • Pain 04/2018    from right nephrostomy tube   • Heart attack (HCC) 11/03/2017   • High cholesterol     stopped taking med   • Renal " disorder     R kidney failure and scar tissue in the ureter   • Urinary incontinence        Surgical History  Past Surgical History:   Procedure Laterality Date   • EXPLORATORY LAPAROTOMY Right 4/30/2018    Procedure: EXPLORATORY LAPAROTOMY W/ILEAL URETER;  Surgeon: Kapil Rangel M.D.;  Location: SURGERY [facility];  Service: Urology   • LYSIS ADHESIONS GENERAL  4/30/2018    Procedure: LYSIS ADHESIONS GENERAL;  Surgeon: Kapil Rangel M.D.;  Location: SURGERY [facility];  Service: Urology   • CYSTOSCOPY STENT PLACEMENT Right 3/21/2018    Procedure: CYSTOSCOPY - STENT REMOVAL;  Surgeon: Terry Maldonado M.D.;  Location: SURGERY [facility];  Service: Urology   • URETEROSCOPY Right 3/21/2018    Procedure: URETEROSCOPY- DIAGNOSTIC;  Surgeon: Terry Maldonado M.D.;  Location: SURGERY [facility];  Service: Urology   • RETROGRADES Right 3/21/2018    Procedure: RETROGRADES;  Surgeon: Terry Maldonado M.D.;  Location: SURGERY [facility];  Service: Urology   • PYELOGRAM Right 3/21/2018    Procedure: PYELOGRAM;  Surgeon: Terry Maldonado M.D.;  Location: SURGERY [facility];  Service: Urology   • CYSTOSCOPY N/A 1/31/2018    Procedure: CYSTOSCOPY;  Surgeon: Terry Maldonado M.D.;  Location: SURGERY [facility];  Service: Urology   • RETROGRADES Right 1/31/2018    Procedure: RETROGRADES;  Surgeon: Terry Maldonado M.D.;  Location: SURGERY [facility];  Service: Urology   • PYELOGRAM Right 1/31/2018    Procedure: PYELOGRAM;  Surgeon: Terry Maldonado M.D.;  Location: SURGERY [facility];  Service: Urology   • URETEROSCOPY Right 1/31/2018    Procedure: URETEROSCOPY, INCISIONAL OR DILATION OF STRICTURE  ;  Surgeon: Terry Maldonado M.D.;  Location: SURGERY [facility];  Service: Urology   • LASERTRIPSY Right 1/31/2018    Procedure: LASERTRIPSY;  Surgeon: Terry Maldonado M.D.;  Location: SURGERY [facility];  Service: Urology   • STENT PLACEMENT Right 1/31/2018    Procedure: STENT  PLACEMENT;  Surgeon: Terry Maldonado M.D.;  Location: SURGERY [facility];  Service: Urology   • URETEROSCOPY Right 11/7/2017    Procedure: URETEROSCOPY;  Surgeon: Kiet Eid M.D.;  Location: SURGERY [facility];  Service: Urology   • LASERTRIPSY Right 11/7/2017    Procedure: LASERTRIPSY- LITHO;  Surgeon: Kiet Eid M.D.;  Location: SURGERY [facility];  Service: Urology   • URETHRAL DILATATION Right 11/7/2017    Procedure: URETHRAL DILATATION;  Surgeon: Kiet Eid M.D.;  Location: SURGERY [facility];  Service: Urology   • CYSTOSCOPY STENT PLACEMENT Right 11/7/2017    Procedure: CYSTOSCOPY STENT PLACEMENT;  Surgeon: Kiet Eid M.D.;  Location: SURGERY [facility];  Service: Urology   • CYSTOSCOPY Right 9/17/2017    Procedure: CYSTOSCOPY;  Surgeon: Kiet Eid M.D.;  Location: SURGERY [facility];  Service: Urology   • URETEROSCOPY Right 9/17/2017    Procedure: URETEROSCOPY;  Surgeon: Kiet Eid M.D.;  Location: SURGERY [facility];  Service: Urology   • LASERTRIPSY Right 9/17/2017    Procedure: LASERTRIPSY;  Surgeon: Kite Eid M.D.;  Location: SURGERY [facility];  Service: Urology   • STENT PLACEMENT Right 9/17/2017    Procedure: STENT PLACEMENT;  Surgeon: Kiet Eid M.D.;  Location: SURGERY [facility];  Service: Urology   • ABDOMINAL HYSTERECTOMY TOTAL  2009   • OTHER ABDOMINAL SURGERY  2009    abdominal tumor    • OTHER ABDOMINAL SURGERY  2009   • ABDOMINAL EXPLORATION     • OTHER      eye surgeries X6   • OTHER      ear surgeries X5   • PRIMARY C SECTION      1989/1991/1997/1999   • TONSILLECTOMY         Medications  No current facility-administered medications on file prior to encounter.      Current Outpatient Prescriptions on File Prior to Encounter   Medication Sig Dispense Refill   • guaiFENesin LA (MUCINEX) 600 MG TABLET SR 12 HR Take 1 Tab by mouth every 12 hours. 28 Tab 1   • acetaminophen (TYLENOL) 500  MG Tab Take 1,000 mg by mouth 1 time daily as needed.     • estradiol (MINIVELLE) 0.05 MG/24HR PATCH BIWEEKLY Apply 1 Patch to skin as directed every Tuesday.         Family History  Family History   Problem Relation Age of Onset   • Cancer Mother    • Cancer Father    • Heart Disease Brother    • Diabetes Maternal Grandmother    • Cancer Maternal Grandfather    • Cancer Paternal Grandmother    • Cancer Paternal Grandfather        Social History  Social History   Substance Use Topics   • Smoking status: Former Smoker     Packs/day: 0.25     Years: 10.00     Types: Cigarettes     Quit date: 2013   • Smokeless tobacco: Never Used   • Alcohol use No      Comment: 1/month       Allergies  Allergies   Allergen Reactions   • Levaquin Hives, Shortness of Breath and Itching     Rxn - 01   • Morphine Itching        Physical Exam  Laboratory   Hemodynamics  Temp (24hrs), Av.7 °C (101.6 °F), Min:38.6 °C (101.5 °F), Max:38.7 °C (101.7 °F)   Temperature: (!) 38.6 °C (101.5 °F)  Pulse  Av  Min: 118  Max: 118    Blood Pressure: 124/75      Respiratory      Respiration: 20, Pulse Oximetry: 95 %             Physical Exam   Constitutional: She is oriented to person, place, and time. She appears well-developed and well-nourished. No distress.   HENT:   Head: Normocephalic and atraumatic.   Eyes: Conjunctivae are normal. Pupils are equal, round, and reactive to light.   Neck: Normal range of motion. Neck supple. No tracheal deviation present. No thyromegaly present.   Cardiovascular: Normal rate, regular rhythm and normal heart sounds.  Exam reveals no gallop and no friction rub.    No murmur heard.  Pulmonary/Chest: Effort normal and breath sounds normal. No respiratory distress. She has no wheezes. She has no rales.   Abdominal: Soft. Bowel sounds are normal. She exhibits no distension. There is no tenderness. There is no rebound.   Musculoskeletal: Normal range of motion. She exhibits no edema.   Lymphadenopathy:      She has no cervical adenopathy.   Neurological: She is alert and oriented to person, place, and time. No cranial nerve deficit.   Skin: Skin is warm and dry. She is not diaphoretic.   Psychiatric: She has a normal mood and affect.   Nursing note and vitals reviewed.      Recent Labs      05/21/18   1700   WBC  11.8*   RBC  4.22   HEMOGLOBIN  12.0   HEMATOCRIT  36.2*   MCV  85.8   MCH  28.4   MCHC  33.1*   RDW  39.4   PLATELETCT  627*   MPV  10.6     Recent Labs      05/21/18   1700   SODIUM  136   POTASSIUM  3.8   CHLORIDE  103   CO2  20   GLUCOSE  99   BUN  11   CREATININE  0.83   CALCIUM  9.1     Recent Labs      05/21/18   1700   ALTSGPT  9   ASTSGOT  9*   ALKPHOSPHAT  66   TBILIRUBIN  0.4   GLUCOSE  99                 Lab Results   Component Value Date    TROPONINI 0.55 (H) 11/04/2017     Urinalysis:    Lab Results  Component Value Date/Time   SPECGRAVITY 1.009 05/21/2018 1700   GLUCOSEUR Negative 05/21/2018 1700   KETONES Negative 05/21/2018 1700   NITRITE Positive (A) 05/21/2018 1700   WBCURINE 100-150 (A) 05/21/2018 1700   RBCURINE 0-2 05/21/2018 1700   BACTERIA Few (A) 05/21/2018 1700   EPITHELCELL Negative 05/21/2018 1700        Imaging  Chest radiograph within normal limits.      CT abdomen and pelvis with contrast is pending.    Assessment/Plan     I anticipate this patient will require at least two midnights for appropriate medical management, necessitating inpatient admission.    * UTI (urinary tract infection)- (present on admission)   Assessment & Plan    Complicated in the setting of ileal conduit, stent in place.  Start IV antibiotics, plan for urologic evaluation, CT abdomen and pelvis with contrast to evaluate for any leak from the previous surgery.         Sepsis(995.91)- (present on admission)   Assessment & Plan    This is sepsis (without associated acute organ dysfunction). Based leukocy        Ureteral stricture, right- (present on admission)   Assessment & Plan    Required surgical  intervention with placement of ileal conduit in place of ureter.  Appreciate urology evaluation/consultation.         Obesity (BMI 30-39.9)- (present on admission)   Assessment & Plan    Body mass index is 30.66 kg/m².              VTE prophylaxis: SCD, hold lovenox pending surgical evaluation.

## 2018-05-23 LAB
BACTERIA UR CULT: ABNORMAL
BACTERIA UR CULT: ABNORMAL
SIGNIFICANT IND 70042: ABNORMAL
SITE SITE: ABNORMAL
SOURCE SOURCE: ABNORMAL

## 2018-05-23 NOTE — DISCHARGE SUMMARY
CHIEF COMPLAINT ON ADMISSION  Chief Complaint   Patient presents with   • Abdominal Pain   • Back Pain   • Head Ache   • Fever       CODE STATUS  Prior    HPI & HOSPITAL COURSE  Chief Complaint      Chief Complaint   Patient presents with   • Abdominal Pain   • Back Pain   • Head Ache   • Fever         History of Presenting Illness  48 y.o. Female with history of nephrolithiasis, complicated by multiple episodes of obstruction, requiring stents which also did not work, eventuating in placement of an ileal conduit.  She reports that this occurred 3 weeks prior to admission, and that 4 days prior to admission she had removal of all drains and poe catheter.  She initially was doing well until the day of admission, starting in the morning she began to feel very cold, with shaking chills.  She also had dysuria.  She was able to complete a half day of work before feeling too sick to continue, and subsequently presented to the emergency department for further evaluation.  She has no other complaints    She was admitted. She was hydrated. She was given iv abx(iv rocephin).   She was seen by urology. See recommendations below:    PLAN:  1.  Pt is cleared for discharge from urology standpoint.  She will f/u in Dr Garrett office daily for Rocephin IM pending culture results.  She understands the importance of follow up.   2.  POC discussed with Dr Landry, pt and family and RN.    The patient recovered much more quickly than anticipated on admission.    Therefore, she is discharged in good and stable condition with close outpatient follow-up.    SPECIFIC OUTPATIENT FOLLOW-UP  Dr landry urology    DISCHARGE PROBLEM LIST  Principal Problem:    UTI (urinary tract infection) POA: Yes  Active Problems:    Obesity (BMI 30-39.9) POA: Yes    Ureteral stricture, right POA: Yes  Resolved Problems:    Sepsis(995.91) POA: Yes      FOLLOW UP  No future appointments.  MICHAELA ValdezRHeenaN.  [address] [place] [address]  [address]  [phone]            MEDICATIONS ON DISCHARGE   Aurora Tan   Home Medication Instructions RADHA:[id number]    Printed on:05/23/18 0640   Medication Information                      estradiol (MINIVELLE) 0.05 MG/24HR PATCH BIWEEKLY  Apply 1 Patch to skin as directed every Tuesday.             guaiFENesin LA (MUCINEX) 600 MG TABLET SR 12 HR  Take 1 Tab by mouth every 12 hours.                 DIET  No orders of the defined types were placed in this encounter.      ACTIVITY  As tolerated.  Weight bearing as tolerated      CONSULTATIONS  Dr landry urology    PROCEDURES  none    LABORATORY  Lab Results   Component Value Date/Time    SODIUM 138 05/22/2018 12:16 AM    POTASSIUM 3.8 05/22/2018 12:16 AM    CHLORIDE 103 05/22/2018 12:16 AM    CO2 22 05/22/2018 12:16 AM    GLUCOSE 94 05/22/2018 12:16 AM    BUN 11 05/22/2018 12:16 AM    CREATININE 0.96 05/22/2018 12:16 AM        Lab Results   Component Value Date/Time    WBC 10.3 05/22/2018 12:16 AM    HEMOGLOBIN 12.9 05/22/2018 12:16 AM    HEMATOCRIT 39.0 05/22/2018 12:16 AM    PLATELETCT 527 (H) 05/22/2018 12:16 AM        Total time of the discharge process exceeds 40 minutes

## 2018-06-10 ENCOUNTER — APPOINTMENT (OUTPATIENT)
Dept: RADIOLOGY | Facility: MEDICAL CENTER | Age: 48
DRG: 862 | End: 2018-06-10
Attending: EMERGENCY MEDICINE
Payer: COMMERCIAL

## 2018-06-10 ENCOUNTER — HOSPITAL ENCOUNTER (INPATIENT)
Facility: MEDICAL CENTER | Age: 48
LOS: 2 days | DRG: 862 | End: 2018-06-12
Attending: EMERGENCY MEDICINE | Admitting: INTERNAL MEDICINE
Payer: COMMERCIAL

## 2018-06-10 DIAGNOSIS — N12 PYELONEPHRITIS: ICD-10-CM

## 2018-06-10 DIAGNOSIS — A41.9 SEPSIS, DUE TO UNSPECIFIED ORGANISM: ICD-10-CM

## 2018-06-10 LAB
ALBUMIN SERPL BCP-MCNC: 4.6 G/DL (ref 3.2–4.9)
ALBUMIN/GLOB SERPL: 1.2 G/DL
ALP SERPL-CCNC: 87 U/L (ref 30–99)
ALT SERPL-CCNC: 8 U/L (ref 2–50)
ANION GAP SERPL CALC-SCNC: 13 MMOL/L (ref 0–11.9)
APPEARANCE UR: ABNORMAL
AST SERPL-CCNC: 10 U/L (ref 12–45)
BACTERIA #/AREA URNS HPF: ABNORMAL /HPF
BASOPHILS # BLD AUTO: 0.6 % (ref 0–1.8)
BASOPHILS # BLD: 0.09 K/UL (ref 0–0.12)
BILIRUB SERPL-MCNC: 0.8 MG/DL (ref 0.1–1.5)
BILIRUB UR QL STRIP.AUTO: NEGATIVE
BUN SERPL-MCNC: 11 MG/DL (ref 8–22)
CALCIUM SERPL-MCNC: 10.3 MG/DL (ref 8.5–10.5)
CHLORIDE SERPL-SCNC: 99 MMOL/L (ref 96–112)
CO2 SERPL-SCNC: 21 MMOL/L (ref 20–33)
COLOR UR: YELLOW
CREAT SERPL-MCNC: 0.92 MG/DL (ref 0.5–1.4)
EOSINOPHIL # BLD AUTO: 0.07 K/UL (ref 0–0.51)
EOSINOPHIL NFR BLD: 0.5 % (ref 0–6.9)
EPI CELLS #/AREA URNS HPF: NEGATIVE /HPF
ERYTHROCYTE [DISTWIDTH] IN BLOOD BY AUTOMATED COUNT: 44.2 FL (ref 35.9–50)
GLOBULIN SER CALC-MCNC: 3.8 G/DL (ref 1.9–3.5)
GLUCOSE SERPL-MCNC: 105 MG/DL (ref 65–99)
GLUCOSE UR STRIP.AUTO-MCNC: NEGATIVE MG/DL
HCT VFR BLD AUTO: 43.4 % (ref 37–47)
HGB BLD-MCNC: 14.1 G/DL (ref 12–16)
HYALINE CASTS #/AREA URNS LPF: ABNORMAL /LPF
IMM GRANULOCYTES # BLD AUTO: 0.06 K/UL (ref 0–0.11)
IMM GRANULOCYTES NFR BLD AUTO: 0.4 % (ref 0–0.9)
KETONES UR STRIP.AUTO-MCNC: NEGATIVE MG/DL
LACTATE BLD-SCNC: 0.9 MMOL/L (ref 0.5–2)
LACTATE BLD-SCNC: 2.2 MMOL/L (ref 0.5–2)
LEUKOCYTE ESTERASE UR QL STRIP.AUTO: ABNORMAL
LYMPHOCYTES # BLD AUTO: 2.8 K/UL (ref 1–4.8)
LYMPHOCYTES NFR BLD: 18.6 % (ref 22–41)
MCH RBC QN AUTO: 28.5 PG (ref 27–33)
MCHC RBC AUTO-ENTMCNC: 32.5 G/DL (ref 33.6–35)
MCV RBC AUTO: 87.9 FL (ref 81.4–97.8)
MICRO URNS: ABNORMAL
MONOCYTES # BLD AUTO: 0.97 K/UL (ref 0–0.85)
MONOCYTES NFR BLD AUTO: 6.4 % (ref 0–13.4)
NEUTROPHILS # BLD AUTO: 11.08 K/UL (ref 2–7.15)
NEUTROPHILS NFR BLD: 73.5 % (ref 44–72)
NITRITE UR QL STRIP.AUTO: NEGATIVE
NRBC # BLD AUTO: 0 K/UL
NRBC BLD-RTO: 0 /100 WBC
PH UR STRIP.AUTO: 7 [PH]
PLATELET # BLD AUTO: 455 K/UL (ref 164–446)
PMV BLD AUTO: 10.4 FL (ref 9–12.9)
POTASSIUM SERPL-SCNC: 4 MMOL/L (ref 3.6–5.5)
PROT SERPL-MCNC: 8.4 G/DL (ref 6–8.2)
PROT UR QL STRIP: 30 MG/DL
RBC # BLD AUTO: 4.94 M/UL (ref 4.2–5.4)
RBC # URNS HPF: ABNORMAL /HPF
RBC UR QL AUTO: ABNORMAL
SODIUM SERPL-SCNC: 133 MMOL/L (ref 135–145)
SP GR UR STRIP.AUTO: 1.01
UROBILINOGEN UR STRIP.AUTO-MCNC: 0.2 MG/DL
WBC # BLD AUTO: 15.1 K/UL (ref 4.8–10.8)
WBC #/AREA URNS HPF: ABNORMAL /HPF

## 2018-06-10 PROCEDURE — 87186 SC STD MICRODIL/AGAR DIL: CPT

## 2018-06-10 PROCEDURE — 700105 HCHG RX REV CODE 258: Performed by: EMERGENCY MEDICINE

## 2018-06-10 PROCEDURE — A9270 NON-COVERED ITEM OR SERVICE: HCPCS | Performed by: EMERGENCY MEDICINE

## 2018-06-10 PROCEDURE — 87086 URINE CULTURE/COLONY COUNT: CPT

## 2018-06-10 PROCEDURE — 770006 HCHG ROOM/CARE - MED/SURG/GYN SEMI*

## 2018-06-10 PROCEDURE — 74177 CT ABD & PELVIS W/CONTRAST: CPT

## 2018-06-10 PROCEDURE — 76775 US EXAM ABDO BACK WALL LIM: CPT

## 2018-06-10 PROCEDURE — 80053 COMPREHEN METABOLIC PANEL: CPT

## 2018-06-10 PROCEDURE — 81001 URINALYSIS AUTO W/SCOPE: CPT

## 2018-06-10 PROCEDURE — 96375 TX/PRO/DX INJ NEW DRUG ADDON: CPT

## 2018-06-10 PROCEDURE — 71045 X-RAY EXAM CHEST 1 VIEW: CPT

## 2018-06-10 PROCEDURE — A9270 NON-COVERED ITEM OR SERVICE: HCPCS | Performed by: INTERNAL MEDICINE

## 2018-06-10 PROCEDURE — 87077 CULTURE AEROBIC IDENTIFY: CPT

## 2018-06-10 PROCEDURE — 700111 HCHG RX REV CODE 636 W/ 250 OVERRIDE (IP): Performed by: EMERGENCY MEDICINE

## 2018-06-10 PROCEDURE — 700102 HCHG RX REV CODE 250 W/ 637 OVERRIDE(OP): Performed by: INTERNAL MEDICINE

## 2018-06-10 PROCEDURE — 87040 BLOOD CULTURE FOR BACTERIA: CPT | Mod: 91

## 2018-06-10 PROCEDURE — 700117 HCHG RX CONTRAST REV CODE 255: Performed by: EMERGENCY MEDICINE

## 2018-06-10 PROCEDURE — 96365 THER/PROPH/DIAG IV INF INIT: CPT

## 2018-06-10 PROCEDURE — 83605 ASSAY OF LACTIC ACID: CPT | Mod: 91

## 2018-06-10 PROCEDURE — 96366 THER/PROPH/DIAG IV INF ADDON: CPT

## 2018-06-10 PROCEDURE — 700105 HCHG RX REV CODE 258: Performed by: INTERNAL MEDICINE

## 2018-06-10 PROCEDURE — 99223 1ST HOSP IP/OBS HIGH 75: CPT | Performed by: INTERNAL MEDICINE

## 2018-06-10 PROCEDURE — 700102 HCHG RX REV CODE 250 W/ 637 OVERRIDE(OP): Performed by: EMERGENCY MEDICINE

## 2018-06-10 PROCEDURE — 85025 COMPLETE CBC W/AUTO DIFF WBC: CPT

## 2018-06-10 PROCEDURE — 99285 EMERGENCY DEPT VISIT HI MDM: CPT

## 2018-06-10 RX ORDER — BISACODYL 10 MG
10 SUPPOSITORY, RECTAL RECTAL
Status: DISCONTINUED | OUTPATIENT
Start: 2018-06-10 | End: 2018-06-12 | Stop reason: HOSPADM

## 2018-06-10 RX ORDER — ONDANSETRON 2 MG/ML
4 INJECTION INTRAMUSCULAR; INTRAVENOUS EVERY 4 HOURS PRN
Status: DISCONTINUED | OUTPATIENT
Start: 2018-06-10 | End: 2018-06-12 | Stop reason: HOSPADM

## 2018-06-10 RX ORDER — ONDANSETRON 2 MG/ML
4 INJECTION INTRAMUSCULAR; INTRAVENOUS ONCE
Status: COMPLETED | OUTPATIENT
Start: 2018-06-10 | End: 2018-06-10

## 2018-06-10 RX ORDER — AMOXICILLIN 250 MG
2 CAPSULE ORAL 2 TIMES DAILY
Status: DISCONTINUED | OUTPATIENT
Start: 2018-06-10 | End: 2018-06-12 | Stop reason: HOSPADM

## 2018-06-10 RX ORDER — PROMETHAZINE HYDROCHLORIDE 25 MG/1
12.5-25 SUPPOSITORY RECTAL EVERY 4 HOURS PRN
Status: DISCONTINUED | OUTPATIENT
Start: 2018-06-10 | End: 2018-06-12 | Stop reason: HOSPADM

## 2018-06-10 RX ORDER — GUAIFENESIN 600 MG/1
600 TABLET, EXTENDED RELEASE ORAL EVERY 12 HOURS
Status: DISCONTINUED | OUTPATIENT
Start: 2018-06-10 | End: 2018-06-12 | Stop reason: HOSPADM

## 2018-06-10 RX ORDER — ACETAMINOPHEN 325 MG/1
650 TABLET ORAL EVERY 6 HOURS PRN
Status: DISCONTINUED | OUTPATIENT
Start: 2018-06-10 | End: 2018-06-12 | Stop reason: HOSPADM

## 2018-06-10 RX ORDER — ACETAMINOPHEN 325 MG/1
650 TABLET ORAL ONCE
Status: COMPLETED | OUTPATIENT
Start: 2018-06-10 | End: 2018-06-10

## 2018-06-10 RX ORDER — SODIUM CHLORIDE 9 MG/ML
1000 INJECTION, SOLUTION INTRAVENOUS
Status: COMPLETED | OUTPATIENT
Start: 2018-06-10 | End: 2018-06-10

## 2018-06-10 RX ORDER — PROMETHAZINE HYDROCHLORIDE 25 MG/1
12.5-25 TABLET ORAL EVERY 4 HOURS PRN
Status: DISCONTINUED | OUTPATIENT
Start: 2018-06-10 | End: 2018-06-12 | Stop reason: HOSPADM

## 2018-06-10 RX ORDER — SODIUM CHLORIDE 9 MG/ML
30 INJECTION, SOLUTION INTRAVENOUS
Status: DISCONTINUED | OUTPATIENT
Start: 2018-06-10 | End: 2018-06-12 | Stop reason: HOSPADM

## 2018-06-10 RX ORDER — SODIUM CHLORIDE 9 MG/ML
1000 INJECTION, SOLUTION INTRAVENOUS
Status: DISCONTINUED | OUTPATIENT
Start: 2018-06-10 | End: 2018-06-12 | Stop reason: HOSPADM

## 2018-06-10 RX ORDER — SULFAMETHOXAZOLE AND TRIMETHOPRIM 800; 160 MG/1; MG/1
1 TABLET ORAL 2 TIMES DAILY
Status: ON HOLD | COMMUNITY
Start: 2018-05-25 | End: 2018-06-12

## 2018-06-10 RX ORDER — CEFTRIAXONE 2 G/1
2 INJECTION, POWDER, FOR SOLUTION INTRAMUSCULAR; INTRAVENOUS ONCE
Status: COMPLETED | OUTPATIENT
Start: 2018-06-10 | End: 2018-06-10

## 2018-06-10 RX ORDER — ACETAMINOPHEN 500 MG
1000 TABLET ORAL 2 TIMES DAILY PRN
COMMUNITY
End: 2018-07-31

## 2018-06-10 RX ORDER — ESTRADIOL 0.05 MG/D
1 PATCH, EXTENDED RELEASE TRANSDERMAL
Status: DISCONTINUED | OUTPATIENT
Start: 2018-06-17 | End: 2018-06-11

## 2018-06-10 RX ORDER — POLYETHYLENE GLYCOL 3350 17 G/17G
1 POWDER, FOR SOLUTION ORAL
Status: DISCONTINUED | OUTPATIENT
Start: 2018-06-10 | End: 2018-06-12 | Stop reason: HOSPADM

## 2018-06-10 RX ORDER — SODIUM CHLORIDE 9 MG/ML
INJECTION, SOLUTION INTRAVENOUS CONTINUOUS
Status: DISCONTINUED | OUTPATIENT
Start: 2018-06-10 | End: 2018-06-12

## 2018-06-10 RX ORDER — ONDANSETRON 4 MG/1
4 TABLET, ORALLY DISINTEGRATING ORAL EVERY 4 HOURS PRN
Status: DISCONTINUED | OUTPATIENT
Start: 2018-06-10 | End: 2018-06-12 | Stop reason: HOSPADM

## 2018-06-10 RX ADMIN — SODIUM CHLORIDE: 9 INJECTION, SOLUTION INTRAVENOUS at 18:52

## 2018-06-10 RX ADMIN — SODIUM CHLORIDE 1000 ML: 9 INJECTION, SOLUTION INTRAVENOUS at 14:03

## 2018-06-10 RX ADMIN — ACETAMINOPHEN 650 MG: 325 TABLET, FILM COATED ORAL at 14:03

## 2018-06-10 RX ADMIN — CEFTRIAXONE SODIUM 2 G: 2 INJECTION, POWDER, FOR SOLUTION INTRAMUSCULAR; INTRAVENOUS at 14:03

## 2018-06-10 RX ADMIN — ONDANSETRON 4 MG: 2 INJECTION INTRAMUSCULAR; INTRAVENOUS at 14:03

## 2018-06-10 RX ADMIN — VANCOMYCIN HYDROCHLORIDE 2200 MG: 100 INJECTION, POWDER, LYOPHILIZED, FOR SOLUTION INTRAVENOUS at 16:08

## 2018-06-10 RX ADMIN — IOHEXOL 100 ML: 350 INJECTION, SOLUTION INTRAVENOUS at 15:30

## 2018-06-10 RX ADMIN — GUAIFENESIN 600 MG: 600 TABLET, EXTENDED RELEASE ORAL at 22:57

## 2018-06-10 ASSESSMENT — ENCOUNTER SYMPTOMS
BLURRED VISION: 0
FLANK PAIN: 1
COUGH: 0
FEVER: 1
SHORTNESS OF BREATH: 0
VOMITING: 1
NAUSEA: 1
DIARRHEA: 0
ABDOMINAL PAIN: 0
CHILLS: 0
DIZZINESS: 0
HEADACHES: 0

## 2018-06-10 ASSESSMENT — PAIN SCALES - GENERAL
PAINLEVEL_OUTOF10: 0
PAINLEVEL_OUTOF10: 7
PAINLEVEL_OUTOF10: 0
PAINLEVEL_OUTOF10: 3

## 2018-06-10 ASSESSMENT — LIFESTYLE VARIABLES
ALCOHOL_USE: NO
EVER_SMOKED: YES

## 2018-06-10 NOTE — H&P
" Hospital Medicine History and Physical    Date of Service  6/10/2018    Chief Complaint  Chief Complaint   Patient presents with   • Flank Pain   • N/V       History of Presenting Illness  48 y.o. female who presented 6/10/2018 with fever and right flank pain.    Patient has a complicated urological history. History of obstructive nephrolithiasis followed by Dr. Rangel. Has complicated right ureteral strictures. On 4/30 had ex lap for lysis of intra-abdominal adhesions and failed ballooning of strictures and stents. Had ileal conduit placed from bladder to renal pelvis.  Patient presented 5/21 with fevers and chills. She was discharged with Rocephin IM daily at Dr. Garrett office for urinary infection. Urine culture on 5/21 grew staph aureus, resistant to penicillin. Patient says Dr. Rangel switched her to bactrim DS 3 days ago.  Yesterday she developed fever and right flank pain with urinary frequency. She says her urine smells like ammonia. Associated with mild nausea and vomiting.  In the ED her WBC 15.1, lactate 2.2, UA with packed WBC. CT showed right pyelonephritis.    Primary Care Physician  CRISTOPHER Valdez.    Consultants  urology    Code Status  full    Review of Systems  Review of Systems   Constitutional: Positive for fever. Negative for chills.   Eyes: Negative for blurred vision.   Respiratory: Negative for cough and shortness of breath.    Cardiovascular: Negative for chest pain.   Gastrointestinal: Positive for nausea and vomiting. Negative for abdominal pain and diarrhea.   Genitourinary: Positive for flank pain, frequency and urgency. Negative for dysuria and hematuria.   Skin: Negative for rash.   Neurological: Negative for dizziness and headaches.        Past Medical History  Past Medical History:   Diagnosis Date   • Renal disorder 04/25/2018    \"Nephrostomy tube in place\"   • Breath shortness 04/25/2018    \"In the past, not a current problem\"   • Snoring 04/25/2018    Has not had a sleep " "study   • Heart burn 04/25/2018   • Pain 04/25/2018    \"Abdomen pain right side\"   • Pain 04/2018    from right nephrostomy tube   • Heart attack (HCC) 11/03/2017   • High cholesterol     stopped taking med   • Renal disorder     R kidney failure and scar tissue in the ureter   • Urinary incontinence        Surgical History  Past Surgical History:   Procedure Laterality Date   • EXPLORATORY LAPAROTOMY Right 4/30/2018    Procedure: EXPLORATORY LAPAROTOMY W/ILEAL URETER;  Surgeon: Kapil Rangel M.D.;  Location: SURGERY [facility];  Service: Urology   • LYSIS ADHESIONS GENERAL  4/30/2018    Procedure: LYSIS ADHESIONS GENERAL;  Surgeon: Kapil Rangel M.D.;  Location: SURGERY [facility];  Service: Urology   • CYSTOSCOPY STENT PLACEMENT Right 3/21/2018    Procedure: CYSTOSCOPY - STENT REMOVAL;  Surgeon: Terry Maldonado M.D.;  Location: SURGERY [facility];  Service: Urology   • URETEROSCOPY Right 3/21/2018    Procedure: URETEROSCOPY- DIAGNOSTIC;  Surgeon: Terry Maldonado M.D.;  Location: SURGERY [facility];  Service: Urology   • RETROGRADES Right 3/21/2018    Procedure: RETROGRADES;  Surgeon: Terry Maldonado M.D.;  Location: SURGERY [facility];  Service: Urology   • PYELOGRAM Right 3/21/2018    Procedure: PYELOGRAM;  Surgeon: Terry Maldonado M.D.;  Location: SURGERY [facility];  Service: Urology   • CYSTOSCOPY N/A 1/31/2018    Procedure: CYSTOSCOPY;  Surgeon: Terry Maldonado M.D.;  Location: SURGERY [facility];  Service: Urology   • RETROGRADES Right 1/31/2018    Procedure: RETROGRADES;  Surgeon: Terry Maldonado M.D.;  Location: SURGERY [facility];  Service: Urology   • PYELOGRAM Right 1/31/2018    Procedure: PYELOGRAM;  Surgeon: Terry Maldonado M.D.;  Location: SURGERY [facility];  Service: Urology   • URETEROSCOPY Right 1/31/2018    Procedure: URETEROSCOPY, INCISIONAL OR DILATION OF STRICTURE  ;  Surgeon: Terry Maldonado M.D.;  Location: SURGERY [facility];  " Service: Urology   • LASERTRIPSY Right 1/31/2018    Procedure: LASERTRIPSY;  Surgeon: Terry Maldonado M.D.;  Location: SURGERY [facility];  Service: Urology   • STENT PLACEMENT Right 1/31/2018    Procedure: STENT PLACEMENT;  Surgeon: Terry Maldonado M.D.;  Location: SURGERY [facility];  Service: Urology   • URETEROSCOPY Right 11/7/2017    Procedure: URETEROSCOPY;  Surgeon: Kiet Eid M.D.;  Location: SURGERY [facility];  Service: Urology   • LASERTRIPSY Right 11/7/2017    Procedure: LASERTRIPSY- LITHO;  Surgeon: Kiet Eid M.D.;  Location: [facility];  Service: Urology   • URETHRAL DILATATION Right 11/7/2017    Procedure: URETHRAL DILATATION;  Surgeon: Kiet Eid M.D.;  Location: [facility];  Service: Urology   • CYSTOSCOPY STENT PLACEMENT Right 11/7/2017    Procedure: CYSTOSCOPY STENT PLACEMENT;  Surgeon: Kiet Eid M.D.;  Location: [facility];  Service: Urology   • CYSTOSCOPY Right 9/17/2017    Procedure: CYSTOSCOPY;  Surgeon: Kiet Eid M.D.;  Location: [facility];  Service: Urology   • URETEROSCOPY Right 9/17/2017    Procedure: URETEROSCOPY;  Surgeon: Kiet Eid M.D.;  Location: [facility];  Service: Urology   • LASERTRIPSY Right 9/17/2017    Procedure: LASERTRIPSY;  Surgeon: Kiet Eid M.D.;  Location: [facility];  Service: Urology   • STENT PLACEMENT Right 9/17/2017    Procedure: STENT PLACEMENT;  Surgeon: Kiet Eid M.D.;  Location: SURGERY [facility];  Service: Urology   • ABDOMINAL HYSTERECTOMY TOTAL  2009   • OTHER ABDOMINAL SURGERY  2009    abdominal tumor    • OTHER ABDOMINAL SURGERY  2009   • ABDOMINAL EXPLORATION     • OTHER      eye surgeries X6   • OTHER      ear surgeries X5   • PRIMARY C SECTION      1989/1991/1997/1999   • TONSILLECTOMY         Medications  No current facility-administered medications on file prior to encounter.       Current Outpatient Prescriptions on File Prior to Encounter   Medication Sig Dispense Refill   • guaiFENesin LA (MUCINEX) 600 MG TABLET SR 12 HR Take 1 Tab by mouth every 12 hours. 28 Tab 1   • estradiol (MINIVELLE) 0.05 MG/24HR PATCH BIWEEKLY Apply 1 Patch to skin as directed every 7 days.         Family History  Family History   Problem Relation Age of Onset   • Cancer Mother    • Cancer Father    • Heart Disease Brother    • Diabetes Maternal Grandmother    • Cancer Maternal Grandfather    • Cancer Paternal Grandmother    • Cancer Paternal Grandfather        Social History  Social History   Substance Use Topics   • Smoking status: Former Smoker     Packs/day: 0.25     Years: 10.00     Types: Cigarettes     Quit date: 2013   • Smokeless tobacco: Never Used   • Alcohol use No      Comment: 1/month       Allergies  Allergies   Allergen Reactions   • Levaquin Hives, Shortness of Breath and Itching     Rxn - 01   • Morphine Itching        Physical Exam  Laboratory   Hemodynamics  Temp (24hrs), Av.2 °C (100.7 °F), Min:37.8 °C (100 °F), Max:38.7 °C (101.6 °F)   Temperature: 37.8 °C (100 °F)  Pulse  Av.9  Min: 87  Max: 118 Heart Rate (Monitored): 90  Blood Pressure: 106/70, NIBP: 109/60      Respiratory      Respiration: 18, Pulse Oximetry: 97 %             Physical Exam   Constitutional: She is oriented to person, place, and time. She appears well-developed and well-nourished. She is cooperative.   HENT:   Head: Normocephalic and atraumatic.   Eyes: Conjunctivae and EOM are normal.   Cardiovascular: Normal rate, regular rhythm and normal heart sounds.    Pulmonary/Chest: Effort normal. She has no wheezes.   Abdominal: Soft. There is no rebound and no guarding.   Healing midline surgical wound  Mild suprapubic tenderness   Musculoskeletal: She exhibits no edema.   Right CVA tenderness   Neurological: She is alert and oriented to person, place, and time.   Skin: Skin is warm and dry.   Nursing note  and vitals reviewed.      Recent Labs      06/10/18   1318   WBC  15.1*   RBC  4.94   HEMOGLOBIN  14.1   HEMATOCRIT  43.4   MCV  87.9   MCH  28.5   MCHC  32.5*   RDW  44.2   PLATELETCT  455*   MPV  10.4     Recent Labs      06/10/18   1318   SODIUM  133*   POTASSIUM  4.0   CHLORIDE  99   CO2  21   GLUCOSE  105*   BUN  11   CREATININE  0.92   CALCIUM  10.3     Recent Labs      06/10/18   1318   ALTSGPT  8   ASTSGOT  10*   ALKPHOSPHAT  87   TBILIRUBIN  0.8   GLUCOSE  105*                 Lab Results   Component Value Date    TROPONINI 0.55 (H) 11/04/2017     Urinalysis:    Lab Results  Component Value Date/Time   SPECGRAVITY 1.014 06/10/2018 1450   GLUCOSEUR Negative 06/10/2018 1450   KETONES Negative 06/10/2018 1450   NITRITE Negative 06/10/2018 1450   WBCURINE Packed (A) 06/10/2018 1450   RBCURINE 5-10 (A) 06/10/2018 1450   BACTERIA Few (A) 06/10/2018 1450   EPITHELCELL Negative 06/10/2018 1450        Imaging  CT-ABDOMEN-PELVIS WITH   Final Result         1. Urothelial wall thickening, enhancement and surrounding stranding involving the right ureter as well as ill-defined hypoattenuation in the right kidney, in keeping with right pyelitis and pyelonephritis.      US-RENAL   Final Result      Mild to moderate right hydronephrosis.         DX-CHEST-PORTABLE (1 VIEW)   Final Result         1. No acute cardiopulmonary abnormalities are identified.           Assessment/Plan     I anticipate this patient will require at least two midnights for appropriate medical management, necessitating inpatient admission.    * Pyelonephritis- (present on admission)   Assessment & Plan    History of staph aureus urinary infections  UA with packed WBCs and CT shows right pyelonephritis  Will treat with ceftriaxone and vancomycin pending urine culture        Sepsis(995.91)- (present on admission)   Assessment & Plan    This is sepsis (without associated acute organ dysfunction).   Lactate has normalized with IV fluid resuscitation  Blood  cultures collected  Likely sources urine, started on ceftriaxone and vancomycin. Follow urine culture        Ureteral stricture, right- (present on admission)   Assessment & Plan    Status post ileal conduit  Urology consulted, appreciate recommendations        Obesity (BMI 30-39.9)- (present on admission)   Assessment & Plan    Weight loss counseling            VTE prophylaxis: lovenox.

## 2018-06-10 NOTE — CONSULTS
DATE OF SERVICE:  06/10/2018    REQUESTING PHYSICIAN:  Dr. Espinoza from the emergency room.    CONSULTING PHYSICIAN:  Brijesh Hung MD    REASON FOR CONSULTATION:  Hydronephrosis and UTI.    HISTORY OF PRESENT ILLNESS:  The patient is a 48-year-old female patient of   Dr. Kapil Rangel with a history of right ureteral strictures and recent right   ileal ureter creation on 2018 by Dr. Rangel and Dr. Regalado.  The patient   had a stent and a nephrostomy tube in place until recently.  She had a   nephrostomy tube removed and had an infection in that kidney.  Per the patient   report, she had some antibiotics and then had her ureteral stent removed   approximately 2 weeks ago.  She is doing well until a day ago and started to   have some pain on the right side and then started to have some fevers and   chills.  She denies any chest pain, shortness of breath, nausea, vomiting, any   gross hematuria or dysuria.  She denies any constipation or diarrhea either.    PAST MEDICAL HISTORY:  Significant for right ureteral strictures, also GERD,   high cholesterol, incontinence.    PAST SURGICAL HISTORY:  Multiple ureteroscopies and stent placement and then   most recently a right ileal ureter creation with extensive lysis of adhesions   1 month ago, also had a  and hysterectomy in .  Also, a   tonsillectomy.    ALLERGIES:  TO LEVAQUIN AND MORPHINE.    MEDICATIONS:  On admission are estradiol patch and guaifenesin.    REVIEW OF SYSTEMS:  See HPI, otherwise complete review of systems is negative.    PHYSICAL EXAMINATION:  GENERAL:  The patient is alert, in no acute distress.  Breathing is   nonlabored.  Pulse is regular.  ABDOMEN:  Soft, mildly tender in the right flank.  No tenderness on the left   side.  Patient moves all extremities normally.  NEUROLOGICAL:  Grossly intact.    LABORATORY DATA:  Show white blood cell count of 15.1, hemoglobin of 14.1,   hematocrit 43%, platelets 455.  Sodium 133, potassium 4.0,  chloride 99,   bicarbonate 21, BUN 11, creatinine 0.92 with a glucose of 105.  Urinalysis   shows nitrite negative, large leukocyte esterase, 5-10 red cells per   high-powered field and many white blood cells per high-powered field.  CT of   the abdomen and pelvis with IV contrast shows thickening of the right ileal   ureter and evidence of pyelonephritis in the right kidney in comparing to a   prior CT from a month ago.  There is decreased in hydronephrosis from that   time and this may represent the changes from ileal ureter.    ASSESSMENT:  A 48-year-old female, 5 weeks out from a right ileal ureter   creation for ureteral stricture disease, now with pyelonephritis, and   inflammation or infection of the ileal ureter on the right side without overt   signs of obstruction.    PLAN:  Will be to have the patient admitted to the hospitalist service for IV   antibiotics and close observation.  If the patient does not defervesce, then   would consider further studies and/or nephrostomy tube placement on the right   side, but for now, we will treat it as a pyelonephritis with IV antibiotics   and supportive care.       ____________________________________     MD ADELINA Rivera / NTS    DD:  06/10/2018 16:20:11  DT:  06/10/2018 16:51:46    D#:  [id number]  Job#:  [id number]

## 2018-06-10 NOTE — ED NOTES
Med Rec complete per Pt at bedside and Courtney @ [phone]  Allergies Reviewed    Pt reports having ROCEPHIN INJ 6/22-6/24 at DR office    Pt finished a 5 day course of BACTRIM DS on 5/29

## 2018-06-10 NOTE — ED PROVIDER NOTES
"ED Provider Note      CHIEF COMPLAINT  Chief Complaint   Patient presents with   • Flank Pain   • N/V       HPI  Aurora Tan is a 48 y.o. female who presents for evaluation of right flank pain and fever, she has a very complicated medical history including many reoccurring bouts of obstructive uropathy and secondary pyelonephritis secondary to a right proximal ureteral stricture which was fixed about 6 weeks ago with an ileal ureter interposition procedure, she had her nephrostomy tube and Seth catheter removed about 2 weeks ago and had been doing fine until last night when she developed a fever and pain.  The patient is very familiar with these symptoms and is very frustrated about the recurrence of them.  Her current urologist is Dr. Kapil Rangel, he is a surgeon who performed the procedure.  She said she has been vomiting frequently since last night.  No diarrhea, no chest pain or shortness of breath and no other complaints.    REVIEW OF SYSTEMS  Negative for rash, chest pain, dyspnea, headache, focal weakness, focal numbness, focal tingling. All other systems are negative.     PAST MEDICAL HISTORY  Past Medical History:   Diagnosis Date   • Breath shortness 04/25/2018    \"In the past, not a current problem\"   • Heart attack (HCC) 11/03/2017   • Heart burn 04/25/2018   • High cholesterol     stopped taking med   • Pain 04/2018    from right nephrostomy tube   • Pain 04/25/2018    \"Abdomen pain right side\"   • Renal disorder     R kidney failure and scar tissue in the ureter   • Renal disorder 04/25/2018    \"Nephrostomy tube in place\"   • Snoring 04/25/2018    Has not had a sleep study   • Urinary incontinence        FAMILY HISTORY  Family History   Problem Relation Age of Onset   • Cancer Mother    • Cancer Father    • Heart Disease Brother    • Diabetes Maternal Grandmother    • Cancer Maternal Grandfather    • Cancer Paternal Grandmother    • Cancer Paternal Grandfather        SOCIAL HISTORY  Social " History   Substance Use Topics   • Smoking status: Former Smoker     Packs/day: 0.25     Years: 10.00     Types: Cigarettes     Quit date: 4/13/2013   • Smokeless tobacco: Never Used   • Alcohol use No      Comment: 1/month       SURGICAL HISTORY  Past Surgical History:   Procedure Laterality Date   • EXPLORATORY LAPAROTOMY Right 4/30/2018    Procedure: EXPLORATORY LAPAROTOMY W/ILEAL URETER;  Surgeon: Kapil Rangel M.D.;  Location: SURGERY [facility];  Service: Urology   • LYSIS ADHESIONS GENERAL  4/30/2018    Procedure: LYSIS ADHESIONS GENERAL;  Surgeon: Kapil Rangel M.D.;  Location: SURGERY [facility];  Service: Urology   • CYSTOSCOPY STENT PLACEMENT Right 3/21/2018    Procedure: CYSTOSCOPY - STENT REMOVAL;  Surgeon: Terry Maldonado M.D.;  Location: SURGERY [facility];  Service: Urology   • URETEROSCOPY Right 3/21/2018    Procedure: URETEROSCOPY- DIAGNOSTIC;  Surgeon: Terry Maldonado M.D.;  Location: SURGERY [facility];  Service: Urology   • RETROGRADES Right 3/21/2018    Procedure: RETROGRADES;  Surgeon: Terry Maldonado M.D.;  Location: SURGERY [facility];  Service: Urology   • PYELOGRAM Right 3/21/2018    Procedure: PYELOGRAM;  Surgeon: Terry Maldonado M.D.;  Location: SURGERY [facility];  Service: Urology   • CYSTOSCOPY N/A 1/31/2018    Procedure: CYSTOSCOPY;  Surgeon: Terry Maldonado M.D.;  Location: SURGERY [facility];  Service: Urology   • RETROGRADES Right 1/31/2018    Procedure: RETROGRADES;  Surgeon: Terry Maldonado M.D.;  Location: SURGERY [facility];  Service: Urology   • PYELOGRAM Right 1/31/2018    Procedure: PYELOGRAM;  Surgeon: Terry Maldonado M.D.;  Location: SURGERY [facility];  Service: Urology   • URETEROSCOPY Right 1/31/2018    Procedure: URETEROSCOPY, INCISIONAL OR DILATION OF STRICTURE  ;  Surgeon: Terry Maldonado M.D.;  Location: SURGERY [facility];  Service: Urology   • LASERTRIPSY Right 1/31/2018    Procedure: LASERTRIPSY;   Surgeon: Terry Maldonado M.D.;  Location: SURGERY [facility];  Service: Urology   • STENT PLACEMENT Right 1/31/2018    Procedure: STENT PLACEMENT;  Surgeon: Terry Maldonado M.D.;  Location: SURGERY [facility];  Service: Urology   • URETEROSCOPY Right 11/7/2017    Procedure: URETEROSCOPY;  Surgeon: Kiet Eid M.D.;  Location: SURGERY [facility];  Service: Urology   • LASERTRIPSY Right 11/7/2017    Procedure: LASERTRIPSY- LITHO;  Surgeon: iKet Eid M.D.;  Location: SURGERY [facility];  Service: Urology   • URETHRAL DILATATION Right 11/7/2017    Procedure: URETHRAL DILATATION;  Surgeon: Kiet Eid M.D.;  Location: SURGERY [facility];  Service: Urology   • CYSTOSCOPY STENT PLACEMENT Right 11/7/2017    Procedure: CYSTOSCOPY STENT PLACEMENT;  Surgeon: Kiet Eid M.D.;  Location: SURGERY [facility];  Service: Urology   • CYSTOSCOPY Right 9/17/2017    Procedure: CYSTOSCOPY;  Surgeon: Kiet Eid M.D.;  Location: SURGERY [facility];  Service: Urology   • URETEROSCOPY Right 9/17/2017    Procedure: URETEROSCOPY;  Surgeon: Kiet Eid M.D.;  Location: SURGERY [facility];  Service: Urology   • LASERTRIPSY Right 9/17/2017    Procedure: LASERTRIPSY;  Surgeon: Kiet Eid M.D.;  Location: SURGERY [facility];  Service: Urology   • STENT PLACEMENT Right 9/17/2017    Procedure: STENT PLACEMENT;  Surgeon: Kiet Eid M.D.;  Location: SURGERY [facility];  Service: Urology   • ABDOMINAL HYSTERECTOMY TOTAL  2009   • OTHER ABDOMINAL SURGERY  2009    abdominal tumor    • OTHER ABDOMINAL SURGERY  2009   • ABDOMINAL EXPLORATION     • OTHER      eye surgeries X6   • OTHER      ear surgeries X5   • PRIMARY C SECTION      1989/1991/1997/1999   • TONSILLECTOMY         CURRENT MEDICATIONS  I personally reviewed the medication list in the charting documentation.     ALLERGIES  Allergies   Allergen Reactions   • Levaquin Hives, Shortness  "of Breath and Itching     Rxn - 9/11/01   • Morphine Itching       MEDICAL RECORD  I have reviewed patient's medical record and pertinent results are listed above.      PHYSICAL EXAM  VITAL SIGNS: /69   Pulse (!) 108   Temp (!) 38.7 °C (101.6 °F)   Resp 18   Ht 1.702 m (5' 7\")   Wt 87.7 kg (193 lb 5.5 oz)   LMP  (LMP Unknown)   SpO2 98%   BMI 30.28 kg/m²    Constitutional: Ill-appearing  HENT: Mucus membranes dry  Eyes: No scleral icterus. Normal conjunctiva   Neck: Supple, comfortable, nonpainful range of motion.   Cardiovascular: Regular heart rate and rhythm.   Thorax & Lungs: Chest is nontender.  Lungs are clear to auscultation with good air movement bilaterally.  No wheeze, rhonchi, nor rales.   Abdomen: Soft, right mid abdominal tenderness, no rebound, no guarding, midline surgical scar looks good, right-sided nephrostomy site in the low back also looks good without significant tenderness  Skin: Warm, dry. No rash appreciated  Extremities/Musculoskeletal: No sign of trauma. No asymmetric calf tenderness, erythema or edema. Normal range of motion   Neurologic: Alert & oriented. No focal deficits observed.   Psychiatric: Normal affect appropriate for the clinical situation.    DIAGNOSTIC STUDIES / PROCEDURES    LABS  Results for orders placed or performed during the hospital encounter of 06/10/18   Lactic acid (lactate)   Result Value Ref Range    Lactic Acid 2.2 (H) 0.5 - 2.0 mmol/L   Lactic acid (lactate)   Result Value Ref Range    Lactic Acid 0.9 0.5 - 2.0 mmol/L   CBC WITH DIFFERENTIAL   Result Value Ref Range    WBC 15.1 (H) 4.8 - 10.8 K/uL    RBC 4.94 4.20 - 5.40 M/uL    Hemoglobin 14.1 12.0 - 16.0 g/dL    Hematocrit 43.4 37.0 - 47.0 %    MCV 87.9 81.4 - 97.8 fL    MCH 28.5 27.0 - 33.0 pg    MCHC 32.5 (L) 33.6 - 35.0 g/dL    RDW 44.2 35.9 - 50.0 fL    Platelet Count 455 (H) 164 - 446 K/uL    MPV 10.4 9.0 - 12.9 fL    Neutrophils-Polys 73.50 (H) 44.00 - 72.00 %    Lymphocytes 18.60 (L) 22.00 " - 41.00 %    Monocytes 6.40 0.00 - 13.40 %    Eosinophils 0.50 0.00 - 6.90 %    Basophils 0.60 0.00 - 1.80 %    Immature Granulocytes 0.40 0.00 - 0.90 %    Nucleated RBC 0.00 /100 WBC    Neutrophils (Absolute) 11.08 (H) 2.00 - 7.15 K/uL    Lymphs (Absolute) 2.80 1.00 - 4.80 K/uL    Monos (Absolute) 0.97 (H) 0.00 - 0.85 K/uL    Eos (Absolute) 0.07 0.00 - 0.51 K/uL    Baso (Absolute) 0.09 0.00 - 0.12 K/uL    Immature Granulocytes (abs) 0.06 0.00 - 0.11 K/uL    NRBC (Absolute) 0.00 K/uL   COMP METABOLIC PANEL   Result Value Ref Range    Sodium 133 (L) 135 - 145 mmol/L    Potassium 4.0 3.6 - 5.5 mmol/L    Chloride 99 96 - 112 mmol/L    Co2 21 20 - 33 mmol/L    Anion Gap 13.0 (H) 0.0 - 11.9    Glucose 105 (H) 65 - 99 mg/dL    Bun 11 8 - 22 mg/dL    Creatinine 0.92 0.50 - 1.40 mg/dL    Calcium 10.3 8.5 - 10.5 mg/dL    AST(SGOT) 10 (L) 12 - 45 U/L    ALT(SGPT) 8 2 - 50 U/L    Alkaline Phosphatase 87 30 - 99 U/L    Total Bilirubin 0.8 0.1 - 1.5 mg/dL    Albumin 4.6 3.2 - 4.9 g/dL    Total Protein 8.4 (H) 6.0 - 8.2 g/dL    Globulin 3.8 (H) 1.9 - 3.5 g/dL    A-G Ratio 1.2 g/dL   URINALYSIS   Result Value Ref Range    Color Yellow     Character Cloudy (A)     Specific Gravity 1.014 <1.035    Ph 7.0 5.0 - 8.0    Glucose Negative Negative mg/dL    Ketones Negative Negative mg/dL    Protein 30 (A) Negative mg/dL    Bilirubin Negative Negative    Urobilinogen, Urine 0.2 Negative    Nitrite Negative Negative    Leukocyte Esterase Large (A) Negative    Occult Blood Small (A) Negative    Micro Urine Req Microscopic    ESTIMATED GFR   Result Value Ref Range    GFR If African American >60 >60 mL/min/1.73 m 2    GFR If Non African American >60 >60 mL/min/1.73 m 2   URINE MICROSCOPIC (W/UA)   Result Value Ref Range    WBC Packed (A) /hpf    RBC 5-10 (A) /hpf    Bacteria Few (A) None /hpf    Epithelial Cells Negative /hpf    Hyaline Cast 0-2 /lpf        RADIOLOGY  CT-ABDOMEN-PELVIS WITH   Final Result         1. Urothelial wall  thickening, enhancement and surrounding stranding involving the right ureter as well as ill-defined hypoattenuation in the right kidney, in keeping with right pyelitis and pyelonephritis.      US-RENAL   Final Result      Mild to moderate right hydronephrosis.         DX-CHEST-PORTABLE (1 VIEW)   Final Result         1. No acute cardiopulmonary abnormalities are identified.            COURSE & MEDICAL DECISION MAKING  I have reviewed any medical record information, laboratory studies and radiographic results as noted above.  Differential diagnoses includes: Pyelonephritis, obstruction, postoperative complication    Encounter Summary: This is a 48 y.o. female with a history concerning for pyelonephritis with a complicated past medical history including reoccurring pyelonephritis from obstructive uropathy, recently underwent a ileal ureter interposition procedure, became febrile last night with vomiting, she is ill-appearing on exam, she is tachycardic, febrile, WBC is elevated, will obtain an ultrasound of her kidney as she has had countless CT scans, we will treat her presumptively with ceftriaxone, will obtain the rest of the septic workup and consult Dr. Rangel, her own urologist ------ I discussed case with Dr. Hung, on-call urologist recommended a CT scan, this was performed and ruled out any sort of postoperative abscess or obstructive uropathy, the patient is critically ill with pyelonephritis, antibiotic treatment with ceftriaxone was expanded upon with vancomycin at the recommendation of the clinical pharmacist, patient admitted to the hospital in guarded condition    CRITICAL CARE  The very real possibilty of a deterioration of this patient's condition required the highest level of my preparedness for sudden, emergent intervention.  I provided critical care services, which included medication orders, frequent reevaluations of the patient's condition and response to treatment, ordering and reviewing test  results, and discussing the case with various consultants.  The critical care time associated with the care of the patient was 45 minutes. Review chart for interventions. This time is exclusive of any other billable procedures.         DISPOSITION: Admitted in guarded condition      FINAL IMPRESSION  1. Sepsis, due to unspecified organism (HCC)    2. Pyelonephritis           This dictation was created using voice recognition software. The accuracy of the dictation is limited to the abilities of the software. I expect there may be some errors of grammar and possibly content. The nursing notes were reviewed and certain aspects of this information were incorporated into this note.    Electronically signed by: Junaid Espinoza, 6/10/2018 1:59 PM

## 2018-06-10 NOTE — ED TRIAGE NOTES
Pt c/o right flank pain. Pt has extensive hx regarding kidney and ureter. Pt surgery in April for ureter replacement. Pt had stent removed approx 2 weeks ago. Pt now having increased right pain and frequent urination. Pt also with fevers.

## 2018-06-10 NOTE — ED NOTES
Patient ambulated to room 18, c/o right flank pain,  Hx neprostomy removal with stent placement.  Family at bedside, sepsis protocols in placed, placed on monitors, MD to evaluate.

## 2018-06-10 NOTE — ED NOTES
"Patient ambulated to bathroom at this time, unable to obtain urinalysis, states \"will try again in 5 minutes\".  at bedside for draw of 2nd set of blood cultures.  "

## 2018-06-11 PROBLEM — N39.0 SEPSIS SECONDARY TO UTI (HCC): Status: ACTIVE | Noted: 2017-09-17

## 2018-06-11 LAB
ANION GAP SERPL CALC-SCNC: 10 MMOL/L (ref 0–11.9)
BASOPHILS # BLD AUTO: 0.6 % (ref 0–1.8)
BASOPHILS # BLD: 0.06 K/UL (ref 0–0.12)
BUN SERPL-MCNC: 8 MG/DL (ref 8–22)
CALCIUM SERPL-MCNC: 8.7 MG/DL (ref 8.5–10.5)
CHLORIDE SERPL-SCNC: 107 MMOL/L (ref 96–112)
CO2 SERPL-SCNC: 20 MMOL/L (ref 20–33)
CREAT SERPL-MCNC: 0.89 MG/DL (ref 0.5–1.4)
EOSINOPHIL # BLD AUTO: 0.1 K/UL (ref 0–0.51)
EOSINOPHIL NFR BLD: 1 % (ref 0–6.9)
ERYTHROCYTE [DISTWIDTH] IN BLOOD BY AUTOMATED COUNT: 45 FL (ref 35.9–50)
GLUCOSE SERPL-MCNC: 100 MG/DL (ref 65–99)
HCT VFR BLD AUTO: 35.4 % (ref 37–47)
HGB BLD-MCNC: 11.3 G/DL (ref 12–16)
IMM GRANULOCYTES # BLD AUTO: 0.05 K/UL (ref 0–0.11)
IMM GRANULOCYTES NFR BLD AUTO: 0.5 % (ref 0–0.9)
LYMPHOCYTES # BLD AUTO: 2.2 K/UL (ref 1–4.8)
LYMPHOCYTES NFR BLD: 22.6 % (ref 22–41)
MCH RBC QN AUTO: 28.5 PG (ref 27–33)
MCHC RBC AUTO-ENTMCNC: 31.9 G/DL (ref 33.6–35)
MCV RBC AUTO: 89.2 FL (ref 81.4–97.8)
MONOCYTES # BLD AUTO: 1.02 K/UL (ref 0–0.85)
MONOCYTES NFR BLD AUTO: 10.5 % (ref 0–13.4)
NEUTROPHILS # BLD AUTO: 6.29 K/UL (ref 2–7.15)
NEUTROPHILS NFR BLD: 64.8 % (ref 44–72)
NRBC # BLD AUTO: 0 K/UL
NRBC BLD-RTO: 0 /100 WBC
PLATELET # BLD AUTO: 295 K/UL (ref 164–446)
PMV BLD AUTO: 10.6 FL (ref 9–12.9)
POTASSIUM SERPL-SCNC: 3.8 MMOL/L (ref 3.6–5.5)
RBC # BLD AUTO: 3.97 M/UL (ref 4.2–5.4)
SODIUM SERPL-SCNC: 137 MMOL/L (ref 135–145)
WBC # BLD AUTO: 9.7 K/UL (ref 4.8–10.8)

## 2018-06-11 PROCEDURE — 85025 COMPLETE CBC W/AUTO DIFF WBC: CPT

## 2018-06-11 PROCEDURE — 700111 HCHG RX REV CODE 636 W/ 250 OVERRIDE (IP): Performed by: INTERNAL MEDICINE

## 2018-06-11 PROCEDURE — 770006 HCHG ROOM/CARE - MED/SURG/GYN SEMI*

## 2018-06-11 PROCEDURE — 99233 SBSQ HOSP IP/OBS HIGH 50: CPT | Performed by: INTERNAL MEDICINE

## 2018-06-11 PROCEDURE — 80048 BASIC METABOLIC PNL TOTAL CA: CPT

## 2018-06-11 PROCEDURE — 700102 HCHG RX REV CODE 250 W/ 637 OVERRIDE(OP): Performed by: INTERNAL MEDICINE

## 2018-06-11 PROCEDURE — 700105 HCHG RX REV CODE 258: Performed by: INTERNAL MEDICINE

## 2018-06-11 PROCEDURE — 36415 COLL VENOUS BLD VENIPUNCTURE: CPT

## 2018-06-11 PROCEDURE — A9270 NON-COVERED ITEM OR SERVICE: HCPCS | Performed by: INTERNAL MEDICINE

## 2018-06-11 RX ORDER — CEFAZOLIN SODIUM 2 G/100ML
2 INJECTION, SOLUTION INTRAVENOUS EVERY 8 HOURS
Status: DISCONTINUED | OUTPATIENT
Start: 2018-06-11 | End: 2018-06-12

## 2018-06-11 RX ADMIN — CEFTRIAXONE 2 G: 2 INJECTION, POWDER, FOR SOLUTION INTRAMUSCULAR; INTRAVENOUS at 08:52

## 2018-06-11 RX ADMIN — GUAIFENESIN 600 MG: 600 TABLET, EXTENDED RELEASE ORAL at 08:52

## 2018-06-11 RX ADMIN — VANCOMYCIN HYDROCHLORIDE 1300 MG: 100 INJECTION, POWDER, LYOPHILIZED, FOR SOLUTION INTRAVENOUS at 04:37

## 2018-06-11 RX ADMIN — CEFAZOLIN SODIUM 2 G: 2 INJECTION, SOLUTION INTRAVENOUS at 21:37

## 2018-06-11 RX ADMIN — ACETAMINOPHEN 650 MG: 325 TABLET, FILM COATED ORAL at 02:36

## 2018-06-11 RX ADMIN — GUAIFENESIN 600 MG: 600 TABLET, EXTENDED RELEASE ORAL at 21:37

## 2018-06-11 ASSESSMENT — ENCOUNTER SYMPTOMS
EYE REDNESS: 0
HEADACHES: 0
SEIZURES: 0
BLOOD IN STOOL: 0
SHORTNESS OF BREATH: 0
MYALGIAS: 0
WHEEZING: 0
DIZZINESS: 0
FEVER: 1
FOCAL WEAKNESS: 0
PALPITATIONS: 0
COUGH: 0
DIARRHEA: 0
INSOMNIA: 0
CHILLS: 0
FLANK PAIN: 1
ABDOMINAL PAIN: 0
FALLS: 0
NAUSEA: 1
TREMORS: 0
VOMITING: 1
NERVOUS/ANXIOUS: 0
EYE PAIN: 0
HEMOPTYSIS: 0
CONSTIPATION: 0
WEAKNESS: 1
LOSS OF CONSCIOUSNESS: 0

## 2018-06-11 ASSESSMENT — PATIENT HEALTH QUESTIONNAIRE - PHQ9
SUM OF ALL RESPONSES TO PHQ9 QUESTIONS 1 AND 2: 0
1. LITTLE INTEREST OR PLEASURE IN DOING THINGS: NOT AT ALL
2. FEELING DOWN, DEPRESSED, IRRITABLE, OR HOPELESS: NOT AT ALL

## 2018-06-11 ASSESSMENT — PAIN SCALES - GENERAL
PAINLEVEL_OUTOF10: 0
PAINLEVEL_OUTOF10: 0

## 2018-06-11 NOTE — PROGRESS NOTES
Patient refusing bed alarm. A&OX4. Able to ambulate self with steady gait. Education provided. Verbalized understanding. Calls appropriately. Charge nurse notified.

## 2018-06-11 NOTE — ASSESSMENT & PLAN NOTE
History of staph aureus urinary infections  UA with packed WBCs and CT shows right pyelonephritis  Will treat with ceftriaxone and vancomycin pending urine culture  Improved.  Urine culture came back S. Aureus; no mention that it is MRSA at this time; if MSSA we plan switch Abx to Ancef  Need to be 24hours afebrile as well as CnS of urine culture before deescalation and considering discharge.  If the patient does not defervesce, Urology to consider further studies and/or nephrostomy tube placement on the right   side

## 2018-06-11 NOTE — PROGRESS NOTES
"Urology Progress Note      S: fever overnight. Has HA. Wants to go home. Labs stable. Right flank pain improving.    O:   Blood pressure 104/62, pulse 75, temperature 36.6 °C (97.8 °F), resp. rate 16, height 1.702 m (5' 7\"), weight 87.5 kg (192 lb 14.4 oz), last menstrual period 09/01/2009, SpO2 97 %, not currently breastfeeding.  Recent Labs      06/10/18   1318  06/11/18   0537   SODIUM  133*  137   POTASSIUM  4.0  3.8   CHLORIDE  99  107   CO2  21  20   GLUCOSE  105*  100*   BUN  11  8   CREATININE  0.92  0.89   CALCIUM  10.3  8.7     Recent Labs      06/10/18   1318  06/11/18   0537   WBC  15.1*  9.7   RBC  4.94  3.97*   HEMOGLOBIN  14.1  11.3*   HEMATOCRIT  43.4  35.4*   MCV  87.9  89.2   MCH  28.5  28.5   MCHC  32.5*  31.9*   RDW  44.2  45.0   PLATELETCT  455*  295   MPV  10.4  10.6         Intake/Output Summary (Last 24 hours) at 06/11/18 1100  Last data filed at 06/11/18 0700   Gross per 24 hour   Intake             1660 ml   Output                0 ml   Net             1660 ml       Exam:  Abdomen soft, benign.   Urine: clear      A/P:    Active Hospital Problems    Diagnosis   • Pyelonephritis [N12]     Priority: High   • Sepsis secondary to pyelonephritis (HCC) [A41.9, N39.0]     Priority: High   • Ureteral stricture, right [N13.5]   • Obesity (BMI 30-39.9) [E66.9]       Stable.   Will continue to monitor  If she becomes worse will consider nephrostomy tube  "

## 2018-06-11 NOTE — PROGRESS NOTES
"Pharmacy Kinetics 48 y.o. female on vancomycin day # 1 6/10/2018    Currently on Vancomycin new start    Indication for Treatment: UTI    Pertinent history per medical record: Admitted on 6/10/2018 for fever and R flank pain.  Patient has a history of ureteral stricture requiring nephrostomy tubes and stent placement. She is followed on an outpatient basis by urology. CT demonstrated possible pyelitis and pyelonephritis with UA evidence of UTI. Given history of stent placement and previous cultures with staphylococcus aureus, vancomycin added to antibiotic regimen.     Other antibiotics: ceftriaxone 2 g IV q24h    Allergies: Levaquin and Morphine     List concerns for renal function: BMI 30, SIRS    Pertinent cultures to date:   6/10 PBC x 2: in process  6/10 urine: in process   urine: Staphylococcus aureus (MSSA)    Recent Labs      06/10/18   1318   WBC  15.1*   NEUTSPOLYS  73.50*     Recent Labs      06/10/18   1318   BUN  11   CREATININE  0.92   ALBUMIN  4.6     Blood pressure 106/70, pulse 96, temperature 37.8 °C (100 °F), resp. rate 18, height 1.702 m (5' 7\"), weight 87.7 kg (193 lb 5.5 oz), SpO2 96 %. Temp (24hrs), Av.2 °C (100.7 °F), Min:37.8 °C (100 °F), Max:38.7 °C (101.6 °F)      A/P   1. Vancomycin dose change: initiate 15 mg/kg q12h  2. Next vancomycin level: prior to 4th total dose ( @ 0330) - not ordered  3. Goal trough: 12-16 mcg/mL  4. Comments: new start vancomycin for pyelonephritis given history of MSSA in urine. Would recommend de-escalation to alternative agent if MRSA can be ruled out again. Renal indices appear to be at baseline. Few concerns for renal insult and accumulation. Will dose per protocol and plan for a level when closer to steady state. Will follow.    Anny Loja, PharmD    "

## 2018-06-11 NOTE — ASSESSMENT & PLAN NOTE
This is sepsis (without associated acute organ dysfunction).   Lactate has normalized with IV fluid resuscitation  Blood cultures collected  Likely sources urine, started on ceftriaxone and vancomycin. Follow urine culture  Improved.  Urine culture came back S. Aureus; no mention that it is MRSA at this time; if MSSA we plan switch Abx to Ancef  Need to be 24hours afebrile as well as CnS of urine culture before deescalation and considering discharge.  If the patient does not defervesce, Urology to consider further studies and/or nephrostomy tube placement on the right   side

## 2018-06-11 NOTE — PROGRESS NOTES
Patient a new admit on this unit.    Patient is alert and oriented x 4. Able to make needs known. Assessment completed. On room air, tolerating well. Denies any pain and discomfort at this time. Patient educated regarding plan of care. Able to ambulate self with steady gait.    Bed locked, in lowest position, treaded socks on. Needs attended. No further needs at this time. Communication board updated. Call light and personal belongings within reach.

## 2018-06-11 NOTE — CARE PLAN
Problem: Nutritional:  Goal: Achieve adequate nutritional intake  Patient will consume >50% of meals and snacks  Outcome: NOT MET  Pt with 1 meal at 0% consumed

## 2018-06-11 NOTE — PROGRESS NOTES
Paged hospitalist regarding patient request at [phone].     Dr. Brown called back with orders made. Will carry out orders.

## 2018-06-11 NOTE — PROGRESS NOTES
2 RN skin check done with SHANTANU Go.    Patient skin intact. No open areas noted.    Dry, flaky spot noted on right elbow.  Healed surgical sited noted on lower abdominal area.  Dry, cracked heels.

## 2018-06-11 NOTE — PROGRESS NOTES
"Patient requested Mucinex at this time. Verbalized \"I take it at home every 12 hours and pharmacy downstairs are aware.\"    This RN will page hospitalist regarding patient request.  "

## 2018-06-11 NOTE — ED NOTES
Called floor to inform unit pt is on way. Report from Rafia Gold given on day shift. Called to speak with Cheyenne who was not available. This rn's ext given to  for any questions

## 2018-06-11 NOTE — ED NOTES
Cheyenne RN called for bed update. Per Cheyenne, room still occupied.  Charge will call when room is available. Pt informed and is upset she has been waiting a long time. Apologized for the delay.

## 2018-06-11 NOTE — CARE PLAN
Problem: Safety  Goal: Will remain free from injury  Outcome: PROGRESSING AS EXPECTED  Safety precautions in place. Non skid socks on. Able to ambulate self with steady gait. Call light within reach. Bi hourly rounding in place.    Problem: Infection  Goal: Will remain free from infection  Outcome: PROGRESSING AS EXPECTED  Patient on IV antibiotic therapy as ordered.

## 2018-06-11 NOTE — PROGRESS NOTES
Assumed care from AM shift. AAOx4. Up self. PIV flushes.  Denies pain and nausea. Call light within reach, bed in lowest, locked position.  No further needs at this time.  Will continue to monitor.

## 2018-06-12 VITALS
WEIGHT: 192.9 LBS | TEMPERATURE: 98.6 F | SYSTOLIC BLOOD PRESSURE: 102 MMHG | HEART RATE: 71 BPM | DIASTOLIC BLOOD PRESSURE: 63 MMHG | HEIGHT: 67 IN | RESPIRATION RATE: 16 BRPM | OXYGEN SATURATION: 97 % | BODY MASS INDEX: 30.28 KG/M2

## 2018-06-12 PROBLEM — N99.528: Status: ACTIVE | Noted: 2018-06-12

## 2018-06-12 PROBLEM — A41.9 SEPSIS SECONDARY TO UTI (HCC): Status: RESOLVED | Noted: 2017-09-17 | Resolved: 2018-06-12

## 2018-06-12 PROBLEM — A49.01 STAPH AUREUS INFECTION: Status: ACTIVE | Noted: 2018-06-12

## 2018-06-12 PROBLEM — N39.0 SEPSIS SECONDARY TO UTI (HCC): Status: RESOLVED | Noted: 2017-09-17 | Resolved: 2018-06-12

## 2018-06-12 LAB
ALBUMIN SERPL BCP-MCNC: 3.5 G/DL (ref 3.2–4.9)
BACTERIA UR CULT: ABNORMAL
BACTERIA UR CULT: ABNORMAL
BUN SERPL-MCNC: 11 MG/DL (ref 8–22)
CALCIUM SERPL-MCNC: 8.7 MG/DL (ref 8.5–10.5)
CHLORIDE SERPL-SCNC: 109 MMOL/L (ref 96–112)
CO2 SERPL-SCNC: 22 MMOL/L (ref 20–33)
CREAT SERPL-MCNC: 0.82 MG/DL (ref 0.5–1.4)
ERYTHROCYTE [DISTWIDTH] IN BLOOD BY AUTOMATED COUNT: 42.5 FL (ref 35.9–50)
GLUCOSE SERPL-MCNC: 109 MG/DL (ref 65–99)
HCT VFR BLD AUTO: 34.6 % (ref 37–47)
HGB BLD-MCNC: 11.5 G/DL (ref 12–16)
MCH RBC QN AUTO: 28.6 PG (ref 27–33)
MCHC RBC AUTO-ENTMCNC: 33.2 G/DL (ref 33.6–35)
MCV RBC AUTO: 86.1 FL (ref 81.4–97.8)
PHOSPHATE SERPL-MCNC: 3.4 MG/DL (ref 2.5–4.5)
PLATELET # BLD AUTO: 298 K/UL (ref 164–446)
PMV BLD AUTO: 10.2 FL (ref 9–12.9)
POTASSIUM SERPL-SCNC: 3.8 MMOL/L (ref 3.6–5.5)
RBC # BLD AUTO: 4.02 M/UL (ref 4.2–5.4)
SIGNIFICANT IND 70042: ABNORMAL
SITE SITE: ABNORMAL
SODIUM SERPL-SCNC: 138 MMOL/L (ref 135–145)
SOURCE SOURCE: ABNORMAL
WBC # BLD AUTO: 7.5 K/UL (ref 4.8–10.8)

## 2018-06-12 PROCEDURE — 99239 HOSP IP/OBS DSCHRG MGMT >30: CPT | Performed by: HOSPITALIST

## 2018-06-12 PROCEDURE — 700102 HCHG RX REV CODE 250 W/ 637 OVERRIDE(OP): Performed by: INTERNAL MEDICINE

## 2018-06-12 PROCEDURE — A9270 NON-COVERED ITEM OR SERVICE: HCPCS | Performed by: HOSPITALIST

## 2018-06-12 PROCEDURE — 36415 COLL VENOUS BLD VENIPUNCTURE: CPT

## 2018-06-12 PROCEDURE — 80069 RENAL FUNCTION PANEL: CPT

## 2018-06-12 PROCEDURE — A9270 NON-COVERED ITEM OR SERVICE: HCPCS | Performed by: INTERNAL MEDICINE

## 2018-06-12 PROCEDURE — 700111 HCHG RX REV CODE 636 W/ 250 OVERRIDE (IP): Performed by: INTERNAL MEDICINE

## 2018-06-12 PROCEDURE — 700102 HCHG RX REV CODE 250 W/ 637 OVERRIDE(OP): Performed by: HOSPITALIST

## 2018-06-12 PROCEDURE — 85027 COMPLETE CBC AUTOMATED: CPT

## 2018-06-12 RX ORDER — AMOXICILLIN AND CLAVULANATE POTASSIUM 875; 125 MG/1; MG/1
1 TABLET, FILM COATED ORAL EVERY 12 HOURS
Qty: 24 TAB | Refills: 0 | Status: SHIPPED | OUTPATIENT
Start: 2018-06-12 | End: 2018-06-24

## 2018-06-12 RX ORDER — AMOXICILLIN AND CLAVULANATE POTASSIUM 875; 125 MG/1; MG/1
1 TABLET, FILM COATED ORAL EVERY 12 HOURS
Status: DISCONTINUED | OUTPATIENT
Start: 2018-06-12 | End: 2018-06-12 | Stop reason: HOSPADM

## 2018-06-12 RX ORDER — DOXYCYCLINE 100 MG/1
100 TABLET ORAL EVERY 12 HOURS
Status: DISCONTINUED | OUTPATIENT
Start: 2018-06-12 | End: 2018-06-12

## 2018-06-12 RX ORDER — LINEZOLID 600 MG/1
600 TABLET, FILM COATED ORAL 2 TIMES DAILY
Qty: 24 TAB | Refills: 0 | Status: SHIPPED | OUTPATIENT
Start: 2018-06-12 | End: 2018-06-12

## 2018-06-12 RX ADMIN — DOXYCYCLINE 100 MG: 100 TABLET ORAL at 11:55

## 2018-06-12 RX ADMIN — CEFAZOLIN SODIUM 2 G: 2 INJECTION, SOLUTION INTRAVENOUS at 06:11

## 2018-06-12 RX ADMIN — GUAIFENESIN 600 MG: 600 TABLET, EXTENDED RELEASE ORAL at 08:00

## 2018-06-12 ASSESSMENT — PAIN SCALES - GENERAL
PAINLEVEL_OUTOF10: 0

## 2018-06-12 NOTE — PROGRESS NOTES
Patient is alert and orientedx4, able to make needs known, denies chest pain or soa, on ra, vss. Patient is self in room . IV to RAC is discomfortable, nurse removed, tip is intact. Dr. Garcia was notified about no IV at this time. Patient is a possible discharge, so nurse will insert IV if patient stays today, Dr. Garcia aware. Patient denies any pain or discomfort at this time. Call light within reach. Cont to monitor.

## 2018-06-12 NOTE — PROGRESS NOTES
[facility] Hospitalist Progress Note    Date of Service: 2018    Chief Complaint  48 y.o. female admitted 6/10/2018 with Flank Pain and N/V      Interval Problem Update  . Doing well. No pain. Leukocytosis improved. Last temp recorded was last night close to midnight.    Consultants/Specialty  Dr. Hung, Urology    Disposition  Home when ready        Review of Systems   Constitutional: Positive for fever and malaise/fatigue. Negative for chills.   HENT: Negative for congestion, hearing loss and nosebleeds.    Eyes: Negative for pain and redness.   Respiratory: Negative for cough, hemoptysis, shortness of breath and wheezing.    Cardiovascular: Negative for chest pain and palpitations.   Gastrointestinal: Positive for nausea and vomiting. Negative for abdominal pain, blood in stool, constipation and diarrhea.   Genitourinary: Positive for flank pain. Negative for dysuria, frequency and hematuria.   Musculoskeletal: Negative for falls, joint pain and myalgias.   Skin: Negative for rash.   Neurological: Positive for weakness. Negative for dizziness, tremors, focal weakness, seizures, loss of consciousness and headaches.   Psychiatric/Behavioral: The patient is not nervous/anxious and does not have insomnia.    All other systems reviewed and are negative.     Physical Exam  Laboratory/Imaging   Hemodynamics  Temp (24hrs), Av.3 °C (99.2 °F), Min:36.5 °C (97.7 °F), Max:38.8 °C (101.8 °F)   Temperature: 37 °C (98.6 °F)  Pulse  Av.3  Min: 75  Max: 118    Blood Pressure: (!) 94/60 (RN notified), NIBP: 114/69      Respiratory      Respiration: 20, Pulse Oximetry: 97 %             Fluids    Intake/Output Summary (Last 24 hours) at 18 1701  Last data filed at 18 0700   Gross per 24 hour   Intake             1660 ml   Output                0 ml   Net             1660 ml       Nutrition  Orders Placed This Encounter   Procedures   • Diet Order     Standing Status:   Standing     Number of Occurrences:    1     Order Specific Question:   Diet:     Answer:   Regular [1]     Physical Exam   Constitutional: She appears well-developed and well-nourished.   HENT:   Head: Normocephalic and atraumatic.   Eyes: Conjunctivae and EOM are normal. No scleral icterus.   Neck: Normal range of motion. Neck supple.   Cardiovascular: Normal rate and regular rhythm.  Exam reveals no gallop and no friction rub.    No murmur heard.  Pulmonary/Chest: Effort normal and breath sounds normal. No respiratory distress. She has no wheezes. She has no rales.   Abdominal: Soft. Bowel sounds are normal. She exhibits no distension. There is no tenderness. There is no rebound and no guarding.   Musculoskeletal: She exhibits no edema or tenderness.   Neurological: She is alert.   Skin: Skin is warm.   Psychiatric: She has a normal mood and affect. Her behavior is normal.       Recent Labs      06/10/18   1318  06/11/18   0537   WBC  15.1*  9.7   RBC  4.94  3.97*   HEMOGLOBIN  14.1  11.3*   HEMATOCRIT  43.4  35.4*   MCV  87.9  89.2   MCH  28.5  28.5   MCHC  32.5*  31.9*   RDW  44.2  45.0   PLATELETCT  455*  295   MPV  10.4  10.6     Recent Labs      06/10/18   1318  06/11/18   0537   SODIUM  133*  137   POTASSIUM  4.0  3.8   CHLORIDE  99  107   CO2  21  20   GLUCOSE  105*  100*   BUN  11  8   CREATININE  0.92  0.89   CALCIUM  10.3  8.7                      Assessment/Plan     * Sepsis secondary to pyelonephritis (HCC)- (present on admission)   Assessment & Plan    This is sepsis (without associated acute organ dysfunction).   Lactate has normalized with IV fluid resuscitation  Blood cultures collected  Likely sources urine, started on ceftriaxone and vancomycin. Follow urine culture  Improved.  Urine culture came back S. Aureus; no mention that it is MRSA at this time; if MSSA we plan switch Abx to Ancef  Need to be 24hours afebrile as well as CnS of urine culture before deescalation and considering discharge.  If the patient does not defervesce,  Urology to consider further studies and/or nephrostomy tube placement on the right   side        Pyelonephritis- (present on admission)   Assessment & Plan    History of staph aureus urinary infections  UA with packed WBCs and CT shows right pyelonephritis  Will treat with ceftriaxone and vancomycin pending urine culture  Improved.  Urine culture came back S. Aureus; no mention that it is MRSA at this time; if MSSA we plan switch Abx to Ancef  Need to be 24hours afebrile as well as CnS of urine culture before deescalation and considering discharge.  If the patient does not defervesce, Urology to consider further studies and/or nephrostomy tube placement on the right   side        Ureteral stricture, right- (present on admission)   Assessment & Plan    Status post ileal conduit  Urology consulted, appreciate recommendations        Obesity (BMI 30-39.9)- (present on admission)   Assessment & Plan    Weight loss counseling        I spent 38 minutes, reviewing the chart, notes, vitals, labs, imaging, ordering labs, evaluating Aurora Tan for assessment, enacting the plan above. 50% of the time was spent in counseling Aurora Tan and answering questions. Discussed with RNx. Time was devoted to counseling and coordinating care including review of records, pertinent lab data and studies, as well as discussing diagnostic evaluation and work up, planned therapeutic interventions and future disposition of care. Where indicated, the assessment and plan reflect discussion of patient with consultants, other healthcare providers, family members, and additional research needed to obtain further information in formulating the plan of care for Aurora Tan.     Quality-Core Measures   Hold off pharmacologic prophylaxis given UTI/hematuria  D/c estradiol for now  SCDs

## 2018-06-12 NOTE — DISCHARGE SUMMARY
Discharge Summary    CHIEF COMPLAINT ON ADMISSION  Chief Complaint   Patient presents with   • Flank Pain   • N/V       Reason for Admission  Fever     Admission Date  6/10/2018    CODE STATUS  Full Code    HPI & HOSPITAL COURSE  This is a 48 y.o. female admitted 6/10/18 with history of right ureteral strictures and recent right ileal ureter creation on 5/2/18 by Dr. Rangel with subsequent nephrostomy tube removal.  She had developed pain in Rt flank, fever, chills.  Her CT scan revealed right fat stranding c/w acute right pyelonephritis with thickening of right ileal ureter, no obvious obstruction.  Her u/s renal revealed mild to moderate hydronephrosis on right.  Urine culture with MSSA, BCs x2 negative.  She had received 2 doses of IV vancomycin on admission with improvement of her wbc, fever and right flank pain. Dr. Hung was consulted on admission 6/10/18.  She was seen by Lindsey Hawthorne on 6/11/18.  The patient at discharge stated she was frustrated about not seeing urology, but I explained that she was seen 2 days in a row by urology.  She did not realize Lindsey Hawthorne was a urology PA.  Her wbc had normalized, no further fever or chills, eating and drinking normally and was felt safe for discharge with close follow up with Dr. Rangel, urology.  She will complete a 14 day course of antibiotics, 2 days IV vanco +12 days of augmentin for her right sided pyelonephritis with complication of recent right ileal ureter creation.  DC home with .  Lindseyjose Hawthorne to arrange follow up appt. With urology. CBC, BMP wnl, making urine, abdominal midline incision CD&I, no erythema or drainage.  The patient was mostly concerned about the possibility of another nephrostomy tube which she adamantly did not want after her extensive recent surgery with Dr. Rangel.       Therefore, she is discharged in good and stable condition to home with close outpatient follow-up.    The patient met 2-midnight criteria for an inpatient stay at the  time of discharge.    Discharge Date  6/12/2018    FOLLOW UP ITEMS POST DISCHARGE  Urology follow up with Dr. Rangel regarding narrowing of ileal ureter with right sided pyelonephritis with MSSA.    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  No follow-up provider specified.    MEDICATIONS ON DISCHARGE     Medication List      START taking these medications      Instructions   amoxicillin-clavulanate 875-125 MG Tabs  Commonly known as:  AUGMENTIN   Take 1 Tab by mouth every 12 hours for 12 days.  Dose:  1 Tab        CONTINUE taking these medications      Instructions   acetaminophen 500 MG Tabs  Commonly known as:  TYLENOL   Take 1,000 mg by mouth 2 times a day as needed.  Dose:  1000 mg     guaiFENesin  MG Tb12  Commonly known as:  MUCINEX   Take 1 Tab by mouth every 12 hours.  Dose:  600 mg     MINIVELLE 0.05 MG/24HR Pttw  Generic drug:  estradiol   Apply 1 Patch to skin as directed every 7 days.  Dose:  1 Patch        STOP taking these medications    sulfamethoxazole-trimethoprim 800-160 MG tablet  Commonly known as:  BACTRIM DS            Allergies  Allergies   Allergen Reactions   • Levaquin Hives, Shortness of Breath and Itching     Rxn - 9/11/01   • Levofloxacin Itching, Hives and Shortness of Breath     Other reaction(s): Itching, pruritis  Rxn - 9/11/01   • Morphine Itching     Other reaction(s): Itching, pruritis       DIET  Orders Placed This Encounter   Procedures   • Diet Order     Standing Status:   Standing     Number of Occurrences:   1     Order Specific Question:   Diet:     Answer:   Regular [1]       ACTIVITY  As tolerated.  Weight bearing as tolerated    CONSULTATIONS    Abhilash    PROCEDURES    CT abdomen/pelvis:        1. Urothelial wall thickening, enhancement and surrounding stranding involving the right ureter as well as ill-defined hypoattenuation in the right kidney, in keeping with right pyelitis and pyelonephritis.   Reading Provider Reading Date   Esdras Blair M.D. [place] 10, 2018     US-renal:    Mild to moderate right hydronephrosis.   Reading Provider Reading Date   Blanca Dominguez M.D. [place] 10, 2018         LABORATORY  Lab Results   Component Value Date    SODIUM 138 06/12/2018    POTASSIUM 3.8 06/12/2018    CHLORIDE 109 06/12/2018    CO2 22 06/12/2018    GLUCOSE 109 (H) 06/12/2018    BUN 11 06/12/2018    CREATININE 0.82 06/12/2018        Lab Results   Component Value Date    WBC 7.5 06/12/2018    HEMOGLOBIN 11.5 (L) 06/12/2018    HEMATOCRIT 34.6 (L) 06/12/2018    PLATELETCT 298 06/12/2018        Total time of the discharge process exceeds 65 minutes.

## 2018-06-12 NOTE — DISCHARGE INSTRUCTIONS
Discharge Instructions    Discharged to home by car with relative. Discharged via walking, hospital escort: Yes.  Special equipment needed: Not Applicable    Be sure to schedule a follow-up appointment with your primary care doctor or any specialists as instructed.     Discharge Plan:   Influenza Vaccine Indication: Patient Refuses    I understand that a diet low in cholesterol, fat, and sodium is recommended for good health. Unless I have been given specific instructions below for another diet, I accept this instruction as my diet prescription.   Other diet: Regular      Special Instructions: None    · Is patient discharged on Warfarin / Coumadin?   No     Depression / Suicide Risk    As you are discharged from this [facility] facility, it is important to learn how to keep safe from harming yourself.    Recognize the warning signs:  · Abrupt changes in personality, positive or negative- including increase in energy   · Giving away possessions  · Change in eating patterns- significant weight changes-  positive or negative  · Change in sleeping patterns- unable to sleep or sleeping all the time   · Unwillingness or inability to communicate  · Depression  · Unusual sadness, discouragement and loneliness  · Talk of wanting to die  · Neglect of personal appearance   · Rebelliousness- reckless behavior  · Withdrawal from people/activities they love  · Confusion- inability to concentrate     If you or a loved one observes any of these behaviors or has concerns about self-harm, here's what you can do:  · Talk about it- your feelings and reasons for harming yourself  · Remove any means that you might use to hurt yourself (examples: pills, rope, extension cords, firearm)  · Get professional help from the community (Mental Health, Substance Abuse, psychological counseling)  · Do not be alone:Call your Safe Contact- someone whom you trust who will be there for you.  · Call your local CRISIS HOTLINE 442-1315 or  771.834.1744  · Call your local Children's Mobile Crisis Response Team [place] (181) 755-9054 or www.[employer]  · Call the toll free National Suicide Prevention Hotlines   · National Suicide Prevention Lifeline 934-955-ZSEP (0460)  · [employer] Line Network 800-SUICIDE (903-7030)          Pyelonephritis, Adult  Introduction  Pyelonephritis is a kidney infection. The kidneys are the organs that filter a person's blood and move waste out of the bloodstream and into the urine. Urine passes from the kidneys, through the ureters, and into the bladder. There are two main types of pyelonephritis:  · Infections that come on quickly without any warning (acute pyelonephritis).  · Infections that last for a long period of time (chronic pyelonephritis).  In most cases, the infection clears up with treatment and does not cause further problems. More severe infections or chronic infections can sometimes spread to the bloodstream or lead to other problems with the kidneys.  What are the causes?  This condition is usually caused by:  · Bacteria traveling from the bladder to the kidney through infected urine. The urine in the bladder can become infected with bacteria from:  ¨ Bladder infection (cystitis).  ¨ Inflammation of the prostate gland (prostatitis).  ¨ Sexual intercourse, in females.  · Bacteria traveling from the bloodstream to the kidney.  What increases the risk?  This condition is more likely to develop in:  · Pregnant women.  · Older people.  · People who have diabetes.  · People who have kidney stones or bladder stones.  · People who have other abnormalities of the kidney or ureter.  · People who have a catheter placed in the bladder.  · People who have cancer.  · People who are sexually active.  · Women who use spermicides.  · People who have had a prior urinary tract infection.  What are the signs or symptoms?  Symptoms of this condition include:  · Frequent urination.  · Strong or persistent urge to  urinate.  · Burning or stinging when urinating.  · Abdominal pain.  · Back pain.  · Pain in the side or flank area.  · Fever.  · Chills.  · Blood in the urine, or dark urine.  · Nausea.  · Vomiting.  How is this diagnosed?  This condition may be diagnosed based on:  · Medical history and physical exam.  · Urine tests.  · Blood tests.  You may also have imaging tests of the kidneys, such as an ultrasound or CT scan.  How is this treated?  Treatment for this condition may depend on the severity of the infection.  · If the infection is mild and is found early, you may be treated with antibiotic medicines taken by mouth. You will need to drink fluids to remain hydrated.  · If the infection is more severe, you may need to stay in the hospital and receive antibiotics given directly into a vein through an IV tube. You may also need to receive fluids through an IV tube if you are not able to remain hydrated. After your hospital stay, you may need to take oral antibiotics for a period of time.  Other treatments may be required, depending on the cause of the infection.  Follow these instructions at home:  Medicines  · Take over-the-counter and prescription medicines only as told by your health care provider.  · If you were prescribed an antibiotic medicine, take it as told by your health care provider. Do not stop taking the antibiotic even if you start to feel better.  General instructions  · Drink enough fluid to keep your urine clear or pale yellow.  · Avoid caffeine, tea, and carbonated beverages. They tend to irritate the bladder.  · Urinate often. Avoid holding in urine for long periods of time.  · Urinate before and after sex.  · After a bowel movement, women should cleanse from front to back. Use each tissue only once.  · Keep all follow-up visits as told by your health care provider. This is important.  Contact a health care provider if:  · Your symptoms do not get better after 2 days of treatment.  · Your symptoms  get worse.  · You have a fever.  Get help right away if:  · You are unable to take your antibiotics or fluids.  · You have shaking chills.  · You vomit.  · You have severe flank or back pain.  · You have extreme weakness or fainting.  This information is not intended to replace advice given to you by your health care provider. Make sure you discuss any questions you have with your health care provider.  Document Released: 12/18/2006 Document Revised: 05/25/2017 Document Reviewed: 04/11/2016  © 2017 [employer]vier

## 2018-06-12 NOTE — DISCHARGE PLANNING
Medical Social Work  Faxed patient's RX Zyvox to her pharmacy to see if prior authorization is needed.

## 2018-06-12 NOTE — CARE PLAN
Problem: Infection  Goal: Will remain free from infection  Outcome: PROGRESSING AS EXPECTED    Intervention: Assess signs and symptoms of infection  Nurse assess vs, worsening ss of infection and administer ABX per order.       Problem: Discharge Barriers/Planning  Goal: Patient's continuum of care needs will be met  Outcome: PROGRESSING AS EXPECTED    Intervention: Assess potential discharge barriers on admission and throughout hospital stay  Nurse discussed with patient and physician about discharge plan.

## 2018-06-12 NOTE — PROGRESS NOTES
Patient is alert and oriented, no changes from the initial assessment. Dismissal instruction reviewed with patient at bedside, no questions at this time. Patient refused wc service. Staff escorted patient to exit.

## 2018-07-31 ENCOUNTER — APPOINTMENT (OUTPATIENT)
Dept: RADIOLOGY | Facility: MEDICAL CENTER | Age: 48
DRG: 389 | End: 2018-07-31
Attending: SURGERY
Payer: COMMERCIAL

## 2018-07-31 ENCOUNTER — HOSPITAL ENCOUNTER (INPATIENT)
Facility: MEDICAL CENTER | Age: 48
LOS: 2 days | DRG: 389 | End: 2018-08-02
Attending: EMERGENCY MEDICINE | Admitting: INTERNAL MEDICINE
Payer: COMMERCIAL

## 2018-07-31 ENCOUNTER — APPOINTMENT (OUTPATIENT)
Dept: RADIOLOGY | Facility: MEDICAL CENTER | Age: 48
DRG: 389 | End: 2018-07-31
Attending: EMERGENCY MEDICINE
Payer: COMMERCIAL

## 2018-07-31 DIAGNOSIS — R10.31 RIGHT LOWER QUADRANT ABDOMINAL PAIN: ICD-10-CM

## 2018-07-31 DIAGNOSIS — K56.609 SBO (SMALL BOWEL OBSTRUCTION) (HCC): ICD-10-CM

## 2018-07-31 LAB
ALBUMIN SERPL BCP-MCNC: 3.8 G/DL (ref 3.2–4.9)
ALBUMIN/GLOB SERPL: 1.1 G/DL
ALP SERPL-CCNC: 78 U/L (ref 30–99)
ALT SERPL-CCNC: 12 U/L (ref 2–50)
ANION GAP SERPL CALC-SCNC: 8 MMOL/L (ref 0–11.9)
APPEARANCE UR: CLEAR
AST SERPL-CCNC: 15 U/L (ref 12–45)
BACTERIA #/AREA URNS HPF: ABNORMAL /HPF
BASOPHILS # BLD AUTO: 0.4 % (ref 0–1.8)
BASOPHILS # BLD: 0.05 K/UL (ref 0–0.12)
BILIRUB SERPL-MCNC: 0.3 MG/DL (ref 0.1–1.5)
BILIRUB UR QL STRIP.AUTO: NEGATIVE
BUN SERPL-MCNC: 17 MG/DL (ref 8–22)
CALCIUM SERPL-MCNC: 9.5 MG/DL (ref 8.4–10.2)
CHLORIDE SERPL-SCNC: 105 MMOL/L (ref 96–112)
CO2 SERPL-SCNC: 23 MMOL/L (ref 20–33)
COLOR UR: YELLOW
CREAT SERPL-MCNC: 0.96 MG/DL (ref 0.5–1.4)
EOSINOPHIL # BLD AUTO: 0.22 K/UL (ref 0–0.51)
EOSINOPHIL NFR BLD: 1.6 % (ref 0–6.9)
EPI CELLS #/AREA URNS HPF: ABNORMAL /HPF
ERYTHROCYTE [DISTWIDTH] IN BLOOD BY AUTOMATED COUNT: 43.7 FL (ref 35.9–50)
GLOBULIN SER CALC-MCNC: 3.4 G/DL (ref 1.9–3.5)
GLUCOSE SERPL-MCNC: 100 MG/DL (ref 65–99)
GLUCOSE UR STRIP.AUTO-MCNC: NEGATIVE MG/DL
HCT VFR BLD AUTO: 43.4 % (ref 37–47)
HGB BLD-MCNC: 14.1 G/DL (ref 12–16)
IMM GRANULOCYTES # BLD AUTO: 0.04 K/UL (ref 0–0.11)
IMM GRANULOCYTES NFR BLD AUTO: 0.3 % (ref 0–0.9)
KETONES UR STRIP.AUTO-MCNC: NEGATIVE MG/DL
LACTATE BLD-SCNC: 0.9 MMOL/L (ref 0.5–2)
LACTATE BLD-SCNC: 1.2 MMOL/L (ref 0.5–2)
LEUKOCYTE ESTERASE UR QL STRIP.AUTO: NEGATIVE
LIPASE SERPL-CCNC: 34 U/L (ref 7–58)
LYMPHOCYTES # BLD AUTO: 2.41 K/UL (ref 1–4.8)
LYMPHOCYTES NFR BLD: 17.4 % (ref 22–41)
MCH RBC QN AUTO: 27.6 PG (ref 27–33)
MCHC RBC AUTO-ENTMCNC: 32.5 G/DL (ref 33.6–35)
MCV RBC AUTO: 85.1 FL (ref 81.4–97.8)
MICRO URNS: ABNORMAL
MONOCYTES # BLD AUTO: 0.68 K/UL (ref 0–0.85)
MONOCYTES NFR BLD AUTO: 4.9 % (ref 0–13.4)
MUCOUS THREADS #/AREA URNS HPF: ABNORMAL /HPF
NEUTROPHILS # BLD AUTO: 10.48 K/UL (ref 2–7.15)
NEUTROPHILS NFR BLD: 75.4 % (ref 44–72)
NITRITE UR QL STRIP.AUTO: NEGATIVE
NRBC # BLD AUTO: 0 K/UL
NRBC BLD-RTO: 0 /100 WBC
PH UR STRIP.AUTO: 6 [PH]
PLATELET # BLD AUTO: 457 K/UL (ref 164–446)
PMV BLD AUTO: 10.4 FL (ref 9–12.9)
POTASSIUM SERPL-SCNC: 4.1 MMOL/L (ref 3.6–5.5)
PROT SERPL-MCNC: 7.2 G/DL (ref 6–8.2)
PROT UR QL STRIP: NEGATIVE MG/DL
RBC # BLD AUTO: 5.1 M/UL (ref 4.2–5.4)
RBC # URNS HPF: ABNORMAL /HPF
RBC UR QL AUTO: ABNORMAL
SODIUM SERPL-SCNC: 136 MMOL/L (ref 135–145)
SP GR UR STRIP.AUTO: 1.02
TROPONIN I SERPL-MCNC: <0.02 NG/ML (ref 0–0.04)
UNIDENT CRYS URNS QL MICRO: ABNORMAL /HPF
WBC # BLD AUTO: 13.9 K/UL (ref 4.8–10.8)
WBC #/AREA URNS HPF: ABNORMAL /HPF

## 2018-07-31 PROCEDURE — 99285 EMERGENCY DEPT VISIT HI MDM: CPT

## 2018-07-31 PROCEDURE — 700111 HCHG RX REV CODE 636 W/ 250 OVERRIDE (IP): Performed by: EMERGENCY MEDICINE

## 2018-07-31 PROCEDURE — 770006 HCHG ROOM/CARE - MED/SURG/GYN SEMI*

## 2018-07-31 PROCEDURE — 700117 HCHG RX CONTRAST REV CODE 255: Performed by: EMERGENCY MEDICINE

## 2018-07-31 PROCEDURE — 81001 URINALYSIS AUTO W/SCOPE: CPT

## 2018-07-31 PROCEDURE — 36415 COLL VENOUS BLD VENIPUNCTURE: CPT

## 2018-07-31 PROCEDURE — 84484 ASSAY OF TROPONIN QUANT: CPT

## 2018-07-31 PROCEDURE — 83605 ASSAY OF LACTIC ACID: CPT

## 2018-07-31 PROCEDURE — 700111 HCHG RX REV CODE 636 W/ 250 OVERRIDE (IP): Performed by: INTERNAL MEDICINE

## 2018-07-31 PROCEDURE — 87040 BLOOD CULTURE FOR BACTERIA: CPT | Mod: 91

## 2018-07-31 PROCEDURE — A9270 NON-COVERED ITEM OR SERVICE: HCPCS | Performed by: INTERNAL MEDICINE

## 2018-07-31 PROCEDURE — 700105 HCHG RX REV CODE 258: Performed by: INTERNAL MEDICINE

## 2018-07-31 PROCEDURE — 700102 HCHG RX REV CODE 250 W/ 637 OVERRIDE(OP): Performed by: INTERNAL MEDICINE

## 2018-07-31 PROCEDURE — 83690 ASSAY OF LIPASE: CPT

## 2018-07-31 PROCEDURE — 96375 TX/PRO/DX INJ NEW DRUG ADDON: CPT

## 2018-07-31 PROCEDURE — 700101 HCHG RX REV CODE 250: Performed by: SURGERY

## 2018-07-31 PROCEDURE — 74177 CT ABD & PELVIS W/CONTRAST: CPT

## 2018-07-31 PROCEDURE — 85025 COMPLETE CBC W/AUTO DIFF WBC: CPT

## 2018-07-31 PROCEDURE — 80053 COMPREHEN METABOLIC PANEL: CPT

## 2018-07-31 PROCEDURE — 700105 HCHG RX REV CODE 258: Performed by: EMERGENCY MEDICINE

## 2018-07-31 PROCEDURE — 99223 1ST HOSP IP/OBS HIGH 75: CPT | Performed by: INTERNAL MEDICINE

## 2018-07-31 PROCEDURE — 700101 HCHG RX REV CODE 250: Performed by: INTERNAL MEDICINE

## 2018-07-31 PROCEDURE — 700111 HCHG RX REV CODE 636 W/ 250 OVERRIDE (IP): Performed by: SURGERY

## 2018-07-31 PROCEDURE — 96374 THER/PROPH/DIAG INJ IV PUSH: CPT

## 2018-07-31 RX ORDER — OXYCODONE HYDROCHLORIDE 5 MG/1
5 TABLET ORAL
Status: DISCONTINUED | OUTPATIENT
Start: 2018-07-31 | End: 2018-07-31

## 2018-07-31 RX ORDER — OXYCODONE HYDROCHLORIDE 5 MG/1
10 TABLET ORAL
Status: DISCONTINUED | OUTPATIENT
Start: 2018-07-31 | End: 2018-07-31

## 2018-07-31 RX ORDER — FAMOTIDINE 20 MG/1
20 TABLET, FILM COATED ORAL 2 TIMES DAILY
Status: DISCONTINUED | OUTPATIENT
Start: 2018-07-31 | End: 2018-08-02 | Stop reason: HOSPADM

## 2018-07-31 RX ORDER — ONDANSETRON 2 MG/ML
4 INJECTION INTRAMUSCULAR; INTRAVENOUS ONCE
Status: COMPLETED | OUTPATIENT
Start: 2018-07-31 | End: 2018-07-31

## 2018-07-31 RX ORDER — PROMETHAZINE HYDROCHLORIDE 25 MG/1
12.5-25 SUPPOSITORY RECTAL EVERY 4 HOURS PRN
Status: DISCONTINUED | OUTPATIENT
Start: 2018-07-31 | End: 2018-08-02 | Stop reason: HOSPADM

## 2018-07-31 RX ORDER — METOCLOPRAMIDE HYDROCHLORIDE 5 MG/ML
10 INJECTION INTRAMUSCULAR; INTRAVENOUS EVERY 6 HOURS
Status: DISCONTINUED | OUTPATIENT
Start: 2018-07-31 | End: 2018-08-02

## 2018-07-31 RX ORDER — CEFTRIAXONE 2 G/1
2 INJECTION, POWDER, FOR SOLUTION INTRAMUSCULAR; INTRAVENOUS ONCE
COMMUNITY
End: 2018-07-31

## 2018-07-31 RX ORDER — ENEMA 19; 7 G/133ML; G/133ML
1 ENEMA RECTAL ONCE
Status: COMPLETED | OUTPATIENT
Start: 2018-07-31 | End: 2018-07-31

## 2018-07-31 RX ORDER — ONDANSETRON 4 MG/1
4 TABLET, ORALLY DISINTEGRATING ORAL EVERY 4 HOURS PRN
Status: DISCONTINUED | OUTPATIENT
Start: 2018-07-31 | End: 2018-08-02 | Stop reason: HOSPADM

## 2018-07-31 RX ORDER — SODIUM CHLORIDE 9 MG/ML
INJECTION, SOLUTION INTRAVENOUS CONTINUOUS
Status: DISCONTINUED | OUTPATIENT
Start: 2018-07-31 | End: 2018-08-01

## 2018-07-31 RX ORDER — POLYETHYLENE GLYCOL 3350 17 G/17G
1 POWDER, FOR SOLUTION ORAL
Status: DISCONTINUED | OUTPATIENT
Start: 2018-07-31 | End: 2018-07-31

## 2018-07-31 RX ORDER — FLUCONAZOLE 150 MG/1
150 TABLET ORAL DAILY
COMMUNITY
Start: 2018-07-17 | End: 2018-07-31

## 2018-07-31 RX ORDER — DIPHENHYDRAMINE HYDROCHLORIDE 50 MG/ML
50 INJECTION INTRAMUSCULAR; INTRAVENOUS NIGHTLY PRN
Status: DISCONTINUED | OUTPATIENT
Start: 2018-07-31 | End: 2018-08-02 | Stop reason: HOSPADM

## 2018-07-31 RX ORDER — SODIUM CHLORIDE 9 MG/ML
1000 INJECTION, SOLUTION INTRAVENOUS ONCE
Status: COMPLETED | OUTPATIENT
Start: 2018-07-31 | End: 2018-07-31

## 2018-07-31 RX ORDER — ACETAMINOPHEN 325 MG/1
650 TABLET ORAL EVERY 6 HOURS PRN
Status: DISCONTINUED | OUTPATIENT
Start: 2018-07-31 | End: 2018-07-31

## 2018-07-31 RX ORDER — AMOXICILLIN 250 MG
2 CAPSULE ORAL 2 TIMES DAILY PRN
Status: DISCONTINUED | OUTPATIENT
Start: 2018-07-31 | End: 2018-07-31

## 2018-07-31 RX ORDER — PROMETHAZINE HYDROCHLORIDE 25 MG/1
12.5-25 TABLET ORAL EVERY 4 HOURS PRN
Status: DISCONTINUED | OUTPATIENT
Start: 2018-07-31 | End: 2018-07-31

## 2018-07-31 RX ORDER — SODIUM CHLORIDE 9 MG/ML
500 INJECTION, SOLUTION INTRAVENOUS
Status: DISCONTINUED | OUTPATIENT
Start: 2018-07-31 | End: 2018-08-02 | Stop reason: HOSPADM

## 2018-07-31 RX ORDER — GUAIFENESIN 600 MG/1
600 TABLET, EXTENDED RELEASE ORAL EVERY 12 HOURS
Status: DISCONTINUED | OUTPATIENT
Start: 2018-07-31 | End: 2018-07-31

## 2018-07-31 RX ORDER — BISACODYL 10 MG
10 SUPPOSITORY, RECTAL RECTAL
Status: DISCONTINUED | OUTPATIENT
Start: 2018-07-31 | End: 2018-08-02 | Stop reason: HOSPADM

## 2018-07-31 RX ORDER — ONDANSETRON 2 MG/ML
4 INJECTION INTRAMUSCULAR; INTRAVENOUS EVERY 4 HOURS PRN
Status: DISCONTINUED | OUTPATIENT
Start: 2018-07-31 | End: 2018-08-02 | Stop reason: HOSPADM

## 2018-07-31 RX ORDER — KETOROLAC TROMETHAMINE 30 MG/ML
30 INJECTION, SOLUTION INTRAMUSCULAR; INTRAVENOUS EVERY 6 HOURS PRN
Status: DISCONTINUED | OUTPATIENT
Start: 2018-07-31 | End: 2018-08-02

## 2018-07-31 RX ORDER — CEFPODOXIME PROXETIL 200 MG/1
200 TABLET, FILM COATED ORAL 2 TIMES DAILY
COMMUNITY
Start: 2018-07-17 | End: 2018-07-31

## 2018-07-31 RX ADMIN — DIPHENHYDRAMINE HYDROCHLORIDE 50 MG: 50 INJECTION, SOLUTION INTRAMUSCULAR; INTRAVENOUS at 22:12

## 2018-07-31 RX ADMIN — ONDANSETRON 4 MG: 2 INJECTION INTRAMUSCULAR; INTRAVENOUS at 10:18

## 2018-07-31 RX ADMIN — CEFTRIAXONE SODIUM 2 G: 2 INJECTION, POWDER, FOR SOLUTION INTRAMUSCULAR; INTRAVENOUS at 14:54

## 2018-07-31 RX ADMIN — FENTANYL CITRATE 50 MCG: 50 INJECTION INTRAMUSCULAR; INTRAVENOUS at 10:18

## 2018-07-31 RX ADMIN — KETOROLAC TROMETHAMINE 30 MG: 30 INJECTION, SOLUTION INTRAMUSCULAR at 19:09

## 2018-07-31 RX ADMIN — OXYCODONE HYDROCHLORIDE 5 MG: 5 TABLET ORAL at 14:35

## 2018-07-31 RX ADMIN — SODIUM CHLORIDE 1000 ML: 9 INJECTION, SOLUTION INTRAVENOUS at 10:17

## 2018-07-31 RX ADMIN — METRONIDAZOLE 500 MG: 500 INJECTION, SOLUTION INTRAVENOUS at 22:17

## 2018-07-31 RX ADMIN — FAMOTIDINE 20 MG: 20 TABLET ORAL at 17:11

## 2018-07-31 RX ADMIN — SODIUM CHLORIDE: 9 INJECTION, SOLUTION INTRAVENOUS at 14:38

## 2018-07-31 RX ADMIN — METOCLOPRAMIDE 10 MG: 5 INJECTION, SOLUTION INTRAMUSCULAR; INTRAVENOUS at 17:11

## 2018-07-31 RX ADMIN — SODIUM PHOSPHATE 133 ML: 7; 19 ENEMA RECTAL at 17:12

## 2018-07-31 RX ADMIN — ONDANSETRON HYDROCHLORIDE 4 MG: 2 INJECTION, SOLUTION INTRAMUSCULAR; INTRAVENOUS at 20:43

## 2018-07-31 RX ADMIN — METOCLOPRAMIDE 10 MG: 5 INJECTION, SOLUTION INTRAMUSCULAR; INTRAVENOUS at 23:22

## 2018-07-31 RX ADMIN — IOHEXOL 100 ML: 350 INJECTION, SOLUTION INTRAVENOUS at 11:30

## 2018-07-31 RX ADMIN — PROMETHAZINE HYDROCHLORIDE 25 MG: 25 TABLET ORAL at 14:34

## 2018-07-31 RX ADMIN — SODIUM CHLORIDE: 9 INJECTION, SOLUTION INTRAVENOUS at 23:28

## 2018-07-31 RX ADMIN — METRONIDAZOLE 500 MG: 500 INJECTION, SOLUTION INTRAVENOUS at 14:34

## 2018-07-31 ASSESSMENT — ENCOUNTER SYMPTOMS
VOMITING: 1
DIARRHEA: 0
HEMOPTYSIS: 0
WHEEZING: 0
FALLS: 0
NERVOUS/ANXIOUS: 0
SEIZURES: 0
FEVER: 0
EYE REDNESS: 0
CHILLS: 0
EYE PAIN: 0
WEAKNESS: 1
COUGH: 0
ABDOMINAL PAIN: 1
FOCAL WEAKNESS: 0
CONSTIPATION: 0
BLOOD IN STOOL: 0
TREMORS: 0
LOSS OF CONSCIOUSNESS: 0
SHORTNESS OF BREATH: 0
MYALGIAS: 0
NAUSEA: 1
INSOMNIA: 0
HEADACHES: 0
DIZZINESS: 0
PALPITATIONS: 0

## 2018-07-31 ASSESSMENT — LIFESTYLE VARIABLES
TOTAL SCORE: 0
HAVE YOU EVER FELT YOU SHOULD CUT DOWN ON YOUR DRINKING: NO
HOW MANY TIMES IN THE PAST YEAR HAVE YOU HAD 5 OR MORE DRINKS IN A DAY: 0
ALCOHOL_USE: YES
TOTAL SCORE: 0
CONSUMPTION TOTAL: NEGATIVE
HAVE PEOPLE ANNOYED YOU BY CRITICIZING YOUR DRINKING: NO
ON A TYPICAL DAY WHEN YOU DRINK ALCOHOL HOW MANY DRINKS DO YOU HAVE: 1
EVER FELT BAD OR GUILTY ABOUT YOUR DRINKING: NO
TOTAL SCORE: 0
EVER_SMOKED: YES
AVERAGE NUMBER OF DAYS PER WEEK YOU HAVE A DRINK CONTAINING ALCOHOL: 1
EVER HAD A DRINK FIRST THING IN THE MORNING TO STEADY YOUR NERVES TO GET RID OF A HANGOVER: NO

## 2018-07-31 ASSESSMENT — PAIN SCALES - GENERAL: PAINLEVEL_OUTOF10: 9

## 2018-07-31 ASSESSMENT — COGNITIVE AND FUNCTIONAL STATUS - GENERAL
SUGGESTED CMS G CODE MODIFIER DAILY ACTIVITY: CH
SUGGESTED CMS G CODE MODIFIER MOBILITY: CH
MOBILITY SCORE: 24
DAILY ACTIVITIY SCORE: 24

## 2018-07-31 NOTE — ED NOTES
"Chief Complaint   Patient presents with   • RLQ Pain     Pt c/o RLQ pain with N/V/D, onset x 3 days. pt with recent abd surgery 2 months ago.    • Nausea/Vomiting/Diarrhea   /90   Pulse 83   Temp 36.7 °C (98 °F)   Resp 20   Ht 1.702 m (5' 7\")   Wt 87.1 kg (192 lb 0.3 oz)   LMP  (LMP Unknown)   SpO2 99%   BMI 30.07 kg/m²     "

## 2018-07-31 NOTE — ED NOTES
Med Rec complete per Pt at bedside and Courtney @[phone]  Allergies Reviewed    Pt completed 7 day course of VANTIN on 7/23  and  Pt finished a 2 day course of DIFLUCAN on 7/18  In the last 30 days    Pt finished the following ABX more than 30 days ago    6/20 completed 1 day course of DIFLUCAN  6/15 completed 12 day course of AUGMENTIN  5/27 completed 1 day course of DIFLUCAN  5/29 completed 5 day course of BACTRIM DS  5/02 completed 7 day course of BACTRIM DS  3/30 completed 7 day course of AUGMENTIN

## 2018-07-31 NOTE — H&P
Hospital Medicine History and Physical    Date of Service  7/31/2018    Chief Complaint  Chief Complaint   Patient presents with   • RLQ Pain     Pt c/o RLQ pain with N/V/D, onset x 3 days. pt with recent abd surgery 2 months ago.    • Nausea/Vomiting/Diarrhea       History of Presenting Illness  48 y.o. female who presented 7/31/2018 with RLQ Pain (Pt c/o RLQ pain with N/V/D, onset x 3 days. pt with recent abd surgery 2 months ago. ) and Nausea/Vomiting/Diarrhea  Recent ileal-bladder conduit for urethral stricture by Dr. Rangel April 2018  Also had lysis of adhesions  Presents with 2 week history of vomiting, abdominal pain. She could not keep food down. No bloody vomit or bowel movement. She then saw Dr. Rangel, Urology today because it was more severe, mainly the pain. Despite that, she is ambulatory and does not appear malaised or ill.  Dr. Rangel aware fo complaints and sent her here for evaluation. Urine not impressive for infection.   At the ED, afebrile, hemodynamically stable. CT showed small bowel obstruction. Dr. Wilks Gen Surg consulted. Leukocytosis on labs.   When I saw her the ED, no acute distress. Mild abdominal distension and lower abdominal tenderness.     Primary Care Physician  Shawnee Shaver, SHIRA.P.R.N.    Consultants  Gen Surgery    Code Status  Full    Review of Systems  Review of Systems   Constitutional: Positive for malaise/fatigue. Negative for chills and fever.   HENT: Negative for congestion, hearing loss and nosebleeds.    Eyes: Negative for pain and redness.   Respiratory: Negative for cough, hemoptysis, shortness of breath and wheezing.    Cardiovascular: Negative for chest pain and palpitations.   Gastrointestinal: Positive for abdominal pain, nausea and vomiting. Negative for blood in stool, constipation and diarrhea.   Genitourinary: Negative for dysuria, frequency and hematuria.   Musculoskeletal: Negative for falls, joint pain and myalgias.   Skin: Negative for rash.  "  Neurological: Positive for weakness. Negative for dizziness, tremors, focal weakness, seizures, loss of consciousness and headaches.   Psychiatric/Behavioral: The patient is not nervous/anxious and does not have insomnia.    All other systems reviewed and are negative.       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   • Urinary incontinence        Surgical History  Past Surgical History:   Procedure Laterality Date   • EXPLORATORY LAPAROTOMY Right 4/30/2018    Procedure: EXPLORATORY LAPAROTOMY W/ILEAL URETER;  Surgeon: Kapil Rangel M.D.;  Location: [facility];  Service: Urology   • LYSIS ADHESIONS GENERAL  4/30/2018    Procedure: LYSIS ADHESIONS GENERAL;  Surgeon: Kapil Rangel M.D.;  Location: [facility];  Service: Urology   • CYSTOSCOPY STENT PLACEMENT Right 3/21/2018    Procedure: CYSTOSCOPY - STENT REMOVAL;  Surgeon: Terry Maldonado M.D.;  Location: [facility];  Service: Urology   • URETEROSCOPY Right 3/21/2018    Procedure: URETEROSCOPY- DIAGNOSTIC;  Surgeon: Terry Maldonado M.D.;  Location: [facility];  Service: Urology   • RETROGRADES Right 3/21/2018    Procedure: RETROGRADES;  Surgeon: Terry Maldonado M.D.;  Location: [facility];  Service: Urology   • PYELOGRAM Right 3/21/2018    Procedure: PYELOGRAM;  Surgeon: Terry Maldonado M.D.;  Location: [facility];  Service: Urology   • CYSTOSCOPY N/A 1/31/2018    Procedure: CYSTOSCOPY;  Surgeon: Terry Maldonado M.D.; "  Location: [facility];  Service: Urology   • RETROGRADES Right 1/31/2018    Procedure: RETROGRADES;  Surgeon: Terry Maldonado M.D.;  Location: [facility];  Service: Urology   • PYELOGRAM Right 1/31/2018    Procedure: PYELOGRAM;  Surgeon: Terry Maldonado M.D.;  Location: [facility];  Service: Urology   • URETEROSCOPY Right 1/31/2018    Procedure: URETEROSCOPY, INCISIONAL OR DILATION OF STRICTURE  ;  Surgeon: Terry Maldonado M.D.;  Location: [facility];  Service: Urology   • LASERTRIPSY Right 1/31/2018    Procedure: LASERTRIPSY;  Surgeon: Terry Maldonado M.D.;  Location: [facility];  Service: Urology   • STENT PLACEMENT Right 1/31/2018    Procedure: STENT PLACEMENT;  Surgeon: Terry Maldonado M.D.;  Location: [facility];  Service: Urology   • URETEROSCOPY Right 11/7/2017    Procedure: URETEROSCOPY;  Surgeon: Kiet Eid M.D.;  Location: [facility];  Service: Urology   • LASERTRIPSY Right 11/7/2017    Procedure: LASERTRIPSY- LITHO;  Surgeon: Kiet Eid M.D.;  Location: [facility];  Service: Urology   • URETHRAL DILATATION Right 11/7/2017    Procedure: URETHRAL DILATATION;  Surgeon: Kiet Eid M.D.;  Location: [facility];  Service: Urology   • CYSTOSCOPY STENT PLACEMENT Right 11/7/2017    Procedure: CYSTOSCOPY STENT PLACEMENT;  Surgeon: Kiet Eid M.D.;  Location: [facility];  Service: Urology   • CYSTOSCOPY Right 9/17/2017    Procedure: CYSTOSCOPY;  Surgeon: Kiet Eid M.D.;  Location: [facility];  Service: Urology   • URETEROSCOPY Right 9/17/2017    Procedure: URETEROSCOPY;  Surgeon: Kiet Eid M.D.;  Location: [facility];  Service: Urology   • LASERTRIPSY Right 9/17/2017    Procedure: LASERTRIPSY;  Surgeon: Kiet Eid M.D.;  Location: [facility];  Service: Urology   • STENT PLACEMENT Right  2017    Procedure: STENT PLACEMENT;  Surgeon: Kiet Eid M.D.;  Location: [facility];  Service: Urology   • ABDOMINAL HYSTERECTOMY TOTAL     • OTHER ABDOMINAL SURGERY      abdominal tumor    • OTHER ABDOMINAL SURGERY     • ABDOMINAL EXPLORATION     • OTHER      eye surgeries X6   • OTHER      ear surgeries X5   • PRIMARY C SECTION      ///   • TONSILLECTOMY         Medications  No current facility-administered medications on file prior to encounter.      Current Outpatient Prescriptions on File Prior to Encounter   Medication Sig Dispense Refill   • guaiFENesin LA (MUCINEX) 600 MG TABLET SR 12 HR Take 1 Tab by mouth every 12 hours. 28 Tab 1       Family History  Family History   Problem Relation Age of Onset   • Cancer Mother    • Cancer Father    • Heart Disease Brother    • Diabetes Maternal Grandmother    • Cancer Maternal Grandfather    • Cancer Paternal Grandmother    • Cancer Paternal Grandfather        Social History  Social History   Substance Use Topics   • Smoking status: Former Smoker     Packs/day: 0.25     Years: 10.00     Types: Cigarettes     Quit date: 2013   • Smokeless tobacco: Never Used   • Alcohol use No      Comment: 1/month       Allergies  Allergies   Allergen Reactions   • Levaquin Hives, Shortness of Breath and Itching     Rxn - 01   • Levofloxacin Itching, Hives and Shortness of Breath     Other reaction(s): Itching, pruritis  Rxn - 01   • Morphine Itching     Other reaction(s): Itching, pruritis        Physical Exam  Laboratory   Hemodynamics  Temp (24hrs), Av.7 °C (98.1 °F), Min:36.7 °C (98 °F), Max:36.8 °C (98.2 °F)   Temperature: 36.8 °C (98.2 °F)  Pulse  Av  Min: 75  Max: 83    Blood Pressure: 126/83      Respiratory      Respiration: 18, Pulse Oximetry: 98 %             Physical Exam   Constitutional: She appears well-developed and well-nourished.   HENT:   Head: Normocephalic and atraumatic.   Eyes:  Conjunctivae and EOM are normal. No scleral icterus.   Neck: Normal range of motion. Neck supple.   Cardiovascular: Normal rate and regular rhythm.  Exam reveals no gallop and no friction rub.    No murmur heard.  Pulmonary/Chest: Effort normal and breath sounds normal. No respiratory distress. She has no wheezes. She has no rales.   Abdominal: Soft. Bowel sounds are normal. She exhibits distension (Mild). There is tenderness (Mild). There is no rebound and no guarding.   Musculoskeletal: She exhibits no edema or tenderness.   Neurological: She is alert.   Skin: Skin is warm.   Psychiatric: She has a normal mood and affect. Her behavior is normal.       Recent Labs      07/31/18   0947   WBC  13.9*   RBC  5.10   HEMOGLOBIN  14.1   HEMATOCRIT  43.4   MCV  85.1   MCH  27.6   MCHC  32.5*   RDW  43.7   PLATELETCT  457*   MPV  10.4     Recent Labs      07/31/18   0947   SODIUM  136   POTASSIUM  4.1   CHLORIDE  105   CO2  23   GLUCOSE  100*   BUN  17   CREATININE  0.96   CALCIUM  9.5     Recent Labs      07/31/18   0947   ALTSGPT  12   ASTSGOT  15   ALKPHOSPHAT  78   TBILIRUBIN  0.3   LIPASE  34   GLUCOSE  100*                 Lab Results   Component Value Date    TROPONINI <0.02 07/31/2018     Urinalysis:    Lab Results  Component Value Date/Time   SPECGRAVITY 1.020 07/31/2018 1010   GLUCOSEUR Negative 07/31/2018 1010   KETONES Negative 07/31/2018 1010   NITRITE Negative 07/31/2018 1010   WBCURINE 0-2 07/31/2018 1010   RBCURINE 0-2 07/31/2018 1010   BACTERIA Few (A) 07/31/2018 1010   EPITHELCELL Few 07/31/2018 1010        Imaging  Ct-abdomen-pelvis With    Result Date: 7/31/2018 7/31/2018 9:42 AM HISTORY/REASON FOR EXAM: Right lower quadrant pain. Recent abdominal surgery. TECHNIQUE/EXAM DESCRIPTION: CT scan of the abdomen and pelvis with contrast. Contrast-enhanced helical scanning was obtained from the diaphragmatic domes through the pubic symphysis following the bolus administration of 100 mL of Omnipaque 350  nonionic contrast without complication. Low dose optimization technique was utilized for this CT exam including automated exposure control and adjustment of the mA and/or kV according to patient size. COMPARISON: 6/10/2018 FINDINGS: CT Abdomen: There is no evidence of focal consolidation or pleural effusion seen within the lung bases. There is a stable subtle low density focus within the left lobe of the liver adjacent to the gallbladder fossa likely representing a small hemangioma. The spleen is normal. There is moderate right hydronephrosis present with likely surgical changes of an ileal conduit. The left kidney is normal. The adrenal glands are normal. The pancreas is normal. The aorta is normal in caliber. No evidence of retroperitoneal adenopathy. CT Pelvis: There are multiple thick-walled loops of small intestine within the right lower quadrant with inflammatory stranding within the surrounding mesentery as well as some ascites. There is mild dilatation of small intestine in that area. Surgical clips are also seen in that area. There are surgical changes consistent with midline incision. The appendix is visualized and appears normal.     1.  Multiple thick-walled loops of small intestine within the right lower quadrant with inflammatory change within the surrounding mesentery and some bowel dilatation as well as a small amount of ascites in that area. Differential diagnosis includes small bowel obstruction with bowel wall edema and possibly early ischemic change considering the bowel wall thickening. Other processes such as Crohn's disease or ileitis could have this appearance as well. 2.  Surgical change consistent with prior resection of the right ureter with an ileal conduit present. There is moderate right hydronephrosis again seen. 3.  Diverticulosis. 4.  Again seen small low-density focus within the left lobe of the liver likely representing a hemangioma. This was discussed with ZACKARY ASHBY at  11:30 AM on 7/31/2018.     Assessment/Plan     I anticipate this patient will require at least two midnights for appropriate medical management, necessitating inpatient admission.    * Small bowel obstruction (HCC)   Assessment & Plan    Had ileal conduit for ureteral stricture by Dr. Rangel in April 2018  2 week history of N/V and abdominal pain  Low grade fevers  Last 2 weeks was given antibiotics for presumtive UTI  Worsened last Saturday  Sent here by Dr. Rangel for evaluation  Dr. Wilks consulted  Bowel rest, IV fluids, famotidine, NGT ordered.  Jose Elias cover with antibiotics for now.             VTE prophylaxis: Pharmacologic.    I spent 72 minutes, reviewing the chart, notes, vitals, labs, imaging, ordering labs, evaluating Aurora Tan for assessment, enacting the plan above. 50% of the time was spent in counseling Aurora Tan and , answering questions. Discussed with ED physician. Medical decision making is therefore complex. Time was devoted to counseling and coordinating care including review of records, pertinent lab data and studies, as well as discussing diagnostic evaluation and work up, planned therapeutic interventions and future disposition of care. Where indicated, the assessment and plan reflect discussion of patient with consultants, other healthcare providers, family members, and additional research needed to obtain further information in formulating the plan of care for Aurora Tan.

## 2018-07-31 NOTE — PROGRESS NOTES
"Pt arrived to floor. Ambulated from gurney to bed, steady. Pt states 9/10 abdominal pain. States it \"cramps.\" 2 BMs noted today per pt. Pt informed on NPO status.   at bed side, pt notified to call for assistance.   "

## 2018-07-31 NOTE — PROGRESS NOTES
Will see later today   Has sbo   Needs NG decompression   Colon evacuation   prokinetics   Minimal if any narcotics   Strict NPO   Hope to resolve with conservative management

## 2018-07-31 NOTE — PROGRESS NOTES
Yoan GRIMALDO, awaiting call back. Pt refusing NG tube at this time, would like to speak with the doctor.

## 2018-07-31 NOTE — ED PROVIDER NOTES
"ED Provider Note    CHIEF COMPLAINT  Chief Complaint   Patient presents with   • RLQ Pain     Pt c/o RLQ pain with N/V/D, onset x 3 days. pt with recent abd surgery 2 months ago.    • Nausea/Vomiting/Diarrhea       HPI  Aurora Tan is a 48 y.o. female who presents with abdominal pain.  This started with vomiting on Saturday she had multiple episodes for 2 hours then just did not feel well was in bed she has had some loose bowel movements that were watery this morning.  She has had some discomfort in the right lower quadrant and she seen her urologist.  She had fever last week.  She worsened this week she saw the urologist or call the urologist today and was sent in for evaluation.  She has decreased p.o. intake pains primarily right lower quadrant suprapubic not made worse by walking but she bends over walking is better when she is laying flat.  She has had some degree of right flank lower quadrant pain for couple weeks.  She does feel diffusely weak.  No fever no chills no dysuria she has some decreased urine output and frequency.  No upper respiratory symptoms.  She has a history of ureteral stricture that was repaired and a failed repair and had a ileal graft placed the develops mucus and causes chronic UTIs.  She has been on multiple antibiotics.  She has never had her appendix out.  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   • Renal disorder 04/25/2018    \"Nephrostomy tube in place\"   • Snoring 04/25/2018    Has not had a sleep study   • Urinary incontinence  "       FAMILY HISTORY  Family History   Problem Relation Age of Onset   • Cancer Mother    • Cancer Father    • Heart Disease Brother    • Diabetes Maternal Grandmother    • Cancer Maternal Grandfather    • Cancer Paternal Grandmother    • Cancer Paternal Grandfather        SOCIAL HISTORY  Social History     Social History   • Marital status:      Spouse name: N/A   • Number of children: N/A   • Years of education: N/A     Social History Main Topics   • Smoking status: Former Smoker     Packs/day: 0.25     Years: 10.00     Types: Cigarettes     Quit date: 4/13/2013   • Smokeless tobacco: Never Used   • Alcohol use No      Comment: 1/month   • Drug use: No   • Sexual activity: Not on file     Other Topics Concern   • Not on file     Social History Narrative   • No narrative on file       SURGICAL HISTORY  Past Surgical History:   Procedure Laterality Date   • EXPLORATORY LAPAROTOMY Right 4/30/2018    Procedure: EXPLORATORY LAPAROTOMY W/ILEAL URETER;  Surgeon: Kapil Rangel M.D.;  Location: [facility];  Service: Urology   • LYSIS ADHESIONS GENERAL  4/30/2018    Procedure: LYSIS ADHESIONS GENERAL;  Surgeon: Kapil Rangel M.D.;  Location: SURGERY [facility];  Service: Urology   • CYSTOSCOPY STENT PLACEMENT Right 3/21/2018    Procedure: CYSTOSCOPY - STENT REMOVAL;  Surgeon: Terry Maldonado M.D.;  Location: [facility];  Service: Urology   • URETEROSCOPY Right 3/21/2018    Procedure: URETEROSCOPY- DIAGNOSTIC;  Surgeon: Terry Maldonado M.D.;  Location: [facility];  Service: Urology   • RETROGRADES Right 3/21/2018    Procedure: RETROGRADES;  Surgeon: Terry Maldonado M.D.;  Location: SURGERY [facility];  Service: Urology   • PYELOGRAM Right 3/21/2018    Procedure: PYELOGRAM;  Surgeon: Terry Maldonado M.D.;  Location: SURGERY [facility];  Service: Urology   • CYSTOSCOPY N/A 1/31/2018    Procedure: CYSTOSCOPY;  Surgeon: Terry Maldonado M.D.;  Location: SURGERY  [facility];  Service: Urology   • RETROGRADES Right 1/31/2018    Procedure: RETROGRADES;  Surgeon: Terry Maldonado M.D.;  Location: SURGERY [facility];  Service: Urology   • PYELOGRAM Right 1/31/2018    Procedure: PYELOGRAM;  Surgeon: Terry Maldonado M.D.;  Location: SURGERY [facility];  Service: Urology   • URETEROSCOPY Right 1/31/2018    Procedure: URETEROSCOPY, INCISIONAL OR DILATION OF STRICTURE  ;  Surgeon: Terry Maldonado M.D.;  Location: SURGERY [facility];  Service: Urology   • LASERTRIPSY Right 1/31/2018    Procedure: LASERTRIPSY;  Surgeon: Terry Maldonado M.D.;  Location: [facility];  Service: Urology   • STENT PLACEMENT Right 1/31/2018    Procedure: STENT PLACEMENT;  Surgeon: Terry Maldonado M.D.;  Location: SURGERY [facility];  Service: Urology   • URETEROSCOPY Right 11/7/2017    Procedure: URETEROSCOPY;  Surgeon: Kiet Eid M.D.;  Location: [facility];  Service: Urology   • LASERTRIPSY Right 11/7/2017    Procedure: LASERTRIPSY- LITHO;  Surgeon: Kiet Eid M.D.;  Location: [facility];  Service: Urology   • URETHRAL DILATATION Right 11/7/2017    Procedure: URETHRAL DILATATION;  Surgeon: Kiet Eid M.D.;  Location: [facility];  Service: Urology   • CYSTOSCOPY STENT PLACEMENT Right 11/7/2017    Procedure: CYSTOSCOPY STENT PLACEMENT;  Surgeon: Kiet Eid M.D.;  Location: SURGERY [facility];  Service: Urology   • CYSTOSCOPY Right 9/17/2017    Procedure: CYSTOSCOPY;  Surgeon: Kiet Eid M.D.;  Location: SURGERY [facility];  Service: Urology   • URETEROSCOPY Right 9/17/2017    Procedure: URETEROSCOPY;  Surgeon: Kiet Eid M.D.;  Location: SURGERY [facility];  Service: Urology   • LASERTRIPSY Right 9/17/2017    Procedure: LASERTRIPSY;  Surgeon: Kiet Eid M.D.;  Location: SURGERY [facility];  Service: Urology   • STENT PLACEMENT Right 9/17/2017     "Procedure: STENT PLACEMENT;  Surgeon: Kiet Eid M.D.;  Location: SURGERY [facility];  Service: Urology   • ABDOMINAL HYSTERECTOMY TOTAL  2009   • OTHER ABDOMINAL SURGERY  2009    abdominal tumor    • OTHER ABDOMINAL SURGERY  2009   • ABDOMINAL EXPLORATION     • OTHER      eye surgeries X6   • OTHER      ear surgeries X5   • PRIMARY C SECTION      1989/1991/1997/1999   • TONSILLECTOMY         CURRENT MEDICATIONS  Home Medications     Reviewed by Rich Aranda (Pharmacy Tech) on 07/31/18 at 1119  Med List Status: Complete   Medication Last Dose Status   cefpodoxime (VANTIN) 200 MG Tab 7/23/2018 Active   cefTRIAXone (ROCEPHIN) 2 GM Recon Soln 7/17/2018 Active   estradiol (MINIVELLE) 0.05 MG/24HR PATCH BIWEEKLY 7/26/2018 Active   fluconazole (DIFLUCAN) 150 MG tablet 7/18/2018 Active   guaiFENesin LA (MUCINEX) 600 MG TABLET SR 12 HR 7/31/2018 Active                ALLERGIES  Allergies   Allergen Reactions   • Levaquin Hives, Shortness of Breath and Itching     Rxn - 9/11/01   • Levofloxacin Itching, Hives and Shortness of Breath     Other reaction(s): Itching, pruritis  Rxn - 9/11/01   • Morphine Itching     Other reaction(s): Itching, pruritis       PHYSICAL EXAM  VITAL SIGNS: /83   Pulse 75   Temp 36.8 °C (98.2 °F)   Resp 18   Ht 1.702 m (5' 7\")   Wt 87.1 kg (192 lb 0.3 oz)   LMP  (LMP Unknown)   SpO2 98%   BMI 30.07 kg/m²     Constitutional: Patient is alert and oriented x3 in mild distress   HENT: Moist mucous membranes  Eyes:   No conjunctivitis or icterus  Neck: Trach is midline no palpable thyroid  Lymphatic: Negative anterior cervical lymphadenopathy  Cardiovascular: Normal heart rate    Thorax & Lungs: Clear to auscultation  Back: No CVA tenderness  Abdomen: Midline surgical scar well-healed.  Bowel sounds are present.  Moderately obese abdomen.  She has reproducible right lower quadrant tenderness and to a lesser degree suprapubic tenderness on exam.  No guarding " rigidity.  Neurologic: Normal motor sensation  Extremities: No edema  Psychiatric: Affect normal, Judgment normal, Mood normal.     Results for orders placed or performed during the hospital encounter of 07/31/18   CBC WITH DIFFERENTIAL   Result Value Ref Range    WBC 13.9 (H) 4.8 - 10.8 K/uL    RBC 5.10 4.20 - 5.40 M/uL    Hemoglobin 14.1 12.0 - 16.0 g/dL    Hematocrit 43.4 37.0 - 47.0 %    MCV 85.1 81.4 - 97.8 fL    MCH 27.6 27.0 - 33.0 pg    MCHC 32.5 (L) 33.6 - 35.0 g/dL    RDW 43.7 35.9 - 50.0 fL    Platelet Count 457 (H) 164 - 446 K/uL    MPV 10.4 9.0 - 12.9 fL    Neutrophils-Polys 75.40 (H) 44.00 - 72.00 %    Lymphocytes 17.40 (L) 22.00 - 41.00 %    Monocytes 4.90 0.00 - 13.40 %    Eosinophils 1.60 0.00 - 6.90 %    Basophils 0.40 0.00 - 1.80 %    Immature Granulocytes 0.30 0.00 - 0.90 %    Nucleated RBC 0.00 /100 WBC    Neutrophils (Absolute) 10.48 (H) 2.00 - 7.15 K/uL    Lymphs (Absolute) 2.41 1.00 - 4.80 K/uL    Monos (Absolute) 0.68 0.00 - 0.85 K/uL    Eos (Absolute) 0.22 0.00 - 0.51 K/uL    Baso (Absolute) 0.05 0.00 - 0.12 K/uL    Immature Granulocytes (abs) 0.04 0.00 - 0.11 K/uL    NRBC (Absolute) 0.00 K/uL   COMP METABOLIC PANEL   Result Value Ref Range    Sodium 136 135 - 145 mmol/L    Potassium 4.1 3.6 - 5.5 mmol/L    Chloride 105 96 - 112 mmol/L    Co2 23 20 - 33 mmol/L    Anion Gap 8.0 0.0 - 11.9    Glucose 100 (H) 65 - 99 mg/dL    Bun 17 8 - 22 mg/dL    Creatinine 0.96 0.50 - 1.40 mg/dL    Calcium 9.5 8.4 - 10.2 mg/dL    AST(SGOT) 15 12 - 45 U/L    ALT(SGPT) 12 2 - 50 U/L    Alkaline Phosphatase 78 30 - 99 U/L    Total Bilirubin 0.3 0.1 - 1.5 mg/dL    Albumin 3.8 3.2 - 4.9 g/dL    Total Protein 7.2 6.0 - 8.2 g/dL    Globulin 3.4 1.9 - 3.5 g/dL    A-G Ratio 1.1 g/dL   TROPONIN   Result Value Ref Range    Troponin I <0.02 0.00 - 0.04 ng/mL   LIPASE   Result Value Ref Range    Lipase 34 7 - 58 U/L   LACTIC ACID   Result Value Ref Range    Lactic Acid 1.2 0.5 - 2.0 mmol/L   URINALYSIS CULTURE, IF  INDICATED   Result Value Ref Range    Color Yellow     Character Clear     Specific Gravity 1.020 <1.035    Ph 6.0 5.0 - 8.0    Glucose Negative Negative mg/dL    Ketones Negative Negative mg/dL    Protein Negative Negative mg/dL    Bilirubin Negative Negative    Nitrite Negative Negative    Leukocyte Esterase Negative Negative    Occult Blood Trace (A) Negative    Micro Urine Req Microscopic    URINE MICROSCOPIC (W/UA)   Result Value Ref Range    WBC 0-2 /hpf    RBC 0-2 /hpf    Bacteria Few (A) None /hpf    Epithelial Cells Few Few /hpf    Mucous Threads Moderate /hpf    Urine Crystals Rare Amorphous /hpf   ESTIMATED GFR   Result Value Ref Range    GFR If African American >60 >60 mL/min/1.73 m 2    GFR If Non African American >60 >60 mL/min/1.73 m 2      CT-ABDOMEN-PELVIS WITH   Final Result      1.  Multiple thick-walled loops of small intestine within the right lower quadrant with inflammatory change within the surrounding mesentery and some bowel dilatation as well as a small amount of ascites in that area. Differential diagnosis includes    small bowel obstruction with bowel wall edema and possibly early ischemic change considering the bowel wall thickening. Other processes such as Crohn's disease or ileitis could have this appearance as well.      2.  Surgical change consistent with prior resection of the right ureter with an ileal conduit present. There is moderate right hydronephrosis again seen.      3.  Diverticulosis.      4.  Again seen small low-density focus within the left lobe of the liver likely representing a hemangioma.      This was discussed with ZACKARY ASHBY at 11:30 AM on 7/31/2018.              COURSE & MEDICAL DECISION MAKING  Pertinent Labs & Imaging studies reviewed. (See chart for details)    Patient has a complicated urologic surgical history.  Dr. landry feels is less likely to be urologic and more likely to be GI and that is why she was sent in for evaluation I will assess the urine  as well in the ER she also has been on antibiotics so I am ordering C. difficile.  We are getting CT and lab work to assess right lower quadrant pain.  I am given the patient morphine and Zofran in the ER.    Patient has elevated white count and right lower quadrant tenderness.  CT was read by radiology Dr. Paz she called me.  He is concerned about small bowel obstruction and possible ischemia of the bowel.  I am contacting Dr. Velasco of surgery for consultation.    FINAL IMPRESSION  1.   1. Right lower quadrant abdominal pain    2. SBO (small bowel obstruction) (HCC)        2.   3.         Electronically signed by: Earnest Monge, 7/31/2018 10:09 AM

## 2018-07-31 NOTE — PROGRESS NOTES
Informed pt on the need for an NG tube, educated pt. Pt crying stating she would like to talk to the doctor. Informed pt that this RN will page MD and notify him.     Pt currently on the phone trying to reach her doctor.

## 2018-07-31 NOTE — ASSESSMENT & PLAN NOTE
Recent extensive urologic surgery with intermittent GI symptoms since  Symptoms have resolved  Small bowel follow-through is negative  Advancing diet. Okay to discontinue NG tube  General surgical, urologic consultations appreciable

## 2018-08-01 ENCOUNTER — APPOINTMENT (OUTPATIENT)
Dept: RADIOLOGY | Facility: MEDICAL CENTER | Age: 48
DRG: 389 | End: 2018-08-01
Attending: SURGERY
Payer: COMMERCIAL

## 2018-08-01 LAB
ALBUMIN SERPL BCP-MCNC: 3.4 G/DL (ref 3.2–4.9)
ALBUMIN/GLOB SERPL: 1.3 G/DL
ALP SERPL-CCNC: 66 U/L (ref 30–99)
ALT SERPL-CCNC: 10 U/L (ref 2–50)
ANION GAP SERPL CALC-SCNC: 5 MMOL/L (ref 0–11.9)
AST SERPL-CCNC: 10 U/L (ref 12–45)
BILIRUB SERPL-MCNC: 0.4 MG/DL (ref 0.1–1.5)
BUN SERPL-MCNC: 14 MG/DL (ref 8–22)
CALCIUM SERPL-MCNC: 8.3 MG/DL (ref 8.4–10.2)
CHLORIDE SERPL-SCNC: 113 MMOL/L (ref 96–112)
CO2 SERPL-SCNC: 22 MMOL/L (ref 20–33)
CREAT SERPL-MCNC: 0.98 MG/DL (ref 0.5–1.4)
ERYTHROCYTE [DISTWIDTH] IN BLOOD BY AUTOMATED COUNT: 43.8 FL (ref 35.9–50)
GLOBULIN SER CALC-MCNC: 2.7 G/DL (ref 1.9–3.5)
GLUCOSE SERPL-MCNC: 94 MG/DL (ref 65–99)
HCT VFR BLD AUTO: 37.2 % (ref 37–47)
HGB BLD-MCNC: 12.1 G/DL (ref 12–16)
MCH RBC QN AUTO: 27.9 PG (ref 27–33)
MCHC RBC AUTO-ENTMCNC: 32.5 G/DL (ref 33.6–35)
MCV RBC AUTO: 85.9 FL (ref 81.4–97.8)
PLATELET # BLD AUTO: 386 K/UL (ref 164–446)
PMV BLD AUTO: 10.6 FL (ref 9–12.9)
POTASSIUM SERPL-SCNC: 3.6 MMOL/L (ref 3.6–5.5)
PROT SERPL-MCNC: 6.1 G/DL (ref 6–8.2)
RBC # BLD AUTO: 4.33 M/UL (ref 4.2–5.4)
SODIUM SERPL-SCNC: 140 MMOL/L (ref 135–145)
WBC # BLD AUTO: 6.7 K/UL (ref 4.8–10.8)

## 2018-08-01 PROCEDURE — 80053 COMPREHEN METABOLIC PANEL: CPT

## 2018-08-01 PROCEDURE — 700111 HCHG RX REV CODE 636 W/ 250 OVERRIDE (IP): Performed by: SURGERY

## 2018-08-01 PROCEDURE — 85027 COMPLETE CBC AUTOMATED: CPT

## 2018-08-01 PROCEDURE — 700111 HCHG RX REV CODE 636 W/ 250 OVERRIDE (IP): Performed by: INTERNAL MEDICINE

## 2018-08-01 PROCEDURE — 36415 COLL VENOUS BLD VENIPUNCTURE: CPT

## 2018-08-01 PROCEDURE — 74250 X-RAY XM SM INT 1CNTRST STD: CPT

## 2018-08-01 PROCEDURE — 700101 HCHG RX REV CODE 250: Performed by: INTERNAL MEDICINE

## 2018-08-01 PROCEDURE — 700102 HCHG RX REV CODE 250 W/ 637 OVERRIDE(OP): Performed by: SURGERY

## 2018-08-01 PROCEDURE — 700105 HCHG RX REV CODE 258: Performed by: INTERNAL MEDICINE

## 2018-08-01 PROCEDURE — 770006 HCHG ROOM/CARE - MED/SURG/GYN SEMI*

## 2018-08-01 PROCEDURE — 99232 SBSQ HOSP IP/OBS MODERATE 35: CPT | Performed by: INTERNAL MEDICINE

## 2018-08-01 RX ADMIN — CEFTRIAXONE SODIUM 2 G: 2 INJECTION, POWDER, FOR SOLUTION INTRAMUSCULAR; INTRAVENOUS at 05:11

## 2018-08-01 RX ADMIN — METRONIDAZOLE 500 MG: 500 INJECTION, SOLUTION INTRAVENOUS at 05:15

## 2018-08-01 RX ADMIN — METRONIDAZOLE 500 MG: 500 INJECTION, SOLUTION INTRAVENOUS at 13:32

## 2018-08-01 RX ADMIN — CHLORASEPTIC 1 SPRAY: 1.5 LIQUID ORAL at 13:40

## 2018-08-01 RX ADMIN — ONDANSETRON HYDROCHLORIDE 4 MG: 2 INJECTION, SOLUTION INTRAMUSCULAR; INTRAVENOUS at 05:19

## 2018-08-01 RX ADMIN — METRONIDAZOLE 500 MG: 500 INJECTION, SOLUTION INTRAVENOUS at 21:03

## 2018-08-01 RX ADMIN — METOCLOPRAMIDE 10 MG: 5 INJECTION, SOLUTION INTRAMUSCULAR; INTRAVENOUS at 05:08

## 2018-08-01 RX ADMIN — SODIUM CHLORIDE: 9 INJECTION, SOLUTION INTRAVENOUS at 06:08

## 2018-08-01 RX ADMIN — CHLORASEPTIC 1 SPRAY: 1.5 LIQUID ORAL at 00:51

## 2018-08-01 ASSESSMENT — ENCOUNTER SYMPTOMS
HEADACHES: 0
CHILLS: 0
WEAKNESS: 0
ABDOMINAL PAIN: 0
TINGLING: 0
NERVOUS/ANXIOUS: 1
NAUSEA: 0
MYALGIAS: 0
FEVER: 0
SORE THROAT: 1
SHORTNESS OF BREATH: 0
ABDOMINAL PAIN: 1
VOMITING: 0
DIZZINESS: 0
FLANK PAIN: 0

## 2018-08-01 ASSESSMENT — PAIN SCALES - GENERAL
PAINLEVEL_OUTOF10: 0

## 2018-08-01 ASSESSMENT — PATIENT HEALTH QUESTIONNAIRE - PHQ9
1. LITTLE INTEREST OR PLEASURE IN DOING THINGS: NOT AT ALL
2. FEELING DOWN, DEPRESSED, IRRITABLE, OR HOPELESS: NOT AT ALL
SUM OF ALL RESPONSES TO PHQ9 QUESTIONS 1 AND 2: 0

## 2018-08-01 NOTE — PROGRESS NOTES
Dr. Torres at bedside, made aware of small bowel series, okay to remove NG tube. Dr. Castillo also contacted by .

## 2018-08-01 NOTE — PROGRESS NOTES
Received report from night shift and assumed care of pt. Pt is a&ox4, no complaints of pain at this time, but is anxious to have NG tube d/c'd. Pt to have small bowel study with contrast today. Updated pt on POC and will continue with care.

## 2018-08-01 NOTE — PROGRESS NOTES
NG tube removed, pt is in good spirits. Clear liquid diet has been ordered and pt is having ice chips at this time. Spouse and friend at bedside.

## 2018-08-01 NOTE — CARE PLAN
Problem: Knowledge Deficit  Goal: Knowledge of disease process/condition, treatment plan, diagnostic tests, and medications will improve  Outcome: NOT MET  Educated pt on the importance of the NG tube, pt refusing at this time.     Problem: Pain Management  Goal: Pain level will decrease to patient's comfort goal  Outcome: PROGRESSING AS EXPECTED  Pt notified to inform the RN of any pain greater than comfort goal.

## 2018-08-01 NOTE — CONSULTS
UROLOGY Consult Note:    Isael Franks  Date & Time note created:    8/1/2018   3:36 PM     Referring MD:  Dr. Torres    Patient ID:   Name:             Aurora Tan   YOB: 1970  Age:                 48 y.o.  female   MRN:               [id number]                                                             Reason for Consult:      Abdominal pain    History of Present Illness:    Consult requested for this pleasant 49 yo who is well known to our office. She has a history of right ureteral stricture that was not amendable to laser incision or balloon dilatation and ultimately 3 months ago she underwent ileal ureter placement with Dr. Rangel which apparently was a complicated procedure and required extensive lysis of adhesions. She does state following her procedure she has had febrile urinary tract infections but that this was something she was counseled about by Dr. Rangel. Unfortunately, she develops diarrhea following her antibiotic courses. Despite this, she was doing well until a couple of weeks ago when she developed lower abdominal pain, predominately on the right but extending toward the center that was associated with nausea and vomiting. The pain intensified and yesterday, with the advice of Dr. Rangel, she decided to proceed to the ER. She reports having regular bowel movements through that time, sometimes liquid. Her diet has not been altered. She has not had fevers, sweats or chills and has been voiding without irritative symptoms. In the ER she underwent CT imaging which was suggestive of a SBO or possibly some other GI process. She underwent placement of an NG tube upon admission and general surgery was consulted. There is question as to an underlying post surgical  complication that could be contributing and thus urology has been consulted. At this time, the patient is feeling much better. Her pain is resolving. The NG tube has been removed and she has been started on PO  "liquids. A small bowel follow through has been ordered.       Review of Systems:      FROS completed and is negative with the exception of that being reported in the HPI.     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   • Renal disorder 04/25/2018    \"Nephrostomy tube in place\"   • Snoring 04/25/2018    Has not had a sleep study   • Urinary incontinence      Active Hospital Problems    Diagnosis   • Small bowel obstruction (HCC) [K56.609]     Priority: High       Past Surgical History:  Past Surgical History:   Procedure Laterality Date   • EXPLORATORY LAPAROTOMY Right 4/30/2018    Procedure: EXPLORATORY LAPAROTOMY W/ILEAL URETER;  Surgeon: Kapil Rangel M.D.;  Location: [facility];  Service: Urology   • LYSIS ADHESIONS GENERAL  4/30/2018    Procedure: LYSIS ADHESIONS GENERAL;  Surgeon: Kpail Rangel M.D.;  Location: [facility];  Service: Urology   • CYSTOSCOPY STENT PLACEMENT Right 3/21/2018    Procedure: CYSTOSCOPY - STENT REMOVAL;  Surgeon: Terry Maldonado M.D.;  Location: [facility];  Service: Urology   • URETEROSCOPY Right 3/21/2018    Procedure: URETEROSCOPY- DIAGNOSTIC;  Surgeon: Terry Maldonado M.D.;  Location: [facility];  Service: Urology   • RETROGRADES Right 3/21/2018    Procedure: RETROGRADES;  Surgeon: Terry Maldonado M.D.;  Location: [facility];  Service: Urology   • PYELOGRAM Right 3/21/2018    Procedure: PYELOGRAM;  Surgeon: Terry Maldonado M.D.;  Location: [facility];  Service: Urology   • CYSTOSCOPY N/A 1/31/2018    Procedure: CYSTOSCOPY;  Surgeon: Terry DOMINGO" BRIANNA Maldonado;  Location: [facility];  Service: Urology   • RETROGRADES Right 1/31/2018    Procedure: RETROGRADES;  Surgeon: Terry Maldonado M.D.;  Location: [facility];  Service: Urology   • PYELOGRAM Right 1/31/2018    Procedure: PYELOGRAM;  Surgeon: Terry Maldonado M.D.;  Location: [facility];  Service: Urology   • URETEROSCOPY Right 1/31/2018    Procedure: URETEROSCOPY, INCISIONAL OR DILATION OF STRICTURE  ;  Surgeon: Terry Maldonado M.D.;  Location: [facility];  Service: Urology   • LASERTRIPSY Right 1/31/2018    Procedure: LASERTRIPSY;  Surgeon: Terry Maldonado M.D.;  Location: [facility];  Service: Urology   • STENT PLACEMENT Right 1/31/2018    Procedure: STENT PLACEMENT;  Surgeon: Terry Maldonado M.D.;  Location: [facility];  Service: Urology   • URETEROSCOPY Right 11/7/2017    Procedure: URETEROSCOPY;  Surgeon: Kiet Eid M.D.;  Location: [facility];  Service: Urology   • LASERTRIPSY Right 11/7/2017    Procedure: LASERTRIPSY- LITHO;  Surgeon: Kiet Eid M.D.;  Location: SUR[facility];  Service: Urology   • URETHRAL DILATATION Right 11/7/2017    Procedure: URETHRAL DILATATION;  Surgeon: Kiet Eid M.D.;  Location: SUR[facility];  Service: Urology   • CYSTOSCOPY STENT PLACEMENT Right 11/7/2017    Procedure: CYSTOSCOPY STENT PLACEMENT;  Surgeon: Kiet Eid M.D.;  Location: [facility];  Service: Urology   • CYSTOSCOPY Right 9/17/2017    Procedure: CYSTOSCOPY;  Surgeon: Kiet Eid M.D.;  Location: [facility];  Service: Urology   • URETEROSCOPY Right 9/17/2017    Procedure: URETEROSCOPY;  Surgeon: Kiet Eid M.D.;  Location: [facility];  Service: Urology   • LASERTRIPSY Right 9/17/2017    Procedure: LASERTRIPSY;  Surgeon: Kiet Eid M.D.;  Location: SUR[facility];  Service: Urology   • STENT  PLACEMENT Right 9/17/2017    Procedure: STENT PLACEMENT;  Surgeon: Kiet Eid M.D.;  Location: [facility];  Service: Urology   • ABDOMINAL HYSTERECTOMY TOTAL  2009   • OTHER ABDOMINAL SURGERY  2009    abdominal tumor    • OTHER ABDOMINAL SURGERY  2009   • ABDOMINAL EXPLORATION     • OTHER      eye surgeries X6   • OTHER      ear surgeries X5   • PRIMARY C SECTION      1989/1991/1997/1999   • TONSILLECTOMY         Hospital Medications:    Current Facility-Administered Medications:   •  sore throat spray (CHLORASEPTIC) 1 Spray, 1 Spray, Mouth/Throat, Q2HRS PRN, Ross Mendoza M.D., 1 Spray at 08/01/18 1340  •  [DISCONTINUED] senna-docusate (PERICOLACE or SENOKOT S) 8.6-50 MG per tablet 2 Tab, 2 Tab, Oral, BID PRN **AND** [DISCONTINUED] polyethylene glycol/lytes (MIRALAX) PACKET 1 Packet, 1 Packet, Oral, QDAY PRN **AND** [DISCONTINUED] magnesium hydroxide (MILK OF MAGNESIA) suspension 30 mL, 30 mL, Oral, QDAY PRN **AND** bisacodyl (DULCOLAX) suppository 10 mg, 10 mg, Rectal, QDAY PRN, Ryan Bedolla M.D.  •  enoxaparin (LOVENOX) inj 40 mg, 40 mg, Subcutaneous, DAILY, Ryan Bedolla M.D.  •  Notify provider if pain remains uncontrolled, , , CONTINUOUS **AND** Use the numeric rating scale (NRS-11) on regular floors and Critical-Care Pain Observation Tool (CPOT) on ICUs/Trauma to assess pain, , , CONTINUOUS **AND** Pulse Ox (Oximetry), , , CONTINUOUS **AND** Pharmacy Consult Request ...Pain Management Review, , Other, PRN **AND** If patient difficult to arouse and/or has respiratory depression, stop any opiates that are currently infusing and call a Rapid Response., , , CONTINUOUS **AND** [DISCONTINUED] oxyCODONE immediate-release (ROXICODONE) tablet 5 mg, 5 mg, Oral, Q3HRS PRN, 5 mg at 07/31/18 1435 **AND** [DISCONTINUED] oxyCODONE immediate-release (ROXICODONE) tablet 10 mg, 10 mg, Oral, Q3HRS PRN **AND** HYDROmorphone (DILAUDID) injection 0.5 mg, 0.5 mg, Intravenous, Q3HRS PRN, Ryan MARAVILLA  BRIANNA Bedolla  •  ondansetron (ZOFRAN) syringe/vial injection 4 mg, 4 mg, Intravenous, Q4HRS PRN, Ryan Bedolla M.D., 4 mg at 08/01/18 0519  •  ondansetron (ZOFRAN ODT) dispertab 4 mg, 4 mg, Oral, Q4HRS PRN, Ryan Bedolla M.D.  •  promethazine (PHENERGAN) suppository 12.5-25 mg, 12.5-25 mg, Rectal, Q4HRS PRN, Ryan Bedolla M.D.  •  prochlorperazine (COMPAZINE) injection 5-10 mg, 5-10 mg, Intravenous, Q4HRS PRN, Ryan Bedolla M.D.  •  NS (BOLUS) infusion 500 mL, 500 mL, Intravenous, Once PRN, Ryan Bedolla M.D.  •  cefTRIAXone (ROCEPHIN) 2 g in  mL IVPB, 2 g, Intravenous, Q24HRS, Ryan Bedolla M.D., Stopped at 08/01/18 0541  •  metroNIDAZOLE (FLAGYL) IVPB 500 mg, 500 mg, Intravenous, Q8HRS, Ryan Bedolla M.D., Last Rate: 100 mL/hr at 08/01/18 1332, 500 mg at 08/01/18 1332  •  metoclopramide (REGLAN) injection 10 mg, 10 mg, Intravenous, Q6HRS, Ross Mendoza M.D., 10 mg at 08/01/18 0508  •  famotidine (PEPCID) tablet 20 mg, 20 mg, Oral, BID, Ryan Bedolla M.D., Stopped at 08/01/18 0600  •  ketorolac (TORADOL) injection 30 mg, 30 mg, Intravenous, Q6HRS PRN, Ross Mendoza M.D., 30 mg at 07/31/18 1909  •  diphenhydrAMINE (BENADRYL) injection 50 mg, 50 mg, Intravenous, HS PRN, Ross Mendoza M.D., 50 mg at 07/31/18 2212    Current Outpatient Medications:  Prescriptions Prior to Admission   Medication Sig Dispense Refill Last Dose   • guaiFENesin LA (MUCINEX) 600 MG TABLET SR 12 HR Take 1 Tab by mouth every 12 hours. 28 Tab 1 7/31/2018 at 0900       Medication Allergy:  Allergies   Allergen Reactions   • Levaquin Hives, Shortness of Breath and Itching     Rxn - 9/11/01   • Levofloxacin Itching, Hives and Shortness of Breath     Other reaction(s): Itching, pruritis  Rxn - 9/11/01   • Morphine Itching     Other reaction(s): Itching, pruritis       Family History:  Family History   Problem Relation Age of Onset   • Cancer Mother    • Cancer Father    • Heart Disease Brother    • Diabetes  "Maternal Grandmother    • Cancer Maternal Grandfather    • Cancer Paternal Grandmother    • Cancer Paternal Grandfather        Social History:  Social History     Social History   • Marital status:      Spouse name: N/A   • Number of children: N/A   • Years of education: N/A     Occupational History   • Not on file.     Social History Main Topics   • Smoking status: Former Smoker     Packs/day: 0.25     Years: 10.00     Types: Cigarettes     Quit date: 4/13/2013   • Smokeless tobacco: Never Used   • Alcohol use No      Comment: 1/month   • Drug use: No   • Sexual activity: Not on file     Other Topics Concern   • Not on file     Social History Narrative   • No narrative on file         Physical Exam:  Vitals/ General Appearance:   Weight/BMI: Body mass index is 30.07 kg/m².  Blood pressure 144/61, pulse 68, temperature 36.4 °C (97.5 °F), resp. rate 18, height 1.702 m (5' 7\"), weight 87.1 kg (192 lb 0.3 oz), SpO2 100 %, not currently breastfeeding.  Vitals:    07/31/18 2000 08/01/18 0100 08/01/18 0700 08/01/18 1400   BP: 110/72 116/64 116/64 144/61   Pulse: 67 60 63 68   Resp: 18 18 18 18   Temp: 36.6 °C (97.9 °F) 36.6 °C (97.9 °F) 36.6 °C (97.8 °F) 36.4 °C (97.5 °F)   SpO2: 95% 97% 95% 100%   Weight:       Height:         Oxygen Therapy:  Pulse Oximetry: 100 %, O2 (LPM): 0, O2 Delivery: None (Room Air)    Constitutional:   Well developed, Well nourished, No acute distress  HENMT:  Normocephalic, Atraumatic, Oropharynx moist mucous membranes, No oral exudates, Nose normal.  Eyes:  EOMI, Conjunctiva normal, No discharge.  Neck:  Normal range of motion  Cardiovascular:  Normal heart rate, Normal rhythm, No murmurs, No rubs, No gallops.   Extremitites with intact distal pulses, no cyanosis, or edema.  Lungs:  Normal breath sounds, breath sounds clear to auscultation bilaterally,  no crackles, no wheezing.   Abdomen: Bowel sounds normal, Soft, Mildly TTP RLQ to mid lower abdomen. No rebound or guarding. No masses " or distension.  : - CVAT  Skin: Warm, Dry, No erythema, No rash, no induration.  Neurologic: Alert & oriented x 3  Psychiatric: Affect normal, Judgment normal, Mood normal.      MDM (Data Review):     Records reviewed and summarized in current documentation    Lab Data Review:  Recent Results (from the past 24 hour(s))   CBC without Differential    Collection Time: 08/01/18  4:14 AM   Result Value Ref Range    WBC 6.7 4.8 - 10.8 K/uL    RBC 4.33 4.20 - 5.40 M/uL    Hemoglobin 12.1 12.0 - 16.0 g/dL    Hematocrit 37.2 37.0 - 47.0 %    MCV 85.9 81.4 - 97.8 fL    MCH 27.9 27.0 - 33.0 pg    MCHC 32.5 (L) 33.6 - 35.0 g/dL    RDW 43.8 35.9 - 50.0 fL    Platelet Count 386 164 - 446 K/uL    MPV 10.6 9.0 - 12.9 fL   Comp Metabolic Panel (CMP)    Collection Time: 08/01/18  4:14 AM   Result Value Ref Range    Sodium 140 135 - 145 mmol/L    Potassium 3.6 3.6 - 5.5 mmol/L    Chloride 113 (H) 96 - 112 mmol/L    Co2 22 20 - 33 mmol/L    Anion Gap 5.0 0.0 - 11.9    Glucose 94 65 - 99 mg/dL    Bun 14 8 - 22 mg/dL    Creatinine 0.98 0.50 - 1.40 mg/dL    Calcium 8.3 (L) 8.4 - 10.2 mg/dL    AST(SGOT) 10 (L) 12 - 45 U/L    ALT(SGPT) 10 2 - 50 U/L    Alkaline Phosphatase 66 30 - 99 U/L    Total Bilirubin 0.4 0.1 - 1.5 mg/dL    Albumin 3.4 3.2 - 4.9 g/dL    Total Protein 6.1 6.0 - 8.2 g/dL    Globulin 2.7 1.9 - 3.5 g/dL    A-G Ratio 1.3 g/dL   ESTIMATED GFR    Collection Time: 08/01/18  4:14 AM   Result Value Ref Range    GFR If African American >60 >60 mL/min/1.73 m 2    GFR If Non African American >60 >60 mL/min/1.73 m 2       Imaging/Procedures Review:    Reviewed    MDM (Assessment and Plan):     Active Hospital Problems    Diagnosis   • Small bowel obstruction (HCC) [K56.651]     Priority: High       A 49 yo with multiple prior pelvic and abdominal surgeries, most recently ileal ureter reimplantation who presents with abdominal pain. Dr. Rangel has reviewed her imaging and does not feel there is a  process contributing to her  current symptoms. Nonetheless, she does seem to be improving and has begun a diet. Small bowel follow through is pending. The case was discussed with Dr. Torres and assurance given that  surgical intervention is not anticipated. General surgery is on board as well. We will continue to follow. Dr. Rangel has directed this plan of care.

## 2018-08-01 NOTE — PROGRESS NOTES
Report received from Day RN. Radiology tech at bedside. Pt is awake and alert. Pt states no needs at this time.

## 2018-08-01 NOTE — CONSULTS
DATE OF SERVICE:  07/31/2018    REASON FOR CONSULTATION:  Abdominal pain, nausea and vomiting.    HISTORY OF PRESENT ILLNESS:  The patient is familiar patient.  She is 48 years   of age.  The patient has undergone previous hysterectomy and I believe   resection of abdominal wall endometrioma in the remote past.  I participated   in that surgery with Dr. Martinez and she did require some abdominal wall   reconstruction.  The patient more recently has had a problem with obstructing   stones in the ureter on the right.  The patient developed ureteral stricture   following some manipulation and ended up with hydronephrosis of the right   kidney.  She had a percutaneous nephrostomy for 8 months.  Ultimately, the   stricture was not amenable to primary correction.  She recently was offered an   ileal interposition between the 2 ends of the ureter in the bladder.  She   after some deliberation elected to proceed with surgery, she could not really   go on living with the percutaneous nephrostomy.  The patient had this surgery   by Dr. Kapil Rangel on 04/30/2018.  She subsequently recovered.  She did undergo   extensive enterolysis at the time of surgery.  There were no primary   complications.  Once discharged, she has had recurrent episodes of urosepsis   and infection.  The patient has been treated with multiple courses of   antibiotics.  She has been in and out of hospitals.  She has had serial CT   scans at least monthly over the last 3 months.  She recently had a fever of   103 degrees about 10 days ago and was treated with IM Rocephin in the urology   office and oral medications.  Her fever subsequently resolved and she was   clinically improving as of last Friday.  On Saturday, now 4 days ago, the   patient developed abdominal cramping and nausea and subsequently had episodes   of vomiting.  She did not vomit 3 days ago on Sunday, but continued to have   the same right lower quadrant abdominal pain that she has been  having with   these episodes of urosepsis.  She ultimately came to the hospital for   evaluation and vomited this morning.  The patient has been moving her bowels   and passing flatus.  She again was subjected to abdominal CT scan.  At this   time, she does show some proximal small bowel dilatation and fluid not really   gastric distention or fluid, but there is some small bowel fluid running down   into what appears to be postsurgical inflammatory changes in the right lower   quadrant.  The patient was given IV contrast and was seen to have some bowel   wall thickening.  This has been seen on CT scan as most recently as 05/2018.    It seemed to be a little more prominent.  She previously had a retroperitoneal   fluid collection along the ileal conduit near the psoas muscle and this has   persisted but now she has some fluid present between the bowel loops which   appear to extend down on to the bladder.  The patient was felt on the basis of   evaluation to potentially have some sort of inflammatory change of the bowel   and possible small-bowel obstruction.  She was felt to be a candidate for   admission.  She was admitted to the medical service.  They requested   consultation from a general surgeon.  I believe the patient was in   conversation with Dr. Padilla today who did direct her to the hospital for   further evaluation.  I am not sure that they have been consulted.  Patient has   not been febrile or had chills.  She really denies any possible ingestion,   which could have led to acute food poisoning.  She does not really have   symptoms of viral illness including headache, muscle ache, joint ache, fever,   rash or back pain.  The patient has not really had urinary symptoms.  She is   uncertain whether or not she has had any positive urine cultures along the   way.  The patient has been told that she needed to take Mucinex to prevent   mucous buildup in the ileal conduit.  Otherwise, this could potentially lead    to infection.  The patient had not vomited since admission.  I did order an   enema this afternoon which she had poor results from.  She did have some   watery bowel movement this morning, but subsequently had a relatively normal   bowel movement.  She has not had melena or hematochezia.  The patient has   continued to have some right lower quadrant abdominal pain this evening.  She   has not vomited since admission, an NG tube was placed, thinking that she   would benefit from nasogastric decompression and so far, there has been no   effluent.  The patient had poor results as noted with the Fleet's enema.    There was a considerable amount of stool in the colon, more proximally on her   CT scan.    PAST MEDICAL HISTORY:  Significant for possible previous heart attack,   although that is not clear as well.  She has had previous pelvic surgeries.  I   do not believe she takes any medications or has active medical problems.    ALLERGIES:  THE PATIENT IS ALLERGIC TO LEVAQUIN.    The patient was started empirically on Flagyl and a C. diff was ordered,   although the colon itself does not appear to be involved in inflammatory   process.  I think that diagnosis is unlikely.    FAMILY HISTORY:  Negative and noncontributory.    SOCIAL HISTORY:  There is no contributory history.    REVIEW OF SYSTEMS:  Otherwise negative and noncontributory in 10 categories.    PHYSICAL EXAMINATION:  VITAL SIGNS:  Normal.  She is afebrile.  She is not tachycardic.  HEENT:  Benign.  NECK:  Supple.  There is no thyroid abnormality or tenderness.  No   lymphadenopathy.  LUNGS:  Clear.  CARDIAC:  Normal.  BREASTS:  Not examined.  ABDOMEN:  Her abdomen shows a healing midline abdominal incision without   evidence of hernia of the incision or inguinal region.  Her abdomen is   slightly distended, generally tender to deep palpation, but there were no   peritoneal findings or guarding.  There are no palpable masses, although it is   somewhat  asymmetrically thickened in the right lower quadrant compared to the   left.  EXTREMITIES:  Without deformity.  There is no rash.  BACK:  Normal.    LABORATORIES:  Remarkable for the absence of significant evidence of   electrolyte abnormalities or dehydration.  The patient's CBC was remarkable   for mild leukocytosis.  Her urinalysis was basically unremarkable.  Lactic   acid was 1.2 and it went down to 0.9, certainly not elevated.  The leukocyte   esterase on her urinalysis was also negative.  Her leukocyte differential did   demonstrate elevated PMNs, but not significant bandemia.    DIAGNOSTIC DATA:  The CT scans have been reviewed over the last several   months.  There was a ureteral stent in an ileal conduit in 05/2018 that   apparently has now been removed.  Most of the inflammatory changes seen on   today's CT were present previously in 05/2018 although the loops today which   show more prominent wall thickening were not the loops that were traversed by   the stent in 05/2018.  In 06/2018, I still think there are some inflammatory   changes, although that CT was not done with contrast compared to today's CT.    IMPRESSION:  At this time is abdominal pain, which apparently has been fairly   chronic over the last 3 months.  This patient had surgery.  The patient did   have nausea or vomiting, but her clinical presentation is not necessarily   consistent with small-bowel obstruction.  She may have a localized ileus.    There are some inflammatory changes in the right lower quadrant of uncertain   etiology.  There does appear to be some retroperitoneal and free fluid in the   mesentery which appears to extend down to the bladder.  There is no air in the   bowel wall, no twisting of the mesentery and no definite evidence of closed   loop obstruction.  The patient clearly does not need surgical intervention   this evening.  I suspect that these inflammatory changes related to the   urologic surgery, I am not  convinced there in fact is obstruction.    PLAN:  I plan to decompress the patient overnight.  We will recheck her   laboratories in the morning.  If there has been further bowel activity and no   vomiting, we may consider a small bowel follow through with Gastrografin.  I   think that there could potentially be some sort of leak or complication   potentially urologic nature and I would ask Dr. Padilla to come and review of the   patient's imaging and add what he can to her diagnostic dilemma.  I do not   think the patient will come to surgical intervention from a general surgical   standpoint, but that remains of course to be seen.  I appreciate the   opportunity to be involved in the patient's care.       ____________________________________     MD MACKENZIE NOVOA / NTS    DD:  07/31/2018 21:49:39  DT:  07/31/2018 23:55:27    D#:  [id number]  Job#:  [id number]    cc: Ryan Bedolla MD

## 2018-08-01 NOTE — PROGRESS NOTES
Pt having episodes of diarrhea since receiving contrast. Xray series every 30 minutes being completed at this time.

## 2018-08-01 NOTE — PROGRESS NOTES
"Trauma / Surgical Progress Note  Interval Events:  Improved   Pain resolved   Passing flatus   VSS   Abdomen less tender   SBFT today   Water soluable contrast   Doubt SBO   Suspect problem with urologic reconstruction   Possible urinoma       Review of Systems   HENT: Positive for sore throat.    Gastrointestinal: Positive for abdominal pain. Negative for nausea and vomiting.       Vitals:  Blood pressure 116/64, pulse 63, temperature 36.6 °C (97.8 °F), resp. rate 18, height 1.702 m (5' 7\"), weight 87.1 kg (192 lb 0.3 oz), SpO2 95 %, not currently breastfeeding.  Temp (24hrs), Av.7 °C (98 °F), Min:36.6 °C (97.8 °F), Max:36.8 °C (98.2 °F)      IO:       Exam:  Respiratory: unlabored respirations, no intercostal retractions or accessory muscle use  Neuro: no focal deficits noted  Cardiovascular: regular rate and rhythm  GI: without rebound  Other: General: no distress    Labs:  Recent Results (from the past 24 hour(s))   CBC WITH DIFFERENTIAL    Collection Time: 18  9:47 AM   Result Value Ref Range    WBC 13.9 (H) 4.8 - 10.8 K/uL    RBC 5.10 4.20 - 5.40 M/uL    Hemoglobin 14.1 12.0 - 16.0 g/dL    Hematocrit 43.4 37.0 - 47.0 %    MCV 85.1 81.4 - 97.8 fL    MCH 27.6 27.0 - 33.0 pg    MCHC 32.5 (L) 33.6 - 35.0 g/dL    RDW 43.7 35.9 - 50.0 fL    Platelet Count 457 (H) 164 - 446 K/uL    MPV 10.4 9.0 - 12.9 fL    Neutrophils-Polys 75.40 (H) 44.00 - 72.00 %    Lymphocytes 17.40 (L) 22.00 - 41.00 %    Monocytes 4.90 0.00 - 13.40 %    Eosinophils 1.60 0.00 - 6.90 %    Basophils 0.40 0.00 - 1.80 %    Immature Granulocytes 0.30 0.00 - 0.90 %    Nucleated RBC 0.00 /100 WBC    Neutrophils (Absolute) 10.48 (H) 2.00 - 7.15 K/uL    Lymphs (Absolute) 2.41 1.00 - 4.80 K/uL    Monos (Absolute) 0.68 0.00 - 0.85 K/uL    Eos (Absolute) 0.22 0.00 - 0.51 K/uL    Baso (Absolute) 0.05 0.00 - 0.12 K/uL    Immature Granulocytes (abs) 0.04 0.00 - 0.11 K/uL    NRBC (Absolute) 0.00 K/uL   COMP METABOLIC PANEL    Collection Time: 18 "  9:47 AM   Result Value Ref Range    Sodium 136 135 - 145 mmol/L    Potassium 4.1 3.6 - 5.5 mmol/L    Chloride 105 96 - 112 mmol/L    Co2 23 20 - 33 mmol/L    Anion Gap 8.0 0.0 - 11.9    Glucose 100 (H) 65 - 99 mg/dL    Bun 17 8 - 22 mg/dL    Creatinine 0.96 0.50 - 1.40 mg/dL    Calcium 9.5 8.4 - 10.2 mg/dL    AST(SGOT) 15 12 - 45 U/L    ALT(SGPT) 12 2 - 50 U/L    Alkaline Phosphatase 78 30 - 99 U/L    Total Bilirubin 0.3 0.1 - 1.5 mg/dL    Albumin 3.8 3.2 - 4.9 g/dL    Total Protein 7.2 6.0 - 8.2 g/dL    Globulin 3.4 1.9 - 3.5 g/dL    A-G Ratio 1.1 g/dL   TROPONIN    Collection Time: 07/31/18  9:47 AM   Result Value Ref Range    Troponin I <0.02 0.00 - 0.04 ng/mL   LIPASE    Collection Time: 07/31/18  9:47 AM   Result Value Ref Range    Lipase 34 7 - 58 U/L   LACTIC ACID    Collection Time: 07/31/18  9:47 AM   Result Value Ref Range    Lactic Acid 1.2 0.5 - 2.0 mmol/L   BLOOD CULTURE    Collection Time: 07/31/18  9:47 AM   Result Value Ref Range    Significant Indicator NEG     Source BLD     Site PERIPHERAL     Blood Culture       No Growth    Note: Blood cultures are incubated for 5 days and  are monitored continuously.Positive blood cultures  are called to the RN and reported as soon as  they are identified.    Blood culture testing and Gram stain, if indicated, are  performed at [facility], [address] [place] [address].  Positive blood cultures are  sent to [facility], [address] [place] [address], for organism identification and  susceptibility testing.     ESTIMATED GFR    Collection Time: 07/31/18  9:47 AM   Result Value Ref Range    GFR If African American >60 >60 mL/min/1.73 m 2    GFR If Non African American >60 >60 mL/min/1.73 m 2   URINALYSIS CULTURE, IF INDICATED    Collection Time: 07/31/18 10:10 AM   Result Value Ref Range    Color Yellow     Character Clear     Specific Gravity 1.020 <1.035    Ph 6.0 5.0 - 8.0    Glucose Negative Negative mg/dL     Ketones Negative Negative mg/dL    Protein Negative Negative mg/dL    Bilirubin Negative Negative    Nitrite Negative Negative    Leukocyte Esterase Negative Negative    Occult Blood Trace (A) Negative    Micro Urine Req Microscopic    URINE MICROSCOPIC (W/UA)    Collection Time: 07/31/18 10:10 AM   Result Value Ref Range    WBC 0-2 /hpf    RBC 0-2 /hpf    Bacteria Few (A) None /hpf    Epithelial Cells Few Few /hpf    Mucous Threads Moderate /hpf    Urine Crystals Rare Amorphous /hpf   BLOOD CULTURE    Collection Time: 07/31/18 11:31 AM   Result Value Ref Range    Significant Indicator NEG     Source BLD     Site PERIPHERAL     Blood Culture       No Growth    Note: Blood cultures are incubated for 5 days and  are monitored continuously.Positive blood cultures  are called to the RN and reported as soon as  they are identified.    Blood culture testing and Gram stain, if indicated, are  performed at [facility], [address] [place] [address].  Positive blood cultures are  sent to [facility], [address] [place] [address], for organism identification and  susceptibility testing.     Lactic Acid -STAT Once    Collection Time: 07/31/18  1:51 PM   Result Value Ref Range    Lactic Acid 0.9 0.5 - 2.0 mmol/L   CBC without Differential    Collection Time: 08/01/18  4:14 AM   Result Value Ref Range    WBC 6.7 4.8 - 10.8 K/uL    RBC 4.33 4.20 - 5.40 M/uL    Hemoglobin 12.1 12.0 - 16.0 g/dL    Hematocrit 37.2 37.0 - 47.0 %    MCV 85.9 81.4 - 97.8 fL    MCH 27.9 27.0 - 33.0 pg    MCHC 32.5 (L) 33.6 - 35.0 g/dL    RDW 43.8 35.9 - 50.0 fL    Platelet Count 386 164 - 446 K/uL    MPV 10.6 9.0 - 12.9 fL   Comp Metabolic Panel (CMP)    Collection Time: 08/01/18  4:14 AM   Result Value Ref Range    Sodium 140 135 - 145 mmol/L    Potassium 3.6 3.6 - 5.5 mmol/L    Chloride 113 (H) 96 - 112 mmol/L    Co2 22 20 - 33 mmol/L    Anion Gap 5.0 0.0 - 11.9    Glucose 94 65 - 99 mg/dL    Bun  14 8 - 22 mg/dL    Creatinine 0.98 0.50 - 1.40 mg/dL    Calcium 8.3 (L) 8.4 - 10.2 mg/dL    AST(SGOT) 10 (L) 12 - 45 U/L    ALT(SGPT) 10 2 - 50 U/L    Alkaline Phosphatase 66 30 - 99 U/L    Total Bilirubin 0.4 0.1 - 1.5 mg/dL    Albumin 3.4 3.2 - 4.9 g/dL    Total Protein 6.1 6.0 - 8.2 g/dL    Globulin 2.7 1.9 - 3.5 g/dL    A-G Ratio 1.3 g/dL   ESTIMATED GFR    Collection Time: 08/01/18  4:14 AM   Result Value Ref Range    GFR If African American >60 >60 mL/min/1.73 m 2    GFR If Non African American >60 >60 mL/min/1.73 m 2       Problem and Plan:  Active Hospital Problems    Diagnosis   • Small bowel obstruction (HCC) [K56.609]     Priority: High       Core Measures & Quality Metrics:  Core Measures & Quality Metrics

## 2018-08-01 NOTE — CARE PLAN
Problem: Safety  Goal: Will remain free from injury  Outcome: PROGRESSING AS EXPECTED    Intervention: Provide assistance with mobility   08/01/18 0340   OTHER   Assistance / Tolerance Standby Assist;Tolerates Well   Pt educated to call for assistance prior to ambulating.       Problem: Pain Management  Goal: Pain level will decrease to patient's comfort goal  Outcome: PROGRESSING AS EXPECTED   08/01/18 0340   OTHER   Non Verbal Scale  Calm;Sleeping;Unlabored Breathing   Pt educated to call for pain medication when needed

## 2018-08-01 NOTE — PROGRESS NOTES
Pt complaining of throat irritation. Dr. Mendoza said it was ok to get Chloraseptic spray ordered.

## 2018-08-01 NOTE — PROGRESS NOTES
"Pt had small BM post enema, pt still refusing NG tube at this time. Educated pt on the importance of the tube, pt admit about \"speaking with the Doctor.\"   "

## 2018-08-02 ENCOUNTER — PATIENT OUTREACH (OUTPATIENT)
Dept: HEALTH INFORMATION MANAGEMENT | Facility: OTHER | Age: 48
End: 2018-08-02

## 2018-08-02 VITALS
DIASTOLIC BLOOD PRESSURE: 63 MMHG | RESPIRATION RATE: 18 BRPM | OXYGEN SATURATION: 100 % | BODY MASS INDEX: 30.14 KG/M2 | SYSTOLIC BLOOD PRESSURE: 105 MMHG | HEIGHT: 67 IN | WEIGHT: 192.02 LBS | TEMPERATURE: 98.4 F | HEART RATE: 74 BPM

## 2018-08-02 PROCEDURE — 700101 HCHG RX REV CODE 250: Performed by: INTERNAL MEDICINE

## 2018-08-02 PROCEDURE — 700111 HCHG RX REV CODE 636 W/ 250 OVERRIDE (IP): Performed by: INTERNAL MEDICINE

## 2018-08-02 PROCEDURE — 700105 HCHG RX REV CODE 258: Performed by: INTERNAL MEDICINE

## 2018-08-02 PROCEDURE — 99239 HOSP IP/OBS DSCHRG MGMT >30: CPT | Performed by: INTERNAL MEDICINE

## 2018-08-02 RX ORDER — GUAIFENESIN 600 MG/1
600 TABLET, EXTENDED RELEASE ORAL EVERY 12 HOURS
Status: DISCONTINUED | OUTPATIENT
Start: 2018-08-02 | End: 2018-08-02 | Stop reason: HOSPADM

## 2018-08-02 RX ORDER — FLUCONAZOLE 150 MG/1
150 TABLET ORAL
Qty: 3 TAB | Refills: 1 | Status: ON HOLD | OUTPATIENT
Start: 2018-08-02 | End: 2018-08-08

## 2018-08-02 RX ADMIN — METRONIDAZOLE 500 MG: 500 INJECTION, SOLUTION INTRAVENOUS at 05:21

## 2018-08-02 RX ADMIN — CEFTRIAXONE SODIUM 2 G: 2 INJECTION, POWDER, FOR SOLUTION INTRAMUSCULAR; INTRAVENOUS at 05:15

## 2018-08-02 ASSESSMENT — ENCOUNTER SYMPTOMS
FLANK PAIN: 1
FEVER: 0
VOMITING: 0
NAUSEA: 0
CONSTIPATION: 0
ABDOMINAL PAIN: 1
CHILLS: 0

## 2018-08-02 ASSESSMENT — PAIN SCALES - GENERAL
PAINLEVEL_OUTOF10: 0
PAINLEVEL_OUTOF10: 0

## 2018-08-02 NOTE — PROGRESS NOTES
Pt sitting up in bed watching tv with spouse. Reports she is feeling well with no complaints of pain or nausea. Pt consumed about 50% of her dinner tray.

## 2018-08-02 NOTE — PROGRESS NOTES
Per Dr. Torres, if pt tolerates her regular diet for lunch and does not have any issues she should be discharged this afternoon. Will continue to monitor.

## 2018-08-02 NOTE — PROGRESS NOTES
Discharged pt per MD orders. Pt states understanding of d/c instructions, education, prescriptions, and f/u appts. Pt tolerating regular diet. 02 sats above 90 and pt has voided. IV discontinued. Pt spouse will be here soon to  pt.

## 2018-08-02 NOTE — PROGRESS NOTES
Assumed care of patient from RN. Updated on POC. Call light within reach and other fall precautions in place such as bed locked in lowest position. Patient instructed to call staff before getting out of bed. All questions answered, no other needs at this time. Safety precautions in place such as bed in lowest position, clutter free, call light within reach, calls appropriately, non-skid socks. Patient alert and oriented x4, no pain at this time. Resting comfortably in bed.

## 2018-08-02 NOTE — CARE PLAN
Problem: Bowel/Gastric:  Goal: Normal bowel function is maintained or improved  Outcome: PROGRESSING AS EXPECTED  Having loose stools due to contrast given today.

## 2018-08-02 NOTE — DISCHARGE INSTRUCTIONS
Comments: DC home   High fiber diet   F/u with Urology and primary care    Discharge Instructions    Discharged to home by car with relative. Discharged via walking, hospital escort: Refused.  Special equipment needed: Not Applicable    Be sure to schedule a follow-up appointment with your primary care doctor or any specialists as instructed.     Discharge Plan:   Influenza Vaccine Indication: Patient Refuses    I understand that a diet low in cholesterol, fat, and sodium is recommended for good health. Unless I have been given specific instructions below for another diet, I accept this instruction as my diet prescription.   Other diet: Regular    Special Instructions: None    · Is patient discharged on Warfarin / Coumadin?   No     Depression / Suicide Risk    As you are discharged from this [facility] facility, it is important to learn how to keep safe from harming yourself.    Recognize the warning signs:  · Abrupt changes in personality, positive or negative- including increase in energy   · Giving away possessions  · Change in eating patterns- significant weight changes-  positive or negative  · Change in sleeping patterns- unable to sleep or sleeping all the time   · Unwillingness or inability to communicate  · Depression  · Unusual sadness, discouragement and loneliness  · Talk of wanting to die  · Neglect of personal appearance   · Rebelliousness- reckless behavior  · Withdrawal from people/activities they love  · Confusion- inability to concentrate     If you or a loved one observes any of these behaviors or has concerns about self-harm, here's what you can do:  · Talk about it- your feelings and reasons for harming yourself  · Remove any means that you might use to hurt yourself (examples: pills, rope, extension cords, firearm)  · Get professional help from the community (Mental Health, Substance Abuse, psychological counseling)  · Do not be alone:Call your Safe Contact- someone whom you trust who will  be there for you.  · Call your local CRISIS HOTLINE 445-3104 or 735-802-9412  · Call your local Children's Mobile Crisis Response Team [place] (074) 149-3798 or www.[employer]  · Call the toll free National Suicide Prevention Hotlines   · National Suicide Prevention Lifeline 686-461-JOHK (5080)  · National [facility] Line Network 800-SUICIDE (605-7985)

## 2018-08-02 NOTE — CARE PLAN
Problem: Communication  Goal: The ability to communicate needs accurately and effectively will improve  Outcome: MET Date Met: 08/02/18

## 2018-08-02 NOTE — PROGRESS NOTES
Received report from night shift SHANTANU Metcalf and assumed care of pt. Pt is emotional this morning, stating that she just wants to be home. Pt has some RLQ abdominal tenderness with palpation, and reports that she was continuing to have diarrhea throughout the night. Updated on POC for the day and will continue with care.

## 2018-08-02 NOTE — PROGRESS NOTES
"Trauma / Surgical Progress Note  Interval Events:  No SBO   rlq pain   ?urinoma on CT   No gen surgical issues   Reg diet   D/c toradol and reglan   Will sign off      Review of Systems   Gastrointestinal: Positive for abdominal pain. Negative for constipation, nausea and vomiting.   All other systems reviewed and are negative.      Vitals:  Blood pressure 120/69, pulse 61, temperature 36.7 °C (98.1 °F), resp. rate 18, height 1.702 m (5' 7\"), weight 87.1 kg (192 lb 0.3 oz), SpO2 98 %, not currently breastfeeding.  Temp (24hrs), Av.7 °C (98 °F), Min:36.4 °C (97.5 °F), Max:37 °C (98.6 °F)      IO:    Date 18 0700 - 18 0659   Shift [id number] 8369-4826 7494-8926 24 Hour Total   I  N  T  A  K  E   I.V. 100   100      IV Piggyback Volume 100   100    Shift Total 100   100   O  U  T  P  U  T   Stool          Number of Times Stooled 3 x   3 x    Shift Total          100       Exam:  Respiratory: unlabored respirations, no intercostal retractions or accessory muscle use  Neuro: no focal deficits noted  Cardiovascular: regular rate and rhythm  GI: without rebound  Other: General: alert, smiling    Labs:  No results found for this or any previous visit (from the past 24 hour(s)).    Problem and Plan:  Active Hospital Problems    Diagnosis   • Small bowel obstruction (HCC) [K56.609]     Priority: High   • Ureteral stricture, right [N13.5]       Core Measures & Quality Metrics:  Core Measures & Quality Metrics  "

## 2018-08-02 NOTE — PROGRESS NOTES
[facility] Hospitalist Progress Note    Date of Service: 2018    Chief Complaint  48 y.o. female with complex urologic and surgical history admitted 2018 with concern for enteritis and small bowel obstruction. She was decompressed with NG tube overnight. She had about 500 mL brackish material out. Her NG has since been clamped.    Interval Problem Update  Discussed with urology PA  Surgical consultation note reviewed  Discussed with the RN at bedside  Patient had bowel movements since admission and is very hungry and wants her NG tube out.    Consultants/Specialty  General surgery- Ross Mendoza MD  Urology    Disposition  Home        Review of Systems   Constitutional: Negative for chills, fever and malaise/fatigue.   HENT: Positive for sore throat.         Nasal pain due to NG tube   Respiratory: Negative for shortness of breath.    Cardiovascular: Negative for chest pain.   Gastrointestinal: Negative for abdominal pain, nausea and vomiting.   Genitourinary: Negative for dysuria, flank pain (mild resolved), frequency, hematuria and urgency.   Musculoskeletal: Negative for joint pain and myalgias.   Skin: Negative for itching and rash.   Neurological: Negative for dizziness, tingling, weakness and headaches.   Psychiatric/Behavioral: The patient is nervous/anxious.       Physical Exam  Laboratory/Imaging   Hemodynamics  Temp (24hrs), Av.6 °C (97.8 °F), Min:36.4 °C (97.5 °F), Max:36.6 °C (97.9 °F)   Temperature: 36.4 °C (97.5 °F)  Pulse  Av.3  Min: 60  Max: 83    Blood Pressure: 144/61      Respiratory      Respiration: 18, Pulse Oximetry: 100 %             Fluids    Intake/Output Summary (Last 24 hours) at 18 1914  Last data filed at 18 1800   Gross per 24 hour   Intake             3600 ml   Output              100 ml   Net             3500 ml       Nutrition  Orders Placed This Encounter   Procedures   • Diet Order Full Liquid     Standing Status:   Standing     Number of Occurrences:   1      Order Specific Question:   Diet:     Answer:   Full Liquid [11]     Physical Exam   Constitutional: She is oriented to person, place, and time. She appears well-developed and well-nourished. She appears distressed (tearful and anxious because she wants NG tube out).   HENT:   Head: Normocephalic and atraumatic.   Right Ear: External ear normal.   Left Ear: External ear normal.   Mouth/Throat: Oropharynx is clear and moist. No oropharyngeal exudate.   NG tube in place   Eyes: Pupils are equal, round, and reactive to light. Conjunctivae and EOM are normal. Right eye exhibits no discharge. Left eye exhibits no discharge. No scleral icterus.   Neck: Neck supple.   Cardiovascular: Normal rate and regular rhythm.    Pulmonary/Chest: Effort normal and breath sounds normal.   Abdominal: Soft. Bowel sounds are normal. She exhibits no distension and no mass. There is no tenderness. There is no rebound and no guarding.   Musculoskeletal: She exhibits no edema or tenderness.   Neurological: She is alert and oriented to person, place, and time. No cranial nerve deficit.   Skin: Skin is warm and dry. She is not diaphoretic.   Psychiatric:   Anxious   Nursing note and vitals reviewed.      Recent Labs      07/31/18   0947  08/01/18   0414   WBC  13.9*  6.7   RBC  5.10  4.33   HEMOGLOBIN  14.1  12.1   HEMATOCRIT  43.4  37.2   MCV  85.1  85.9   MCH  27.6  27.9   MCHC  32.5*  32.5*   RDW  43.7  43.8   PLATELETCT  457*  386   MPV  10.4  10.6     Recent Labs      07/31/18   0947  08/01/18   0414   SODIUM  136  140   POTASSIUM  4.1  3.6   CHLORIDE  105  113*   CO2  23  22   GLUCOSE  100*  94   BUN  17  14   CREATININE  0.96  0.98   CALCIUM  9.5  8.3*                      Assessment/Plan     * Small bowel obstruction (HCC)   Assessment & Plan    Recent extensive urologic surgery with intermittent GI symptoms since  Symptoms have resolved  Small bowel follow-through is negative  Advancing diet. 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

## 2018-08-02 NOTE — PROGRESS NOTES
Surgical Progress Note    Author: Isael Franks Date & Time created: 2018   8:37 AM     Interval Events:    Patient seen and examined.  Very emotional this morning. Frustrated about being in Hospital as well as lack of answers as to her symptoms. She does report overall improvement in her abdominal pain, presently 6/10. Still with some right flank pain. No N/V and tolerating soft diet.     Review of Systems   Constitutional: Negative for chills and fever.   Gastrointestinal: Positive for abdominal pain. Negative for nausea and vomiting.   Genitourinary: Positive for flank pain.     Hemodynamics:  Temp (24hrs), Av.7 °C (98 °F), Min:36.4 °C (97.5 °F), Max:37 °C (98.6 °F)  Temperature: 36.7 °C (98.1 °F)  Pulse  Av.1  Min: 60  Max: 83   Blood Pressure: 120/69     Respiratory:    Respiration: 18, Pulse Oximetry: 98 %           Neuro:  GCS       Fluids:    Intake/Output Summary (Last 24 hours) at 18 0837  Last data filed at 18 0000   Gross per 24 hour   Intake             3920 ml   Output              100 ml   Net             3820 ml        Current Diet Order   Procedures   • Diet Order Full Liquid     Physical Exam   Constitutional: She is oriented to person, place, and time. She appears well-developed and well-nourished. No distress.   Pulmonary/Chest: Effort normal.   Abdominal: She exhibits no distension. There is tenderness.   Neurological: She is alert and oriented to person, place, and time.   Skin: Skin is warm and dry.   Nursing note and vitals reviewed.    Labs:  No results found for this or any previous visit (from the past 24 hour(s)).  Medical Decision Making, by Problem:  Active Hospital Problems    Diagnosis   • Small bowel obstruction (HCC) [K56.609]     Priority: High   • Ureteral stricture, right [N13.5]     Plan:  Lengthy discussion with patient. Resumed Mucinex. Reassured regarding stable  findings on CT scan and normal renal functions. No signs of UTI. Will defer  further care to Hospitalist and general surgery in terms of advancing diet, antibiotic choices and eventual disposition home.    Quality Measures:  Quality-Core Measures   Reviewed items::  Labs reviewed and Medications reviewed  Seth catheter::  No Seth      Discussed patient condition with Family, RN, Patient and urology-Dr. Rangel

## 2018-08-03 NOTE — DISCHARGE SUMMARY
Discharge Summary    CHIEF COMPLAINT ON ADMISSION  Chief Complaint   Patient presents with   • RLQ Pain     Pt c/o RLQ pain with N/V/D, onset x 3 days. pt with recent abd surgery 2 months ago.    • Nausea/Vomiting/Diarrhea       Reason for Admission  sent from MD; obstructive bowel;?    Admission Date  7/31/2018    CODE STATUS  Prior    HPI & HOSPITAL COURSE  48 y.o. female with complex urologic and surgical history admitted 7/31/2018 with concern for enteritis and small bowel obstruction. She was decompressed with NG tube overnight. She had about 500 mL brackish material out.  The following day she had a few bowel movements and felt significantly improved, NG tube was removed and she tolerated diet which was slowly advanced.  She was seen in surgical consultation by Dr. Ross Mendoza, he was concerned about fluid seen in the abdomen that this could possibly represent a leak related to her prior urologic procedure, patient's urologic surgeon Dr. Rangel, reviewed the images, did not feel the findings were consistent with this.  Patient is concerned about her frequent urinary tract infections and recurrent GI symptoms, we discussed that given that her surgery involved both systems and was complicated by post operative urinary tract infections, which would further aggravate her GI tract due to the antibiotics, that both things are related.  However on this occasion her symptoms have completely resolved, we discussed diet activities.  Hopefully with a high-fiber diet, and avoidance of some of her previous activities she can avoid future infection and complications       Therefore, she is discharged in good and stable condition to home with close outpatient follow-up.    The patient met 2-midnight criteria for an inpatient stay at the time of discharge.    Discharge Date  8/2/2018    FOLLOW UP ITEMS POST DISCHARGE  PCP  Urology    DISCHARGE DIAGNOSES  Principal Problem:    Small bowel obstruction (HCC) POA: Unknown  Active  Problems:    Ureteral stricture, right POA: Yes  Resolved Problems:    * No resolved hospital problems. *      FOLLOW UP  No future appointments.  ANDREA Valdez  [address] [place] [id number]  [phone]    Go on 8/21/2018  Please arrive at 11:30 am for your appointment with primary care. Please bring your discharge summary, photo ID and list of current medications.       MEDICATIONS ON DISCHARGE     Medication List      START taking these medications      Instructions   fluconazole 150 MG tablet  Commonly known as:  DIFLUCAN   Take 1 Tab by mouth every 48 hours for 3 doses.  Dose:  150 mg        CONTINUE taking these medications      Instructions   guaiFENesin  MG Tb12  Commonly known as:  MUCINEX   Take 1 Tab by mouth every 12 hours.  Dose:  600 mg            Allergies  Allergies   Allergen Reactions   • Levaquin Hives, Shortness of Breath and Itching     Rxn - 9/11/01   • Levofloxacin Itching, Hives and Shortness of Breath     Other reaction(s): Itching, pruritis  Rxn - 9/11/01   • Morphine Itching     Other reaction(s): Itching, pruritis       DIET  High fiber  Lots of fluids    ACTIVITY  As tolerated  No swimming in lakes/ rivers    CONSULTATIONS  [facility] [place]  Surgery- Ross Mendoza MD    PROCEDURES  none    LABORATORY  Lab Results   Component Value Date    SODIUM 140 08/01/2018    POTASSIUM 3.6 08/01/2018    CHLORIDE 113 (H) 08/01/2018    CO2 22 08/01/2018    GLUCOSE 94 08/01/2018    BUN 14 08/01/2018    CREATININE 0.98 08/01/2018        Lab Results   Component Value Date    WBC 6.7 08/01/2018    HEMOGLOBIN 12.1 08/01/2018    HEMATOCRIT 37.2 08/01/2018    PLATELETCT 386 08/01/2018        Total time of the discharge process exceeds 44 minutes.

## 2018-08-07 ENCOUNTER — APPOINTMENT (OUTPATIENT)
Dept: RADIOLOGY | Facility: MEDICAL CENTER | Age: 48
End: 2018-08-07
Attending: UROLOGY
Payer: COMMERCIAL

## 2018-08-07 ENCOUNTER — HOSPITAL ENCOUNTER (OUTPATIENT)
Facility: MEDICAL CENTER | Age: 48
LOS: 1 days | End: 2018-08-08
Attending: UROLOGY | Admitting: UROLOGY
Payer: COMMERCIAL

## 2018-08-07 LAB
ALBUMIN SERPL BCP-MCNC: 4 G/DL (ref 3.2–4.9)
ALBUMIN/GLOB SERPL: 1.4 G/DL
ALP SERPL-CCNC: 70 U/L (ref 30–99)
ALT SERPL-CCNC: 10 U/L (ref 2–50)
ANION GAP SERPL CALC-SCNC: 8 MMOL/L (ref 0–11.9)
APPEARANCE UR: CLEAR
AST SERPL-CCNC: 15 U/L (ref 12–45)
BACTERIA #/AREA URNS HPF: ABNORMAL /HPF
BASOPHILS # BLD AUTO: 0.4 % (ref 0–1.8)
BASOPHILS # BLD: 0.05 K/UL (ref 0–0.12)
BILIRUB SERPL-MCNC: 0.4 MG/DL (ref 0.1–1.5)
BILIRUB UR QL STRIP.AUTO: NEGATIVE
BUN SERPL-MCNC: 15 MG/DL (ref 8–22)
CALCIUM SERPL-MCNC: 9 MG/DL (ref 8.4–10.2)
CHLORIDE SERPL-SCNC: 105 MMOL/L (ref 96–112)
CO2 SERPL-SCNC: 23 MMOL/L (ref 20–33)
COLOR UR: YELLOW
CREAT SERPL-MCNC: 0.88 MG/DL (ref 0.5–1.4)
EOSINOPHIL # BLD AUTO: 0.22 K/UL (ref 0–0.51)
EOSINOPHIL NFR BLD: 1.6 % (ref 0–6.9)
EPI CELLS #/AREA URNS HPF: ABNORMAL /HPF
ERYTHROCYTE [DISTWIDTH] IN BLOOD BY AUTOMATED COUNT: 44 FL (ref 35.9–50)
GLOBULIN SER CALC-MCNC: 2.8 G/DL (ref 1.9–3.5)
GLUCOSE SERPL-MCNC: 93 MG/DL (ref 65–99)
GLUCOSE UR STRIP.AUTO-MCNC: NEGATIVE MG/DL
HCT VFR BLD AUTO: 42.4 % (ref 37–47)
HGB BLD-MCNC: 13.8 G/DL (ref 12–16)
IMM GRANULOCYTES # BLD AUTO: 0.04 K/UL (ref 0–0.11)
IMM GRANULOCYTES NFR BLD AUTO: 0.3 % (ref 0–0.9)
KETONES UR STRIP.AUTO-MCNC: NEGATIVE MG/DL
LEUKOCYTE ESTERASE UR QL STRIP.AUTO: NEGATIVE
LYMPHOCYTES # BLD AUTO: 2.84 K/UL (ref 1–4.8)
LYMPHOCYTES NFR BLD: 21.1 % (ref 22–41)
MAGNESIUM SERPL-MCNC: 1.9 MG/DL (ref 1.5–2.5)
MCH RBC QN AUTO: 27.4 PG (ref 27–33)
MCHC RBC AUTO-ENTMCNC: 32.5 G/DL (ref 33.6–35)
MCV RBC AUTO: 84.3 FL (ref 81.4–97.8)
MICRO URNS: ABNORMAL
MONOCYTES # BLD AUTO: 0.85 K/UL (ref 0–0.85)
MONOCYTES NFR BLD AUTO: 6.3 % (ref 0–13.4)
MUCOUS THREADS #/AREA URNS HPF: ABNORMAL /HPF
NEUTROPHILS # BLD AUTO: 9.43 K/UL (ref 2–7.15)
NEUTROPHILS NFR BLD: 70.3 % (ref 44–72)
NITRITE UR QL STRIP.AUTO: NEGATIVE
NRBC # BLD AUTO: 0 K/UL
NRBC BLD-RTO: 0 /100 WBC
PH UR STRIP.AUTO: 7 [PH]
PLATELET # BLD AUTO: 404 K/UL (ref 164–446)
PMV BLD AUTO: 10.7 FL (ref 9–12.9)
POTASSIUM SERPL-SCNC: 3.8 MMOL/L (ref 3.6–5.5)
PROT SERPL-MCNC: 6.8 G/DL (ref 6–8.2)
PROT UR QL STRIP: NEGATIVE MG/DL
RBC # BLD AUTO: 5.03 M/UL (ref 4.2–5.4)
RBC # URNS HPF: ABNORMAL /HPF
RBC UR QL AUTO: ABNORMAL
SODIUM SERPL-SCNC: 136 MMOL/L (ref 135–145)
SP GR UR STRIP.AUTO: 1.01
WBC # BLD AUTO: 13.4 K/UL (ref 4.8–10.8)
WBC #/AREA URNS HPF: ABNORMAL /HPF

## 2018-08-07 PROCEDURE — 80053 COMPREHEN METABOLIC PANEL: CPT

## 2018-08-07 PROCEDURE — 74019 RADEX ABDOMEN 2 VIEWS: CPT

## 2018-08-07 PROCEDURE — 96376 TX/PRO/DX INJ SAME DRUG ADON: CPT

## 2018-08-07 PROCEDURE — 96374 THER/PROPH/DIAG INJ IV PUSH: CPT

## 2018-08-07 PROCEDURE — 700111 HCHG RX REV CODE 636 W/ 250 OVERRIDE (IP): Performed by: UROLOGY

## 2018-08-07 PROCEDURE — 36415 COLL VENOUS BLD VENIPUNCTURE: CPT

## 2018-08-07 PROCEDURE — 83735 ASSAY OF MAGNESIUM: CPT

## 2018-08-07 PROCEDURE — 74430 CONTRAST X-RAY BLADDER: CPT

## 2018-08-07 PROCEDURE — G0378 HOSPITAL OBSERVATION PER HR: HCPCS

## 2018-08-07 PROCEDURE — 700105 HCHG RX REV CODE 258: Performed by: UROLOGY

## 2018-08-07 PROCEDURE — 87086 URINE CULTURE/COLONY COUNT: CPT

## 2018-08-07 PROCEDURE — 85025 COMPLETE CBC W/AUTO DIFF WBC: CPT

## 2018-08-07 PROCEDURE — 81001 URINALYSIS AUTO W/SCOPE: CPT

## 2018-08-07 RX ORDER — HYDROMORPHONE HYDROCHLORIDE 2 MG/ML
0.5 INJECTION, SOLUTION INTRAMUSCULAR; INTRAVENOUS; SUBCUTANEOUS
Status: DISCONTINUED | OUTPATIENT
Start: 2018-08-07 | End: 2018-08-08 | Stop reason: HOSPADM

## 2018-08-07 RX ORDER — ONDANSETRON 2 MG/ML
4 INJECTION INTRAMUSCULAR; INTRAVENOUS EVERY 4 HOURS PRN
Status: DISCONTINUED | OUTPATIENT
Start: 2018-08-07 | End: 2018-08-08 | Stop reason: HOSPADM

## 2018-08-07 RX ORDER — GUAIFENESIN 600 MG/1
600 TABLET, EXTENDED RELEASE ORAL EVERY 12 HOURS
Status: DISCONTINUED | OUTPATIENT
Start: 2018-08-07 | End: 2018-08-08 | Stop reason: HOSPADM

## 2018-08-07 RX ORDER — SODIUM CHLORIDE, SODIUM LACTATE, POTASSIUM CHLORIDE, CALCIUM CHLORIDE 600; 310; 30; 20 MG/100ML; MG/100ML; MG/100ML; MG/100ML
INJECTION, SOLUTION INTRAVENOUS CONTINUOUS
Status: DISCONTINUED | OUTPATIENT
Start: 2018-08-07 | End: 2018-08-08 | Stop reason: HOSPADM

## 2018-08-07 RX ADMIN — HYDROMORPHONE HYDROCHLORIDE 0.5 MG: 2 INJECTION, SOLUTION INTRAMUSCULAR; INTRAVENOUS; SUBCUTANEOUS at 12:34

## 2018-08-07 RX ADMIN — SODIUM CHLORIDE, POTASSIUM CHLORIDE, SODIUM LACTATE AND CALCIUM CHLORIDE: 600; 310; 30; 20 INJECTION, SOLUTION INTRAVENOUS at 19:23

## 2018-08-07 RX ADMIN — HYDROMORPHONE HYDROCHLORIDE 0.5 MG: 2 INJECTION, SOLUTION INTRAMUSCULAR; INTRAVENOUS; SUBCUTANEOUS at 17:47

## 2018-08-07 RX ADMIN — SODIUM CHLORIDE, POTASSIUM CHLORIDE, SODIUM LACTATE AND CALCIUM CHLORIDE: 600; 310; 30; 20 INJECTION, SOLUTION INTRAVENOUS at 12:44

## 2018-08-07 ASSESSMENT — LIFESTYLE VARIABLES
AVERAGE NUMBER OF DAYS PER WEEK YOU HAVE A DRINK CONTAINING ALCOHOL: 1
HAVE PEOPLE ANNOYED YOU BY CRITICIZING YOUR DRINKING: NO
EVER HAD A DRINK FIRST THING IN THE MORNING TO STEADY YOUR NERVES TO GET RID OF A HANGOVER: NO
TOTAL SCORE: 0
TOTAL SCORE: 0
ON A TYPICAL DAY WHEN YOU DRINK ALCOHOL HOW MANY DRINKS DO YOU HAVE: 1
CONSUMPTION TOTAL: NEGATIVE
EVER FELT BAD OR GUILTY ABOUT YOUR DRINKING: NO
HOW MANY TIMES IN THE PAST YEAR HAVE YOU HAD 5 OR MORE DRINKS IN A DAY: 0
TOTAL SCORE: 0
HAVE YOU EVER FELT YOU SHOULD CUT DOWN ON YOUR DRINKING: NO
ALCOHOL_USE: YES

## 2018-08-07 ASSESSMENT — PAIN SCALES - GENERAL
PAINLEVEL_OUTOF10: 0
PAINLEVEL_OUTOF10: 4

## 2018-08-07 ASSESSMENT — PATIENT HEALTH QUESTIONNAIRE - PHQ9
2. FEELING DOWN, DEPRESSED, IRRITABLE, OR HOPELESS: NOT AT ALL
1. LITTLE INTEREST OR PLEASURE IN DOING THINGS: NOT AT ALL
SUM OF ALL RESPONSES TO PHQ9 QUESTIONS 1 AND 2: 0
2. FEELING DOWN, DEPRESSED, IRRITABLE, OR HOPELESS: NOT AT ALL
SUM OF ALL RESPONSES TO PHQ9 QUESTIONS 1 AND 2: 0
1. LITTLE INTEREST OR PLEASURE IN DOING THINGS: NOT AT ALL

## 2018-08-07 NOTE — PROGRESS NOTES
Spoke with GUILLERMO Orellana for Dr. Rangel.  Clarified Cystogram and acute abdominal series orders.  Seth placement needed for cystogram.   Radiology notified.

## 2018-08-07 NOTE — PROGRESS NOTES
Pt Aurora Tan admitted to room 204-1 via transport in wheelchair at11am. Requesting pain medication, appears very uncomfortable.  Oriented to room call light and smoking policy.  Reviewed plan of care (equipment, incentive spirometer, sequential compression devices, medications, activity, diet, fall precautions, skin care, and pain) with patient and family.

## 2018-08-07 NOTE — PROGRESS NOTES
Direct admit from [facility], referred by Dr. Rangel. For Small bowel obstruction. Admitting MD is Dr. Rangel. ADT order signed and held, to be released upon patient's arrival on the unit.

## 2018-08-07 NOTE — PROGRESS NOTES
Patient arrived to floor, complaints of severe pain, no written pain med orders.  Call placed to Dr. Rangel.

## 2018-08-08 VITALS
TEMPERATURE: 98.6 F | HEART RATE: 67 BPM | RESPIRATION RATE: 18 BRPM | DIASTOLIC BLOOD PRESSURE: 70 MMHG | WEIGHT: 193.78 LBS | SYSTOLIC BLOOD PRESSURE: 105 MMHG | HEIGHT: 67 IN | OXYGEN SATURATION: 95 % | BODY MASS INDEX: 30.42 KG/M2

## 2018-08-08 PROCEDURE — 700102 HCHG RX REV CODE 250 W/ 637 OVERRIDE(OP): Performed by: UROLOGY

## 2018-08-08 PROCEDURE — A9270 NON-COVERED ITEM OR SERVICE: HCPCS | Performed by: UROLOGY

## 2018-08-08 PROCEDURE — 700105 HCHG RX REV CODE 258: Performed by: UROLOGY

## 2018-08-08 PROCEDURE — G0378 HOSPITAL OBSERVATION PER HR: HCPCS

## 2018-08-08 RX ADMIN — GUAIFENESIN 600 MG: 600 TABLET, EXTENDED RELEASE ORAL at 06:12

## 2018-08-08 RX ADMIN — SODIUM CHLORIDE, POTASSIUM CHLORIDE, SODIUM LACTATE AND CALCIUM CHLORIDE: 600; 310; 30; 20 INJECTION, SOLUTION INTRAVENOUS at 06:30

## 2018-08-08 ASSESSMENT — COGNITIVE AND FUNCTIONAL STATUS - GENERAL
SUGGESTED CMS G CODE MODIFIER MOBILITY: CH
DAILY ACTIVITIY SCORE: 24
SUGGESTED CMS G CODE MODIFIER DAILY ACTIVITY: CH
MOBILITY SCORE: 24

## 2018-08-08 ASSESSMENT — PAIN SCALES - GENERAL: PAINLEVEL_OUTOF10: 0

## 2018-08-08 NOTE — PROGRESS NOTES
X-Ray and cystogram results posted.  Call placed to Dr. Rangel to update.  Awaiting return phone call.

## 2018-08-08 NOTE — PROGRESS NOTES
Received report from Yoselin FOURNIER, assumed pt care. Pt resting in bed awake and alert.  at bedside. States no needs at this time. Educated to call for assistance.

## 2018-08-08 NOTE — PROGRESS NOTES
Anel emeryd back from Dr. Vides, reviewed cystogram and x-ray results.  Patient to remain NPO, okay to [facility] poe.

## 2018-08-08 NOTE — PROGRESS NOTES
Pt tolerating PO, voiding, denies pain or nausea. discharge instructions given and discussed. Pt discharged home in stable condition.

## 2018-08-08 NOTE — CARE PLAN
Problem: Pain Management  Goal: Pain level will decrease to patient's comfort goal   08/07/18 1244 08/07/18 2000   OTHER   Pain Scale 0 - 10  --  0   Comfort Goal Comfort at Rest;Comfort with Movement --

## 2018-08-08 NOTE — DISCHARGE SUMMARY
Discharge Summary    CHIEF COMPLAINT ON ADMISSION  No chief complaint on file.  abdominal pain with nausea and vomiting    Reason for Admission  Bowel Obstruction     Admission Date  8/7/2018    CODE STATUS  Prior    HPI & HOSPITAL COURSE  This is a 48 y.o. female here with history of ileal ureter, abdominal pain, nausea and emesis.  She underwent a cystogram and KUB which are both normal with evidence of SBO or ureteral leak.  She is feeling better today.  Tolerating liquid diet.  No emesis or nausea.  Pain resolved.          Therefore, she is discharged in good and stable condition to home with close outpatient follow-up.    The patient recovered much more quickly than anticipated on admission.    Discharge Date  8/8/2018    FOLLOW UP ITEMS POST DISCHARGE      DISCHARGE DIAGNOSES  Active Problems:    * No active hospital problems. *  Resolved Problems:    * No resolved hospital problems. *      FOLLOW UP  No future appointments.  No follow-up provider specified.    MEDICATIONS ON DISCHARGE     Medication List      CONTINUE taking these medications      Instructions   guaiFENesin  MG Tb12  Commonly known as:  MUCINEX   Take 1 Tab by mouth every 12 hours.  Dose:  600 mg        STOP taking these medications    fluconazole 150 MG tablet  Commonly known as:  DIFLUCAN            Allergies  Allergies   Allergen Reactions   • Levaquin Hives, Shortness of Breath and Itching     Rxn - 9/11/01   • Levofloxacin Itching, Hives and Shortness of Breath     Other reaction(s): Itching, pruritis  Rxn - 9/11/01   • Morphine Itching     Other reaction(s): Itching, pruritis       DIET  Orders Placed This Encounter   Procedures   • Diet Order Clear Liquid     Standing Status:   Standing     Number of Occurrences:   1     Order Specific Question:   Diet:     Answer:   Clear Liquid [10]       ACTIVITY  As tolerated.      CONSULTATIONS  none    PROCEDURES  none    LABORATORY  Lab Results   Component Value Date    SODIUM 136  08/07/2018    POTASSIUM 3.8 08/07/2018    CHLORIDE 105 08/07/2018    CO2 23 08/07/2018    GLUCOSE 93 08/07/2018    BUN 15 08/07/2018    CREATININE 0.88 08/07/2018        Lab Results   Component Value Date    WBC 13.4 (H) 08/07/2018    HEMOGLOBIN 13.8 08/07/2018    HEMATOCRIT 42.4 08/07/2018    PLATELETCT 404 08/07/2018        Total time of the discharge process exceeds 31 minutes.

## 2018-08-08 NOTE — DISCHARGE INSTRUCTIONS
Discharge Instructions    Discharged to home by car with relative. Discharged via walking, hospital escort: Yes.  Special equipment needed: Not Applicable    Be sure to schedule a follow-up appointment with your primary care doctor or any specialists as instructed.     Discharge Plan:   Diet Plan: Discussed  Activity Level: Discussed  Confirmed Follow up Appointment: Patient to Call and Schedule Appointment  Confirmed Symptoms Management: Discussed  Medication Reconciliation Updated: Yes  Influenza Vaccine Indication: Patient Refuses    I understand that a diet low in cholesterol, fat, and sodium is recommended for good health. Unless I have been given specific instructions below for another diet, I accept this instruction as my diet prescription.   Other diet: advance as tolerated      Special Instructions: None    · Is patient discharged on Warfarin / Coumadin?   No         Small Bowel Obstruction  A small bowel obstruction is a blockage in the small bowel. The small bowel, which is also called the small intestine, is a long, slender tube that connects the stomach to the colon. When a person eats and drinks, food and fluids go from the stomach to the small bowel. This is where most of the nutrients in the food and fluids are absorbed.  A small bowel obstruction will prevent food and fluids from passing through the small bowel as they normally do during digestion. The small bowel can become partially or completely blocked. This can cause symptoms such as abdominal pain, vomiting, and bloating. If this condition is not treated, it can be dangerous because the small bowel could rupture.  What are the causes?  Common causes of this condition include:  · Scar tissue from previous surgery or radiation treatment.  · Recent surgery. This may cause the movements of the bowel to slow down and cause food to block the intestine.  · Hernias.  · Inflammatory bowel disease (colitis).  · Twisting of the bowel  (volvulus).  · Tumors.  · A foreign body.  · Slipping of a part of the bowel into another part (intussusception).  What are the signs or symptoms?  Symptoms of this condition include:  · Abdominal pain. This may be dull cramps or sharp pain. It may occur in one area, or it may be present in the entire abdomen. Pain can range from mild to severe, depending on the degree of obstruction.  · Nausea and vomiting. Vomit may be greenish or a yellow bile color.  · Abdominal bloating.  · Constipation.  · Lack of passing gas.  · Frequent belching.  · Diarrhea. This may occur if the obstruction is partial and runny stool is able to leak around the obstruction.  How is this diagnosed?  This condition may be diagnosed based on a physical exam, medical history, and X-rays of the abdomen. You may also have other tests, such as a CT scan of the abdomen and pelvis.  How is this treated?  Treatment for this condition depends on the cause and severity of the problem. Treatment options may include:  · Bed rest along with fluids and pain medicines that are given through an IV tube inserted into one of your veins. Sometimes, this is all that is needed for the obstruction to improve.  · Following a simple diet. In some cases, a clear liquid diet may be required for several days. This allows the bowel to rest.  · Placement of a small tube (nasogastric tube) into the stomach. When the bowel is blocked, it usually swells up like a balloon that is filled with air and fluids. The air and fluids may be removed by suction through the nasogastric tube. This can help with pain, discomfort, and nausea. It can also help the obstruction to clear up faster.  · Surgery. This may be required if other treatments do not work. Bowel obstruction from a hernia may require early surgery and can be an emergency procedure. Surgery may also be required for scar tissue that causes frequent or severe obstructions.  Follow these instructions at home:  · Get plenty  of rest.  · Follow instructions from your health care provider about eating restrictions. You may need to avoid solid foods and consume only clear liquids until your condition improves.  · Take over-the-counter and prescription medicines only as told by your health care provider.  · Keep all follow-up visits as told by your health care provider. This is important.  Contact a health care provider if:  · You have a fever.  · You have chills.  Get help right away if:  · You have increased pain or cramping.  · You vomit blood.  · You have uncontrolled vomiting or nausea.  · You cannot drink fluids because of vomiting or pain.  · You develop confusion.  · You begin feeling very dry or thirsty (dehydrated).  · You have severe bloating.  · You feel extremely weak or you faint.  This information is not intended to replace advice given to you by your health care provider. Make sure you discuss any questions you have with your health care provider.  Document Released: 03/06/2007 Document Revised: 08/14/2017 Document Reviewed: 02/11/2016  [employer] Patient Education © 2017 [employer]    Abdominal Pain, Adult  Abdominal pain can be caused by many things. Often, abdominal pain is not serious and it gets better with no treatment or by being treated at home. However, sometimes abdominal pain is serious. Your health care provider will do a medical history and a physical exam to try to determine the cause of your abdominal pain.  Follow these instructions at home:  · Take over-the-counter and prescription medicines only as told by your health care provider. Do not take a laxative unless told by your health care provider.  · Drink enough fluid to keep your urine clear or pale yellow.  · Watch your condition for any changes.  · Keep all follow-up visits as told by your health care provider. This is important.  Contact a health care provider if:  · Your abdominal pain changes or gets worse.  · You are not hungry or you  lose weight without trying.  · You are constipated or have diarrhea for more than 2-3 days.  · You have pain when you urinate or have a bowel movement.  · Your abdominal pain wakes you up at night.  · Your pain gets worse with meals, after eating, or with certain foods.  · You are throwing up and cannot keep anything down.  · You have a fever.  Get help right away if:  · Your pain does not go away as soon as your health care provider told you to expect.  · You cannot stop throwing up.  · Your pain is only in areas of the abdomen, such as the right side or the left lower portion of the abdomen.  · You have bloody or black stools, or stools that look like tar.  · You have severe pain, cramping, or bloating in your abdomen.  · You have signs of dehydration, such as:  ¨ Dark urine, very little urine, or no urine.  ¨ Cracked lips.  ¨ Dry mouth.  ¨ Sunken eyes.  ¨ Sleepiness.  ¨ Weakness.  This information is not intended to replace advice given to you by your health care provider. Make sure you discuss any questions you have with your health care provider.  Document Released: 09/27/2006 Document Revised: 07/07/2017 Document Reviewed: 05/31/2017  [employer] Patient Education © 2017 [employer]      Depression / Suicide Risk    As you are discharged from this [facility] facility, it is important to learn how to keep safe from harming yourself.    Recognize the warning signs:  · Abrupt changes in personality, positive or negative- including increase in energy   · Giving away possessions  · Change in eating patterns- significant weight changes-  positive or negative  · Change in sleeping patterns- unable to sleep or sleeping all the time   · Unwillingness or inability to communicate  · Depression  · Unusual sadness, discouragement and loneliness  · Talk of wanting to die  · Neglect of personal appearance   · Rebelliousness- reckless behavior  · Withdrawal from people/activities they love  · Confusion-  inability to concentrate     If you or a loved one observes any of these behaviors or has concerns about self-harm, here's what you can do:  · Talk about it- your feelings and reasons for harming yourself  · Remove any means that you might use to hurt yourself (examples: pills, rope, extension cords, firearm)  · Get professional help from the community (Mental Health, Substance Abuse, psychological counseling)  · Do not be alone:Call your Safe Contact- someone whom you trust who will be there for you.  · Call your local CRISIS HOTLINE 946-6671 or 946-214-0928  · Call your local Children's Mobile Crisis Response Team [place] (136) 550-5118 or www.[employer]  · Call the toll free National Suicide Prevention Hotlines   · National Suicide Prevention Lifeline 061-830-ZGPP (4605)  · National Hope Line Network 800-SUICIDE (536-8938)

## 2018-08-08 NOTE — PROGRESS NOTES
Pt awake alert and oriented, pt denies pain or nausea at this time, pt states her abdomen I stender though. Pt tolerating clear liquid diet. Pt states she would nathalie to go home today, since her symptoms have resolved.

## 2018-08-10 LAB
BACTERIA UR CULT: NORMAL
SIGNIFICANT IND 70042: NORMAL
SITE SITE: NORMAL
SOURCE SOURCE: NORMAL

## 2018-09-17 ENCOUNTER — HOSPITAL ENCOUNTER (INPATIENT)
Facility: MEDICAL CENTER | Age: 48
LOS: 4 days | DRG: 872 | End: 2018-09-21
Attending: EMERGENCY MEDICINE | Admitting: HOSPITALIST
Payer: COMMERCIAL

## 2018-09-17 ENCOUNTER — APPOINTMENT (OUTPATIENT)
Dept: RADIOLOGY | Facility: MEDICAL CENTER | Age: 48
DRG: 872 | End: 2018-09-17
Attending: EMERGENCY MEDICINE
Payer: COMMERCIAL

## 2018-09-17 ENCOUNTER — APPOINTMENT (OUTPATIENT)
Dept: RADIOLOGY | Facility: MEDICAL CENTER | Age: 48
DRG: 872 | End: 2018-09-17
Payer: COMMERCIAL

## 2018-09-17 DIAGNOSIS — N12 PYELONEPHRITIS: ICD-10-CM

## 2018-09-17 PROBLEM — N39.0 SEPSIS DUE TO URINARY TRACT INFECTION (HCC): Status: ACTIVE | Noted: 2018-09-17

## 2018-09-17 PROBLEM — A41.9 SEPSIS DUE TO URINARY TRACT INFECTION (HCC): Status: ACTIVE | Noted: 2018-09-17

## 2018-09-17 PROBLEM — S30.1XXA HEMATOMA OF RECTUS SHEATH: Status: ACTIVE | Noted: 2018-09-17

## 2018-09-17 PROBLEM — R50.9 FEVER: Status: ACTIVE | Noted: 2018-09-17

## 2018-09-17 PROBLEM — E87.1 HYPONATREMIA: Status: ACTIVE | Noted: 2018-09-17

## 2018-09-17 LAB
ALBUMIN SERPL BCP-MCNC: 4.5 G/DL (ref 3.2–4.9)
ALBUMIN/GLOB SERPL: 1.7 G/DL
ALP SERPL-CCNC: 82 U/L (ref 30–99)
ALT SERPL-CCNC: 9 U/L (ref 2–50)
ANION GAP SERPL CALC-SCNC: 12 MMOL/L (ref 0–11.9)
APPEARANCE UR: ABNORMAL
AST SERPL-CCNC: 13 U/L (ref 12–45)
BACTERIA #/AREA URNS HPF: ABNORMAL /HPF
BASOPHILS # BLD AUTO: 0.4 % (ref 0–1.8)
BASOPHILS # BLD: 0.07 K/UL (ref 0–0.12)
BILIRUB SERPL-MCNC: 0.4 MG/DL (ref 0.1–1.5)
BILIRUB UR QL STRIP.AUTO: NEGATIVE
BUN SERPL-MCNC: 14 MG/DL (ref 8–22)
CALCIUM SERPL-MCNC: 9.3 MG/DL (ref 8.5–10.5)
CHLORIDE SERPL-SCNC: 103 MMOL/L (ref 96–112)
CO2 SERPL-SCNC: 19 MMOL/L (ref 20–33)
COLOR UR: YELLOW
CREAT SERPL-MCNC: 0.97 MG/DL (ref 0.5–1.4)
EOSINOPHIL # BLD AUTO: 0.07 K/UL (ref 0–0.51)
EOSINOPHIL NFR BLD: 0.4 % (ref 0–6.9)
EPI CELLS #/AREA URNS HPF: NEGATIVE /HPF
ERYTHROCYTE [DISTWIDTH] IN BLOOD BY AUTOMATED COUNT: 44.1 FL (ref 35.9–50)
GLOBULIN SER CALC-MCNC: 2.6 G/DL (ref 1.9–3.5)
GLUCOSE SERPL-MCNC: 89 MG/DL (ref 65–99)
GLUCOSE UR STRIP.AUTO-MCNC: NEGATIVE MG/DL
HCT VFR BLD AUTO: 40 % (ref 37–47)
HGB BLD-MCNC: 13.3 G/DL (ref 12–16)
HYALINE CASTS #/AREA URNS LPF: ABNORMAL /LPF
IMM GRANULOCYTES # BLD AUTO: 0.07 K/UL (ref 0–0.11)
IMM GRANULOCYTES NFR BLD AUTO: 0.4 % (ref 0–0.9)
KETONES UR STRIP.AUTO-MCNC: NEGATIVE MG/DL
LACTATE BLD-SCNC: 1.4 MMOL/L (ref 0.5–2)
LACTATE BLD-SCNC: 2.1 MMOL/L (ref 0.5–2)
LEUKOCYTE ESTERASE UR QL STRIP.AUTO: ABNORMAL
LYMPHOCYTES # BLD AUTO: 1.7 K/UL (ref 1–4.8)
LYMPHOCYTES NFR BLD: 8.7 % (ref 22–41)
MCH RBC QN AUTO: 28.1 PG (ref 27–33)
MCHC RBC AUTO-ENTMCNC: 33.3 G/DL (ref 33.6–35)
MCV RBC AUTO: 84.6 FL (ref 81.4–97.8)
MICRO URNS: ABNORMAL
MONOCYTES # BLD AUTO: 1.04 K/UL (ref 0–0.85)
MONOCYTES NFR BLD AUTO: 5.3 % (ref 0–13.4)
NEUTROPHILS # BLD AUTO: 16.54 K/UL (ref 2–7.15)
NEUTROPHILS NFR BLD: 84.8 % (ref 44–72)
NITRITE UR QL STRIP.AUTO: NEGATIVE
NRBC # BLD AUTO: 0 K/UL
NRBC BLD-RTO: 0 /100 WBC
PH UR STRIP.AUTO: 6 [PH]
PLATELET # BLD AUTO: 385 K/UL (ref 164–446)
PMV BLD AUTO: 10.8 FL (ref 9–12.9)
POTASSIUM SERPL-SCNC: 3.9 MMOL/L (ref 3.6–5.5)
PROT SERPL-MCNC: 7.1 G/DL (ref 6–8.2)
PROT UR QL STRIP: 30 MG/DL
RBC # BLD AUTO: 4.73 M/UL (ref 4.2–5.4)
RBC # URNS HPF: ABNORMAL /HPF
RBC UR QL AUTO: ABNORMAL
SODIUM SERPL-SCNC: 134 MMOL/L (ref 135–145)
SP GR UR STRIP.AUTO: 1.01
TROPONIN I SERPL-MCNC: <0.01 NG/ML (ref 0–0.04)
UROBILINOGEN UR STRIP.AUTO-MCNC: 0.2 MG/DL
WBC # BLD AUTO: 19.5 K/UL (ref 4.8–10.8)
WBC #/AREA URNS HPF: ABNORMAL /HPF

## 2018-09-17 PROCEDURE — 96367 TX/PROPH/DG ADDL SEQ IV INF: CPT

## 2018-09-17 PROCEDURE — 99285 EMERGENCY DEPT VISIT HI MDM: CPT

## 2018-09-17 PROCEDURE — 71045 X-RAY EXAM CHEST 1 VIEW: CPT

## 2018-09-17 PROCEDURE — 87086 URINE CULTURE/COLONY COUNT: CPT

## 2018-09-17 PROCEDURE — 84484 ASSAY OF TROPONIN QUANT: CPT

## 2018-09-17 PROCEDURE — A9270 NON-COVERED ITEM OR SERVICE: HCPCS | Performed by: EMERGENCY MEDICINE

## 2018-09-17 PROCEDURE — 700102 HCHG RX REV CODE 250 W/ 637 OVERRIDE(OP): Performed by: EMERGENCY MEDICINE

## 2018-09-17 PROCEDURE — 94760 N-INVAS EAR/PLS OXIMETRY 1: CPT

## 2018-09-17 PROCEDURE — 74176 CT ABD & PELVIS W/O CONTRAST: CPT

## 2018-09-17 PROCEDURE — 96366 THER/PROPH/DIAG IV INF ADDON: CPT

## 2018-09-17 PROCEDURE — 81001 URINALYSIS AUTO W/SCOPE: CPT

## 2018-09-17 PROCEDURE — 770020 HCHG ROOM/CARE - TELE (206)

## 2018-09-17 PROCEDURE — 96365 THER/PROPH/DIAG IV INF INIT: CPT

## 2018-09-17 PROCEDURE — 83605 ASSAY OF LACTIC ACID: CPT | Mod: 91

## 2018-09-17 PROCEDURE — 96361 HYDRATE IV INFUSION ADD-ON: CPT

## 2018-09-17 PROCEDURE — 87040 BLOOD CULTURE FOR BACTERIA: CPT | Mod: 91

## 2018-09-17 PROCEDURE — 99223 1ST HOSP IP/OBS HIGH 75: CPT | Performed by: HOSPITALIST

## 2018-09-17 PROCEDURE — 85025 COMPLETE CBC W/AUTO DIFF WBC: CPT

## 2018-09-17 PROCEDURE — 700111 HCHG RX REV CODE 636 W/ 250 OVERRIDE (IP): Performed by: EMERGENCY MEDICINE

## 2018-09-17 PROCEDURE — 93005 ELECTROCARDIOGRAM TRACING: CPT | Performed by: EMERGENCY MEDICINE

## 2018-09-17 PROCEDURE — 700105 HCHG RX REV CODE 258: Performed by: EMERGENCY MEDICINE

## 2018-09-17 PROCEDURE — 96375 TX/PRO/DX INJ NEW DRUG ADDON: CPT

## 2018-09-17 PROCEDURE — 80053 COMPREHEN METABOLIC PANEL: CPT

## 2018-09-17 RX ORDER — OXYCODONE HYDROCHLORIDE 5 MG/1
5 TABLET ORAL
Status: DISCONTINUED | OUTPATIENT
Start: 2018-09-17 | End: 2018-09-21 | Stop reason: HOSPADM

## 2018-09-17 RX ORDER — SODIUM CHLORIDE 9 MG/ML
500 INJECTION, SOLUTION INTRAVENOUS
Status: DISCONTINUED | OUTPATIENT
Start: 2018-09-17 | End: 2018-09-18

## 2018-09-17 RX ORDER — ACETAMINOPHEN 325 MG/1
650 TABLET ORAL ONCE
Status: COMPLETED | OUTPATIENT
Start: 2018-09-17 | End: 2018-09-17

## 2018-09-17 RX ORDER — HYDROMORPHONE HYDROCHLORIDE 2 MG/ML
0.5 INJECTION, SOLUTION INTRAMUSCULAR; INTRAVENOUS; SUBCUTANEOUS
Status: COMPLETED | OUTPATIENT
Start: 2018-09-17 | End: 2018-09-17

## 2018-09-17 RX ORDER — ONDANSETRON 2 MG/ML
4 INJECTION INTRAMUSCULAR; INTRAVENOUS
Status: COMPLETED | OUTPATIENT
Start: 2018-09-17 | End: 2018-09-17

## 2018-09-17 RX ORDER — ACETAMINOPHEN 500 MG
500 TABLET ORAL EVERY 6 HOURS PRN
Status: DISCONTINUED | OUTPATIENT
Start: 2018-09-17 | End: 2018-09-21 | Stop reason: HOSPADM

## 2018-09-17 RX ORDER — SODIUM CHLORIDE 9 MG/ML
INJECTION, SOLUTION INTRAVENOUS CONTINUOUS
Status: DISCONTINUED | OUTPATIENT
Start: 2018-09-17 | End: 2018-09-18

## 2018-09-17 RX ORDER — OXYCODONE HYDROCHLORIDE 10 MG/1
10 TABLET ORAL
Status: DISCONTINUED | OUTPATIENT
Start: 2018-09-17 | End: 2018-09-21 | Stop reason: HOSPADM

## 2018-09-17 RX ORDER — AMOXICILLIN 250 MG
2 CAPSULE ORAL 2 TIMES DAILY
Status: DISCONTINUED | OUTPATIENT
Start: 2018-09-18 | End: 2018-09-18

## 2018-09-17 RX ORDER — SODIUM CHLORIDE 9 MG/ML
30 INJECTION, SOLUTION INTRAVENOUS ONCE
Status: COMPLETED | OUTPATIENT
Start: 2018-09-17 | End: 2018-09-18

## 2018-09-17 RX ORDER — ESTRADIOL 0.05 MG/D
1 PATCH, EXTENDED RELEASE TRANSDERMAL
COMMUNITY

## 2018-09-17 RX ORDER — POLYETHYLENE GLYCOL 3350 17 G/17G
1 POWDER, FOR SOLUTION ORAL
Status: DISCONTINUED | OUTPATIENT
Start: 2018-09-17 | End: 2018-09-18

## 2018-09-17 RX ORDER — SODIUM CHLORIDE 9 MG/ML
30 INJECTION, SOLUTION INTRAVENOUS
Status: COMPLETED | OUTPATIENT
Start: 2018-09-17 | End: 2018-09-18

## 2018-09-17 RX ORDER — PROMETHAZINE HYDROCHLORIDE 25 MG/1
12.5-25 TABLET ORAL EVERY 4 HOURS PRN
Status: DISCONTINUED | OUTPATIENT
Start: 2018-09-17 | End: 2018-09-18

## 2018-09-17 RX ORDER — ONDANSETRON 4 MG/1
4 TABLET, ORALLY DISINTEGRATING ORAL EVERY 4 HOURS PRN
Status: DISCONTINUED | OUTPATIENT
Start: 2018-09-17 | End: 2018-09-21 | Stop reason: HOSPADM

## 2018-09-17 RX ORDER — ONDANSETRON 2 MG/ML
4 INJECTION INTRAMUSCULAR; INTRAVENOUS EVERY 4 HOURS PRN
Status: DISCONTINUED | OUTPATIENT
Start: 2018-09-17 | End: 2018-09-21 | Stop reason: HOSPADM

## 2018-09-17 RX ORDER — PROMETHAZINE HYDROCHLORIDE 25 MG/1
12.5-25 SUPPOSITORY RECTAL EVERY 4 HOURS PRN
Status: DISCONTINUED | OUTPATIENT
Start: 2018-09-17 | End: 2018-09-18

## 2018-09-17 RX ORDER — HYDROMORPHONE HYDROCHLORIDE 2 MG/ML
0.5 INJECTION, SOLUTION INTRAMUSCULAR; INTRAVENOUS; SUBCUTANEOUS
Status: DISCONTINUED | OUTPATIENT
Start: 2018-09-17 | End: 2018-09-21 | Stop reason: HOSPADM

## 2018-09-17 RX ORDER — BISACODYL 10 MG
10 SUPPOSITORY, RECTAL RECTAL
Status: DISCONTINUED | OUTPATIENT
Start: 2018-09-17 | End: 2018-09-18

## 2018-09-17 RX ADMIN — CEFTRIAXONE SODIUM 2 G: 2 INJECTION, POWDER, FOR SOLUTION INTRAMUSCULAR; INTRAVENOUS at 20:41

## 2018-09-17 RX ADMIN — ONDANSETRON 4 MG: 2 INJECTION INTRAMUSCULAR; INTRAVENOUS at 20:41

## 2018-09-17 RX ADMIN — VANCOMYCIN HYDROCHLORIDE 2300 MG: 100 INJECTION, POWDER, LYOPHILIZED, FOR SOLUTION INTRAVENOUS at 21:50

## 2018-09-17 RX ADMIN — ACETAMINOPHEN 650 MG: 325 TABLET, FILM COATED ORAL at 20:40

## 2018-09-17 RX ADMIN — SODIUM CHLORIDE 2715 ML: 9 INJECTION, SOLUTION INTRAVENOUS at 20:40

## 2018-09-17 RX ADMIN — HYDROMORPHONE HYDROCHLORIDE 0.5 MG: 2 INJECTION, SOLUTION INTRAMUSCULAR; INTRAVENOUS; SUBCUTANEOUS at 20:40

## 2018-09-17 ASSESSMENT — ENCOUNTER SYMPTOMS
NAUSEA: 0
HALLUCINATIONS: 0
HEARTBURN: 0
DEPRESSION: 0
SHORTNESS OF BREATH: 0
MYALGIAS: 0
ABDOMINAL PAIN: 1
SPEECH CHANGE: 0
DIZZINESS: 0
BLURRED VISION: 0
BRUISES/BLEEDS EASILY: 0
PALPITATIONS: 0
WEAKNESS: 1
CHILLS: 1
FEVER: 1
DOUBLE VISION: 0
SENSORY CHANGE: 0
FOCAL WEAKNESS: 0
EYE DISCHARGE: 0
FLANK PAIN: 0
VOMITING: 1
HEMOPTYSIS: 0
COUGH: 0

## 2018-09-17 ASSESSMENT — LIFESTYLE VARIABLES: SUBSTANCE_ABUSE: 0

## 2018-09-17 ASSESSMENT — PAIN SCALES - GENERAL: PAINLEVEL_OUTOF10: 7

## 2018-09-18 PROBLEM — R50.9 FEVER: Status: RESOLVED | Noted: 2018-09-17 | Resolved: 2018-09-18

## 2018-09-18 PROBLEM — N11.1 OBSTRUCTIVE PYELONEPHRITIS: Status: ACTIVE | Noted: 2018-01-10

## 2018-09-18 PROBLEM — M60.08 RECTUS SHEATH ABSCESS: Status: ACTIVE | Noted: 2018-09-17

## 2018-09-18 PROBLEM — N13.5 URETERAL STRICTURE, RIGHT: Status: RESOLVED | Noted: 2018-01-31 | Resolved: 2018-09-18

## 2018-09-18 PROBLEM — E87.1 HYPONATREMIA: Status: RESOLVED | Noted: 2018-09-17 | Resolved: 2018-09-18

## 2018-09-18 PROBLEM — A49.01 MSSA (METHICILLIN SUSCEPTIBLE STAPHYLOCOCCUS AUREUS) INFECTION: Status: RESOLVED | Noted: 2018-06-12 | Resolved: 2018-09-18

## 2018-09-18 LAB
ALBUMIN SERPL BCP-MCNC: 3.5 G/DL (ref 3.2–4.9)
ALBUMIN/GLOB SERPL: 1.4 G/DL
ALP SERPL-CCNC: 69 U/L (ref 30–99)
ALT SERPL-CCNC: 11 U/L (ref 2–50)
ANION GAP SERPL CALC-SCNC: 9 MMOL/L (ref 0–11.9)
AST SERPL-CCNC: 13 U/L (ref 12–45)
BACTERIA UR CULT: NORMAL
BILIRUB SERPL-MCNC: 0.5 MG/DL (ref 0.1–1.5)
BUN SERPL-MCNC: 10 MG/DL (ref 8–22)
CALCIUM SERPL-MCNC: 8 MG/DL (ref 8.5–10.5)
CHLORIDE SERPL-SCNC: 109 MMOL/L (ref 96–112)
CHOLEST SERPL-MCNC: 151 MG/DL (ref 100–199)
CO2 SERPL-SCNC: 20 MMOL/L (ref 20–33)
CREAT SERPL-MCNC: 0.92 MG/DL (ref 0.5–1.4)
EST. AVERAGE GLUCOSE BLD GHB EST-MCNC: 120 MG/DL
GLOBULIN SER CALC-MCNC: 2.5 G/DL (ref 1.9–3.5)
GLUCOSE SERPL-MCNC: 115 MG/DL (ref 65–99)
HBA1C MFR BLD: 5.8 % (ref 0–5.6)
HDLC SERPL-MCNC: 40 MG/DL
INR PPP: 1.25 (ref 0.87–1.13)
LACTATE BLD-SCNC: 0.9 MMOL/L (ref 0.5–2)
LACTATE BLD-SCNC: 1 MMOL/L (ref 0.5–2)
LDLC SERPL CALC-MCNC: 86 MG/DL
POTASSIUM SERPL-SCNC: 3.5 MMOL/L (ref 3.6–5.5)
PROT SERPL-MCNC: 6 G/DL (ref 6–8.2)
PROTHROMBIN TIME: 15.4 SEC (ref 12–14.6)
SIGNIFICANT IND 70042: NORMAL
SITE SITE: NORMAL
SODIUM SERPL-SCNC: 138 MMOL/L (ref 135–145)
SOURCE SOURCE: NORMAL
TRIGL SERPL-MCNC: 123 MG/DL (ref 0–149)
TSH SERPL DL<=0.005 MIU/L-ACNC: 1.77 UIU/ML (ref 0.38–5.33)

## 2018-09-18 PROCEDURE — A9270 NON-COVERED ITEM OR SERVICE: HCPCS | Performed by: INTERNAL MEDICINE

## 2018-09-18 PROCEDURE — 700102 HCHG RX REV CODE 250 W/ 637 OVERRIDE(OP): Performed by: INTERNAL MEDICINE

## 2018-09-18 PROCEDURE — 36415 COLL VENOUS BLD VENIPUNCTURE: CPT

## 2018-09-18 PROCEDURE — 700105 HCHG RX REV CODE 258: Performed by: HOSPITALIST

## 2018-09-18 PROCEDURE — 85610 PROTHROMBIN TIME: CPT

## 2018-09-18 PROCEDURE — A9270 NON-COVERED ITEM OR SERVICE: HCPCS | Performed by: HOSPITALIST

## 2018-09-18 PROCEDURE — 700102 HCHG RX REV CODE 250 W/ 637 OVERRIDE(OP): Performed by: HOSPITALIST

## 2018-09-18 PROCEDURE — 80061 LIPID PANEL: CPT

## 2018-09-18 PROCEDURE — 83605 ASSAY OF LACTIC ACID: CPT | Mod: 91

## 2018-09-18 PROCEDURE — 700111 HCHG RX REV CODE 636 W/ 250 OVERRIDE (IP): Performed by: INTERNAL MEDICINE

## 2018-09-18 PROCEDURE — 84443 ASSAY THYROID STIM HORMONE: CPT

## 2018-09-18 PROCEDURE — 99255 IP/OBS CONSLTJ NEW/EST HI 80: CPT | Performed by: INTERNAL MEDICINE

## 2018-09-18 PROCEDURE — 99233 SBSQ HOSP IP/OBS HIGH 50: CPT | Performed by: INTERNAL MEDICINE

## 2018-09-18 PROCEDURE — 80053 COMPREHEN METABOLIC PANEL: CPT

## 2018-09-18 PROCEDURE — 770006 HCHG ROOM/CARE - MED/SURG/GYN SEMI*

## 2018-09-18 PROCEDURE — 700111 HCHG RX REV CODE 636 W/ 250 OVERRIDE (IP): Performed by: HOSPITALIST

## 2018-09-18 PROCEDURE — 83036 HEMOGLOBIN GLYCOSYLATED A1C: CPT

## 2018-09-18 PROCEDURE — 700105 HCHG RX REV CODE 258: Performed by: INTERNAL MEDICINE

## 2018-09-18 RX ORDER — SODIUM CHLORIDE, SODIUM LACTATE, POTASSIUM CHLORIDE, CALCIUM CHLORIDE 600; 310; 30; 20 MG/100ML; MG/100ML; MG/100ML; MG/100ML
INJECTION, SOLUTION INTRAVENOUS CONTINUOUS
Status: DISCONTINUED | OUTPATIENT
Start: 2018-09-18 | End: 2018-09-20

## 2018-09-18 RX ORDER — GUAIFENESIN 600 MG/1
600 TABLET, EXTENDED RELEASE ORAL EVERY 12 HOURS
Status: DISCONTINUED | OUTPATIENT
Start: 2018-09-18 | End: 2018-09-21 | Stop reason: HOSPADM

## 2018-09-18 RX ORDER — ONDANSETRON 2 MG/ML
4 INJECTION INTRAMUSCULAR; INTRAVENOUS ONCE
Status: DISCONTINUED | OUTPATIENT
Start: 2018-09-18 | End: 2018-09-18

## 2018-09-18 RX ORDER — POTASSIUM CHLORIDE 20 MEQ/1
40 TABLET, EXTENDED RELEASE ORAL ONCE
Status: COMPLETED | OUTPATIENT
Start: 2018-09-18 | End: 2018-09-18

## 2018-09-18 RX ORDER — ONDANSETRON 2 MG/ML
4 INJECTION INTRAMUSCULAR; INTRAVENOUS ONCE
Status: COMPLETED | OUTPATIENT
Start: 2018-09-18 | End: 2018-09-18

## 2018-09-18 RX ADMIN — CEFTRIAXONE SODIUM 2 G: 2 INJECTION, POWDER, FOR SOLUTION INTRAMUSCULAR; INTRAVENOUS at 21:15

## 2018-09-18 RX ADMIN — SODIUM CHLORIDE: 9 INJECTION, SOLUTION INTRAVENOUS at 03:12

## 2018-09-18 RX ADMIN — ONDANSETRON 4 MG: 2 INJECTION INTRAMUSCULAR; INTRAVENOUS at 15:39

## 2018-09-18 RX ADMIN — VANCOMYCIN HYDROCHLORIDE 1400 MG: 100 INJECTION, POWDER, LYOPHILIZED, FOR SOLUTION INTRAVENOUS at 10:01

## 2018-09-18 RX ADMIN — ACETAMINOPHEN 500 MG: 500 TABLET, FILM COATED ORAL at 09:18

## 2018-09-18 RX ADMIN — GUAIFENESIN 600 MG: 600 TABLET, EXTENDED RELEASE ORAL at 21:24

## 2018-09-18 RX ADMIN — VANCOMYCIN HYDROCHLORIDE 1400 MG: 100 INJECTION, POWDER, LYOPHILIZED, FOR SOLUTION INTRAVENOUS at 23:04

## 2018-09-18 RX ADMIN — ONDANSETRON 4 MG: 2 INJECTION INTRAMUSCULAR; INTRAVENOUS at 15:48

## 2018-09-18 RX ADMIN — ACETAMINOPHEN 500 MG: 500 TABLET, FILM COATED ORAL at 03:08

## 2018-09-18 RX ADMIN — POTASSIUM CHLORIDE 40 MEQ: 1500 TABLET, EXTENDED RELEASE ORAL at 11:57

## 2018-09-18 RX ADMIN — SODIUM CHLORIDE, POTASSIUM CHLORIDE, SODIUM LACTATE AND CALCIUM CHLORIDE: 600; 310; 30; 20 INJECTION, SOLUTION INTRAVENOUS at 12:15

## 2018-09-18 RX ADMIN — HYDROMORPHONE HYDROCHLORIDE 0.5 MG: 2 INJECTION INTRAMUSCULAR; INTRAVENOUS; SUBCUTANEOUS at 00:29

## 2018-09-18 RX ADMIN — SODIUM CHLORIDE 715 ML: 9 INJECTION, SOLUTION INTRAVENOUS at 00:24

## 2018-09-18 RX ADMIN — ACETAMINOPHEN 500 MG: 500 TABLET, FILM COATED ORAL at 21:14

## 2018-09-18 RX ADMIN — SODIUM CHLORIDE: 9 INJECTION, SOLUTION INTRAVENOUS at 01:28

## 2018-09-18 RX ADMIN — HYDROMORPHONE HYDROCHLORIDE 0.5 MG: 2 INJECTION INTRAMUSCULAR; INTRAVENOUS; SUBCUTANEOUS at 10:09

## 2018-09-18 ASSESSMENT — ENCOUNTER SYMPTOMS
DIAPHORESIS: 1
HALLUCINATIONS: 0
LOSS OF CONSCIOUSNESS: 0
HEARTBURN: 0
NERVOUS/ANXIOUS: 0
SEIZURES: 0
SPEECH CHANGE: 0
BACK PAIN: 0
DOUBLE VISION: 0
ORTHOPNEA: 0
ABDOMINAL PAIN: 0
DIARRHEA: 0
CHILLS: 1
TREMORS: 0
BLOOD IN STOOL: 0
BLURRED VISION: 0
SHORTNESS OF BREATH: 0
SPUTUM PRODUCTION: 0
COUGH: 0
MEMORY LOSS: 0
INSOMNIA: 0
HEADACHES: 1
VOMITING: 0
SENSORY CHANGE: 0
CONSTIPATION: 0
TINGLING: 0
DEPRESSION: 0
WEAKNESS: 1
FALLS: 0
NECK PAIN: 0
WHEEZING: 0
PALPITATIONS: 0
FLANK PAIN: 1
MYALGIAS: 0
FEVER: 1
HEMOPTYSIS: 0
CLAUDICATION: 0
NAUSEA: 0
DIZZINESS: 1
FOCAL WEAKNESS: 0
PND: 0

## 2018-09-18 ASSESSMENT — PAIN SCALES - GENERAL
PAINLEVEL_OUTOF10: 7
PAINLEVEL_OUTOF10: 5
PAINLEVEL_OUTOF10: 8
PAINLEVEL_OUTOF10: 4
PAINLEVEL_OUTOF10: 4
PAINLEVEL_OUTOF10: 5
PAINLEVEL_OUTOF10: 6
PAINLEVEL_OUTOF10: 4
PAINLEVEL_OUTOF10: 5

## 2018-09-18 ASSESSMENT — LIFESTYLE VARIABLES
SUBSTANCE_ABUSE: 0
HAVE PEOPLE ANNOYED YOU BY CRITICIZING YOUR DRINKING: NO
CONSUMPTION TOTAL: INCOMPLETE
EVER_SMOKED: YES
TOTAL SCORE: 0
HOW MANY TIMES IN THE PAST YEAR HAVE YOU HAD 5 OR MORE DRINKS IN A DAY: 2
ON A TYPICAL DAY WHEN YOU DRINK ALCOHOL HOW MANY DRINKS DO YOU HAVE: 3
HAVE YOU EVER FELT YOU SHOULD CUT DOWN ON YOUR DRINKING: NO
EVER FELT BAD OR GUILTY ABOUT YOUR DRINKING: NO
HAVE YOU EVER FELT YOU SHOULD CUT DOWN ON YOUR DRINKING: NO
TOTAL SCORE: 0
TOTAL SCORE: 0
EVER HAD A DRINK FIRST THING IN THE MORNING TO STEADY YOUR NERVES TO GET RID OF A HANGOVER: NO
EVER FELT BAD OR GUILTY ABOUT YOUR DRINKING: NO
EVER HAD A DRINK FIRST THING IN THE MORNING TO STEADY YOUR NERVES TO GET RID OF A HANGOVER: NO
AVERAGE NUMBER OF DAYS PER WEEK YOU HAVE A DRINK CONTAINING ALCOHOL: 2
ALCOHOL_USE: YES
CONSUMPTION TOTAL: INCOMPLETE
HAVE PEOPLE ANNOYED YOU BY CRITICIZING YOUR DRINKING: NO
TOTAL SCORE: 0
ALCOHOL_USE: YES

## 2018-09-18 ASSESSMENT — COGNITIVE AND FUNCTIONAL STATUS - GENERAL
MOBILITY SCORE: 24
DAILY ACTIVITIY SCORE: 24
SUGGESTED CMS G CODE MODIFIER DAILY ACTIVITY: CH
SUGGESTED CMS G CODE MODIFIER MOBILITY: CH

## 2018-09-18 ASSESSMENT — COPD QUESTIONNAIRES
HAVE YOU SMOKED AT LEAST 100 CIGARETTES IN YOUR ENTIRE LIFE: YES
DURING THE PAST 4 WEEKS HOW MUCH DID YOU FEEL SHORT OF BREATH: NONE/LITTLE OF THE TIME
IN THE PAST 12 MONTHS DO YOU DO LESS THAN YOU USED TO BECAUSE OF YOUR BREATHING PROBLEMS: DISAGREE/UNSURE
DO YOU EVER COUGH UP ANY MUCUS OR PHLEGM?: NO/ONLY WITH OCCASIONAL COLDS OR INFECTIONS
COPD SCREENING SCORE: 2

## 2018-09-18 ASSESSMENT — PATIENT HEALTH QUESTIONNAIRE - PHQ9
2. FEELING DOWN, DEPRESSED, IRRITABLE, OR HOPELESS: NOT AT ALL
1. LITTLE INTEREST OR PLEASURE IN DOING THINGS: NOT AT ALL
SUM OF ALL RESPONSES TO PHQ9 QUESTIONS 1 AND 2: 0

## 2018-09-18 NOTE — CONSULTS
INFECTIOUS DISEASES INPATIENT CONSULT NOTE     Date of Service: 9/18/2018    Consult Requested By: Catalino Liu M.D.    Reason for Consultation: Right pyelonephritis and rectus abdominal sheath fluid collection    History of Present Illness:   Aurora Tan is a 48 y.o. 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  Patient was recently hospitalized in early August secondary to abdominal pain associated with nausea and vomiting.  She was found to have a small bowel obstruction.  She was treated conservatively with a nasogastric tube.  Imaging at that time revealed fluid in the abdomen that could possibly represent a leak related to her prior urologic procedure, however her urologist Dr. ponce reviewed the images and did not feel the findings were consistent with this.  She was ultimately discharged and underwent a cystogram and KUB which were both normal without any evidence of small bowel obstruction or ureteral leak.  Patient was doing relatively well since a prior hospitalization and felt well over the weekend.  Yesterday when she was at work she developed acute onset of dysuria and right-sided flank pain associated with a fever of 103.3.  She had some associated nausea but no vomiting, abdominal pain or diarrhea.  She denies any recent antibiotics in the past 30 days.  Patient states that she was previously been treated for urinary tract infections on outpatient basis and has received multiple courses of intramuscular ceftriaxone.  Prior urine cultures have grown staph aureus in the past.  Due to concerns for recurrent urinary tract infection, she presented to the ED for further evaluation and management.  On presentation,  "she was febrile to 102 with a leukocytosis of 19.5.  Lactic acid was slightly elevated at 2.1.  Renal function was normal.  Urinalysis revealed packed WBCs, negative nitrite, large leukocyte esterase and few bacteria.  Urine cultures currently pending.  CT scan revealed increased right hydro-ureteral nephrosis and a 3.1 x 2.1 cm right rectus sheath fluid collection concerning for possible seroma, hematoma or abscess.  Patient was started on broad-spectrum antibiotics.  She states that she was evaluated by Dr. Velasco this morning who was concerned about the possibility of mesh infection.  Infectious disease service consulted for further antibiotic recommendations and management.    Review Of Systems:  All other ROS were reviewed and are negative except as noted above in the HPI.    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   • Renal disorder 04/25/2018    \"Nephrostomy tube in place\"   • Snoring 04/25/2018    Has not had a sleep study   • Urinary incontinence    Recurrent urinary tract infections    PSH:  Past Surgical History:   Procedure Laterality Date   • EXPLORATORY LAPAROTOMY Right 4/30/2018    Procedure: EXPLORATORY LAPAROTOMY W/ILEAL URETER;  Surgeon: Kapil Rangel M.D.;  Location: [facility];  Service: Urology   • LYSIS ADHESIONS GENERAL  4/30/2018    Procedure: LYSIS ADHESIONS GENERAL;  Surgeon: Kapil Rangel M.D.;  Location: [facility];  Service: Urology   • CYSTOSCOPY STENT PLACEMENT Right 3/21/2018    Procedure: CYSTOSCOPY - STENT REMOVAL;  Surgeon: Terry Maldonado M.D.;  Location: [facility];  Service: Urology   • " URETEROSCOPY Right 3/21/2018    Procedure: URETEROSCOPY- DIAGNOSTIC;  Surgeon: Terry Maldonado M.D.;  Location: [facility];  Service: Urology   • RETROGRADES Right 3/21/2018    Procedure: RETROGRADES;  Surgeon: Terry Maldonado M.D.;  Location: [facility];  Service: Urology   • PYELOGRAM Right 3/21/2018    Procedure: PYELOGRAM;  Surgeon: Terry Maldonado M.D.;  Location: [facility];  Service: Urology   • CYSTOSCOPY N/A 1/31/2018    Procedure: CYSTOSCOPY;  Surgeon: Terry Maldonado M.D.;  Location: [facility];  Service: Urology   • RETROGRADES Right 1/31/2018    Procedure: RETROGRADES;  Surgeon: Terry Maldonado M.D.;  Location: [facility];  Service: Urology   • PYELOGRAM Right 1/31/2018    Procedure: PYELOGRAM;  Surgeon: Terry Maldonado M.D.;  Location: SUR[facility];  Service: Urology   • URETEROSCOPY Right 1/31/2018    Procedure: URETEROSCOPY, INCISIONAL OR DILATION OF STRICTURE  ;  Surgeon: Terry Maldonado M.D.;  Location: SUR[facility];  Service: Urology   • LASERTRIPSY Right 1/31/2018    Procedure: LASERTRIPSY;  Surgeon: Terry Maldonado M.D.;  Location: [facility];  Service: Urology   • STENT PLACEMENT Right 1/31/2018    Procedure: STENT PLACEMENT;  Surgeon: Terry Maldonado M.D.;  Location: [facility];  Service: Urology   • URETEROSCOPY Right 11/7/2017    Procedure: URETEROSCOPY;  Surgeon: Kiet Eid M.D.;  Location: SUR[facility];  Service: Urology   • LASERTRIPSY Right 11/7/2017    Procedure: LASERTRIPSY- LITHO;  Surgeon: Kiet Eid M.D.;  Location: SUR[facility];  Service: Urology   • URETHRAL DILATATION Right 11/7/2017    Procedure: URETHRAL DILATATION;  Surgeon: Kiet Eid M.D.;  Location: SUR[facility];  Service: Urology   • CYSTOSCOPY STENT PLACEMENT Right 11/7/2017    Procedure: CYSTOSCOPY STENT PLACEMENT;  Surgeon: Kiet Eid  M.D.;  Location: SUR[facility]ERY [facility];  Service: Urology   • CYSTOSCOPY Right 9/17/2017    Procedure: CYSTOSCOPY;  Surgeon: Kiet Eid M.D.;  Location: SUR[facility]ERY [facility];  Service: Urology   • URETEROSCOPY Right 9/17/2017    Procedure: URETEROSCOPY;  Surgeon: Kiet Eid M.D.;  Location: SUR[facility]ERY [facility];  Service: Urology   • LASERTRIPSY Right 9/17/2017    Procedure: LASERTRIPSY;  Surgeon: Kiet Eid M.D.;  Location: SUR[facility]ERY [facility];  Service: Urology   • STENT PLACEMENT Right 9/17/2017    Procedure: STENT PLACEMENT;  Surgeon: Kiet Eid M.D.;  Location: SUR[facility]ERY [facility];  Service: Urology   • ABDOMINAL HYSTERECTOMY TOTAL  2009   • OTHER ABDOMINAL SURGERY  2009    abdominal tumor    • OTHER ABDOMINAL SURGERY  2009   • ABDOMINAL EXPLORATION     • OTHER      eye surgeries X6   • OTHER      ear surgeries X5   • PRIMARY C SECTION      1989/1991/1997/1999   • TONSILLECTOMY         FAMILY HX:  Family History   Problem Relation Age of Onset   • Cancer Mother    • Cancer Father    • Heart Disease Brother    • Diabetes Maternal Grandmother    • Cancer Maternal Grandfather    • Cancer Paternal Grandmother    • Cancer Paternal Grandfather    Negative for nephrolithiasis    SOCIAL HX:  Social History     Social History   • Marital status:      Spouse name: N/A   • Number of children: N/A   • Years of education: N/A     Occupational History   • Not on file.     Social History Main Topics   • Smoking status: Former Smoker     Packs/day: 0.25     Years: 10.00     Types: Cigarettes     Quit date: 4/13/2013   • Smokeless tobacco: Never Used   • Alcohol use Yes      Comment: occ   • Drug use: No   • Sexual activity: Not on file     Other Topics Concern   • Not on file     Social History Narrative   • No narrative on file     History   Smoking Status   • Former Smoker   • Packs/day: 0.25   • Years: 10.00   • Types: Cigarettes   • Quit date: 4/13/2013   Smokeless  Tobacco   • Never Used     History   Alcohol Use   • Yes     Comment: occ       Allergies/Intolerances:  Allergies   Allergen Reactions   • Levofloxacin Itching, Hives and Shortness of Breath     Other reaction(s): Itching, pruritis  Rxn - 9/11/01   • Morphine Itching     Other reaction(s): Itching, pruritis       History reviewed with the patient     Other Current Medications:    Current Facility-Administered Medications:   •  MD Alert...Vancomycin per Pharmacy, , Other, pharmacy to dose, Bree Alvarez M.D.  •  cefTRIAXone (ROCEPHIN) 2 g in  mL IVPB, 2 g, Intravenous, Q24HRS, Bree Alvarez M.D.  •  vancomycin 1,400 mg in  mL IVPB, 15 mg/kg, Intravenous, Q12HR, Bree Alvarez M.D., Last Rate: 125 mL/hr at 09/18/18 1001, 1,400 mg at 09/18/18 1001  •  lactated ringers infusion, , Intravenous, Continuous, Catalino Liu M.D.  •  potassium chloride SA (Kdur) tablet 40 mEq, 40 mEq, Oral, Once, Catalino Liu M.D.  •  ondansetron (ZOFRAN) syringe/vial injection 4 mg, 4 mg, Intravenous, Q4HRS PRN, Bree Alvarez M.D.  •  ondansetron (ZOFRAN ODT) dispertab 4 mg, 4 mg, Oral, Q4HRS PRN, Bree Alvarez M.D.  •  Notify provider if pain remains uncontrolled, , , CONTINUOUS **AND** Use the numeric rating scale (NRS-11) on regular floors and Critical-Care Pain Observation Tool (CPOT) on ICUs/Trauma to assess pain, , , CONTINUOUS **AND** Pulse Ox (Oximetry), , , CONTINUOUS **AND** Pharmacy Consult Request ...Pain Management Review, , Other, PRN **AND** If patient difficult to arouse and/or has respiratory depression, stop any opiates that are currently infusing and call a Rapid Response., , , CONTINUOUS **AND** oxyCODONE immediate-release (ROXICODONE) tablet 5 mg, 5 mg, Oral, Q3HRS PRN **AND** oxyCODONE immediate release (ROXICODONE) tablet 10 mg, 10 mg, Oral, Q3HRS PRN **AND** HYDROmorphone (DILAUDID) injection 0.5 mg, 0.5 mg, Intravenous, Q3HRS PRN, Bree Alvarez M.D., 0.5 mg at 09/18/18 1009  •   "acetaminophen (TYLENOL) tablet 500 mg, 500 mg, Oral, Q6HRS PRN, Bree Alvarez M.D., 500 mg at 18  [unfilled]    Most Recent Vital Signs:  /74   Pulse 81   Temp 37.5 °C (99.5 °F)   Resp 15   Ht 1.702 m (5' 7\")   Wt 90.5 kg (199 lb 8.3 oz)   LMP  (LMP Unknown)   SpO2 98%   Breastfeeding? No   BMI 31.25 kg/m²   Temp  Av.9 °C (100.2 °F)  Min: 37.1 °C (98.8 °F)  Max: 38.9 °C (102 °F)    Physical Exam:  General: well-appearing, no acute distress  HEENT: sclera anicteric, PERRL, EOMI, MMM, no oral lesions  Neck: supple, no lymphadenopathy  Chest: CTAB, no r/r/w, normal work of breathing.  Cardiac: RRR, normal S1 S2, no m/r/g   Abdomen: + bowel sounds, soft, non-tender, non-distended, no HSM, midline abdominal surgical scar clean, right CVA tenderness present  Extremities: WWP, no edema, 2+ pulses  Skin: no rashes or worrisome lesions  Neuro: Alert and oriented times 3, non-focal exam, speech fluent, moves all extremities    Pertinent Lab Results:  Recent Labs      18   WBC  19.5*      Recent Labs      18   HEMOGLOBIN  13.3   HEMATOCRIT  40.0   MCV  84.6   MCH  28.1   PLATELETCT  385         Recent Labs      18   0256   SODIUM  134*  138   POTASSIUM  3.9  3.5*   CHLORIDE  103  109   CO2  19*  20   CREATININE  0.97  0.92        Recent Labs      18   0256   ALBUMIN  4.5  3.5        Pertinent Micro:  Results     Procedure Component Value Units Date/Time    BLOOD CULTURE [[id number]] Collected:  18    Order Status:  Completed Specimen:  Blood from Peripheral Updated:  18 0849     Significant Indicator NEG     Source BLD     Site PERIPHERAL     Blood Culture No Growth    Note: Blood cultures are incubated for 5 days and  are monitored continuously.Positive blood cultures  are called to the RN and reported as soon as  they are identified.      Narrative:       Per Hospital Policy: Only change " "Specimen Src: to \"Line\" if  specified by physician order.    BLOOD CULTURE [[id number]] Collected:  09/17/18 2027    Order Status:  Completed Specimen:  Blood from Peripheral Updated:  09/18/18 0849     Significant Indicator NEG     Source BLD     Site PERIPHERAL     Blood Culture No Growth    Note: Blood cultures are incubated for 5 days and  are monitored continuously.Positive blood cultures  are called to the RN and reported as soon as  they are identified.      Narrative:       Per Hospital Policy: Only change Specimen Src: to \"Line\" if  specified by physician order.    Blood Culture [[id number]]     Order Status:  Canceled Specimen:  Blood from Peripheral     Blood Culture [[id number]]     Order Status:  Canceled Specimen:  Blood from Peripheral     Urinalysis [[id number]]     Order Status:  Canceled Specimen:  Urine     Culture Respiratory W/ GRM STN [[id number]]     Order Status:  Completed Specimen:  Respirate from Sputum     URINALYSIS [[id number]]  (Abnormal) Collected:  09/17/18 1957    Order Status:  Completed Specimen:  Urine Updated:  09/17/18 2041     Color Yellow     Character Cloudy (A)     Specific Gravity 1.013     Ph 6.0     Glucose Negative mg/dL      Ketones Negative mg/dL      Protein 30 (A) mg/dL      Bilirubin Negative     Urobilinogen, Urine 0.2     Nitrite Negative     Leukocyte Esterase Large (A)     Occult Blood Moderate (A)     Micro Urine Req Microscopic    Narrative:       Indication for culture:->Emergency Room Patient    URINE CULTURE(NEW) [[id number]] Collected:  09/17/18 1957    Order Status:  Completed Specimen:  Blood Updated:  09/17/18 2017    Narrative:       Indication for culture:->Emergency Room Patient        Blood Culture   Date Value Ref Range Status   09/17/2018   Preliminary    No Growth    Note: Blood cultures are incubated for 5 days and  are monitored continuously.Positive blood cultures  are called to the RN and reported as soon as  they are identified.      "     Studies:  Ct-renal Colic Evaluation(a/p W/o)    Result Date: 9/17/2018 9/17/2018 9:23 PM HISTORY/REASON FOR EXAM: RIGHT flank pain TECHNIQUE/EXAM DESCRIPTION: CT scan of the abdomen and pelvis without contrast, 9/17/2018 9:23 PM. Unenhanced helical scanning was obtained from the diaphragmatic domes through the pubic symphysis without intravenous contrast. Low dose optimization technique was utilized for this CT exam including automated exposure control and adjustment of the mA and/or kV according to patient size. COMPARISON: 7/31/2018 contrast-enhanced CT of the abdomen and pelvis FINDINGS: The study is limited due to non-use of intravenous contrast. CT Abdomen: There is no evidence of focal consolidation or pleural effusion seen within the lung bases. The liver is unremarkable in appearance. The gallbladder is unremarkable. The pancreas is unremarkable. The spleen is unremarkable. The adrenal glands are unremarkable. LEFT kidney is unremarkable. There is moderate to severe RIGHT hydronephrosis. There is mild RIGHT perinephric fat stranding there is a RIGHT ileal conduit. The conduit is slightly more dilated than on the prior study. The aorta is normal in caliber.  No evidence of retroperitoneal adenopathy. CT Pelvis: There is no evidence of bowel obstruction or inflammatory change. There has been bowel anastomosis in the RIGHT lower quadrant. There are colonic diverticuli. The appendix is not visualized. There is an indistinct fluid collection in the RIGHT rectus sheath measuring 3.1 x 2.1 cm on axial image 131 of series 2. This is adjacent to loops of bowel in the RIGHT lower quadrant but appears separate from them and sagittal plane. There is no evidence of free fluid. No acute or suspicious osseous lesion is seen.     1.  Increased RIGHT hydroneoureteronephrosis and perinephric fat stranding. Prior RIGHT ileal conduit creation. 2.  3.1 x 2.1 cm RIGHT rectus sheath fluid collection which could be a seroma,  hematoma or abscess     Dx-chest-portable (1 View)    Result Date: 9/17/2018 9/17/2018 8:35 PM HISTORY/REASON FOR EXAM:  Fever and tachycardia, possible sepsis TECHNIQUE/EXAM DESCRIPTION AND NUMBER OF VIEWS: Single portable view of the chest. COMPARISON: 6/10/2018 FINDINGS: HEART: Stable size. LUNGS: No areas of air space disease are demonstrated. PLEURA: No effusion or pneumothorax.     No acute cardiac or pulmonary abnormalities are identified.      IMPRESSION:   1.  Sepsis    2.  Pyelonephritis  3.  Rectus sheath fluid collection  4.  Recurrent urinary tract infection      PLAN:   Aurora Tan is a 48 y.o. woman with a history of nephrolithiasis status post prior percutaneous nephrostomy tube with ileal ureter creation by urology complicated by recurrent urinary tract infections, and removal of stomach tumor and placement of mesh in 2009 admitted for acute onset of dysuria and right-sided flank pain.  Patient found to have evidence of pyelonephritis as well as a right rectus sheath fluid collection on imaging.  It is likely the fluid collection is not related to her pyelonephritis.  She does tell me that she has mesh in place and I am certainly concerned that it is infected given fluid noted on imaging concerning for possibility of an abscess.  She may need surgical washout of this fluid collection.  Will defer to her surgeon.  Agree with current antibiotics for now.  We will follow urine cultures.  She has previously grown Staphylococcus aureus from her urine cultures back in March, May and June.  Monitor renal function and Vanco trough.  Further recommendations per clinical course and culture results.      Plan of care discussed with JORDI Liu M.D.. Will continue to follow    Adriana Yancey M.D.

## 2018-09-18 NOTE — PROGRESS NOTES
Received report from Gris FOURNIER. Assumed care of the patient at this time. This RN agrees with the assessment of this patient. Patient declines any needs at this time.

## 2018-09-18 NOTE — H&P
[facility] Medicine History & Physical Note    Date of Service  9/17/2018    Primary Care Physician  CRISTOPHER Valdez.    Consultants  Gen surg  Urology    Code Status  Full    Chief Complaint  Flank pain right    History of Presenting Illness  48 y.o. female who presented 9/17/2018 with history of kidney stones and previous MI presents with right flank pain lower abdominal pain.  Symptoms started this morning she has had urinary frequency dysuria decreased appetite and nausea and vomiting.  She is also had fever at home.  She follows with urology and has had previous right-sided ilial conduit creation by urology.  She follows with Dr. Rangel.  In the emergency department she is found to have right-sided pyelonephritis meets sepsis criteria urology has been consulted she is also had a incidental finding of abdominal rectus sheath fluid collection in general surgery has been consulted.    Review of Systems  Review of Systems   Constitutional: Positive for chills and fever.   HENT: Negative for congestion, hearing loss and tinnitus.    Eyes: Negative for blurred vision, double vision and discharge.   Respiratory: Negative for cough, hemoptysis and shortness of breath.    Cardiovascular: Negative for chest pain, palpitations and leg swelling.   Gastrointestinal: Positive for abdominal pain and vomiting. Negative for heartburn and nausea.   Genitourinary: Negative for dysuria and flank pain.   Musculoskeletal: Negative for joint pain and myalgias.   Skin: Negative for rash.   Neurological: Positive for weakness. Negative for dizziness, sensory change, speech change and focal weakness.   Endo/Heme/Allergies: Negative for environmental allergies. Does not bruise/bleed easily.   Psychiatric/Behavioral: Negative for depression, hallucinations and substance abuse.       Past Medical History   has a past medical history of Breath shortness (04/25/2018); Heart attack (HCC) (11/03/2017); Heart burn (04/25/2018); High  cholesterol; Pain (04/2018); Pain (04/25/2018); Renal disorder; Renal disorder (04/25/2018); Snoring (04/25/2018); and Urinary incontinence.    Surgical History   has a past surgical history that includes abdominal exploration; cystoscopy (Right, 9/17/2017); ureteroscopy (Right, 9/17/2017); lasertripsy (Right, 9/17/2017); stent placement (Right, 9/17/2017); ureteroscopy (Right, 11/7/2017); lasertripsy (Right, 11/7/2017); urethral dilatation (Right, 11/7/2017); cystoscopy stent placement (Right, 11/7/2017); cystoscopy (N/A, 1/31/2018); retrogrades (Right, 1/31/2018); pyelogram (Right, 1/31/2018); ureteroscopy (Right, 1/31/2018); lasertripsy (Right, 1/31/2018); stent placement (Right, 1/31/2018); cystoscopy stent placement (Right, 3/21/2018); ureteroscopy (Right, 3/21/2018); retrogrades (Right, 3/21/2018); pyelogram (Right, 3/21/2018); tonsillectomy; abdominal hysterectomy total (2009); primary c section; other; other; other abdominal surgery (2009); other abdominal surgery (2009); exploratory laparotomy (Right, 4/30/2018); and lysis adhesions general (4/30/2018).     Family History  family history includes Cancer in her father, maternal grandfather, mother, paternal grandfather, and paternal grandmother; Diabetes in her maternal grandmother; Heart Disease in her brother.     Social History   reports that she quit smoking about 5 years ago. Her smoking use included Cigarettes. She has a 2.50 pack-year smoking history. She has never used smokeless tobacco. She reports that she drinks alcohol. She reports that she does not use drugs.    Allergies  Allergies   Allergen Reactions   • Levofloxacin Itching, Hives and Shortness of Breath     Other reaction(s): Itching, pruritis  Rxn - 9/11/01   • Morphine Itching     Other reaction(s): Itching, pruritis       Medications  Prior to Admission Medications   Prescriptions Last Dose Informant Patient Reported? Taking?   estradiol (MINIVELLE) 0.05 MG/24HR PATCH BIWEEKLY 9/13/2018  at 0900 Patient Yes Yes   Sig: Apply 1 Patch to skin as directed every 7 days.   guaiFENesin LA (MUCINEX) 600 MG TABLET SR 12 HR 7/31/2018 at 0900 Patient No No   Sig: Take 1 Tab by mouth every 12 hours.      Facility-Administered Medications: None       Physical Exam  Blood Pressure: 123/67   Temperature: (!) 38.9 °C (102 °F)   Pulse: 90   Respiration: 16   Pulse Oximetry: 97 %     Physical Exam   Constitutional: She is oriented to person, place, and time. She appears well-developed and well-nourished. No distress.   HENT:   Head: Normocephalic and atraumatic.   Eyes: Pupils are equal, round, and reactive to light. Conjunctivae and EOM are normal.   Neck: Normal range of motion. Neck supple. No JVD present.   Cardiovascular: Regular rhythm, normal heart sounds and intact distal pulses.    No murmur heard.  tachycardic   Pulmonary/Chest: Effort normal and breath sounds normal. No respiratory distress. She has no wheezes.   Abdominal: Soft. Bowel sounds are normal. She exhibits no distension. There is tenderness.   RLQ   Genitourinary:   Genitourinary Comments: Right flank ttp   Musculoskeletal: Normal range of motion. She exhibits no edema or tenderness.   Neurological: She is alert and oriented to person, place, and time. She exhibits normal muscle tone.   Skin: Skin is warm and dry.   Psychiatric: She has a normal mood and affect. Her behavior is normal. Judgment and thought content normal.   Nursing note and vitals reviewed.      Laboratory:  Recent Labs      09/17/18 1925   WBC  19.5*   RBC  4.73   HEMOGLOBIN  13.3   HEMATOCRIT  40.0   MCV  84.6   MCH  28.1   MCHC  33.3*   RDW  44.1   PLATELETCT  385   MPV  10.8     Recent Labs      09/17/18 1925   SODIUM  134*   POTASSIUM  3.9   CHLORIDE  103   CO2  19*   GLUCOSE  89   BUN  14   CREATININE  0.97   CALCIUM  9.3     Recent Labs      09/17/18 1925   ALTSGPT  9   ASTSGOT  13   ALKPHOSPHAT  82   TBILIRUBIN  0.4   GLUCOSE  89                 Lab Results    Component Value Date    TROPONINI <0.01 09/17/2018       Urinalysis:    Recent Labs      09/17/18 1957   SPECGRAVITY  1.013   GLUCOSEUR  Negative   KETONES  Negative   NITRITE  Negative   LEUKESTERAS  Large*   WBCURINE  Packed*   RBCURINE  5-10*   BACTERIA  Few*   EPITHELCELL  Negative        Imaging:  CT-RENAL COLIC EVALUATION(A/P W/O)   Final Result      1.  Increased RIGHT hydroneoureteronephrosis and perinephric fat stranding. Prior RIGHT ileal conduit creation.   2.  3.1 x 2.1 cm RIGHT rectus sheath fluid collection which could be a seroma, hematoma or abscess         DX-CHEST-PORTABLE (1 VIEW)   Final Result      No acute cardiac or pulmonary abnormalities are identified.            Assessment/Plan:  I anticipate this patient will require at least two midnights for appropriate medical management, necessitating inpatient admission.    * Sepsis due to urinary tract infection (HCC)   Assessment & Plan    This is sepsis (without associated acute organ dysfunction).   Renal source likely   Continue withIV abx, preivous hx of MSSA on vanc and cef  Follow cultures  Trend lactic   descilate therapy as appropriate  Dr. Yosi prabhakar urology consulted        Pyelonephritis- (present on admission)   Assessment & Plan    Right sided  IV abx  Anti emetics  Pain control         Hematoma of rectus sheath   Assessment & Plan    Vs abscess?   Gen surg consulted  Dr Mendoza consulted        Fever   Assessment & Plan    Tylenol prn        Hyponatremia   Assessment & Plan    IVF and monitor        MSSA (methicillin susceptible Staphylococcus aureus) infection- (present on admission)   Assessment & Plan    preivous UTI cultures         Ureteral stricture, right- (present on admission)   Assessment & Plan    Right sided stricture creation of ileal conduit   Urology consulted        Obesity (BMI 30-39.9)- (present on admission)   Assessment & Plan    Encouraged weight loss            VTE prophylaxis: scd

## 2018-09-18 NOTE — ASSESSMENT & PLAN NOTE
"CT revealing \"3.1 x 2.1 cm RIGHT rectus sheath fluid collection which could be a seroma, hematoma or abscess\"  Previously followed by Dr Mendoza  I discussed with Dr Mendoza from surgical team, 09/18/18  Patient will need surgical drainage of this later in week per Dr Mendoza once urological issues are stable  For now on abx, IV vancomycin. Monitor vancomycin toxicity and therapeutics.   On Ceftriaxone.   Abx per ID  "

## 2018-09-18 NOTE — PROGRESS NOTES
"Pharmacy Kinetics 48 y.o. female on vancomycin day #2 2018    Received vancomycin loading dose    Indication for Treatment: Sepsis, pyelonephritis     Pertinent history per medical record: Admitted on 2018. Presents with right flank pain lower abdominal pain.  Symptoms started this morning she has had urinary frequency dysuria decreased appetite and nausea and vomiting.  She is also had fever at home.  She follows with urology and has had previous right-sided ilial conduit creation by urology.  She follows with Dr. Rangel.  In the emergency department she is found to have right-sided pyelonephritis meets sepsis criteria urology has been consulted she is also had a incidental finding of abdominal rectus sheath fluid collection in general surgery has been consulted.    Other antibiotics: Ceftriaxone    Allergies: Levofloxacin and Morphine     List concerns for renal functio: obesity (BMI 31)    Pertinent cultures to date:   18 blood cultures x2 - in process  18 urine culture - in process  6/10/18 urine culture - MSSA  18 urine culture - MSSA  3/8/18 nephrostomy tube - MSSA, staph warneri      Recent Labs      18   1925   WBC  19.5*   NEUTSPOLYS  84.80*     Recent Labs      18   1925   BUN  14   CREATININE  0.97   ALBUMIN  4.5     No results for input(s): VANCOTROUGH, VANCOPEAK, VANCORANDOM in the last 72 hours.No intake or output data in the 24 hours ending 18 0119   Blood pressure 106/82, pulse (!) 104, temperature 38 °C (100.4 °F), resp. rate 20, height 1.702 m (5' 7\"), weight 90.5 kg (199 lb 8.3 oz), SpO2 100 %. Temp (24hrs), Av.4 °C (101.2 °F), Min:38 °C (100.4 °F), Max:38.9 °C (102 °F)      A/P   1. Vancomycin dose change: Start vancomycin 15 mg/kg q12hr (1400mg)  2. Next vancomycin level: Prior to the 4th dose on  @0930, or sooner if clinically indicated  3. Goal trough: 12-16 mcg/ml  4. Comments: No previously levels at [facility] to assess. Vancomycin should " concentrate well in kidneys. Few concerns for renal insult and accumulation. Pharmacy will follow. Narrow therapy as able. Consider ID consult.    Paramjit Nettles, [place], BCPS

## 2018-09-18 NOTE — PROGRESS NOTES
.Patient transferred from ED  to telemetry 8. Telemetry monitor placed on the patient. 2  RN skin assessment completed along with admission profile. Patient encouraged to call for assistance. Call light within reach. Bed locked and in lowest position. Patient verbalized an understanding of calling for assistance and the routine for the unit. VSS and pain addressed

## 2018-09-18 NOTE — ED NOTES
Med rec complete per pt at bedside  No antibiotics taken in the last 30 days  Allergies have been verified and updated

## 2018-09-18 NOTE — CARE PLAN
Problem: Safety  Goal: Will remain free from falls  Outcome: PROGRESSING AS EXPECTED  .Patient and their room was assessed for risk factors that contribute to falls.  Patient was educated on using the call light and the light was kept within the patient's reach. Room was kept free of clutter and the patient was assisted to bathroom.  Patient was educated on why bed alarms are on. Bed in lowest position and upper side rails up.     Problem: Fluid Volume:  Goal: Will maintain balanced intake and output  Outcome: PROGRESSING AS EXPECTED  Educated Pt on therapeutic intake of IVFs for sepsis Dx

## 2018-09-18 NOTE — ED TRIAGE NOTES
Chief Complaint   Patient presents with   • Flank Pain     right side since this am.   • Fever     103 at home     Hx of recent ileal urostomy and recent bowel in past several months. +urinary symptoms. Febrile and tachycardic, SIRS score 3.

## 2018-09-18 NOTE — CONSULTS
UROLOGY Consult Note:    Mel Palacios  Date & Time note created:    9/18/2018   3:49 PM     Referring MD:      Patient ID:   Name:             Aurora Tan   YOB: 1970  Age:                 48 y.o.  female   MRN:               [id number]                                                             Reason for Consult:      Pyelonephritis- post     History of Present Illness:    Admitted 9/17 diagnosed  with sepsis secondary to pyelonephritis.  Pt states she has been experiencing, fever, chills, nausea vomiting, right flank pain, lower right abdominal pain- 4-8/10, non radiating. No relief with OTC  meds  Pt with a history of a ureteral stricture S/P right ileal ureteral substitution for proximal stricture (4/18).  Since her surgery she has had several UTI's,  yeast infections.  She has experienced chronic intermittent R abdomen and flank pain.       Review of Systems  Constitutional: Positive for chills, diaphoresis, fever and malaise/fatigue. .   Respiratory: Negative for cough, hemoptysis, sputum production, shortness of breath and wheezing.    Cardiovascular: Negative for chest pain, palpitations, orthopnea, claudication, leg swelling and PND.   Gastrointestinal: Negative for abdominal pain, blood in stool, constipation, diarrhea, heartburn, melena, nausea and vomiting.   Genitourinary: Positive for dysuria,  flank pain- right and frequency. Negative for hematuria and urgency.   Musculoskeletal: Negative for back pain, falls, joint pain, myalgias and neck pain.   Skin: Negative for itching and rash.   Neurological: Positive for dizziness, weakness and headaches. Negative for tingling, tremors, sensory change, speech change, focal weakness, seizures and loss of consciousness.   Psychiatric/Behavioral: Negative for depression, hallucinations, memory loss, substance abuse and suicidal ideas. The patient is not nervous/anxious and does not have insomnia.    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   • Renal disorder 04/25/2018    \"Nephrostomy tube in place\"   • Snoring 04/25/2018    Has not had a sleep study   • Urinary incontinence      Active Hospital Problems    Diagnosis   • Sepsis due to urinary tract infection (HCC) [A41.9, N39.0]     Priority: High   • Rectus sheath abscess [K61.1]     Priority: Medium   • Obstructive pyelonephritis [N11.1]     Priority: Medium   • Obesity (BMI 30-39.9) [E66.9]     Priority: Low       Past Surgical History:  Past Surgical History:   Procedure Laterality Date   • EXPLORATORY LAPAROTOMY Right 4/30/2018    Procedure: EXPLORATORY LAPAROTOMY W/ILEAL URETER;  Surgeon: Kapil Rangel M.D.;  Location: [facility];  Service: Urology   • LYSIS ADHESIONS GENERAL  4/30/2018    Procedure: LYSIS ADHESIONS GENERAL;  Surgeon: Kapil Rangel M.D.;  Location: [facility];  Service: Urology   • CYSTOSCOPY STENT PLACEMENT Right 3/21/2018    Procedure: CYSTOSCOPY - STENT REMOVAL;  Surgeon: Terry Maldonado M.D.;  Location: [facility];  Service: Urology   • URETEROSCOPY Right 3/21/2018    Procedure: URETEROSCOPY- DIAGNOSTIC;  Surgeon: Terry Maldonado M.D.;  Location: [facility];  Service: Urology   • RETROGRADES Right 3/21/2018    Procedure: RETROGRADES;  Surgeon: Terry Maldonado M.D.;  Location: [facility];  Service: Urology   • PYELOGRAM Right 3/21/2018    Procedure: PYELOGRAM;  Surgeon: Terry Maldonado M.D.;  Location: [facility];  Service: Urology   • CYSTOSCOPY N/A 1/31/2018    Procedure: CYSTOSCOPY;  Surgeon: Terry Maldonado M.D.;  " Location: SURGERY [facility];  Service: Urology   • RETROGRADES Right 1/31/2018    Procedure: RETROGRADES;  Surgeon: Terry Maldonado M.D.;  Location: SURGERY [facility];  Service: Urology   • PYELOGRAM Right 1/31/2018    Procedure: PYELOGRAM;  Surgeon: Terry Maldonado M.D.;  Location: SURGERY [facility];  Service: Urology   • URETEROSCOPY Right 1/31/2018    Procedure: URETEROSCOPY, INCISIONAL OR DILATION OF STRICTURE  ;  Surgeon: Terry Maldonado M.D.;  Location: SURGERY [facility];  Service: Urology   • LASERTRIPSY Right 1/31/2018    Procedure: LASERTRIPSY;  Surgeon: Terry Maldonado M.D.;  Location: [facility];  Service: Urology   • STENT PLACEMENT Right 1/31/2018    Procedure: STENT PLACEMENT;  Surgeon: Terry Maldonado M.D.;  Location: [facility];  Service: Urology   • URETEROSCOPY Right 11/7/2017    Procedure: URETEROSCOPY;  Surgeon: Kiet Eid M.D.;  Location: [facility];  Service: Urology   • LASERTRIPSY Right 11/7/2017    Procedure: LASERTRIPSY- LITHO;  Surgeon: Kiet Eid M.D.;  Location: [facility];  Service: Urology   • URETHRAL DILATATION Right 11/7/2017    Procedure: URETHRAL DILATATION;  Surgeon: Kiet Eid M.D.;  Location: [facility];  Service: Urology   • CYSTOSCOPY STENT PLACEMENT Right 11/7/2017    Procedure: CYSTOSCOPY STENT PLACEMENT;  Surgeon: Kiet Eid M.D.;  Location: SURGERY [facility];  Service: Urology   • CYSTOSCOPY Right 9/17/2017    Procedure: CYSTOSCOPY;  Surgeon: Kiet Eid M.D.;  Location: [facility];  Service: Urology   • URETEROSCOPY Right 9/17/2017    Procedure: URETEROSCOPY;  Surgeon: Kiet Eid M.D.;  Location: SURGERY [facility];  Service: Urology   • LASERTRIPSY Right 9/17/2017    Procedure: LASERTRIPSY;  Surgeon: Kiet Eid M.D.;  Location: SURGERY [facility];  Service: Urology   • STENT PLACEMENT Right  9/17/2017    Procedure: STENT PLACEMENT;  Surgeon: Kiet Eid M.D.;  Location: SURGERY [facility];  Service: Urology   • ABDOMINAL HYSTERECTOMY TOTAL  2009   • OTHER ABDOMINAL SURGERY  2009    abdominal tumor    • OTHER ABDOMINAL SURGERY  2009   • ABDOMINAL EXPLORATION     • OTHER      eye surgeries X6   • OTHER      ear surgeries X5   • PRIMARY C SECTION      1989/1991/1997/1999   • TONSILLECTOMY         Hospital Medications:    Current Facility-Administered Medications:   •  MD Alert...Vancomycin per Pharmacy, , Other, pharmacy to dose, Bree Alvarez M.D.  •  cefTRIAXone (ROCEPHIN) 2 g in  mL IVPB, 2 g, Intravenous, Q24HRS, Bree Alvarez M.D.  •  vancomycin 1,400 mg in  mL IVPB, 15 mg/kg, Intravenous, Q12HR, Bree Alvarez M.D., Stopped at 09/18/18 1201  •  lactated ringers infusion, , Intravenous, Continuous, Catalino Liu M.D., Last Rate: 125 mL/hr at 09/18/18 1215  •  enoxaparin (LOVENOX) inj 40 mg, 40 mg, Subcutaneous, DAILY, Catalino Liu M.D., Stopped at 09/18/18 1100  •  ondansetron (ZOFRAN) syringe/vial injection 4 mg, 4 mg, Intravenous, Q4HRS PRN, Bree Alvarez M.D., 4 mg at 09/18/18 1539  •  ondansetron (ZOFRAN ODT) dispertab 4 mg, 4 mg, Oral, Q4HRS PRN, Bree Alvarez M.D.  •  Notify provider if pain remains uncontrolled, , , CONTINUOUS **AND** Use the numeric rating scale (NRS-11) on regular floors and Critical-Care Pain Observation Tool (CPOT) on ICUs/Trauma to assess pain, , , CONTINUOUS **AND** Pulse Ox (Oximetry), , , CONTINUOUS **AND** Pharmacy Consult Request ...Pain Management Review, , Other, PRN **AND** If patient difficult to arouse and/or has respiratory depression, stop any opiates that are currently infusing and call a Rapid Response., , , CONTINUOUS **AND** oxyCODONE immediate-release (ROXICODONE) tablet 5 mg, 5 mg, Oral, Q3HRS PRN **AND** oxyCODONE immediate release (ROXICODONE) tablet 10 mg, 10 mg, Oral, Q3HRS PRN **AND** HYDROmorphone (DILAUDID)  "injection 0.5 mg, 0.5 mg, Intravenous, Q3HRS PRN, Bree Alvarez M.D., 0.5 mg at 09/18/18 1009  •  acetaminophen (TYLENOL) tablet 500 mg, 500 mg, Oral, Q6HRS PRN, Bree Alvarez M.D., 500 mg at 09/18/18 0918    Current Outpatient Medications:  Prescriptions Prior to Admission   Medication Sig Dispense Refill Last Dose   • estradiol (MINIVELLE) 0.05 MG/24HR PATCH BIWEEKLY Apply 1 Patch to skin as directed every 7 days.   9/13/2018 at 0900   • guaiFENesin LA (MUCINEX) 600 MG TABLET SR 12 HR Take 1 Tab by mouth every 12 hours. 28 Tab 1 7/31/2018 at 0900       Medication Allergy:  Allergies   Allergen Reactions   • Levofloxacin Itching, Hives and Shortness of Breath     Other reaction(s): Itching, pruritis  Rxn - 9/11/01   • Morphine Itching     Other reaction(s): Itching, pruritis       Family History:  Family History   Problem Relation Age of Onset   • Cancer Mother    • Cancer Father    • Heart Disease Brother    • Diabetes Maternal Grandmother    • Cancer Maternal Grandfather    • Cancer Paternal Grandmother    • Cancer Paternal Grandfather        Social History:  Social History     Social History   • Marital status:      Spouse name: N/A   • Number of children: N/A   • Years of education: N/A     Occupational History   • Not on file.     Social History Main Topics   • Smoking status: Former Smoker     Packs/day: 0.25     Years: 10.00     Types: Cigarettes     Quit date: 4/13/2013   • Smokeless tobacco: Never Used   • Alcohol use Yes      Comment: occ   • Drug use: No   • Sexual activity: Not on file     Other Topics Concern   • Not on file     Social History Narrative   • No narrative on file         Physical Exam:  Vitals/ General Appearance:   Weight/BMI: Body mass index is 31.25 kg/m².  Blood pressure 109/74, pulse 71, temperature 37.1 °C (98.7 °F), resp. rate 15, height 1.702 m (5' 7\"), weight 90.5 kg (199 lb 8.3 oz), SpO2 99 %, not currently breastfeeding.  Vitals:    09/17/18 2343 09/18/18 0500 " 09/18/18 0930 09/18/18 1240   BP: 106/82 101/60 119/74 109/74   Pulse: (!) 104 91 81 71   Resp: 20 18 15 15   Temp: 38 °C (100.4 °F) 37.1 °C (98.8 °F) 37.5 °C (99.5 °F) 37.1 °C (98.7 °F)   SpO2: 100% 91% 98% 99%   Weight:       Height:         Oxygen Therapy:  Pulse Oximetry: 99 %, O2 (LPM): 0, O2 Delivery: None (Room Air)    Constitutional: Oriented to person, place, and time. She appears well-developed and well-nourished. No distress.   HENT:   Head: Normocephalic.   Mouth/Throat: Oropharynx is clear and moist. No oropharyngeal exudate.   Eyes: Pupils are equal, round, and reactive to light. Conjunctivae are normal. No scleral icterus.   Neck: Normal range of motion. .   Cardiovascular: Normal rate, regular rhythm and normal heart sounds.  Exam reveals no gallop and no friction rub.    No murmur  auscultated   Pulmonary/Chest: Effort normal and breath sounds normal. No stridor. No respiratory distress. She has no wheezes. She has no rales.   Decreased breath sounds to bilateral lower bases  Abdominal: Soft. Bowel sounds are normal. She exhibits no distension. There is tenderness to her RLQ,  With + R CVA tenderness). There is no rebound and no guarding.   Musculoskeletal: Normal range of motion. She exhibits no edema, tenderness or deformity.   Neurological: She is alert and oriented to person, place, and time. No cranial nerve deficit.   Skin: Skin is warm and dry. No rash noted. She is not diaphoretic. No erythema.   Psychiatric: She has a normal mood and affect. Her behavior is normal. Judgment and thought content normal  MDM (Data Review):     Records reviewed and summarized in current documentation    Lab Data Review:  Recent Results (from the past 24 hour(s))   Lactic acid (lactate)    Collection Time: 09/17/18  7:25 PM   Result Value Ref Range    Lactic Acid 2.1 (H) 0.5 - 2.0 mmol/L   CBC WITH DIFFERENTIAL    Collection Time: 09/17/18  7:25 PM   Result Value Ref Range    WBC 19.5 (H) 4.8 - 10.8 K/uL    RBC  4.73 4.20 - 5.40 M/uL    Hemoglobin 13.3 12.0 - 16.0 g/dL    Hematocrit 40.0 37.0 - 47.0 %    MCV 84.6 81.4 - 97.8 fL    MCH 28.1 27.0 - 33.0 pg    MCHC 33.3 (L) 33.6 - 35.0 g/dL    RDW 44.1 35.9 - 50.0 fL    Platelet Count 385 164 - 446 K/uL    MPV 10.8 9.0 - 12.9 fL    Neutrophils-Polys 84.80 (H) 44.00 - 72.00 %    Lymphocytes 8.70 (L) 22.00 - 41.00 %    Monocytes 5.30 0.00 - 13.40 %    Eosinophils 0.40 0.00 - 6.90 %    Basophils 0.40 0.00 - 1.80 %    Immature Granulocytes 0.40 0.00 - 0.90 %    Nucleated RBC 0.00 /100 WBC    Neutrophils (Absolute) 16.54 (H) 2.00 - 7.15 K/uL    Lymphs (Absolute) 1.70 1.00 - 4.80 K/uL    Monos (Absolute) 1.04 (H) 0.00 - 0.85 K/uL    Eos (Absolute) 0.07 0.00 - 0.51 K/uL    Baso (Absolute) 0.07 0.00 - 0.12 K/uL    Immature Granulocytes (abs) 0.07 0.00 - 0.11 K/uL    NRBC (Absolute) 0.00 K/uL   COMP METABOLIC PANEL    Collection Time: 09/17/18  7:25 PM   Result Value Ref Range    Sodium 134 (L) 135 - 145 mmol/L    Potassium 3.9 3.6 - 5.5 mmol/L    Chloride 103 96 - 112 mmol/L    Co2 19 (L) 20 - 33 mmol/L    Anion Gap 12.0 (H) 0.0 - 11.9    Glucose 89 65 - 99 mg/dL    Bun 14 8 - 22 mg/dL    Creatinine 0.97 0.50 - 1.40 mg/dL    Calcium 9.3 8.5 - 10.5 mg/dL    AST(SGOT) 13 12 - 45 U/L    ALT(SGPT) 9 2 - 50 U/L    Alkaline Phosphatase 82 30 - 99 U/L    Total Bilirubin 0.4 0.1 - 1.5 mg/dL    Albumin 4.5 3.2 - 4.9 g/dL    Total Protein 7.1 6.0 - 8.2 g/dL    Globulin 2.6 1.9 - 3.5 g/dL    A-G Ratio 1.7 g/dL   BLOOD CULTURE    Collection Time: 09/17/18  7:25 PM   Result Value Ref Range    Significant Indicator NEG     Source BLD     Site PERIPHERAL     Blood Culture       No Growth    Note: Blood cultures are incubated for 5 days and  are monitored continuously.Positive blood cultures  are called to the RN and reported as soon as  they are identified.     ESTIMATED GFR    Collection Time: 09/17/18  7:25 PM   Result Value Ref Range    GFR If African American >60 >60 mL/min/1.73 m 2    GFR If  Non African American >60 >60 mL/min/1.73 m 2   TROPONIN    Collection Time: 18  7:25 PM   Result Value Ref Range    Troponin I <0.01 0.00 - 0.04 ng/mL   URINALYSIS    Collection Time: 18  7:57 PM   Result Value Ref Range    Color Yellow     Character Cloudy (A)     Specific Gravity 1.013 <1.035    Ph 6.0 5.0 - 8.0    Glucose Negative Negative mg/dL    Ketones Negative Negative mg/dL    Protein 30 (A) Negative mg/dL    Bilirubin Negative Negative    Urobilinogen, Urine 0.2 Negative    Nitrite Negative Negative    Leukocyte Esterase Large (A) Negative    Occult Blood Moderate (A) Negative    Micro Urine Req Microscopic    URINE CULTURE(NEW)    Collection Time: 18  7:57 PM   Result Value Ref Range    Significant Indicator NEG     Source UR     Site      Urine Culture Mixed skin eduardo  >100,000 cfu/mL    URINE MICROSCOPIC (W/UA)    Collection Time: 18  7:57 PM   Result Value Ref Range    WBC Packed (A) /hpf    RBC 5-10 (A) /hpf    Bacteria Few (A) None /hpf    Epithelial Cells Negative /hpf    Hyaline Cast 3-5 (A) /lpf   BLOOD CULTURE    Collection Time: 18  8:27 PM   Result Value Ref Range    Significant Indicator NEG     Source BLD     Site PERIPHERAL     Blood Culture       No Growth    Note: Blood cultures are incubated for 5 days and  are monitored continuously.Positive blood cultures  are called to the RN and reported as soon as  they are identified.     EKG (ER)    Collection Time: 18  8:30 PM   Result Value Ref Range    Report       [facility]    Test Date:  2018  Pt Name:    STEVAN RODRIGUEZ              Department: ER  MRN:        [id number]                      Room:        12  Gender:     Female                       Technician: [id number]  :        1970                   Requested By:SARAH JAEGER  Order #:    [id number]                    Reading MD:    Measurements  Intervals                                Axis  Rate:       104                           P:          65  NH:         144                          QRS:        11  QRSD:       102                          T:          31  QT:         340  QTc:        448    Interpretive Statements  SINUS TACHYCARDIA  CONSIDER POSTERIOR INFARCT  Compared to ECG 03/14/2018 08:56:25  Myocardial infarct finding now present  Sinus rhythm no longer present     Lactic acid (lactate)    Collection Time: 09/17/18 10:00 PM   Result Value Ref Range    Lactic Acid 1.4 0.5 - 2.0 mmol/L   Comp Metabolic Panel (CMP)    Collection Time: 09/18/18  2:56 AM   Result Value Ref Range    Sodium 138 135 - 145 mmol/L    Potassium 3.5 (L) 3.6 - 5.5 mmol/L    Chloride 109 96 - 112 mmol/L    Co2 20 20 - 33 mmol/L    Anion Gap 9.0 0.0 - 11.9    Glucose 115 (H) 65 - 99 mg/dL    Bun 10 8 - 22 mg/dL    Creatinine 0.92 0.50 - 1.40 mg/dL    Calcium 8.0 (L) 8.5 - 10.5 mg/dL    AST(SGOT) 13 12 - 45 U/L    ALT(SGPT) 11 2 - 50 U/L    Alkaline Phosphatase 69 30 - 99 U/L    Total Bilirubin 0.5 0.1 - 1.5 mg/dL    Albumin 3.5 3.2 - 4.9 g/dL    Total Protein 6.0 6.0 - 8.2 g/dL    Globulin 2.5 1.9 - 3.5 g/dL    A-G Ratio 1.4 g/dL   Hemoglobin A1c    Collection Time: 09/18/18  2:56 AM   Result Value Ref Range    Glycohemoglobin 5.8 (H) 0.0 - 5.6 %    Est Avg Glucose 120 mg/dL   Lactic Acid Every four hours after STAT order    Collection Time: 09/18/18  2:56 AM   Result Value Ref Range    Lactic Acid 0.9 0.5 - 2.0 mmol/L   Lipid Profile (Lipid Panel) Fasting    Collection Time: 09/18/18  2:56 AM   Result Value Ref Range    Cholesterol,Tot 151 100 - 199 mg/dL    Triglycerides 123 0 - 149 mg/dL    HDL 40 >=40 mg/dL    LDL 86 <100 mg/dL   Prothrombin time (INR)    Collection Time: 09/18/18  2:56 AM   Result Value Ref Range    PT 15.4 (H) 12.0 - 14.6 sec    INR 1.25 (H) 0.87 - 1.13   TSH (Thyroid Stimulating Hormone)    Collection Time: 09/18/18  2:56 AM   Result Value Ref Range    TSH 1.770 0.380 - 5.330 uIU/mL   ESTIMATED GFR    Collection  Time: 09/18/18  2:56 AM   Result Value Ref Range    GFR If African American >60 >60 mL/min/1.73 m 2    GFR If Non African American >60 >60 mL/min/1.73 m 2   Lactic Acid Every four hours after STAT order    Collection Time: 09/18/18  6:53 AM   Result Value Ref Range    Lactic Acid 1.0 0.5 - 2.0 mmol/L       Imaging/Procedures Review:    Reviewed    MDM (Assessment and Plan):     Active Hospital Problems    Diagnosis   • Sepsis due to urinary tract infection (HCC) [A41.9, N39.0]     Priority: High   • Rectus sheath abscess [K61.1]     Priority: Medium   • Obstructive pyelonephritis [N11.1]     Priority: Medium   • Obesity (BMI 30-39.9) [E66.9]     Priority: Low       Continue ABX for Pyelonephritis   No urological surgery recommended at this time.    We will continue to follow    Plan of care discussed and directed by MD Rangel

## 2018-09-18 NOTE — ASSESSMENT & PLAN NOTE
CT revealing Increased RIGHT hydroneoureteronephrosis and perinephric fat stranding. Prior RIGHT ileal conduit creation.  Previously ureteral stricture s/p ileal conduit per urology, Dr Rangel  On IV vancomycin given previous history of MSSA  Monitor for vancomycin toxicity and therapeutics   Continue IV ceftriaxone   Urology team on board  Abx per ID  Source control per urology team

## 2018-09-18 NOTE — PROGRESS NOTES
Hospital Medicine Daily Progress Note    Date of Service  9/18/2018    Chief Complaint  48 y.o. female admitted 9/17/2018 with R flank pain    Hospital Course    Patient admitted 09/17/2018 with sepsis secondary to right-sided obstructive pyelonephritis with history of ileal conduit by Dr. Rangel from urology, in addition right rectus sheath fluid collection which is concerning for hematoma versus seroma versus abscess formation.  Previously cared for by Dr Mendoza from surgery. Hx MSSA UTI in the past      Interval Problem Update  Patient seen and evaluated on rounds  She is complaining of a headache  Remains n.p.o.  Discussed with surgical team, general surgery no surgical plans in place per them  Discussed with urology [place] nurse practitioner, they will discuss with Dr. Rangel  Otherwise her right flank pain has somewhat improved  No other complaints per patient  No other concerns per nursing staff  I consulted ID   Discussed with Dr Arredondo  ID will evaluate the patient    Consultants/Specialty  Infectious disease  General surgery  Urology    Disposition  No telemetry needs can be transferred to surgical floor  Home once okay per ID, surgical and urology teams     Review of Systems  Review of Systems   Constitutional: Positive for chills, diaphoresis, fever and malaise/fatigue.   HENT: Negative for hearing loss and tinnitus.    Eyes: Negative for blurred vision and double vision.   Respiratory: Negative for cough, hemoptysis, sputum production, shortness of breath and wheezing.    Cardiovascular: Negative for chest pain, palpitations, orthopnea, claudication, leg swelling and PND.   Gastrointestinal: Negative for abdominal pain, blood in stool, constipation, diarrhea, heartburn, melena, nausea and vomiting.   Genitourinary: Positive for dysuria, flank pain and frequency. Negative for hematuria and urgency.   Musculoskeletal: Negative for back pain, falls, joint pain, myalgias and neck pain.   Skin: Negative for  itching and rash.   Neurological: Positive for dizziness, weakness and headaches. Negative for tingling, tremors, sensory change, speech change, focal weakness, seizures and loss of consciousness.   Psychiatric/Behavioral: Negative for depression, hallucinations, memory loss, substance abuse and suicidal ideas. The patient is not nervous/anxious and does not have insomnia.         Physical Exam  Blood Pressure: 119/74   Temperature: 37.5 °C (99.5 °F)   Pulse: 81   Respiration: 15   Pulse Oximetry: 98 %     Physical Exam   Constitutional: She is oriented to person, place, and time. She appears well-developed and well-nourished. No distress.   HENT:   Head: Normocephalic.   Mouth/Throat: Oropharynx is clear and moist. No oropharyngeal exudate.   Eyes: Pupils are equal, round, and reactive to light. Conjunctivae are normal. No scleral icterus.   Neck: Normal range of motion. No JVD present.   Cardiovascular: Normal rate, regular rhythm and normal heart sounds.  Exam reveals no gallop and no friction rub.    No murmur heard.  Pulmonary/Chest: Effort normal and breath sounds normal. No stridor. No respiratory distress. She has no wheezes. She has no rales.   Slightly diminished in bases   Abdominal: Soft. Bowel sounds are normal. She exhibits no distension. There is tenderness (RLQ, R CVA tenderness). There is no rebound and no guarding.   Musculoskeletal: Normal range of motion. She exhibits no edema, tenderness or deformity.   Neurological: She is alert and oriented to person, place, and time. No cranial nerve deficit.   Skin: Skin is warm and dry. No rash noted. She is not diaphoretic. No erythema.   Psychiatric: She has a normal mood and affect. Her behavior is normal. Judgment and thought content normal.       Fluids  No intake or output data in the 24 hours ending 09/18/18 1027    Laboratory  Recent Labs      09/17/18   1925   WBC  19.5*   RBC  4.73   HEMOGLOBIN  13.3   HEMATOCRIT  40.0   MCV  84.6   MCH  28.1  "  MCHC  33.3*   RDW  44.1   PLATELETCT  385   MPV  10.8     Recent Labs      09/17/18   1925  09/18/18   0256   SODIUM  134*  138   POTASSIUM  3.9  3.5*   CHLORIDE  103  109   CO2  19*  20   GLUCOSE  89  115*   BUN  14  10   CREATININE  0.97  0.92   CALCIUM  9.3  8.0*     Recent Labs      09/18/18   0256   INR  1.25*         Recent Labs      09/18/18   0256   TRIGLYCERIDE  123   HDL  40   LDL  86       Imaging  CT-RENAL COLIC EVALUATION(A/P W/O)   Final Result      1.  Increased RIGHT hydroneoureteronephrosis and perinephric fat stranding. Prior RIGHT ileal conduit creation.   2.  3.1 x 2.1 cm RIGHT rectus sheath fluid collection which could be a seroma, hematoma or abscess         DX-CHEST-PORTABLE (1 VIEW)   Final Result      No acute cardiac or pulmonary abnormalities are identified.           Assessment/Plan  * Sepsis due to urinary tract infection (HCC)- (present on admission)   Assessment & Plan    This is sepsis (without associated acute organ dysfunction).   Source:   Improved  Urology / Surgical consult for source control  ID for abx  Continue close clinical monitoring         Rectus sheath abscess- (present on admission)   Assessment & Plan    CT revealing \"3.1 x 2.1 cm RIGHT rectus sheath fluid collection which could be a seroma, hematoma or abscess\"  Previously followed by Dr Mendoza  I discussed with Dr Mendoza from surgical team, 09/18/18  Patient will need surgical drainage of this later in week per Dr Mendoza once urological issues are stable  For now on abx, IV vancomycin. Monitor vancomycin toxicity and therapeutics.   On Ceftriaxone.   ID consult. Dr Arredondo to evaluate patient.         Obstructive pyelonephritis- (present on admission)   Assessment & Plan    CT revealing Increased RIGHT hydroneoureteronephrosis and perinephric fat stranding. Prior RIGHT ileal conduit creation.  Previously ureteral stricture s/p ileal conduit per urology, Dr Rangel  On IV vancomycin given previous history of " MSSA  Monitor for vancomycin toxicity and therapeutics   Continue IV ceftriaxone   I have again notified [facility]rology [place] regarding need for inpatient consultation, 09/18/18, 10:26 AM  ID consultation requested, abx per ID  Source control per urology team         Obesity (BMI 30-39.9)- (present on admission)   Assessment & Plan    Body mass index is 31.25 kg/m².

## 2018-09-18 NOTE — PROGRESS NOTES
Pt a&o x4, denies chest pain/pressure.  C/o head and body ache, medicated with tylenol and dilaudid.  Updated with plan of care, awaiting for urology at this time.  Call light in reach and encourage to call for assistance.

## 2018-09-18 NOTE — PROGRESS NOTES
Paged MD Liu to update that patient has now thrown up a second time. Per MD give an additional 4mg of zofran IV at this time for a total of 8 mg and call back if patient throws up again

## 2018-09-18 NOTE — ED NOTES
Chief Complaint   Patient presents with   • Flank Pain     right side since this am.   • Fever     103 at home     Agree with triage rn.  Pt on monitor.  Iv established.  Chart up for erp.  Pt states no other needs

## 2018-09-18 NOTE — ASSESSMENT & PLAN NOTE
This is sepsis (without associated acute organ dysfunction).   Source:   Improved  Urology / Surgical consult for source control  ID for abx  Continue close clinical monitoring

## 2018-09-18 NOTE — PROGRESS NOTES
2 RN skin check completed with Geronimo. Pt's skin is intact head to toe. Top of Ears red and blanching. Heals have calices red and blanching. Sacrum and other pressure points without redness

## 2018-09-18 NOTE — ED PROVIDER NOTES
ED Provider Note    Scribed for Zander Moore M.D. by Michelle Ponce. 9/17/2018, 8:09 PM.    Primary care provider: ANDREA Valdez  Means of arrival: Walk in  History obtained from: Patient  History limited by: None    CHIEF COMPLAINT  Chief Complaint   Patient presents with   • Flank Pain     right side since this am.   • Fever     103 at home       HPI  Aurora Tan is a 48 y.o. female with history of kidney stones and Myocardial Infarction who presents to the Emergency Department for evaluation of right flank pain and lower abdominal pain onset this morning. She endorses associated urinary frequency, dysuria, decreased appetite, and nausea. The patient also has fever, with a t-max measured at 103.3 °F at home today. After consulting with Dr. Adrian, Urologist, [facility], the patient was recommended to come to the ED for further evaluation. She states she developed a small kidney stone about 1 year ago, that turned into sepsis, and required emergency surgery. The patient additionally has history of a right renal stent and nephrostomy tube and subsequently required a right ureter corrective surgery, and now takes Mucinex to help her urinate normally. The patient currently sees Dr. Rangel, Urologist, regarding her urinary issues. She is not on anticoagulated. The patient has a prior history of a hysterectomy, but has not had an appendectomy. She denies chest pain or shortness of breath. No alleviating or exacerbating factors are identified at this time.    REVIEW OF SYSTEMS  Pertinent positives include right flank pain, lower abdominal pain, urinary frequency, dysuria, decreased appetite, nausea, and fever. Pertinent negatives include no chest pain or shortness of breath. As above, all other systems reviewed and are negative.   See HPI for further details.     PAST MEDICAL HISTORY   has a past medical history of Breath shortness (04/25/2018); Heart attack (HCC) (11/03/2017); Heart burn  (04/25/2018); High cholesterol; Pain (04/2018); Pain (04/25/2018); Renal disorder; Renal disorder (04/25/2018); Snoring (04/25/2018); and Urinary incontinence.    SURGICAL HISTORY   has a past surgical history that includes abdominal exploration; cystoscopy (Right, 9/17/2017); ureteroscopy (Right, 9/17/2017); lasertripsy (Right, 9/17/2017); stent placement (Right, 9/17/2017); ureteroscopy (Right, 11/7/2017); lasertripsy (Right, 11/7/2017); urethral dilatation (Right, 11/7/2017); cystoscopy stent placement (Right, 11/7/2017); cystoscopy (N/A, 1/31/2018); retrogrades (Right, 1/31/2018); pyelogram (Right, 1/31/2018); ureteroscopy (Right, 1/31/2018); lasertripsy (Right, 1/31/2018); stent placement (Right, 1/31/2018); cystoscopy stent placement (Right, 3/21/2018); ureteroscopy (Right, 3/21/2018); retrogrades (Right, 3/21/2018); pyelogram (Right, 3/21/2018); tonsillectomy; abdominal hysterectomy total (2009); primary c section; other; other; other abdominal surgery (2009); other abdominal surgery (2009); exploratory laparotomy (Right, 4/30/2018); and lysis adhesions general (4/30/2018).    SOCIAL HISTORY  Social History   Substance Use Topics   • Smoking status: Former Smoker     Packs/day: 0.25     Years: 10.00     Types: Cigarettes     Quit date: 4/13/2013   • Smokeless tobacco: Never Used   • Alcohol use Yes      Comment: occ      History   Drug Use No       FAMILY HISTORY  Family History   Problem Relation Age of Onset   • Cancer Mother    • Cancer Father    • Heart Disease Brother    • Diabetes Maternal Grandmother    • Cancer Maternal Grandfather    • Cancer Paternal Grandmother    • Cancer Paternal Grandfather        CURRENT MEDICATIONS  Home Medications     Reviewed by Rich Jefferson (Pharmacy Tech) on 09/17/18 at 2213  Med List Status: Complete   Medication Last Dose Status   estradiol (MINIVELLE) 0.05 MG/24HR PATCH BIWEEKLY 9/13/2018 Active   guaiFENesin LA (MUCINEX) 600 MG TABLET SR 12 HR 7/31/2018 Active  "               ALLERGIES  Allergies   Allergen Reactions   • Levofloxacin Itching, Hives and Shortness of Breath     Other reaction(s): Itching, pruritis  Rxn - 9/11/01   • Morphine Itching     Other reaction(s): Itching, pruritis       PHYSICAL EXAM  VITAL SIGNS: /67   Pulse (!) 111   Temp (!) 38.9 °C (102 °F)   Resp 18   Ht 1.702 m (5' 7\")   Wt 90.5 kg (199 lb 8.3 oz)   LMP  (LMP Unknown)   SpO2 95%   BMI 31.25 kg/m²   Vitals reviewed.  Constitutional: Alert in no apparent distress.  HENT: No signs of trauma, Bilateral external ears normal, Nose normal. Dry mucous membranes  Eyes: Pupils are equal and reactive, Conjunctiva normal, Non-icteric.   Neck: Normal range of motion, No tenderness, Supple, No stridor.   Lymphatic: No lymphadenopathy noted.   Cardiovascular: Tachycardic rate and regular rhythm, no murmurs.   Thorax & Lungs: Normal breath sounds, No respiratory distress, No wheezing, No chest tenderness.   Abdomen: Bowel sounds normal, Soft, Tenderness to palpation in bilateral lower quadrants, No peritoneal signs, No masses, No pulsatile masses. Healed incision/scar from prior nephrostomy tube on right flank. Healing midline abdominal surgical incision.  Skin: Hot and dry, No erythema, No rash.   Back: No bony tenderness, Right CVA tenderness.   Extremities: Intact distal pulses, No edema, No tenderness, No cyanosis  Musculoskeletal: Good range of motion in all major joints. No tenderness to palpation or major deformities noted.   Neurologic: Alert , Normal motor function, Normal sensory function, No focal deficits noted.   Psychiatric: Pressured speech but pleasant and cooperative.     DIAGNOSTIC STUDIES / PROCEDURES    LABS  Labs Reviewed   LACTIC ACID - Abnormal; Notable for the following:        Result Value    Lactic Acid 2.1 (*)     All other components within normal limits   CBC WITH DIFFERENTIAL - Abnormal; Notable for the following:     WBC 19.5 (*)     MCHC 33.3 (*)     " "Neutrophils-Polys 84.80 (*)     Lymphocytes 8.70 (*)     Neutrophils (Absolute) 16.54 (*)     Monos (Absolute) 1.04 (*)     All other components within normal limits   COMP METABOLIC PANEL - Abnormal; Notable for the following:     Sodium 134 (*)     Co2 19 (*)     Anion Gap 12.0 (*)     All other components within normal limits   URINALYSIS - Abnormal; Notable for the following:     Character Cloudy (*)     Protein 30 (*)     Leukocyte Esterase Large (*)     Occult Blood Moderate (*)     All other components within normal limits    Narrative:     Indication for culture:->Emergency Room Patient   URINE MICROSCOPIC (W/UA) - Abnormal; Notable for the following:     WBC Packed (*)     RBC 5-10 (*)     Bacteria Few (*)     Hyaline Cast 3-5 (*)     All other components within normal limits    Narrative:     Indication for culture:->Emergency Room Patient   LACTIC ACID   URINE CULTURE(NEW)    Narrative:     Indication for culture:->Emergency Room Patient   BLOOD CULTURE    Narrative:     Per Hospital Policy: Only change Specimen Src: to \"Line\" if  specified by physician order.   BLOOD CULTURE    Narrative:     Per Hospital Policy: Only change Specimen Src: to \"Line\" if  specified by physician order.   ESTIMATED GFR   TROPONIN   CULTURE RESPIRATORY W/ GRM STN   PROTHROMBIN TIME   APTT   LACTIC ACID   TSH   HEMOGLOBIN A1C      All labs reviewed by me.    EKG Interpretation:  Interpreted by me    12 Lead EKG interpreted by me to show:  Sinus Tachycardia  Rate 104  Axis: Normal  Prolonged QRS.  ST depressions in leads V4 - V6.  My impression of this EKG: No STEMI. Does not indicate ischemia or arrhythmia at this time.    RADIOLOGY  CT-RENAL COLIC EVALUATION(A/P W/O)   Final Result      1.  Increased RIGHT hydroneoureteronephrosis and perinephric fat stranding. Prior RIGHT ileal conduit creation.   2.  3.1 x 2.1 cm RIGHT rectus sheath fluid collection which could be a seroma, hematoma or abscess         DX-CHEST-PORTABLE (1 " VIEW)   Final Result      No acute cardiac or pulmonary abnormalities are identified.        The radiologist's interpretation of all radiological studies have been reviewed by me.    COURSE & MEDICAL DECISION MAKING  Nursing notes, VS, PMSFHx reviewed in chart.  Differential diagnoses include but not limited to: Pyelonephritis, infected kidney stone, cystitis, intraabdominal abscess, less likely bowel obstruction with intestinal perforation     8:09 PM Patient seen and examined at bedside. Patient arrives febrile and tachycardic with otherwise normal vital signs. She appears dehydrated but non-toxic. The physical exam is remarkable for right CVA tenderness and bilateral lower quadrant tenderness. Ordered for Urine Culture, Urinalysis, Blood Culture, CMP, CBC with differential, Lactic acid, DX-Chest, and CT-Abdomen to evaluate. Patient will be treated with Rocephin 2 g, vancomycin (history of MSSA in urine), and Tylenol 650 mg for her symptoms. She will additionally receive 2,715 mL secondary to tachycardia and sepsis.     8:26 PM - Obtained and reviewed past medical records which indicate history of right ureteral stricture. History of right ilial creation on 5/2/18 by Dr. Rangel and Dr. Maldonado. History of a stent and nephrostomy tube on the right prior to right ilial creation with multiple right renal infections.     CBC with significant leukocytosis but no bandemia. Metabolic panel with slight anion gap and mild lactic acidosis. Normal troponin. UA suggests significant UTI. CT renal protocol performed to evaluate for kidney stone which revealed increased right hydroneoureteronephrosis and perinephric fat stranding consistent with worsening obstruction and potential pyelonephritis. Additionally, there was a new 3x2cm right sided rectus sheath fluid collection; seroma, hematoma, or abscess.    10:05 PM - Paged Urology who agrees with antibiotics and admission. He recommends discussion with patient's general surgeon,  Dr. Mendoza, regarding the rectus sheath fluid collection.    10:15 PM - Patient reevaluated at bedside. Tachycardia has improved after IV fluids. She was made aware she will be admitted given lab and radiology results. The patient is understanding and agreeable to plan of care.     10:28 PM - Consult with Dr. Adrian, Urology. He recommends admission and will see the patient in the morning. Paged General Surgery in regards of right rectus sheath fluid collection.     10:43 PM - Consult with Dr. Alvarez, Hospitalist, who agrees to admit the patient.    10:59 PM - Consult with Dr. Mendoza, General Surgery, who will see the patient after admission.     DISPOSITION:  Patient will be admitted to Dr. Alvarez, Hospitalist, in guarded condition.    FINAL IMPRESSION  1. Pyelonephritis          Michelle DAIGLE (Robles), am scribing for, and in the presence of, Zander Moore M.D..    Electronically signed by: Michelle Ponce (Robles), 9/17/2018    Zander DAIGLE M.D. personally performed the services described in this documentation, as scribed by Michelle Ponce in my presence, and it is both accurate and complete.    C.    The note accurately reflects work and decisions made by me.  Zander Moore  9/18/2018  5:45 PM

## 2018-09-18 NOTE — CARE PLAN
Problem: Safety  Goal: Will remain free from injury  Outcome: PROGRESSING AS EXPECTED  Bed locked and in lowest position, non skid socks in place, call light in reach    Problem: Knowledge Deficit  Goal: Knowledge of disease process/condition, treatment plan, diagnostic tests, and medications will improve  Outcome: PROGRESSING AS EXPECTED  Updated with plan of care, cont iv abx, urology consult

## 2018-09-19 PROBLEM — R51.9 HEADACHE: Status: ACTIVE | Noted: 2018-09-19

## 2018-09-19 LAB
ALBUMIN SERPL BCP-MCNC: 3.8 G/DL (ref 3.2–4.9)
ALBUMIN/GLOB SERPL: 1.4 G/DL
ALP SERPL-CCNC: 76 U/L (ref 30–99)
ALT SERPL-CCNC: 13 U/L (ref 2–50)
ANION GAP SERPL CALC-SCNC: 9 MMOL/L (ref 0–11.9)
AST SERPL-CCNC: 13 U/L (ref 12–45)
BASOPHILS # BLD AUTO: 0.2 % (ref 0–1.8)
BASOPHILS # BLD: 0.02 K/UL (ref 0–0.12)
BILIRUB SERPL-MCNC: 0.5 MG/DL (ref 0.1–1.5)
BUN SERPL-MCNC: 8 MG/DL (ref 8–22)
CALCIUM SERPL-MCNC: 8.7 MG/DL (ref 8.5–10.5)
CHLORIDE SERPL-SCNC: 106 MMOL/L (ref 96–112)
CO2 SERPL-SCNC: 22 MMOL/L (ref 20–33)
CREAT SERPL-MCNC: 0.93 MG/DL (ref 0.5–1.4)
EOSINOPHIL # BLD AUTO: 0.03 K/UL (ref 0–0.51)
EOSINOPHIL NFR BLD: 0.3 % (ref 0–6.9)
ERYTHROCYTE [DISTWIDTH] IN BLOOD BY AUTOMATED COUNT: 44.8 FL (ref 35.9–50)
GLOBULIN SER CALC-MCNC: 2.8 G/DL (ref 1.9–3.5)
GLUCOSE SERPL-MCNC: 95 MG/DL (ref 65–99)
HCT VFR BLD AUTO: 33.6 % (ref 37–47)
HGB BLD-MCNC: 11.5 G/DL (ref 12–16)
IMM GRANULOCYTES # BLD AUTO: 0.03 K/UL (ref 0–0.11)
IMM GRANULOCYTES NFR BLD AUTO: 0.3 % (ref 0–0.9)
LYMPHOCYTES # BLD AUTO: 1.06 K/UL (ref 1–4.8)
LYMPHOCYTES NFR BLD: 8.9 % (ref 22–41)
MAGNESIUM SERPL-MCNC: 1.8 MG/DL (ref 1.5–2.5)
MCH RBC QN AUTO: 29.4 PG (ref 27–33)
MCHC RBC AUTO-ENTMCNC: 34.2 G/DL (ref 33.6–35)
MCV RBC AUTO: 85.9 FL (ref 81.4–97.8)
MONOCYTES # BLD AUTO: 0.77 K/UL (ref 0–0.85)
MONOCYTES NFR BLD AUTO: 6.5 % (ref 0–13.4)
NEUTROPHILS # BLD AUTO: 10.01 K/UL (ref 2–7.15)
NEUTROPHILS NFR BLD: 83.8 % (ref 44–72)
NRBC # BLD AUTO: 0 K/UL
NRBC BLD-RTO: 0 /100 WBC
PHOSPHATE SERPL-MCNC: 2.5 MG/DL (ref 2.5–4.5)
PLATELET # BLD AUTO: 260 K/UL (ref 164–446)
PMV BLD AUTO: 10.7 FL (ref 9–12.9)
POTASSIUM SERPL-SCNC: 3.8 MMOL/L (ref 3.6–5.5)
PROT SERPL-MCNC: 6.6 G/DL (ref 6–8.2)
RBC # BLD AUTO: 3.91 M/UL (ref 4.2–5.4)
SODIUM SERPL-SCNC: 137 MMOL/L (ref 135–145)
VANCOMYCIN TROUGH SERPL-MCNC: 39.9 UG/ML (ref 10–20)
WBC # BLD AUTO: 11.9 K/UL (ref 4.8–10.8)

## 2018-09-19 PROCEDURE — 83735 ASSAY OF MAGNESIUM: CPT

## 2018-09-19 PROCEDURE — 700111 HCHG RX REV CODE 636 W/ 250 OVERRIDE (IP): Performed by: HOSPITALIST

## 2018-09-19 PROCEDURE — 700105 HCHG RX REV CODE 258: Performed by: HOSPITALIST

## 2018-09-19 PROCEDURE — 85025 COMPLETE CBC W/AUTO DIFF WBC: CPT

## 2018-09-19 PROCEDURE — 700102 HCHG RX REV CODE 250 W/ 637 OVERRIDE(OP): Performed by: HOSPITALIST

## 2018-09-19 PROCEDURE — 99233 SBSQ HOSP IP/OBS HIGH 50: CPT | Performed by: INTERNAL MEDICINE

## 2018-09-19 PROCEDURE — 770006 HCHG ROOM/CARE - MED/SURG/GYN SEMI*

## 2018-09-19 PROCEDURE — 80053 COMPREHEN METABOLIC PANEL: CPT

## 2018-09-19 PROCEDURE — A9270 NON-COVERED ITEM OR SERVICE: HCPCS | Performed by: HOSPITALIST

## 2018-09-19 PROCEDURE — 84100 ASSAY OF PHOSPHORUS: CPT

## 2018-09-19 PROCEDURE — 80202 ASSAY OF VANCOMYCIN: CPT

## 2018-09-19 PROCEDURE — 700105 HCHG RX REV CODE 258: Performed by: INTERNAL MEDICINE

## 2018-09-19 PROCEDURE — A9270 NON-COVERED ITEM OR SERVICE: HCPCS | Performed by: INTERNAL MEDICINE

## 2018-09-19 PROCEDURE — 36415 COLL VENOUS BLD VENIPUNCTURE: CPT

## 2018-09-19 PROCEDURE — 700102 HCHG RX REV CODE 250 W/ 637 OVERRIDE(OP): Performed by: INTERNAL MEDICINE

## 2018-09-19 RX ORDER — DIPHENHYDRAMINE HCL 25 MG
25 TABLET ORAL NIGHTLY PRN
Status: DISCONTINUED | OUTPATIENT
Start: 2018-09-19 | End: 2018-09-21 | Stop reason: HOSPADM

## 2018-09-19 RX ORDER — DIPHENHYDRAMINE HCL 25 MG
25 TABLET ORAL EVERY 6 HOURS PRN
Status: DISCONTINUED | OUTPATIENT
Start: 2018-09-19 | End: 2018-09-19

## 2018-09-19 RX ORDER — BUTALBITAL, ACETAMINOPHEN AND CAFFEINE 50; 325; 40 MG/1; MG/1; MG/1
1 TABLET ORAL EVERY 6 HOURS PRN
Status: DISCONTINUED | OUTPATIENT
Start: 2018-09-19 | End: 2018-09-21 | Stop reason: HOSPADM

## 2018-09-19 RX ADMIN — GUAIFENESIN 600 MG: 600 TABLET, EXTENDED RELEASE ORAL at 05:00

## 2018-09-19 RX ADMIN — DIPHENHYDRAMINE HCL 25 MG: 25 TABLET ORAL at 21:51

## 2018-09-19 RX ADMIN — ACETAMINOPHEN 500 MG: 500 TABLET, FILM COATED ORAL at 03:15

## 2018-09-19 RX ADMIN — GUAIFENESIN 600 MG: 600 TABLET, EXTENDED RELEASE ORAL at 17:56

## 2018-09-19 RX ADMIN — ACETAMINOPHEN 500 MG: 500 TABLET, FILM COATED ORAL at 21:51

## 2018-09-19 RX ADMIN — ACETAMINOPHEN 500 MG: 500 TABLET, FILM COATED ORAL at 15:47

## 2018-09-19 RX ADMIN — BUTALBITAL, ACETAMINOPHEN, AND CAFFEINE 1 TABLET: 50; 325; 40 TABLET ORAL at 09:47

## 2018-09-19 RX ADMIN — OXYCODONE HYDROCHLORIDE 5 MG: 5 TABLET ORAL at 01:49

## 2018-09-19 RX ADMIN — CEFTRIAXONE SODIUM 2 G: 2 INJECTION, POWDER, FOR SOLUTION INTRAMUSCULAR; INTRAVENOUS at 21:47

## 2018-09-19 RX ADMIN — SODIUM CHLORIDE, POTASSIUM CHLORIDE, SODIUM LACTATE AND CALCIUM CHLORIDE: 600; 310; 30; 20 INJECTION, SOLUTION INTRAVENOUS at 09:47

## 2018-09-19 RX ADMIN — BUTALBITAL, ACETAMINOPHEN, AND CAFFEINE 1 TABLET: 50; 325; 40 TABLET ORAL at 15:47

## 2018-09-19 RX ADMIN — ACETAMINOPHEN 500 MG: 500 TABLET, FILM COATED ORAL at 09:47

## 2018-09-19 ASSESSMENT — ENCOUNTER SYMPTOMS
SHORTNESS OF BREATH: 0
TINGLING: 0
MEMORY LOSS: 0
DEPRESSION: 0
BLURRED VISION: 0
ABDOMINAL PAIN: 1
WEAKNESS: 1
DIARRHEA: 0
DIZZINESS: 1
SEIZURES: 0
HEMOPTYSIS: 0
HEARTBURN: 0
FEVER: 1
NECK PAIN: 0
BACK PAIN: 0
DIAPHORESIS: 1
COUGH: 0
TREMORS: 0
SENSORY CHANGE: 0
HALLUCINATIONS: 0
HEADACHES: 1
SPUTUM PRODUCTION: 0
FOCAL WEAKNESS: 0
CHILLS: 0
FLANK PAIN: 1
CHILLS: 1
ORTHOPNEA: 0
NERVOUS/ANXIOUS: 0
ABDOMINAL PAIN: 0
PND: 0
DOUBLE VISION: 0
BLOOD IN STOOL: 0
CLAUDICATION: 0
VOMITING: 0
PALPITATIONS: 0
LOSS OF CONSCIOUSNESS: 0
CONSTIPATION: 1
VOMITING: 1
MYALGIAS: 0
FALLS: 0
SPEECH CHANGE: 0
INSOMNIA: 0
WHEEZING: 0
CONSTIPATION: 0
NAUSEA: 1

## 2018-09-19 ASSESSMENT — PAIN SCALES - GENERAL
PAINLEVEL_OUTOF10: 0
PAINLEVEL_OUTOF10: 5
PAINLEVEL_OUTOF10: 7
PAINLEVEL_OUTOF10: 7
PAINLEVEL_OUTOF10: 9
PAINLEVEL_OUTOF10: 6

## 2018-09-19 ASSESSMENT — LIFESTYLE VARIABLES: SUBSTANCE_ABUSE: 0

## 2018-09-19 NOTE — PROGRESS NOTES
"Pharmacy Kinetics 48 y.o. female on vancomycin day # 3 2018    Currently on Vancomycin 1400 mg iv q12hr    Indication for Treatment: Sepsis, pyelonephritis      Pertinent history per medical record: Admitted on 2018. Presents with right flank pain lower abdominal pain.  Symptoms started this morning she has had urinary frequency dysuria decreased appetite and nausea and vomiting.  She is also had fever at home.  She follows with urology and has had previous right-sided ilial conduit creation by urology.  She follows with Dr. Rangel.  In the emergency department she is found to have right-sided pyelonephritis meets sepsis criteria urology has been consulted she is also had a incidental finding of abdominal rectus sheath fluid collection in general surgery has been consulted.     Other antibiotics: Ceftriaxone     Allergies: Levofloxacin and Morphine      List concerns for renal functio: obesity (BMI 31)     Pertinent cultures to date:   18 blood cultures x2 - in process  18 urine culture - in process  6/10/18 urine culture - MSSA  18 urine culture - MSSA  3/8/18 nephrostomy tube - MSSA, staph warneri    Recent Labs      18   1925  18   0342   WBC  19.5*  11.9*   NEUTSPOLYS  84.80*  83.80*     Recent Labs      18   1925  18   0256  18   0040   BUN  14  10  8   CREATININE  0.97  0.92  0.93   ALBUMIN  4.5  3.5  3.8     Recent Labs      18   1017   VANCOTROUGH  39.9*     Intake/Output Summary (Last 24 hours) at 18 1152  Last data filed at 18 0800   Gross per 24 hour   Intake              120 ml   Output              300 ml   Net             -180 ml      Blood pressure 120/76, pulse 70, temperature 37.7 °C (99.8 °F), resp. rate 15, height 1.702 m (5' 7\"), weight 90.8 kg (200 lb 2.8 oz), SpO2 100 %, not currently breastfeeding. Temp (24hrs), Av.3 °C (99.2 °F), Min:36.9 °C (98.4 °F), Max:37.8 °C (100.1 °F)      A/P   1. Vancomycin dose change: hold " for now  2. Next vancomycin level: if cont'd will check random level tomorrow morning  3. Goal trough: 12-16 mcg/mL  4. Comments: I/O data incomplete.  Poor clearance.  Peripheral stick sample.  Will defer to ID.    JONO Light, PharmD, BCPS

## 2018-09-19 NOTE — PROGRESS NOTES
Still having headaches  Recommend LP to r/o meningitis  Patient declined above. Aware of R/B/A    Catalino Liu M.D.  09/19/18  9:03 AM

## 2018-09-19 NOTE — CONSULTS
DATE OF SERVICE:  2018    REFERRING PHYSICIAN:  Bree Alvarez MD    REASON FOR CONSULTATION:  Abdominal wall fluid collection.    HISTORY OF PRESENT ILLNESS:  The patient, 48 years of age, has been admitted   on the hospitalist service for treatment of presumed pyelonephritis.  Patient   has had recurrent urinary tract infections following reconstruction of a   ureter on the right side.  The patient was admitted with pyuria, fever and   septic syndrome.  She has been placed on broad-spectrum antibiotics.  She has   had some right lower abdomen and right flank abdominal tenderness.  Patient   did have leukocytosis and initially an elevated lactic acid, which has   corrected with fluid resuscitation.  Patient had a CT scan, which was done for   renal colic.  She has had multiple CT scans over the last several months.    This demonstrated a fluid collection in the right lower quadrant in the rectus   sheath.  The fluid collection did not contain air.  It has over my inspection   on serial CT increased in size over the last 6 months.  Patient had remote   surgery in this location for removal of an endometrioma in .  The patient   had local incision at that time, removal of endometrial tissue presumably,   which had implanted following  section and required transmural   resection of the fascia.  The patient underwent reconstruction with Proceed   mesh, according to my records mesh, which has been subjected to subsequent   recall.  Patient subsequently did develop adhesions to this reportedly   adhesion-resistant mesh prosthesis and required enterolysis on the course of a   hysterectomy also in the remote past, which was performed by Dr. Farhad Martinez, but I did participate in this surgery with him and did the enterolysis   portion of the procedure.  The patient has presented with some right-sided   abdominal complaints in the past.  The hospitalist admitting the patient asked   to see the patient  in consultation regarding this abnormality.    PAST MEDICAL HISTORY:  Has been well documented in medical record and was   reviewed.  She has had major abdominal surgery recently, which was not noted   to have adhesions or inflammatory changes in that location through a midline   incision.  The patient has had some localized pain, though, in this location.    MEDICATIONS:  Noted and reviewed.      ALLERGIES:  Noted and reviewed.    REVIEW OF SYSTEMS:  Notable this morning for improvement in her symptomatology   since admission.  She does have complaint of some pain, though, in the right   abdominal wall area.  The patient's review of systems is otherwise negative   and noncontributory.    FAMILY HISTORY:  Negative and noncontributory.    SOCIAL HISTORY:  She is a nonsmoker, I believe, a nondrinker.    PHYSICAL EXAMINATION:  GENERAL:  Shows her to be afebrile this morning.  VITAL SIGNS:  Improved.  Currently normal.  HEENT:  Unremarkable.  NECK:  Supple.  There is no palpable lymphadenopathy.  BREASTS:  Not examined.  LUNGS:  Clear.  CARDIAC:  Normal.  ABDOMEN:  Shows a recent midline abdominal incision, which is healing well   without complication.  She is tender in the right lower quadrant of the   abdomen to deep palpation.  There is no discernible mass.  There is asymmetry,   though, which has been chronic between the right lower and left lower   quadrant due to abdominal wall reconstruction.  There is an old incision in   that location, which corresponds to incision made for initial removal of the   endometrioma.  The patient shows no bruising of the abdominal wall and no   inflammatory changes.  There is no inguinal lymphadenopathy.  EXTREMITIES:  Without deformity.  BACK:  Normal.  SKIN:  Normal.  NEUROLOGIC:  She is intact.    ASSESSMENT AND PLAN:  I did review the patient's CT scans serially.  There is   a fluid collection in the right lower quadrant of the abdominal wall that   actually appears to be new.   She was admitted for symptoms of bowel   obstruction, which I think was correlated with some bowel wall thickening in   that location, which was intimately involved with fluid collection when it was   smaller earlier this spring.  The patient's symptoms of obstruction did   resolve.  She did receive concomitant antibiotic therapy and I felt that there   was possibly a urine leak at that time from the ureteral reconstruction.  The   inflammatory changes of the bowel on most recent CT scan have resolved.    Patient has not had the obstructive symptoms.  The etiology of the fluid   collection is obscure at this time.  There is a mesh prosthesis in place.  It   is conceivable, but that has been seated due to bacteremia related to her   recurrent episodes of urosepsis and it may actually represent an infection,   which could in fact represent an infection of the abdominal wall mesh.  The   patient could conceivably have recurrent endometrioma in that location, which   also could give rise to fluid collection due to potentially local hemorrhage   and inflammatory changes.  I think at this point in time that this probably   will come to exploration surgically and repairs as indicated.  I believe that   her current septic episode needs to be controlled before she would be a   candidate for surgical intervention, but it is potentially possible that   surgical intervention could be accomplished this admission or in the very near   future.  I explained this to the patient this morning and she is ready and   willing to proceed with surgery when it is safe to do so.  I will plan to   follow along and monitor the patient's progress and make a decision regarding   surgical intervention at the appropriate time.  I appreciate the opportunity   to be involved in patient's care and I do think there is a clinically   significant problem associated with the fluid collection, which is evolving   and enlarging over time in the right lower  quadrant in the rectus sheath.       ____________________________________     MD MACKENZIE NOVOA / JERILYN    DD:  09/18/2018 21:22:30  DT:  09/18/2018 22:07:20    D#:  [id number]  Job#:  [id number]    cc: Bree Alvarez MD

## 2018-09-19 NOTE — CARE PLAN
Problem: Safety  Goal: Will remain free from injury  Outcome: PROGRESSING AS EXPECTED  .Patient and their room was assessed for risk factors that contribute to falls.  Patient was educated on using the call light and the light was kept within the patient's reach. Room was kept free of clutter and the patient was assisted to bathroom.  Patient was educated on why bed alarms are on. Bed in lowest position and upper side rails up.         Problem: Pain Management  Goal: Pain level will decrease to patient's comfort goal  Outcome: PROGRESSING AS EXPECTED  .Pain management plan was followed as developed by the interdisciplinary team and in collaboration with patient and/or family. Non-Pharmacologic comfort measures also implemented and education about examples such as relaxation and distraction techniques provided for pain relief.

## 2018-09-19 NOTE — PROGRESS NOTES
Infectious Disease Progress Note    Author: Adriana Yancey M.D. Date & Time of service: 2018  12:12 PM    Chief Complaint:  Follow-up for pyelonephritis    Interval History:   T-max 100.1 WBC 11.9 patient feels awful today with persistent headache, nausea and poor ongoing right flank pain, refused lumbar puncture per hospitalist  Labs Reviewed, Medications Reviewed, Radiology Reviewed and Wound Reviewed.    Review of Systems:  Review of Systems   Constitutional: Positive for fever and malaise/fatigue. Negative for chills.   Respiratory: Negative for cough and shortness of breath.    Gastrointestinal: Positive for nausea. Negative for abdominal pain, diarrhea and vomiting.   Genitourinary: Positive for flank pain.   Neurological: Positive for headaches.       Hemodynamics:  Temp (24hrs), Av.3 °C (99.2 °F), Min:36.9 °C (98.4 °F), Max:37.8 °C (100.1 °F)  Temperature: 37.7 °C (99.8 °F)  Pulse  Av.8  Min: 70  Max: 111   Blood Pressure: 120/76       Physical Exam:  Physical Exam   Constitutional: She is oriented to person, place, and time.   Ill-appearing   HENT:   Head: Normocephalic and atraumatic.   Eyes: Pupils are equal, round, and reactive to light. EOM are normal.   Neck: Neck supple.   Cardiovascular: Normal rate and regular rhythm.    Pulmonary/Chest: Effort normal. She has no wheezes. She has no rales.   Abdominal: Soft.   Right CVA tenderness    Midline abdominal surgical scar clean   Musculoskeletal: She exhibits no edema.   Neurological: She is alert and oriented to person, place, and time.       Meds:    Current Facility-Administered Medications:   •  acetaminophen/caffeine/butalbital 325-40-50 mg  •  prochlorperazine  •  diphenhydrAMINE  •  MD Alert...Vancomycin per Pharmacy  •  cefTRIAXone (ROCEPHIN) IV  •  LR  •  enoxaparin (LOVENOX) injection  •  guaiFENesin LA  •  ondansetron  •  ondansetron  •  Notify provider if pain remains uncontrolled **AND** Use the numeric rating scale  (NRS-11) on regular floors and Critical-Care Pain Observation Tool (CPOT) on ICUs/Trauma to assess pain **AND** Pulse Ox (Oximetry) **AND** Pharmacy Consult Request **AND** If patient difficult to arouse and/or has respiratory depression, stop any opiates that are currently infusing and call a Rapid Response. **AND** oxyCODONE immediate-release **AND** oxyCODONE immediate-release **AND** HYDROmorphone  •  acetaminophen    Labs:  Recent Labs      09/17/18 1925 09/19/18   0342   WBC  19.5*  11.9*   RBC  4.73  3.91*   HEMOGLOBIN  13.3  11.5*   HEMATOCRIT  40.0  33.6*   MCV  84.6  85.9   MCH  28.1  29.4   RDW  44.1  44.8   PLATELETCT  385  260   MPV  10.8  10.7   NEUTSPOLYS  84.80*  83.80*   LYMPHOCYTES  8.70*  8.90*   MONOCYTES  5.30  6.50   EOSINOPHILS  0.40  0.30   BASOPHILS  0.40  0.20     Recent Labs      09/17/18 1925 09/18/18   0256  09/19/18   0040   SODIUM  134*  138  137   POTASSIUM  3.9  3.5*  3.8   CHLORIDE  103  109  106   CO2  19*  20  22   GLUCOSE  89  115*  95   BUN  14  10  8     Recent Labs      09/17/18 1925 09/18/18   0256  09/19/18   0040   ALBUMIN  4.5  3.5  3.8   TBILIRUBIN  0.4  0.5  0.5   ALKPHOSPHAT  82  69  76   TOTPROTEIN  7.1  6.0  6.6   ALTSGPT  9  11  13   ASTSGOT  13  13  13   CREATININE  0.97  0.92  0.93       Imaging:  Ct-renal Colic Evaluation(a/p W/o)    Result Date: 9/17/2018 9/17/2018 9:23 PM HISTORY/REASON FOR EXAM: RIGHT flank pain TECHNIQUE/EXAM DESCRIPTION: CT scan of the abdomen and pelvis without contrast, 9/17/2018 9:23 PM. Unenhanced helical scanning was obtained from the diaphragmatic domes through the pubic symphysis without intravenous contrast. Low dose optimization technique was utilized for this CT exam including automated exposure control and adjustment of the mA and/or kV according to patient size. COMPARISON: 7/31/2018 contrast-enhanced CT of the abdomen and pelvis FINDINGS: The study is limited due to non-use of intravenous contrast. CT Abdomen: There is  no evidence of focal consolidation or pleural effusion seen within the lung bases. The liver is unremarkable in appearance. The gallbladder is unremarkable. The pancreas is unremarkable. The spleen is unremarkable. The adrenal glands are unremarkable. LEFT kidney is unremarkable. There is moderate to severe RIGHT hydronephrosis. There is mild RIGHT perinephric fat stranding there is a RIGHT ileal conduit. The conduit is slightly more dilated than on the prior study. The aorta is normal in caliber.  No evidence of retroperitoneal adenopathy. CT Pelvis: There is no evidence of bowel obstruction or inflammatory change. There has been bowel anastomosis in the RIGHT lower quadrant. There are colonic diverticuli. The appendix is not visualized. There is an indistinct fluid collection in the RIGHT rectus sheath measuring 3.1 x 2.1 cm on axial image 131 of series 2. This is adjacent to loops of bowel in the RIGHT lower quadrant but appears separate from them and sagittal plane. There is no evidence of free fluid. No acute or suspicious osseous lesion is seen.     1.  Increased RIGHT hydroneoureteronephrosis and perinephric fat stranding. Prior RIGHT ileal conduit creation. 2.  3.1 x 2.1 cm RIGHT rectus sheath fluid collection which could be a seroma, hematoma or abscess     Dx-chest-portable (1 View)    Result Date: 9/17/2018 9/17/2018 8:35 PM HISTORY/REASON FOR EXAM:  Fever and tachycardia, possible sepsis TECHNIQUE/EXAM DESCRIPTION AND NUMBER OF VIEWS: Single portable view of the chest. COMPARISON: 6/10/2018 FINDINGS: HEART: Stable size. LUNGS: No areas of air space disease are demonstrated. PLEURA: No effusion or pneumothorax.     No acute cardiac or pulmonary abnormalities are identified.      Micro:  Results     Procedure Component Value Units Date/Time    URINE CULTURE(NEW) [[id number]] Collected:  09/17/18 1957    Order Status:  Completed Specimen:  Urine Updated:  09/18/18 1258     Significant Indicator NEG      "Source UR     Site --     Urine Culture Mixed skin eduardo  >100,000 cfu/mL    Narrative:       Indication for culture:->Emergency Room Patient    BLOOD CULTURE [[id number]] Collected:  09/17/18 1925    Order Status:  Completed Specimen:  Blood from Peripheral Updated:  09/18/18 0849     Significant Indicator NEG     Source BLD     Site PERIPHERAL     Blood Culture No Growth    Note: Blood cultures are incubated for 5 days and  are monitored continuously.Positive blood cultures  are called to the RN and reported as soon as  they are identified.      Narrative:       Per Hospital Policy: Only change Specimen Src: to \"Line\" if  specified by physician order.    BLOOD CULTURE [[id number]] Collected:  09/17/18 2027    Order Status:  Completed Specimen:  Blood from Peripheral Updated:  09/18/18 0849     Significant Indicator NEG     Source BLD     Site PERIPHERAL     Blood Culture No Growth    Note: Blood cultures are incubated for 5 days and  are monitored continuously.Positive blood cultures  are called to the RN and reported as soon as  they are identified.      Narrative:       Per Hospital Policy: Only change Specimen Src: to \"Line\" if  specified by physician order.    Blood Culture [[id number]]     Order Status:  Canceled Specimen:  Blood from Peripheral     Blood Culture [[id number]]     Order Status:  Canceled Specimen:  Blood from Peripheral     Urinalysis [[id number]]     Order Status:  Canceled Specimen:  Urine     Culture Respiratory W/ GRM STN [[id number]]     Order Status:  Completed Specimen:  Respirate from Sputum     URINALYSIS [[id number]]  (Abnormal) Collected:  09/17/18 1957    Order Status:  Completed Specimen:  Urine Updated:  09/17/18 2041     Color Yellow     Character Cloudy (A)     Specific Gravity 1.013     Ph 6.0     Glucose Negative mg/dL      Ketones Negative mg/dL      Protein 30 (A) mg/dL      Bilirubin Negative     Urobilinogen, Urine 0.2     Nitrite Negative     Leukocyte Esterase Large (A)     " Occult Blood Moderate (A)     Micro Urine Req Microscopic    Narrative:       Indication for culture:->Emergency Room Patient          Assessment:  Active Hospital Problems    Diagnosis   • *Sepsis due to urinary tract infection (HCC) [A41.9, N39.0]   • Rectus sheath abscess [K61.1]   • Obstructive pyelonephritis [N11.1]   • Obesity (BMI 30-39.9) [E66.9]       Plan:  Sepsis  Secondary to pyelonephritis  Still having low-grade temps  Leukocytosis  Blood culture 9/17 -NGTD  Continue vancomycin and ceftriaxone    Pyelonephritis, right  Patient has ileal ureter  Urine culture+ mixed skin eduardo  Prior cultures in June + Staphylococcus aureus  On antibiotics above    Rectus sheath fluid collection  Concern for mesh infection versus recurrent endometrioma  Patient has history of removal of abdominal tumor in 2009 with mesh placement  Surgery following -plan for expiration when infection stabilized    Discussed with internal medicine/Dr. Liu

## 2018-09-19 NOTE — DISCHARGE PLANNING
Anticipated Discharge Disposition: TBD    Action: LSW spoke with patient to complete assessment 9/18/18. Patient lives in [place] with family. Patient independent with ADLs. Discharge needs TBD.     Barriers to Discharge: none    Plan: LSW to monitor for discharge needs    Care Transition Team Assessment    Information Source  Information Given By: Patient  Informant's Name:  (Aurora Tan)  Who is responsible for making decisions for patient? : Patient    Readmission Evaluation  Is this a readmission?: No    Elopement Risk  Legal Hold: No  Ambulatory or Self Mobile in Wheelchair: Yes  Disoriented: No  Psychiatric Symptoms: None  History of Wandering: No  Elopement this Admit: No  Vocalizing Wanting to Leave: No  Displays Behaviors, Body Language Wanting to Leave: No-Not at Risk for Elopement  Elopement Risk: Not at Risk for Elopement    Interdisciplinary Discharge Planning  Patient or legal guardian wants to designate a caregiver (see row info): No    Discharge Preparedness  What is your plan after discharge?: Uncertain - pending medical team collaboration  What are your discharge supports?: Child, Spouse  Prior Functional Level: Independent with Activities of Daily Living    Functional Assesment  Prior Functional Level: Independent with Activities of Daily Living    Finances  Financial Barriers to Discharge: No  Prescription Coverage: Yes    Vision / Hearing Impairment  Vision Impairment : No  Hearing Impairment : No    Values / Beliefs / Concerns  Values / Beliefs Concerns : No    Advance Directive  Advance Directive?: None  Advance Directive offered?: AD Booklet refused    Domestic Abuse  Have you ever been the victim of abuse or violence?: No  Physical Abuse or Sexual Abuse: No  Verbal Abuse or Emotional Abuse: No  Possible Abuse Reported to:: Not Applicable    Psychological Assessment  History of Substance Abuse: None  History of Psychiatric Problems: No    Discharge Risks or Barriers  Discharge risks or  barriers?: No    Anticipated Discharge Information  Anticipated discharge disposition: Discharge needs currently unknown  Discharge Address:  ([address] [place])  Discharge Contact Phone Number:  ([phone])

## 2018-09-19 NOTE — PROGRESS NOTES
Trauma / Surgical Daily Progress Note    Date of Service  9/19/2018    Chief Complaint  48 y.o. female admitted 9/17/2018 with Sepsis (HCC)    Interval Events  Less febrile   Wbc down   Mixed eduardo on Urine culture   BC's neg   Abdominal wall fluid collection   Possible infection vs recurrent endometrioma   Will need exploration when infection stabilized   TBD   positive clinical response     Review of Systems  Review of Systems   Gastrointestinal: Positive for abdominal pain, constipation and nausea. Negative for blood in stool and vomiting.   All other systems reviewed and are negative.       Vital Signs  Temp:  [36.9 °C (98.4 °F)-37.8 °C (100.1 °F)] 37.2 °C (99 °F)  Pulse:  [71-86] 86  Resp:  [15-18] 18  BP: (109-140)/(66-76) 121/76    Physical Exam  Physical Exam   Constitutional: She is oriented to person, place, and time. She appears well-developed.   HENT:   Head: Normocephalic.   Eyes: Pupils are equal, round, and reactive to light.   Cardiovascular: Regular rhythm.    sinus tach   Pulmonary/Chest: Effort normal and breath sounds normal.   Abdominal: Soft. There is tenderness.   Musculoskeletal: She exhibits no edema.   Neurological: She is alert and oriented to person, place, and time.   Skin: Skin is warm and dry.   Psychiatric: She has a normal mood and affect.   Nursing note and vitals reviewed.      Laboratory  Recent Results (from the past 24 hour(s))   COMP METABOLIC PANEL    Collection Time: 09/19/18 12:40 AM   Result Value Ref Range    Sodium 137 135 - 145 mmol/L    Potassium 3.8 3.6 - 5.5 mmol/L    Chloride 106 96 - 112 mmol/L    Co2 22 20 - 33 mmol/L    Anion Gap 9.0 0.0 - 11.9    Glucose 95 65 - 99 mg/dL    Bun 8 8 - 22 mg/dL    Creatinine 0.93 0.50 - 1.40 mg/dL    Calcium 8.7 8.5 - 10.5 mg/dL    AST(SGOT) 13 12 - 45 U/L    ALT(SGPT) 13 2 - 50 U/L    Alkaline Phosphatase 76 30 - 99 U/L    Total Bilirubin 0.5 0.1 - 1.5 mg/dL    Albumin 3.8 3.2 - 4.9 g/dL    Total Protein 6.6 6.0 - 8.2 g/dL     Globulin 2.8 1.9 - 3.5 g/dL    A-G Ratio 1.4 g/dL   MAGNESIUM    Collection Time: 09/19/18 12:40 AM   Result Value Ref Range    Magnesium 1.8 1.5 - 2.5 mg/dL   PHOSPHORUS    Collection Time: 09/19/18 12:40 AM   Result Value Ref Range    Phosphorus 2.5 2.5 - 4.5 mg/dL   ESTIMATED GFR    Collection Time: 09/19/18 12:40 AM   Result Value Ref Range    GFR If African American >60 >60 mL/min/1.73 m 2    GFR If Non African American >60 >60 mL/min/1.73 m 2   CBC WITH DIFFERENTIAL    Collection Time: 09/19/18  3:42 AM   Result Value Ref Range    WBC 11.9 (H) 4.8 - 10.8 K/uL    RBC 3.91 (L) 4.20 - 5.40 M/uL    Hemoglobin 11.5 (L) 12.0 - 16.0 g/dL    Hematocrit 33.6 (L) 37.0 - 47.0 %    MCV 85.9 81.4 - 97.8 fL    MCH 29.4 27.0 - 33.0 pg    MCHC 34.2 33.6 - 35.0 g/dL    RDW 44.8 35.9 - 50.0 fL    Platelet Count 260 164 - 446 K/uL    MPV 10.7 9.0 - 12.9 fL    Neutrophils-Polys 83.80 (H) 44.00 - 72.00 %    Lymphocytes 8.90 (L) 22.00 - 41.00 %    Monocytes 6.50 0.00 - 13.40 %    Eosinophils 0.30 0.00 - 6.90 %    Basophils 0.20 0.00 - 1.80 %    Immature Granulocytes 0.30 0.00 - 0.90 %    Nucleated RBC 0.00 /100 WBC    Neutrophils (Absolute) 10.01 (H) 2.00 - 7.15 K/uL    Lymphs (Absolute) 1.06 1.00 - 4.80 K/uL    Monos (Absolute) 0.77 0.00 - 0.85 K/uL    Eos (Absolute) 0.03 0.00 - 0.51 K/uL    Baso (Absolute) 0.02 0.00 - 0.12 K/uL    Immature Granulocytes (abs) 0.03 0.00 - 0.11 K/uL    NRBC (Absolute) 0.00 K/uL       Fluids    Intake/Output Summary (Last 24 hours) at 09/19/18 0803  Last data filed at 09/18/18 1354   Gross per 24 hour   Intake                0 ml   Output              300 ml   Net             -300 ml       Core Measures & Quality Metrics  Labs reviewed, Medications reviewed and Radiology images reviewed          DVT prophylaxis - mechanical: SCDs    Antibiotics: Treating active infection/contamination beyond 24 hours perioperative coverage      NAHEED Score  ETOH Screening   As above

## 2018-09-19 NOTE — PROGRESS NOTES
"Urology Progress Note        Interval Events: pt remains with some nausea and head ache    S: + fevers, chills, +nausea no vomiting.  + flatus.    O:   Blood pressure 120/76, pulse 70, temperature 37.7 °C (99.8 °F), resp. rate 15, height 1.702 m (5' 7\"), weight 90.8 kg (200 lb 2.8 oz), SpO2 100 %, not currently breastfeeding.  Recent Labs      09/17/18 1925 09/18/18   0256  09/19/18   0040   SODIUM  134*  138  137   POTASSIUM  3.9  3.5*  3.8   CHLORIDE  103  109  106   CO2  19*  20  22   GLUCOSE  89  115*  95   BUN  14  10  8   CREATININE  0.97  0.92  0.93   CALCIUM  9.3  8.0*  8.7     Recent Labs      09/17/18 1925 09/19/18   0342   WBC  19.5*  11.9*   RBC  4.73  3.91*   HEMOGLOBIN  13.3  11.5*   HEMATOCRIT  40.0  33.6*   MCV  84.6  85.9   MCH  28.1  29.4   MCHC  33.3*  34.2   RDW  44.1  44.8   PLATELETCT  385  260   MPV  10.8  10.7         Intake/Output Summary (Last 24 hours) at 09/19/18 1627  Last data filed at 09/19/18 0800   Gross per 24 hour   Intake              120 ml   Output                0 ml   Net              120 ml       Exam:  Abdomen soft, RLQ pain, + R flank pain.  Urine clear  A/P:    Active Hospital Problems    Diagnosis   • Sepsis due to urinary tract infection (HCC) [A41.9, N39.0]     Priority: High   • Rectus sheath abscess [K61.1]     Priority: Medium   • Obstructive pyelonephritis [N11.1]     Priority: Medium   • Obesity (BMI 30-39.9) [E66.9]     Priority: Low       Stable.   Ambulate, IS.  Continue ABX.    No further treatment needed by urology at this time.  Pt will follow up in office.   [facility] signing off. Please call us for any new developments/symptoms. Thank you.     Plan of care discussed and directed by MD Claire Palacios, A.P.R.N.  "

## 2018-09-19 NOTE — PROGRESS NOTES
Assumed care of pt.  Bedside report received and whiteboard updated. Discussed plan of care for the day and answered questions.  Chart and MAR reviewed.  Call light and personal belongings are within reach.  Bed in low position and locked. Pt has no needs at this time.    Dentures: top dentures in place  Glasses: at bedside  Hearing aides: denies

## 2018-09-20 LAB
ALBUMIN SERPL BCP-MCNC: 3.3 G/DL (ref 3.2–4.9)
ALBUMIN/GLOB SERPL: 1.3 G/DL
ALP SERPL-CCNC: 88 U/L (ref 30–99)
ALT SERPL-CCNC: 16 U/L (ref 2–50)
ANION GAP SERPL CALC-SCNC: 7 MMOL/L (ref 0–11.9)
AST SERPL-CCNC: 20 U/L (ref 12–45)
BASOPHILS # BLD AUTO: 0.5 % (ref 0–1.8)
BASOPHILS # BLD: 0.03 K/UL (ref 0–0.12)
BILIRUB SERPL-MCNC: 0.2 MG/DL (ref 0.1–1.5)
BUN SERPL-MCNC: 8 MG/DL (ref 8–22)
CALCIUM SERPL-MCNC: 8.5 MG/DL (ref 8.5–10.5)
CHLORIDE SERPL-SCNC: 106 MMOL/L (ref 96–112)
CO2 SERPL-SCNC: 23 MMOL/L (ref 20–33)
CREAT SERPL-MCNC: 0.86 MG/DL (ref 0.5–1.4)
EOSINOPHIL # BLD AUTO: 0.05 K/UL (ref 0–0.51)
EOSINOPHIL NFR BLD: 0.9 % (ref 0–6.9)
ERYTHROCYTE [DISTWIDTH] IN BLOOD BY AUTOMATED COUNT: 43 FL (ref 35.9–50)
GLOBULIN SER CALC-MCNC: 2.6 G/DL (ref 1.9–3.5)
GLUCOSE SERPL-MCNC: 110 MG/DL (ref 65–99)
HCT VFR BLD AUTO: 34.3 % (ref 37–47)
HGB BLD-MCNC: 11.3 G/DL (ref 12–16)
IMM GRANULOCYTES # BLD AUTO: 0.01 K/UL (ref 0–0.11)
IMM GRANULOCYTES NFR BLD AUTO: 0.2 % (ref 0–0.9)
LYMPHOCYTES # BLD AUTO: 1.28 K/UL (ref 1–4.8)
LYMPHOCYTES NFR BLD: 22 % (ref 22–41)
MCH RBC QN AUTO: 28 PG (ref 27–33)
MCHC RBC AUTO-ENTMCNC: 32.9 G/DL (ref 33.6–35)
MCV RBC AUTO: 84.9 FL (ref 81.4–97.8)
MONOCYTES # BLD AUTO: 0.57 K/UL (ref 0–0.85)
MONOCYTES NFR BLD AUTO: 9.8 % (ref 0–13.4)
NEUTROPHILS # BLD AUTO: 3.87 K/UL (ref 2–7.15)
NEUTROPHILS NFR BLD: 66.6 % (ref 44–72)
NRBC # BLD AUTO: 0 K/UL
NRBC BLD-RTO: 0 /100 WBC
PLATELET # BLD AUTO: 239 K/UL (ref 164–446)
PMV BLD AUTO: 10.6 FL (ref 9–12.9)
POTASSIUM SERPL-SCNC: 3.3 MMOL/L (ref 3.6–5.5)
PROT SERPL-MCNC: 5.9 G/DL (ref 6–8.2)
RBC # BLD AUTO: 4.04 M/UL (ref 4.2–5.4)
SODIUM SERPL-SCNC: 136 MMOL/L (ref 135–145)
VANCOMYCIN SERPL-MCNC: 3.5 UG/ML
WBC # BLD AUTO: 5.8 K/UL (ref 4.8–10.8)

## 2018-09-20 PROCEDURE — 700102 HCHG RX REV CODE 250 W/ 637 OVERRIDE(OP): Performed by: HOSPITALIST

## 2018-09-20 PROCEDURE — 700105 HCHG RX REV CODE 258: Performed by: HOSPITALIST

## 2018-09-20 PROCEDURE — 85025 COMPLETE CBC W/AUTO DIFF WBC: CPT

## 2018-09-20 PROCEDURE — 770006 HCHG ROOM/CARE - MED/SURG/GYN SEMI*

## 2018-09-20 PROCEDURE — 700111 HCHG RX REV CODE 636 W/ 250 OVERRIDE (IP): Performed by: HOSPITALIST

## 2018-09-20 PROCEDURE — 36415 COLL VENOUS BLD VENIPUNCTURE: CPT

## 2018-09-20 PROCEDURE — 80202 ASSAY OF VANCOMYCIN: CPT

## 2018-09-20 PROCEDURE — 99232 SBSQ HOSP IP/OBS MODERATE 35: CPT | Performed by: INTERNAL MEDICINE

## 2018-09-20 PROCEDURE — 700105 HCHG RX REV CODE 258: Performed by: INTERNAL MEDICINE

## 2018-09-20 PROCEDURE — A9270 NON-COVERED ITEM OR SERVICE: HCPCS | Performed by: INTERNAL MEDICINE

## 2018-09-20 PROCEDURE — A9270 NON-COVERED ITEM OR SERVICE: HCPCS | Performed by: HOSPITALIST

## 2018-09-20 PROCEDURE — 700102 HCHG RX REV CODE 250 W/ 637 OVERRIDE(OP): Performed by: INTERNAL MEDICINE

## 2018-09-20 PROCEDURE — 80053 COMPREHEN METABOLIC PANEL: CPT

## 2018-09-20 RX ORDER — POTASSIUM CHLORIDE 20 MEQ/1
40 TABLET, EXTENDED RELEASE ORAL EVERY 4 HOURS
Status: DISPENSED | OUTPATIENT
Start: 2018-09-20 | End: 2018-09-20

## 2018-09-20 RX ADMIN — DIPHENHYDRAMINE HCL 25 MG: 25 TABLET ORAL at 23:04

## 2018-09-20 RX ADMIN — GUAIFENESIN 600 MG: 600 TABLET, EXTENDED RELEASE ORAL at 05:35

## 2018-09-20 RX ADMIN — GUAIFENESIN 600 MG: 600 TABLET, EXTENDED RELEASE ORAL at 17:46

## 2018-09-20 RX ADMIN — CEFTRIAXONE SODIUM 2 G: 2 INJECTION, POWDER, FOR SOLUTION INTRAMUSCULAR; INTRAVENOUS at 21:29

## 2018-09-20 RX ADMIN — BUTALBITAL, ACETAMINOPHEN, AND CAFFEINE 1 TABLET: 50; 325; 40 TABLET ORAL at 09:57

## 2018-09-20 RX ADMIN — SODIUM CHLORIDE, POTASSIUM CHLORIDE, SODIUM LACTATE AND CALCIUM CHLORIDE: 600; 310; 30; 20 INJECTION, SOLUTION INTRAVENOUS at 02:41

## 2018-09-20 RX ADMIN — ACETAMINOPHEN 500 MG: 500 TABLET, FILM COATED ORAL at 09:58

## 2018-09-20 ASSESSMENT — ENCOUNTER SYMPTOMS
DEPRESSION: 0
DIZZINESS: 0
WEAKNESS: 1
BLOOD IN STOOL: 0
FEVER: 1
HEARTBURN: 0
TREMORS: 0
FALLS: 0
PALPITATIONS: 0
FLANK PAIN: 1
SPUTUM PRODUCTION: 0
WEAKNESS: 0
TINGLING: 0
DIAPHORESIS: 0
HEADACHES: 0
DOUBLE VISION: 0
MEMORY LOSS: 0
SPEECH CHANGE: 0
MYALGIAS: 0
LOSS OF CONSCIOUSNESS: 0
PND: 0
SEIZURES: 0
NAUSEA: 0
ABDOMINAL PAIN: 0
HEADACHES: 1
NERVOUS/ANXIOUS: 0
ORTHOPNEA: 0
FEVER: 0
DIARRHEA: 0
ABDOMINAL PAIN: 1
SHORTNESS OF BREATH: 0
SENSORY CHANGE: 0
WHEEZING: 0
CONSTIPATION: 0
INSOMNIA: 0
HALLUCINATIONS: 0
FOCAL WEAKNESS: 0
COUGH: 0
BLURRED VISION: 0
VOMITING: 0
CLAUDICATION: 0
BACK PAIN: 0
NECK PAIN: 0
HEMOPTYSIS: 0
FLANK PAIN: 0
CHILLS: 0

## 2018-09-20 ASSESSMENT — PAIN SCALES - GENERAL
PAINLEVEL_OUTOF10: 5
PAINLEVEL_OUTOF10: 0
PAINLEVEL_OUTOF10: 8
PAINLEVEL_OUTOF10: 5
PAINLEVEL_OUTOF10: 7

## 2018-09-20 ASSESSMENT — LIFESTYLE VARIABLES: SUBSTANCE_ABUSE: 0

## 2018-09-20 NOTE — PROGRESS NOTES
[facility] Medicine Daily Progress Note    Date of Service  9/20/2018    Chief Complaint  48 y.o. female admitted 9/17/2018 with R flank pain    Hospital Course    Patient admitted 09/17/2018 with sepsis secondary to right-sided obstructive pyelonephritis with history of ileal conduit by Dr. Rangel from urology, in addition right rectus sheath fluid collection which is concerning for hematoma versus seroma versus abscess formation.  Previously cared for by Dr Mendoza from surgery. Hx MSSA UTI in the past      Interval Problem Update  Patient seen and evaluated on rounds  Feels well  Febrile last night  HA have improved  NV has improved  ID following  No LP per ID  Appreciate surgical teams input  Appreciuate ID input  No other complaints per patient  No other concerns per nursing staff    Consultants/Specialty  Infectious disease  General surgery  Urology    Disposition  No telemetry needs can be transferred to surgical floor  Home once okay per ID, surgical and urology teams     Review of Systems  Review of Systems   Constitutional: Negative for chills, diaphoresis, fever and malaise/fatigue.   HENT: Negative for hearing loss and tinnitus.    Eyes: Negative for blurred vision and double vision.   Respiratory: Negative for cough, hemoptysis, sputum production, shortness of breath and wheezing.    Cardiovascular: Negative for chest pain, palpitations, orthopnea, claudication, leg swelling and PND.   Gastrointestinal: Negative for abdominal pain, blood in stool, constipation, diarrhea, heartburn, melena, nausea and vomiting.   Genitourinary: Negative for dysuria, flank pain, frequency, hematuria and urgency.   Musculoskeletal: Negative for back pain, falls, joint pain, myalgias and neck pain.   Skin: Negative for itching and rash.   Neurological: Negative for dizziness, tingling, tremors, sensory change, speech change, focal weakness, seizures, loss of consciousness, weakness and headaches.   Psychiatric/Behavioral:  Negative for depression, hallucinations, memory loss, substance abuse and suicidal ideas. The patient is not nervous/anxious and does not have insomnia.         Physical Exam  Blood Pressure: 119/74   Temperature: 37.5 °C (99.5 °F)   Pulse: 81   Respiration: 15   Pulse Oximetry: 98 %     Physical Exam   Constitutional: She is oriented to person, place, and time. She appears well-developed and well-nourished. No distress.   HENT:   Head: Normocephalic.   Mouth/Throat: Oropharynx is clear and moist. No oropharyngeal exudate.   Eyes: Pupils are equal, round, and reactive to light. Conjunctivae are normal. No scleral icterus.   Neck: Normal range of motion. No JVD present.   Cardiovascular: Normal rate, regular rhythm and normal heart sounds.  Exam reveals no gallop and no friction rub.    No murmur heard.  Pulmonary/Chest: Effort normal and breath sounds normal. No stridor. No respiratory distress. She has no wheezes. She has no rales.   Slightly diminished in bases   Abdominal: Soft. Bowel sounds are normal. She exhibits no distension. There is no tenderness (RLQ, R CVA tenderness). There is no rebound and no guarding.   Musculoskeletal: Normal range of motion. She exhibits no edema, tenderness or deformity.   Neurological: She is alert and oriented to person, place, and time. No cranial nerve deficit.   Skin: Skin is warm and dry. No rash noted. She is not diaphoretic. No erythema.   Psychiatric: She has a normal mood and affect. Her behavior is normal. Judgment and thought content normal.       Fluids    Intake/Output Summary (Last 24 hours) at 09/20/18 1430  Last data filed at 09/20/18 1300   Gross per 24 hour   Intake              240 ml   Output                0 ml   Net              240 ml       Laboratory  Recent Labs      09/17/18   1925  09/19/18   0342  09/20/18   0844   WBC  19.5*  11.9*  5.8   RBC  4.73  3.91*  4.04*   HEMOGLOBIN  13.3  11.5*  11.3*   HEMATOCRIT  40.0  33.6*  34.3*   MCV  84.6  85.9  84.9  "  MCH  28.1  29.4  28.0   MCHC  33.3*  34.2  32.9*   RDW  44.1  44.8  43.0   PLATELETCT  385  260  239   MPV  10.8  10.7  10.6     Recent Labs      09/18/18   0256  09/19/18   0040  09/20/18   0845   SODIUM  138  137  136   POTASSIUM  3.5*  3.8  3.3*   CHLORIDE  109  106  106   CO2  20  22  23   GLUCOSE  115*  95  110*   BUN  10  8  8   CREATININE  0.92  0.93  0.86   CALCIUM  8.0*  8.7  8.5     Recent Labs      09/18/18   0256   INR  1.25*         Recent Labs      09/18/18   0256   TRIGLYCERIDE  123   HDL  40   LDL  86       Imaging  CT-RENAL COLIC EVALUATION(A/P W/O)   Final Result      1.  Increased RIGHT hydroneoureteronephrosis and perinephric fat stranding. Prior RIGHT ileal conduit creation.   2.  3.1 x 2.1 cm RIGHT rectus sheath fluid collection which could be a seroma, hematoma or abscess         DX-CHEST-PORTABLE (1 VIEW)   Final Result      No acute cardiac or pulmonary abnormalities are identified.           Assessment/Plan  * Sepsis due to urinary tract infection (HCC)- (present on admission)   Assessment & Plan    This is sepsis (without associated acute organ dysfunction).   Source:   Improved  Urology / Surgical consult for source control  ID for abx  Continue close clinical monitoring         Rectus sheath abscess- (present on admission)   Assessment & Plan    CT revealing \"3.1 x 2.1 cm RIGHT rectus sheath fluid collection which could be a seroma, hematoma or abscess\"  Previously followed by Dr Mendoza  I discussed with Dr Mendoza from surgical team, 09/18/18  Patient will need surgical drainage of this later in week per Dr Mendoza once urological issues are stable  For now on abx, IV vancomycin. Monitor vancomycin toxicity and therapeutics.   On Ceftriaxone.   Abx per ID        Obstructive pyelonephritis- (present on admission)   Assessment & Plan    CT revealing Increased RIGHT hydroneoureteronephrosis and perinephric fat stranding. Prior RIGHT ileal conduit creation.  Previously ureteral stricture s/p " ileal conduit per urology, Dr Rangel  On IV vancomycin given previous history of MSSA  Monitor for vancomycin toxicity and therapeutics   Continue IV ceftriaxone   Urology team on board  Abx per ID  Source control per urology team         Headache- (present on admission)   Assessment & Plan    Discussed with Dr Arredondo, no need for LP per ID  HA improved        Obesity (BMI 30-39.9)- (present on admission)   Assessment & Plan    Body mass index is 31.25 kg/m².

## 2018-09-20 NOTE — PROGRESS NOTES
Report called to receiving floor RN Mamta. Pt updated on POC, all belongings packed. Meds from drawer and chart with patient

## 2018-09-20 NOTE — CARE PLAN
Problem: Safety  Goal: Will remain free from injury  Outcome: PROGRESSING AS EXPECTED  .Patient and their room was assessed for risk factors that contribute to falls.  Patient was educated on using the call light and the light was kept within the patient's reach. Room was kept free of clutter                                           Problem: Infection  Goal: Will remain free from infection  Outcome: PROGRESSING SLOWER THAN EXPECTED  ..  Intervention: Implement standard precautions and perform hand washing before and after patient contact  .Patient received verbal infection prevention. Hand hygiene implemented for standard precautions against spread of infection.

## 2018-09-20 NOTE — PROGRESS NOTES
Infectious Disease Progress Note    Author: Adriana Yancey M.D. Date & Time of service: 2018  10:30 AM    Chief Complaint:  Follow-up for pyelonephritis    Interval History:   T-max 100.1 WBC 11.9 patient feels awful today with persistent headache, nausea and poor ongoing right flank pain, refused lumbar puncture per hospitalist   T-max 101.1 WBC 5.8 patient continues to have a severe headache, she denies any history of headaches or migraines, she questions whether it is related to the vancomycin, she denies any dysuria and her right flank pain has overall improved, patient was able to tolerate dinner yesterday  Labs Reviewed, Medications Reviewed, Radiology Reviewed and Wound Reviewed.    Review of Systems:  Review of Systems   Constitutional: Positive for fever and malaise/fatigue. Negative for chills.   Respiratory: Negative for cough and shortness of breath.    Gastrointestinal: Negative for abdominal pain, diarrhea, nausea and vomiting.   Genitourinary: Positive for flank pain.        Improved   Neurological: Positive for headaches.       Hemodynamics:  Temp (24hrs), Av.8 °C (100 °F), Min:37.4 °C (99.3 °F), Max:38.4 °C (101.1 °F)  Temperature: 37.4 °C (99.3 °F)  Pulse  Av.2  Min: 63  Max: 111   Blood Pressure: 121/69       Physical Exam:  Physical Exam   Constitutional: She is oriented to person, place, and time. She appears well-developed.   Ill-appearing   HENT:   Head: Normocephalic and atraumatic.   Eyes: Pupils are equal, round, and reactive to light. EOM are normal.   Neck: Neck supple.   Cardiovascular: Normal rate and regular rhythm.    Pulmonary/Chest: Effort normal. She has no wheezes. She has no rales.   Abdominal: Soft.   Right CVA tenderness, improved    Midline abdominal surgical scar clean   Musculoskeletal: She exhibits no edema.   Neurological: She is alert and oriented to person, place, and time.       Meds:    Current Facility-Administered Medications:   •   acetaminophen/caffeine/butalbital 325-40-50 mg  •  prochlorperazine  •  diphenhydrAMINE  •  MD Alert...Vancomycin per Pharmacy  •  cefTRIAXone (ROCEPHIN) IV  •  LR  •  enoxaparin (LOVENOX) injection  •  guaiFENesin LA  •  ondansetron  •  ondansetron  •  Notify provider if pain remains uncontrolled **AND** Use the numeric rating scale (NRS-11) on regular floors and Critical-Care Pain Observation Tool (CPOT) on ICUs/Trauma to assess pain **AND** Pulse Ox (Oximetry) **AND** Pharmacy Consult Request **AND** If patient difficult to arouse and/or has respiratory depression, stop any opiates that are currently infusing and call a Rapid Response. **AND** oxyCODONE immediate-release **AND** oxyCODONE immediate-release **AND** HYDROmorphone  •  acetaminophen    Labs:  Recent Labs      09/17/18   1925  09/19/18   0342  09/20/18   0844   WBC  19.5*  11.9*  5.8   RBC  4.73  3.91*  4.04*   HEMOGLOBIN  13.3  11.5*  11.3*   HEMATOCRIT  40.0  33.6*  34.3*   MCV  84.6  85.9  84.9   MCH  28.1  29.4  28.0   RDW  44.1  44.8  43.0   PLATELETCT  385  260  239   MPV  10.8  10.7  10.6   NEUTSPOLYS  84.80*  83.80*  66.60   LYMPHOCYTES  8.70*  8.90*  22.00   MONOCYTES  5.30  6.50  9.80   EOSINOPHILS  0.40  0.30  0.90   BASOPHILS  0.40  0.20  0.50     Recent Labs      09/18/18   0256  09/19/18   0040  09/20/18   0845   SODIUM  138  137  136   POTASSIUM  3.5*  3.8  3.3*   CHLORIDE  109  106  106   CO2  20  22  23   GLUCOSE  115*  95  110*   BUN  10  8  8     Recent Labs      09/18/18   0256  09/19/18   0040  09/20/18   0845   ALBUMIN  3.5  3.8  3.3   TBILIRUBIN  0.5  0.5  0.2   ALKPHOSPHAT  69  76  88   TOTPROTEIN  6.0  6.6  5.9*   ALTSGPT  11  13  16   ASTSGOT  13  13  20   CREATININE  0.92  0.93  0.86       Imaging:  Ct-renal Colic Evaluation(a/p W/o)    Result Date: 9/17/2018 9/17/2018 9:23 PM HISTORY/REASON FOR EXAM: RIGHT flank pain TECHNIQUE/EXAM DESCRIPTION: CT scan of the abdomen and pelvis without contrast, 9/17/2018 9:23 PM.  Unenhanced helical scanning was obtained from the diaphragmatic domes through the pubic symphysis without intravenous contrast. Low dose optimization technique was utilized for this CT exam including automated exposure control and adjustment of the mA and/or kV according to patient size. COMPARISON: 7/31/2018 contrast-enhanced CT of the abdomen and pelvis FINDINGS: The study is limited due to non-use of intravenous contrast. CT Abdomen: There is no evidence of focal consolidation or pleural effusion seen within the lung bases. The liver is unremarkable in appearance. The gallbladder is unremarkable. The pancreas is unremarkable. The spleen is unremarkable. The adrenal glands are unremarkable. LEFT kidney is unremarkable. There is moderate to severe RIGHT hydronephrosis. There is mild RIGHT perinephric fat stranding there is a RIGHT ileal conduit. The conduit is slightly more dilated than on the prior study. The aorta is normal in caliber.  No evidence of retroperitoneal adenopathy. CT Pelvis: There is no evidence of bowel obstruction or inflammatory change. There has been bowel anastomosis in the RIGHT lower quadrant. There are colonic diverticuli. The appendix is not visualized. There is an indistinct fluid collection in the RIGHT rectus sheath measuring 3.1 x 2.1 cm on axial image 131 of series 2. This is adjacent to loops of bowel in the RIGHT lower quadrant but appears separate from them and sagittal plane. There is no evidence of free fluid. No acute or suspicious osseous lesion is seen.     1.  Increased RIGHT hydroneoureteronephrosis and perinephric fat stranding. Prior RIGHT ileal conduit creation. 2.  3.1 x 2.1 cm RIGHT rectus sheath fluid collection which could be a seroma, hematoma or abscess     Dx-chest-portable (1 View)    Result Date: 9/17/2018 9/17/2018 8:35 PM HISTORY/REASON FOR EXAM:  Fever and tachycardia, possible sepsis TECHNIQUE/EXAM DESCRIPTION AND NUMBER OF VIEWS: Single portable view of the  "chest. COMPARISON: 6/10/2018 FINDINGS: HEART: Stable size. LUNGS: No areas of air space disease are demonstrated. PLEURA: No effusion or pneumothorax.     No acute cardiac or pulmonary abnormalities are identified.      Micro:  Results     Procedure Component Value Units Date/Time    URINE CULTURE(NEW) [[id number]] Collected:  09/17/18 1957    Order Status:  Completed Specimen:  Urine Updated:  09/18/18 1258     Significant Indicator NEG     Source UR     Site --     Urine Culture Mixed skin eduardo  >100,000 cfu/mL    Narrative:       Indication for culture:->Emergency Room Patient    BLOOD CULTURE [[id number]] Collected:  09/17/18 1925    Order Status:  Completed Specimen:  Blood from Peripheral Updated:  09/18/18 0849     Significant Indicator NEG     Source BLD     Site PERIPHERAL     Blood Culture No Growth    Note: Blood cultures are incubated for 5 days and  are monitored continuously.Positive blood cultures  are called to the RN and reported as soon as  they are identified.      Narrative:       Per Hospital Policy: Only change Specimen Src: to \"Line\" if  specified by physician order.    BLOOD CULTURE [[id number]] Collected:  09/17/18 2027    Order Status:  Completed Specimen:  Blood from Peripheral Updated:  09/18/18 0849     Significant Indicator NEG     Source BLD     Site PERIPHERAL     Blood Culture No Growth    Note: Blood cultures are incubated for 5 days and  are monitored continuously.Positive blood cultures  are called to the RN and reported as soon as  they are identified.      Narrative:       Per Hospital Policy: Only change Specimen Src: to \"Line\" if  specified by physician order.    Blood Culture [[id number]]     Order Status:  Canceled Specimen:  Blood from Peripheral     Blood Culture [[id number]]     Order Status:  Canceled Specimen:  Blood from Peripheral     Urinalysis [[id number]]     Order Status:  Canceled Specimen:  Urine     Culture Respiratory W/ GRM STN [[id number]]     Order Status:  " Completed Specimen:  Respirate from Sputum     URINALYSIS [[id number]]  (Abnormal) Collected:  09/17/18 1957    Order Status:  Completed Specimen:  Urine Updated:  09/17/18 2041     Color Yellow     Character Cloudy (A)     Specific Gravity 1.013     Ph 6.0     Glucose Negative mg/dL      Ketones Negative mg/dL      Protein 30 (A) mg/dL      Bilirubin Negative     Urobilinogen, Urine 0.2     Nitrite Negative     Leukocyte Esterase Large (A)     Occult Blood Moderate (A)     Micro Urine Req Microscopic    Narrative:       Indication for culture:->Emergency Room Patient          Assessment:  Active Hospital Problems    Diagnosis   • *Sepsis due to urinary tract infection (HCC) [A41.9, N39.0]   • Rectus sheath abscess [K61.1]   • Obstructive pyelonephritis [N11.1]   • Obesity (BMI 30-39.9) [E66.9]       Plan:  Sepsis  Secondary to pyelonephritis  Still febrile  Leukocytosis resolved  Blood culture 9/17 -NGTD  DC vancomycin  Continue ceftriaxone    Pyelonephritis, right  Patient has ileal ureter  Urine culture+ mixed skin eduardo  Prior cultures in June + Staphylococcus aureus  On antibiotics above    Headache, new and persistent  Clinically does not have meningitis  Lumbar puncture not indicated  Symptomatic treatment    Rectus sheath fluid collection  Concern for mesh infection versus recurrent endometrioma  Patient has history of removal of abdominal tumor in 2009 with mesh placement  Surgery following -plan for expiration when infection stabilized -likely next week    Discussed with internal medicine/Dr. Liu

## 2018-09-20 NOTE — PROGRESS NOTES
Assumed care of pt.  Bedside report received and whiteboard updated. Discussed plan of care for the day and answered questions.  Chart and MAR reviewed.  Call light and personal belongings are within reach.  Bed in low position and locked. Pt has no needs at this time.      Glasses: readers at bedside

## 2018-09-20 NOTE — PROGRESS NOTES
Trauma / Surgical Daily Progress Note    Date of Service  9/20/2018    Chief Complaint  48 y.o. female admitted 9/17/2018 with Sepsis (HCC)    Interval Events  Continued fever   Abdomen less tender   Labs pending   Surgery next week   Continue abx  therapy    Review of Systems  Review of Systems   Constitutional: Positive for fever. Negative for chills.   Gastrointestinal: Positive for abdominal pain. Negative for blood in stool, constipation, nausea and vomiting.   Neurological: Positive for weakness.   All other systems reviewed and are negative.       Vital Signs  Temp:  [37.4 °C (99.3 °F)-38.4 °C (101.1 °F)] 37.4 °C (99.3 °F)  Pulse:  [] 63  Resp:  [15-20] 18  BP: (120-151)/(69-88) 121/69    Physical Exam  Physical Exam   Constitutional: She is oriented to person, place, and time. She appears well-developed.   HENT:   Head: Normocephalic.   Eyes: Pupils are equal, round, and reactive to light.   Cardiovascular: Regular rhythm.    sinus tach   Pulmonary/Chest: Effort normal and breath sounds normal.   Abdominal: Soft. There is tenderness.   Musculoskeletal: She exhibits no edema.   Neurological: She is alert and oriented to person, place, and time.   Skin: Skin is warm and dry.   Psychiatric: She has a normal mood and affect.   Nursing note and vitals reviewed.      Laboratory  Recent Results (from the past 24 hour(s))   VANCOMYCIN TROUGH LEVEL    Collection Time: 09/19/18 10:17 AM   Result Value Ref Range    Vancomycin Trough 39.9 (H) 10.0 - 20.0 ug/mL   CBC WITH DIFFERENTIAL    Collection Time: 09/20/18  8:44 AM   Result Value Ref Range    WBC 5.8 4.8 - 10.8 K/uL    RBC 4.04 (L) 4.20 - 5.40 M/uL    Hemoglobin 11.3 (L) 12.0 - 16.0 g/dL    Hematocrit 34.3 (L) 37.0 - 47.0 %    MCV 84.9 81.4 - 97.8 fL    MCH 28.0 27.0 - 33.0 pg    MCHC 32.9 (L) 33.6 - 35.0 g/dL    RDW 43.0 35.9 - 50.0 fL    Platelet Count 239 164 - 446 K/uL    MPV 10.6 9.0 - 12.9 fL    Neutrophils-Polys 66.60 44.00 - 72.00 %    Lymphocytes  22.00 22.00 - 41.00 %    Monocytes 9.80 0.00 - 13.40 %    Eosinophils 0.90 0.00 - 6.90 %    Basophils 0.50 0.00 - 1.80 %    Immature Granulocytes 0.20 0.00 - 0.90 %    Nucleated RBC 0.00 /100 WBC    Neutrophils (Absolute) 3.87 2.00 - 7.15 K/uL    Lymphs (Absolute) 1.28 1.00 - 4.80 K/uL    Monos (Absolute) 0.57 0.00 - 0.85 K/uL    Eos (Absolute) 0.05 0.00 - 0.51 K/uL    Baso (Absolute) 0.03 0.00 - 0.12 K/uL    Immature Granulocytes (abs) 0.01 0.00 - 0.11 K/uL    NRBC (Absolute) 0.00 K/uL       Fluids  No intake or output data in the 24 hours ending 09/20/18 0900    Core Measures & Quality Metrics  Labs reviewed, Medications reviewed and Radiology images reviewed          DVT prophylaxis - mechanical: SCDs    Antibiotics: Treating active infection/contamination beyond 24 hours perioperative coverage      NAHEED Score  ETOH Screening    Assessment/Plan  As above continuie to follow

## 2018-09-20 NOTE — PROGRESS NOTES
.Patient care assumed for night shift call light within reach, safety measures implemented, and plan of care discussed. All patient questions answered. VS and pain assessed

## 2018-09-20 NOTE — PROGRESS NOTES
[facility] Medicine Daily Progress Note    Date of Service  9/19/2018    Chief Complaint  48 y.o. female admitted 9/17/2018 with R flank pain    Hospital Course    Patient admitted 09/17/2018 with sepsis secondary to right-sided obstructive pyelonephritis with history of ileal conduit by Dr. Rangel from urology, in addition right rectus sheath fluid collection which is concerning for hematoma versus seroma versus abscess formation.  Previously cared for by Dr Mendoza from surgery. Hx MSSA UTI in the past      Interval Problem Update  Patient seen and evaluated on rounds  HA this am, refused LP, later improved, Per ID no needs for LP  N/V yesterday which hsa improved now   Appreciate surgical teams input  Appreciuate ID input  No other complaints per patient  No other concerns per nursing staff    Consultants/Specialty  Infectious disease  General surgery  Urology    Disposition  No telemetry needs can be transferred to surgical floor  Home once okay per ID, surgical and urology teams     Review of Systems  Review of Systems   Constitutional: Positive for chills, diaphoresis, fever and malaise/fatigue.   HENT: Negative for hearing loss and tinnitus.    Eyes: Negative for blurred vision and double vision.   Respiratory: Negative for cough, hemoptysis, sputum production, shortness of breath and wheezing.    Cardiovascular: Negative for chest pain, palpitations, orthopnea, claudication, leg swelling and PND.   Gastrointestinal: Positive for nausea and vomiting. Negative for abdominal pain, blood in stool, constipation, diarrhea, heartburn and melena.   Genitourinary: Positive for dysuria, flank pain and frequency. Negative for hematuria and urgency.   Musculoskeletal: Negative for back pain, falls, joint pain, myalgias and neck pain.   Skin: Negative for itching and rash.   Neurological: Positive for dizziness, weakness and headaches. Negative for tingling, tremors, sensory change, speech change, focal weakness, seizures and  loss of consciousness.   Psychiatric/Behavioral: Negative for depression, hallucinations, memory loss, substance abuse and suicidal ideas. The patient is not nervous/anxious and does not have insomnia.         Physical Exam  Blood Pressure: 119/74   Temperature: 37.5 °C (99.5 °F)   Pulse: 81   Respiration: 15   Pulse Oximetry: 98 %     Physical Exam   Constitutional: She is oriented to person, place, and time. She appears well-developed and well-nourished. No distress.   HENT:   Head: Normocephalic.   Mouth/Throat: Oropharynx is clear and moist. No oropharyngeal exudate.   Eyes: Pupils are equal, round, and reactive to light. Conjunctivae are normal. No scleral icterus.   Neck: Normal range of motion. No JVD present.   Cardiovascular: Normal rate, regular rhythm and normal heart sounds.  Exam reveals no gallop and no friction rub.    No murmur heard.  Pulmonary/Chest: Effort normal and breath sounds normal. No stridor. No respiratory distress. She has no wheezes. She has no rales.   Slightly diminished in bases   Abdominal: Soft. Bowel sounds are normal. She exhibits no distension. There is tenderness (RLQ, R CVA tenderness). There is no rebound and no guarding.   Musculoskeletal: Normal range of motion. She exhibits no edema, tenderness or deformity.   Neurological: She is alert and oriented to person, place, and time. No cranial nerve deficit.   Skin: Skin is warm and dry. No rash noted. She is not diaphoretic. No erythema.   Psychiatric: She has a normal mood and affect. Her behavior is normal. Judgment and thought content normal.       Fluids    Intake/Output Summary (Last 24 hours) at 09/19/18 1734  Last data filed at 09/19/18 0800   Gross per 24 hour   Intake              120 ml   Output                0 ml   Net              120 ml       Laboratory  Recent Labs      09/17/18   1925  09/19/18   0342   WBC  19.5*  11.9*   RBC  4.73  3.91*   HEMOGLOBIN  13.3  11.5*   HEMATOCRIT  40.0  33.6*   MCV  84.6  85.9  "  MCH  28.1  29.4   MCHC  33.3*  34.2   RDW  44.1  44.8   PLATELETCT  385  260   MPV  10.8  10.7     Recent Labs      09/17/18   1925  09/18/18   0256  09/19/18   0040   SODIUM  134*  138  137   POTASSIUM  3.9  3.5*  3.8   CHLORIDE  103  109  106   CO2  19*  20  22   GLUCOSE  89  115*  95   BUN  14  10  8   CREATININE  0.97  0.92  0.93   CALCIUM  9.3  8.0*  8.7     Recent Labs      09/18/18   0256   INR  1.25*         Recent Labs      09/18/18   0256   TRIGLYCERIDE  123   HDL  40   LDL  86       Imaging  CT-RENAL COLIC EVALUATION(A/P W/O)   Final Result      1.  Increased RIGHT hydroneoureteronephrosis and perinephric fat stranding. Prior RIGHT ileal conduit creation.   2.  3.1 x 2.1 cm RIGHT rectus sheath fluid collection which could be a seroma, hematoma or abscess         DX-CHEST-PORTABLE (1 VIEW)   Final Result      No acute cardiac or pulmonary abnormalities are identified.           Assessment/Plan  * Sepsis due to urinary tract infection (HCC)- (present on admission)   Assessment & Plan    This is sepsis (without associated acute organ dysfunction).   Source:   Improved  Urology / Surgical consult for source control  ID for abx  Continue close clinical monitoring         Rectus sheath abscess- (present on admission)   Assessment & Plan    CT revealing \"3.1 x 2.1 cm RIGHT rectus sheath fluid collection which could be a seroma, hematoma or abscess\"  Previously followed by Dr Mendoza  I discussed with Dr Mendoza from surgical team, 09/18/18  Patient will need surgical drainage of this later in week per Dr Mendoza once urological issues are stable  For now on abx, IV vancomycin. Monitor vancomycin toxicity and therapeutics.   On Ceftriaxone.   Abx per ID        Obstructive pyelonephritis- (present on admission)   Assessment & Plan    CT revealing Increased RIGHT hydroneoureteronephrosis and perinephric fat stranding. Prior RIGHT ileal conduit creation.  Previously ureteral stricture s/p ileal conduit per urology, Dr" Hald  On IV vancomycin given previous history of MSSA  Monitor for vancomycin toxicity and therapeutics   Continue IV ceftriaxone   Urology team on board  Abx per ID  Source control per urology team         Headache- (present on admission)   Assessment & Plan    Discussed with Dr Arredondo, no need for LP per ID  HA improved later in the day  Patient initially refused LP but then later agreed   Will continue monitoring for now  Recommendations per ID        Obesity (BMI 30-39.9)- (present on admission)   Assessment & Plan    Body mass index is 31.25 kg/m².

## 2018-09-21 ENCOUNTER — HOSPITAL ENCOUNTER (OUTPATIENT)
Facility: MEDICAL CENTER | Age: 48
End: 2018-09-21
Attending: SURGERY | Admitting: SURGERY
Payer: COMMERCIAL

## 2018-09-21 VITALS
HEIGHT: 67 IN | RESPIRATION RATE: 16 BRPM | SYSTOLIC BLOOD PRESSURE: 128 MMHG | DIASTOLIC BLOOD PRESSURE: 81 MMHG | BODY MASS INDEX: 31.11 KG/M2 | WEIGHT: 198.19 LBS | TEMPERATURE: 97.5 F | HEART RATE: 62 BPM | OXYGEN SATURATION: 98 %

## 2018-09-21 LAB
ANION GAP SERPL CALC-SCNC: 9 MMOL/L (ref 0–11.9)
BUN SERPL-MCNC: 11 MG/DL (ref 8–22)
CALCIUM SERPL-MCNC: 8.6 MG/DL (ref 8.5–10.5)
CHLORIDE SERPL-SCNC: 106 MMOL/L (ref 96–112)
CO2 SERPL-SCNC: 24 MMOL/L (ref 20–33)
CREAT SERPL-MCNC: 0.86 MG/DL (ref 0.5–1.4)
ERYTHROCYTE [DISTWIDTH] IN BLOOD BY AUTOMATED COUNT: 43.6 FL (ref 35.9–50)
GLUCOSE SERPL-MCNC: 94 MG/DL (ref 65–99)
HCT VFR BLD AUTO: 35.3 % (ref 37–47)
HGB BLD-MCNC: 12 G/DL (ref 12–16)
MCH RBC QN AUTO: 29.2 PG (ref 27–33)
MCHC RBC AUTO-ENTMCNC: 34 G/DL (ref 33.6–35)
MCV RBC AUTO: 85.9 FL (ref 81.4–97.8)
PLATELET # BLD AUTO: 282 K/UL (ref 164–446)
PMV BLD AUTO: 10.9 FL (ref 9–12.9)
POTASSIUM SERPL-SCNC: 3.5 MMOL/L (ref 3.6–5.5)
RBC # BLD AUTO: 4.11 M/UL (ref 4.2–5.4)
SODIUM SERPL-SCNC: 139 MMOL/L (ref 135–145)
WBC # BLD AUTO: 5.7 K/UL (ref 4.8–10.8)

## 2018-09-21 PROCEDURE — 36415 COLL VENOUS BLD VENIPUNCTURE: CPT

## 2018-09-21 PROCEDURE — 85027 COMPLETE CBC AUTOMATED: CPT

## 2018-09-21 PROCEDURE — 700102 HCHG RX REV CODE 250 W/ 637 OVERRIDE(OP): Performed by: INTERNAL MEDICINE

## 2018-09-21 PROCEDURE — A9270 NON-COVERED ITEM OR SERVICE: HCPCS | Performed by: INTERNAL MEDICINE

## 2018-09-21 PROCEDURE — 99239 HOSP IP/OBS DSCHRG MGMT >30: CPT | Performed by: INTERNAL MEDICINE

## 2018-09-21 PROCEDURE — 99232 SBSQ HOSP IP/OBS MODERATE 35: CPT | Performed by: INTERNAL MEDICINE

## 2018-09-21 PROCEDURE — 80048 BASIC METABOLIC PNL TOTAL CA: CPT

## 2018-09-21 RX ORDER — POTASSIUM CHLORIDE 20 MEQ/1
40 TABLET, EXTENDED RELEASE ORAL ONCE
Status: DISCONTINUED | OUTPATIENT
Start: 2018-09-21 | End: 2018-09-21 | Stop reason: HOSPADM

## 2018-09-21 RX ORDER — LEVOFLOXACIN 750 MG/1
750 TABLET, FILM COATED ORAL DAILY
Qty: 13 TAB | Refills: 0 | Status: SHIPPED | OUTPATIENT
Start: 2018-09-21 | End: 2018-09-21

## 2018-09-21 RX ORDER — CEFDINIR 300 MG/1
300 CAPSULE ORAL 2 TIMES DAILY
Qty: 28 CAP | Refills: 0 | Status: ON HOLD | OUTPATIENT
Start: 2018-09-21 | End: 2018-09-26 | Stop reason: CLARIF

## 2018-09-21 RX ORDER — LINEZOLID 600 MG/1
600 TABLET, FILM COATED ORAL EVERY 12 HOURS
Qty: 26 TAB | Refills: 0 | Status: ON HOLD | OUTPATIENT
Start: 2018-09-21 | End: 2018-09-26 | Stop reason: CLARIF

## 2018-09-21 RX ORDER — LINEZOLID 600 MG/1
600 TABLET, FILM COATED ORAL EVERY 12 HOURS
Status: DISCONTINUED | OUTPATIENT
Start: 2018-09-21 | End: 2018-09-21 | Stop reason: HOSPADM

## 2018-09-21 RX ORDER — LEVOFLOXACIN 750 MG/1
750 TABLET, FILM COATED ORAL DAILY
Status: DISCONTINUED | OUTPATIENT
Start: 2018-09-21 | End: 2018-09-21

## 2018-09-21 RX ORDER — LINEZOLID 600 MG/1
600 TABLET, FILM COATED ORAL EVERY 12 HOURS
Qty: 26 TAB | Refills: 0 | Status: SHIPPED | OUTPATIENT
Start: 2018-09-21 | End: 2018-09-21

## 2018-09-21 RX ADMIN — GUAIFENESIN 600 MG: 600 TABLET, EXTENDED RELEASE ORAL at 06:11

## 2018-09-21 ASSESSMENT — ENCOUNTER SYMPTOMS
MYALGIAS: 0
FLANK PAIN: 1
SHORTNESS OF BREATH: 0
CONSTIPATION: 0
ABDOMINAL PAIN: 1
ABDOMINAL PAIN: 0
BLOOD IN STOOL: 0
NAUSEA: 0
COUGH: 0
WEAKNESS: 1
VOMITING: 0
DIARRHEA: 0
CHILLS: 0
FEVER: 0
HEADACHES: 1

## 2018-09-21 ASSESSMENT — PAIN SCALES - GENERAL
PAINLEVEL_OUTOF10: 0
PAINLEVEL_OUTOF10: 0

## 2018-09-21 NOTE — DISCHARGE INSTRUCTIONS
Discharge Instructions    Discharged to home by car with relative. Discharged via wheelchair, hospital escort: Yes.  Special equipment needed: Not Applicable    Be sure to schedule a follow-up appointment with your primary care doctor or any specialists as instructed.     Discharge Plan:   Diet Plan: Discussed  Activity Level: Discussed  Confirmed Follow up Appointment: Appointment Scheduled  Confirmed Symptoms Management: Discussed  Medication Reconciliation Updated: Yes  Influenza Vaccine Indication: Patient Refuses    I understand that a diet low in cholesterol, fat, and sodium is recommended for good health. Unless I have been given specific instructions below for another diet, I accept this instruction as my diet prescription.   Other diet: Regular    Special Instructions: None    · Is patient discharged on Warfarin / Coumadin?   No     Depression / Suicide Risk    As [facility] are [facility], it is important to learn how to keep safe from harming yourself.    Recognize the warning signs:  · Abrupt changes in personality, positive or negative- including increase in energy   · Giving away possessions  · Change in eating patterns- significant weight changes-  positive or negative  · Change in sleeping patterns- unable to sleep or sleeping all the time   · Unwillingness or inability to communicate  · Depression  · Unusual sadness, discouragement and loneliness  · Talk of wanting to die  · Neglect of personal appearance   · Rebelliousness- reckless behavior  · Withdrawal from people/activities they love  · Confusion- inability to concentrate     If you or a loved one observes any of these behaviors or has concerns about self-harm, here's what you can do:  · Talk about it- your feelings and reasons for harming yourself  · Remove any means that you might use to hurt yourself (examples: pills, rope, extension cords, firearm)  · Get professional help from the community (Mental Health,  Substance Abuse, psychological counseling)  · Do not be alone:Call your Safe Contact- someone whom you trust who will be there for you.  · Call your local CRISIS HOTLINE 946-9485 or 154-626-2506  · Call your local Children's Mobile Crisis Response Team [place] (155) 310-5281 or www.Spock  · Call the toll free National Suicide Prevention Hotlines   · National Suicide Prevention Lifeline 471-408-NEXP (0043)  · National Hope Line Network 800-SUICIDE (164-8221)  Sepsis, Adult  Sepsis is a serious infection of your blood or tissues that affects your whole body. The infection that causes sepsis may be bacterial, viral, fungal, or parasitic. Sepsis may be life threatening. Sepsis can cause your blood pressure to drop. This may result in shock. Shock causes your central nervous system and your organs to stop working correctly.  What increases the risk?  Sepsis can happen in anyone, but it is more likely to happen in people who have weakened immune systems.  What are the signs or symptoms?  Symptoms of sepsis can include:  · Fever or low body temperature (hypothermia).  · Rapid breathing (hyperventilation).  · Chills.  · Rapid heartbeat (tachycardia).  · Confusion or light-headedness.  · Trouble breathing.  · Urinating much less than usual.  · Cool, clammy skin or red, flushed skin.  · Other problems with the heart, kidneys, or brain.  How is this diagnosed?  Your health care provider will likely do tests to look for an infection, to see if the infection has spread to your blood, and to see how serious your condition is. Tests can include:  · Blood tests, including cultures of your blood.  · Cultures of other fluids from your body, such as:  ¨ Urine.  ¨ Pus from wounds.  ¨ Mucus coughed up from your lungs.  · Urine tests other than cultures.  · X-ray exams or other imaging tests.  How is this treated?  Treatment will begin with elimination of the source of infection. If your sepsis is likely caused by a  bacterial or fungal infection, you will be given antibiotic or antifungal medicines.  You may also receive:  · Oxygen.  · Fluids through an IV tube.  · Medicines to increase your blood pressure.  · A machine to clean your blood (dialysis) if your kidneys fail.  · A machine to help you breathe if your lungs fail.  Get help right away if:  You get an infection or develop any of the signs and symptoms of sepsis after surgery or a hospitalization.  This information is not intended to replace advice given to you by your health care provider. Make sure you discuss any questions you have with your health care provider.  Document Released: 09/15/2004 Document Revised: 05/25/2017 Document Reviewed: 08/25/2014  [employer] Interactive Patient Education © 2017 [employer]

## 2018-09-21 NOTE — DISCHARGE SUMMARY
Discharge Summary    CHIEF COMPLAINT ON ADMISSION  Chief Complaint   Patient presents with   • Flank Pain     right side since this am.   • Fever     103 at home       Reason for Admission  Fever; Post Op Comp     Admission Date  9/17/2018    CODE STATUS  Full Code    HPI & HOSPITAL COURSE  Patient admitted 09/17/2018 with sepsis secondary to right-sided obstructive pyelonephritis with history of ileal conduit by Dr. Rangel from urology, in addition right rectus sheath fluid collection which is concerning for hematoma versus seroma versus abscess formation.  Previously cared for by Dr Mendoza from surgery. Hx MSSA UTI in the past.  Patient was then treated with IV antibiotics per infectious disease specialist and patient's UTI significantly improved.  Patient sepsis also resolved after IV antibiotics and IV fluid.  General surgery see patient and recommend patient to be discharged with oral antibiotics and then return to have I&D next week.  Infectious disease recommend patient to receive Zyvox and Cefdinir for total of 2 weeks.        Therefore, she is discharged in fair and stable condition to home with close outpatient follow-up.    The patient met 2-midnight criteria for an inpatient stay at the time of discharge.    Discharge Date  9/21/2018    FOLLOW UP ITEMS POST DISCHARGE  none    DISCHARGE DIAGNOSES      Sepsis due to urinary tract infection (HCC) POA: Yes    Obstructive pyelonephritis POA: Yes    Rectus sheath abscess POA: Yes    Obesity (BMI 30-39.9) POA: Yes    Headache POA: Yes    FOLLOW UP  No future appointments.  Ross Mendoza M.D.  [address] [facility] [address]  [place] [address]  [phone]    In 1 week        MEDICATIONS ON DISCHARGE     Medication List      START taking these medications      Instructions   cefdinir 300 MG Caps  Commonly known as:  OMNICEF   Take 1 Cap by mouth 2 times a day for 14 days.  Dose:  300 mg     linezolid 600 MG Tabs  Commonly known as:  ZYVOX   Take 1 Tab by mouth every  12 hours for 13 days.  Dose:  600 mg        CONTINUE taking these medications      Instructions   guaiFENesin  MG Tb12  Commonly known as:  MUCINEX   Take 1 Tab by mouth every 12 hours.  Dose:  600 mg     MINIVELLE 0.05 MG/24HR Pttw  Generic drug:  estradiol   Apply 1 Patch to skin as directed every 7 days.  Dose:  1 Patch            Allergies  Allergies   Allergen Reactions   • Levofloxacin Itching, Hives and Shortness of Breath     Other reaction(s): Itching, pruritis  Rxn - 9/11/01   • Morphine Itching     Other reaction(s): Itching, pruritis       DIET  Orders Placed This Encounter   Procedures   • Diet Order Regular     Standing Status:   Standing     Number of Occurrences:   1     Order Specific Question:   Diet:     Answer:   Regular [1]       ACTIVITY  As tolerated.  Weight bearing as tolerated    CONSULTATIONS  ID  surgery    PROCEDURES  none    CT-RENAL COLIC EVALUATION(A/P W/O)   Final Result      1.  Increased RIGHT hydroneoureteronephrosis and perinephric fat stranding. Prior RIGHT ileal conduit creation.   2.  3.1 x 2.1 cm RIGHT rectus sheath fluid collection which could be a seroma, hematoma or abscess         DX-CHEST-PORTABLE (1 VIEW)   Final Result      No acute cardiac or pulmonary abnormalities are identified.          PE:  Gen: AAOx3, NAD  Eyes: PELLA  Neck: no JVD, no lymphadenopathy  Cardia: RRR, no mrg  Lungs: CTAB, no rales, rhonci or wheezing  Abd: NABS, soft, non extended, no mass  EXT: No C/C/E, peripheral pulse 2+ b/l  Neuro: CN II-XII intact, non focal, reflex 2+ symmetrical  Skin: Intact, no lesion, warm  Psych: Appropriate.    LABORATORY  Lab Results   Component Value Date    SODIUM 139 09/21/2018    POTASSIUM 3.5 (L) 09/21/2018    CHLORIDE 106 09/21/2018    CO2 24 09/21/2018    GLUCOSE 94 09/21/2018    BUN 11 09/21/2018    CREATININE 0.86 09/21/2018        Lab Results   Component Value Date    WBC 5.7 09/21/2018    HEMOGLOBIN 12.0 09/21/2018    HEMATOCRIT 35.3 (L) 09/21/2018     PLATELETCT 282 09/21/2018        Total time of the discharge process exceeds 37 minutes.

## 2018-09-21 NOTE — DISCHARGE PLANNING
Anticipated Discharge Disposition: Home    Action: Rx sent to [facility]  Called to verify they have medication in stock, told no.  [facility] [address] [facility], will send RX to them    Barriers to Discharge: n/a    Plan: Informed Dr. Nugetn

## 2018-09-21 NOTE — PROGRESS NOTES
"Pt transferred to room S 616-1 from telemetry via wheelchair.  Pt states \"I was in this room before when I was here\" and states she is familiar with the floor routine.  Pt up to shower independently and able to ambulate in room.  Denies pain at this time, states \"I can go home tomorrow if I don't get a fever\".   at bedside assisting with care and patient needs.  "

## 2018-09-21 NOTE — PROGRESS NOTES
Infectious Disease Progress Note    Author: Ayleen Bertrand M.D. Date & Time of service: 2018  12:23 PM    Chief Complaint:  Follow-up for pyelonephritis    Interval History:   T-max 100.1 WBC 11.9 patient feels awful today with persistent headache, nausea and poor ongoing right flank pain, refused lumbar puncture per hospitalist   T-max 101.1 WBC 5.8 patient continues to have a severe headache, she denies any history of headaches or migraines, she questions whether it is related to the vancomycin, she denies any dysuria and her right flank pain has overall improved, patient was able to tolerate dinner yesterday   AF WBC 5.7 cleared by surgery to go home-plan for mesh removal in a week  Labs Reviewed, Medications Reviewed, Radiology Reviewed and Wound Reviewed.    Review of Systems:  Review of Systems   Constitutional: Positive for malaise/fatigue. Negative for chills and fever.   Respiratory: Negative for cough and shortness of breath.    Gastrointestinal: Negative for abdominal pain, diarrhea, nausea and vomiting.   Genitourinary: Positive for flank pain.        Improved   Musculoskeletal: Negative for myalgias.   Neurological: Positive for headaches.       Hemodynamics:  Temp (24hrs), Av.7 °C (98 °F), Min:36.4 °C (97.6 °F), Max:37 °C (98.6 °F)  Temperature: 36.4 °C (97.6 °F)  Pulse  Av.6  Min: 63  Max: 111   Blood Pressure: 144/66       Physical Exam:  Physical Exam   Constitutional: She is oriented to person, place, and time. She appears well-developed. No distress.   HENT:   Head: Normocephalic and atraumatic.   Eyes: Pupils are equal, round, and reactive to light. EOM are normal.   Neck: Neck supple.   Cardiovascular: Normal rate and regular rhythm.    Pulmonary/Chest: Effort normal. No respiratory distress. She has no wheezes. She has no rales.   Abdominal: Soft. She exhibits no distension. There is no tenderness. There is no rebound and no guarding.   Right CVA tenderness,  improved    Midline abdominal surgical scar healed   Musculoskeletal: She exhibits no edema.   Neurological: She is alert and oriented to person, place, and time.   Skin: No rash noted. She is not diaphoretic.   Nursing note and vitals reviewed.      Meds:    Current Facility-Administered Medications:   •  potassium chloride SA  •  linezolid  •  levoFLOXacin  •  acetaminophen/caffeine/butalbital 325-40-50 mg  •  prochlorperazine  •  diphenhydrAMINE  •  enoxaparin (LOVENOX) injection  •  guaiFENesin LA  •  ondansetron  •  ondansetron  •  Notify provider if pain remains uncontrolled **AND** Use the numeric rating scale (NRS-11) on regular floors and Critical-Care Pain Observation Tool (CPOT) on ICUs/Trauma to assess pain **AND** Pulse Ox (Oximetry) **AND** Pharmacy Consult Request **AND** If patient difficult to arouse and/or has respiratory depression, stop any opiates that are currently infusing and call a Rapid Response. **AND** oxyCODONE immediate-release **AND** oxyCODONE immediate-release **AND** HYDROmorphone  •  acetaminophen    Labs:  Recent Labs      09/19/18   0342  09/20/18   0844  09/21/18   0331   WBC  11.9*  5.8  5.7   RBC  3.91*  4.04*  4.11*   HEMOGLOBIN  11.5*  11.3*  12.0   HEMATOCRIT  33.6*  34.3*  35.3*   MCV  85.9  84.9  85.9   MCH  29.4  28.0  29.2   RDW  44.8  43.0  43.6   PLATELETCT  260  239  282   MPV  10.7  10.6  10.9   NEUTSPOLYS  83.80*  66.60   --    LYMPHOCYTES  8.90*  22.00   --    MONOCYTES  6.50  9.80   --    EOSINOPHILS  0.30  0.90   --    BASOPHILS  0.20  0.50   --      Recent Labs      09/19/18   0040  09/20/18   0845  09/21/18   0331   SODIUM  137  136  139   POTASSIUM  3.8  3.3*  3.5*   CHLORIDE  106  106  106   CO2  22  23  24   GLUCOSE  95  110*  94   BUN  8  8  11     Recent Labs      09/19/18   0040  09/20/18   0845  09/21/18   0331   ALBUMIN  3.8  3.3   --    TBILIRUBIN  0.5  0.2   --    ALKPHOSPHAT  76  88   --    TOTPROTEIN  6.6  5.9*   --    ALTSGPT  13  16   --     ASTSGOT  13  20   --    CREATININE  0.93  0.86  0.86       Imaging:  Ct-renal Colic Evaluation(a/p W/o)    Result Date: 9/17/2018 9/17/2018 9:23 PM HISTORY/REASON FOR EXAM: RIGHT flank pain TECHNIQUE/EXAM DESCRIPTION: CT scan of the abdomen and pelvis without contrast, 9/17/2018 9:23 PM. Unenhanced helical scanning was obtained from the diaphragmatic domes through the pubic symphysis without intravenous contrast. Low dose optimization technique was utilized for this CT exam including automated exposure control and adjustment of the mA and/or kV according to patient size. COMPARISON: 7/31/2018 contrast-enhanced CT of the abdomen and pelvis FINDINGS: The study is limited due to non-use of intravenous contrast. CT Abdomen: There is no evidence of focal consolidation or pleural effusion seen within the lung bases. The liver is unremarkable in appearance. The gallbladder is unremarkable. The pancreas is unremarkable. The spleen is unremarkable. The adrenal glands are unremarkable. LEFT kidney is unremarkable. There is moderate to severe RIGHT hydronephrosis. There is mild RIGHT perinephric fat stranding there is a RIGHT ileal conduit. The conduit is slightly more dilated than on the prior study. The aorta is normal in caliber.  No evidence of retroperitoneal adenopathy. CT Pelvis: There is no evidence of bowel obstruction or inflammatory change. There has been bowel anastomosis in the RIGHT lower quadrant. There are colonic diverticuli. The appendix is not visualized. There is an indistinct fluid collection in the RIGHT rectus sheath measuring 3.1 x 2.1 cm on axial image 131 of series 2. This is adjacent to loops of bowel in the RIGHT lower quadrant but appears separate from them and sagittal plane. There is no evidence of free fluid. No acute or suspicious osseous lesion is seen.     1.  Increased RIGHT hydroneoureteronephrosis and perinephric fat stranding. Prior RIGHT ileal conduit creation. 2.  3.1 x 2.1 cm RIGHT  "rectus sheath fluid collection which could be a seroma, hematoma or abscess     Dx-chest-portable (1 View)    Result Date: 9/17/2018 9/17/2018 8:35 PM HISTORY/REASON FOR EXAM:  Fever and tachycardia, possible sepsis TECHNIQUE/EXAM DESCRIPTION AND NUMBER OF VIEWS: Single portable view of the chest. COMPARISON: 6/10/2018 FINDINGS: HEART: Stable size. LUNGS: No areas of air space disease are demonstrated. PLEURA: No effusion or pneumothorax.     No acute cardiac or pulmonary abnormalities are identified.      Micro:  Results     Procedure Component Value Units Date/Time    URINE CULTURE(NEW) [[id number]] Collected:  09/17/18 1957    Order Status:  Completed Specimen:  Urine Updated:  09/18/18 1258     Significant Indicator NEG     Source UR     Site --     Urine Culture Mixed skin eduardo  >100,000 cfu/mL    Narrative:       Indication for culture:->Emergency Room Patient    BLOOD CULTURE [[id number]] Collected:  09/17/18 1925    Order Status:  Completed Specimen:  Blood from Peripheral Updated:  09/18/18 0849     Significant Indicator NEG     Source BLD     Site PERIPHERAL     Blood Culture No Growth    Note: Blood cultures are incubated for 5 days and  are monitored continuously.Positive blood cultures  are called to the RN and reported as soon as  they are identified.      Narrative:       Per Hospital Policy: Only change Specimen Src: to \"Line\" if  specified by physician order.    BLOOD CULTURE [[id number]] Collected:  09/17/18 2027    Order Status:  Completed Specimen:  Blood from Peripheral Updated:  09/18/18 0849     Significant Indicator NEG     Source BLD     Site PERIPHERAL     Blood Culture No Growth    Note: Blood cultures are incubated for 5 days and  are monitored continuously.Positive blood cultures  are called to the RN and reported as soon as  they are identified.      Narrative:       Per Hospital Policy: Only change Specimen Src: to \"Line\" if  specified by physician order.    Blood Culture [[id number]]  "    Order Status:  Canceled Specimen:  Blood from Peripheral     Blood Culture [[id number]]     Order Status:  Canceled Specimen:  Blood from Peripheral     Urinalysis [[id number]]     Order Status:  Canceled Specimen:  Urine     URINALYSIS [[id number]]  (Abnormal) Collected:  09/17/18 1957    Order Status:  Completed Specimen:  Urine Updated:  09/17/18 2041     Color Yellow     Character Cloudy (A)     Specific Gravity 1.013     Ph 6.0     Glucose Negative mg/dL      Ketones Negative mg/dL      Protein 30 (A) mg/dL      Bilirubin Negative     Urobilinogen, Urine 0.2     Nitrite Negative     Leukocyte Esterase Large (A)     Occult Blood Moderate (A)     Micro Urine Req Microscopic    Narrative:       Indication for culture:->Emergency Room Patient    Culture Respiratory W/ GRM STN [[id number]] Collected:  09/17/18 0000    Order Status:  Canceled Specimen:  Sputum from Sputum           Assessment:  Active Hospital Problems    Diagnosis   • *Sepsis due to urinary tract infection (HCC) [A41.9, N39.0]   • Rectus sheath abscess [K61.1]   • Obstructive pyelonephritis [N11.1]   • Obesity (BMI 30-39.9) [E66.9]       Plan:  Sepsis  Secondary to pyelonephritis  Afebrile  Leukocytosis resolved  Blood culture 9/17 -NGTD  UCx neg  Change to Zyvox and cefdinir for 14 days    Pyelonephritis, right  Patient has ileal ureter  Urine culture+ mixed skin eduardo  Prior cultures in June + Staphylococcus aureus  On antibiotics above    Rectus sheath fluid collection  Concern for mesh infection versus recurrent endometrioma  Patient has history of removal of abdominal tumor in 2009 with mesh placement  Surgery following -plan for expiration when infection stabilized -likely next week    Discussed with internal medicine/Dr. Nugent

## 2018-09-21 NOTE — PROGRESS NOTES
Trauma / Surgical Daily Progress Note    Date of Service  9/21/2018    Chief Complaint  48 y.o. female admitted 9/17/2018 with Sepsis (HCC)    Interval Events  Improving   No fever  Wbc normal    Review of Systems  Review of Systems   Constitutional: Negative for chills and fever.   Gastrointestinal: Positive for abdominal pain. Negative for blood in stool, constipation, nausea and vomiting.   Genitourinary: Positive for flank pain.   Neurological: Positive for weakness.   All other systems reviewed and are negative.       Vital Signs  Temp:  [36.4 °C (97.6 °F)-37 °C (98.6 °F)] 36.4 °C (97.6 °F)  Pulse:  [63-91] 63  Resp:  [16-18] 18  BP: (119-144)/(66-70) 144/66    Physical Exam  Physical Exam   Constitutional: She is oriented to person, place, and time. She appears well-developed.   HENT:   Head: Normocephalic.   Eyes: Pupils are equal, round, and reactive to light.   Cardiovascular: Regular rhythm.    sinus tach   Pulmonary/Chest: Effort normal and breath sounds normal.   Abdominal: Soft. There is tenderness.   Musculoskeletal: She exhibits no edema.   Neurological: She is alert and oriented to person, place, and time.   Skin: Skin is warm and dry.   Psychiatric: She has a normal mood and affect.   Nursing note and vitals reviewed.      Laboratory  Recent Results (from the past 24 hour(s))   CBC WITHOUT DIFFERENTIAL    Collection Time: 09/21/18  3:31 AM   Result Value Ref Range    WBC 5.7 4.8 - 10.8 K/uL    RBC 4.11 (L) 4.20 - 5.40 M/uL    Hemoglobin 12.0 12.0 - 16.0 g/dL    Hematocrit 35.3 (L) 37.0 - 47.0 %    MCV 85.9 81.4 - 97.8 fL    MCH 29.2 27.0 - 33.0 pg    MCHC 34.0 33.6 - 35.0 g/dL    RDW 43.6 35.9 - 50.0 fL    Platelet Count 282 164 - 446 K/uL    MPV 10.9 9.0 - 12.9 fL   BASIC METABOLIC PANEL    Collection Time: 09/21/18  3:31 AM   Result Value Ref Range    Sodium 139 135 - 145 mmol/L    Potassium 3.5 (L) 3.6 - 5.5 mmol/L    Chloride 106 96 - 112 mmol/L    Co2 24 20 - 33 mmol/L    Glucose 94 65 - 99  mg/dL    Bun 11 8 - 22 mg/dL    Creatinine 0.86 0.50 - 1.40 mg/dL    Calcium 8.6 8.5 - 10.5 mg/dL    Anion Gap 9.0 0.0 - 11.9   ESTIMATED GFR    Collection Time: 09/21/18  3:31 AM   Result Value Ref Range    GFR If African American >60 >60 mL/min/1.73 m 2    GFR If Non African American >60 >60 mL/min/1.73 m 2       Fluids    Intake/Output Summary (Last 24 hours) at 09/21/18 1058  Last data filed at 09/21/18 0600   Gross per 24 hour   Intake              390 ml   Output                0 ml   Net              390 ml       Core Measures & Quality Metrics  Labs reviewed, Medications reviewed and Radiology images reviewed          DVT prophylaxis - mechanical: SCDs    Antibiotics: Treating active infection/contamination beyond 24 hours perioperative coverage      NAHEED Score  ETOH Screening    Assessment/Plan  UTI therapy effective with near complete response   Ok with discharge on PPO antibiotics   Planning surgery next week to explore fluid collection with repairs as indicated     Discussed patient condition with Patient.  CRITICAL CARE TIME EXCLUDING PROCEDURES: 20    Minutes

## 2018-09-21 NOTE — CARE PLAN
Problem: Infection  Goal: Will remain free from infection    Intervention: Assess signs and symptoms of infection  Pt was assessed for signs and symptoms of infection. Mews score is with in an acceptable range and pts vitals are not abnormal.       Problem: Venous Thromboembolism (VTW)/Deep Vein Thrombosis (DVT) Prevention:  Goal: Patient will participate in Venous Thrombosis (VTE)/Deep Vein Thrombosis (DVT)Prevention Measures    Intervention: Assess and monitor for anticoagulation complications  Pt is refusing Lovenox. No signs and symptoms of DVT or VTE. The pt is ambulating frequently for VET and DVT prophylaxis.

## 2018-09-22 ENCOUNTER — PATIENT OUTREACH (OUTPATIENT)
Dept: HEALTH INFORMATION MANAGEMENT | Facility: OTHER | Age: 48
End: 2018-09-22

## 2018-09-26 ENCOUNTER — HOSPITAL ENCOUNTER (INPATIENT)
Facility: MEDICAL CENTER | Age: 48
LOS: 1 days | DRG: 909 | End: 2018-09-26
Attending: SURGERY | Admitting: SURGERY
Payer: COMMERCIAL

## 2018-09-26 VITALS
RESPIRATION RATE: 118 BRPM | HEART RATE: 58 BPM | WEIGHT: 191.8 LBS | HEIGHT: 67 IN | BODY MASS INDEX: 30.1 KG/M2 | OXYGEN SATURATION: 98 % | TEMPERATURE: 98.2 F | SYSTOLIC BLOOD PRESSURE: 118 MMHG | DIASTOLIC BLOOD PRESSURE: 60 MMHG

## 2018-09-26 PROCEDURE — 160048 HCHG OR STATISTICAL LEVEL 1-5: Performed by: SURGERY

## 2018-09-26 PROCEDURE — 160022 HCHG BLOCK: Performed by: SURGERY

## 2018-09-26 PROCEDURE — 700111 HCHG RX REV CODE 636 W/ 250 OVERRIDE (IP): Performed by: ANESTHESIOLOGY

## 2018-09-26 PROCEDURE — 160036 HCHG PACU - EA ADDL 30 MINS PHASE I: Performed by: SURGERY

## 2018-09-26 PROCEDURE — 160025 RECOVERY II MINUTES (STATS): Performed by: SURGERY

## 2018-09-26 PROCEDURE — 160035 HCHG PACU - 1ST 60 MINS PHASE I: Performed by: SURGERY

## 2018-09-26 PROCEDURE — 700111 HCHG RX REV CODE 636 W/ 250 OVERRIDE (IP)

## 2018-09-26 PROCEDURE — 87070 CULTURE OTHR SPECIMN AEROBIC: CPT

## 2018-09-26 PROCEDURE — 160041 HCHG SURGERY MINUTES - EA ADDL 1 MIN LEVEL 4: Performed by: SURGERY

## 2018-09-26 PROCEDURE — 306637 HCHG MISC ORTHO ITEM RC 0274

## 2018-09-26 PROCEDURE — 160002 HCHG RECOVERY MINUTES (STAT): Performed by: SURGERY

## 2018-09-26 PROCEDURE — 160046 HCHG PACU - 1ST 60 MINS PHASE II: Performed by: SURGERY

## 2018-09-26 PROCEDURE — A9270 NON-COVERED ITEM OR SERVICE: HCPCS

## 2018-09-26 PROCEDURE — 87075 CULTR BACTERIA EXCEPT BLOOD: CPT

## 2018-09-26 PROCEDURE — 0W9G0ZZ DRAINAGE OF PERITONEAL CAVITY, OPEN APPROACH: ICD-10-PCS | Performed by: SURGERY

## 2018-09-26 PROCEDURE — 501445 HCHG STAPLER, SKIN DISP: Performed by: SURGERY

## 2018-09-26 PROCEDURE — 160009 HCHG ANES TIME/MIN: Performed by: SURGERY

## 2018-09-26 PROCEDURE — 700101 HCHG RX REV CODE 250

## 2018-09-26 PROCEDURE — 160029 HCHG SURGERY MINUTES - 1ST 30 MINS LEVEL 4: Performed by: SURGERY

## 2018-09-26 PROCEDURE — 700102 HCHG RX REV CODE 250 W/ 637 OVERRIDE(OP)

## 2018-09-26 PROCEDURE — 501838 HCHG SUTURE GENERAL: Performed by: SURGERY

## 2018-09-26 PROCEDURE — 87205 SMEAR GRAM STAIN: CPT

## 2018-09-26 RX ORDER — MEPERIDINE HYDROCHLORIDE 25 MG/ML
12.5 INJECTION INTRAMUSCULAR; INTRAVENOUS; SUBCUTANEOUS
Status: DISCONTINUED | OUTPATIENT
Start: 2018-09-26 | End: 2018-09-26 | Stop reason: HOSPADM

## 2018-09-26 RX ORDER — LORAZEPAM 2 MG/ML
0.5 INJECTION INTRAMUSCULAR
Status: DISCONTINUED | OUTPATIENT
Start: 2018-09-26 | End: 2018-09-26 | Stop reason: HOSPADM

## 2018-09-26 RX ORDER — LABETALOL HYDROCHLORIDE 5 MG/ML
5 INJECTION, SOLUTION INTRAVENOUS
Status: DISCONTINUED | OUTPATIENT
Start: 2018-09-26 | End: 2018-09-26 | Stop reason: HOSPADM

## 2018-09-26 RX ORDER — ACETAMINOPHEN 500 MG
1000 TABLET ORAL EVERY 6 HOURS PRN
Status: DISCONTINUED | OUTPATIENT
Start: 2018-09-26 | End: 2018-09-26 | Stop reason: HOSPADM

## 2018-09-26 RX ORDER — NALOXONE HYDROCHLORIDE 0.4 MG/ML
0.1 INJECTION, SOLUTION INTRAMUSCULAR; INTRAVENOUS; SUBCUTANEOUS PRN
Status: DISCONTINUED | OUTPATIENT
Start: 2018-09-26 | End: 2018-09-26 | Stop reason: HOSPADM

## 2018-09-26 RX ORDER — SODIUM CHLORIDE, SODIUM LACTATE, POTASSIUM CHLORIDE, CALCIUM CHLORIDE 600; 310; 30; 20 MG/100ML; MG/100ML; MG/100ML; MG/100ML
INJECTION, SOLUTION INTRAVENOUS CONTINUOUS
Status: ACTIVE | OUTPATIENT
Start: 2018-09-26 | End: 2018-09-26

## 2018-09-26 RX ORDER — OXYCODONE HYDROCHLORIDE 5 MG/1
5 TABLET ORAL
Status: DISCONTINUED | OUTPATIENT
Start: 2018-09-26 | End: 2018-09-26 | Stop reason: HOSPADM

## 2018-09-26 RX ORDER — HYDROMORPHONE HYDROCHLORIDE 2 MG/ML
0.1 INJECTION, SOLUTION INTRAMUSCULAR; INTRAVENOUS; SUBCUTANEOUS
Status: DISCONTINUED | OUTPATIENT
Start: 2018-09-26 | End: 2018-09-26 | Stop reason: HOSPADM

## 2018-09-26 RX ORDER — DIPHENHYDRAMINE HYDROCHLORIDE 50 MG/ML
12.5 INJECTION INTRAMUSCULAR; INTRAVENOUS
Status: DISCONTINUED | OUTPATIENT
Start: 2018-09-26 | End: 2018-09-26 | Stop reason: HOSPADM

## 2018-09-26 RX ORDER — LIDOCAINE HYDROCHLORIDE 10 MG/ML
0.5 INJECTION, SOLUTION INFILTRATION; PERINEURAL
Status: DISCONTINUED | OUTPATIENT
Start: 2018-09-26 | End: 2018-09-26 | Stop reason: HOSPADM

## 2018-09-26 RX ORDER — METOPROLOL TARTRATE 1 MG/ML
1 INJECTION, SOLUTION INTRAVENOUS
Status: DISCONTINUED | OUTPATIENT
Start: 2018-09-26 | End: 2018-09-26 | Stop reason: HOSPADM

## 2018-09-26 RX ORDER — HALOPERIDOL 5 MG/ML
1 INJECTION INTRAMUSCULAR
Status: DISCONTINUED | OUTPATIENT
Start: 2018-09-26 | End: 2018-09-26 | Stop reason: HOSPADM

## 2018-09-26 RX ORDER — BUPIVACAINE HYDROCHLORIDE AND EPINEPHRINE 5; 5 MG/ML; UG/ML
INJECTION, SOLUTION EPIDURAL; INTRACAUDAL; PERINEURAL
Status: DISCONTINUED | OUTPATIENT
Start: 2018-09-26 | End: 2018-09-26 | Stop reason: HOSPADM

## 2018-09-26 RX ORDER — LINEZOLID 600 MG/1
600 TABLET, FILM COATED ORAL 2 TIMES DAILY
Status: ON HOLD | COMMUNITY
Start: 2018-09-21 | End: 2018-11-06

## 2018-09-26 RX ORDER — HYDROMORPHONE HYDROCHLORIDE 2 MG/ML
0.4 INJECTION, SOLUTION INTRAMUSCULAR; INTRAVENOUS; SUBCUTANEOUS
Status: DISCONTINUED | OUTPATIENT
Start: 2018-09-26 | End: 2018-09-26 | Stop reason: HOSPADM

## 2018-09-26 RX ORDER — ONDANSETRON 2 MG/ML
4 INJECTION INTRAMUSCULAR; INTRAVENOUS
Status: COMPLETED | OUTPATIENT
Start: 2018-09-26 | End: 2018-09-26

## 2018-09-26 RX ORDER — OXYCODONE HYDROCHLORIDE 5 MG/1
10 TABLET ORAL
Status: DISCONTINUED | OUTPATIENT
Start: 2018-09-26 | End: 2018-09-26 | Stop reason: HOSPADM

## 2018-09-26 RX ORDER — SODIUM CHLORIDE, SODIUM LACTATE, POTASSIUM CHLORIDE, CALCIUM CHLORIDE 600; 310; 30; 20 MG/100ML; MG/100ML; MG/100ML; MG/100ML
INJECTION, SOLUTION INTRAVENOUS CONTINUOUS
Status: DISCONTINUED | OUTPATIENT
Start: 2018-09-26 | End: 2018-09-26 | Stop reason: HOSPADM

## 2018-09-26 RX ORDER — OXYCODONE HCL 5 MG/5 ML
SOLUTION, ORAL ORAL
Status: COMPLETED
Start: 2018-09-26 | End: 2018-09-26

## 2018-09-26 RX ORDER — GLYCOPYRROLATE 0.2 MG/ML
0.2 INJECTION INTRAMUSCULAR; INTRAVENOUS
Status: DISCONTINUED | OUTPATIENT
Start: 2018-09-26 | End: 2018-09-26 | Stop reason: HOSPADM

## 2018-09-26 RX ORDER — HYDROMORPHONE HYDROCHLORIDE 2 MG/ML
0.2 INJECTION, SOLUTION INTRAMUSCULAR; INTRAVENOUS; SUBCUTANEOUS
Status: DISCONTINUED | OUTPATIENT
Start: 2018-09-26 | End: 2018-09-26 | Stop reason: HOSPADM

## 2018-09-26 RX ORDER — MEPERIDINE HYDROCHLORIDE 25 MG/ML
INJECTION INTRAMUSCULAR; INTRAVENOUS; SUBCUTANEOUS
Status: COMPLETED
Start: 2018-09-26 | End: 2018-09-26

## 2018-09-26 RX ORDER — LIDOCAINE HYDROCHLORIDE 10 MG/ML
INJECTION, SOLUTION INFILTRATION; PERINEURAL
Status: COMPLETED
Start: 2018-09-26 | End: 2018-09-26

## 2018-09-26 RX ORDER — CEFDINIR 300 MG/1
300 CAPSULE ORAL 2 TIMES DAILY
Status: ON HOLD | COMMUNITY
Start: 2018-09-21 | End: 2018-11-06

## 2018-09-26 RX ORDER — ACETAMINOPHEN 500 MG
1000 TABLET ORAL EVERY 6 HOURS PRN
COMMUNITY

## 2018-09-26 RX ORDER — FLUCONAZOLE 150 MG/1
150 TABLET ORAL ONCE
Status: ON HOLD | COMMUNITY
End: 2018-11-06

## 2018-09-26 RX ORDER — OXYCODONE HCL 5 MG/5 ML
10 SOLUTION, ORAL ORAL
Status: COMPLETED | OUTPATIENT
Start: 2018-09-26 | End: 2018-09-26

## 2018-09-26 RX ORDER — OXYCODONE HCL 5 MG/5 ML
5 SOLUTION, ORAL ORAL
Status: COMPLETED | OUTPATIENT
Start: 2018-09-26 | End: 2018-09-26

## 2018-09-26 RX ADMIN — Medication 10 MG: at 12:22

## 2018-09-26 RX ADMIN — MEPERIDINE HYDROCHLORIDE 12.5 MG: 25 INJECTION INTRAMUSCULAR; INTRAVENOUS; SUBCUTANEOUS at 11:55

## 2018-09-26 RX ADMIN — OXYCODONE HYDROCHLORIDE 10 MG: 5 SOLUTION ORAL at 12:22

## 2018-09-26 RX ADMIN — LIDOCAINE HYDROCHLORIDE 0.5 ML: 10 INJECTION, SOLUTION INFILTRATION; PERINEURAL at 09:30

## 2018-09-26 RX ADMIN — ONDANSETRON 4 MG: 2 INJECTION INTRAMUSCULAR; INTRAVENOUS at 12:28

## 2018-09-26 RX ADMIN — SODIUM CHLORIDE, SODIUM LACTATE, POTASSIUM CHLORIDE, CALCIUM CHLORIDE: 600; 310; 30; 20 INJECTION, SOLUTION INTRAVENOUS at 09:31

## 2018-09-26 RX ADMIN — FENTANYL CITRATE 50 MCG: 50 INJECTION, SOLUTION INTRAMUSCULAR; INTRAVENOUS at 12:12

## 2018-09-26 ASSESSMENT — PAIN SCALES - GENERAL
PAINLEVEL_OUTOF10: 0
PAINLEVEL_OUTOF10: 5
PAINLEVEL_OUTOF10: 7
PAINLEVEL_OUTOF10: 8
PAINLEVEL_OUTOF10: 4
PAINLEVEL_OUTOF10: 3
PAINLEVEL_OUTOF10: 7

## 2018-09-26 NOTE — OR NURSING
The pt is awake and oriented. Respirations are regular and easy. Pain is controlled, the pt is comfortable. Abdominal incision  dry and intact.

## 2018-09-26 NOTE — OP REPORT
DATE OF SERVICE:  09/26/2018    PREOPERATIVE DIAGNOSES:  Abdominal wall fluid collection with a differential   diagnosis of recurrent endometrioma or potential infection of prosthetic mesh   prosthesis.    POSTOPERATIVE DIAGNOSIS:  Abdominal wall seroma, cultures pending.    OPERATION PERFORMED:  Abdominal wall exploration ultrasound-guided with   drainage and culture of abdominal wall seroma.    SURGEON:  Ross Mendoza MD    ANESTHESIOLOGIST:  Oscar Quiroz MD    ANESTHESIA:  General anesthesia plus TAP block.    ASSISTANT:  Jani Benitez MD    OPERATIVE NOTE:  The patient has had recurrent episodes of infection over the   last several months following a ureteral reconstruction by Dr. Rangel.  She has   had serial abdominal CT scans which have demonstrated a fluid collection   involving in the lower abdomen to the left of the midline.  This has been   observed increase in size.  There was no gas within the fluid and it was not   overly enhancing.  There was question as to whether or not this could be the   source of recurrent infection, possibly related to previously implanted mesh   following resection of an endometrioma in 2008.  The patient also was felt to   potentially have recurrent tumor.  The patient ultimately was recommended to   undergo exploration and evaluation of this with repairs and treatment to be   determined.  The patient consented to proceed.    DESCRIPTION OF PROCEDURE:  She was taken to the operating room and placed   under anesthesia by Dr. Quiroz.  She was given prophylactic Ancef and   sequential stockings were applied as antiembolism prophylaxis.  Her abdomen   was prepped with ChloraPrep and sterilely draped and a time-out was affected.    A solution of 0.5% Marcaine was infiltrated along the line of incision   previously made in the right lower quadrant.  This was opened through skin and   subcutaneous tissue, superficial fascia down to the fascia.  Patient had the   fascia  interrogated.  The fluid collection was felt to be medial.  The fascia   was opened in fact we dissected down through with the previous mesh   prosthesis, which was well incorporated.  This was elevated off of the   peritoneum circumferentially and gradually mobilized down to where the fluid   collection could be identified.  The fluid collection appeared to be   intra-abdominal in the peritoneal layer.  This was opened.  The fluid was   quite clear.  There is no exudate or granulation tissue.  This cold fluid was   cultured for aerobic and anaerobic organisms.  This appeared to be adequately   drained and explained the fluid collection, which is probably some seroma or   lymphangiocele.  The patient had the fascia closed using interrupted   [id number] of 2-0 PDS suture and additional Marcaine was injected in the   fascia layers.  The patient had the wound irrigated.  The skin was closed   using running 4-0 Monocryl and sealed with Dermabond.  Dr. Pryor will proceed   with a transversus abdominis block to help the patient with postoperative   pain.  She was given a prescription for oxycodone IR 5 mg #24 and compliance   with about regulations.  The patient was then extubated, awakened and taken to   recovery room in stable satisfactory condition, may be treated on an   ambulatory basis.  We will ask her to return to see us in 1 week.  Cultures   are maturing now for the fluid.  I do not think that it represented recurrent   tumor.  There was unlikely to represent a mesh infection.  I believe this will   provide definitive answer to the CT abnormality.       ____________________________________     MD MACKENZIE NOVOA / JERILYN    DD:  09/26/2018 11:43:59  DT:  09/26/2018 12:01:49    D#:  [id number]  Job#:  [id number]    cc: PATSY BALDWIN MD, CLARICE PRYOR MD, TACOS JEFFERSON MD

## 2018-09-26 NOTE — DOCUMENTATION QUERY
"DOCUMENTATION QUERY    PROVIDERS: Please select “Cosign w/ note” to reply to query.    To better represent the severity of illness of your patient, please review the following information and exercise your independent professional judgment in responding to this query.     Patient was admitted for sepsis due to obstructive pyelonephritis. \"Pt with a history of a ureteral stricture S/P right ileal ureteral substitution for proximal stricture (4/18).  Since her surgery she has had several UTI's,  yeast infections.  She has experienced chronic intermittent R abdomen and flank pain\" is noted in the urology consult note.       Based upon the clinical findings, risk factors, and treatment, can the relationship between these two conditions be further specified?    • Sepsis/Pyelonephritis is related to urinary diversion  • Sepsis/ Pyelonephritis is not related to urinary diversion  • Other explanation of clinical findings (Please document)  • Unable to determine         The medical record reflects the following:   Clinical Findings  Sepsis   Obstructive Pyelonephritis   recurrent UTIs      Treatment  IV Antibiotics   IV Fluids    Risk Factors     Location within medical record  History and Physical    Consult      Thank you,   Elizabeth Sawallisch        "

## 2018-09-26 NOTE — OR NURSING
Pt's VSS; denies N/V; states pain is at tolerable level. Dressing CDI to abd. D/c orders received. IV dc'd. Pt changed into clothing with assistance. Pt up and ambulated to BR, steady gait, voided adequately. Discharge instructions given; pt and family verbalized understanding and questions answered. Patient states ready to d/c home. Prescriptions given. Pt dc'd in w/c with CNA in stable condition.

## 2018-09-26 NOTE — DISCHARGE INSTRUCTIONS
ACTIVITY: Rest and take it easy for the first 24 hours.  A responsible adult is recommended to remain with you during that time.  It is normal to feel sleepy.  We encourage you to not do anything that requires balance, judgment or coordination.    MILD FLU-LIKE SYMPTOMS ARE NORMAL. YOU MAY EXPERIENCE GENERALIZED MUSCLE ACHES, THROAT IRRITATION, HEADACHE AND/OR SOME NAUSEA.    FOR 24 HOURS DO NOT:  Drive, operate machinery or run household appliances.  Drink beer or alcoholic beverages.   Make important decisions or sign legal documents.    SPECIAL INSTRUCTIONS: ***    DIET: To avoid nausea, slowly advance diet as tolerated, avoiding spicy or greasy foods for the first day.  Add more substantial food to your diet according to your physician's instructions.  Babies can be fed formula or breast milk as soon as they are hungry.  INCREASE FLUIDS AND FIBER TO AVOID CONSTIPATION.    SURGICAL DRESSING/BATHING: *okay to shower*    FOLLOW-UP APPOINTMENT:  A follow-up appointment should be arranged with your doctor in *1 week*; call to schedule.    You should CALL YOUR PHYSICIAN if you develop:  Fever greater than 101 degrees F.  Pain not relieved by medication, or persistent nausea or vomiting.  Excessive bleeding (blood soaking through dressing) or unexpected drainage from the wound.  Extreme redness or swelling around the incision site, drainage of pus or foul smelling drainage.  Inability to urinate or empty your bladder within 8 hours.  Problems with breathing or chest pain.    You should call 911 if you develop problems with breathing or chest pain.  If you are unable to contact your doctor or surgical center, you should go to the nearest emergency room or urgent care center.  Physician's telephone #: *Dr. Mendoza [phone]*    If any questions arise, call your doctor.  If your doctor is not available, please feel free to call the Surgical Center at [phone].  The Center is open Monday through Friday from 7AM to  7PM.  You can also call the HEALTH HOTLINE open 24 hours/day, 7 days/week and speak to a nurse at [phone], or toll free at [phone].    A registered nurse may call you a few days after your surgery to see how you are doing after your procedure.    MEDICATIONS: Resume taking daily medication.  Take prescribed pain medication with food.  If no medication is prescribed, you may take non-aspirin pain medication if needed.  PAIN MEDICATION CAN BE VERY CONSTIPATING.  Take a stool softener or laxative such as senokot, pericolace, or milk of magnesia if needed.    Prescription given for **.  Last pain medication given at *12:22pm next dose may be given at 4:22    If your physician has prescribed pain medication that includes Acetaminophen (Tylenol), do not take additional Acetaminophen (Tylenol) while taking the prescribed medication.    Depression / Suicide Risk    As you are discharged from this [facility] facility, it is important to learn how to keep safe from harming yourself.    Recognize the warning signs:  · Abrupt changes in personality, positive or negative- including increase in energy   · Giving away possessions  · Change in eating patterns- significant weight changes-  positive or negative  · Change in sleeping patterns- unable to sleep or sleeping all the time   · Unwillingness or inability to communicate  · Depression  · Unusual sadness, discouragement and loneliness  · Talk of wanting to die  · Neglect of personal appearance   · Rebelliousness- reckless behavior  · Withdrawal from people/activities they love  · Confusion- inability to concentrate     If you or a loved one observes any of these behaviors or has concerns about self-harm, here's what you can do:  · Talk about it- your feelings and reasons for harming yourself  · Remove any means that you might use to hurt yourself (examples: pills, rope, extension cords, firearm)  · Get professional help from the community (Mental Health,  Substance Abuse, psychological counseling)  · Do not be alone:Call your Safe Contact- someone whom you trust who will be there for you.  · Call your local CRISIS HOTLINE 194-4313 or 973-412-2754  · Call your local Children's Mobile Crisis Response Team [place] (910) 083-6327 or www.[employer]  · Call the toll free National Suicide Prevention Hotlines   · National Suicide Prevention Lifeline 886-792-WYMB (5697)  · National Hope Line Network 800-SUICIDE (009-6032)

## 2018-09-26 NOTE — OR SURGEON
Immediate Post OP Note    PreOp Diagnosis: abdominal wall fluid collecTIon     PostOp Diagnosis: same    Procedure(s):  DRAINAGE OF ABDOMINAL WALL SEROMA - Wound Class: Clean    Surgeon(s):  BRIANNA Dailey M.D.    Anesthesiologist/Type of Anesthesia:  Anesthesiologist: Oscar Quiroz M.D./General    Surgical Staff:  Circulator: Karen White R.N.  Relief Scrub: Brooklyn Fraser  Scrub Person: Lamin Bear    Specimens removed if any:  ID Type Source Tests Collected by Time Destination   1 : ABDOMINAL WALL FLUID COLLECTION Wound Abdominal ANAEROBIC CULTURE, CULTURE WOUND W/ GRAM STAIN Ross Mendoza M.D. 9/26/2018 11:16 AM        Estimated Blood Loss: 5    Findings: clear fluid   Well incorporated mesh     Complications: o        9/26/2018 11:37 AM Ross Mendoza M.D.

## 2018-09-27 LAB
GRAM STN SPEC: NORMAL
SIGNIFICANT IND 70042: NORMAL
SITE SITE: NORMAL
SOURCE SOURCE: NORMAL

## 2018-09-29 LAB
BACTERIA WND AEROBE CULT: NORMAL
GRAM STN SPEC: NORMAL
SIGNIFICANT IND 70042: NORMAL
SITE SITE: NORMAL
SOURCE SOURCE: NORMAL

## 2018-10-01 LAB
BACTERIA SPEC ANAEROBE CULT: NORMAL
SIGNIFICANT IND 70042: NORMAL
SITE SITE: NORMAL
SOURCE SOURCE: NORMAL

## 2018-11-02 ENCOUNTER — APPOINTMENT (OUTPATIENT)
Dept: RADIOLOGY | Facility: MEDICAL CENTER | Age: 48
DRG: 907 | End: 2018-11-02
Attending: EMERGENCY MEDICINE
Payer: COMMERCIAL

## 2018-11-02 ENCOUNTER — HOSPITAL ENCOUNTER (INPATIENT)
Facility: MEDICAL CENTER | Age: 48
LOS: 4 days | DRG: 907 | End: 2018-11-06
Attending: EMERGENCY MEDICINE | Admitting: FAMILY MEDICINE
Payer: COMMERCIAL

## 2018-11-02 DIAGNOSIS — L08.9 WOUND INFECTION: ICD-10-CM

## 2018-11-02 DIAGNOSIS — A41.9 SEPSIS, DUE TO UNSPECIFIED ORGANISM: ICD-10-CM

## 2018-11-02 DIAGNOSIS — T14.8XXA WOUND INFECTION: ICD-10-CM

## 2018-11-02 DIAGNOSIS — L03.311 CELLULITIS OF ABDOMINAL WALL: ICD-10-CM

## 2018-11-02 PROBLEM — T81.40XA POSTOPERATIVE INFECTION: Status: ACTIVE | Noted: 2018-11-02

## 2018-11-02 LAB
ALBUMIN SERPL BCP-MCNC: 4.2 G/DL (ref 3.2–4.9)
ALBUMIN/GLOB SERPL: 1.4 G/DL
ALP SERPL-CCNC: 78 U/L (ref 30–99)
ALT SERPL-CCNC: 9 U/L (ref 2–50)
ANION GAP SERPL CALC-SCNC: 9 MMOL/L (ref 0–11.9)
APPEARANCE UR: CLEAR
AST SERPL-CCNC: 12 U/L (ref 12–45)
BASOPHILS # BLD AUTO: 0.4 % (ref 0–1.8)
BASOPHILS # BLD: 0.06 K/UL (ref 0–0.12)
BILIRUB SERPL-MCNC: 0.5 MG/DL (ref 0.1–1.5)
BILIRUB UR QL STRIP.AUTO: NEGATIVE
BUN SERPL-MCNC: 13 MG/DL (ref 8–22)
CALCIUM SERPL-MCNC: 9.4 MG/DL (ref 8.5–10.5)
CHLORIDE SERPL-SCNC: 102 MMOL/L (ref 96–112)
CO2 SERPL-SCNC: 22 MMOL/L (ref 20–33)
COLOR UR: YELLOW
CREAT SERPL-MCNC: 1.08 MG/DL (ref 0.5–1.4)
EOSINOPHIL # BLD AUTO: 0.14 K/UL (ref 0–0.51)
EOSINOPHIL NFR BLD: 0.8 % (ref 0–6.9)
ERYTHROCYTE [DISTWIDTH] IN BLOOD BY AUTOMATED COUNT: 47.3 FL (ref 35.9–50)
GLOBULIN SER CALC-MCNC: 2.9 G/DL (ref 1.9–3.5)
GLUCOSE SERPL-MCNC: 108 MG/DL (ref 65–99)
GLUCOSE UR STRIP.AUTO-MCNC: NEGATIVE MG/DL
HCT VFR BLD AUTO: 41.6 % (ref 37–47)
HGB BLD-MCNC: 14.2 G/DL (ref 12–16)
IMM GRANULOCYTES # BLD AUTO: 0.07 K/UL (ref 0–0.11)
IMM GRANULOCYTES NFR BLD AUTO: 0.4 % (ref 0–0.9)
KETONES UR STRIP.AUTO-MCNC: NEGATIVE MG/DL
LACTATE BLD-SCNC: 1.2 MMOL/L (ref 0.5–2)
LEUKOCYTE ESTERASE UR QL STRIP.AUTO: NEGATIVE
LYMPHOCYTES # BLD AUTO: 2.57 K/UL (ref 1–4.8)
LYMPHOCYTES NFR BLD: 15 % (ref 22–41)
MCH RBC QN AUTO: 29.7 PG (ref 27–33)
MCHC RBC AUTO-ENTMCNC: 34.1 G/DL (ref 33.6–35)
MCV RBC AUTO: 87 FL (ref 81.4–97.8)
MICRO URNS: NORMAL
MONOCYTES # BLD AUTO: 1.56 K/UL (ref 0–0.85)
MONOCYTES NFR BLD AUTO: 9.1 % (ref 0–13.4)
NEUTROPHILS # BLD AUTO: 12.68 K/UL (ref 2–7.15)
NEUTROPHILS NFR BLD: 74.3 % (ref 44–72)
NITRITE UR QL STRIP.AUTO: NEGATIVE
NRBC # BLD AUTO: 0 K/UL
NRBC BLD-RTO: 0 /100 WBC
PH UR STRIP.AUTO: 6 [PH]
PLATELET # BLD AUTO: 407 K/UL (ref 164–446)
PMV BLD AUTO: 10.9 FL (ref 9–12.9)
POTASSIUM SERPL-SCNC: 3.9 MMOL/L (ref 3.6–5.5)
PROT SERPL-MCNC: 7.1 G/DL (ref 6–8.2)
PROT UR QL STRIP: NEGATIVE MG/DL
RBC # BLD AUTO: 4.78 M/UL (ref 4.2–5.4)
RBC UR QL AUTO: NEGATIVE
SODIUM SERPL-SCNC: 133 MMOL/L (ref 135–145)
SP GR UR STRIP.AUTO: 1.01
UROBILINOGEN UR STRIP.AUTO-MCNC: 0.2 MG/DL
WBC # BLD AUTO: 17.1 K/UL (ref 4.8–10.8)

## 2018-11-02 PROCEDURE — 80053 COMPREHEN METABOLIC PANEL: CPT

## 2018-11-02 PROCEDURE — 700111 HCHG RX REV CODE 636 W/ 250 OVERRIDE (IP): Performed by: EMERGENCY MEDICINE

## 2018-11-02 PROCEDURE — 700105 HCHG RX REV CODE 258: Performed by: FAMILY MEDICINE

## 2018-11-02 PROCEDURE — 99233 SBSQ HOSP IP/OBS HIGH 50: CPT | Performed by: FAMILY MEDICINE

## 2018-11-02 PROCEDURE — 700105 HCHG RX REV CODE 258: Performed by: EMERGENCY MEDICINE

## 2018-11-02 PROCEDURE — 700102 HCHG RX REV CODE 250 W/ 637 OVERRIDE(OP): Performed by: EMERGENCY MEDICINE

## 2018-11-02 PROCEDURE — 99285 EMERGENCY DEPT VISIT HI MDM: CPT

## 2018-11-02 PROCEDURE — 76705 ECHO EXAM OF ABDOMEN: CPT

## 2018-11-02 PROCEDURE — 770020 HCHG ROOM/CARE - TELE (206)

## 2018-11-02 PROCEDURE — 85025 COMPLETE CBC W/AUTO DIFF WBC: CPT

## 2018-11-02 PROCEDURE — 96375 TX/PRO/DX INJ NEW DRUG ADDON: CPT

## 2018-11-02 PROCEDURE — 36415 COLL VENOUS BLD VENIPUNCTURE: CPT

## 2018-11-02 PROCEDURE — 87040 BLOOD CULTURE FOR BACTERIA: CPT

## 2018-11-02 PROCEDURE — 81003 URINALYSIS AUTO W/O SCOPE: CPT

## 2018-11-02 PROCEDURE — A9270 NON-COVERED ITEM OR SERVICE: HCPCS | Performed by: EMERGENCY MEDICINE

## 2018-11-02 PROCEDURE — 87086 URINE CULTURE/COLONY COUNT: CPT

## 2018-11-02 PROCEDURE — 71045 X-RAY EXAM CHEST 1 VIEW: CPT

## 2018-11-02 PROCEDURE — 700111 HCHG RX REV CODE 636 W/ 250 OVERRIDE (IP): Performed by: FAMILY MEDICINE

## 2018-11-02 PROCEDURE — 94760 N-INVAS EAR/PLS OXIMETRY 1: CPT

## 2018-11-02 PROCEDURE — 96367 TX/PROPH/DG ADDL SEQ IV INF: CPT

## 2018-11-02 PROCEDURE — 96365 THER/PROPH/DIAG IV INF INIT: CPT

## 2018-11-02 PROCEDURE — 96366 THER/PROPH/DIAG IV INF ADDON: CPT

## 2018-11-02 PROCEDURE — 83605 ASSAY OF LACTIC ACID: CPT

## 2018-11-02 RX ORDER — HYDROMORPHONE HYDROCHLORIDE 1 MG/ML
0.5 INJECTION, SOLUTION INTRAMUSCULAR; INTRAVENOUS; SUBCUTANEOUS
Status: DISCONTINUED | OUTPATIENT
Start: 2018-11-02 | End: 2018-11-06 | Stop reason: HOSPADM

## 2018-11-02 RX ORDER — ESTRADIOL 0.05 MG/D
1 PATCH, EXTENDED RELEASE TRANSDERMAL
Status: DISCONTINUED | OUTPATIENT
Start: 2018-11-05 | End: 2018-11-03

## 2018-11-02 RX ORDER — ACETAMINOPHEN 325 MG/1
650 TABLET ORAL EVERY 6 HOURS PRN
Status: DISCONTINUED | OUTPATIENT
Start: 2018-11-02 | End: 2018-11-06 | Stop reason: HOSPADM

## 2018-11-02 RX ORDER — ONDANSETRON 2 MG/ML
4 INJECTION INTRAMUSCULAR; INTRAVENOUS ONCE
Status: COMPLETED | OUTPATIENT
Start: 2018-11-02 | End: 2018-11-02

## 2018-11-02 RX ORDER — ACETAMINOPHEN 325 MG/1
650 TABLET ORAL ONCE
Status: COMPLETED | OUTPATIENT
Start: 2018-11-02 | End: 2018-11-02

## 2018-11-02 RX ORDER — ONDANSETRON 2 MG/ML
4 INJECTION INTRAMUSCULAR; INTRAVENOUS EVERY 4 HOURS PRN
Status: DISCONTINUED | OUTPATIENT
Start: 2018-11-02 | End: 2018-11-06 | Stop reason: HOSPADM

## 2018-11-02 RX ORDER — PROMETHAZINE HYDROCHLORIDE 25 MG/1
12.5-25 SUPPOSITORY RECTAL EVERY 4 HOURS PRN
Status: DISCONTINUED | OUTPATIENT
Start: 2018-11-02 | End: 2018-11-06 | Stop reason: HOSPADM

## 2018-11-02 RX ORDER — LACTOBACILLUS RHAMNOSUS GG 10B CELL
1 CAPSULE ORAL
Status: DISCONTINUED | OUTPATIENT
Start: 2018-11-03 | End: 2018-11-06 | Stop reason: HOSPADM

## 2018-11-02 RX ORDER — SODIUM CHLORIDE 9 MG/ML
30 INJECTION, SOLUTION INTRAVENOUS
Status: DISCONTINUED | OUTPATIENT
Start: 2018-11-02 | End: 2018-11-06 | Stop reason: HOSPADM

## 2018-11-02 RX ORDER — SODIUM CHLORIDE 9 MG/ML
1000 INJECTION, SOLUTION INTRAVENOUS
Status: DISCONTINUED | OUTPATIENT
Start: 2018-11-02 | End: 2018-11-06 | Stop reason: HOSPADM

## 2018-11-02 RX ORDER — ONDANSETRON 4 MG/1
4 TABLET, ORALLY DISINTEGRATING ORAL EVERY 4 HOURS PRN
Status: DISCONTINUED | OUTPATIENT
Start: 2018-11-02 | End: 2018-11-06 | Stop reason: HOSPADM

## 2018-11-02 RX ORDER — PROMETHAZINE HYDROCHLORIDE 25 MG/1
12.5-25 TABLET ORAL EVERY 4 HOURS PRN
Status: DISCONTINUED | OUTPATIENT
Start: 2018-11-02 | End: 2018-11-06 | Stop reason: HOSPADM

## 2018-11-02 RX ORDER — OXYCODONE HYDROCHLORIDE 5 MG/1
5 TABLET ORAL
Status: DISCONTINUED | OUTPATIENT
Start: 2018-11-02 | End: 2018-11-06 | Stop reason: HOSPADM

## 2018-11-02 RX ORDER — SODIUM CHLORIDE 9 MG/ML
INJECTION, SOLUTION INTRAVENOUS CONTINUOUS
Status: DISCONTINUED | OUTPATIENT
Start: 2018-11-02 | End: 2018-11-04

## 2018-11-02 RX ORDER — OXYCODONE HYDROCHLORIDE 10 MG/1
10 TABLET ORAL
Status: DISCONTINUED | OUTPATIENT
Start: 2018-11-02 | End: 2018-11-06 | Stop reason: HOSPADM

## 2018-11-02 RX ADMIN — HYDROMORPHONE HYDROCHLORIDE 0.5 MG: 1 INJECTION, SOLUTION INTRAMUSCULAR; INTRAVENOUS; SUBCUTANEOUS at 14:13

## 2018-11-02 RX ADMIN — ONDANSETRON 4 MG: 2 INJECTION INTRAMUSCULAR; INTRAVENOUS at 14:13

## 2018-11-02 RX ADMIN — SODIUM CHLORIDE: 9 INJECTION, SOLUTION INTRAVENOUS at 18:45

## 2018-11-02 RX ADMIN — ACETAMINOPHEN 650 MG: 325 TABLET, FILM COATED ORAL at 17:01

## 2018-11-02 RX ADMIN — HYDROMORPHONE HYDROCHLORIDE 0.5 MG: 1 INJECTION, SOLUTION INTRAMUSCULAR; INTRAVENOUS; SUBCUTANEOUS at 21:26

## 2018-11-02 RX ADMIN — AMPICILLIN SODIUM AND SULBACTAM SODIUM 3 G: 2; 1 INJECTION, POWDER, FOR SOLUTION INTRAMUSCULAR; INTRAVENOUS at 14:13

## 2018-11-02 RX ADMIN — VANCOMYCIN HYDROCHLORIDE 2200 MG: 100 INJECTION, POWDER, LYOPHILIZED, FOR SOLUTION INTRAVENOUS at 14:45

## 2018-11-02 RX ADMIN — ONDANSETRON 4 MG: 2 INJECTION INTRAMUSCULAR; INTRAVENOUS at 21:36

## 2018-11-02 ASSESSMENT — ENCOUNTER SYMPTOMS
NERVOUS/ANXIOUS: 1
WHEEZING: 0
DIARRHEA: 0
SORE THROAT: 0
DIZZINESS: 0
NECK PAIN: 0
VOMITING: 1
CHILLS: 1
BLURRED VISION: 0
SHORTNESS OF BREATH: 0
HEADACHES: 0
HEARTBURN: 0
ABDOMINAL PAIN: 1
BACK PAIN: 0
COUGH: 0
FEVER: 1
NAUSEA: 1
WEAKNESS: 0

## 2018-11-02 ASSESSMENT — LIFESTYLE VARIABLES: DO YOU DRINK ALCOHOL: NO

## 2018-11-02 ASSESSMENT — PAIN SCALES - GENERAL
PAINLEVEL_OUTOF10: 8
PAINLEVEL_OUTOF10: 6
PAINLEVEL_OUTOF10: 8

## 2018-11-02 NOTE — ED NOTES
Break RN: pt ambulatory back to room w/ steady gait, reconnected to monitors and assisted to a position of comfort, denies further needs at this time.

## 2018-11-02 NOTE — ED NOTES
Med rec complete per pt at bedside  Allergies have been verified and updated        Pt finished a 10 day course of CEFDINIR on 10-    Pt finished a 13 day course of LINEZOLID on 10-4-2018

## 2018-11-02 NOTE — ED PROVIDER NOTES
"ED Provider Note    CHIEF COMPLAINT  Chief Complaint   Patient presents with   • Post-Op Complications   • Fever       HPI  Aurora Tan is a 48 y.o. female who presents to the emergency department complaining of redness and pain over an abdominal surgical wound.  The patient says that about 3 weeks ago she had surgery of the abdominal wall she thinks that she may have had infected mesh in this area.  She was doing okay until 4 days ago when she developed nausea vomiting and diarrhea and she actually thought that that was getting better and went out to a football game last night where she developed pain in the right side of the abdomen and fever and chills and today has come to the emergency department for evaluation and she is noted redness developing around his surgical wound site in the right lower abdomen.  She does not recognize any others specific exacerbating or alleviating factors or precipitating events    REVIEW OF SYSTEMS no black or bloody stool or emesis no chest pain or difficulty breathing.  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   • Renal disorder 04/25/2018    \"Nephrostomy tube in place\"   • Snoring 04/25/2018    Has not had a sleep study   • Urinary incontinence        FAMILY HISTORY  Family History   Problem Relation Age of Onset   • Cancer Mother    • Cancer Father    • Heart Disease Brother    • Diabetes Maternal Grandmother    • Cancer Maternal Grandfather    • Cancer Paternal Grandmother    • Cancer Paternal Grandfather        SOCIAL HISTORY  Social History     Social History   • Marital status: "      Spouse name: N/A   • Number of children: N/A   • Years of education: N/A     Social History Main Topics   • Smoking status: Former Smoker     Packs/day: 0.25     Years: 10.00     Types: Cigarettes     Quit date: 4/13/2013   • Smokeless tobacco: Never Used   • Alcohol use Yes      Comment: occ   • Drug use: No   • Sexual activity: Not on file     Other Topics Concern   • Not on file     Social History Narrative   • No narrative on file       SURGICAL HISTORY  Past Surgical History:   Procedure Laterality Date   • EXPLORATORY LAPAROTOMY  9/26/2018    Procedure: DRAINAGE OF ABDOMINAL WALL SEROMA;  Surgeon: Ross Mendoza M.D.;  Location: SURGERY [facility];  Service: General   • EXPLORATORY LAPAROTOMY Right 4/30/2018    Procedure: EXPLORATORY LAPAROTOMY W/ILEAL URETER;  Surgeon: Kapil Rangel M.D.;  Location: SURGERY [facility];  Service: Urology   • LYSIS ADHESIONS GENERAL  4/30/2018    Procedure: LYSIS ADHESIONS GENERAL;  Surgeon: Kapil Rangel M.D.;  Location: SURGERY [facility];  Service: Urology   • CYSTOSCOPY STENT PLACEMENT Right 3/21/2018    Procedure: CYSTOSCOPY - STENT REMOVAL;  Surgeon: Terry Maldonado M.D.;  Location: SURGERY [facility];  Service: Urology   • URETEROSCOPY Right 3/21/2018    Procedure: URETEROSCOPY- DIAGNOSTIC;  Surgeon: Terry Maldonado M.D.;  Location: SURGERY [facility];  Service: Urology   • RETROGRADES Right 3/21/2018    Procedure: RETROGRADES;  Surgeon: Terry Maldonado M.D.;  Location: SURGERY [facility];  Service: Urology   • PYELOGRAM Right 3/21/2018    Procedure: PYELOGRAM;  Surgeon: Terry Maldonado M.D.;  Location: SURGERY [facility];  Service: Urology   • CYSTOSCOPY N/A 1/31/2018    Procedure: CYSTOSCOPY;  Surgeon: Terry Maldonado M.D.;  Location: SURGERY [facility];  Service: Urology   • RETROGRADES Right 1/31/2018    Procedure: RETROGRADES;  Surgeon: Terry Maldonado M.D.;  Location: SURGERY [facility];  Service:  Urology   • PYELOGRAM Right 1/31/2018    Procedure: PYELOGRAM;  Surgeon: Terry Maldonado M.D.;  Location: SURGERY [facility];  Service: Urology   • URETEROSCOPY Right 1/31/2018    Procedure: URETEROSCOPY, INCISIONAL OR DILATION OF STRICTURE  ;  Surgeon: Terry Maldonado M.D.;  Location: SURGERY [facility];  Service: Urology   • LASERTRIPSY Right 1/31/2018    Procedure: LASERTRIPSY;  Surgeon: Terry Maldonado M.D.;  Location: SURGERY [facility];  Service: Urology   • STENT PLACEMENT Right 1/31/2018    Procedure: STENT PLACEMENT;  Surgeon: Terry Maldonado M.D.;  Location: SURGERY [facility];  Service: Urology   • URETEROSCOPY Right 11/7/2017    Procedure: URETEROSCOPY;  Surgeon: Kiet Eid M.D.;  Location: SURGERY [facility];  Service: Urology   • LASERTRIPSY Right 11/7/2017    Procedure: LASERTRIPSY- LITHO;  Surgeon: Kiet Eid M.D.;  Location: SURGERY [facility];  Service: Urology   • URETHRAL DILATATION Right 11/7/2017    Procedure: URETHRAL DILATATION;  Surgeon: Kiet Eid M.D.;  Location: [facility];  Service: Urology   • CYSTOSCOPY STENT PLACEMENT Right 11/7/2017    Procedure: CYSTOSCOPY STENT PLACEMENT;  Surgeon: Kiet Eid M.D.;  Location: [facility];  Service: Urology   • CYSTOSCOPY Right 9/17/2017    Procedure: CYSTOSCOPY;  Surgeon: Kiet Eid M.D.;  Location: SURGERY [facility];  Service: Urology   • URETEROSCOPY Right 9/17/2017    Procedure: URETEROSCOPY;  Surgeon: Kiet Eid M.D.;  Location: SURGERY [facility];  Service: Urology   • LASERTRIPSY Right 9/17/2017    Procedure: LASERTRIPSY;  Surgeon: Kiet Eid M.D.;  Location: SURGERY [facility];  Service: Urology   • STENT PLACEMENT Right 9/17/2017    Procedure: STENT PLACEMENT;  Surgeon: Kiet Eid M.D.;  Location: SURGERY [facility];  Service: Urology   • ABDOMINAL HYSTERECTOMY TOTAL  2009   • OTHER ABDOMINAL  "SURGERY  2009    abdominal tumor    • OTHER ABDOMINAL SURGERY  2009   • ABDOMINAL EXPLORATION     • OTHER      eye surgeries X6   • OTHER      ear surgeries X5   • PRIMARY C SECTION      1989/1991/1997/1999   • TONSILLECTOMY         CURRENT MEDICATIONS  Home Medications    **Home medications have not yet been reviewed for this encounter**         ALLERGIES  Allergies   Allergen Reactions   • Levofloxacin Itching, Hives and Shortness of Breath     Other reaction(s): Itching, pruritis  Rxn - 9/11/01   • Morphine Itching     Other reaction(s): Itching, pruritis       PHYSICAL EXAM  VITAL SIGNS: /80   Pulse 84   Temp 37.6 °C (99.6 °F) (Oral)   Resp 16   Ht 1.702 m (5' 7\")   Wt 87.2 kg (192 lb 3.9 oz)   LMP  (LMP Unknown)   SpO2 96%   BMI 30.11 kg/m²    Oxygen saturation is interpreted as adequate  Constitutional: Awake and nontoxic-appearing  HENT: Mucous membranes are moist  Eyes: No erythema discharge or jaundice  Neck: Trachea midline no JVD  Cardiovascular: Regular rate and rhythm  Lungs: Clear and equal bilaterally with no apparent difficulty breathing  Abdomen/Back: Soft and in the right lower quadrant there is a horizontal surgical scar which is well-healed but the surrounding skin is bright red and slightly indurated in a 6 inch diameter.  Skin: Warm and dry  Musculoskeletal: No acute bony deformity  Neurologic: Awake verbal moving all extremities    Laboratory  CBC shows an elevated white blood cell count of 1713.2 complete metabolic panel is unremarkable lactic acid level is normal at 1.2 urine negative for nitrite leukocyte esterase and blood.    Radiology  US-ABDOMEN LTD (SOFT TISSUE)   Final Result      Complex multiloculated fluid is ill-defined at the surgical site. Findings may represent early abscess formation      DX-CHEST-PORTABLE (1 VIEW)   Final Result      No acute cardiopulmonary findings.        MEDICAL DECISION MAKING and DISPOSITION  In the emergency department an IV was " established and the patient was started on intravenous vancomycin and Unasyn.  She was given oral Tylenol.  I have reviewed the findings above with her surgeon Dr. de jesus who will provide consultation and I reviewed the case with the hospitalist and the patient is admitted for further evaluation and treatment    IMPRESSION  1.  Abdominal wall cellulitis, possible early abscess formation  2.  Wound infection      Electronically signed by: Mannie Oliver, 11/2/2018 3:59 PM

## 2018-11-02 NOTE — ED TRIAGE NOTES
"PT to triage c/o abd pain and swelling in the RLQ.  PT has had several abd surgeries most recently in September, pt has noted redness and swelling to her surgical site since Tuesday.  PT reports a fever of 102.9 last night.    Chief Complaint   Patient presents with   • Post-Op Complications   • Fever     Blood pressure 122/80, pulse (!) 103, temperature 37.6 °C (99.6 °F), temperature source Oral, resp. rate 16, height 1.702 m (5' 7\"), weight 87.2 kg (192 lb 3.9 oz), SpO2 97 %, not currently breastfeeding.      "

## 2018-11-02 NOTE — ED NOTES
Assumed care of pt from waiting room. Pt ambulatory to room with steady gait.  Changed to gown, hooked to monitor- VSS. Sepsis protocol initiated. PIV placed with blood draw and BC draw x 2.  Fall precautions in place. Call bell in reach. Awaiting MD eval/orders. Ongoing monitoring.

## 2018-11-03 LAB
ANION GAP SERPL CALC-SCNC: 9 MMOL/L (ref 0–11.9)
BASOPHILS # BLD AUTO: 0.3 % (ref 0–1.8)
BASOPHILS # BLD: 0.04 K/UL (ref 0–0.12)
BUN SERPL-MCNC: 11 MG/DL (ref 8–22)
CALCIUM SERPL-MCNC: 8.7 MG/DL (ref 8.5–10.5)
CHLORIDE SERPL-SCNC: 107 MMOL/L (ref 96–112)
CO2 SERPL-SCNC: 22 MMOL/L (ref 20–33)
CREAT SERPL-MCNC: 1.06 MG/DL (ref 0.5–1.4)
EOSINOPHIL # BLD AUTO: 0.16 K/UL (ref 0–0.51)
EOSINOPHIL NFR BLD: 1.3 % (ref 0–6.9)
ERYTHROCYTE [DISTWIDTH] IN BLOOD BY AUTOMATED COUNT: 47.6 FL (ref 35.9–50)
GLUCOSE SERPL-MCNC: 111 MG/DL (ref 65–99)
GRAM STN SPEC: NORMAL
HCT VFR BLD AUTO: 37.2 % (ref 37–47)
HGB BLD-MCNC: 12.5 G/DL (ref 12–16)
IMM GRANULOCYTES # BLD AUTO: 0.03 K/UL (ref 0–0.11)
IMM GRANULOCYTES NFR BLD AUTO: 0.3 % (ref 0–0.9)
LACTATE BLD-SCNC: 0.7 MMOL/L (ref 0.5–2)
LACTATE BLD-SCNC: 0.9 MMOL/L (ref 0.5–2)
LYMPHOCYTES # BLD AUTO: 2.11 K/UL (ref 1–4.8)
LYMPHOCYTES NFR BLD: 17.7 % (ref 22–41)
MCH RBC QN AUTO: 29.6 PG (ref 27–33)
MCHC RBC AUTO-ENTMCNC: 33.6 G/DL (ref 33.6–35)
MCV RBC AUTO: 87.9 FL (ref 81.4–97.8)
MONOCYTES # BLD AUTO: 1.15 K/UL (ref 0–0.85)
MONOCYTES NFR BLD AUTO: 9.6 % (ref 0–13.4)
NEUTROPHILS # BLD AUTO: 8.46 K/UL (ref 2–7.15)
NEUTROPHILS NFR BLD: 70.8 % (ref 44–72)
NRBC # BLD AUTO: 0 K/UL
NRBC BLD-RTO: 0 /100 WBC
PLATELET # BLD AUTO: 304 K/UL (ref 164–446)
PMV BLD AUTO: 10.6 FL (ref 9–12.9)
POTASSIUM SERPL-SCNC: 3.4 MMOL/L (ref 3.6–5.5)
RBC # BLD AUTO: 4.23 M/UL (ref 4.2–5.4)
SIGNIFICANT IND 70042: NORMAL
SITE SITE: NORMAL
SODIUM SERPL-SCNC: 138 MMOL/L (ref 135–145)
SOURCE SOURCE: NORMAL
WBC # BLD AUTO: 12 K/UL (ref 4.8–10.8)

## 2018-11-03 PROCEDURE — 160039 HCHG SURGERY MINUTES - EA ADDL 1 MIN LEVEL 3: Performed by: SURGERY

## 2018-11-03 PROCEDURE — A9270 NON-COVERED ITEM OR SERVICE: HCPCS | Performed by: INTERNAL MEDICINE

## 2018-11-03 PROCEDURE — 160009 HCHG ANES TIME/MIN: Performed by: SURGERY

## 2018-11-03 PROCEDURE — 160022 HCHG BLOCK: Performed by: SURGERY

## 2018-11-03 PROCEDURE — 700111 HCHG RX REV CODE 636 W/ 250 OVERRIDE (IP)

## 2018-11-03 PROCEDURE — 85025 COMPLETE CBC W/AUTO DIFF WBC: CPT

## 2018-11-03 PROCEDURE — 700111 HCHG RX REV CODE 636 W/ 250 OVERRIDE (IP): Performed by: FAMILY MEDICINE

## 2018-11-03 PROCEDURE — 87206 SMEAR FLUORESCENT/ACID STAI: CPT

## 2018-11-03 PROCEDURE — 99233 SBSQ HOSP IP/OBS HIGH 50: CPT | Performed by: INTERNAL MEDICINE

## 2018-11-03 PROCEDURE — 500452 HCHG DRESSING, WOUND VAC MED.: Performed by: SURGERY

## 2018-11-03 PROCEDURE — 160048 HCHG OR STATISTICAL LEVEL 1-5: Performed by: SURGERY

## 2018-11-03 PROCEDURE — 80048 BASIC METABOLIC PNL TOTAL CA: CPT

## 2018-11-03 PROCEDURE — 87077 CULTURE AEROBIC IDENTIFY: CPT

## 2018-11-03 PROCEDURE — 700102 HCHG RX REV CODE 250 W/ 637 OVERRIDE(OP)

## 2018-11-03 PROCEDURE — 36415 COLL VENOUS BLD VENIPUNCTURE: CPT

## 2018-11-03 PROCEDURE — 160002 HCHG RECOVERY MINUTES (STAT): Performed by: SURGERY

## 2018-11-03 PROCEDURE — 700105 HCHG RX REV CODE 258: Performed by: FAMILY MEDICINE

## 2018-11-03 PROCEDURE — 700102 HCHG RX REV CODE 250 W/ 637 OVERRIDE(OP): Performed by: FAMILY MEDICINE

## 2018-11-03 PROCEDURE — 700101 HCHG RX REV CODE 250

## 2018-11-03 PROCEDURE — 87116 MYCOBACTERIA CULTURE: CPT

## 2018-11-03 PROCEDURE — 700102 HCHG RX REV CODE 250 W/ 637 OVERRIDE(OP): Performed by: INTERNAL MEDICINE

## 2018-11-03 PROCEDURE — 83605 ASSAY OF LACTIC ACID: CPT | Mod: 91

## 2018-11-03 PROCEDURE — A9270 NON-COVERED ITEM OR SERVICE: HCPCS | Performed by: FAMILY MEDICINE

## 2018-11-03 PROCEDURE — A9270 NON-COVERED ITEM OR SERVICE: HCPCS

## 2018-11-03 PROCEDURE — 87070 CULTURE OTHR SPECIMN AEROBIC: CPT

## 2018-11-03 PROCEDURE — 87075 CULTR BACTERIA EXCEPT BLOOD: CPT

## 2018-11-03 PROCEDURE — 87205 SMEAR GRAM STAIN: CPT

## 2018-11-03 PROCEDURE — 160028 HCHG SURGERY MINUTES - 1ST 30 MINS LEVEL 3: Performed by: SURGERY

## 2018-11-03 PROCEDURE — 770020 HCHG ROOM/CARE - TELE (206)

## 2018-11-03 PROCEDURE — 87015 SPECIMEN INFECT AGNT CONCNTJ: CPT

## 2018-11-03 PROCEDURE — 501445 HCHG STAPLER, SKIN DISP: Performed by: SURGERY

## 2018-11-03 PROCEDURE — 160035 HCHG PACU - 1ST 60 MINS PHASE I: Performed by: SURGERY

## 2018-11-03 PROCEDURE — 87186 SC STD MICRODIL/AGAR DIL: CPT

## 2018-11-03 PROCEDURE — 0W9F0ZZ DRAINAGE OF ABDOMINAL WALL, OPEN APPROACH: ICD-10-PCS | Performed by: SURGERY

## 2018-11-03 RX ORDER — MEPERIDINE HYDROCHLORIDE 25 MG/ML
12.5 INJECTION INTRAMUSCULAR; INTRAVENOUS; SUBCUTANEOUS
Status: DISCONTINUED | OUTPATIENT
Start: 2018-11-03 | End: 2018-11-03 | Stop reason: HOSPADM

## 2018-11-03 RX ORDER — HYDROMORPHONE HYDROCHLORIDE 2 MG/ML
INJECTION, SOLUTION INTRAMUSCULAR; INTRAVENOUS; SUBCUTANEOUS
Status: COMPLETED
Start: 2018-11-03 | End: 2018-11-03

## 2018-11-03 RX ORDER — GUAIFENESIN 600 MG/1
1200 TABLET, EXTENDED RELEASE ORAL DAILY
Status: DISCONTINUED | OUTPATIENT
Start: 2018-11-03 | End: 2018-11-03

## 2018-11-03 RX ORDER — KETOROLAC TROMETHAMINE 30 MG/ML
15 INJECTION, SOLUTION INTRAMUSCULAR; INTRAVENOUS ONCE
Status: DISCONTINUED | OUTPATIENT
Start: 2018-11-03 | End: 2018-11-03 | Stop reason: HOSPADM

## 2018-11-03 RX ORDER — OXYCODONE HYDROCHLORIDE 5 MG/1
5 TABLET ORAL
Status: DISCONTINUED | OUTPATIENT
Start: 2018-11-03 | End: 2018-11-03 | Stop reason: HOSPADM

## 2018-11-03 RX ORDER — ESTRADIOL 0.05 MG/D
1 PATCH, EXTENDED RELEASE TRANSDERMAL
Status: DISCONTINUED | OUTPATIENT
Start: 2018-11-03 | End: 2018-11-06 | Stop reason: HOSPADM

## 2018-11-03 RX ORDER — GUAIFENESIN 600 MG/1
600 TABLET, EXTENDED RELEASE ORAL 2 TIMES DAILY
Status: DISCONTINUED | OUTPATIENT
Start: 2018-11-04 | End: 2018-11-06 | Stop reason: HOSPADM

## 2018-11-03 RX ORDER — HYDROMORPHONE HYDROCHLORIDE 2 MG/ML
0.1 INJECTION, SOLUTION INTRAMUSCULAR; INTRAVENOUS; SUBCUTANEOUS
Status: DISCONTINUED | OUTPATIENT
Start: 2018-11-03 | End: 2018-11-03 | Stop reason: HOSPADM

## 2018-11-03 RX ORDER — OXYCODONE HCL 5 MG/5 ML
5 SOLUTION, ORAL ORAL
Status: COMPLETED | OUTPATIENT
Start: 2018-11-03 | End: 2018-11-03

## 2018-11-03 RX ORDER — OXYCODONE HYDROCHLORIDE 5 MG/1
10 TABLET ORAL
Status: DISCONTINUED | OUTPATIENT
Start: 2018-11-03 | End: 2018-11-03 | Stop reason: HOSPADM

## 2018-11-03 RX ORDER — OXYCODONE HCL 5 MG/5 ML
10 SOLUTION, ORAL ORAL
Status: COMPLETED | OUTPATIENT
Start: 2018-11-03 | End: 2018-11-03

## 2018-11-03 RX ORDER — ONDANSETRON 2 MG/ML
4 INJECTION INTRAMUSCULAR; INTRAVENOUS
Status: DISCONTINUED | OUTPATIENT
Start: 2018-11-03 | End: 2018-11-03 | Stop reason: HOSPADM

## 2018-11-03 RX ORDER — SODIUM CHLORIDE, SODIUM LACTATE, POTASSIUM CHLORIDE, CALCIUM CHLORIDE 600; 310; 30; 20 MG/100ML; MG/100ML; MG/100ML; MG/100ML
INJECTION, SOLUTION INTRAVENOUS CONTINUOUS
Status: DISCONTINUED | OUTPATIENT
Start: 2018-11-03 | End: 2018-11-03 | Stop reason: HOSPADM

## 2018-11-03 RX ORDER — HALOPERIDOL 5 MG/ML
1 INJECTION INTRAMUSCULAR
Status: DISCONTINUED | OUTPATIENT
Start: 2018-11-03 | End: 2018-11-03 | Stop reason: HOSPADM

## 2018-11-03 RX ORDER — HYDROMORPHONE HYDROCHLORIDE 2 MG/ML
0.2 INJECTION, SOLUTION INTRAMUSCULAR; INTRAVENOUS; SUBCUTANEOUS
Status: DISCONTINUED | OUTPATIENT
Start: 2018-11-03 | End: 2018-11-03 | Stop reason: HOSPADM

## 2018-11-03 RX ORDER — OXYCODONE HCL 5 MG/5 ML
SOLUTION, ORAL ORAL
Status: COMPLETED
Start: 2018-11-03 | End: 2018-11-03

## 2018-11-03 RX ORDER — HYDROMORPHONE HYDROCHLORIDE 2 MG/ML
0.4 INJECTION, SOLUTION INTRAMUSCULAR; INTRAVENOUS; SUBCUTANEOUS
Status: DISCONTINUED | OUTPATIENT
Start: 2018-11-03 | End: 2018-11-03 | Stop reason: HOSPADM

## 2018-11-03 RX ORDER — IPRATROPIUM BROMIDE AND ALBUTEROL SULFATE 2.5; .5 MG/3ML; MG/3ML
3 SOLUTION RESPIRATORY (INHALATION)
Status: DISCONTINUED | OUTPATIENT
Start: 2018-11-03 | End: 2018-11-03 | Stop reason: HOSPADM

## 2018-11-03 RX ADMIN — AMPICILLIN SODIUM AND SULBACTAM SODIUM 3 G: 2; 1 INJECTION, POWDER, FOR SOLUTION INTRAMUSCULAR; INTRAVENOUS at 20:30

## 2018-11-03 RX ADMIN — OXYCODONE HYDROCHLORIDE 5 MG: 5 TABLET ORAL at 16:34

## 2018-11-03 RX ADMIN — OXYCODONE HYDROCHLORIDE 10 MG: 5 SOLUTION ORAL at 14:44

## 2018-11-03 RX ADMIN — GUAIFENESIN 600 MG: 600 TABLET, EXTENDED RELEASE ORAL at 16:35

## 2018-11-03 RX ADMIN — SODIUM CHLORIDE: 9 INJECTION, SOLUTION INTRAVENOUS at 08:14

## 2018-11-03 RX ADMIN — Medication 10 MG: at 14:44

## 2018-11-03 RX ADMIN — HYDROMORPHONE HYDROCHLORIDE 0.4 MG: 2 INJECTION, SOLUTION INTRAMUSCULAR; INTRAVENOUS; SUBCUTANEOUS at 14:51

## 2018-11-03 RX ADMIN — HYDROMORPHONE HYDROCHLORIDE 0.5 MG: 1 INJECTION, SOLUTION INTRAMUSCULAR; INTRAVENOUS; SUBCUTANEOUS at 02:13

## 2018-11-03 RX ADMIN — AMPICILLIN SODIUM AND SULBACTAM SODIUM 3 G: 2; 1 INJECTION, POWDER, FOR SOLUTION INTRAMUSCULAR; INTRAVENOUS at 08:11

## 2018-11-03 RX ADMIN — AMPICILLIN SODIUM AND SULBACTAM SODIUM 3 G: 2; 1 INJECTION, POWDER, FOR SOLUTION INTRAMUSCULAR; INTRAVENOUS at 02:12

## 2018-11-03 RX ADMIN — AMPICILLIN SODIUM AND SULBACTAM SODIUM 3 G: 2; 1 INJECTION, POWDER, FOR SOLUTION INTRAMUSCULAR; INTRAVENOUS at 16:34

## 2018-11-03 RX ADMIN — OXYCODONE HYDROCHLORIDE 10 MG: 10 TABLET ORAL at 22:55

## 2018-11-03 RX ADMIN — HYDROMORPHONE HYDROCHLORIDE 0.4 MG: 2 INJECTION, SOLUTION INTRAMUSCULAR; INTRAVENOUS; SUBCUTANEOUS at 14:44

## 2018-11-03 RX ADMIN — OXYCODONE HYDROCHLORIDE 5 MG: 5 TABLET ORAL at 19:39

## 2018-11-03 RX ADMIN — ESTRADIOL 1 PATCH: 0.05 PATCH, EXTENDED RELEASE TRANSDERMAL at 10:34

## 2018-11-03 RX ADMIN — VANCOMYCIN HYDROCHLORIDE 1400 MG: 100 INJECTION, POWDER, LYOPHILIZED, FOR SOLUTION INTRAVENOUS at 17:53

## 2018-11-03 RX ADMIN — HYDROMORPHONE HYDROCHLORIDE 0.4 MG: 2 INJECTION INTRAMUSCULAR; INTRAVENOUS; SUBCUTANEOUS at 14:44

## 2018-11-03 RX ADMIN — ONDANSETRON 4 MG: 2 INJECTION INTRAMUSCULAR; INTRAVENOUS at 02:13

## 2018-11-03 ASSESSMENT — PATIENT HEALTH QUESTIONNAIRE - PHQ9
1. LITTLE INTEREST OR PLEASURE IN DOING THINGS: NOT AT ALL
1. LITTLE INTEREST OR PLEASURE IN DOING THINGS: NOT AT ALL
SUM OF ALL RESPONSES TO PHQ9 QUESTIONS 1 AND 2: 0
2. FEELING DOWN, DEPRESSED, IRRITABLE, OR HOPELESS: NOT AT ALL
2. FEELING DOWN, DEPRESSED, IRRITABLE, OR HOPELESS: NOT AT ALL
SUM OF ALL RESPONSES TO PHQ9 QUESTIONS 1 AND 2: 0

## 2018-11-03 ASSESSMENT — PAIN SCALES - GENERAL
PAINLEVEL_OUTOF10: 8
PAINLEVEL_OUTOF10: 7
PAINLEVEL_OUTOF10: 7
PAINLEVEL_OUTOF10: 4
PAINLEVEL_OUTOF10: 4
PAINLEVEL_OUTOF10: 8
PAINLEVEL_OUTOF10: 7
PAINLEVEL_OUTOF10: 7
PAINLEVEL_OUTOF10: 6
PAINLEVEL_OUTOF10: 5
PAINLEVEL_OUTOF10: 5
PAINLEVEL_OUTOF10: 7

## 2018-11-03 ASSESSMENT — ENCOUNTER SYMPTOMS
PSYCHIATRIC NEGATIVE: 1
MUSCULOSKELETAL NEGATIVE: 1
CARDIOVASCULAR NEGATIVE: 1
RESPIRATORY NEGATIVE: 1
FEVER: 1
ABDOMINAL PAIN: 1
EYES NEGATIVE: 1
CHILLS: 1
NEUROLOGICAL NEGATIVE: 1

## 2018-11-03 NOTE — CONSULTS
General Surgery Consult    CHIEF COMPLAINT: Wound erythema.     HISTORY OF PRESENT ILLNESS: The patient is a 48 y.o. female, who presents with redness and pain in a right lower quadrant wound.  The patient recently underwent wound exploration in the right lower quadrant with a sterile fluid collection.  Unfortunately she recently developed redness swelling and tenderness in the same region.  She presented to the emergency department where she underwent a workup that revealed a fluid collection beneath the right lower quadrant incision.  She also had an elevated white blood cell count.  General surgery was consulted for evaluation and management.  Her primary surgeon is Dr. Mendoza.  The patient describes exacerbating factors as movement and pressure.  There are no relieving factors to her symptoms.     PAST MEDICAL HISTORY:  has a past medical history of Breath shortness (04/25/2018); Heart attack (HCC) (11/03/2017); Heart burn (04/25/2018); High cholesterol; Pain (04/2018); Pain (04/25/2018); Renal disorder; Renal disorder (04/25/2018); Snoring (04/25/2018); and Urinary incontinence.     PAST SURGICAL HISTORY:  has a past surgical history that includes abdominal exploration; cystoscopy (Right, 9/17/2017); ureteroscopy (Right, 9/17/2017); lasertripsy (Right, 9/17/2017); stent placement (Right, 9/17/2017); ureteroscopy (Right, 11/7/2017); lasertripsy (Right, 11/7/2017); urethral dilatation (Right, 11/7/2017); cystoscopy stent placement (Right, 11/7/2017); cystoscopy (N/A, 1/31/2018); retrogrades (Right, 1/31/2018); pyelogram (Right, 1/31/2018); ureteroscopy (Right, 1/31/2018); lasertripsy (Right, 1/31/2018); stent placement (Right, 1/31/2018); cystoscopy stent placement (Right, 3/21/2018); ureteroscopy (Right, 3/21/2018); retrogrades (Right, 3/21/2018); pyelogram (Right, 3/21/2018); tonsillectomy; abdominal hysterectomy total (2009); primary c section; other; other; other abdominal surgery (2009); other abdominal  "surgery (2009); exploratory laparotomy (Right, 4/30/2018); lysis adhesions general (4/30/2018); and exploratory laparotomy (9/26/2018).     ALLERGIES:   Allergies   Allergen Reactions   • Levofloxacin Hives, Shortness of Breath and Itching     Itching, pruritis     • Morphine Itching               CURRENT MEDICATIONS:   Home Medications     Reviewed by Rich Jefferson (Pharmacy Tech) on 11/02/18 at 1634  Med List Status: Complete   Medication Last Dose Status   acetaminophen (TYLENOL) 500 MG Tab 11/2/2018 Active   cefdinir (OMNICEF) 300 MG Cap 10/6/2018 Active   estradiol (MINIVELLE) 0.05 MG/24HR PATCH BIWEEKLY 10/29/2018 Active   fluconazole (DIFLUCAN) 150 MG tablet 9/26/2018 Active   linezolid (ZYVOX) 600 MG Tab 10/4/2018 Active   Probiotic Product (PROBIOTIC PO) 11/2/2018 Active   Pseudoephedrine-Guaifenesin (MUCINEX D MAX STRENGTH PO) 11/2/2018 Active                FAMILY HISTORY:   Family History   Problem Relation Age of Onset   • Cancer Mother    • Cancer Father    • Heart Disease Brother    • Diabetes Maternal Grandmother    • Cancer Maternal Grandfather    • Cancer Paternal Grandmother    • Cancer Paternal Grandfather         SOCIAL HISTORY:   Social History     Social History Main Topics   • Smoking status: Former Smoker     Packs/day: 0.25     Years: 10.00     Types: Cigarettes     Quit date: 4/13/2013   • Smokeless tobacco: Never Used   • Alcohol use Yes      Comment: occ   • Drug use: No   • Sexual activity: Not on file       REVIEW OF SYSTEMS: Comprehensive review of systems was negative aside from the above HPI.     PHYSICAL EXAMINATION:     GENERAL: The patient is in no acute distress.   VITAL SIGNS: Blood pressure 101/58, pulse 71, temperature 36.8 °C (98.3 °F), resp. rate 20, height 1.702 m (5' 7\"), weight 87.1 kg (192 lb), SpO2 97 %, not currently breastfeeding.  HEAD AND NECK: Demonstrates symmetric, reactive pupils. Extraocular muscles   are intact. Nares and oropharynx are clear.   NECK: " Supple. No adenopathy.  CHEST:No respiratory distress.    CARDIOVASCULAR: Regular rate. The extremities are well perfused.   ABDOMEN: Right lower quadrant well-healed incision.  There is significant erythema and fluctuance in the right lower quadrant.  It is tender without peritoneal signs.   EXTREMITIES: Examination of the upper and lower extremities demonstrates no cyanosis edema or clubbing.  NEUROLOGIC: Alert & oriented x 3, Normal motor function, Normal sensory function, No focal deficits noted.    LABORATORY VALUES:   Recent Labs      11/02/18   1310  11/03/18   0016   WBC  17.1*  12.0*   RBC  4.78  4.23   HEMOGLOBIN  14.2  12.5   HEMATOCRIT  41.6  37.2   MCV  87.0  87.9   MCH  29.7  29.6   MCHC  34.1  33.6   RDW  47.3  47.6   PLATELETCT  407  304   MPV  10.9  10.6     Recent Labs      11/02/18   1310  11/03/18   0016   SODIUM  133*  138   POTASSIUM  3.9  3.4*   CHLORIDE  102  107   CO2  22  22   GLUCOSE  108*  111*   BUN  13  11   CREATININE  1.08  1.06   CALCIUM  9.4  8.7     Recent Labs      11/02/18   1310   ASTSGOT  12   ALTSGPT  9   TBILIRUBIN  0.5   ALKPHOSPHAT  78   GLOBULIN  2.9            IMAGING:   US-ABDOMEN LTD (SOFT TISSUE)   Final Result      Complex multiloculated fluid is ill-defined at the surgical site. Findings may represent early abscess formation      DX-CHEST-PORTABLE (1 VIEW)   Final Result      No acute cardiopulmonary findings.          IMPRESSION AND PLAN:     1.  Postoperative wound infection.    1.  The patient will be taken to the operating room for an incision and drainage of a postoperative wound infection with possible wound VAC placement. The surgical conduct was explained. Potential complications including but not limited to infection, bleeding, damage to adjacent structures, anesthetic complications were discussed in detail. Questions were elicited and answered to her satisfaction. She understands the rationale for surgery and elects to proceed.  Operative consent  signed.  _      ___________________________________   Rogerio Crowder M.D.    DD: 11/3/2018 DT: 1:07 PM

## 2018-11-03 NOTE — PROGRESS NOTES
Report received from day shift RN.  Patient resting in bed with  at bedside.  Patient A & O x 4.  No apparent signs of distress.  Safety precautions in place.  Patient educated to call for assistance.  Will continue to monitor.

## 2018-11-03 NOTE — ASSESSMENT & PLAN NOTE
- recent I&D of abdominal wall seroma  - U/S abdomnial wall illustrates complex multiloculated fluid collection suggestive of early abscess formation  - d/c'ed IV vancomycin and Unasyn  - started IV Ancef as wound cx grew MSSA; ID consulted to provide abx recs  - Surg consulted and performed I&D and placed wound vac (11/3)  - cont prn analgesics

## 2018-11-03 NOTE — CARE PLAN
Problem: Safety  Goal: Will remain free from falls  Pt educated on safety precautions, utilization of the call light, and bed alarm.  Pt verbalized understanding.    Problem: Pain Management  Goal: Pain level will decrease to patient's comfort goal  Pt's pain will remain at a comfortable level.  Pt's medication will be given PRN, as well as non-pharmacological pain management.

## 2018-11-03 NOTE — PROGRESS NOTES
Bedside report received. Patient resting comfortably in bed, no complaints at this time. 2 RN admission skin check completed with NOC RN. Call light within reach.

## 2018-11-03 NOTE — PROGRESS NOTES
[facility] Medicine Daily Progress Note    Date of Service  11/3/2018    Chief Complaint  48 y.o. female admitted 11/2/2018 with abdominal wall redness, swelling, and pain    Hospital Course    49yo F with PMHx of recent I&D of abdominal wall seroma was admitted for severe sepsis due to abdominal wall infection at surgical site.  Patient was started on IVFs and blood cultures were collected.  Patient was started on IV Vancomycin/Unasyn.  U/S of abdominal wall/surgical site was completed and revealed complex multiloculated fluid collection suggestive of early abscess formation.  Surgery was consulted and recommendations are currently pending.       Interval Problem Update  Pt reports feeling chills and subjective fever.      Consultants/Specialty  Gen Surgery    Code Status  FULL    Disposition  Home when medically stable    Review of Systems  Review of Systems   Constitutional: Positive for chills and fever.   HENT: Negative.    Eyes: Negative.    Respiratory: Negative.    Cardiovascular: Negative.    Gastrointestinal: Positive for abdominal pain (abd wall).   Musculoskeletal: Negative.    Skin: Negative.    Neurological: Negative.    Psychiatric/Behavioral: Negative.         Physical Exam  Temp:  [36.3 °C (97.3 °F)-38.4 °C (101.1 °F)] 36.5 °C (97.7 °F)  Pulse:  [] 76  Resp:  [16-20] 20  BP: ()/(52-80) 98/60    Physical Exam   Constitutional: She is oriented to person, place, and time. She appears well-nourished. No distress.   HENT:   Head: Normocephalic and atraumatic.   Mouth/Throat: No oropharyngeal exudate.   Eyes: Pupils are equal, round, and reactive to light. Conjunctivae and EOM are normal. No scleral icterus.   Neck: Normal range of motion. Neck supple.   Cardiovascular: Exam reveals no gallop and no friction rub.    No murmur heard.  Pulmonary/Chest: Effort normal and breath sounds normal. No respiratory distress.   Abdominal: Soft. Bowel sounds are normal. She exhibits no distension. There is  tenderness (RLQ abdominal wall). There is no rebound and no guarding.   Musculoskeletal: She exhibits tenderness (RLQ abdominal wall). She exhibits no edema or deformity.   Neurological: She is alert and oriented to person, place, and time. She has normal reflexes.   Skin: Skin is warm and dry. There is erythema (RLQ abdominal wall).   Psychiatric: She has a normal mood and affect. Her behavior is normal.   Nursing note and vitals reviewed.      Fluids    Intake/Output Summary (Last 24 hours) at 11/03/18 1033  Last data filed at 11/03/18 0600   Gross per 24 hour   Intake             1470 ml   Output                0 ml   Net             1470 ml       Laboratory  Recent Labs      11/02/18   1310  11/03/18   0016   WBC  17.1*  12.0*   RBC  4.78  4.23   HEMOGLOBIN  14.2  12.5   HEMATOCRIT  41.6  37.2   MCV  87.0  87.9   MCH  29.7  29.6   MCHC  34.1  33.6   RDW  47.3  47.6   PLATELETCT  407  304   MPV  10.9  10.6     Recent Labs      11/02/18   1310  11/03/18   0016   SODIUM  133*  138   POTASSIUM  3.9  3.4*   CHLORIDE  102  107   CO2  22  22   GLUCOSE  108*  111*   BUN  13  11   CREATININE  1.08  1.06   CALCIUM  9.4  8.7                   Imaging  US-ABDOMEN LTD (SOFT TISSUE)   Final Result      Complex multiloculated fluid is ill-defined at the surgical site. Findings may represent early abscess formation      DX-CHEST-PORTABLE (1 VIEW)   Final Result      No acute cardiopulmonary findings.           Assessment/Plan  * Sepsis (HCC)- (present on admission)   Assessment & Plan    This is severe sepsis with the following associated acute organ dysfunction(s): acute kidney failure.   Source - likely abdominal wall infection/fluid collection  - cont IVFs and IV Vanc/Unasyn  - leukocytosis improving; other SIRS markers stable  - cont monitoring        Postoperative infection- (present on admission)   Assessment & Plan    - recent I&D of abdominal wall seroma  - U/S abdomnial wall illustrates complex multiloculated fluid  collection suggestive of early abscess formation  - cont IV vancomycin and Unasyn  - Surg consulted and awaiting recs        Hyponatremia- (present on admission)   Assessment & Plan    - resolved with IVFs        Acute kidney injury (HCC)- (present on admission)   Assessment & Plan    - resolved with IVFs  - UA unremarkable             VTE prophylaxis: Lovenox

## 2018-11-03 NOTE — PROGRESS NOTES
Spoke to Ross in Pharmacy about Pt's Unasyn dose at 1844 never infusing.  When I went to hang pt's midnight dose of Unasyn, I noticed the 1844 dose never infused.  The primary NS was infusing the whole time at 100 mL/hr instead.  After I got her primary and secondary tubing fixed, I set up her Unasyn as scheduled to run at 200 mL/hr.  Because the pt only had a 22 G IV near her hand, when the antibiotic started infusing at 200 mL/hr, it started infiltrating, and the Unasyn had to be stopped.  Ross in Pharmacy said that after we get a new IV, to start infusing the Unasyn I pulled at midnight, and to throw the Unasyn that partially infused away.  He also said to send a message to pharmacy to reschedule the Unasyn after we had the first bag running.

## 2018-11-03 NOTE — OR NURSING
Patient A+Ox4. States pain now 4/10 and tolerable.  Rt abd with wound vac placement on and functioning.  VSS. ARGUETA well.  Plan to transfer back to tele.  Angela FOURNIER updated with patient plan.  Family at home per patient

## 2018-11-03 NOTE — H&P
Hospital Medicine History & Physical Note    Date of Service  11/2/2018    Primary Care Physician  ANDREA Valdez    Consultants  Surgery - ell    Code Status  Full    Chief Complaint  Abdominal wall redness and swelling and pain    History of Presenting Illness  48 y.o. female who presented 11/2/2018 with abdominal wall redness, swelling and pain which developed over the past several days.  She has also noted some fever and chills, and nausea vomiting and diarrhea.  The diarrhea resolved 4 days ago.  Patient about a month ago had abdominal wall seroma drained.  She states she has a mesh in place in that area.  Surgery was consulted in the case and patient will be seen in the morning.    Review of Systems  Review of Systems   Constitutional: Positive for chills and fever. Negative for malaise/fatigue.   HENT: Negative for hearing loss and sore throat.    Eyes: Negative for blurred vision.   Respiratory: Negative for cough, shortness of breath and wheezing.    Cardiovascular: Negative for chest pain and leg swelling.   Gastrointestinal: Positive for abdominal pain, nausea and vomiting. Negative for diarrhea and heartburn.   Genitourinary: Negative for dysuria.   Musculoskeletal: Negative for back pain and neck pain.   Skin: Negative for rash.   Neurological: Negative for dizziness, weakness and headaches.   Psychiatric/Behavioral: The patient is nervous/anxious.        Past Medical History   has a past medical history of Breath shortness (04/25/2018); Heart attack (HCC) (11/03/2017); Heart burn (04/25/2018); High cholesterol; Pain (04/2018); Pain (04/25/2018); Renal disorder; Renal disorder (04/25/2018); Snoring (04/25/2018); and Urinary incontinence.    Surgical History   has a past surgical history that includes abdominal exploration; cystoscopy (Right, 9/17/2017); ureteroscopy (Right, 9/17/2017); lasertripsy (Right, 9/17/2017); stent placement (Right, 9/17/2017); ureteroscopy (Right, 11/7/2017);  lasertripsy (Right, 11/7/2017); urethral dilatation (Right, 11/7/2017); cystoscopy stent placement (Right, 11/7/2017); cystoscopy (N/A, 1/31/2018); retrogrades (Right, 1/31/2018); pyelogram (Right, 1/31/2018); ureteroscopy (Right, 1/31/2018); lasertripsy (Right, 1/31/2018); stent placement (Right, 1/31/2018); cystoscopy stent placement (Right, 3/21/2018); ureteroscopy (Right, 3/21/2018); retrogrades (Right, 3/21/2018); pyelogram (Right, 3/21/2018); tonsillectomy; abdominal hysterectomy total (2009); primary c section; other; other; other abdominal surgery (2009); other abdominal surgery (2009); exploratory laparotomy (Right, 4/30/2018); lysis adhesions general (4/30/2018); and exploratory laparotomy (9/26/2018).     Family History  family history includes Cancer in her father, maternal grandfather, mother, paternal grandfather, and paternal grandmother; Diabetes in her maternal grandmother; Heart Disease in her brother.     Social History   reports that she quit smoking about 5 years ago. Her smoking use included Cigarettes. She has a 2.50 pack-year smoking history. She has never used smokeless tobacco. She reports that she drinks alcohol. She reports that she does not use drugs.    Allergies  Allergies   Allergen Reactions   • Levofloxacin Hives, Shortness of Breath and Itching     Itching, pruritis     • Morphine Itching              Medications  Prior to Admission Medications   Prescriptions Last Dose Informant Patient Reported? Taking?   Probiotic Product (PROBIOTIC PO) 11/2/2018 at 0800 Patient Yes Yes   Sig: Take 1 Cap by mouth every day.   Pseudoephedrine-Guaifenesin (MUCINEX D MAX STRENGTH PO) 11/2/2018 at 0800 Patient Yes No   Sig: Take 1,200 mg by mouth every day.   acetaminophen (TYLENOL) 500 MG Tab 11/2/2018 at 0400 Patient Yes No   Sig: Take 1,000 mg by mouth every 6 hours as needed for Moderate Pain.   cefdinir (OMNICEF) 300 MG Cap 10/6/2018 at FINISHED Patient Yes No   Sig: Take 300 mg by mouth 2  times a day. Pt started on 9/21/2018 for 10 day course.   estradiol (MINIVELLE) 0.05 MG/24HR PATCH BIWEEKLY 10/29/2018 at 0900 Patient Yes No   Sig: Apply 1 Patch to skin as directed every 7 days. On Mon   fluconazole (DIFLUCAN) 150 MG tablet 9/26/2018 at FINISHED Patient Yes No   Sig: Take 150 mg by mouth Once.   linezolid (ZYVOX) 600 MG Tab 10/4/2018 at FINISHED Patient Yes No   Sig: Take 600 mg by mouth 2 times a day. Pt started on 9/21/2018 for 13 day course.      Facility-Administered Medications: None       Physical Exam  Temp:  [36.3 °C (97.3 °F)-38.4 °C (101.1 °F)] 36.3 °C (97.3 °F)  Pulse:  [] 71  Resp:  [16-20] 18  BP: ()/(58-80) 108/58    Physical Exam   Constitutional: She appears well-developed and well-nourished.   HENT:   Head: Normocephalic and atraumatic.   Eyes: Pupils are equal, round, and reactive to light. Conjunctivae are normal.   Neck: No tracheal deviation present. No thyromegaly present.   Cardiovascular: Normal rate and regular rhythm.    Pulmonary/Chest: Effort normal and breath sounds normal.   Abdominal: Soft. Bowel sounds are normal. She exhibits no distension. There is tenderness. There is no rebound and no guarding.   Erythema and swelling noted at the hypogastric area, area of old incision.   Lymphadenopathy:     She has no cervical adenopathy.   Nursing note and vitals reviewed.      Laboratory:  Recent Labs      11/02/18   1310   WBC  17.1*   RBC  4.78   HEMOGLOBIN  14.2   HEMATOCRIT  41.6   MCV  87.0   MCH  29.7   MCHC  34.1   RDW  47.3   PLATELETCT  407   MPV  10.9     Recent Labs      11/02/18   1310   SODIUM  133*   POTASSIUM  3.9   CHLORIDE  102   CO2  22   GLUCOSE  108*   BUN  13   CREATININE  1.08   CALCIUM  9.4     Recent Labs      11/02/18   1310   ALTSGPT  9   ASTSGOT  12   ALKPHOSPHAT  78   TBILIRUBIN  0.5   GLUCOSE  108*                 No results for input(s): TROPONINI in the last 72 hours.    Urinalysis:    Recent Labs      11/02/18   1340   SPECGRAVITY   1.014   GLUCOSEUR  Negative   KETONES  Negative   NITRITE  Negative   LEUKESTERAS  Negative        Imaging:  US-ABDOMEN LTD (SOFT TISSUE)   Final Result      Complex multiloculated fluid is ill-defined at the surgical site. Findings may represent early abscess formation      DX-CHEST-PORTABLE (1 VIEW)   Final Result      No acute cardiopulmonary findings.            Assessment/Plan:  I anticipate this patient will require at least two midnights for appropriate medical management, necessitating inpatient admission.    Postoperative infection- (present on admission)   Assessment & Plan    IV vancomycin and Unasyn  Surgery consulted and will be seen in the morning        Sepsis (HCC)- (present on admission)   Assessment & Plan    This is severe sepsis with the following associated acute organ dysfunction(s): acute kidney failure.   Source - likely abdominal wall infection        Hyponatremia- (present on admission)   Assessment & Plan    IVF NS, follow BMP        Acute kidney injury (HCC)- (present on admission)   Assessment & Plan    IVF NS, follow BMP  Check UA            VTE prophylaxis: Lovenox

## 2018-11-03 NOTE — OR SURGEON
Post OP Note    PreOp Diagnosis: Postoperative wound infection    PostOp Diagnosis: Same    Procedure(s):  IRRIGATION & DEBRIDEMENT GENERAL ABDOMINAL WALL  Negative pressure dressing placement    Surgeon(s):  Rogerio Crowder M.D.    Anesthesiologist/Type of Anesthesia:  No anesthesia staff entered./* No anesthesia type entered *    Surgical Staff:  Circulator: Demetrio Fields R.N.  Scrub Person: ODIN Hood IV    Specimens removed if any:  Cultures    Estimated Blood Loss: 10 cc    Findings: Purulence in the wound cavity of the most recent incision    Complications: No complications were noted    Indications: Patient is a 48-year-old female status post drainage of an abdominal wall fluid collection 3 weeks ago.  She developed significant erythema and tenderness over the region.  Imaging consistent with abscess.  Patient was counseled extensively as to the risk first benefits of surgery and agreed to proceed fully informed.    Description:    The patient was prepped and draped in the standard sterile surgical fashion after induction of general anesthesia.  Appropriate timeout was performed and antibiotics delivered.    An incision was made over the premarked incision.  This was carried down to a purulent wound cavity.  I sent off cultures.  I drain the purulence.  I took this down to the anterior abdominal wall.  The infection appeared to be superficial.  I copiously irrigated the wound.  I assured hemostasis was achieved.  I placed the wound VAC sponge and applied the device.  It held suction.    The patient was then x-rayed, to recovery in satisfactory condition.    A tap block was applied by anesthesia prior to the surgery.        11/3/2018 2:20 PM Rogerio Crowder M.D.

## 2018-11-03 NOTE — ASSESSMENT & PLAN NOTE
This is severe sepsis with the following associated acute organ dysfunction(s): acute kidney failure.   Source - likely abdominal wall infection/fluid collection  - cont IVFs and IV Ancef as wound cx growing MSSA  - ID consulted for abx recs  - leukocytosis resolved; other SIRS markers stable  - cont monitoring

## 2018-11-03 NOTE — PROGRESS NOTES
"Pharmacy Kinetics 48 y.o. female on vancomycin day # 1 2018    Currently on Vancomycin 2200 mg iv once (25 mg/kg load)    Indication for Treatment: SSTI with possible abscess formation    Pertinent history per medical record: Admitted on 2018 for abdominal wall erythema, swelling and increased pain.   Patient had a recent procedure a month prior for an abdominal wall evacuation with mesh placement.  Abdominal ultrasound showed possible early abscess formation.  Broad spectrum antibiotics were initiated and surgery was consulted.    Other antibiotics: Unasyn 3g IV q6h    Allergies: Levofloxacin and Morphine     List concerns for renal function : BMI (31), Elevated SCr from baseline    Pertinent cultures to date:    - peripheral blood - in process   - urine - in process    Recent Labs      18   1310   WBC  17.1*   NEUTSPOLYS  74.30*     Recent Labs      18   1310   BUN  13   CREATININE  1.08   ALBUMIN  4.2     No results for input(s): VANCOTROUGH, VANCOPEAK, VANCORANDOM in the last 72 hours.No intake or output data in the 24 hours ending 18 1747   Blood pressure 122/80, pulse 87, temperature (!) 38.4 °C (101.1 °F), resp. rate 16, height 1.702 m (5' 7\"), weight 87.2 kg (192 lb 3.9 oz), SpO2 99 %, not currently breastfeeding. Temp (24hrs), Av °C (100.4 °F), Min:37.6 °C (99.6 °F), Max:38.4 °C (101.1 °F)      A/P   1. Vancomycin dose change: Vancomycin 1400 mg (15 mg/kg) IV q24h  2. Next vancomycin level: ~ 2 days (not ordered)  3. Goal trough: 12-16 mcg/mL  4. Comments: Reviewed previous vancomycin regimen of 15 mg/kg q12h which resulted in a supratherapeutic trough of 39.9 mcg/mL.  The subsequent vancomycin random level, about 32 hours after the last administered dose was 3.5 mcg/mL.  Based on previous trends, pt unlikely to tolerate q12h dosing.  Will schedule q24 hour dosing at this time.  Recommend I&D of the abscess and obtaining cultures to help guide antibiotic therapy.  " Pharmacy will continue to monitor.    Melody Hawk, PharmD., BCPS

## 2018-11-03 NOTE — PROGRESS NOTES
2 RN Skin Check:    Pt has dry/cracked heels.  Abdominal scaring and horizontal incision about 5 cm in length with erythema and edema.  Otherwise skin is clean, dry, and intact.

## 2018-11-04 ENCOUNTER — HOME HEALTH ADMISSION (OUTPATIENT)
Dept: HOME HEALTH SERVICES | Facility: HOME HEALTHCARE | Age: 48
End: 2018-11-04
Payer: COMMERCIAL

## 2018-11-04 LAB
ANION GAP SERPL CALC-SCNC: 10 MMOL/L (ref 0–11.9)
BACTERIA UR CULT: NORMAL
BASOPHILS # BLD AUTO: 0.1 % (ref 0–1.8)
BASOPHILS # BLD: 0.01 K/UL (ref 0–0.12)
BUN SERPL-MCNC: 9 MG/DL (ref 8–22)
CALCIUM SERPL-MCNC: 9.2 MG/DL (ref 8.5–10.5)
CHLORIDE SERPL-SCNC: 107 MMOL/L (ref 96–112)
CO2 SERPL-SCNC: 23 MMOL/L (ref 20–33)
CREAT SERPL-MCNC: 0.79 MG/DL (ref 0.5–1.4)
EOSINOPHIL # BLD AUTO: 0 K/UL (ref 0–0.51)
EOSINOPHIL NFR BLD: 0 % (ref 0–6.9)
ERYTHROCYTE [DISTWIDTH] IN BLOOD BY AUTOMATED COUNT: 46.5 FL (ref 35.9–50)
GLUCOSE SERPL-MCNC: 142 MG/DL (ref 65–99)
HCT VFR BLD AUTO: 36.3 % (ref 37–47)
HGB BLD-MCNC: 12.3 G/DL (ref 12–16)
IMM GRANULOCYTES # BLD AUTO: 0.05 K/UL (ref 0–0.11)
IMM GRANULOCYTES NFR BLD AUTO: 0.5 % (ref 0–0.9)
LYMPHOCYTES # BLD AUTO: 0.89 K/UL (ref 1–4.8)
LYMPHOCYTES NFR BLD: 9.4 % (ref 22–41)
MCH RBC QN AUTO: 29.7 PG (ref 27–33)
MCHC RBC AUTO-ENTMCNC: 33.9 G/DL (ref 33.6–35)
MCV RBC AUTO: 87.7 FL (ref 81.4–97.8)
MONOCYTES # BLD AUTO: 0.36 K/UL (ref 0–0.85)
MONOCYTES NFR BLD AUTO: 3.8 % (ref 0–13.4)
NEUTROPHILS # BLD AUTO: 8.15 K/UL (ref 2–7.15)
NEUTROPHILS NFR BLD: 86.2 % (ref 44–72)
NRBC # BLD AUTO: 0 K/UL
NRBC BLD-RTO: 0 /100 WBC
PLATELET # BLD AUTO: 357 K/UL (ref 164–446)
PMV BLD AUTO: 11 FL (ref 9–12.9)
POTASSIUM SERPL-SCNC: 4.1 MMOL/L (ref 3.6–5.5)
RBC # BLD AUTO: 4.14 M/UL (ref 4.2–5.4)
RHODAMINE-AURAMINE STN SPEC: NORMAL
SIGNIFICANT IND 70042: NORMAL
SIGNIFICANT IND 70042: NORMAL
SITE SITE: NORMAL
SITE SITE: NORMAL
SODIUM SERPL-SCNC: 140 MMOL/L (ref 135–145)
SOURCE SOURCE: NORMAL
SOURCE SOURCE: NORMAL
VANCOMYCIN TROUGH SERPL-MCNC: 6 UG/ML (ref 10–20)
WBC # BLD AUTO: 9.5 K/UL (ref 4.8–10.8)

## 2018-11-04 PROCEDURE — 700102 HCHG RX REV CODE 250 W/ 637 OVERRIDE(OP): Performed by: FAMILY MEDICINE

## 2018-11-04 PROCEDURE — 80202 ASSAY OF VANCOMYCIN: CPT

## 2018-11-04 PROCEDURE — 99233 SBSQ HOSP IP/OBS HIGH 50: CPT | Performed by: INTERNAL MEDICINE

## 2018-11-04 PROCEDURE — 700111 HCHG RX REV CODE 636 W/ 250 OVERRIDE (IP): Performed by: INTERNAL MEDICINE

## 2018-11-04 PROCEDURE — 700112 HCHG RX REV CODE 229: Performed by: INTERNAL MEDICINE

## 2018-11-04 PROCEDURE — 85025 COMPLETE CBC W/AUTO DIFF WBC: CPT

## 2018-11-04 PROCEDURE — 80048 BASIC METABOLIC PNL TOTAL CA: CPT

## 2018-11-04 PROCEDURE — A9270 NON-COVERED ITEM OR SERVICE: HCPCS | Performed by: INTERNAL MEDICINE

## 2018-11-04 PROCEDURE — 700111 HCHG RX REV CODE 636 W/ 250 OVERRIDE (IP): Performed by: FAMILY MEDICINE

## 2018-11-04 PROCEDURE — 700102 HCHG RX REV CODE 250 W/ 637 OVERRIDE(OP): Performed by: INTERNAL MEDICINE

## 2018-11-04 PROCEDURE — A9270 NON-COVERED ITEM OR SERVICE: HCPCS | Performed by: FAMILY MEDICINE

## 2018-11-04 PROCEDURE — 700105 HCHG RX REV CODE 258: Performed by: FAMILY MEDICINE

## 2018-11-04 PROCEDURE — 36415 COLL VENOUS BLD VENIPUNCTURE: CPT

## 2018-11-04 PROCEDURE — 770006 HCHG ROOM/CARE - MED/SURG/GYN SEMI*

## 2018-11-04 RX ORDER — CEFAZOLIN SODIUM 2 G/100ML
2 INJECTION, SOLUTION INTRAVENOUS EVERY 8 HOURS
Status: DISCONTINUED | OUTPATIENT
Start: 2018-11-04 | End: 2018-11-06

## 2018-11-04 RX ORDER — DIPHENHYDRAMINE HCL 25 MG
25 TABLET ORAL EVERY 6 HOURS PRN
Status: DISCONTINUED | OUTPATIENT
Start: 2018-11-04 | End: 2018-11-06 | Stop reason: HOSPADM

## 2018-11-04 RX ORDER — DOCUSATE SODIUM 100 MG/1
100 CAPSULE, LIQUID FILLED ORAL 2 TIMES DAILY
Status: DISCONTINUED | OUTPATIENT
Start: 2018-11-04 | End: 2018-11-06 | Stop reason: HOSPADM

## 2018-11-04 RX ORDER — DIPHENHYDRAMINE HCL 25 MG
25 TABLET ORAL EVERY 6 HOURS PRN
Status: DISCONTINUED | OUTPATIENT
Start: 2018-11-04 | End: 2018-11-04

## 2018-11-04 RX ADMIN — AMPICILLIN SODIUM AND SULBACTAM SODIUM 3 G: 2; 1 INJECTION, POWDER, FOR SOLUTION INTRAMUSCULAR; INTRAVENOUS at 07:38

## 2018-11-04 RX ADMIN — OXYCODONE HYDROCHLORIDE 10 MG: 10 TABLET ORAL at 22:06

## 2018-11-04 RX ADMIN — Medication 1 CAPSULE: at 07:38

## 2018-11-04 RX ADMIN — OXYCODONE HYDROCHLORIDE 5 MG: 5 TABLET ORAL at 04:38

## 2018-11-04 RX ADMIN — GUAIFENESIN 600 MG: 600 TABLET, EXTENDED RELEASE ORAL at 17:13

## 2018-11-04 RX ADMIN — CEFAZOLIN SODIUM 2 G: 2 INJECTION, SOLUTION INTRAVENOUS at 18:31

## 2018-11-04 RX ADMIN — DOCUSATE SODIUM 100 MG: 100 CAPSULE, LIQUID FILLED ORAL at 11:44

## 2018-11-04 RX ADMIN — OXYCODONE HYDROCHLORIDE 10 MG: 10 TABLET ORAL at 11:10

## 2018-11-04 RX ADMIN — OXYCODONE HYDROCHLORIDE 10 MG: 10 TABLET ORAL at 17:13

## 2018-11-04 RX ADMIN — SODIUM CHLORIDE: 9 INJECTION, SOLUTION INTRAVENOUS at 09:01

## 2018-11-04 RX ADMIN — GUAIFENESIN 600 MG: 600 TABLET, EXTENDED RELEASE ORAL at 06:32

## 2018-11-04 RX ADMIN — SODIUM CHLORIDE: 9 INJECTION, SOLUTION INTRAVENOUS at 00:25

## 2018-11-04 RX ADMIN — OXYCODONE HYDROCHLORIDE 10 MG: 10 TABLET ORAL at 01:12

## 2018-11-04 RX ADMIN — AMPICILLIN SODIUM AND SULBACTAM SODIUM 3 G: 2; 1 INJECTION, POWDER, FOR SOLUTION INTRAMUSCULAR; INTRAVENOUS at 01:15

## 2018-11-04 RX ADMIN — OXYCODONE HYDROCHLORIDE 10 MG: 10 TABLET ORAL at 07:38

## 2018-11-04 RX ADMIN — AMPICILLIN SODIUM AND SULBACTAM SODIUM 3 G: 2; 1 INJECTION, POWDER, FOR SOLUTION INTRAMUSCULAR; INTRAVENOUS at 13:14

## 2018-11-04 RX ADMIN — ONDANSETRON 4 MG: 2 INJECTION INTRAMUSCULAR; INTRAVENOUS at 17:09

## 2018-11-04 ASSESSMENT — PAIN SCALES - GENERAL
PAINLEVEL_OUTOF10: 7
PAINLEVEL_OUTOF10: 6
PAINLEVEL_OUTOF10: 8
PAINLEVEL_OUTOF10: 0
PAINLEVEL_OUTOF10: 7
PAINLEVEL_OUTOF10: 7
PAINLEVEL_OUTOF10: 8
PAINLEVEL_OUTOF10: 6
PAINLEVEL_OUTOF10: 6
PAINLEVEL_OUTOF10: 8
PAINLEVEL_OUTOF10: 0

## 2018-11-04 ASSESSMENT — ENCOUNTER SYMPTOMS
RESPIRATORY NEGATIVE: 1
ABDOMINAL PAIN: 1
NEUROLOGICAL NEGATIVE: 1
EYES NEGATIVE: 1
CHILLS: 1
MUSCULOSKELETAL NEGATIVE: 1
PSYCHIATRIC NEGATIVE: 1
CARDIOVASCULAR NEGATIVE: 1
FEVER: 1

## 2018-11-04 NOTE — PROGRESS NOTES
RN called to room. Pt crying sitting in shower as she self removed IV. Emotional support provided. Previous IV site covered with gauze and coban. IVF and tubing thrown away. Pt ambulated back to bed.

## 2018-11-04 NOTE — DISCHARGE PLANNING
Anticipated Discharge Disposition: Home    Action: Pt has lives with  and child in [facility].  Pt independent with ADL's and IADL's.  Pt has been admitted several times for infections and sepsis.  She expressed frustration at MD's and lack of communication in outpatient care.  She became tearful and is frustrated because no one can tell her why she keeps getting infections. She is worried about an appointment she has on Tuesday 11/6 at 1015 with MD Rangel (urologist), if she misses it she wouldn't be able to see him until Jan.    Pt chose [facility].  Choice form was faxed to [facility].       Barriers to Discharge: medical clearance, wound vac    Plan: Follow up with wound vac.

## 2018-11-04 NOTE — PROGRESS NOTES
Assumed care from SHANTANU Zamudio. Pt sitting up in bed. C/o of 8/10 abd. Pain, PRN oxycodone given. Bed low and locked. Call light within reach.

## 2018-11-04 NOTE — PROGRESS NOTES
Patient sent to OR for I&D of abdominal wall at site of infection. Transferred back to [facility], wound vac in place and draining. PRN oxycodone given for 5/10 pain at surgical site. Patient A&Ox4, no other complaints at this time.

## 2018-11-04 NOTE — PROGRESS NOTES
Poor pain management overnight per Pt report, encouraged Pt to take 10 mg instead of 5 mg oxycodone, will continue to monitor. ? Need for telemetry, Dr. Mata aware, stated will look through chart and potentially transfer to medical. LIP stated to please call wound RN to see if Pt can shower.

## 2018-11-04 NOTE — WOUND TEAM
TC from wound team member Gina. She received order from Dr Crowder for wound team to change VAC drsg 3x/week. Wound team will start 11/5/18.

## 2018-11-04 NOTE — CARE PLAN
Problem: Safety  Goal: Will remain free from injury  Encourage Pt to call before OOB. Bed low and locked. Call light within reach.    Problem: Pain Management  Goal: Pain level will decrease to patient's comfort goal  Encourage Pt to tell RN when having pain. C/o of abd. Pain, intervened with PRN oxycodone q3h. Will continue to monitor.

## 2018-11-04 NOTE — PROGRESS NOTES
"Pharmacy Kinetics 48 y.o. female on vancomycin day # 3 2018    Currently on Vancomycin 1400 mg IV q24hr    Indication for Treatment: Abdominal wall abscess    Pertinent history per medical record: Admitted on 2018 for abdominal wall redness/pain/swelling. She had an abdominal wall seroma drained ~1 month ago. An abscess was found and she was taken for I&D on 11/3 with wound vac placement..    Other antibiotics: Unasyn    Allergies: Levofloxacin and Morphine     Concerns for renal function: obesity     Pertinent cultures to date:   ·  Blood x2: NGTD  · 11/3 Abscess fluid: Few GPC (stain)      Recent Labs      18   1310  18   0016  18   0153   WBC  17.1*  12.0*  9.5   NEUTSPOLYS  74.30*  70.80  86.20*     Recent Labs      18   1310  18   0016  18   0153   BUN  13  11  9   CREATININE  1.08  1.06  0.79   ALBUMIN  4.2   --    --      No results for input(s): VANCOTROUGH, VANCOPEAK, VANCORANDOM in the last 72 hours.  Intake/Output Summary (Last 24 hours) at 18 1121  Last data filed at 18 1014   Gross per 24 hour   Intake          2883.33 ml   Output                0 ml   Net          2883.33 ml      Blood pressure 115/65, pulse 66, temperature 36.4 °C (97.5 °F), resp. rate 18, height 1.702 m (5' 7\"), weight 96.5 kg (212 lb 11.9 oz), SpO2 97 %, not currently breastfeeding. Temp (24hrs), Av.5 °C (97.7 °F), Min:36.2 °C (97.1 °F), Max:36.9 °C (98.5 °F)      A/P   1. Vancomycin dose: 1400 mg IV q24h (at 1800)  2. Next vancomycin level: Tonight at 1730, before the 3rd dose  3. Goal trough: 12-16 mcg/mL  4. Comments: Pt remains hemodynamically stable and afebrile. Leukocytosis has resolved. UOP not charted but SCr has gone back to baseline. Will check a level tonight before the third dose given improvement in her renal function.     Asha Koch, PharmD, BCPS    "

## 2018-11-04 NOTE — PROGRESS NOTES
Report received from day shift RN.  Patient sitting up in bed with friend at bedside.  Patient A & O x 4.  No apparent signs of distress. Pt is requesting her PRN pain meds, which she can have again a little after 7:30pm to try to stay on top of her pain. Safety precautions in place.  Patient educated to call for assistance.  Will continue to monitor.

## 2018-11-04 NOTE — PROGRESS NOTES
Surgery  POD#1  Anxious about IV that was recently pulled inadvertently.    WBC normalized  Wound vac in place  Will need change tomorrow  Social work consult for freedom vac  Dr. Mendoza returns tomorrow.

## 2018-11-04 NOTE — DISCHARGE PLANNING
Received Choice form at 2163  Agency/Facility Name: [facility]  Referral sent per Choice form @ 3143

## 2018-11-04 NOTE — PROGRESS NOTES
[facility] Medicine Daily Progress Note    Date of Service  11/4/2018    Chief Complaint  48 y.o. female admitted 11/2/2018 with abdominal wall redness, swelling, and pain    Hospital Course    49yo F with PMHx of recent I&D of abdominal wall seroma was admitted for severe sepsis due to abdominal wall infection at surgical site.  Patient was started on IVFs and blood cultures were collected.  Patient was started on IV Vancomycin/Unasyn.  U/S of abdominal wall/surgical site was completed and revealed complex multiloculated fluid collection suggestive of early abscess formation.  Surgery was consulted and performed I&D with wound vac placement on 11/3 with no complications.  Wound cultures from the abscess were sent and growing S. Aureus (sensitivities pending).  Sepsis resolved with treatment and Unasyn was discontinued.       Interval Problem Update  Pt denies any fever or chills.  Anxious.      Consultants/Specialty  Gen Surgery    Code Status  FULL    Disposition  Home when medically stable; arranging outpt wound vac    Review of Systems  Review of Systems   Constitutional: Positive for chills and fever.   HENT: Negative.    Eyes: Negative.    Respiratory: Negative.    Cardiovascular: Negative.    Gastrointestinal: Positive for abdominal pain (abd wall).   Musculoskeletal: Negative.    Skin: Negative.    Neurological: Negative.    Psychiatric/Behavioral: Negative.         Physical Exam  Temp:  [36.2 °C (97.1 °F)-36.9 °C (98.4 °F)] 36.5 °C (97.7 °F)  Pulse:  [61-93] 68  Resp:  [16-20] 16  BP: (100-124)/(58-79) 121/79    Physical Exam   Constitutional: She is oriented to person, place, and time. She appears well-nourished. No distress.   HENT:   Head: Normocephalic and atraumatic.   Mouth/Throat: No oropharyngeal exudate.   Eyes: Pupils are equal, round, and reactive to light. Conjunctivae and EOM are normal. No scleral icterus.   Neck: Normal range of motion. Neck supple.   Cardiovascular: Exam reveals no gallop and  no friction rub.    No murmur heard.  Pulmonary/Chest: Effort normal and breath sounds normal. No respiratory distress.   Abdominal: Soft. Bowel sounds are normal. She exhibits no distension. There is tenderness (RLQ abdominal wall). There is no rebound and no guarding.   Musculoskeletal: She exhibits tenderness (RLQ abdominal wall). She exhibits no edema or deformity.   Neurological: She is alert and oriented to person, place, and time. She has normal reflexes.   Skin: Skin is warm and dry. There is erythema (RLQ abdominal wall).   Psychiatric: She has a normal mood and affect. Her behavior is normal.   Nursing note and vitals reviewed.      Fluids    Intake/Output Summary (Last 24 hours) at 11/04/18 1422  Last data filed at 11/04/18 1344   Gross per 24 hour   Intake          3123.33 ml   Output                0 ml   Net          3123.33 ml       Laboratory  Recent Labs      11/02/18   1310  11/03/18   0016  11/04/18   0153   WBC  17.1*  12.0*  9.5   RBC  4.78  4.23  4.14*   HEMOGLOBIN  14.2  12.5  12.3   HEMATOCRIT  41.6  37.2  36.3*   MCV  87.0  87.9  87.7   MCH  29.7  29.6  29.7   MCHC  34.1  33.6  33.9   RDW  47.3  47.6  46.5   PLATELETCT  407  304  357   MPV  10.9  10.6  11.0     Recent Labs      11/02/18   1310  11/03/18   0016  11/04/18   0153   SODIUM  133*  138  140   POTASSIUM  3.9  3.4*  4.1   CHLORIDE  102  107  107   CO2  22  22  23   GLUCOSE  108*  111*  142*   BUN  13  11  9   CREATININE  1.08  1.06  0.79   CALCIUM  9.4  8.7  9.2                   Imaging  US-ABDOMEN LTD (SOFT TISSUE)   Final Result      Complex multiloculated fluid is ill-defined at the surgical site. Findings may represent early abscess formation      DX-CHEST-PORTABLE (1 VIEW)   Final Result      No acute cardiopulmonary findings.           Assessment/Plan  * Sepsis (HCC)- (present on admission)   Assessment & Plan    This is severe sepsis with the following associated acute organ dysfunction(s): acute kidney failure.   Source -  likely abdominal wall infection/fluid collection  - cont IVFs and IV Vanc as wound cx growing S. aureus  - leukocytosis resolved; other SIRS markers stable  - cont monitoring     Postoperative infection- (present on admission)   Assessment & Plan    - recent I&D of abdominal wall seroma  - U/S abdomnial wall illustrates complex multiloculated fluid collection suggestive of early abscess formation  - cont IV vancomycin; d/c'ed Unasyn  - Surg consulted and performed I&D and placed wound vac (11/3); cx growing S. Aureus (await finalization)  - cont prn analgesics     Hyponatremia- (present on admission)   Assessment & Plan    - resolved with IVFs     Acute kidney injury (HCC)- (present on admission)   Assessment & Plan    - resolved with IVFs  - UA unremarkable          VTE prophylaxis: Lovenox

## 2018-11-04 NOTE — PROGRESS NOTES
Pt requesting stool softeners, Dr. Esperanza mckeon, stated to ordered 100 mg colace PO BID, 1st dose now. OK to d/c tele.

## 2018-11-04 NOTE — PROGRESS NOTES
Pt called RN to room for towels to get washed up with her 's assistance. Pt crying and very upset about her POC and disposition. Emotional support provided. Will continue to monitor.

## 2018-11-04 NOTE — PROGRESS NOTES
"Pharmacy Kinetics 48 y.o. female on vancomycin day # 2 11/3/2018    Currently on Vancomycin 1400 mg IV q24hr    Indication for Treatment: Abdominal wall abscess    Pertinent history per medical record: Admitted on 2018 for abdominal wall redness/pain/swelling. She had an abdominal wall seroma drained ~1 month ago. An abscess was found and she was taken for I&D on 11/3 with wound vac placement.    Other antibiotics: Unasyn    Allergies: Levofloxacin and Morphine     Concerns for renal function: obesity, elevated SCr from baseline    Pertinent cultures to date:   ·  Blood x2: NGTD  · 11/3 Abscess fluid: in process    Recent Labs      18   1310  18   0016   WBC  17.1*  12.0*   NEUTSPOLYS  74.30*  70.80     Recent Labs      18   1310  18   0016   BUN  13  11   CREATININE  1.08  1.06   ALBUMIN  4.2   --        Last data filed at 18 1400   Gross per 24 hour   Intake             1570 ml   Output                0 ml   Net             1570 ml      Blood pressure 102/63, pulse 72, temperature 36.3 °C (97.4 °F), resp. rate 18, height 1.702 m (5' 7\"), weight 87.1 kg (192 lb), SpO2 100 %, not currently breastfeeding. Temp (24hrs), Av.6 °C (97.9 °F), Min:36.3 °C (97.3 °F), Max:36.9 °C (98.5 °F)      A/P   1. Vancomycin dose: Continue 1400 mg IV q24h (changed to 1800 given that pt was in surgery for this afternoon's dose)  2. Next vancomycin level: Will need to check a level before the 3rd or 4th dose (not ordered)  3. Goal trough: 12-16 mcg/mL  4. Comments: Patient is afebrile today with improvement in leukocytosis. I&D was today so will watch culture results to help guide therapy. Her SCr remains elevated from baseline, so will need to watch for improvements with that as well.     Asha Koch, PharmD, BCPS  "

## 2018-11-04 NOTE — FACE TO FACE
Face to Face Note  -  Durable Medical Equipment    Carlos Mata M.D. - NPI: [id number]  I certify that this patient is under my care and that they have had a durable medical equipment(DME)face to face encounter by myself that meets the physician DME face-to-face encounter requirements with this patient on:    Date of encounter:   Patient:                    MRN:                       YOB: 2019  Aurora Tan  [id number]  1970     The encounter with the patient was in whole, or in part, for the following medical condition, which is the primary reason for durable medical equipment:  Post-Op Surgery    I certify that, based on my findings, the following durable medical equipment is medically necessary:  Wound Vac.    HOME O2 Saturation Measurements:(Values must be present for Home Oxygen orders)         ,     ,         My Clinical findings support the need for the above equipment due to:  Wound/Incision    Supporting Symptoms: NONE     ------------------------------------------------------------------------------------------------------------------    Face to Face Supporting Documentation - Home Health    The encounter with this patient was in whole or in part the primary reason for home health admission.    Date of encounter:   Patient:                    MRN:                       YOB: 2019  Aurora Tan  [id number]  1970     Home health to see patient for:  Skilled Nursing care for assessment, interventions & education and Wound Care    Skilled need for:  Surgical Aftercare I&D    Skilled nursing interventions to include:  Wound Care    Homebound evidenced status by:  Require the use of special transportation. Leaving home must require a considerable and taxing effort. There must exist a normal inability to leave the home.    Community Physician to provide follow up care: ANDREA Valdez     Optional Interventions    Wound  information & treatment:    Home Infusion Therapy orders:    Line/Drain/Airway:    I certify the face to face encounter for this home care referral meets the CMS requirements and the encounter/clinical assessment with the patient was, in whole, or in part, for the medical condition(s) listed above, which is the primary reason for home health care. Based on my clinical findings: the service(s) are medically necessary, support the need for home health care, and the homebound criteria are met.  I certify that this patient has had a face to face encounter by myself.  Carlos Mata M.D. - NPI: [id number]    *Debility, frailty and advanced age in the absence of an acute deterioration or exacerbation of a condition do not qualify a patient for home health.

## 2018-11-04 NOTE — CARE PLAN
Problem: Safety  Goal: Will remain free from falls  Pt educated on safety precautions, utilization of the call light, and bed alarm.  Pt verbalized understanding.    Problem: Infection  Goal: Will remain free from infection  Implement standard precautions and perform handwashing before and after patient contact. RN will follow protocols and necessary steps to minimize the spread of infection.  RN educated pt and any visitors on proper hand hygiene.

## 2018-11-05 PROBLEM — F41.9 ANXIETY: Status: ACTIVE | Noted: 2018-11-05

## 2018-11-05 LAB
ANION GAP SERPL CALC-SCNC: 7 MMOL/L (ref 0–11.9)
BACTERIA WND AEROBE CULT: ABNORMAL
BACTERIA WND AEROBE CULT: ABNORMAL
BASOPHILS # BLD AUTO: 0.3 % (ref 0–1.8)
BASOPHILS # BLD: 0.03 K/UL (ref 0–0.12)
BUN SERPL-MCNC: 13 MG/DL (ref 8–22)
CALCIUM SERPL-MCNC: 9.1 MG/DL (ref 8.5–10.5)
CHLORIDE SERPL-SCNC: 109 MMOL/L (ref 96–112)
CO2 SERPL-SCNC: 23 MMOL/L (ref 20–33)
CREAT SERPL-MCNC: 0.94 MG/DL (ref 0.5–1.4)
EOSINOPHIL # BLD AUTO: 0.15 K/UL (ref 0–0.51)
EOSINOPHIL NFR BLD: 1.6 % (ref 0–6.9)
ERYTHROCYTE [DISTWIDTH] IN BLOOD BY AUTOMATED COUNT: 48.7 FL (ref 35.9–50)
GLUCOSE SERPL-MCNC: 100 MG/DL (ref 65–99)
GRAM STN SPEC: ABNORMAL
HCT VFR BLD AUTO: 35.2 % (ref 37–47)
HGB BLD-MCNC: 11.7 G/DL (ref 12–16)
IMM GRANULOCYTES # BLD AUTO: 0.03 K/UL (ref 0–0.11)
IMM GRANULOCYTES NFR BLD AUTO: 0.3 % (ref 0–0.9)
LYMPHOCYTES # BLD AUTO: 2.89 K/UL (ref 1–4.8)
LYMPHOCYTES NFR BLD: 30 % (ref 22–41)
MCH RBC QN AUTO: 29.8 PG (ref 27–33)
MCHC RBC AUTO-ENTMCNC: 33.2 G/DL (ref 33.6–35)
MCV RBC AUTO: 89.6 FL (ref 81.4–97.8)
MONOCYTES # BLD AUTO: 0.57 K/UL (ref 0–0.85)
MONOCYTES NFR BLD AUTO: 5.9 % (ref 0–13.4)
NEUTROPHILS # BLD AUTO: 5.96 K/UL (ref 2–7.15)
NEUTROPHILS NFR BLD: 61.9 % (ref 44–72)
NRBC # BLD AUTO: 0 K/UL
NRBC BLD-RTO: 0 /100 WBC
PLATELET # BLD AUTO: 362 K/UL (ref 164–446)
PMV BLD AUTO: 11 FL (ref 9–12.9)
POTASSIUM SERPL-SCNC: 3.7 MMOL/L (ref 3.6–5.5)
RBC # BLD AUTO: 3.93 M/UL (ref 4.2–5.4)
SIGNIFICANT IND 70042: ABNORMAL
SITE SITE: ABNORMAL
SODIUM SERPL-SCNC: 139 MMOL/L (ref 135–145)
SOURCE SOURCE: ABNORMAL
WBC # BLD AUTO: 9.6 K/UL (ref 4.8–10.8)

## 2018-11-05 PROCEDURE — 80048 BASIC METABOLIC PNL TOTAL CA: CPT

## 2018-11-05 PROCEDURE — A9270 NON-COVERED ITEM OR SERVICE: HCPCS | Performed by: FAMILY MEDICINE

## 2018-11-05 PROCEDURE — 99255 IP/OBS CONSLTJ NEW/EST HI 80: CPT | Performed by: INTERNAL MEDICINE

## 2018-11-05 PROCEDURE — 700112 HCHG RX REV CODE 229: Performed by: INTERNAL MEDICINE

## 2018-11-05 PROCEDURE — 36415 COLL VENOUS BLD VENIPUNCTURE: CPT

## 2018-11-05 PROCEDURE — 770006 HCHG ROOM/CARE - MED/SURG/GYN SEMI*

## 2018-11-05 PROCEDURE — 97605 NEG PRS WND THER DME<=50SQCM: CPT

## 2018-11-05 PROCEDURE — 85025 COMPLETE CBC W/AUTO DIFF WBC: CPT

## 2018-11-05 PROCEDURE — A9270 NON-COVERED ITEM OR SERVICE: HCPCS | Performed by: INTERNAL MEDICINE

## 2018-11-05 PROCEDURE — 700102 HCHG RX REV CODE 250 W/ 637 OVERRIDE(OP): Performed by: FAMILY MEDICINE

## 2018-11-05 PROCEDURE — 306372 DRESSING,VAC SIMPLACE MED: Performed by: SURGERY

## 2018-11-05 PROCEDURE — 99232 SBSQ HOSP IP/OBS MODERATE 35: CPT | Performed by: INTERNAL MEDICINE

## 2018-11-05 PROCEDURE — 97161 PT EVAL LOW COMPLEX 20 MIN: CPT

## 2018-11-05 PROCEDURE — 700111 HCHG RX REV CODE 636 W/ 250 OVERRIDE (IP): Performed by: INTERNAL MEDICINE

## 2018-11-05 PROCEDURE — 700102 HCHG RX REV CODE 250 W/ 637 OVERRIDE(OP): Performed by: INTERNAL MEDICINE

## 2018-11-05 RX ORDER — ALPRAZOLAM 1 MG/1
1 TABLET ORAL 3 TIMES DAILY PRN
Status: DISCONTINUED | OUTPATIENT
Start: 2018-11-05 | End: 2018-11-06 | Stop reason: HOSPADM

## 2018-11-05 RX ADMIN — DOCUSATE SODIUM 100 MG: 100 CAPSULE, LIQUID FILLED ORAL at 06:30

## 2018-11-05 RX ADMIN — CEFAZOLIN SODIUM 2 G: 2 INJECTION, SOLUTION INTRAVENOUS at 03:22

## 2018-11-05 RX ADMIN — DIPHENHYDRAMINE HCL 25 MG: 25 TABLET ORAL at 00:32

## 2018-11-05 RX ADMIN — ALPRAZOLAM 1 MG: 1 TABLET ORAL at 12:33

## 2018-11-05 RX ADMIN — DOCUSATE SODIUM 100 MG: 100 CAPSULE, LIQUID FILLED ORAL at 18:17

## 2018-11-05 RX ADMIN — CEFAZOLIN SODIUM 2 G: 2 INJECTION, SOLUTION INTRAVENOUS at 10:25

## 2018-11-05 RX ADMIN — ALPRAZOLAM 1 MG: 1 TABLET ORAL at 20:13

## 2018-11-05 RX ADMIN — OXYCODONE HYDROCHLORIDE 10 MG: 10 TABLET ORAL at 11:55

## 2018-11-05 RX ADMIN — CEFAZOLIN SODIUM 2 G: 2 INJECTION, SOLUTION INTRAVENOUS at 18:17

## 2018-11-05 RX ADMIN — Medication 1 CAPSULE: at 09:07

## 2018-11-05 RX ADMIN — GUAIFENESIN 600 MG: 600 TABLET, EXTENDED RELEASE ORAL at 18:17

## 2018-11-05 RX ADMIN — GUAIFENESIN 600 MG: 600 TABLET, EXTENDED RELEASE ORAL at 06:29

## 2018-11-05 ASSESSMENT — ENCOUNTER SYMPTOMS
NEUROLOGICAL NEGATIVE: 1
MUSCULOSKELETAL NEGATIVE: 1
CHILLS: 1
ABDOMINAL PAIN: 1
PSYCHIATRIC NEGATIVE: 1
FEVER: 1
RESPIRATORY NEGATIVE: 1
CARDIOVASCULAR NEGATIVE: 1
EYES NEGATIVE: 1

## 2018-11-05 ASSESSMENT — PAIN SCALES - GENERAL
PAINLEVEL_OUTOF10: 4
PAINLEVEL_OUTOF10: 5
PAINLEVEL_OUTOF10: 0
PAINLEVEL_OUTOF10: 6
PAINLEVEL_OUTOF10: 7
PAINLEVEL_OUTOF10: 7

## 2018-11-05 NOTE — DISCHARGE PLANNING
ATTN: Case Management  RE: Referral for Home Health                We would like to take this opportunity to thank you for submitting a referral for your patient to continue care with [facility]. Our skilled team is dedicated to helping all patients recover and gain independence in the home setting.            As of 11/05/18, we have accepted the above patient into our service. A [facility] clinician will be out to see the patient within 48 hours to conduct our initial visit. If you have any questions or concerns regarding the patient’s transition to Home Health, please do not hesitate to contact us. We are open for referrals 7 days a week from 8AM to 5PM at [phone].

## 2018-11-05 NOTE — PROGRESS NOTES
Pt to be transferred to [place] [id number]. RN informed pt of this transfer. Pt is anxious, unhappy, and frustrated.

## 2018-11-05 NOTE — PROGRESS NOTES
AVSS   Wound looks good  No cellulitis   Wound vac in place   Superficial staph infection , no mesh exposed  At I and D   MSSA   Ok home  Antibiotics are optional with adequate drainage    On recent exploration no evidence of infection  And cultures of clear seroma fluid were negative   Mesh is 10 years old  And very well incorporated in viable tissue , should be fine   Time will tell

## 2018-11-05 NOTE — PROGRESS NOTES
Bedside report received. Pt a/o visiting with . No complaints at this time. No tele, pt medical.  bed locked and low positon. Call light within reach.

## 2018-11-05 NOTE — PROGRESS NOTES
Per I.D., pt will not be discharged today. Waiting on medical bed. Pt refusing to be transferred to medical floor on [facility].

## 2018-11-05 NOTE — PROGRESS NOTES
Hospital Medicine Daily Progress Note    Date of Service  11/5/2018    Chief Complaint  48 y.o. female admitted 11/2/2018 with abdominal wall redness, swelling, and pain    Hospital Course    47yo F with PMHx of recent I&D of abdominal wall seroma was admitted for severe sepsis due to abdominal wall infection at surgical site.  Patient was started on IVFs and blood cultures were collected.  Patient was started on IV Vancomycin/Unasyn.  U/S of abdominal wall/surgical site was completed and revealed complex multiloculated fluid collection suggestive of early abscess formation.  Surgery was consulted and performed I&D with wound vac placement on 11/3 with no complications.  Wound cultures from the abscess were sent and growing MSSA.  Sepsis resolved with treatment and Unasyn was discontinued.  Patient was started on Ancef.  ID was consulted to assist with antibiotic management upon discharge.  Home health with wound care/woudn vac to be arranged.       Interval Problem Update  Pt more anxious this morning.  Does not want to be transferred from the unit.      Consultants/Specialty  Gen Surgery  Infectious Disease    Code Status  FULL    Disposition  Home when medically stable; arranging HHC and outpt wound vac    Review of Systems  Review of Systems   Constitutional: Positive for chills and fever.   HENT: Negative.    Eyes: Negative.    Respiratory: Negative.    Cardiovascular: Negative.    Gastrointestinal: Positive for abdominal pain (abd wall).   Musculoskeletal: Negative.    Skin: Negative.    Neurological: Negative.    Psychiatric/Behavioral: Negative.         Physical Exam  Temp:  [36.2 °C (97.1 °F)-36.7 °C (98 °F)] 36.6 °C (97.9 °F)  Pulse:  [62-70] 65  Resp:  [16-18] 16  BP: (105-122)/(64-79) 113/65    Physical Exam   Constitutional: She is oriented to person, place, and time. She appears well-nourished. No distress.   HENT:   Head: Normocephalic and atraumatic.   Mouth/Throat: No oropharyngeal exudate.   Eyes:  Pupils are equal, round, and reactive to light. Conjunctivae and EOM are normal. No scleral icterus.   Neck: Normal range of motion. Neck supple.   Cardiovascular: Exam reveals no gallop and no friction rub.    No murmur heard.  Pulmonary/Chest: Effort normal and breath sounds normal. No respiratory distress.   Abdominal: Soft. Bowel sounds are normal. She exhibits no distension. There is tenderness (RLQ abdominal wall). There is no rebound and no guarding.   Wound vac in place c/d/i   Musculoskeletal: She exhibits tenderness (RLQ abdominal wall). She exhibits no edema or deformity.   Neurological: She is alert and oriented to person, place, and time. She has normal reflexes.   Skin: Skin is warm and dry. There is erythema (RLQ abdominal wall).   Psychiatric: She has a normal mood and affect. Her behavior is normal.   Nursing note and vitals reviewed.      Fluids    Intake/Output Summary (Last 24 hours) at 11/05/18 1223  Last data filed at 11/04/18 1400   Gross per 24 hour   Intake              580 ml   Output                0 ml   Net              580 ml       Laboratory  Recent Labs      11/03/18   0016  11/04/18   0153  11/05/18   0248   WBC  12.0*  9.5  9.6   RBC  4.23  4.14*  3.93*   HEMOGLOBIN  12.5  12.3  11.7*   HEMATOCRIT  37.2  36.3*  35.2*   MCV  87.9  87.7  89.6   MCH  29.6  29.7  29.8   MCHC  33.6  33.9  33.2*   RDW  47.6  46.5  48.7   PLATELETCT  304  357  362   MPV  10.6  11.0  11.0     Recent Labs      11/03/18   0016  11/04/18   0153  11/05/18   0248   SODIUM  138  140  139   POTASSIUM  3.4*  4.1  3.7   CHLORIDE  107  107  109   CO2  22  23  23   GLUCOSE  111*  142*  100*   BUN  11  9  13   CREATININE  1.06  0.79  0.94   CALCIUM  8.7  9.2  9.1                   Imaging  US-ABDOMEN LTD (SOFT TISSUE)   Final Result      Complex multiloculated fluid is ill-defined at the surgical site. Findings may represent early abscess formation      DX-CHEST-PORTABLE (1 VIEW)   Final Result      No acute  cardiopulmonary findings.           Assessment/Plan  * Sepsis (HCC)- (present on admission)   Assessment & Plan    This is severe sepsis with the following associated acute organ dysfunction(s): acute kidney failure.   Source - likely abdominal wall infection/fluid collection  - cont IVFs and IV Ancef as wound cx growing MSSA  - ID consulted for abx recs  - leukocytosis resolved; other SIRS markers stable  - cont monitoring     Postoperative infection- (present on admission)   Assessment & Plan    - recent I&D of abdominal wall seroma  - U/S abdomnial wall illustrates complex multiloculated fluid collection suggestive of early abscess formation  - d/c'ed IV vancomycin and Unasyn  - started IV Ancef as wound cx grew MSSA; ID consulted to provide abx recs  - Surg consulted and performed I&D and placed wound vac (11/3)  - cont prn analgesics     Hyponatremia- (present on admission)   Assessment & Plan    - resolved with IVFs     Acute kidney injury (HCC)- (present on admission)   Assessment & Plan    - resolved with IVFs  - UA unremarkable     Anxiety   Assessment & Plan    - about current health issues  - prn Xanax on board; monitor for sedation          VTE prophylaxis: Lovenox

## 2018-11-05 NOTE — DIETARY
Nutrition Services: Pt seen for poor PO intake per nutrition admit screen.    Pt is a 49 yo F admitted for postoperative infection and on day 3 of admit on a regular diet.    Ht: 170.2 cm  Wt: 96.6 kg   BMI: 33.4    Per ADL documentation pt is consuming % of majority of meals on regular diet. As PO intake is adequate, no need to see for complete nutrition assessment at this time.     RD available prn

## 2018-11-05 NOTE — DISCHARGE PLANNING
Thank you for the referral, we are currently verifying patient's PCP/cosigner as of 11/5/18. We are also requesting orders for wound vac treatment.  Thank you.

## 2018-11-05 NOTE — CONSULTS
INFECTIOUS DISEASES INPATIENT CONSULT NOTE     Date of Service: 11/5/2018    Consult Requested By: Carlos Mata M.D.    Reason for Consultation: Abdominal wall infection    History of Present Illness:   Aurora Tan is a 48 y.o. woman with a history of endometrioma status post resection with mesh placement in 2008, and history of nephrolithiasis admitted 11/2/2018 for increasing lower abdominal pain, edema and erythema.  Patient was recently hospitalized in September for right-sided pyelonephritis.  At that time workup included a renal CT which revealed a fluid collection in the rectus sheath.  There was a concern for possible meth mesh infection versus recurrent endometrioma.  She was evaluated by general surgery and that time underwent drainage of an abdominal wall seroma on 9/26/18.  Fluid collection was noted intraoperatively that appeared to be intra-abdominal in the peritoneal layer.  The fluid was noted to be quite clear with no exudate or granulation tissue.  Per the operative note there was a well incorporated mesh with no signs of infection at that time.  OR cultures were negative however patient had been on antibiotics prior to the procedure.  Patient was subsequently discharged on linezolid and cefdinir to complete a 14-day course.  Patient states she completed the antibiotics on 10/13/18.  One week prior to admission, she had a flulike illness with significant nausea and vomiting.  Her entire family except for her  had similar symptoms.  Her symptoms have since resolved.  On Thursday prior to admission, she developed increasing abdominal pain at the site of her recent surgery.  She rates her pain a 7 out of 10 in severity.  There was associated edema and erythema which progressed the following day.  She denies any associated fevers or chills.  Given increasing abdominal pain associated with increasing erythema, she presented to the ED for further evaluation and management.  On  "presentation, she was febrile with a temperature of 101.1 with a leukocytosis of 17.1.  An abdominal ultrasound revealed a complex multiloculated fluid at the surgical site concerning for abscess formation.  She was evaluated by surgery and is now status post irrigation and debridement of the abdominal wall on 11/3/18.  Appearance in the wound cavity was noted intraoperatively.  Per the operative report the infection appeared to be superficial.  OR cultures are now positive for MSSA.  Patient has been transitioned over to cefazolin.  Blood cultures from admission are currently negative to date.  Infectious disease service consulted for further antibiotic recommendations and management.    Review Of Systems:  All other ROS were reviewed and are negative except as noted above in the HPI.    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;  Service: General   • EXPLORATORY LAPAROTOMY Right 4/30/2018    Procedure: EXPLORATORY LAPAROTOMY W/ILEAL URETER;  Surgeon: Kapil Rangel M.D.;  Location: [facility];  " Service: Urology   • LYSIS ADHESIONS GENERAL  4/30/2018    Procedure: LYSIS ADHESIONS GENERAL;  Surgeon: Kapil Rangel M.D.;  Location: SURGERY [facility];  Service: Urology   • CYSTOSCOPY STENT PLACEMENT Right 3/21/2018    Procedure: CYSTOSCOPY - STENT REMOVAL;  Surgeon: Terry Maldonado M.D.;  Location: SURGERY [facility];  Service: Urology   • URETEROSCOPY Right 3/21/2018    Procedure: URETEROSCOPY- DIAGNOSTIC;  Surgeon: Terry Maldonado M.D.;  Location: SURGERY [facility];  Service: Urology   • RETROGRADES Right 3/21/2018    Procedure: RETROGRADES;  Surgeon: Terry Maldonado M.D.;  Location: SURGERY [facility];  Service: Urology   • PYELOGRAM Right 3/21/2018    Procedure: PYELOGRAM;  Surgeon: Terry Maldonado M.D.;  Location: SURGERY [facility];  Service: Urology   • CYSTOSCOPY N/A 1/31/2018    Procedure: CYSTOSCOPY;  Surgeon: Terry Maldonado M.D.;  Location: SURGERY [facility];  Service: Urology   • RETROGRADES Right 1/31/2018    Procedure: RETROGRADES;  Surgeon: Terry Maldonado M.D.;  Location: SURGERY [facility];  Service: Urology   • PYELOGRAM Right 1/31/2018    Procedure: PYELOGRAM;  Surgeon: Terry Maldonado M.D.;  Location: SURGERY [facility];  Service: Urology   • URETEROSCOPY Right 1/31/2018    Procedure: URETEROSCOPY, INCISIONAL OR DILATION OF STRICTURE  ;  Surgeon: Terry Maldonado M.D.;  Location: SURGERY [facility];  Service: Urology   • LASERTRIPSY Right 1/31/2018    Procedure: LASERTRIPSY;  Surgeon: Terry Maldonado M.D.;  Location: SURGERY [facility];  Service: Urology   • STENT PLACEMENT Right 1/31/2018    Procedure: STENT PLACEMENT;  Surgeon: Terry Maldonado M.D.;  Location: SURGERY [facility];  Service: Urology   • URETEROSCOPY Right 11/7/2017    Procedure: URETEROSCOPY;  Surgeon: Kiet Eid M.D.;  Location: SURGERY [facility];  Service: Urology   • LASERTRIPSY Right 11/7/2017    Procedure: LASERTRIPSY- LITHO;  Surgeon:  Kiet Eid M.D.;  Location: SURGERY [facility];  Service: Urology   • URETHRAL DILATATION Right 11/7/2017    Procedure: URETHRAL DILATATION;  Surgeon: Kiet Eid M.D.;  Location: SURGERY [facility];  Service: Urology   • CYSTOSCOPY STENT PLACEMENT Right 11/7/2017    Procedure: CYSTOSCOPY STENT PLACEMENT;  Surgeon: Kiet Eid M.D.;  Location: SURGERY [facility];  Service: Urology   • CYSTOSCOPY Right 9/17/2017    Procedure: CYSTOSCOPY;  Surgeon: Kiet Eid M.D.;  Location: SURGERY [facility];  Service: Urology   • URETEROSCOPY Right 9/17/2017    Procedure: URETEROSCOPY;  Surgeon: Kiet Eid M.D.;  Location: SURGERY [facility];  Service: Urology   • LASERTRIPSY Right 9/17/2017    Procedure: LASERTRIPSY;  Surgeon: Kiet Eid M.D.;  Location: SURGERY [facility];  Service: Urology   • STENT PLACEMENT Right 9/17/2017    Procedure: STENT PLACEMENT;  Surgeon: Kiet Eid M.D.;  Location: SURGERY [facility];  Service: Urology   • ABDOMINAL HYSTERECTOMY TOTAL  2009   • OTHER ABDOMINAL SURGERY  2009    abdominal tumor    • OTHER ABDOMINAL SURGERY  2009   • ABDOMINAL EXPLORATION     • OTHER      eye surgeries X6   • OTHER      ear surgeries X5   • PRIMARY C SECTION      1989/1991/1997/1999   • TONSILLECTOMY         FAMILY HX:  Family History   Problem Relation Age of Onset   • Cancer Mother    • Cancer Father    • Heart Disease Brother    • Diabetes Maternal Grandmother    • Cancer Maternal Grandfather    • Cancer Paternal Grandmother    • Cancer Paternal Grandfather        SOCIAL HX:  Social History     Social History   • Marital status:      Spouse name: N/A   • Number of children: N/A   • Years of education: N/A     Occupational History   • Not on file.     Social History Main Topics   • Smoking status: Former Smoker     Packs/day: 0.25     Years: 10.00     Types: Cigarettes     Quit date: 4/13/2013   • Smokeless tobacco: Never  Used   • Alcohol use Yes      Comment: occ   • Drug use: No   • Sexual activity: Not on file     Other Topics Concern   • Not on file     Social History Narrative   • No narrative on file     History   Smoking Status   • Former Smoker   • Packs/day: 0.25   • Years: 10.00   • Types: Cigarettes   • Quit date: 4/13/2013   Smokeless Tobacco   • Never Used     History   Alcohol Use   • Yes     Comment: occ       Allergies/Intolerances:  Allergies   Allergen Reactions   • Levofloxacin Hives, Shortness of Breath and Itching     Itching, pruritis     • Morphine Itching              History reviewed with the patient    Other Current Medications:    Current Facility-Administered Medications:   •  docusate sodium (COLACE) capsule 100 mg, 100 mg, Oral, BID, Carlos Mata M.D., 100 mg at 11/05/18 0630  •  diphenhydrAMINE (BENADRYL) tablet/capsule 25 mg, 25 mg, Oral, Q6HRS PRN, Carlos Mata M.D., 25 mg at 11/05/18 0032  •  ceFAZolin (ANCEF) IVPB 2 g, 2 g, Intravenous, Q8HR, Carlos Mata M.D., Stopped at 11/05/18 0352  •  estradiol (VIVELLE DOT) 0.05 MG/24HR 1 Patch, 1 Patch, Transdermal, 2X A WEEK, Carlos Mata M.D., 1 Patch at 11/03/18 1034  •  guaiFENesin LA (MUCINEX) tablet 600 mg, 600 mg, Oral, BID, Carlos Mata M.D., 600 mg at 11/05/18 0629  •  HYDROmorphone pf (DILAUDID) injection 0.5 mg, 0.5 mg, Intravenous, Q2HRS PRN, Mannie Oliver M.D., 0.5 mg at 11/02/18 1413  •  NS infusion 2,616 mL, 30 mL/kg, Intravenous, Once PRN, Hunter Bang M.D.  •  NS (BOLUS) infusion 1,000 mL, 1,000 mL, Intravenous, Once PRN, Hunter Bang M.D.  •  enoxaparin (LOVENOX) inj 40 mg, 40 mg, Subcutaneous, DAILY, Hunter Bang M.D.  •  acetaminophen (TYLENOL) tablet 650 mg, 650 mg, Oral, Q6HRS PRN, Hunter Bang M.D.  •  Notify provider if pain remains uncontrolled, , , CONTINUOUS **AND** Use the numeric rating scale (NRS-11) on regular floors and Critical-Care Pain Observation Tool (CPOT) on ICUs/Trauma to assess pain,  ", , CONTINUOUS **AND** Pulse Ox (Oximetry), , , CONTINUOUS **AND** Pharmacy Consult Request ...Pain Management Review, , Other, PRN **AND** If patient difficult to arouse and/or has respiratory depression, stop any opiates that are currently infusing and call a Rapid Response., , , CONTINUOUS **AND** oxyCODONE immediate-release (ROXICODONE) tablet 5 mg, 5 mg, Oral, Q3HRS PRN, 5 mg at 18 0438 **AND** oxyCODONE immediate-release (ROXICODONE) tablet 10 mg, 10 mg, Oral, Q3HRS PRN, 10 mg at 18 2206 **AND** HYDROmorphone pf (DILAUDID) injection 0.5 mg, 0.5 mg, Intravenous, Q3HRS PRN, Hunter Bang M.D., 0.5 mg at 18 0213  •  ondansetron (ZOFRAN) syringe/vial injection 4 mg, 4 mg, Intravenous, Q4HRS PRN, Hunter Bang M.D., 4 mg at 18 1709  •  ondansetron (ZOFRAN ODT) dispertab 4 mg, 4 mg, Oral, Q4HRS PRN, Hunter Bang M.D.  •  promethazine (PHENERGAN) tablet 12.5-25 mg, 12.5-25 mg, Oral, Q4HRS PRN, Hunter Bang M.D.  •  promethazine (PHENERGAN) suppository 12.5-25 mg, 12.5-25 mg, Rectal, Q4HRS PRN, Hunter Bang M.D.  •  prochlorperazine (COMPAZINE) injection 5-10 mg, 5-10 mg, Intravenous, Q4HRS PRN, Hunter Bang M.D.  •  lactobacillus rhamnosus (CULTURELLE) capsule 1 Cap, 1 Cap, Oral, QDAY with Breakfast, Hunter Bang M.D., 1 Cap at 18 0907  [unfilled]    Most Recent Vital Signs:  /65   Pulse 65   Temp 36.6 °C (97.9 °F)   Resp 16   Ht 1.702 m (5' 7\")   Wt 96.6 kg (212 lb 15.4 oz)   LMP  (LMP Unknown)   SpO2 98%   Breastfeeding? No   BMI 33.35 kg/m²   Temp  Av.6 °C (97.9 °F)  Min: 36.2 °C (97.1 °F)  Max: 38.4 °C (101.1 °F)    Physical Exam:  General: well-appearing, no acute distress, nontoxic  HEENT: sclera anicteric, PERRL, EOMI, MMM, no oral lesions  Neck: supple, no lymphadenopathy  Chest: CTAB, no r/r/w, normal work of breathing.  Cardiac: RRR, normal S1 S2, no m/r/g   Abdomen: Right lower quadrant abdominal wound VAC in place " with surrounding edema and extending erythema from the wound VAC.  There is tenderness to palpation.  Extremities: WWP, no edema, 2+ pulses  Skin: no rashes or worrisome lesions  Neuro: Alert and oriented times 3, non-focal exam, speech fluent, moves all extremities    Pertinent Lab Results:  Recent Labs      11/03/18   0016  11/04/18   0153  11/05/18   0248   WBC  12.0*  9.5  9.6      Recent Labs      11/03/18   0016  11/04/18   0153  11/05/18   0248   HEMOGLOBIN  12.5  12.3  11.7*   HEMATOCRIT  37.2  36.3*  35.2*   MCV  87.9  87.7  89.6   MCH  29.6  29.7  29.8   PLATELETCT  304  357  362         Recent Labs      11/03/18   0016  11/04/18   0153  11/05/18   0248   SODIUM  138  140  139   POTASSIUM  3.4*  4.1  3.7   CHLORIDE  107  107  109   CO2  22  23  23   CREATININE  1.06  0.79  0.94        Recent Labs      11/02/18   1310   ALBUMIN  4.2        Pertinent Micro:  Results     Procedure Component Value Units Date/Time    GRAM STAIN [[id number]] Collected:  11/03/18 1423    Order Status:  Completed Specimen:  Wound Updated:  11/04/18 1916     Significant Indicator .     Source WND     Site Abdominal Wall Wound Infection     Gram Stain Result Many WBCs.  Few Gram positive cocci.      ACID FAST STAIN [[id number]] Collected:  11/03/18 1423    Order Status:  Completed Specimen:  Wound Updated:  11/04/18 1916     Significant Indicator NEG     Source WND     Site Abdominal Wall Wound Infection     AFB Smear Results No acid fast bacilli seen.    CULTURE WOUND W/ GRAM STAIN [[id number]]  (Abnormal) Collected:  11/03/18 1423    Order Status:  Completed Specimen:  Wound Updated:  11/04/18 1916     Significant Indicator POS (POS)     Source WND     Site Abdominal Wall Wound Infection     Culture Result Wound -- (A)     Gram Stain Result Many WBCs.  Few Gram positive cocci.       Culture Result Wound Staphylococcus aureus  Moderate growth  Oxacillin Sensitive by screening method   (A)    ANAEROBIC CULTURE [[id number]] Collected:   "11/03/18 1423    Order Status:  Completed Specimen:  Wound Updated:  11/04/18 1916     Significant Indicator NEG     Source WND     Site Abdominal Wall Wound Infection     Anaerobic Culture, Culture Res Culture in progress.    AFB CULTURE [[id number]] Collected:  11/03/18 1423    Order Status:  Completed Specimen:  Wound Updated:  11/04/18 1916     Significant Indicator NEG     Source WND     Site Abdominal Wall Wound Infection     AFB Culture Culture in progress.  NOTE:  Swabs are not recommended for the isolation of  Mycobacteria, since they provide limited material.  They  are acceptable only if a specimen cannot be collected by  any other means.  Negative results obtained from these  specimens submitted on swabs are not reliable.       AFB Smear Results No acid fast bacilli seen.    URINE CULTURE(NEW) [[id number]] Collected:  11/02/18 1340    Order Status:  Completed Specimen:  Urine Updated:  11/04/18 0943     Significant Indicator NEG     Source UR     Site --     Urine Culture Mixed skin eduardo 10-50,000 cfu/mL    Narrative:       Indication for culture:->Emergency Room Patient    BLOOD CULTURE [[id number]] Collected:  11/02/18 1305    Order Status:  Completed Specimen:  Blood from Peripheral Updated:  11/03/18 0829     Significant Indicator NEG     Source BLD     Site PERIPHERAL     Blood Culture No Growth    Note: Blood cultures are incubated for 5 days and  are monitored continuously.Positive blood cultures  are called to the RN and reported as soon as  they are identified.      Narrative:       Per Hospital Policy: Only change Specimen Src: to \"Line\" if  specified by physician order.    BLOOD CULTURE [[id number]] Collected:  11/02/18 1310    Order Status:  Completed Specimen:  Blood from Peripheral Updated:  11/03/18 0829     Significant Indicator NEG     Source BLD     Site PERIPHERAL     Blood Culture No Growth    Note: Blood cultures are incubated for 5 days and  are monitored continuously.Positive blood " "cultures  are called to the RN and reported as soon as  they are identified.      Narrative:       Per Hospital Policy: Only change Specimen Src: to \"Line\" if  specified by physician order.    Blood Culture [[id number]]     Order Status:  Canceled Specimen:  Blood from Peripheral     Blood Culture [[id number]]     Order Status:  Canceled Specimen:  Blood from Peripheral     Urinalysis [[id number]]     Order Status:  Sent Specimen:  Urine from Urine, Clean Catch     Culture Respiratory W/ GRM STN [[id number]]     Order Status:  Completed Specimen:  Respirate from Sputum     URINALYSIS [[id number]] Collected:  11/02/18 1340    Order Status:  Completed Specimen:  Urine Updated:  11/02/18 1405     Color Yellow     Character Clear     Specific Gravity 1.014     Ph 6.0     Glucose Negative mg/dL      Ketones Negative mg/dL      Protein Negative mg/dL      Bilirubin Negative     Urobilinogen, Urine 0.2     Nitrite Negative     Leukocyte Esterase Negative     Occult Blood Negative     Micro Urine Req see below     Comment: Microscopic examination not performed when specimen is clear  and chemically negative for protein, blood, leukocyte esterase  and nitrite.         Narrative:       Indication for culture:->Emergency Room Patient        Blood Culture   Date Value Ref Range Status   11/02/2018   Preliminary    No Growth    Note: Blood cultures are incubated for 5 days and  are monitored continuously.Positive blood cultures  are called to the RN and reported as soon as  they are identified.          Studies:  Dx-chest-portable (1 View)    Result Date: 11/2/2018 11/2/2018 1:01 PM HISTORY/REASON FOR EXAM:  Abdominal pain and right lower quadrant swelling. Possible sepsis. TECHNIQUE/EXAM DESCRIPTION AND NUMBER OF VIEWS: Single portable view of the chest. COMPARISON: 9/17/2018 FINDINGS: Single portable view of the chest demonstrates a normal cardiac silhouette and mediastinal contours. No discrete opacity, pleural fluid, or " pneumothorax. No suspicious bony lesions.     No acute cardiopulmonary findings.    Us-abdomen Ltd (soft Tissue)    Result Date: 11/2/2018 11/2/2018 2:26 PM HISTORY/REASON FOR EXAM:  Redness and edema at the surgical site TECHNIQUE/EXAM DESCRIPTION: Limited abdominal ultrasound. COMPARISON: None available. FINDINGS: Sonographic images of the anterior abdominal wall the right lower quadrant demonstrate an irregular complex collection measuring approximately 5 x 1 cm.     Complex multiloculated fluid is ill-defined at the surgical site. Findings may represent early abscess formation      IMPRESSION:   1.  Surgical site infection   2.  Abdominal wall abscess  3.  Methicillin sensitive Staphylococcus aureus infection      PLAN:   Aurora Tan is a 48 y.o. woman with a history of endometrioma resection with mesh placement in 2008 and recent hospitalization for pyelonephritis and abdominal wall drainage for seroma in September admitted for increasing abdominal pain, edema and erythema at the prior surgical site.  Patient found to have evidence of soft tissue infection with a complex multiloculated fluid noted on ultrasound concerning for abscess.  She underwent irrigation debridement of the abdominal wall with wound VAC placement on 11/3/18.  Purulence was noted in the wound cavity however the infection appeared to be superficial per the operative note.  It does not appear that the infection went down to the mesh but there is also always a concern about the mesh being infected.  OR cultures are now positive for methicillin sensitive Staphylococcus aureus.  Fortunately her blood cultures are negative to date.  Agree with cefazolin for now.  Anticipate transition to p.o. antibiotics at discharge and plan for a 14-day course total from the date of surgery.  Further recommendations per clinical course.    Plan of care discussed with JORDI Mata M.D.. Will continue to follow    Adriana Yancey M.D.

## 2018-11-05 NOTE — PROGRESS NOTES
Received bedside report from RN, pt care assumed, VSS, pt assessment complete. Pt AAOx4, pt c/o abd pain 7/10 at this time. Pt declining pain meds.Wound vac to abd suctioning at 125   mmHG. No signs of acute distress noted at this time. POC discussed with pt and verbalizes no questions. Pt denies any additional needs at this time. Bed in lowest position, pt educated on fall risk and verbalized understanding, call light within reach, hourly rounding initiated.

## 2018-11-05 NOTE — PROGRESS NOTES
"Per charge RN, transfer to medical floor has been cancelled as pt is possibly discharging today.     Per Dr. Esperanza MD waiting on clearance from surgery.    1040- RN notified Dr. Mendoza if pt can be discharged from their standpoint. Dr. Mendoza will see pt some time today. Per MD, pt can be discharged today \"as long as pt doesn't need antibiotics.\" RN notified MD that I.D. MD is at bedside assessing pt.       "

## 2018-11-05 NOTE — CARE PLAN
Problem: Safety  Goal: Will remain free from injury  Outcome: PROGRESSING AS EXPECTED      Problem: Venous Thromboembolism (VTW)/Deep Vein Thrombosis (DVT) Prevention:  Goal: Patient will participate in Venous Thrombosis (VTE)/Deep Vein Thrombosis (DVT)Prevention Measures  Outcome: PROGRESSING AS EXPECTED      Problem: Bowel/Gastric:  Goal: Normal bowel function is maintained or improved  Outcome: PROGRESSING AS EXPECTED      Problem: Knowledge Deficit  Goal: Knowledge of disease process/condition, treatment plan, diagnostic tests, and medications will improve  Outcome: PROGRESSING AS EXPECTED

## 2018-11-06 ENCOUNTER — PATIENT OUTREACH (OUTPATIENT)
Dept: HEALTH INFORMATION MANAGEMENT | Facility: OTHER | Age: 48
End: 2018-11-06

## 2018-11-06 VITALS
DIASTOLIC BLOOD PRESSURE: 61 MMHG | SYSTOLIC BLOOD PRESSURE: 124 MMHG | BODY MASS INDEX: 34.19 KG/M2 | HEIGHT: 67 IN | RESPIRATION RATE: 17 BRPM | TEMPERATURE: 98.2 F | HEART RATE: 61 BPM | OXYGEN SATURATION: 95 % | WEIGHT: 217.81 LBS

## 2018-11-06 PROBLEM — A41.9 SEPSIS (HCC): Status: RESOLVED | Noted: 2018-09-17 | Resolved: 2018-11-06

## 2018-11-06 PROBLEM — E87.1 HYPONATREMIA: Status: RESOLVED | Noted: 2018-09-17 | Resolved: 2018-11-06

## 2018-11-06 PROBLEM — N17.9 ACUTE KIDNEY INJURY (HCC): Status: RESOLVED | Noted: 2017-09-17 | Resolved: 2018-11-06

## 2018-11-06 PROBLEM — F41.9 ANXIETY: Status: RESOLVED | Noted: 2018-11-05 | Resolved: 2018-11-06

## 2018-11-06 PROCEDURE — A9270 NON-COVERED ITEM OR SERVICE: HCPCS | Performed by: INTERNAL MEDICINE

## 2018-11-06 PROCEDURE — 99239 HOSP IP/OBS DSCHRG MGMT >30: CPT | Performed by: HOSPITALIST

## 2018-11-06 PROCEDURE — 99232 SBSQ HOSP IP/OBS MODERATE 35: CPT | Performed by: INTERNAL MEDICINE

## 2018-11-06 PROCEDURE — 700102 HCHG RX REV CODE 250 W/ 637 OVERRIDE(OP): Performed by: FAMILY MEDICINE

## 2018-11-06 PROCEDURE — 700111 HCHG RX REV CODE 636 W/ 250 OVERRIDE (IP): Performed by: INTERNAL MEDICINE

## 2018-11-06 PROCEDURE — A9270 NON-COVERED ITEM OR SERVICE: HCPCS | Performed by: HOSPITALIST

## 2018-11-06 PROCEDURE — 700102 HCHG RX REV CODE 250 W/ 637 OVERRIDE(OP): Performed by: INTERNAL MEDICINE

## 2018-11-06 PROCEDURE — 700105 HCHG RX REV CODE 258

## 2018-11-06 PROCEDURE — 700102 HCHG RX REV CODE 250 W/ 637 OVERRIDE(OP): Performed by: HOSPITALIST

## 2018-11-06 PROCEDURE — 700112 HCHG RX REV CODE 229: Performed by: INTERNAL MEDICINE

## 2018-11-06 PROCEDURE — A9270 NON-COVERED ITEM OR SERVICE: HCPCS | Performed by: FAMILY MEDICINE

## 2018-11-06 RX ORDER — SULFAMETHOXAZOLE AND TRIMETHOPRIM 400; 80 MG/1; MG/1
1 TABLET ORAL EVERY 12 HOURS
Qty: 24 TAB | Refills: 0 | Status: SHIPPED | OUTPATIENT
Start: 2018-11-06 | End: 2018-12-27

## 2018-11-06 RX ORDER — SULFAMETHOXAZOLE AND TRIMETHOPRIM 400; 80 MG/1; MG/1
1 TABLET ORAL EVERY 12 HOURS
Status: DISCONTINUED | OUTPATIENT
Start: 2018-11-06 | End: 2018-11-06 | Stop reason: HOSPADM

## 2018-11-06 RX ORDER — SODIUM CHLORIDE 9 MG/ML
INJECTION, SOLUTION INTRAVENOUS
Status: COMPLETED
Start: 2018-11-06 | End: 2018-11-06

## 2018-11-06 RX ADMIN — GUAIFENESIN 600 MG: 600 TABLET, EXTENDED RELEASE ORAL at 05:41

## 2018-11-06 RX ADMIN — SODIUM CHLORIDE 250 ML: 9 INJECTION, SOLUTION INTRAVENOUS at 10:13

## 2018-11-06 RX ADMIN — CEFAZOLIN SODIUM 2 G: 2 INJECTION, SOLUTION INTRAVENOUS at 02:37

## 2018-11-06 RX ADMIN — SULFAMETHOXAZOLE AND TRIMETHOPRIM 1 TABLET: 400; 80 TABLET ORAL at 12:06

## 2018-11-06 RX ADMIN — DOCUSATE SODIUM 100 MG: 100 CAPSULE, LIQUID FILLED ORAL at 05:41

## 2018-11-06 RX ADMIN — CEFAZOLIN SODIUM 2 G: 2 INJECTION, SOLUTION INTRAVENOUS at 10:13

## 2018-11-06 RX ADMIN — Medication 1 CAPSULE: at 09:10

## 2018-11-06 ASSESSMENT — PAIN SCALES - GENERAL
PAINLEVEL_OUTOF10: 2
PAINLEVEL_OUTOF10: 3

## 2018-11-06 ASSESSMENT — ENCOUNTER SYMPTOMS
ABDOMINAL PAIN: 1
DIARRHEA: 0
VOMITING: 0
FEVER: 0
CHILLS: 0
COUGH: 0
NAUSEA: 0
SHORTNESS OF BREATH: 0

## 2018-11-06 NOTE — DISCHARGE SUMMARY
Discharge Summary    CHIEF COMPLAINT ON ADMISSION  Chief Complaint   Patient presents with   • Post-Op Complications   • Fever       Reason for Admission  FEVER     Admission Date  11/2/2018    CODE STATUS  Full Code    HPI & HOSPITAL COURSE  This is a 48 y.o. female here with abdominal wall cellulitis at the sight of a recent exploratory surgery.  She underwent an incision and drainage on 11/3/18 with a wound vac placement.  The wound grew out MSSA.  She tolerated the surgery well.  She has now been changed to oral antibiotics and will follow up with ID and Surgery as an outpatient.       Therefore, she is discharged in fair and stable condition to home with close outpatient follow-up.    The patient met 2-midnight criteria for an inpatient stay at the time of discharge.    Discharge Date  11/6/18    FOLLOW UP ITEMS POST DISCHARGE      DISCHARGE DIAGNOSES  Principal Problem (Resolved):    Sepsis (HCC) POA: Yes  Active Problems:    Postoperative infection POA: Yes  Resolved Problems:    Acute kidney injury (HCC) POA: Yes    Hyponatremia POA: Yes    Anxiety POA: No      FOLLOW UP  No future appointments.  ANDREA Valdez  [address] [place] [id number]  [phone]    Go on 11/27/2018  Please arrive at 11:30 am for your appointment with primary care.      MEDICATIONS ON DISCHARGE     Medication List      START taking these medications      Instructions   sulfamethoxazole-trimethoprim 400-80 MG Tabs  Commonly known as:  BACTRIM   Take 1 Tab by mouth every 12 hours.  Dose:  1 Tab        CONTINUE taking these medications      Instructions   acetaminophen 500 MG Tabs  Commonly known as:  TYLENOL   Take 1,000 mg by mouth every 6 hours as needed for Moderate Pain.  Dose:  1000 mg     MINIVELLE 0.05 MG/24HR Pttw  Generic drug:  estradiol   Apply 1 Patch to skin as directed every 7 days. On Mon  Dose:  1 Patch     PROBIOTIC PO   Take 1 Cap by mouth every day.  Dose:  1 Cap        STOP taking these  medications    cefdinir 300 MG Caps  Commonly known as:  OMNICEF     fluconazole 150 MG tablet  Commonly known as:  DIFLUCAN     linezolid 600 MG Tabs  Commonly known as:  ZYVOX     MUCINEX D MAX STRENGTH PO            Allergies  Allergies   Allergen Reactions   • Levofloxacin Hives, Shortness of Breath and Itching     Itching, pruritis     • Morphine Itching              DIET  Orders Placed This Encounter   Procedures   • Diet Order Regular     Standing Status:   Standing     Number of Occurrences:   1     Order Specific Question:   Diet:     Answer:   Regular [1]       ACTIVITY  As tolerated.     CONSULTATIONS  Surgery [facility]  ID [facility]     PROCEDURES  US-ABDOMEN LTD (SOFT TISSUE)   Final Result      Complex multiloculated fluid is ill-defined at the surgical site. Findings may represent early abscess formation      DX-CHEST-PORTABLE (1 VIEW)   Final Result      No acute cardiopulmonary findings.            LABORATORY  Lab Results   Component Value Date    SODIUM 139 11/05/2018    POTASSIUM 3.7 11/05/2018    CHLORIDE 109 11/05/2018    CO2 23 11/05/2018    GLUCOSE 100 (H) 11/05/2018    BUN 13 11/05/2018    CREATININE 0.94 11/05/2018        Lab Results   Component Value Date    WBC 9.6 11/05/2018    HEMOGLOBIN 11.7 (L) 11/05/2018    HEMATOCRIT 35.2 (L) 11/05/2018    PLATELETCT 362 11/05/2018        Total time of the discharge process exceeds 45 minutes.

## 2018-11-06 NOTE — DISCHARGE INSTRUCTIONS
Discharge Instructions    Discharged to home by car with relative. Discharged via walking, hospital escort: Refused.  Special equipment needed: Wound VAC    Be sure to schedule a follow-up appointment with your primary care doctor or any specialists as instructed.     Discharge Plan:   Diet Plan: Discussed  Activity Level: Discussed  Confirmed Follow up Appointment: Appointment Scheduled  Confirmed Symptoms Management: Discussed  Medication Reconciliation Updated: Yes  Influenza Vaccine Indication: Patient Refuses    I understand that a diet low in cholesterol, fat, and sodium is recommended for good health. Unless I have been given specific instructions below for another diet, I accept this instruction as my diet prescription.       Special Instructions: Sepsis, Adult  Sepsis is a serious infection of your blood or tissues that affects your whole body. The infection that causes sepsis may be bacterial, viral, fungal, or parasitic. Sepsis may be life threatening. Sepsis can cause your blood pressure to drop. This may result in shock. Shock causes your central nervous system and your organs to stop working correctly.  What increases the risk?  Sepsis can happen in anyone, but it is more likely to happen in people who have weakened immune systems.  What are the signs or symptoms?  Symptoms of sepsis can include:  · Fever or low body temperature (hypothermia).  · Rapid breathing (hyperventilation).  · Chills.  · Rapid heartbeat (tachycardia).  · Confusion or light-headedness.  · Trouble breathing.  · Urinating much less than usual.  · Cool, clammy skin or red, flushed skin.  · Other problems with the heart, kidneys, or brain.  How is this diagnosed?  Your health care provider will likely do tests to look for an infection, to see if the infection has spread to your blood, and to see how serious your condition is. Tests can include:  · Blood tests, including cultures of your blood.  · Cultures of other fluids from your  body, such as:  ¨ Urine.  ¨ Pus from wounds.  ¨ Mucus coughed up from your lungs.  · Urine tests other than cultures.  · X-ray exams or other imaging tests.  How is this treated?  Treatment will begin with elimination of the source of infection. If your sepsis is likely caused by a bacterial or fungal infection, you will be given antibiotic or antifungal medicines.  You may also receive:  · Oxygen.  · Fluids through an IV tube.  · Medicines to increase your blood pressure.  · A machine to clean your blood (dialysis) if your kidneys fail.  · A machine to help you breathe if your lungs fail.  Get help right away if:  You get an infection or develop any of the signs and symptoms of sepsis after surgery or a hospitalization.  This information is not intended to replace advice given to you by your health care provider. Make sure you discuss any questions you have with your health care provider.  Document Released: 09/15/2004 Document Revised: 05/25/2017 Document Reviewed: 08/25/2014  [employer] Interactive Patient Education © 2017 [employer]      · Is patient discharged on Warfarin / Coumadin?   No       Acute Kidney Injury, Adult  Acute kidney injury (CECILIO) occurs when there is sudden (acute) damage to the kidneys. A small amount of kidney damage may not cause problems, but a large amount of damage may make it difficult or impossible for the kidneys to work the way they should. CECILIO may develop into long-lasting (chronic) kidney disease. Early detection and treatment of CECILIO may prevent kidney damage from becoming permanent or getting worse.  What are the causes?  Common causes of this condition include:  · A problem with blood flow to the kidneys. This may be caused by:  ¨ Blood loss.  ¨ Heart and blood vessel (cardiovascular) disease.  ¨ Severe burns.  ¨ Liver disease.  · Direct damage to the kidneys. This may be caused by:  ¨ Some medicines.  ¨ A kidney infection.  ¨ Poisoning.  ¨ Being around or in contact with  poisonous (toxic) substances.  ¨ A surgical wound.  ¨ A hard, direct force to the kidney area.  · A sudden blockage of urine flow. This may be caused by:  ¨ Cancer.  ¨ Kidney stones.  ¨ Enlarged prostate in males.  What are the signs or symptoms?  Symptoms develop slowly and may not be obvious until the kidney damage becomes severe. It is possible to have CECILIO for years without showing any symptoms. Symptoms of this condition can include:  · Swelling (edema) of the face, legs, ankles, or feet.  · Numbness, tingling, or loss of feeling (sensation) in the hands or feet.  · Tiredness (lethargy).  · Nausea or vomiting.  · Confusion or trouble concentrating.  · Problems with urination, such as:  ¨ Painful or burning feeling during urination.  ¨ Decreased urine production.  ¨ Frequent urination, especially at night.  ¨ Bloody urine.  · Muscle twitches and cramps, especially in the legs.  · Shortness of breath.  · Weakness.  · Constant itchiness.  · Loss of appetite.  · Metallic taste in the mouth.  · Trouble sleeping.  · Pale lining of the eyelids and surface of the eye (conjunctiva).  How is this diagnosed?  This condition may be diagnosed with various tests. Tests may include:  · Blood tests.  · Urine tests.  · Imaging tests.  · A test in which a sample of tissue is removed from the kidneys to be looked at under a microscope (kidney biopsy).  How is this treated?  Treatment of CECILIO varies depending on the cause and severity of the kidney damage. In mild cases, treatment may not be needed. The kidneys may heal on their own.  If CECILIO is more severe, your health care provider will treat the cause of the kidney damage, help the kidneys heal, and prevent problems from occurring. Severe cases may require a procedure to remove toxic wastes from the body (dialysis) or surgery to repair kidney damage. Surgery may involve:  · Repair of a torn kidney.  · Removal of a urine flow obstruction.  Follow these instructions at  home:  · Follow your prescribed diet.  · Take over-the-counter and prescription medicines only as told by your health care provider.  ¨ Do not take any new medicines unless approved by your health care provider. Many medicines can worsen your kidney damage.  ¨ Do not take any vitamin and mineral supplements unless approved by your health care provider. Many nutritional supplements can worsen your kidney damage.  ¨ The dose of some medicines that you take may need to be adjusted.  · Do not use any tobacco products, such as cigarettes, chewing tobacco, and e-cigarettes. If you need help quitting, ask your health care provider.  · Keep all follow-up visits as told by your health care provider. This is important.  · Keep track of your blood pressure. Report changes in your blood pressure as told by your health care provider.  · Achieve and maintain a healthy weight. If you need help with this, ask your health care provider.  · Start or continue an exercise plan. Try to exercise at least 30 minutes a day, 5 days a week.  · Stay current with immunizations as told by your health care provider.  Where to find more information:  · American Association of Kidney Patients: www.aakp.org  · National Kidney Foundation: www.kidney.org  · American Kidney Fund: www.akfinc.org  · [employer] [facility]: www.lifeoptions.org and www.kidneyschool.org  Contact a health care provider if:  · Your symptoms get worse.  · You develop new symptoms.  Get help right away if:  · You develop symptoms of end-stage kidney disease, which include:  ¨ Headaches.  ¨ Abnormally dark or light skin.  ¨ Numbness in the hands or feet.  ¨ Easy bruising.  ¨ Frequent hiccups.  ¨ Chest pain.  ¨ Shortness of breath.  ¨ End of menstruation in women.  · You have a fever.  · You have decreased urine production.  · You have pain or bleeding when you urinate.  This information is not intended to replace advice given to you by your health care provider.  Make sure you discuss any questions you have with your health care provider.  Document Released: 07/02/2012 Document Revised: 07/27/2017 Document Reviewed: 08/16/2013  [employer] Interactive Patient Education © 2017 [employer]            Sepsis, Adult  Sepsis is a serious infection of your blood or tissues that affects your whole body. The infection that causes sepsis may be bacterial, viral, fungal, or parasitic. Sepsis may be life threatening. Sepsis can cause your blood pressure to drop. This may result in shock. Shock causes your central nervous system and your organs to stop working correctly.  What increases the risk?  Sepsis can happen in anyone, but it is more likely to happen in people who have weakened immune systems.  What are the signs or symptoms?  Symptoms of sepsis can include:  · Fever or low body temperature (hypothermia).  · Rapid breathing (hyperventilation).  · Chills.  · Rapid heartbeat (tachycardia).  · Confusion or light-headedness.  · Trouble breathing.  · Urinating much less than usual.  · Cool, clammy skin or red, flushed skin.  · Other problems with the heart, kidneys, or brain.  How is this diagnosed?  Your health care provider will likely do tests to look for an infection, to see if the infection has spread to your blood, and to see how serious your condition is. Tests can include:  · Blood tests, including cultures of your blood.  · Cultures of other fluids from your body, such as:  ¨ Urine.  ¨ Pus from wounds.  ¨ Mucus coughed up from your lungs.  · Urine tests other than cultures.  · X-ray exams or other imaging tests.  How is this treated?  Treatment will begin with elimination of the source of infection. If your sepsis is likely caused by a bacterial or fungal infection, you will be given antibiotic or antifungal medicines.  You may also receive:  · Oxygen.  · Fluids through an IV tube.  · Medicines to increase your blood pressure.  · A machine to clean your blood (dialysis) if  your kidneys fail.  · A machine to help you breathe if your lungs fail.  Get help right away if:  You get an infection or develop any of the signs and symptoms of sepsis after surgery or a hospitalization.  This information is not intended to replace advice given to you by your health care provider. Make sure you discuss any questions you have with your health care provider.  Document Released: 09/15/2004 Document Revised: 05/25/2017 Document Reviewed: 08/25/2014  [employer] Interactive Patient Education © 2017 [employer]        Depression / Suicide Risk    As you are discharged from this [facility] facility, it is important to learn how to keep safe from harming yourself.    Recognize the warning signs:  · Abrupt changes in personality, positive or negative- including increase in energy   · Giving away possessions  · Change in eating patterns- significant weight changes-  positive or negative  · Change in sleeping patterns- unable to sleep or sleeping all the time   · Unwillingness or inability to communicate  · Depression  · Unusual sadness, discouragement and loneliness  · Talk of wanting to die  · Neglect of personal appearance   · Rebelliousness- reckless behavior  · Withdrawal from people/activities they love  · Confusion- inability to concentrate     If you or a loved one observes any of these behaviors or has concerns about self-harm, here's what you can do:  · Talk about it- your feelings and reasons for harming yourself  · Remove any means that you might use to hurt yourself (examples: pills, rope, extension cords, firearm)  · Get professional help from the community (Mental Health, Substance Abuse, psychological counseling)  · Do not be alone:Call your Safe Contact- someone whom you trust who will be there for you.  · Call your local CRISIS HOTLINE 047-0607 or 425-526-1222  · Call your local Children's Mobile Crisis Response Team [place] (446) 628-7066 or www.atokore  · Call the toll  free National Suicide Prevention Hotlines   · National Suicide Prevention Lifeline 584-409-INMJ (3681)  · National Hope Line Network 800-SUICIDE (488-1023)

## 2018-11-06 NOTE — WOUND TEAM
[facility]  Inpatient Services  Initial Wound and Skin Care Evaluation    Admission Date:   11/2/18  HPI, PMH, SH: Reviewed  Unit where seen by Wound Team:  T7    WOUND CONSULT RELATED TO:  md request for npwt drsg changes per protocol    SUBJECTIVE:  Anxious but agreeable    Self Report / Pain Level: drsg removal painful despite pre-med    OBJECTIVE:   npwt drsg placed in surgery remained intact    WOUND TYPE, LOCATION, CHARACTERISTICS (Pressure ulcers: location, stage, POA or date identified)    Location and type of wound: Full thickness surgical wnd right lower abdomen        Periwound:      intact  Drainage:      Min serosanguinous  Tissue Type and %:     100% pink/red  Wound Edges:     attached  Odor:       none  Exposed structure(s):   adipose  S&S of Infection:    none      Measurements: (cm)   Taken 11/5/18  Length:      2.0  Width:       12.5  Depth:      2.5      INTERVENTIONS BY WOUND TEAM:   Prev drsg removed -- wnd irrigated with wnd cleanser -- measurements and photograph done -- benzoin and drape bj-[id number] -- 1 piece black foam strip to fill the wnd plus a button -- sealed with drape and neg pressure applied at 125mmhg/dpc    Interdisciplinary consultation:  Patient; nsg    EVALUATION:  Clean surgical wnd that should do well with npwt    Factors affecting wound healing:  Infection    Goals:  Control drainage -- increase granulation -- decrease wnd size    NURSING PLAN OF CARE ORDERS (x):    Dressing changes: See Dressing Maintenance orders: x  Skin care: See Skin Care orders: x  Rectal tube care: See Rectal Tube Care orders:   Other orders:    RSKIN: CURRENT (X) ORDERED (O)  Q shift Corbin:  X  Q shift pressure point assessments:  X  Pressure redistribution mattress        ZELDA      Bariatric ZELDA      Bariatric foam        Heel float boots       Heels floated on pillows    x  Barrier wipes      Barrier Cream      Barrier paste      Sacral silicone dressing      Silicone O2 tubing       Anchorfast      Trach with Optifoam split foam       Waffle cushion      Rectal tube or BMS      Antifungal tx    Turn q 2 hours   x  Up to chair     Ambulate   PT/OT     Dietician      PO     TF   TPN     PVN    NPO   # days   Other       WOUND TEAM PLAN OF CARE (x):   NPWT change 3 x week:      x  Dressing changes by wound team:     x  Follow up as needed:     x  Other (explain):    Anticipated discharge plans (x):  SNF:           Home Care:           Outpatient Wound Center:          x  Self Care:            Other:

## 2018-11-06 NOTE — PROGRESS NOTES
Bedside report received. Pt a/o visiting with family in bed. Some pain but not ready for any prns yet. Bed locked, low position, call light within reach.

## 2018-11-06 NOTE — PROGRESS NOTES
Discharge instructions give to patient at bedside. Pt verbalizes understanding and states plans for follow up. New and home medications reviewed, post discharge activity level and worsening of symptoms needing follow up care discussed. IV cathlon removed. Wound vac was delivered and set up. All belongings accounted for, all questions answered at this time. Pt walked with  to car.

## 2018-11-06 NOTE — DISCHARGE PLANNING
Anticipated Discharge Disposition: home with home health and wound vac     Action: Wound vac order yesterday with [facility], LSW spoke with I staff Denea regarding pending discharge and wound vac delivery. LSW informed wound vac has been approved and service would be in contact regarding delivery.      Barriers to Discharge:  wound vac delivery      Plan: Pt awaiting wound vac to be delivered, expected delivery shortly        Care Transition Team Assessment    Information Source  Orientation : Oriented x 4  Information Given By: Patient, Spouse    Readmission Evaluation  Is this a readmission?: No    Elopement Risk  Legal Hold: No  Ambulatory or Self Mobile in Wheelchair: Yes  Disoriented: No  Psychiatric Symptoms: None  History of Wandering: No  Elopement this Admit: No  Vocalizing Wanting to Leave: No  Displays Behaviors, Body Language Wanting to Leave: No-Not at Risk for Elopement  Elopement Risk: Not at Risk for Elopement    Interdisciplinary Discharge Planning  Does Admitting Nurse Feel This Could be a Complex Discharge?: No  Lives with - Patient's Self Care Capacity: Spouse  Patient or legal guardian wants to designate a caregiver (see row info): No  Support Systems: Family Member(s), Spouse / Significant Other  Housing / Facility: [address] [facility]  Do You Take your Prescribed Medications Regularly: Yes  Able to Return to Previous ADL's: Yes  Mobility Issues: No  Prior Services: None  Patient Expects to be Discharged to:: Home  Assistance Needed: No  Durable Medical Equipment: Not Applicable    Discharge Preparedness  What is your plan after discharge?: Home with help  Prior Functional Level: Independent with Activities of Daily Living    Functional Assesment  Prior Functional Level: Independent with Activities of Daily Living    Finances  Financial Barriers to Discharge: No  Prescription Coverage: Yes    Vision / Hearing Impairment  Vision Impairment : No  Hearing Impairment : No    Values / Beliefs /  Concerns  Values / Beliefs Concerns : No         Domestic Abuse  Have you ever been the victim of abuse or violence?: No  Physical Abuse or Sexual Abuse: No  Verbal Abuse or Emotional Abuse: No  Possible Abuse Reported to:: Not Applicable    Psychological Assessment  History of Substance Abuse: None  History of Psychiatric Problems: No         Anticipated Discharge Information  Anticipated discharge disposition: [facility]  Discharge Address:  ([facility] )

## 2018-11-06 NOTE — PROGRESS NOTES
Report received from night shift RN, assumed care of pt. Pt A&OX4, in no apparent distress. No reports of pain. Wound vac in place, clean, dry and intact, scant yellow drainage. Plan of care discussed with pt, no questions or needs at this time. Call light within reach, bed locked and in lowest position. All fall precautions and hourly rounding in place.

## 2018-11-06 NOTE — CARE PLAN
Problem: Bowel/Gastric:  Goal: Normal bowel function is maintained or improved  Outcome: PROGRESSING AS EXPECTED      Problem: Pain Management  Goal: Pain level will decrease to patient's comfort goal  Outcome: PROGRESSING AS EXPECTED      Problem: Psychosocial Needs:  Goal: Level of anxiety will decrease  Outcome: PROGRESSING AS EXPECTED

## 2018-11-06 NOTE — PROGRESS NOTES
Pt resting in bed at this time. Even visible chest rise. No signs of distress.  Call light in place. Bed in low and locked position. Awaiting wound vac delivery for discharge. Hourly rounding in place.

## 2018-11-06 NOTE — PROGRESS NOTES
Infectious Disease Progress Note    Author: Adriana Yancey M.D. Date & Time of service: 2018  10:57 AM    Chief Complaint:  Follow-up on abdominal wall soft tissue infection    Interval History:   afebrile CBC not done today, patient is feeling slightly better today and tolerating IV antibiotics, anxious to go home today with wound VAC  Labs Reviewed, Medications Reviewed, Radiology Reviewed and Wound Reviewed.    Review of Systems:  Review of Systems   Constitutional: Negative for chills and fever.   Respiratory: Negative for cough and shortness of breath.    Gastrointestinal: Positive for abdominal pain. Negative for diarrhea, nausea and vomiting.        At prior surgical site and wound VAC       Hemodynamics:  Temp (24hrs), Av.8 °C (98.3 °F), Min:36.7 °C (98 °F), Max:36.9 °C (98.4 °F)  Temperature: 36.8 °C (98.2 °F)  Pulse  Av.3  Min: 61  Max: 103   Blood Pressure: 124/61       Physical Exam:  Physical Exam   Constitutional: She is oriented to person, place, and time. She appears well-developed.   HENT:   Head: Normocephalic and atraumatic.   Eyes: Pupils are equal, round, and reactive to light. EOM are normal.   Neck: Neck supple.   Cardiovascular: Normal rate and regular rhythm.    Pulmonary/Chest: Effort normal. She has no wheezes. She has no rales.   Abdominal: Soft. There is tenderness.   Abdominal wound VAC in place.  Mild surrounding edema   Musculoskeletal: Normal range of motion. She exhibits no edema.   Neurological: She is alert and oriented to person, place, and time.   Skin: No erythema.       Meds:    Current Facility-Administered Medications:   •  ALPRAZolam  •  docusate sodium  •  diphenhydrAMINE  •  ceFAZolin  •  estradiol  •  guaiFENesin LA  •  HYDROmorphone  •  NS  •  NS  •  enoxaparin  •  acetaminophen  •  Notify provider if pain remains uncontrolled **AND** Use the numeric rating scale (NRS-11) on regular floors and Critical-Care Pain Observation Tool (CPOT) on  ICUs/Trauma to assess pain **AND** Pulse Ox (Oximetry) **AND** Pharmacy Consult Request **AND** If patient difficult to arouse and/or has respiratory depression, stop any opiates that are currently infusing and call a Rapid Response. **AND** oxyCODONE immediate-release **AND** oxyCODONE immediate-release **AND** HYDROmorphone  •  ondansetron  •  ondansetron  •  promethazine  •  promethazine  •  prochlorperazine  •  lactobacillus rhamnosus    Labs:  Recent Labs      11/04/18 0153  11/05/18   0248   WBC  9.5  9.6   RBC  4.14*  3.93*   HEMOGLOBIN  12.3  11.7*   HEMATOCRIT  36.3*  35.2*   MCV  87.7  89.6   MCH  29.7  29.8   RDW  46.5  48.7   PLATELETCT  357  362   MPV  11.0  11.0   NEUTSPOLYS  86.20*  61.90   LYMPHOCYTES  9.40*  30.00   MONOCYTES  3.80  5.90   EOSINOPHILS  0.00  1.60   BASOPHILS  0.10  0.30     Recent Labs      11/04/18 0153  11/05/18   0248   SODIUM  140  139   POTASSIUM  4.1  3.7   CHLORIDE  107  109   CO2  23  23   GLUCOSE  142*  100*   BUN  9  13     Recent Labs      11/04/18 0153  11/05/18   0248   CREATININE  0.79  0.94       Imaging:  Dx-chest-portable (1 View)    Result Date: 11/2/2018 11/2/2018 1:01 PM HISTORY/REASON FOR EXAM:  Abdominal pain and right lower quadrant swelling. Possible sepsis. TECHNIQUE/EXAM DESCRIPTION AND NUMBER OF VIEWS: Single portable view of the chest. COMPARISON: 9/17/2018 FINDINGS: Single portable view of the chest demonstrates a normal cardiac silhouette and mediastinal contours. No discrete opacity, pleural fluid, or pneumothorax. No suspicious bony lesions.     No acute cardiopulmonary findings.    Us-abdomen Ltd (soft Tissue)    Result Date: 11/2/2018 11/2/2018 2:26 PM HISTORY/REASON FOR EXAM:  Redness and edema at the surgical site TECHNIQUE/EXAM DESCRIPTION: Limited abdominal ultrasound. COMPARISON: None available. FINDINGS: Sonographic images of the anterior abdominal wall the right lower quadrant demonstrate an irregular complex collection measuring  approximately 5 x 1 cm.     Complex multiloculated fluid is ill-defined at the surgical site. Findings may represent early abscess formation      Micro:  Results     Procedure Component Value Units Date/Time    CULTURE WOUND W/ GRAM STAIN [[id number]]  (Abnormal)  (Susceptibility) Collected:  11/03/18 1423    Order Status:  Completed Specimen:  Wound Updated:  11/06/18 1034     Significant Indicator POS (POS)     Source WND     Site Abdominal Wall Wound Infection     Culture Result Wound -- (A)     Gram Stain Result Many WBCs.  Few Gram positive cocci.       Culture Result Wound Staphylococcus aureus  Moderate growth   (A)    Culture & Susceptibility     STAPHYLOCOCCUS AUREUS     Antibiotic Sensitivity Microscan Unit Status    Ampicillin/sulbactam Sensitive <=8/4 mcg/mL Final    Method: SENSITIVITY, SON    Clindamycin Sensitive <=0.5 mcg/mL Final    Method: SENSITIVITY, SON    Daptomycin Sensitive 1 mcg/mL Final    Method: SENSITIVITY, SON    Erythromycin Sensitive <=0.5 mcg/mL Final    Method: SENSITIVITY, SON    Moxifloxacin Sensitive <=0.5 mcg/mL Final    Method: SENSITIVITY, SON    Oxacillin Sensitive <=0.25 mcg/mL Final    Method: SENSITIVITY, SON    Tetracycline Sensitive <=4 mcg/mL Final    Method: SENSITIVITY, SON    Trimeth/Sulfa Sensitive <=0.5/9.5 mcg/mL Final    Method: SENSITIVITY, SON    Vancomycin Sensitive 2 mcg/mL Final    Method: SENSITIVITY, SON                       ANAEROBIC CULTURE [[id number]] Collected:  11/03/18 1423    Order Status:  Completed Specimen:  Wound Updated:  11/06/18 1034     Significant Indicator NEG     Source WND     Site Abdominal Wall Wound Infection     Anaerobic Culture, Culture Res No Anaerobes isolated.    AFB CULTURE [[id number]] Collected:  11/03/18 1423    Order Status:  Completed Specimen:  Wound Updated:  11/06/18 1034     Significant Indicator NEG     Source WND     Site Abdominal Wall Wound Infection     AFB Culture Culture in progress.  NOTE:  Swabs are not  "recommended for the isolation of  Mycobacteria, since they provide limited material.  They  are acceptable only if a specimen cannot be collected by  any other means.  Negative results obtained from these  specimens submitted on swabs are not reliable.       AFB Smear Results No acid fast bacilli seen.    GRAM STAIN [[id number]] Collected:  11/03/18 1423    Order Status:  Completed Specimen:  Wound Updated:  11/04/18 1916     Significant Indicator .     Source WND     Site Abdominal Wall Wound Infection     Gram Stain Result Many WBCs.  Few Gram positive cocci.      ACID FAST STAIN [[id number]] Collected:  11/03/18 1423    Order Status:  Completed Specimen:  Wound Updated:  11/04/18 1916     Significant Indicator NEG     Source WND     Site Abdominal Wall Wound Infection     AFB Smear Results No acid fast bacilli seen.    URINE CULTURE(NEW) [[id number]] Collected:  11/02/18 1340    Order Status:  Completed Specimen:  Urine Updated:  11/04/18 0943     Significant Indicator NEG     Source UR     Site --     Urine Culture Mixed skin eduardo 10-50,000 cfu/mL    Narrative:       Indication for culture:->Emergency Room Patient    BLOOD CULTURE [[id number]] Collected:  11/02/18 1305    Order Status:  Completed Specimen:  Blood from Peripheral Updated:  11/03/18 0829     Significant Indicator NEG     Source BLD     Site PERIPHERAL     Blood Culture No Growth    Note: Blood cultures are incubated for 5 days and  are monitored continuously.Positive blood cultures  are called to the RN and reported as soon as  they are identified.      Narrative:       Per Hospital Policy: Only change Specimen Src: to \"Line\" if  specified by physician order.    BLOOD CULTURE [[id number]] Collected:  11/02/18 1310    Order Status:  Completed Specimen:  Blood from Peripheral Updated:  11/03/18 0829     Significant Indicator NEG     Source BLD     Site PERIPHERAL     Blood Culture No Growth    Note: Blood cultures are incubated for 5 days and  are " "monitored continuously.Positive blood cultures  are called to the RN and reported as soon as  they are identified.      Narrative:       Per Hospital Policy: Only change Specimen Src: to \"Line\" if  specified by physician order.    Blood Culture [[id number]]     Order Status:  Canceled Specimen:  Blood from Peripheral     Blood Culture [[id number]]     Order Status:  Canceled Specimen:  Blood from Peripheral     URINALYSIS [[id number]] Collected:  11/02/18 1340    Order Status:  Completed Specimen:  Urine Updated:  11/02/18 1405     Color Yellow     Character Clear     Specific Gravity 1.014     Ph 6.0     Glucose Negative mg/dL      Ketones Negative mg/dL      Protein Negative mg/dL      Bilirubin Negative     Urobilinogen, Urine 0.2     Nitrite Negative     Leukocyte Esterase Negative     Occult Blood Negative     Micro Urine Req see below     Comment: Microscopic examination not performed when specimen is clear  and chemically negative for protein, blood, leukocyte esterase  and nitrite.         Narrative:       Indication for culture:->Emergency Room Patient    Urinalysis [[id number]] Collected:  11/02/18 0000    Order Status:  Canceled Specimen:  Urine from Urine, Clean Catch     Culture Respiratory W/ GRM STN [[id number]] Collected:  11/02/18 0000    Order Status:  Canceled Specimen:  Sputum from Sputum           Assessment:  Active Hospital Problems    Diagnosis   • *Sepsis (HCC) [A41.9]   • Postoperative infection [T81.40XA]   • Hyponatremia [E87.1]   • Acute kidney injury (HCC) [N17.9]   • Anxiety [F41.9]       Plan:  Abdominal wall soft tissue infection/abscess  In setting of recent drainage of abdominal wall seroma on 9/26/18  Fevers resolved  Leukocytosis resolved  Abdominal ultrasound + complex multiloculated fluid collection concerning for abscess  Status post irrigation and debridement of the abdominal wall on 11/3/18.  Purulence in the wound cavity was noted intraoperatively however the infection appeared " to be superficial and did not involve a go deep to the mesh  OR cultures+ MSSA  Continue cefazolin 2 g every 8 hours inpatient  Transition to p.o. Bactrim 1 double strength tablet twice daily at discharge  Plan for 2-week course from date of surgery  Stop date 11/17/18 with possible extension pending clinical improvement and wound healing  Wound VAC and wound care    History of endometrioma resection with mesh placement in 2008  Status post abdominal seroma drainage on 9/26/18.  Clear fluid was noted to be intra-abdominal in the peritoneal layer per the operative note.  Cultures at that time were negative however she had been on antibiotics prior to the drainage.  Per operative report on 11/3/18, infection was superficial   However there is always a concern that mesh may have been infected.  If that is the case, the infection should reveal itself once antibiotics are completed.  If she has recurrence of infection, would likely recommend mesh removal    Okay to discharge patient home from ID standpoint.    Follow-up in the ID clinic    Discussed with internal medicine/Dr Bray

## 2018-11-07 ENCOUNTER — HOME CARE VISIT (OUTPATIENT)
Dept: HOME HEALTH SERVICES | Facility: HOME HEALTHCARE | Age: 48
End: 2018-11-07
Payer: COMMERCIAL

## 2018-11-07 VITALS
RESPIRATION RATE: 16 BRPM | SYSTOLIC BLOOD PRESSURE: 102 MMHG | DIASTOLIC BLOOD PRESSURE: 58 MMHG | BODY MASS INDEX: 29.38 KG/M2 | WEIGHT: 187.2 LBS | HEIGHT: 67 IN | TEMPERATURE: 97.3 F

## 2018-11-07 PROCEDURE — 665001 SOC-HOME HEALTH

## 2018-11-07 PROCEDURE — G0493 RN CARE EA 15 MIN HH/HOSPICE: HCPCS

## 2018-11-07 SDOH — ECONOMIC STABILITY: HOUSING INSECURITY: UNSAFE APPLIANCES: 0

## 2018-11-07 SDOH — ECONOMIC STABILITY: HOUSING INSECURITY: UNSAFE COOKING RANGE AREA: 0

## 2018-11-07 ASSESSMENT — PATIENT HEALTH QUESTIONNAIRE - PHQ9
SUM OF ALL RESPONSES TO PHQ QUESTIONS 1-9: 3
CLINICAL INTERPRETATION OF PHQ2 SCORE: 2
5. POOR APPETITE OR OVEREATING: 0 - NOT AT ALL
2. FEELING DOWN, DEPRESSED, IRRITABLE, OR HOPELESS: 01
1. LITTLE INTEREST OR PLEASURE IN DOING THINGS: 01

## 2018-11-07 ASSESSMENT — ACTIVITIES OF DAILY LIVING (ADL)
OASIS_M1830: 02
HOME_HEALTH_OASIS: 01

## 2018-11-07 ASSESSMENT — ENCOUNTER SYMPTOMS
NAUSEA: DENIES
VOMITING: DENIES

## 2018-11-08 ENCOUNTER — TELEPHONE (OUTPATIENT)
Dept: INFECTIOUS DISEASES | Facility: MEDICAL CENTER | Age: 48
End: 2018-11-08

## 2018-11-08 ENCOUNTER — HOME CARE VISIT (OUTPATIENT)
Dept: HOME HEALTH SERVICES | Facility: HOME HEALTHCARE | Age: 48
End: 2018-11-08
Payer: COMMERCIAL

## 2018-11-08 ENCOUNTER — TELEPHONE (OUTPATIENT)
Dept: HEALTH INFORMATION MANAGEMENT | Facility: OTHER | Age: 48
End: 2018-11-08

## 2018-11-08 VITALS
HEART RATE: 90 BPM | OXYGEN SATURATION: 99 % | SYSTOLIC BLOOD PRESSURE: 114 MMHG | DIASTOLIC BLOOD PRESSURE: 72 MMHG | TEMPERATURE: 98.9 F | RESPIRATION RATE: 16 BRPM

## 2018-11-08 PROCEDURE — G0299 HHS/HOSPICE OF RN EA 15 MIN: HCPCS

## 2018-11-08 SDOH — ECONOMIC STABILITY: HOUSING INSECURITY: UNSAFE APPLIANCES: 0

## 2018-11-08 SDOH — ECONOMIC STABILITY: HOUSING INSECURITY: UNSAFE COOKING RANGE AREA: 0

## 2018-11-08 ASSESSMENT — ENCOUNTER SYMPTOMS
VOMITING: PT DENIES ANY EMESIS AT THIS TIME
NAUSEA: PT DENIES ANY NAUSEA AT THIS TIME

## 2018-11-08 NOTE — TELEPHONE ENCOUNTER
Received referral from [facility]. Medications reviewed against discharge summary 11/6/18. No clinically significant interactions or medication issues noted.     Asha Lovelace, PharmD

## 2018-11-12 ENCOUNTER — HOME CARE VISIT (OUTPATIENT)
Dept: HOME HEALTH SERVICES | Facility: HOME HEALTHCARE | Age: 48
End: 2018-11-12
Payer: COMMERCIAL

## 2018-11-12 PROCEDURE — G0299 HHS/HOSPICE OF RN EA 15 MIN: HCPCS

## 2018-11-14 ENCOUNTER — HOME CARE VISIT (OUTPATIENT)
Dept: HOME HEALTH SERVICES | Facility: HOME HEALTHCARE | Age: 48
End: 2018-11-14
Payer: COMMERCIAL

## 2018-11-14 VITALS
OXYGEN SATURATION: 99 % | HEART RATE: 65 BPM | SYSTOLIC BLOOD PRESSURE: 120 MMHG | OXYGEN SATURATION: 97 % | SYSTOLIC BLOOD PRESSURE: 110 MMHG | DIASTOLIC BLOOD PRESSURE: 70 MMHG | HEART RATE: 78 BPM | RESPIRATION RATE: 16 BRPM | DIASTOLIC BLOOD PRESSURE: 70 MMHG | RESPIRATION RATE: 16 BRPM | TEMPERATURE: 98.5 F | TEMPERATURE: 98.6 F

## 2018-11-14 PROCEDURE — G0299 HHS/HOSPICE OF RN EA 15 MIN: HCPCS

## 2018-11-14 SDOH — ECONOMIC STABILITY: HOUSING INSECURITY: UNSAFE COOKING RANGE AREA: 0

## 2018-11-14 SDOH — ECONOMIC STABILITY: HOUSING INSECURITY: UNSAFE APPLIANCES: 0

## 2018-11-14 ASSESSMENT — ENCOUNTER SYMPTOMS
NAUSEA: PT DENIES ANY NAUSEA AT THIS TIME
VOMITING: PT DENIES ANY EMESIS AT THIS TIME
VOMITING: PT DENIES ANY EMESIS AT THIS TIME
NAUSEA: PT DENIES ANY NAUSEA AT THIS TIME

## 2018-11-16 ENCOUNTER — HOME CARE VISIT (OUTPATIENT)
Dept: HOME HEALTH SERVICES | Facility: HOME HEALTHCARE | Age: 48
End: 2018-11-16
Payer: COMMERCIAL

## 2018-11-16 VITALS
DIASTOLIC BLOOD PRESSURE: 60 MMHG | SYSTOLIC BLOOD PRESSURE: 108 MMHG | HEART RATE: 78 BPM | RESPIRATION RATE: 16 BRPM | OXYGEN SATURATION: 99 % | TEMPERATURE: 98.3 F

## 2018-11-16 PROCEDURE — G0299 HHS/HOSPICE OF RN EA 15 MIN: HCPCS

## 2018-11-16 SDOH — ECONOMIC STABILITY: HOUSING INSECURITY: UNSAFE APPLIANCES: 0

## 2018-11-16 SDOH — ECONOMIC STABILITY: HOUSING INSECURITY: UNSAFE COOKING RANGE AREA: 0

## 2018-11-16 ASSESSMENT — ENCOUNTER SYMPTOMS
NAUSEA: PT DENIES ANY NAUSEA AT THIS TIME
VOMITING: PT DENIES ANY EMESIS AT THIS TIME

## 2018-11-19 ENCOUNTER — HOME CARE VISIT (OUTPATIENT)
Dept: HOME HEALTH SERVICES | Facility: HOME HEALTHCARE | Age: 48
End: 2018-11-19
Payer: COMMERCIAL

## 2018-11-19 SDOH — ECONOMIC STABILITY: HOUSING INSECURITY: UNSAFE COOKING RANGE AREA: 0

## 2018-11-19 SDOH — ECONOMIC STABILITY: HOUSING INSECURITY: UNSAFE APPLIANCES: 0

## 2018-11-19 ASSESSMENT — ACTIVITIES OF DAILY LIVING (ADL)
HOME_HEALTH_OASIS: 00
OASIS_M1830: 00

## 2018-11-20 ENCOUNTER — OFFICE VISIT (OUTPATIENT)
Dept: INFECTIOUS DISEASES | Facility: MEDICAL CENTER | Age: 48
End: 2018-11-20
Payer: COMMERCIAL

## 2018-11-20 VITALS
DIASTOLIC BLOOD PRESSURE: 70 MMHG | WEIGHT: 186.9 LBS | HEART RATE: 76 BPM | OXYGEN SATURATION: 99 % | SYSTOLIC BLOOD PRESSURE: 126 MMHG | RESPIRATION RATE: 16 BRPM | TEMPERATURE: 98.2 F | BODY MASS INDEX: 29.34 KG/M2 | HEIGHT: 67 IN

## 2018-11-20 DIAGNOSIS — T81.40XA POSTOPERATIVE INFECTION, UNSPECIFIED TYPE, INITIAL ENCOUNTER: ICD-10-CM

## 2018-11-20 DIAGNOSIS — L02.211 ABDOMINAL WALL ABSCESS: ICD-10-CM

## 2018-11-20 DIAGNOSIS — N80.129 ENDOMETRIOMA: ICD-10-CM

## 2018-11-20 PROCEDURE — 99214 OFFICE O/P EST MOD 30 MIN: CPT | Performed by: NURSE PRACTITIONER

## 2018-11-20 NOTE — PROGRESS NOTES
Subjective:     Chief Complaint   Patient presents with   • Follow-Up     Infectious Disease clinic follow up  Aurora Tan 48 y.o.female in clinic today for evaluation and management of abdominal infection, abscess, pyelo. Primary care provider: CRISTOPHER Valdez.  This is my first time meeting Ms. Tan. H/o nephrolithiasis status post right percutaneous nephrostomy tube, ileal ureter creation, removal of abdominal tumor in 2009 with mesh placement, recurrent urinary tract infection and recent hospitalization for small bowel obstruction.      Interval History: pt hospitalized 9/17/18-9/21/18 due to right pyelonephritis and rectus abdominal sheath fluid collection. Patient has ileal ureter. Urine culture 9/17/18 negative.  Prior urine culture 6/10/18 +staph aureus. Blood cultures 9/17/18 negative x 2.  Concern for mesh infection versus recurrent endometrioma.   Patient has history of removal of abdominal tumor in 2009 with mesh placement.  Discharged on oral Zyvox and Cefdinir x 14 days. S/p drainage of abdominal wall seroma on 9/26/18.  She completed antibiotics on 10/13/18.  Returned to the hospital 11/2/18-11/6/18 due to abdominal pain, fever, abdominal wall soft tissue infection/abscess.   Abdominal ultrasound revealed a complex multiloculated fluid at the surgical site concerning for abscess formation.  She was evaluated by surgery and is now status post irrigation and debridement of the abdominal wall on 11/3/18.   Purulence in the wound cavity was noted intraoperatively however per the OP note the infection appeared to be superficial and did not involve the mesh.  Cefazolin 2 g every 8 hours inpatient.  Transition to oral Bactrim at discharge times 2 weeks from date of surgery.  Patient was discharged with wound VAC and wound care as well.    Hospital records reviewed    Today 11/20/18: Patient reports feeling good.  She is frustrated stating that every time she goes off antibiotics she  "feels like within a few weeks she is back in the hospital.  Currently she is feeling well.  She denies abdominal pain.  She states she completed the Bactrim yesterday and that she tolerated with out adverse effect.  The wound VAC has also been removed due to wound resolution.  Denies drainage, pungent odor, redness, pain. Denies feeling generally ill, fevers/chills, general malaise, headache, n/v/d.  She is scheduled to follow-up on Monday with Dr. Mendoza and Dr. Rangel with [facility] on Wednesday.       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    ROS  As documented above in my HPI       Objective:     PE:  /70 (BP Location: Right arm, Patient Position: Sitting)   Pulse 76   Temp 36.8 °C (98.2 °F)   Resp 16   Ht 1.702 m (5' 7\")   Wt 84.8 kg (186 lb 14.4 oz)   LMP  (LMP Unknown)   SpO2 99%   BMI 29.27 kg/m²      Vital signs reviewed  Constitutional: patient is oriented to person, place, and time. Appears well-developed and well-nourished. No distress  Eyes: Conjunctivae normal and EOM are normal. Pupils are equal, round, and reactive to light.   Mouth/Throat: Lips without lesions, good dentition, oropharynx is clear and moist.  Neck: Trachea midline. Normal range of motion. Neck supple. No masses  Cardiovascular: Normal rate, regular rhythm, normal heart sounds and intact distal pulses. No murmur, gallop, " or friction rub. No edema.  Pulmonary/Chest: No respiratory distress. Unlabored respiratory effort, lungs clear to auscultation. No wheezes or rales.   Abdominal: Soft, non tender. BS + x 4.  Midline abdominal incision well-healed.  Transverse lower abdominal incision is well approximated and appears to be healing well.  Mild surrounding incisional erythema and edema noted.  No open wound.  Nephrostomy tube site well-healed.   Musculoskeletal: Normal range of motion. No tenderness, swelling, erythema, deformity noted.  Neurological: He is alert and oriented to person, place, and time. No cranial nerve deficit. Coordination normal.   Skin: Skin is warm and dry. Good turgor. No rashes visable.  Psychiatric: Normal mood and affect. Behavior is normal.         Assessment and Plan:   The following treatment plan was discussed with patient at length  1. Abdominal wall abscess     2. Postoperative infection, unspecified type, initial encounter     3. Endometrioma, History of endometrioma resection with mesh placement in 2008         Patient completed oral Bactrim yesterday.  Surgical incision appears to be healing well.  She denies abdominal pain, fevers, chills  She is frustrated stating that every time she completes antibiotics she has recurrent symptoms requiring hospitalization approximately 2 weeks later.  Discussed with her at length that there is always a concern of the mesh having been infected.  In the case of recurrent infection, would likely recommend mesh removal  Follow-up with surgery and urology as scheduled    Follow up: 2 weeks, RTC sooner if needed. FU with PCP for ongoing chronic medical conditions.            Please note that this dictation was created using voice recognition software. I have  worked with technical experts from [employer] to optimize the interface.  I have made every reasonable attempt to correct obvious errors, but there may be errors of grammar and possibly content that I did not  discover before finalizing the note.

## 2018-12-04 ENCOUNTER — OFFICE VISIT (OUTPATIENT)
Dept: INFECTIOUS DISEASES | Facility: MEDICAL CENTER | Age: 48
End: 2018-12-04
Payer: COMMERCIAL

## 2018-12-04 VITALS
HEIGHT: 67 IN | OXYGEN SATURATION: 98 % | SYSTOLIC BLOOD PRESSURE: 126 MMHG | BODY MASS INDEX: 29.51 KG/M2 | HEART RATE: 74 BPM | TEMPERATURE: 98.6 F | DIASTOLIC BLOOD PRESSURE: 80 MMHG | WEIGHT: 188 LBS

## 2018-12-04 DIAGNOSIS — L02.211 ABDOMINAL WALL ABSCESS: ICD-10-CM

## 2018-12-04 DIAGNOSIS — T81.40XA POSTOPERATIVE INFECTION, UNSPECIFIED TYPE, INITIAL ENCOUNTER: ICD-10-CM

## 2018-12-04 DIAGNOSIS — N80.129 ENDOMETRIOMA: ICD-10-CM

## 2018-12-04 PROCEDURE — 99213 OFFICE O/P EST LOW 20 MIN: CPT | Performed by: NURSE PRACTITIONER

## 2018-12-04 NOTE — PROGRESS NOTES
Subjective:     Chief Complaint   Patient presents with   • Follow-Up     Abdominal wall abscess     Infectious Disease clinic follow up  Aurora Tan 48 y.o.female in clinic today for evaluation and management of abdominal infection, abscess, pyelo. Primary care provider: ANDREA Valdez  H/o nephrolithiasis status post right percutaneous nephrostomy tube, ileal ureter creation, removal of abdominal tumor in 2009 with mesh placement, recurrent urinary tract infection and recent hospitalization for small bowel obstruction.      Interval History: pt hospitalized 9/17/18-9/21/18 due to right pyelonephritis and rectus abdominal sheath fluid collection. Patient has ileal ureter. Urine culture 9/17/18 negative.  Prior urine culture 6/10/18 +staph aureus. Blood cultures 9/17/18 negative x 2.  Concern for mesh infection versus recurrent endometrioma.   Patient has history of removal of abdominal tumor in 2009 with mesh placement.  Discharged on oral Zyvox and Cefdinir x 14 days. S/p drainage of abdominal wall seroma on 9/26/18.  She completed antibiotics on 10/13/18.  Returned to the hospital 11/2/18-11/6/18 due to abdominal pain, fever, abdominal wall soft tissue infection/abscess.   Abdominal ultrasound revealed a complex multiloculated fluid at the surgical site concerning for abscess formation.  She was evaluated by surgery and is now status post irrigation and debridement of the abdominal wall on 11/3/18.   Purulence in the wound cavity was noted intraoperatively however per the OP note the infection appeared to be superficial and did not involve the mesh.  Cefazolin 2 g every 8 hours inpatient.  Transition to oral Bactrim at discharge times 2 weeks from date of surgery.      Hospital records reviewed    Today 11/20/18: Patient reports feeling good.  She denies abdominal pain.  She has been off antibiotics for 2 weeks and reports feeling well.  Denies open wound, incisional drainage, pungent  "odor, redness, pain. Denies feeling generally ill, fevers/chills, general malaise, headache, n/v/d.  She states that she was seen by her primary care, Dr. Mendoza, and Dr. Rangel last week.     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    ROS  As documented above in my HPI       Objective:     PE:  /80 (BP Location: Left arm, Patient Position: Sitting, BP Cuff Size: Adult)   Pulse 74   Temp 37 °C (98.6 °F) (Temporal)   Ht 1.702 m (5' 7\")   Wt 85.3 kg (188 lb)   LMP  (LMP Unknown)   SpO2 98%   BMI 29.44 kg/m²      Vital signs reviewed  Constitutional: patient is oriented to person, place, and time. Appears well-developed and well-nourished. No distress  Eyes: Conjunctivae normal and EOM are normal. Pupils are equal, round, and reactive to light.   Mouth/Throat: Lips without lesions, good dentition, oropharynx is clear and moist.  Neck: Trachea midline. Normal range of motion. Neck supple. No masses  Cardiovascular: Normal rate, regular rhythm, normal heart sounds and intact distal pulses. No murmur, gallop, or friction rub. No edema.  Pulmonary/Chest: No respiratory distress. Unlabored respiratory effort, lungs clear to auscultation. No wheezes or rales.   Abdominal: Soft, non tender. BS + x 4.  Midline abdominal incision well-healed.  Transverse lower abdominal incision is well approximated and appears to be " healing well.   No open wound.  Nephrostomy tube site well-healed.   Musculoskeletal: Normal range of motion. No tenderness, swelling, erythema, deformity noted.  Neurological: He is alert and oriented to person, place, and time. No cranial nerve deficit. Coordination normal.   Skin: Skin is warm and dry. Good turgor. No rashes visable.  Psychiatric: Normal mood and affect. Behavior is normal.         Assessment and Plan:   The following treatment plan was discussed with patient at length  1. Abdominal wall abscess     2. Postoperative infection, unspecified type, initial encounter     3. Endometrioma  History of endometrioma resection with mesh placement in 2008     Patient completed oral Bactrim as scheduled. She has been off antibiotics for nearly 2 weeks.   She continues to be concerned that every time she completes antibiotics she has recurrent symptoms requiring hospitalization approximately 2 weeks later.  Discussed with her that there is always a concern of the mesh having been infected.  In the case of recurrent infection, would likely recommend mesh removal  Follow-up with surgery and urology as scheduled    Follow up: 2-3 weeks, RTC sooner if needed. FU with PCP for ongoing chronic medical conditions.            Please note that this dictation was created using voice recognition software. I have  worked with technical experts from [employer] to optimize the interface.  I have made every reasonable attempt to correct obvious errors, but there may be errors of grammar and possibly content that I did not discover before finalizing the note.

## 2018-12-07 ENCOUNTER — OFFICE VISIT (OUTPATIENT)
Dept: URGENT CARE | Facility: PHYSICIAN GROUP | Age: 48
End: 2018-12-07
Payer: COMMERCIAL

## 2018-12-07 ENCOUNTER — TELEPHONE (OUTPATIENT)
Dept: INFECTIOUS DISEASES | Facility: MEDICAL CENTER | Age: 48
End: 2018-12-07

## 2018-12-07 VITALS
HEART RATE: 64 BPM | RESPIRATION RATE: 14 BRPM | DIASTOLIC BLOOD PRESSURE: 74 MMHG | BODY MASS INDEX: 29.13 KG/M2 | TEMPERATURE: 97.9 F | WEIGHT: 186 LBS | SYSTOLIC BLOOD PRESSURE: 120 MMHG | OXYGEN SATURATION: 97 %

## 2018-12-07 DIAGNOSIS — T23.221A BURN OF FINGER OF RIGHT HAND, SECOND DEGREE, INITIAL ENCOUNTER: ICD-10-CM

## 2018-12-07 PROCEDURE — 99213 OFFICE O/P EST LOW 20 MIN: CPT | Performed by: EMERGENCY MEDICINE

## 2018-12-07 RX ORDER — MUPIROCIN CALCIUM 20 MG/G
1 CREAM TOPICAL 2 TIMES DAILY
Qty: 1 TUBE | Refills: 1 | Status: SHIPPED | OUTPATIENT
Start: 2018-12-07
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
## 2018-12-08 ASSESSMENT — ENCOUNTER SYMPTOMS
CHILLS: 0
FEVER: 0
VOMITING: 0
NERVOUS/ANXIOUS: 1
NAUSEA: 0

## 2018-12-19 ENCOUNTER — OFFICE VISIT (OUTPATIENT)
Dept: INFECTIOUS DISEASES | Facility: MEDICAL CENTER | Age: 48
End: 2018-12-19
Payer: COMMERCIAL

## 2018-12-19 VITALS
HEART RATE: 84 BPM | BODY MASS INDEX: 29.63 KG/M2 | WEIGHT: 188.8 LBS | HEIGHT: 67 IN | DIASTOLIC BLOOD PRESSURE: 84 MMHG | SYSTOLIC BLOOD PRESSURE: 122 MMHG | TEMPERATURE: 98.8 F | OXYGEN SATURATION: 99 %

## 2018-12-19 DIAGNOSIS — T81.40XD POSTOPERATIVE INFECTION, UNSPECIFIED TYPE, SUBSEQUENT ENCOUNTER: ICD-10-CM

## 2018-12-19 DIAGNOSIS — L02.211 ABDOMINAL WALL ABSCESS: ICD-10-CM

## 2018-12-19 PROCEDURE — 99213 OFFICE O/P EST LOW 20 MIN: CPT | Performed by: NURSE PRACTITIONER
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
## 2018-12-27 ENCOUNTER — HOSPITAL ENCOUNTER (EMERGENCY)
Facility: MEDICAL CENTER | Age: 48
End: 2018-12-28
Attending: EMERGENCY MEDICINE
Payer: COMMERCIAL

## 2018-12-27 DIAGNOSIS — R82.998 URINE WBC INCREASED: ICD-10-CM

## 2018-12-27 DIAGNOSIS — R50.9 FEBRILE ILLNESS: ICD-10-CM

## 2018-12-27 DIAGNOSIS — J06.9 UPPER RESPIRATORY TRACT INFECTION, UNSPECIFIED TYPE: ICD-10-CM

## 2018-12-27 PROCEDURE — 99284 EMERGENCY DEPT VISIT MOD MDM: CPT

## 2018-12-27 ASSESSMENT — PAIN SCALES - GENERAL: PAINLEVEL_OUTOF10: 7

## 2018-12-28 ENCOUNTER — APPOINTMENT (OUTPATIENT)
Dept: RADIOLOGY | Facility: MEDICAL CENTER | Age: 48
End: 2018-12-28
Attending: EMERGENCY MEDICINE
Payer: COMMERCIAL

## 2018-12-28 ENCOUNTER — PATIENT OUTREACH (OUTPATIENT)
Dept: HEALTH INFORMATION MANAGEMENT | Facility: OTHER | Age: 48
End: 2018-12-28

## 2018-12-28 VITALS
DIASTOLIC BLOOD PRESSURE: 56 MMHG | SYSTOLIC BLOOD PRESSURE: 114 MMHG | BODY MASS INDEX: 29.65 KG/M2 | TEMPERATURE: 99.2 F | OXYGEN SATURATION: 95 % | HEIGHT: 67 IN | RESPIRATION RATE: 16 BRPM | WEIGHT: 188.93 LBS | HEART RATE: 74 BPM

## 2018-12-28 LAB
ALBUMIN SERPL BCP-MCNC: 4.3 G/DL (ref 3.2–4.9)
ALBUMIN/GLOB SERPL: 1.3 G/DL
ALP SERPL-CCNC: 81 U/L (ref 30–99)
ALT SERPL-CCNC: 7 U/L (ref 2–50)
ANION GAP SERPL CALC-SCNC: 11 MMOL/L (ref 0–11.9)
APPEARANCE UR: CLEAR
AST SERPL-CCNC: 12 U/L (ref 12–45)
BACTERIA #/AREA URNS HPF: NEGATIVE /HPF
BASOPHILS # BLD AUTO: 0.6 % (ref 0–1.8)
BASOPHILS # BLD: 0.06 K/UL (ref 0–0.12)
BILIRUB SERPL-MCNC: 0.4 MG/DL (ref 0.1–1.5)
BILIRUB UR QL STRIP.AUTO: NEGATIVE
BUN SERPL-MCNC: 15 MG/DL (ref 8–22)
CALCIUM SERPL-MCNC: 9.3 MG/DL (ref 8.5–10.5)
CHLORIDE SERPL-SCNC: 103 MMOL/L (ref 96–112)
CO2 SERPL-SCNC: 20 MMOL/L (ref 20–33)
COLOR UR: YELLOW
CREAT SERPL-MCNC: 1.04 MG/DL (ref 0.5–1.4)
EOSINOPHIL # BLD AUTO: 0.24 K/UL (ref 0–0.51)
EOSINOPHIL NFR BLD: 2.6 % (ref 0–6.9)
EPI CELLS #/AREA URNS HPF: NEGATIVE /HPF
ERYTHROCYTE [DISTWIDTH] IN BLOOD BY AUTOMATED COUNT: 43 FL (ref 35.9–50)
FLUAV RNA SPEC QL NAA+PROBE: NEGATIVE
FLUBV RNA SPEC QL NAA+PROBE: NEGATIVE
GLOBULIN SER CALC-MCNC: 3.3 G/DL (ref 1.9–3.5)
GLUCOSE SERPL-MCNC: 101 MG/DL (ref 65–99)
GLUCOSE UR STRIP.AUTO-MCNC: NEGATIVE MG/DL
HCT VFR BLD AUTO: 44.8 % (ref 37–47)
HGB BLD-MCNC: 14.7 G/DL (ref 12–16)
HYALINE CASTS #/AREA URNS LPF: ABNORMAL /LPF
IMM GRANULOCYTES # BLD AUTO: 0.03 K/UL (ref 0–0.11)
IMM GRANULOCYTES NFR BLD AUTO: 0.3 % (ref 0–0.9)
KETONES UR STRIP.AUTO-MCNC: NEGATIVE MG/DL
LEUKOCYTE ESTERASE UR QL STRIP.AUTO: ABNORMAL
LYMPHOCYTES # BLD AUTO: 2.34 K/UL (ref 1–4.8)
LYMPHOCYTES NFR BLD: 25 % (ref 22–41)
MCH RBC QN AUTO: 28.8 PG (ref 27–33)
MCHC RBC AUTO-ENTMCNC: 32.8 G/DL (ref 33.6–35)
MCV RBC AUTO: 87.7 FL (ref 81.4–97.8)
MICRO URNS: ABNORMAL
MONOCYTES # BLD AUTO: 0.8 K/UL (ref 0–0.85)
MONOCYTES NFR BLD AUTO: 8.6 % (ref 0–13.4)
NEUTROPHILS # BLD AUTO: 5.88 K/UL (ref 2–7.15)
NEUTROPHILS NFR BLD: 62.9 % (ref 44–72)
NITRITE UR QL STRIP.AUTO: NEGATIVE
NRBC # BLD AUTO: 0 K/UL
NRBC BLD-RTO: 0 /100 WBC
PH UR STRIP.AUTO: 6 [PH]
PLATELET # BLD AUTO: 370 K/UL (ref 164–446)
PMV BLD AUTO: 10.5 FL (ref 9–12.9)
POTASSIUM SERPL-SCNC: 4 MMOL/L (ref 3.6–5.5)
PROT SERPL-MCNC: 7.6 G/DL (ref 6–8.2)
PROT UR QL STRIP: NEGATIVE MG/DL
RBC # BLD AUTO: 5.11 M/UL (ref 4.2–5.4)
RBC # URNS HPF: ABNORMAL /HPF
RBC UR QL AUTO: NEGATIVE
SODIUM SERPL-SCNC: 134 MMOL/L (ref 135–145)
SP GR UR STRIP.AUTO: 1.01
UROBILINOGEN UR STRIP.AUTO-MCNC: 0.2 MG/DL
WBC # BLD AUTO: 9.4 K/UL (ref 4.8–10.8)
WBC #/AREA URNS HPF: ABNORMAL /HPF

## 2018-12-28 PROCEDURE — 81001 URINALYSIS AUTO W/SCOPE: CPT

## 2018-12-28 PROCEDURE — 80053 COMPREHEN METABOLIC PANEL: CPT

## 2018-12-28 PROCEDURE — 87040 BLOOD CULTURE FOR BACTERIA: CPT

## 2018-12-28 PROCEDURE — 87086 URINE CULTURE/COLONY COUNT: CPT

## 2018-12-28 PROCEDURE — 85025 COMPLETE CBC W/AUTO DIFF WBC: CPT

## 2018-12-28 PROCEDURE — 87502 INFLUENZA DNA AMP PROBE: CPT

## 2018-12-28 PROCEDURE — 71045 X-RAY EXAM CHEST 1 VIEW: CPT
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
## 2018-12-28 NOTE — ED TRIAGE NOTES
"Chief Complaint   Patient presents with   • Cough   • Fever     pt states fever at home       Pt ambulatory to triage with above complaint.  Pt states she has had a fever since hans night.  Pt currently 99.2 in [facility].  Pt states max fever of 101.5f at home.  Pt denies tylenol use.  Pt states she has been in to hospital numerous times since last April after \"major surgery to reroute my ureter.\"  Pt states non-productive cough.    Pt returned to [facility], educated on triage process, and to inform staff of any changes or concerns.    Blood pressure 121/83, pulse 93, temperature 37.3 °C (99.2 °F), temperature source Oral, resp. rate 18, height 1.702 m (5' 7\"), weight 85.7 kg (188 lb 15 oz), SpO2 96 %, not currently breastfeeding.      "

## 2018-12-28 NOTE — DISCHARGE INSTRUCTIONS
As we discussed today: we offered you both a CT(cat) scan of your abdomen and a consult to urology.  If you change your mind and decide to receive either of these or wish for further work up/evaluation of your fever please return to the emergency department immediately.  Today you have elevated white blood cells in your urine and this is abnormal.  Despite this you do not appear to have any bacteria in your urine and we will send the urine for culture.  Please call first thing this morning to establish close follow-up appointment with urology.

## 2018-12-29 LAB
MYCOBACTERIUM SPEC CULT: NORMAL
RHODAMINE-AURAMINE STN SPEC: NORMAL
SIGNIFICANT IND 70042: NORMAL
SITE SITE: NORMAL
SOURCE SOURCE: NORMAL

## 2018-12-30 LAB
BACTERIA UR CULT: NORMAL
SIGNIFICANT IND 70042: NORMAL
SITE SITE: NORMAL
SOURCE SOURCE: NORMAL

## 2019-01-02 LAB
BACTERIA BLD CULT: NORMAL
SIGNIFICANT IND 70042: NORMAL
SITE SITE: NORMAL
SOURCE SOURCE: NORMAL

## 2019-01-10 ENCOUNTER — APPOINTMENT (OUTPATIENT)
Dept: RADIOLOGY | Facility: MEDICAL CENTER | Age: 49
End: 2019-01-10
Attending: EMERGENCY MEDICINE
Payer: COMMERCIAL

## 2019-01-10 ENCOUNTER — HOSPITAL ENCOUNTER (EMERGENCY)
Facility: MEDICAL CENTER | Age: 49
End: 2019-01-10
Attending: EMERGENCY MEDICINE
Payer: COMMERCIAL

## 2019-01-10 VITALS
BODY MASS INDEX: 31.11 KG/M2 | HEIGHT: 67 IN | RESPIRATION RATE: 18 BRPM | DIASTOLIC BLOOD PRESSURE: 60 MMHG | HEART RATE: 76 BPM | TEMPERATURE: 97.8 F | WEIGHT: 198.19 LBS | OXYGEN SATURATION: 97 % | SYSTOLIC BLOOD PRESSURE: 100 MMHG

## 2019-01-10 DIAGNOSIS — N13.30 HYDRONEPHROSIS OF RIGHT KIDNEY: ICD-10-CM

## 2019-01-10 DIAGNOSIS — N12 PYELONEPHRITIS: ICD-10-CM

## 2019-01-10 LAB
ALBUMIN SERPL BCP-MCNC: 4.2 G/DL (ref 3.2–4.9)
ALBUMIN/GLOB SERPL: 1.4 G/DL
ALP SERPL-CCNC: 81 U/L (ref 30–99)
ALT SERPL-CCNC: 7 U/L (ref 2–50)
ANION GAP SERPL CALC-SCNC: 10 MMOL/L (ref 0–11.9)
APPEARANCE UR: CLEAR
AST SERPL-CCNC: 12 U/L (ref 12–45)
BACTERIA #/AREA URNS HPF: NEGATIVE /HPF
BASOPHILS # BLD AUTO: 0.7 % (ref 0–1.8)
BASOPHILS # BLD: 0.09 K/UL (ref 0–0.12)
BILIRUB SERPL-MCNC: 0.3 MG/DL (ref 0.1–1.5)
BILIRUB UR QL STRIP.AUTO: NEGATIVE
BUN SERPL-MCNC: 16 MG/DL (ref 8–22)
CALCIUM SERPL-MCNC: 9.3 MG/DL (ref 8.5–10.5)
CHLORIDE SERPL-SCNC: 105 MMOL/L (ref 96–112)
CO2 SERPL-SCNC: 24 MMOL/L (ref 20–33)
COLOR UR: YELLOW
CREAT SERPL-MCNC: 1.06 MG/DL (ref 0.5–1.4)
EOSINOPHIL # BLD AUTO: 0.27 K/UL (ref 0–0.51)
EOSINOPHIL NFR BLD: 2.2 % (ref 0–6.9)
EPI CELLS #/AREA URNS HPF: NEGATIVE /HPF
ERYTHROCYTE [DISTWIDTH] IN BLOOD BY AUTOMATED COUNT: 41.4 FL (ref 35.9–50)
GLOBULIN SER CALC-MCNC: 3.1 G/DL (ref 1.9–3.5)
GLUCOSE SERPL-MCNC: 103 MG/DL (ref 65–99)
GLUCOSE UR STRIP.AUTO-MCNC: NEGATIVE MG/DL
HCG UR QL: NEGATIVE
HCT VFR BLD AUTO: 41.4 % (ref 37–47)
HGB BLD-MCNC: 14 G/DL (ref 12–16)
HYALINE CASTS #/AREA URNS LPF: ABNORMAL /LPF
IMM GRANULOCYTES # BLD AUTO: 0.03 K/UL (ref 0–0.11)
IMM GRANULOCYTES NFR BLD AUTO: 0.2 % (ref 0–0.9)
KETONES UR STRIP.AUTO-MCNC: NEGATIVE MG/DL
LACTATE BLD-SCNC: 1.3 MMOL/L (ref 0.5–2)
LEUKOCYTE ESTERASE UR QL STRIP.AUTO: ABNORMAL
LYMPHOCYTES # BLD AUTO: 2.83 K/UL (ref 1–4.8)
LYMPHOCYTES NFR BLD: 22.5 % (ref 22–41)
MCH RBC QN AUTO: 29.9 PG (ref 27–33)
MCHC RBC AUTO-ENTMCNC: 33.8 G/DL (ref 33.6–35)
MCV RBC AUTO: 88.3 FL (ref 81.4–97.8)
MICRO URNS: ABNORMAL
MONOCYTES # BLD AUTO: 0.7 K/UL (ref 0–0.85)
MONOCYTES NFR BLD AUTO: 5.6 % (ref 0–13.4)
NEUTROPHILS # BLD AUTO: 8.63 K/UL (ref 2–7.15)
NEUTROPHILS NFR BLD: 68.8 % (ref 44–72)
NITRITE UR QL STRIP.AUTO: NEGATIVE
NRBC # BLD AUTO: 0 K/UL
NRBC BLD-RTO: 0 /100 WBC
PH UR STRIP.AUTO: 6 [PH]
PLATELET # BLD AUTO: 440 K/UL (ref 164–446)
PMV BLD AUTO: 10.6 FL (ref 9–12.9)
POTASSIUM SERPL-SCNC: 4 MMOL/L (ref 3.6–5.5)
PROT SERPL-MCNC: 7.3 G/DL (ref 6–8.2)
PROT UR QL STRIP: NEGATIVE MG/DL
RBC # BLD AUTO: 4.69 M/UL (ref 4.2–5.4)
RBC # URNS HPF: ABNORMAL /HPF
RBC UR QL AUTO: ABNORMAL
SODIUM SERPL-SCNC: 139 MMOL/L (ref 135–145)
SP GR UR STRIP.AUTO: 1.01
SP GR UR STRIP.AUTO: 1.01
UROBILINOGEN UR STRIP.AUTO-MCNC: 0.2 MG/DL
WBC # BLD AUTO: 12.6 K/UL (ref 4.8–10.8)
WBC #/AREA URNS HPF: ABNORMAL /HPF

## 2019-01-10 PROCEDURE — 81002 URINALYSIS NONAUTO W/O SCOPE: CPT

## 2019-01-10 PROCEDURE — 80053 COMPREHEN METABOLIC PANEL: CPT

## 2019-01-10 PROCEDURE — 81025 URINE PREGNANCY TEST: CPT

## 2019-01-10 PROCEDURE — 81001 URINALYSIS AUTO W/SCOPE: CPT

## 2019-01-10 PROCEDURE — 87086 URINE CULTURE/COLONY COUNT: CPT

## 2019-01-10 PROCEDURE — 36415 COLL VENOUS BLD VENIPUNCTURE: CPT

## 2019-01-10 PROCEDURE — 74018 RADEX ABDOMEN 1 VIEW: CPT

## 2019-01-10 PROCEDURE — 700111 HCHG RX REV CODE 636 W/ 250 OVERRIDE (IP): Performed by: EMERGENCY MEDICINE

## 2019-01-10 PROCEDURE — 99285 EMERGENCY DEPT VISIT HI MDM: CPT

## 2019-01-10 PROCEDURE — 76775 US EXAM ABDO BACK WALL LIM: CPT

## 2019-01-10 PROCEDURE — 96365 THER/PROPH/DIAG IV INF INIT: CPT

## 2019-01-10 PROCEDURE — 85025 COMPLETE CBC W/AUTO DIFF WBC: CPT

## 2019-01-10 PROCEDURE — 700105 HCHG RX REV CODE 258: Performed by: EMERGENCY MEDICINE

## 2019-01-10 PROCEDURE — 83605 ASSAY OF LACTIC ACID: CPT

## 2019-01-10 RX ORDER — CEFDINIR 300 MG/1
300 CAPSULE ORAL 2 TIMES DAILY
Qty: 28 CAP | Refills: 0 | Status: SHIPPED | OUTPATIENT
Start: 2019-01-10 | End: 2019-01-24

## 2019-01-10 RX ADMIN — CEFTRIAXONE SODIUM 2 G: 2 INJECTION, POWDER, FOR SOLUTION INTRAMUSCULAR; INTRAVENOUS at 20:53

## 2019-01-10 ASSESSMENT — LIFESTYLE VARIABLES: DO YOU DRINK ALCOHOL: NO

## 2019-01-11 NOTE — ED TRIAGE NOTES
".  Chief Complaint   Patient presents with   • Flank Pain     Bilateral since 0400 this morning   • Painful Urination     (+) blood in urine   • Vaginal Pain     ./83   Pulse 85   Temp 36.6 °C (97.8 °F) (Temporal)   Resp 18   Ht 1.702 m (5' 7\")   Wt 89.9 kg (198 lb 3.1 oz)   LMP  (LMP Unknown)   SpO2 100%   BMI 31.04 kg/m²     Ambulatory to triage with above complaints, extensive renal history, given urine specimen cup and clean catch instructions, educated on triage process, placed in lobby, told to inform staff of any changes in condition.    "

## 2019-01-11 NOTE — DISCHARGE INSTRUCTIONS
You were seen in the Emergency Department for urinary issues.    Labs were completed without significant acute abnormalities other than mild elevation of white blood cell count.  US showed chronic hydronephrosis of right kidney.    Please use 1,000mg of tylenol or 600mg of ibuprofen every 6 hours as needed for pain.  Take antibiotics as directed.    Please follow up with your primary care physician and urology.    Return to the Emergency Department with fevers>100.4, worsening abdominal pain, vomiting or other concerns .

## 2019-01-12 LAB
BACTERIA UR CULT: NORMAL
SIGNIFICANT IND 70042: NORMAL
SITE SITE: NORMAL
SOURCE SOURCE: NORMAL

## 2019-01-31 LAB — EKG IMPRESSION: NORMAL

## 2020-01-16 ENCOUNTER — HOSPITAL ENCOUNTER (OUTPATIENT)
Dept: LAB | Facility: MEDICAL CENTER | Age: 50
End: 2020-01-16
Attending: PHYSICIAN ASSISTANT
Payer: COMMERCIAL

## 2020-01-16 PROCEDURE — 87086 URINE CULTURE/COLONY COUNT: CPT

## 2020-01-16 PROCEDURE — 87186 SC STD MICRODIL/AGAR DIL: CPT

## 2020-01-16 PROCEDURE — 87077 CULTURE AEROBIC IDENTIFY: CPT

## 2020-03-12 ENCOUNTER — HOSPITAL ENCOUNTER (OUTPATIENT)
Dept: LAB | Facility: MEDICAL CENTER | Age: 50
End: 2020-03-12
Attending: PHYSICIAN ASSISTANT
Payer: COMMERCIAL

## 2020-03-12 PROCEDURE — 87086 URINE CULTURE/COLONY COUNT: CPT

## 2020-03-13 LAB
AMBIGUOUS DTTM AMBI4: NORMAL
SIGNIFICANT IND 70042: NORMAL
SITE SITE: NORMAL
SOURCE SOURCE: NORMAL

## 2020-03-15 LAB
BACTERIA UR CULT: NORMAL
SIGNIFICANT IND 70042: NORMAL
SITE SITE: NORMAL
SOURCE SOURCE: NORMAL

## 2020-06-15 ENCOUNTER — HOSPITAL ENCOUNTER (OUTPATIENT)
Dept: LAB | Facility: MEDICAL CENTER | Age: 50
End: 2020-06-15
Attending: PHYSICIAN ASSISTANT
Payer: COMMERCIAL

## 2020-06-15 PROCEDURE — 87086 URINE CULTURE/COLONY COUNT: CPT

## 2020-06-18 ENCOUNTER — HOSPITAL ENCOUNTER (OUTPATIENT)
Dept: RADIOLOGY | Facility: MEDICAL CENTER | Age: 50
End: 2020-06-18
Attending: PHYSICIAN ASSISTANT
Payer: COMMERCIAL

## 2020-06-18 DIAGNOSIS — N39.0 URINARY TRACT INFECTION WITHOUT HEMATURIA, SITE UNSPECIFIED: ICD-10-CM

## 2020-06-18 LAB
BACTERIA UR CULT: NORMAL
SIGNIFICANT IND 70042: NORMAL
SITE SITE: NORMAL
SOURCE SOURCE: NORMAL

## 2020-06-18 PROCEDURE — 76775 US EXAM ABDO BACK WALL LIM: CPT

## 2020-10-01 NOTE — H&P
CHIEF COMPLAINT:  This is a 48-year-old woman with complaints of abdominal   pain for 24 hours with associated nausea and vomiting.    HISTORY OF PRESENT ILLNESS:  This 48-year-old woman presented on 07/31 to   [facility] with complaints of right lower quadrant pain for several   days, nausea, vomiting with a prior history of ileal ureter 2-1/2 months ago.    The patient was admitted into the hospital service, was diagnosed with a   partial small-bowel obstruction.  She had a CT scan showing abnormality of the   small bowel with some bowel wall thickening and an ileal ureter.  There was   no evidence of retroperitoneal problems.  She has had imaging showing a   refluxing cystogram without evidence of urine leak previously.  The patient;   however, during that hospital stay, was managed with nasogastric tube   conservative therapy.  Consultation with Dr. Mendoza was performed, who felt she   did not have a bowel obstruction, but with conservative therapy, she   improved, her abdominal pain resolved, and prior to removing her NG tube, a   small bowel upper GI series was performed, which showed follow through all the   way to the bowel.  Patient was discharged, did well until last 12 hours when   she began having the same symptoms.  She denies fever.    PAST MEDICAL HISTORY:  Noteworthy for ureteral stricture disease.  She is   status post an ileal ureter for ureteral renal placement.  She has a history   of heartburn in the past as well as a remote history of heart attack, although   this is questionable.  She has a history of hypercholesterolemia.    PAST SURGICAL HISTORY:  Noteworthy for exploratory laparotomy with ileal   ureter reconstruction.  She has a history of lysis of adhesions during that   surgery as well and has had multiple endoscopic procedures in the right ureter   including treatment of the stone, retrograde pyelogram with balloon dilation   and laser treatment.  She, in the past, has had an  abdominal hysterectomy in   .  She had an abdominal tumor removed in  at that time and has had a    in , , , , tonsillectomy and ear surgeries multiple times.    CURRENT MEDICATIONS:  Mucinex 600 mg SR every 12 hours.    ALLERGIES:  TO MORPHINE AND LEVAQUIN.    SOCIAL HISTORY:  She is , has children.  She is a nonsmoker,   nondrinker.  No history of drug use.    FAMILY HISTORY:  Noncontributory.    REVIEW OF SYSTEMS:  GENERAL:  Denies any recent weight loss or weight gain.  She has not had fever   or chills.  HEENT:  Denies any visual change, headache or difficulty swallowing.  RESPIRATORY:  Denies any shortness of breath.  She denies any wheezing.  CARDIOVASCULAR:  Denies any skipped heartbeats or chest pain.  ABDOMEN:  She feels very tender in the right lower quadrant.  She has nausea   and she has had emesis.  She denies any blood in the stool.  She denies any   lack of passing gas, as this has occurred, as well as she has some stooling.  GENITOURINARY:  She denies dysuria or urgency.  She does have some flank   discomfort with voiding with a full bladder from reflux.  She has mucus in her   urine consistent with the ileal ureter and she has had recurrent urinary   tract infections, but no feeling of pyelonephritis during today's evaluation.  ENDOCRINE:  She denies excessive feeling of hot or too cold or thirst.  NEUROLOGIC:  Denies any discoordination or weakness in the upper or lower   extremities.  PSYCHIATRIC:  She has had some anxiety with her medical   problems, but no depression.    PHYSICAL EXAMINATION:  VITAL SIGNS:  Stable.  She is afebrile.  GENERAL:  She is a well-developed, well-nourished woman, who is in moderate   distress due to abdominal pain.  HEENT:  Normocephalic, atraumatic.  Pupils are equal and round.  Sclerae are   nonicteric.  NECK:  Supple.  I did not appreciate a carotid bruit or any thyromegaly.  CARDIOVASCULAR:  Regular rate and rhythm without a  murmur.  ABDOMEN:  Soft, tender in the right lower quadrant.  There is no rebound or   guarding.  She has normoactive bowel sounds.  BACK:  With no costovertebral angle tenderness.  GENITOURINARY:  She has a well-healed midline incision without evidence of   hernia or wound complication.  EXTREMITIES:  Negative for clubbing, cyanosis or edema.  Homans sign negative.  NEUROLOGIC:  Cranial nerves II-XII intact.  Motor and sensory examination is   nonfocal.    ASSESSMENT:  Recurrence of acute abdominal pain with nausea and vomiting   suggesting small-bowel obstruction.    PLAN AND RECOMMENDATIONS:  I explained to the patient this morning in the   office in the presence of  that I would admit her to [facility].  She is going to be made n.p.o.  IV hydration would be performed.  I   will order a cystogram to confirm that there is no leak in the urinary system,   although this is because of Dr. Mendoza's concern of a retroperitoneal process,   which I believe, is the ileal ureter.  I believe that the primary problem is   in the right lower quadrant.  There is thickening of the bowel wall and a   segment of bowel.  It was previously noted on a prior CAT scan, but to a much   lesser degree.  She could have adhesions which are recurrent and she had   adhesions at the time of surgery and has had prior abdominal and pelvic   surgery.  In addition, I have discussed with the patient the fact that this   could also be a different problem and that the standard would be to proceed   with acute abdominal series.  I am not going to repeat all of her imaging as   the symptoms are the same.  Blood work has been ordered and I explained to the   patient that if she continues to have pain, the abdominal exploration may be   necessary and I will reconsult general surgery if needed.  She appreciated   this information, was in a fair amount of distress, just because of ongoing   health problems, and I explained to her that we  will do our best to figure out   answers to her pain, which is uncertain at this point.       ____________________________________     MD OXANA ADKINS / JERILYN    DD:  08/07/2018 17:30:42  DT:  08/07/2018 18:15:01    D#:  [id number]  Job#:  [id number]   [Appropriately responsive] : appropriately responsive [Alert] : alert [No Acute Distress] : no acute distress [No Lymphadenopathy] : no lymphadenopathy [Soft] : soft [Non-tender] : non-tender [Non-distended] : non-distended [No Lesions] : no lesions [Oriented x3] : oriented x3 [Examination Of The Breasts] : a normal appearance [No Masses] : no breast masses were palpable [Labia Majora] : normal [Labia Minora] : normal [No Bleeding] : There was no active vaginal bleeding [Normal] : normal [Uterine Adnexae] : normal [Tenderness] : nontender [Enlarged ___ wks] : not enlarged

## 2020-12-02 ENCOUNTER — HOSPITAL ENCOUNTER (OUTPATIENT)
Facility: MEDICAL CENTER | Age: 50
End: 2020-12-02
Attending: UROLOGY
Payer: COMMERCIAL

## 2021-04-29 NOTE — CARE PLAN
Problem: Communication  Goal: The ability to communicate needs accurately and effectively will improve  Outcome: PROGRESSING AS EXPECTED      Problem: Safety  Goal: Will remain free from injury  Outcome: PROGRESSING AS EXPECTED  Hourly rounding, bed low and locked. Call light within reach. Alarms on for safety.         stated

## 2021-07-19 ENCOUNTER — HOSPITAL ENCOUNTER (OUTPATIENT)
Facility: MEDICAL CENTER | Age: 51
End: 2021-07-19
Attending: PHYSICIAN ASSISTANT
Payer: COMMERCIAL

## 2021-07-19 PROCEDURE — 87086 URINE CULTURE/COLONY COUNT: CPT

## 2021-07-19 NOTE — ED PROVIDER NOTES
ED Provider Note    Scribed for Bessy Martin M.D. by Antwan Hull. 1/10/2019  5:54 PM    Means of arrival: Walk in  History obtained from: Patient  History limited by: None      CHIEF COMPLAINT  Chief Complaint   Patient presents with   • Flank Pain     Bilateral since 0400 this morning   • Painful Urination     (+) blood in urine   • Vaginal Pain       HPI  Aurora Tan is a 48 y.o. female with past medical history of chronic urologic issues including ileal ureter who presents to the Emergency Department for evaluation of bilateral flank pain onset this morning. The patient woke up to the flank pain.  She describes severe, nonradiating pain worse to the right than the left.  She took home pain medication at home without significant relief.She confirms dysuria, hematuria, and vaginal pain, but denies any fevers, nausea, emesis, diarrhea, and constipation.  She is followed by Dr. Rangel, Urology and he informed her to come to the ED for evaluation. She is also followed by Dr. Mendoza, Surgery as well. The patient denies any cholecystectomy and appendectomy . No chance of pregnancy secondary to hysterectomy. She also denies any chance of STD.    REVIEW OF SYSTEMS  Pertinent positive include flank pain, dysuria, hematuria, and vaginal pain. Pertinent negative include fevers, nausea, emesis, diarrhea, and constipation. All other systems reviewed and are negative.      PAST MEDICAL HISTORY   has a past medical history of Breath shortness (04/25/2018); Heart attack (HCC) (11/03/2017); Heart burn (04/25/2018); High cholesterol; Pain (04/2018); Pain (04/25/2018); Renal disorder; Renal disorder (04/25/2018); Snoring (04/25/2018); and Urinary incontinence.    SOCIAL HISTORY  Social History     Social History Main Topics   • Smoking status: Former Smoker     Packs/day: 0.25     Years: 10.00     Types: Cigarettes     Quit date: 4/13/2013   • Smokeless tobacco: Never Used   • Alcohol use Yes      Comment: occ   •  Drug use: No       SURGICAL HISTORY   has a past surgical history that includes abdominal exploration; cystoscopy (Right, 9/17/2017); ureteroscopy (Right, 9/17/2017); lasertripsy (Right, 9/17/2017); stent placement (Right, 9/17/2017); ureteroscopy (Right, 11/7/2017); lasertripsy (Right, 11/7/2017); urethral dilatation (Right, 11/7/2017); cystoscopy stent placement (Right, 11/7/2017); cystoscopy (N/A, 1/31/2018); retrogrades (Right, 1/31/2018); pyelogram (Right, 1/31/2018); ureteroscopy (Right, 1/31/2018); lasertripsy (Right, 1/31/2018); stent placement (Right, 1/31/2018); cystoscopy stent placement (Right, 3/21/2018); ureteroscopy (Right, 3/21/2018); retrogrades (Right, 3/21/2018); pyelogram (Right, 3/21/2018); tonsillectomy; abdominal hysterectomy total (2009); primary c section; other; other; other abdominal surgery (2009); other abdominal surgery (2009); exploratory laparotomy (Right, 4/30/2018); lysis adhesions general (4/30/2018); exploratory laparotomy (9/26/2018); and irrigation & debridement general (Right, 11/3/2018).    CURRENT MEDICATIONS  No current facility-administered medications for this encounter.     Current Outpatient Prescriptions:   •  cefdinir (OMNICEF) 300 MG Cap, Take 1 Cap by mouth 2 times a day for 14 days., Disp: 28 Cap, Rfl: 0  •  mupirocin calcium (BACTROBAN) 2 % Cream, Apply 1 Application to affected area(s) 2 times a day., Disp: 1 Tube, Rfl: 1  •  OxyCODONE HCl 5 MG Tablet Abuse-Deterrent, Take 5 mg by mouth every 6 hours as needed., Disp: , Rfl:   •  Pseudoephedrine-Guaifenesin (MUCINEX D MAX STRENGTH PO), Take 1 tablet by mouth 2 Times a Day., Disp: , Rfl:   •  Probiotic Product (PROBIOTIC PO), Take 1 Cap by mouth every day., Disp: , Rfl:   •  acetaminophen (TYLENOL) 500 MG Tab, Take 1,000 mg by mouth every 6 hours as needed for Moderate Pain., Disp: , Rfl:   •  estradiol (MINIVELLE) 0.05 MG/24HR PATCH BIWEEKLY, Apply 1 Patch to skin as directed every 7 days. On Mon, Disp: , Rfl:  "      ALLERGIES  Allergies   Allergen Reactions   • Levofloxacin Hives, Shortness of Breath and Itching     Itching, pruritis     • Morphine Itching              PHYSICAL EXAM   VITAL SIGNS: /83   Pulse 85   Temp 36.6 °C (97.8 °F) (Temporal)   Resp 18   Ht 1.702 m (5' 7\")   Wt 89.9 kg (198 lb 3.1 oz)   LMP  (LMP Unknown)   SpO2 100%   BMI 31.04 kg/m²    Constitutional: Nontoxic appearing middle-aged female, Alert in no apparent distress.  HENT: Normocephalic, Atraumatic. Bilateral external ears normal. Nose normal.  Moist mucous membranes.  Oropharynx clear.  Eyes: Pupils are equal and reactive. Conjunctiva normal.   Neck: Supple, full range of motion  Heart: Regular rate and rhythm.  No murmurs.    Lungs: No respiratory distress, normal work of breathing. Lungs clear to auscultation bilaterally.  Abdomen Soft, no distention.  Mild right lower quadrant tenderness to palpation   Musculoskeletal: Atraumatic. No obvious deformities noted.  No lower extremity edema.  Back: Right flank tenderness to palpation, no CVA tenderness to palpation  Skin: Warm, Dry.  No erythema, No rash.   Neurologic: Alert and oriented x3. Moving all extremities spontaneously without focal deficits.  Psychiatric: Affect normal, Mood normal, Appears appropriate and not intoxicated.    DIAGNOSTIC STUDIES      LABS  Personally reviewed by me  Labs Reviewed   URINALYSIS,CULTURE IF INDICATED - Abnormal; Notable for the following:        Result Value    Leukocyte Esterase Moderate (*)     Occult Blood Moderate (*)     All other components within normal limits   CBC WITH DIFFERENTIAL - Abnormal; Notable for the following:     WBC 12.6 (*)     Neutrophils (Absolute) 8.63 (*)     All other components within normal limits   COMP METABOLIC PANEL - Abnormal; Notable for the following:     Glucose 103 (*)     All other components within normal limits   URINE MICROSCOPIC (W/UA) - Abnormal; Notable for the following:     WBC  (*)     RBC " 5-10 (*)     Hyaline Cast 3-5 (*)     All other components within normal limits   ESTIMATED GFR - Abnormal; Notable for the following:     GFR If Non  55 (*)     All other components within normal limits   HCG QUALITATIVE UR   REFRACTOMETER SG   LACTIC ACID   SPECIFIC GRAVITY   URINE CULTURE(NEW)         RADIOLOGY  Personally reviewed by me  [id number]   Final Result      1.  There are no abnormal urinary tract calcifications.      US-RENAL   Final Result      1.  Moderate right hydronephrosis. No right ureteral jet identified. No stone identifiable.      2.  No left hydronephrosis.          ED COURSE  Vitals:    01/10/19 1726 01/10/19 2043 01/10/19 2101 01/10/19 2217   BP:    100/60   Pulse:  79 74 76   Resp:    18   Temp:       TempSrc:       SpO2:  99% 99% 97%   Weight: 89.9 kg (198 lb 3.1 oz)      Height:             Medications administered:  Medications   cefTRIAXone (ROCEPHIN) 2 g in  mL IVPB (0 g Intravenous Stopped 1/10/19 2123)         Old records personally reviewed:  Patient reports that she has been to the hospital multiple times since last April when she had a bladder augmentation surgery following a kidney stone in 12/17. After multiple attempts to remove the kidney stone, her ureter was lost and she chose to undergo the bladder augmentation surgery with Dr. Padilla. Following the surgery, she used a nephrostomy tube for 5 months. She had an exploratory surgery in September with Dr. Gardner due to recurring infections who noted that her abdominal mesh (put in place 10 years ago) was fine. Five weeks later, she was admitted here for 5 days due to a Staph infection. She last took an antibiotic (Bactrim) on November 19th.     5:54 PM Patient seen and examined at bedside. The patient presents with bilateral flank pain. Ordered for DX abdomen, US Renal, CBC, CMP, Lactic acid, UA, HCG Qualitative UR, and Refractometer SG to evaluate. I informed the patient that she will need to  undergo an X-Ray, ultrasound and blood work.    MEDICAL DECISION MAKING  Patient with complicated urologic history including a ileal ureter who presents with 1 day history of flank pain and dysuria.  She is afebrile with normal vitals on arrival and nontoxic-appearing.  She has no signs of systemic illness.  Abdominal exam is overall benign with mild tenderness in the right lower quadrant and right flank.  Clinically doubt bowel obstruction or perforation, appendicitis, diverticulitis.  Urine test does demonstrate concern for infection.  She has evidence of mild leukocytosis however normal lactate and kidney function.  Ultrasound was performed with right-sided hydronephrosis which appears chronic for her.  There is no evidence of nephrolithiasis on ultrasound or x-ray.  Based on patient's reassuring presentation, I do not feel that CT scan is indicated at this time.  I would like to treat her for UTI/pyelonephritis with close follow-up with urology and strict return precautions if symptoms are not improving.    8:37 PM Dr. Castillo, Urology, consulted and believe all of her finding are consistent with her known underlying disease, but should be treated for UTI. Recommends treatment for 14 days and follow up with Dr. Rangel, Urology.    10:02 PM Upon reassessment, patient is resting comfortably with normal vital signs.  No new complaints at this time.  Discussed results with patient and/or family as well as importance of primary care and urology follow up.  Patient understands plan of care and strict return precautions for new or changing symptoms.       DISPOSITION:  Patient will be discharged home in stable condition.    FOLLOW UP:  ANDREA Valdez  [address] [place] [id number] [phone]    Schedule an appointment as soon as possible for a visit       Kapil Rangel M.D.  [address] [place] [address]  [phone]    Schedule an appointment as soon as possible for a visit       [facility], Emergency Dept  [address] [facility] [address]  [phone]    If symptoms worsen      OUTPATIENT MEDICATIONS:  Discharge Medication List as of 1/10/2019  9:47 PM      START taking these medications    Details   cefdinir (OMNICEF) 300 MG Cap Take 1 Cap by mouth 2 times a day for 14 days., Disp-28 Cap, R-0, Print Rx Paper             IMPRESSION  (N12) Pyelonephritis  (N13.30) Hydronephrosis of right kidney    Results, diagnoses, and treatment options were discussed with the patient and/or family. Patient verbalized understanding of plan of care.         Antwan DAIGLE (Scribe), am scribing for, and in the presence of, Bessy Martin M.D..    Electronically signed by: Antwan Hull (Scribe), 1/10/2019    Bessy DAIGLE M.D. personally performed the services described in this documentation, as scribed by Antwan Hull in my presence, and it is both accurate and complete. C    The note accurately reflects work and decisions made by me.  Bessy Martin  1/11/2019  1:41 AM       [Follow-Up Visit] : a follow-up visit for [Other: _____] : [unfilled] [FreeTextEntry2] : Thyroid cancer

## 2021-07-23 ENCOUNTER — HOSPITAL ENCOUNTER (OUTPATIENT)
Dept: RADIOLOGY | Facility: MEDICAL CENTER | Age: 51
End: 2021-07-23
Attending: PHYSICIAN ASSISTANT
Payer: COMMERCIAL

## 2021-07-23 DIAGNOSIS — R10.9 ABDOMINAL PAIN, UNSPECIFIED ABDOMINAL LOCATION: ICD-10-CM

## 2021-07-23 LAB
BACTERIA UR CULT: NORMAL
SIGNIFICANT IND 70042: NORMAL
SITE SITE: NORMAL
SOURCE SOURCE: NORMAL

## 2021-07-23 PROCEDURE — 76775 US EXAM ABDO BACK WALL LIM: CPT

## 2021-08-05 ENCOUNTER — HOSPITAL ENCOUNTER (OUTPATIENT)
Dept: RADIOLOGY | Facility: MEDICAL CENTER | Age: 51
End: 2021-08-05
Attending: UROLOGY
Payer: COMMERCIAL

## 2021-08-05 DIAGNOSIS — R10.9 STOMACH ACHE: ICD-10-CM

## 2021-08-06 ENCOUNTER — HOSPITAL ENCOUNTER (OUTPATIENT)
Dept: RADIOLOGY | Facility: MEDICAL CENTER | Age: 51
End: 2021-08-06
Attending: UROLOGY
Payer: COMMERCIAL

## 2021-08-06 PROCEDURE — 700117 HCHG RX CONTRAST REV CODE 255: Performed by: UROLOGY

## 2021-08-06 PROCEDURE — 74178 CT ABD&PLV WO CNTR FLWD CNTR: CPT

## 2021-08-06 RX ADMIN — IOHEXOL 100 ML: 350 INJECTION, SOLUTION INTRAVENOUS at 18:04

## 2022-03-03 ENCOUNTER — APPOINTMENT (RX ONLY)
Dept: URBAN - METROPOLITAN AREA CLINIC 6 | Facility: CLINIC | Age: 52
Setting detail: DERMATOLOGY
End: 2022-03-03

## 2022-03-03 DIAGNOSIS — Z71.89 OTHER SPECIFIED COUNSELING: ICD-10-CM

## 2022-03-03 DIAGNOSIS — L40.59 OTHER PSORIATIC ARTHROPATHY: ICD-10-CM | Status: INADEQUATELY CONTROLLED

## 2022-03-03 DIAGNOSIS — L57.0 ACTINIC KERATOSIS: ICD-10-CM

## 2022-03-03 DIAGNOSIS — L57.8 OTHER SKIN CHANGES DUE TO CHRONIC EXPOSURE TO NONIONIZING RADIATION: ICD-10-CM

## 2022-03-03 DIAGNOSIS — D22 MELANOCYTIC NEVI: ICD-10-CM

## 2022-03-03 DIAGNOSIS — L82.1 OTHER SEBORRHEIC KERATOSIS: ICD-10-CM

## 2022-03-03 DIAGNOSIS — L81.4 OTHER MELANIN HYPERPIGMENTATION: ICD-10-CM

## 2022-03-03 DIAGNOSIS — L40.0 PSORIASIS VULGARIS: ICD-10-CM | Status: STABLE

## 2022-03-03 DIAGNOSIS — D18.0 HEMANGIOMA: ICD-10-CM

## 2022-03-03 PROBLEM — D48.5 NEOPLASM OF UNCERTAIN BEHAVIOR OF SKIN: Status: ACTIVE | Noted: 2022-03-03

## 2022-03-03 PROBLEM — D18.01 HEMANGIOMA OF SKIN AND SUBCUTANEOUS TISSUE: Status: ACTIVE | Noted: 2022-03-03

## 2022-03-03 PROBLEM — D22.5 MELANOCYTIC NEVI OF TRUNK: Status: ACTIVE | Noted: 2022-03-03

## 2022-03-03 PROBLEM — D23.72 OTHER BENIGN NEOPLASM OF SKIN OF LEFT LOWER LIMB, INCLUDING HIP: Status: ACTIVE | Noted: 2022-03-03

## 2022-03-03 PROBLEM — M12.9 ARTHROPATHY, UNSPECIFIED: Status: ACTIVE | Noted: 2022-03-03

## 2022-03-03 PROCEDURE — 17003 DESTRUCT PREMALG LES 2-14: CPT

## 2022-03-03 PROCEDURE — ? ORDER PLAIN FILM

## 2022-03-03 PROCEDURE — ? LIQUID NITROGEN

## 2022-03-03 PROCEDURE — ? BIOPSY BY SHAVE METHOD

## 2022-03-03 PROCEDURE — ? PRESCRIPTION

## 2022-03-03 PROCEDURE — ? ORDER TESTS

## 2022-03-03 PROCEDURE — 17000 DESTRUCT PREMALG LESION: CPT | Mod: 59

## 2022-03-03 PROCEDURE — 99204 OFFICE O/P NEW MOD 45 MIN: CPT | Mod: 25

## 2022-03-03 PROCEDURE — ? COUNSELING

## 2022-03-03 PROCEDURE — 11102 TANGNTL BX SKIN SINGLE LES: CPT

## 2022-03-03 RX ORDER — CLOBETASOL PROPIONATE 0.5 MG/G
1 OINTMENT TOPICAL QD
Qty: 60 | Refills: 11 | Status: ERX | COMMUNITY
Start: 2022-03-03

## 2022-03-03 RX ADMIN — CLOBETASOL PROPIONATE 1: 0.5 OINTMENT TOPICAL at 00:00

## 2022-03-03 ASSESSMENT — LOCATION SIMPLE DESCRIPTION DERM
LOCATION SIMPLE: RIGHT POSTERIOR UPPER ARM
LOCATION SIMPLE: ABDOMEN
LOCATION SIMPLE: UPPER BACK
LOCATION SIMPLE: RIGHT SCAPHOLUNATE
LOCATION SIMPLE: RIGHT ELBOW
LOCATION SIMPLE: LEFT POSTERIOR UPPER ARM
LOCATION SIMPLE: RIGHT FOREARM
LOCATION SIMPLE: LEFT LUNOCAPITATE
LOCATION SIMPLE: LEFT FOREARM
LOCATION SIMPLE: CHEST
LOCATION SIMPLE: LEFT ELBOW
LOCATION SIMPLE: RIGHT FOREHEAD

## 2022-03-03 ASSESSMENT — LOCATION DETAILED DESCRIPTION DERM
LOCATION DETAILED: RIGHT ELBOW
LOCATION DETAILED: LEFT PROXIMAL POSTERIOR UPPER ARM
LOCATION DETAILED: RIGHT MEDIAL SUPERIOR CHEST
LOCATION DETAILED: LEFT PROXIMAL DORSAL FOREARM
LOCATION DETAILED: RIGHT SUPERIOR FOREHEAD
LOCATION DETAILED: LEFT ELBOW
LOCATION DETAILED: RIGHT PROXIMAL POSTERIOR UPPER ARM
LOCATION DETAILED: RIGHT PROXIMAL DORSAL FOREARM
LOCATION DETAILED: RIGHT SCAPHOLUNATE JOINT
LOCATION DETAILED: SUBXIPHOID
LOCATION DETAILED: LEFT MEDIAL SUPERIOR CHEST
LOCATION DETAILED: LEFT LUNOCAPITATE JOINT
LOCATION DETAILED: INFERIOR THORACIC SPINE

## 2022-03-03 ASSESSMENT — LOCATION ZONE DERM
LOCATION ZONE: WRIST
LOCATION ZONE: FACE
LOCATION ZONE: ARM
LOCATION ZONE: TRUNK

## 2022-03-03 NOTE — PROCEDURE: ORDER TESTS
Expected Date Of Service: 03/03/2022
Performing Laboratory: 0
Bill For Surgical Tray: no
Billing Type: Third-Party Bill

## 2022-03-03 NOTE — HPI: RASH
What Type Of Note Output Would You Prefer (Optional)?: Standard Output
How Severe Is Your Rash?: moderate
Is This A New Presentation, Or A Follow-Up?: Rash
Additional History: Patient has associated joint pain in her hands, mother has a history of rheumatoid arthritis.

## 2022-03-03 NOTE — PROCEDURE: ORDER PLAIN FILM
Lab Facility: 0
Provider: Ryland Abel MD
Additional Body Location: None
Priority: normal
Detail Level: Zone
Body Location: Hand

## 2022-07-26 NOTE — ASSESSMENT & PLAN NOTE
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

## 2023-03-07 NOTE — ED NOTES
Caller: Ivon Ortega    Relationship to patient: Self    Best call back number: [phone]    Chief complaint: RESCHEDULE LABS     Type of visit: LAB     Requested date: ASAP    If rescheduling, when is the original appointment: 03/07/23     Additional notes:PATIENT REQUESTED CALLBACK TO RESCHEDULE LABS     HUB ATTEMPTED TO WARM TRANSFER AND WAS UNABLE TO DO SO   IV site obtained with 1st set blood cultures. Pt vomiting clear yellow emesis. Pt splinting RLQ.

## 2023-03-08 ENCOUNTER — APPOINTMENT (RX ONLY)
Dept: URBAN - METROPOLITAN AREA CLINIC 6 | Facility: CLINIC | Age: 53
Setting detail: DERMATOLOGY
End: 2023-03-08

## 2023-03-08 DIAGNOSIS — D18.0 HEMANGIOMA: ICD-10-CM

## 2023-03-08 DIAGNOSIS — L40.0 PSORIASIS VULGARIS: ICD-10-CM | Status: INADEQUATELY CONTROLLED

## 2023-03-08 DIAGNOSIS — D22 MELANOCYTIC NEVI: ICD-10-CM

## 2023-03-08 DIAGNOSIS — L81.4 OTHER MELANIN HYPERPIGMENTATION: ICD-10-CM

## 2023-03-08 DIAGNOSIS — L82.1 OTHER SEBORRHEIC KERATOSIS: ICD-10-CM

## 2023-03-08 DIAGNOSIS — D36.1 BENIGN NEOPLASM OF PERIPHERAL NERVES AND AUTONOMIC NERVOUS SYSTEM: ICD-10-CM

## 2023-03-08 DIAGNOSIS — L57.0 ACTINIC KERATOSIS: ICD-10-CM

## 2023-03-08 DIAGNOSIS — Z71.89 OTHER SPECIFIED COUNSELING: ICD-10-CM

## 2023-03-08 PROBLEM — D23.72 OTHER BENIGN NEOPLASM OF SKIN OF LEFT LOWER LIMB, INCLUDING HIP: Status: ACTIVE | Noted: 2023-03-08

## 2023-03-08 PROBLEM — D18.01 HEMANGIOMA OF SKIN AND SUBCUTANEOUS TISSUE: Status: ACTIVE | Noted: 2023-03-08

## 2023-03-08 PROBLEM — D22.5 MELANOCYTIC NEVI OF TRUNK: Status: ACTIVE | Noted: 2023-03-08

## 2023-03-08 PROBLEM — D48.5 NEOPLASM OF UNCERTAIN BEHAVIOR OF SKIN: Status: ACTIVE | Noted: 2023-03-08

## 2023-03-08 PROCEDURE — ? ADDITIONAL NOTES

## 2023-03-08 PROCEDURE — ? LIQUID NITROGEN

## 2023-03-08 PROCEDURE — 11103 TANGNTL BX SKIN EA SEP/ADDL: CPT

## 2023-03-08 PROCEDURE — ? BIOPSY BY SHAVE METHOD

## 2023-03-08 PROCEDURE — 17000 DESTRUCT PREMALG LESION: CPT | Mod: 59

## 2023-03-08 PROCEDURE — 99214 OFFICE O/P EST MOD 30 MIN: CPT | Mod: 25

## 2023-03-08 PROCEDURE — ? SUNSCREEN RECOMMENDATIONS

## 2023-03-08 PROCEDURE — ? PRESCRIPTION

## 2023-03-08 PROCEDURE — 11102 TANGNTL BX SKIN SINGLE LES: CPT

## 2023-03-08 PROCEDURE — ? COUNSELING

## 2023-03-08 PROCEDURE — ? PRESCRIPTION MEDICATION MANAGEMENT

## 2023-03-08 RX ORDER — TAPINAROF 10 MG/1000MG
CREAM TOPICAL
Qty: 60 | Refills: 3 | Status: ERX | COMMUNITY
Start: 2023-03-08

## 2023-03-08 RX ADMIN — TAPINAROF: 10 CREAM TOPICAL at 00:00

## 2023-03-08 ASSESSMENT — LOCATION DETAILED DESCRIPTION DERM
LOCATION DETAILED: UPPER STERNUM
LOCATION DETAILED: RIGHT PROXIMAL DORSAL FOREARM
LOCATION DETAILED: LEFT CENTRAL MALAR CHEEK
LOCATION DETAILED: LEFT ANTERIOR SHOULDER
LOCATION DETAILED: RIGHT KNEE
LOCATION DETAILED: LEFT ELBOW
LOCATION DETAILED: RIGHT ELBOW
LOCATION DETAILED: LEFT PROXIMAL DORSAL FOREARM
LOCATION DETAILED: EPIGASTRIC SKIN
LOCATION DETAILED: RIGHT INGUINAL CREASE
LOCATION DETAILED: LEFT KNEE
LOCATION DETAILED: LEFT SUPERIOR MEDIAL UPPER BACK

## 2023-03-08 ASSESSMENT — LOCATION SIMPLE DESCRIPTION DERM
LOCATION SIMPLE: GROIN
LOCATION SIMPLE: LEFT SHOULDER
LOCATION SIMPLE: LEFT KNEE
LOCATION SIMPLE: LEFT ELBOW
LOCATION SIMPLE: LEFT CHEEK
LOCATION SIMPLE: LEFT FOREARM
LOCATION SIMPLE: RIGHT FOREARM
LOCATION SIMPLE: RIGHT ELBOW
LOCATION SIMPLE: RIGHT KNEE
LOCATION SIMPLE: CHEST
LOCATION SIMPLE: LEFT UPPER BACK
LOCATION SIMPLE: ABDOMEN

## 2023-03-08 ASSESSMENT — LOCATION ZONE DERM
LOCATION ZONE: ARM
LOCATION ZONE: FACE
LOCATION ZONE: TRUNK
LOCATION ZONE: LEG

## 2023-03-08 ASSESSMENT — BSA PSORIASIS: % BODY COVERED IN PSORIASIS: 15

## 2023-03-08 ASSESSMENT — ITCH NUMERIC RATING SCALE: HOW SEVERE IS YOUR ITCHING?: 3

## 2023-03-08 ASSESSMENT — PGA PSORIASIS: PGA PSORIASIS 2020: MILD

## 2023-03-08 NOTE — PROCEDURE: LIQUID NITROGEN
Duration Of Freeze Thaw-Cycle (Seconds): 8
Show Aperture Variable?: Yes
Consent: The patient's consent was obtained including but not limited to risks of crusting, scabbing, blistering, scarring, darker or lighter pigmentary change, recurrence, incomplete removal and infection.
Post-Care Instructions: I reviewed with the patient in detail post-care instructions. Patient is to wear sunprotection, and avoid picking at any of the treated lesions. Pt may apply Vaseline to crusted or scabbing areas.
Render Post-Care Instructions In Note?: no
Number Of Freeze-Thaw Cycles: 2 freeze-thaw cycles
Detail Level: Detailed

## 2023-03-08 NOTE — PROCEDURE: PRESCRIPTION MEDICATION MANAGEMENT
Render In Strict Bullet Format?: No
Discontinue Regimen: Clobetasol 0.05% ointment.
Detail Level: Zone
Initiate Treatment: Vtama 1% cream BID

## 2023-03-08 NOTE — PROCEDURE: ADDITIONAL NOTES
Additional Notes: Advised patient to use a thick cream or emollient such as CeraVe Moisturizing Cream, with one application being within 3-5 minutes after showering, trapping in the medicated cream.
Detail Level: Detailed
Render Risk Assessment In Note?: no

## 2023-09-25 NOTE — PROGRESS NOTES
2 RN skin check  L elbow red but blanching, well approximated, pink, healing scar to midline abdomen, well healed scar from previous nephrostomy on R side of mid back. All other skin clean, dry, intact.    Yes - the patient is able to be screened

## 2024-03-11 ENCOUNTER — APPOINTMENT (RX ONLY)
Dept: URBAN - METROPOLITAN AREA CLINIC 6 | Facility: CLINIC | Age: 54
Setting detail: DERMATOLOGY
End: 2024-03-11

## 2024-03-11 DIAGNOSIS — D22 MELANOCYTIC NEVI: ICD-10-CM

## 2024-03-11 DIAGNOSIS — L81.4 OTHER MELANIN HYPERPIGMENTATION: ICD-10-CM

## 2024-03-11 DIAGNOSIS — L82.1 OTHER SEBORRHEIC KERATOSIS: ICD-10-CM

## 2024-03-11 DIAGNOSIS — Z71.89 OTHER SPECIFIED COUNSELING: ICD-10-CM

## 2024-03-11 DIAGNOSIS — D18.0 HEMANGIOMA: ICD-10-CM

## 2024-03-11 DIAGNOSIS — L40.0 PSORIASIS VULGARIS: ICD-10-CM | Status: WELL CONTROLLED

## 2024-03-11 DIAGNOSIS — L57.0 ACTINIC KERATOSIS: ICD-10-CM

## 2024-03-11 PROBLEM — D22.5 MELANOCYTIC NEVI OF TRUNK: Status: ACTIVE | Noted: 2024-03-11

## 2024-03-11 PROBLEM — D23.72 OTHER BENIGN NEOPLASM OF SKIN OF LEFT LOWER LIMB, INCLUDING HIP: Status: ACTIVE | Noted: 2024-03-11

## 2024-03-11 PROBLEM — D18.01 HEMANGIOMA OF SKIN AND SUBCUTANEOUS TISSUE: Status: ACTIVE | Noted: 2024-03-11

## 2024-03-11 PROCEDURE — ? PRESCRIPTION MEDICATION MANAGEMENT

## 2024-03-11 PROCEDURE — 17000 DESTRUCT PREMALG LESION: CPT

## 2024-03-11 PROCEDURE — 17003 DESTRUCT PREMALG LES 2-14: CPT

## 2024-03-11 PROCEDURE — ? ADDITIONAL NOTES

## 2024-03-11 PROCEDURE — 99213 OFFICE O/P EST LOW 20 MIN: CPT | Mod: 25

## 2024-03-11 PROCEDURE — ? PHOTO-DOCUMENTATION

## 2024-03-11 PROCEDURE — ? COUNSELING

## 2024-03-11 PROCEDURE — ? LIQUID NITROGEN

## 2024-03-11 PROCEDURE — ? SUNSCREEN RECOMMENDATIONS

## 2024-03-11 ASSESSMENT — BSA PSORIASIS: % BODY COVERED IN PSORIASIS: 1

## 2024-03-11 ASSESSMENT — LOCATION SIMPLE DESCRIPTION DERM
LOCATION SIMPLE: LEFT ELBOW
LOCATION SIMPLE: RIGHT KNEE
LOCATION SIMPLE: RIGHT ELBOW
LOCATION SIMPLE: LEFT FOREHEAD
LOCATION SIMPLE: CHEST
LOCATION SIMPLE: RIGHT WRIST
LOCATION SIMPLE: LEFT CHEEK
LOCATION SIMPLE: LEFT UPPER BACK
LOCATION SIMPLE: ABDOMEN
LOCATION SIMPLE: LEFT KNEE
LOCATION SIMPLE: LEFT FOREARM
LOCATION SIMPLE: RIGHT FOREARM

## 2024-03-11 ASSESSMENT — LOCATION DETAILED DESCRIPTION DERM
LOCATION DETAILED: LEFT DISTAL RADIAL DORSAL FOREARM
LOCATION DETAILED: LEFT ELBOW
LOCATION DETAILED: RIGHT DORSAL WRIST
LOCATION DETAILED: RIGHT ELBOW
LOCATION DETAILED: LEFT SUPERIOR MEDIAL UPPER BACK
LOCATION DETAILED: RIGHT KNEE
LOCATION DETAILED: RIGHT PROXIMAL DORSAL FOREARM
LOCATION DETAILED: LEFT MEDIAL SUPERIOR CHEST
LOCATION DETAILED: LEFT PROXIMAL DORSAL FOREARM
LOCATION DETAILED: LEFT KNEE
LOCATION DETAILED: UPPER STERNUM
LOCATION DETAILED: LEFT SUPERIOR MEDIAL FOREHEAD
LOCATION DETAILED: EPIGASTRIC SKIN
LOCATION DETAILED: LEFT CENTRAL MALAR CHEEK

## 2024-03-11 ASSESSMENT — LOCATION ZONE DERM
LOCATION ZONE: LEG
LOCATION ZONE: ARM
LOCATION ZONE: TRUNK
LOCATION ZONE: FACE

## 2024-03-11 ASSESSMENT — PGA PSORIASIS: PGA PSORIASIS 2020: ALMOST CLEAR

## 2024-03-11 NOTE — PROCEDURE: PHOTO-DOCUMENTATION
Photo Preface (Leave Blank If You Do Not Want): Photographs were obtained today
Details (Free Text): Captured clinical photos of nevus on left upper back, will plan on rechecking at follow up visit.
Detail Level: Detailed

## 2024-03-11 NOTE — PROCEDURE: COUNSELING
Detail Level: Generalized
Detail Level: Detailed
Detail Level: Zone
Sunscreen Recommendations: Recommended broad spectrum inorganic or physical sunscreens primarily containing zinc oxide or titanium dioxide.

## 2024-03-11 NOTE — PROCEDURE: LIQUID NITROGEN
Post-Care Instructions: I reviewed with the patient in detail post-care instructions. Patient is to wear sunprotection, and avoid picking at any of the treated lesions. Pt may apply Vaseline to crusted or scabbing areas.
Duration Of Freeze Thaw-Cycle (Seconds): 8
Render Note In Bullet Format When Appropriate: No
Detail Level: Zone
Consent: The patient's consent was obtained including but not limited to risks of crusting, scabbing, blistering, scarring, darker or lighter pigmentary change, recurrence, incomplete removal and infection.
Show Aperture Variable?: Yes
Number Of Freeze-Thaw Cycles: 2 freeze-thaw cycles

## 2024-04-24 NOTE — DIETARY
"Nutrition services:   Day 1 of admit.  Aurora Tan is a 48 y.o. female with admitting DX of pyelonephritis. Unplanned weight loss per admit screen.     Pt stated her appetite was poor after having an intra-abdominal surgery on 4/30. Pt reports usual body weight of 210 lbs and noticed a 20 lb weight loss in a period of ~1 month after the surgery due to abdominal discomfort. Pt appears well-nourished. Pt did not eat breakfast today, she stated she got some food brought in from home. She was not taking any supplements prior to hospitalizarion. Added snacks per pt preferences.     Assessment:  Height: 170.2 cm (5' 7\")  Weight: 87.5 kg (192 lb 14.4 oz) via stand up scale on 6/11.   Body mass index is 30.21 kg/m². (Obese class 1)   Diet/Intake: Regular, 1 meal 0%.      Evaluation:   1. Pt noted with 8% weight loss (18 lb) in a period of ~1 month which is considered severe. However, pt appears well-nourished.   2. No pertinent labs at this time.   3. Pt on rocephin and vancomycin.   4. Pt noted with recent ex lap for lysis of intra-abdominal adhesions on 4/30 per H&P.     Recommendations/Plan:  1. Encourage intake of meals and snacks  2. Document intake of all meals and snacks  as % taken in ADL's   3. Monitor weight.  4. Nutrition rep will continue to see patient for ongoing meal and snack preferences.     RD monitoring           " PDMP reviewed last fill 3/18/24  Refilled today    GABRIELE Hughes NP

## 2024-04-30 ENCOUNTER — APPOINTMENT (RX ONLY)
Dept: URBAN - METROPOLITAN AREA CLINIC 6 | Facility: CLINIC | Age: 54
Setting detail: DERMATOLOGY
End: 2024-04-30

## 2024-04-30 DIAGNOSIS — L21.8 OTHER SEBORRHEIC DERMATITIS: ICD-10-CM | Status: INADEQUATELY CONTROLLED

## 2024-04-30 PROBLEM — L30.9 DERMATITIS, UNSPECIFIED: Status: ACTIVE | Noted: 2024-04-30

## 2024-04-30 PROCEDURE — 99213 OFFICE O/P EST LOW 20 MIN: CPT

## 2024-04-30 PROCEDURE — ? PRESCRIPTION MEDICATION MANAGEMENT

## 2024-04-30 PROCEDURE — ? COUNSELING

## 2024-04-30 ASSESSMENT — LOCATION ZONE DERM: LOCATION ZONE: NECK

## 2024-04-30 ASSESSMENT — LOCATION SIMPLE DESCRIPTION DERM
LOCATION SIMPLE: POSTERIOR NECK
LOCATION SIMPLE: LEFT ANTERIOR NECK

## 2024-04-30 ASSESSMENT — LOCATION DETAILED DESCRIPTION DERM
LOCATION DETAILED: LEFT INFERIOR ANTERIOR NECK
LOCATION DETAILED: LEFT INFERIOR POSTERIOR NECK

## 2024-04-30 NOTE — PROCEDURE: PRESCRIPTION MEDICATION MANAGEMENT
Samples Given: Zoryve 0.3% cream QD (also instructed to try Vtama which is a current Rx for her PSO).
Detail Level: Zone
Render In Strict Bullet Format?: No

## 2024-08-15 ENCOUNTER — APPOINTMENT (RX ONLY)
Dept: URBAN - METROPOLITAN AREA CLINIC 6 | Facility: CLINIC | Age: 54
Setting detail: DERMATOLOGY
End: 2024-08-15

## 2024-08-15 DIAGNOSIS — L259 CONTACT DERMATITIS AND OTHER ECZEMA, UNSPECIFIED CAUSE: ICD-10-CM | Status: INADEQUATELY CONTROLLED

## 2024-08-15 PROBLEM — L23.9 ALLERGIC CONTACT DERMATITIS, UNSPECIFIED CAUSE: Status: ACTIVE | Noted: 2024-08-15

## 2024-08-15 PROCEDURE — ? PRESCRIPTION MEDICATION MANAGEMENT

## 2024-08-15 PROCEDURE — ? COUNSELING

## 2024-08-15 PROCEDURE — ? DIAGNOSIS COMMENT

## 2024-08-15 PROCEDURE — 99213 OFFICE O/P EST LOW 20 MIN: CPT

## 2024-08-15 PROCEDURE — ? PRESCRIPTION

## 2024-08-15 RX ORDER — PREDNISONE 10 MG/1
TABLET ORAL
Qty: 42 | Refills: 0 | Status: ERX | COMMUNITY
Start: 2024-08-15

## 2024-08-15 RX ORDER — TRIAMCINOLONE ACETONIDE 1 MG/G
OINTMENT TOPICAL
Qty: 80 | Refills: 2 | Status: ERX | COMMUNITY
Start: 2024-08-15

## 2024-08-15 RX ADMIN — PREDNISONE: 10 TABLET ORAL at 00:00

## 2024-08-15 RX ADMIN — TRIAMCINOLONE ACETONIDE: 1 OINTMENT TOPICAL at 00:00

## 2024-08-15 ASSESSMENT — LOCATION SIMPLE DESCRIPTION DERM
LOCATION SIMPLE: ABDOMEN
LOCATION SIMPLE: RIGHT FOREARM

## 2024-08-15 ASSESSMENT — LOCATION ZONE DERM
LOCATION ZONE: TRUNK
LOCATION ZONE: ARM

## 2024-08-15 ASSESSMENT — ITCH NUMERIC RATING SCALE: HOW SEVERE IS YOUR ITCHING?: 9

## 2024-08-15 ASSESSMENT — BSA RASH: BSA RASH: 3

## 2024-08-15 ASSESSMENT — LOCATION DETAILED DESCRIPTION DERM
LOCATION DETAILED: RIGHT LATERAL ABDOMEN
LOCATION DETAILED: RIGHT VENTRAL PROXIMAL FOREARM

## 2024-08-15 ASSESSMENT — SEVERITY ASSESSMENT: SEVERITY: MODERATE

## 2024-08-15 NOTE — PROCEDURE: DIAGNOSIS COMMENT
Render Risk Assessment In Note?: no
Comment: Unknown trigger, most consistent with acute ACD due to morphology and distribution rather than PSO flare.
Detail Level: Zone

## 2024-08-15 NOTE — PROCEDURE: PRESCRIPTION MEDICATION MANAGEMENT
Detail Level: Zone
Initiate Treatment: Triamcinolone ointment and Prednisone taper for 12 days.
Render In Strict Bullet Format?: No

## 2024-12-10 ENCOUNTER — APPOINTMENT (OUTPATIENT)
Dept: RADIOLOGY | Facility: MEDICAL CENTER | Age: 54
End: 2024-12-10
Attending: EMERGENCY MEDICINE
Payer: COMMERCIAL

## 2024-12-10 ENCOUNTER — HOSPITAL ENCOUNTER (EMERGENCY)
Facility: MEDICAL CENTER | Age: 54
End: 2024-12-10
Attending: EMERGENCY MEDICINE
Payer: COMMERCIAL

## 2024-12-10 VITALS
SYSTOLIC BLOOD PRESSURE: 121 MMHG | RESPIRATION RATE: 16 BRPM | HEIGHT: 67 IN | BODY MASS INDEX: 31.83 KG/M2 | WEIGHT: 202.82 LBS | DIASTOLIC BLOOD PRESSURE: 65 MMHG | HEART RATE: 76 BPM | TEMPERATURE: 100.6 F | OXYGEN SATURATION: 95 %

## 2024-12-10 DIAGNOSIS — J10.1 INFLUENZA A: ICD-10-CM

## 2024-12-10 DIAGNOSIS — R10.9 FLANK PAIN: ICD-10-CM

## 2024-12-10 LAB
ALBUMIN SERPL BCP-MCNC: 4.6 G/DL (ref 3.2–4.9)
ALBUMIN/GLOB SERPL: 1.7 G/DL
ALP SERPL-CCNC: 96 U/L (ref 30–99)
ALT SERPL-CCNC: 14 U/L (ref 2–50)
ANION GAP SERPL CALC-SCNC: 11 MMOL/L (ref 7–16)
APPEARANCE UR: ABNORMAL
AST SERPL-CCNC: 20 U/L (ref 12–45)
BACTERIA #/AREA URNS HPF: ABNORMAL /HPF
BASOPHILS # BLD AUTO: 0.4 % (ref 0–1.8)
BASOPHILS # BLD: 0.02 K/UL (ref 0–0.12)
BILIRUB SERPL-MCNC: 0.2 MG/DL (ref 0.1–1.5)
BILIRUB UR QL STRIP.AUTO: NEGATIVE
BUN SERPL-MCNC: 12 MG/DL (ref 8–22)
CALCIUM ALBUM COR SERPL-MCNC: 8.7 MG/DL (ref 8.5–10.5)
CALCIUM SERPL-MCNC: 9.2 MG/DL (ref 8.5–10.5)
CASTS URNS QL MICRO: ABNORMAL /LPF (ref 0–2)
CHLORIDE SERPL-SCNC: 102 MMOL/L (ref 96–112)
CO2 SERPL-SCNC: 22 MMOL/L (ref 20–33)
COLOR UR: YELLOW
CREAT SERPL-MCNC: 1.06 MG/DL (ref 0.5–1.4)
EOSINOPHIL # BLD AUTO: 0.01 K/UL (ref 0–0.51)
EOSINOPHIL NFR BLD: 0.2 % (ref 0–6.9)
EPITHELIAL CELLS 1715: ABNORMAL /HPF (ref 0–5)
ERYTHROCYTE [DISTWIDTH] IN BLOOD BY AUTOMATED COUNT: 41.1 FL (ref 35.9–50)
FLUAV RNA SPEC QL NAA+PROBE: POSITIVE
FLUBV RNA SPEC QL NAA+PROBE: NEGATIVE
GFR SERPLBLD CREATININE-BSD FMLA CKD-EPI: 62 ML/MIN/1.73 M 2
GLOBULIN SER CALC-MCNC: 2.7 G/DL (ref 1.9–3.5)
GLUCOSE SERPL-MCNC: 94 MG/DL (ref 65–99)
GLUCOSE UR STRIP.AUTO-MCNC: NEGATIVE MG/DL
HCT VFR BLD AUTO: 43.7 % (ref 37–47)
HGB BLD-MCNC: 15.1 G/DL (ref 12–16)
IMM GRANULOCYTES # BLD AUTO: 0.02 K/UL (ref 0–0.11)
IMM GRANULOCYTES NFR BLD AUTO: 0.4 % (ref 0–0.9)
KETONES UR STRIP.AUTO-MCNC: 15 MG/DL
LACTATE SERPL-SCNC: 0.8 MMOL/L (ref 0.5–2)
LEUKOCYTE ESTERASE UR QL STRIP.AUTO: NEGATIVE
LIPASE SERPL-CCNC: 60 U/L (ref 11–82)
LYMPHOCYTES # BLD AUTO: 0.45 K/UL (ref 1–4.8)
LYMPHOCYTES NFR BLD: 8.3 % (ref 22–41)
MCH RBC QN AUTO: 31.3 PG (ref 27–33)
MCHC RBC AUTO-ENTMCNC: 34.6 G/DL (ref 32.2–35.5)
MCV RBC AUTO: 90.7 FL (ref 81.4–97.8)
MICRO URNS: ABNORMAL
MONOCYTES # BLD AUTO: 0.34 K/UL (ref 0–0.85)
MONOCYTES NFR BLD AUTO: 6.3 % (ref 0–13.4)
NEUTROPHILS # BLD AUTO: 4.59 K/UL (ref 1.82–7.42)
NEUTROPHILS NFR BLD: 84.4 % (ref 44–72)
NITRITE UR QL STRIP.AUTO: NEGATIVE
NRBC # BLD AUTO: 0 K/UL
NRBC BLD-RTO: 0 /100 WBC (ref 0–0.2)
PH UR STRIP.AUTO: 5.5 [PH] (ref 5–8)
PLATELET # BLD AUTO: 289 K/UL (ref 164–446)
PMV BLD AUTO: 11.4 FL (ref 9–12.9)
POTASSIUM SERPL-SCNC: 3.9 MMOL/L (ref 3.6–5.5)
PROT SERPL-MCNC: 7.3 G/DL (ref 6–8.2)
PROT UR QL STRIP: NEGATIVE MG/DL
RBC # BLD AUTO: 4.82 M/UL (ref 4.2–5.4)
RBC # URNS HPF: ABNORMAL /HPF (ref 0–2)
RBC UR QL AUTO: NEGATIVE
RSV RNA SPEC QL NAA+PROBE: NEGATIVE
SARS-COV-2 RNA RESP QL NAA+PROBE: NOTDETECTED
SODIUM SERPL-SCNC: 135 MMOL/L (ref 135–145)
SP GR UR STRIP.AUTO: 1.02
SPECIMEN SOURCE: ABNORMAL
UROBILINOGEN UR STRIP.AUTO-MCNC: 0.2 EU/DL
WBC # BLD AUTO: 5.4 K/UL (ref 4.8–10.8)
WBC #/AREA URNS HPF: ABNORMAL /HPF

## 2024-12-10 PROCEDURE — 0241U HCHG SARS-COV-2 COVID-19 NFCT DS RESP RNA 4 TRGT MIC: CPT

## 2024-12-10 PROCEDURE — 83605 ASSAY OF LACTIC ACID: CPT

## 2024-12-10 PROCEDURE — 700117 HCHG RX CONTRAST REV CODE 255: Performed by: EMERGENCY MEDICINE

## 2024-12-10 PROCEDURE — 74177 CT ABD & PELVIS W/CONTRAST: CPT

## 2024-12-10 PROCEDURE — 700111 HCHG RX REV CODE 636 W/ 250 OVERRIDE (IP): Mod: JZ | Performed by: EMERGENCY MEDICINE

## 2024-12-10 PROCEDURE — 36415 COLL VENOUS BLD VENIPUNCTURE: CPT

## 2024-12-10 PROCEDURE — 700102 HCHG RX REV CODE 250 W/ 637 OVERRIDE(OP): Performed by: EMERGENCY MEDICINE

## 2024-12-10 PROCEDURE — 80053 COMPREHEN METABOLIC PANEL: CPT

## 2024-12-10 PROCEDURE — 81001 URINALYSIS AUTO W/SCOPE: CPT

## 2024-12-10 PROCEDURE — 99284 EMERGENCY DEPT VISIT MOD MDM: CPT

## 2024-12-10 PROCEDURE — 96374 THER/PROPH/DIAG INJ IV PUSH: CPT

## 2024-12-10 PROCEDURE — 71045 X-RAY EXAM CHEST 1 VIEW: CPT

## 2024-12-10 PROCEDURE — 87086 URINE CULTURE/COLONY COUNT: CPT

## 2024-12-10 PROCEDURE — A9270 NON-COVERED ITEM OR SERVICE: HCPCS | Performed by: EMERGENCY MEDICINE

## 2024-12-10 PROCEDURE — 85025 COMPLETE CBC W/AUTO DIFF WBC: CPT

## 2024-12-10 PROCEDURE — 87040 BLOOD CULTURE FOR BACTERIA: CPT | Mod: 91

## 2024-12-10 PROCEDURE — 83690 ASSAY OF LIPASE: CPT

## 2024-12-10 RX ORDER — OSELTAMIVIR PHOSPHATE 75 MG/1
75 CAPSULE ORAL ONCE
Status: COMPLETED | OUTPATIENT
Start: 2024-12-10 | End: 2024-12-10

## 2024-12-10 RX ORDER — OSELTAMIVIR PHOSPHATE 75 MG/1
75 CAPSULE ORAL 2 TIMES DAILY
Qty: 10 CAPSULE | Refills: 0 | Status: ACTIVE | OUTPATIENT
Start: 2024-12-10 | End: 2024-12-15

## 2024-12-10 RX ORDER — CEFTRIAXONE 2 G/1
2000 INJECTION, POWDER, FOR SOLUTION INTRAMUSCULAR; INTRAVENOUS ONCE
Status: COMPLETED | OUTPATIENT
Start: 2024-12-10 | End: 2024-12-10

## 2024-12-10 RX ORDER — ACETAMINOPHEN 325 MG/1
650 TABLET ORAL ONCE
Status: COMPLETED | OUTPATIENT
Start: 2024-12-10 | End: 2024-12-10

## 2024-12-10 RX ADMIN — IOHEXOL 100 ML: 350 INJECTION, SOLUTION INTRAVENOUS at 13:24

## 2024-12-10 RX ADMIN — CEFTRIAXONE SODIUM 2000 MG: 2 INJECTION, POWDER, FOR SOLUTION INTRAMUSCULAR; INTRAVENOUS at 11:58

## 2024-12-10 RX ADMIN — ACETAMINOPHEN 650 MG: 325 TABLET ORAL at 11:58

## 2024-12-10 RX ADMIN — OSELTAMIVIR PHOSPHATE 75 MG: 75 CAPSULE ORAL at 14:43

## 2024-12-10 RX ADMIN — IBUPROFEN 800 MG: 200 TABLET, FILM COATED ORAL at 15:51

## 2024-12-10 ASSESSMENT — PAIN DESCRIPTION - PAIN TYPE: TYPE: ACUTE PAIN

## 2024-12-10 NOTE — ED PROVIDER NOTES
"ED Provider Note    CHIEF COMPLAINT  Chief Complaint   Patient presents with    Flank Pain     Pt had a \"reconstructive surgery\" in 2018 for small intestine and kidney. Pt today pt endorsing fever and R low back pain. Pt took tylenol with now relief   Pt sent by PCP for further evaluation        EXTERNAL RECORDS REVIEWED  Inpatient Notes patient has history of ileal ureter.  After multiple episodes of nephrolithiasis with obstruction requiring stents, patient eventually underwent placement of a ileal conduit.  Patient initially presented September 2017 with right flank pain and fever.  In December 2017 she was readmitted with back pain and fever.  She has known history of kidney stone as well as non-STEMI.  She also has nonobstructive cardiomyopathy.  Patient had multiple admissions in 2017 and 2018 due to flank pain, back pains and fever related to her kidney issues.    HPI/ROS  LIMITATION TO HISTORY   Select: : None  OUTSIDE HISTORIAN(S):  None    Aurora Tan is a 54 y.o. female who presents to the ER with complaint of fevers of 102 with right flank pain which developed 2 days ago.  Patient had reconstructive surgery on her right ureter and kidney in 2018 with Dr. Regalado and Dr. Rangel.  She no longer has nephrostomy tube and urinates normally out of the urethra.  She reports she has had a foul smell and some slight cloudiness to her urine lately.  She contacted Dr. Padilla today who told her to come to the emergency department due to fever and flank pain.  No nausea or vomiting.  She has had a slight dry hacking cough.  No sore throat.  She says that the other night her temperature went as high as 105 because she put herself in a hot bath and then bundled herself and blankets.  Otherwise temperatures have been in the 101-102 range.  No abdominal pain.  Patient reports that her flank pain and fevers and the way she feels is reminiscent to the symptoms she had back in 2018 when she had multiple issues with " kidney infections.    PAST MEDICAL HISTORY   has a past medical history of Breath shortness (2018), Heart attack (HCC) (2017), Heart burn (2018), High cholesterol, Pain (2018), Pain (2018), Renal disorder, Renal disorder (2018), Snoring (2018), and Urinary incontinence.    SURGICAL HISTORY   has a past surgical history that includes abdominal exploration; cystoscopy (Right, 2017); ureteroscopy (Right, 2017); lasertripsy (Right, 2017); stent placement (Right, 2017); ureteroscopy (Right, 2017); lasertripsy (Right, 2017); urethral dilatation (Right, 2017); cystoscopy stent placement (Right, 2017); cystoscopy (N/A, 2018); retrogrades (Right, 2018); pyelogram (Right, 2018); ureteroscopy (Right, 2018); lasertripsy (Right, 2018); stent placement (Right, 2018); cystoscopy stent placement (Right, 3/21/2018); ureteroscopy (Right, 3/21/2018); retrogrades (Right, 3/21/2018); pyelogram (Right, 3/21/2018); tonsillectomy; abdominal hysterectomy total (); primary c section; other; other; other abdominal surgery (); other abdominal surgery (); exploratory laparotomy (Right, 2018); lysis adhesions general (2018); exploratory laparotomy (2018); and irrigation & debridement general (Right, 11/3/2018).    FAMILY HISTORY  Family History   Problem Relation Age of Onset    Cancer Mother     Cancer Father     Heart Disease Brother     Diabetes Maternal Grandmother     Cancer Maternal Grandfather     Cancer Paternal Grandmother     Cancer Paternal Grandfather        SOCIAL HISTORY  Social History     Tobacco Use    Smoking status: Former     Current packs/day: 0.00     Average packs/day: 0.3 packs/day for 10.0 years (2.5 ttl pk-yrs)     Types: Cigarettes     Start date: 2003     Quit date: 2013     Years since quittin.6    Smokeless tobacco: Never   Substance and Sexual Activity    Alcohol  "use: Not Currently     Comment: occ    Drug use: No    Sexual activity: Not on file       CURRENT MEDICATIONS  Home Medications       Reviewed by Ne Ochoa R.N. (Registered Nurse) on 12/10/24 at 1035  Med List Status: Not Addressed     Medication Last Dose Status   acetaminophen (TYLENOL) 500 MG Tab  Active   estradiol (MINIVELLE) 0.05 MG/24HR PATCH BIWEEKLY  Active   mupirocin calcium (BACTROBAN) 2 % Cream  Active   OxyCODONE HCl 5 MG Tablet Abuse-Deterrent  Active   Probiotic Product (PROBIOTIC PO)  Active   Pseudoephedrine-Guaifenesin (MUCINEX D MAX STRENGTH PO)  Active                    ALLERGIES  Allergies   Allergen Reactions    Levofloxacin Hives, Shortness of Breath and Itching     Itching, pruritis      Morphine Itching              PHYSICAL EXAM  VITAL SIGNS: /65   Pulse 76   Temp (!) 38.1 °C (100.6 °F) (Oral)   Resp 16   Ht 1.702 m (5' 7\")   Wt 92 kg (202 lb 13.2 oz)   LMP 09/01/2009   SpO2 95%   BMI 31.77 kg/m²    Constitutional:  Well developed, well nourished; No acute distress   HENT: Normocephalic, Atraumatic, Bilateral external ears normal, dry mucous membranes.  Occasional dry sounding cough.  Eyes: PERRL, EOMI, Conjunctiva normal, No discharge.   Neck: Normal range of motion, supple, nontender  Lymphatic: No lymphadenopathy noted.   Cardiovascular: Normal heart rate, Normal rhythm, No murmurs, rubs or gallops   Thorax & Lungs: CTA=bilaterally;  No respiratory distress,  No wheezing rales, or rhonchi; No chest tenderness. No crepitus or subQ air  Abdomen: soft, healed surgical scars.  Good bowel sounds, no guarding no rebound, no masses, no pulsatile mass, no tenderness, no distention  Skin: Warm, Dry, No erythema, No rash.   Back: No tenderness, No CVA tenderness.   Extremities: 2+ dp and pt pulses bilateral LEs;  Nontender; no pretibial edema  Neurologic: Alert & oriented x 4, clear speech,   Psychiatric: appropriate, normal affect     EKG/LABS  Results for orders placed " or performed during the hospital encounter of 12/10/24   CBC with Differential    Collection Time: 12/10/24 10:47 AM   Result Value Ref Range    WBC 5.4 4.8 - 10.8 K/uL    RBC 4.82 4.20 - 5.40 M/uL    Hemoglobin 15.1 12.0 - 16.0 g/dL    Hematocrit 43.7 37.0 - 47.0 %    MCV 90.7 81.4 - 97.8 fL    MCH 31.3 27.0 - 33.0 pg    MCHC 34.6 32.2 - 35.5 g/dL    RDW 41.1 35.9 - 50.0 fL    Platelet Count 289 164 - 446 K/uL    MPV 11.4 9.0 - 12.9 fL    Neutrophils-Polys 84.40 (H) 44.00 - 72.00 %    Lymphocytes 8.30 (L) 22.00 - 41.00 %    Monocytes 6.30 0.00 - 13.40 %    Eosinophils 0.20 0.00 - 6.90 %    Basophils 0.40 0.00 - 1.80 %    Immature Granulocytes 0.40 0.00 - 0.90 %    Nucleated RBC 0.00 0.00 - 0.20 /100 WBC    Neutrophils (Absolute) 4.59 1.82 - 7.42 K/uL    Lymphs (Absolute) 0.45 (L) 1.00 - 4.80 K/uL    Monos (Absolute) 0.34 0.00 - 0.85 K/uL    Eos (Absolute) 0.01 0.00 - 0.51 K/uL    Baso (Absolute) 0.02 0.00 - 0.12 K/uL    Immature Granulocytes (abs) 0.02 0.00 - 0.11 K/uL    NRBC (Absolute) 0.00 K/uL   Complete Metabolic Panel    Collection Time: 12/10/24 10:47 AM   Result Value Ref Range    Sodium 135 135 - 145 mmol/L    Potassium 3.9 3.6 - 5.5 mmol/L    Chloride 102 96 - 112 mmol/L    Co2 22 20 - 33 mmol/L    Anion Gap 11.0 7.0 - 16.0    Glucose 94 65 - 99 mg/dL    Bun 12 8 - 22 mg/dL    Creatinine 1.06 0.50 - 1.40 mg/dL    Calcium 9.2 8.5 - 10.5 mg/dL    Correct Calcium 8.7 8.5 - 10.5 mg/dL    AST(SGOT) 20 12 - 45 U/L    ALT(SGPT) 14 2 - 50 U/L    Alkaline Phosphatase 96 30 - 99 U/L    Total Bilirubin 0.2 0.1 - 1.5 mg/dL    Albumin 4.6 3.2 - 4.9 g/dL    Total Protein 7.3 6.0 - 8.2 g/dL    Globulin 2.7 1.9 - 3.5 g/dL    A-G Ratio 1.7 g/dL   Lipase    Collection Time: 12/10/24 10:47 AM   Result Value Ref Range    Lipase 60 11 - 82 U/L   ESTIMATED GFR    Collection Time: 12/10/24 10:47 AM   Result Value Ref Range    GFR (CKD-EPI) 62 >60 mL/min/1.73 m 2   Urinalysis    Collection Time: 12/10/24 11:03 AM    Specimen:  Urine   Result Value Ref Range    Color Yellow     Character Cloudy (A)     Specific Gravity 1.016 <1.035    Ph 5.5 5.0 - 8.0    Glucose Negative Negative mg/dL    Ketones 15 (A) Negative mg/dL    Protein Negative Negative mg/dL    Bilirubin Negative Negative    Urobilinogen, Urine 0.2 <=1.0 EU/dL    Nitrite Negative Negative    Leukocyte Esterase Negative Negative    Occult Blood Negative Negative    Micro Urine Req Microscopic    URINE MICROSCOPIC (W/UA)    Collection Time: 12/10/24 11:03 AM   Result Value Ref Range    WBC 3-5 /hpf    RBC 0-2 0 - 2 /hpf    Bacteria Few (A) None /hpf    Epithelial Cells 11-20 (A) 0 - 5 /hpf    Urine Casts 0-2 0 - 2 /lpf   Lactic Acid    Collection Time: 12/10/24 11:37 AM   Result Value Ref Range    Lactic Acid 0.8 0.5 - 2.0 mmol/L   Blood Culture - Draw one from central line and one from peripheral site    Collection Time: 12/10/24 11:37 AM    Specimen: Peripheral; Blood   Result Value Ref Range    Significant Indicator NEG     Source BLD     Site PERIPHERAL     Culture Result       No Growth  Note: Blood cultures are incubated for 5 days and  are monitored continuously.Positive blood cultures  are called to the RN and reported as soon as  they are identified.     Blood Culture - Draw one from central line and one from peripheral site    Collection Time: 12/10/24 12:02 PM    Specimen: Line; Blood   Result Value Ref Range    Significant Indicator NEG     Source BLD     Site Peripheral     Culture Result       No Growth  Note: Blood cultures are incubated for 5 days and  are monitored continuously.Positive blood cultures  are called to the RN and reported as soon as  they are identified.     CoV-2, FLU A/B, and RSV by PCR (2-4 Hours [facility]) : Collect NP swab in VTM    Collection Time: 12/10/24 12:02 PM    Specimen: Respirate   Result Value Ref Range    Influenza virus A RNA POSITIVE (A) Negative    Influenza virus B, PCR Negative Negative    RSV, PCR Negative Negative    SARS-CoV-2  "by PCR NotDetected     SARS-CoV-2 Source NP Swab           RADIOLOGY/PROCEDURES   I have independently interpreted the diagnostic imaging associated with this visit and am waiting the final reading from the radiologist.     My preliminary interpretation is as follows: ER MD has reviewed the patient's chest x-ray.  No obvious infiltrate.    Radiologist interpretation:  CT-ABDOMEN-PELVIS WITH   Final Result         1. No acute inflammatory change in the abdomen or pelvis.      2. Postsurgical change from with prior resection of the right ureter with an ileal conduit . There is moderate right hydronephrosis, similar to the 2018. Right renal scarring is slightly worse compared to 2018.      DX-CHEST-PORTABLE (1 VIEW)   Final Result         1. No acute cardiopulmonary abnormalities are identified.          COURSE & MEDICAL DECISION MAKING    ASSESSMENT, COURSE AND PLAN  Care Narrative: Patient presents to the ER with complaint of fevers of 10 1-102 over the last 2 days.  She also complains of right flank pain which has been going on for the last 2 days.  The patient had a ileal conduit done in 2018 for ureteral stricture and scarring due to renal stones.  Patient no longer has nephrostomy tubes and urinates on her own.  She reported some foul smell and some slight cloudiness to her urine over the last few days as well.  Given the patient's history of multiple urinary tract infections including pyelonephritis in the past, she was very concerned that she had another issue with her kidneys or another kidney infection today.  She contacted her urologist, Dr. Rangel, who advised her to come to the ER.  Here in the either the patient is well-appearing other than a fever of 101.  She is not hypotensive or tachycardic.  She denied abdominal pain.  No nausea or vomiting.  No diarrhea.  Patient says \"I feel like I have the flu.\"  She underwent full evaluation here in the ER including blood culture, urine culture, CBC, CMP, lactic " acid level, and urinalysis.  Urine actually looks pretty good other than a few bacteria.  White count is normal.  Lactic acid level is normal.  CMP is unremarkable.  Patient underwent a CT scan of the abdomen pelvis.  CT scan shows some mild right hydronephrosis which looks similar compared to 2018.  I spoke with Dr. Donaldson, on-call urologist, who says that it is pretty common to see some hydronephrosis with the patient's underlying conditions and ileal conduit.  Since her urine looks good and her CT scan is unremarkable and since she has a positive influenza A test, he does not feel that her symptoms are in any way related to her kidneys and feels that she is fine to go home from a urology standpoint.  Patient is positive for influenza A.  Symptoms have been present for 2 days.  She will go home on Tamiflu.  Her vitals are stable.  She is not in any respiratory distress.  Chest x-ray is negative.  No evidence of pneumonia.  At this time I think all the patient's symptoms are secondary to influenza A.  She has been given very strict return precautions and discharge instructions and she understands treatment plan and follow-up.    Hydration: Based on the patient's presentation of Dehydration the patient was given IV fluids. IV Hydration was used because oral hydration was not as rapid as required. Upon recheck following hydration, the patient was improved.      1500: Discussed with Dr. Donaldson, urologist on-call for Dr. Rangel.  He said it is pretty common to have some hydronephrosis after a ileal conduit and he is not concerned since the hydronephrosis appears similar to 2018.  At this time there is nothing else that he would be worried about since the urine looks good and the CT scan is unremarkable.    ADDITIONAL PROBLEMS MANAGED  Problem #1: Fevers x 2 days  Problem #2: Right flank pain x 2 days  Problem #3: Foul-smelling urine x 2 days    DISPOSITION AND DISCUSSIONS  I have discussed management of the patient with  the following physicians and RAYSHAWN's: Urologist    Discussion of management with other Q or appropriate source(s): None     Escalation of care considered, and ultimately not performed:acute inpatient care management, however at this time, the patient is most appropriate for outpatient management.  Patient has influenza A.  This likely explains all of her symptoms.  Workup is otherwise unremarkable.  Patient's vitals are stable.  She is safe for outpatient management discharge home.  No need for inpatient evaluation at this time.    Barriers to care at this time, including but not limited to:  None known .     Decision tools and prescription drugs considered including, but not limited to: Antibiotics patient was given a dose of Rocephin here in the ER for presumed pyelonephritis she will go home with Tamiflu for her influenza A.  and Antivirals   .    FINAL DIAGNOSIS  1. Influenza A Acute   2. Flank pain Acute        This dictation has been created using voice recognition software. The accuracy of the dictation is limited by the abilities of the software. I expect there may be some errors of grammar and possibly content. I made every attempt to manually correct the errors within my dictation. However, errors related to voice recognition software may still exist and should be interpreted within the appropriate context.   Electronically signed by: Maria Luisa Mcclelland M.D., 12/10/2024 11:15 AM

## 2024-12-10 NOTE — DISCHARGE INSTRUCTIONS
Drink plenty of fluids to stay well-hydrated.    Take over-the-counter Tylenol and ibuprofen for fever, muscle aches and bodyaches.    You have influenza A.  You are very contagious.  Please avoid contact with the public.    Return to the ER for any worsening flank pain, worsening abdominal pain, nausea, vomiting, persistent fevers over 101 over the next several days, cough, coughing up phlegm or blood, chest pain, headache, stiff neck, rash, diarrhea, or for any concerns/worsening.    Follow-up with your primary care physician within the next 1 to 2 days.  If you do not have a primary care doctor, please follow-up with the [facility] clinic  or with the [facility] within the next 1 to 2 days. Please call for appointment.     Also follow-up with Dr. Rangel within the next week.  Please call for appointment.

## 2024-12-10 NOTE — ED TRIAGE NOTES
"Chief Complaint   Patient presents with    Flank Pain     Pt had a \"reconstructive surgery\" in 2018 for small intestine and kidney. Pt today pt endorsing fever and R low back pain. Pt took tylenol with now relief   Pt sent by PCP for further evaluation      /75   Pulse 97   Temp (!) 38.6 °C (101.5 °F) (Oral)   Resp 20   Ht 1.702 m (5' 7\")   Wt 92 kg (202 lb 13.2 oz)   LMP 09/01/2009   SpO2 96%   BMI 31.77 kg/m²     Pt is alert/oriented and follows commands. Pt speaking in full sentences and responds appropriately to questions. No acute distress noted in triage and respirations are even and unlabored.      Pt placed in lobby and educated on triage process. Pt encouraged to alert staff for any changes in condition.    "

## 2024-12-12 LAB
BACTERIA UR CULT: NORMAL
SIGNIFICANT IND 70042: NORMAL
SITE SITE: NORMAL
SOURCE SOURCE: NORMAL

## 2025-03-10 ENCOUNTER — APPOINTMENT (OUTPATIENT)
Dept: URBAN - METROPOLITAN AREA CLINIC 6 | Facility: CLINIC | Age: 55
Setting detail: DERMATOLOGY
End: 2025-03-10

## 2025-03-10 DIAGNOSIS — L91.8 OTHER HYPERTROPHIC DISORDERS OF THE SKIN: ICD-10-CM

## 2025-03-10 DIAGNOSIS — L40.0 PSORIASIS VULGARIS: ICD-10-CM | Status: WELL CONTROLLED

## 2025-03-10 DIAGNOSIS — D22 MELANOCYTIC NEVI: ICD-10-CM | Status: STABLE

## 2025-03-10 DIAGNOSIS — D18.0 HEMANGIOMA: ICD-10-CM

## 2025-03-10 DIAGNOSIS — L82.1 OTHER SEBORRHEIC KERATOSIS: ICD-10-CM

## 2025-03-10 DIAGNOSIS — L57.0 ACTINIC KERATOSIS: ICD-10-CM

## 2025-03-10 DIAGNOSIS — Z71.89 OTHER SPECIFIED COUNSELING: ICD-10-CM

## 2025-03-10 DIAGNOSIS — L81.4 OTHER MELANIN HYPERPIGMENTATION: ICD-10-CM

## 2025-03-10 PROBLEM — D18.01 HEMANGIOMA OF SKIN AND SUBCUTANEOUS TISSUE: Status: ACTIVE | Noted: 2025-03-10

## 2025-03-10 PROBLEM — D48.5 NEOPLASM OF UNCERTAIN BEHAVIOR OF SKIN: Status: ACTIVE | Noted: 2025-03-10

## 2025-03-10 PROBLEM — D23.72 OTHER BENIGN NEOPLASM OF SKIN OF LEFT LOWER LIMB, INCLUDING HIP: Status: ACTIVE | Noted: 2025-03-10

## 2025-03-10 PROBLEM — D22.5 MELANOCYTIC NEVI OF TRUNK: Status: ACTIVE | Noted: 2025-03-10

## 2025-03-10 PROCEDURE — 17003 DESTRUCT PREMALG LES 2-14: CPT

## 2025-03-10 PROCEDURE — ? PHOTO-DOCUMENTATION

## 2025-03-10 PROCEDURE — ? PRESCRIPTION MEDICATION MANAGEMENT

## 2025-03-10 PROCEDURE — ? SUNSCREEN RECOMMENDATIONS

## 2025-03-10 PROCEDURE — ? PRESCRIPTION

## 2025-03-10 PROCEDURE — 11102 TANGNTL BX SKIN SINGLE LES: CPT

## 2025-03-10 PROCEDURE — 99213 OFFICE O/P EST LOW 20 MIN: CPT | Mod: 25

## 2025-03-10 PROCEDURE — ? LIQUID NITROGEN

## 2025-03-10 PROCEDURE — ? BIOPSY BY SHAVE METHOD

## 2025-03-10 PROCEDURE — 17000 DESTRUCT PREMALG LESION: CPT | Mod: 59

## 2025-03-10 PROCEDURE — ? ADDITIONAL NOTES

## 2025-03-10 PROCEDURE — ? COUNSELING

## 2025-03-10 RX ORDER — TAPINAROF 10 MG/1000MG
CREAM TOPICAL
Qty: 60 | Refills: 5 | Status: ERX

## 2025-03-10 ASSESSMENT — LOCATION SIMPLE DESCRIPTION DERM
LOCATION SIMPLE: RIGHT ELBOW
LOCATION SIMPLE: LEFT ELBOW
LOCATION SIMPLE: LEFT FOREARM
LOCATION SIMPLE: CHEST
LOCATION SIMPLE: ABDOMEN
LOCATION SIMPLE: LEFT FOREHEAD
LOCATION SIMPLE: RIGHT FOREARM
LOCATION SIMPLE: LEFT KNEE
LOCATION SIMPLE: LEFT UPPER BACK
LOCATION SIMPLE: RIGHT KNEE
LOCATION SIMPLE: LEFT CHEEK

## 2025-03-10 ASSESSMENT — PGA PSORIASIS: PGA PSORIASIS 2020: MILD

## 2025-03-10 ASSESSMENT — LOCATION DETAILED DESCRIPTION DERM
LOCATION DETAILED: EPIGASTRIC SKIN
LOCATION DETAILED: RIGHT ELBOW
LOCATION DETAILED: UPPER STERNUM
LOCATION DETAILED: LEFT CENTRAL MALAR CHEEK
LOCATION DETAILED: RIGHT PROXIMAL DORSAL FOREARM
LOCATION DETAILED: LEFT ELBOW
LOCATION DETAILED: LEFT FOREHEAD
LOCATION DETAILED: LEFT KNEE
LOCATION DETAILED: LEFT SUPERIOR MEDIAL UPPER BACK
LOCATION DETAILED: RIGHT KNEE
LOCATION DETAILED: LEFT LATERAL FOREHEAD
LOCATION DETAILED: RIGHT PROXIMAL RADIAL DORSAL FOREARM
LOCATION DETAILED: LEFT PROXIMAL DORSAL FOREARM

## 2025-03-10 ASSESSMENT — LOCATION ZONE DERM
LOCATION ZONE: TRUNK
LOCATION ZONE: ARM
LOCATION ZONE: FACE
LOCATION ZONE: LEG

## 2025-03-10 ASSESSMENT — BSA PSORIASIS: % BODY COVERED IN PSORIASIS: 10

## 2025-03-10 NOTE — PROCEDURE: LIQUID NITROGEN
Post-Care Instructions: I reviewed with the patient in detail post-care instructions. Patient is to wear sunprotection, and avoid picking at any of the treated lesions. Pt may apply Vaseline to crusted or scabbing areas.
Consent: The patient's verbal consent was obtained including but not limited to risks of crusting, scabbing, blistering, scarring, darker or lighter pigmentary change, recurrence, incomplete removal and infection.
Render Note In Bullet Format When Appropriate: No
Detail Level: Zone
Show Aperture Variable?: Yes
Number Of Freeze-Thaw Cycles: 2 freeze-thaw cycles
Duration Of Freeze Thaw-Cycle (Seconds): 8

## 2025-03-10 NOTE — PROCEDURE: PHOTO-DOCUMENTATION
Photo Preface (Leave Blank If You Do Not Want): Photograph
Details (Free Text): Nevus on upper back remains stable when compared to prior clinical photos, no concerning changes.
Detail Level: Detailed

## 2025-04-01 ENCOUNTER — APPOINTMENT (OUTPATIENT)
Dept: RADIOLOGY | Facility: MEDICAL CENTER | Age: 55
End: 2025-04-01
Attending: OBSTETRICS & GYNECOLOGY
Payer: COMMERCIAL

## 2025-04-01 DIAGNOSIS — Z12.31 ENCOUNTER FOR SCREENING MAMMOGRAM FOR BREAST CANCER: ICD-10-CM

## 2025-04-01 PROCEDURE — 77063 BREAST TOMOSYNTHESIS BI: CPT

## 2025-04-24 ENCOUNTER — HOSPITAL ENCOUNTER (OUTPATIENT)
Dept: RADIOLOGY | Facility: MEDICAL CENTER | Age: 55
End: 2025-04-24
Attending: OBSTETRICS & GYNECOLOGY
Payer: COMMERCIAL

## 2025-04-24 DIAGNOSIS — R92.8 ABNORMAL MAMMOGRAM: ICD-10-CM

## 2025-04-24 PROCEDURE — 76642 ULTRASOUND BREAST LIMITED: CPT | Mod: RT

## 2025-04-24 PROCEDURE — G0279 TOMOSYNTHESIS, MAMMO: HCPCS

## 2025-05-29 NOTE — PROCEDURE: LIQUID NITROGEN
[FreeTextEntry1] : Patient has a right trigger thumb.  She is indicated and given an injection of Kenalog 41 cc under sterile conditions of the right thumb flexor tendon sheath.  She tolerated well.
Consent: The patient's consent was obtained including but not limited to risks of crusting, scabbing, blistering, scarring, darker or lighter pigmentary change, recurrence, incomplete removal and infection.
Show Aperture Variable?: Yes
Detail Level: Detailed
Render Note In Bullet Format When Appropriate: No
Post-Care Instructions: I reviewed with the patient in detail post-care instructions. Patient is to wear sunprotection, and avoid picking at any of the treated lesions. Pt may apply Vaseline to crusted or scabbing areas.
Duration Of Freeze Thaw-Cycle (Seconds): 0

## 2025-07-05 ENCOUNTER — OFFICE VISIT (OUTPATIENT)
Dept: URGENT CARE | Facility: PHYSICIAN GROUP | Age: 55
End: 2025-07-05
Payer: COMMERCIAL

## 2025-07-05 ENCOUNTER — HOSPITAL ENCOUNTER (OUTPATIENT)
Facility: MEDICAL CENTER | Age: 55
End: 2025-07-05
Attending: PHYSICIAN ASSISTANT
Payer: COMMERCIAL

## 2025-07-05 VITALS
HEART RATE: 107 BPM | SYSTOLIC BLOOD PRESSURE: 126 MMHG | OXYGEN SATURATION: 93 % | BODY MASS INDEX: 32.96 KG/M2 | WEIGHT: 210 LBS | DIASTOLIC BLOOD PRESSURE: 84 MMHG | RESPIRATION RATE: 18 BRPM | HEIGHT: 67 IN | TEMPERATURE: 101.1 F

## 2025-07-05 DIAGNOSIS — N12 PYELONEPHRITIS: ICD-10-CM

## 2025-07-05 DIAGNOSIS — N12 PYELONEPHRITIS: Primary | ICD-10-CM

## 2025-07-05 LAB
AMBIGUOUS DTTM AMBI4: NORMAL
APPEARANCE UR: NORMAL
BILIRUB UR STRIP-MCNC: NEGATIVE MG/DL
COLOR UR AUTO: NORMAL
GLUCOSE UR STRIP.AUTO-MCNC: NEGATIVE MG/DL
KETONES UR STRIP.AUTO-MCNC: 15 MG/DL
LEUKOCYTE ESTERASE UR QL STRIP.AUTO: NORMAL
NITRITE UR QL STRIP.AUTO: NEGATIVE
PH UR STRIP.AUTO: 7 [PH] (ref 5–8)
PROT UR QL STRIP: 30 MG/DL
RBC UR QL AUTO: NORMAL
SP GR UR STRIP.AUTO: 1.02
UROBILINOGEN UR STRIP-MCNC: 0.2 MG/DL

## 2025-07-05 PROCEDURE — 87086 URINE CULTURE/COLONY COUNT: CPT

## 2025-07-05 PROCEDURE — 87186 SC STD MICRODIL/AGAR DIL: CPT

## 2025-07-05 PROCEDURE — 87077 CULTURE AEROBIC IDENTIFY: CPT

## 2025-07-05 PROCEDURE — 81002 URINALYSIS NONAUTO W/O SCOPE: CPT | Performed by: PHYSICIAN ASSISTANT

## 2025-07-05 PROCEDURE — 3079F DIAST BP 80-89 MM HG: CPT | Performed by: PHYSICIAN ASSISTANT

## 2025-07-05 PROCEDURE — 3074F SYST BP LT 130 MM HG: CPT | Performed by: PHYSICIAN ASSISTANT

## 2025-07-05 PROCEDURE — 99204 OFFICE O/P NEW MOD 45 MIN: CPT | Performed by: PHYSICIAN ASSISTANT

## 2025-07-05 RX ORDER — SULFAMETHOXAZOLE AND TRIMETHOPRIM 800; 160 MG/1; MG/1
1 TABLET ORAL 2 TIMES DAILY
Qty: 28 TABLET | Refills: 0 | Status: SHIPPED | OUTPATIENT
Start: 2025-07-05 | End: 2025-07-19

## 2025-07-05 ASSESSMENT — FIBROSIS 4 INDEX: FIB4 SCORE: 1.04

## 2025-07-05 NOTE — PROGRESS NOTES
"Subjective     Socorro Tan is a 55 y.o. female who presents with Fever (Symptoms started Thursday with fever of 102- 103. Took advil this morning. Recently just had  surgery /)    HPI:  Aurora Tan is a 55 y.o. female who presents today for evaluation of fever.  Notes that she has a history of reconstructive surgery in 2008 on her right ureter and kidney.  States that she gets a lot of kidney infections quite easily with onset of fever.  Does not always have the typical urinary tract symptoms.  Says that she was noticing some urinary incontinence this past week and then Thursday developed fever.  Fever has gotten as high as 102 to 103 °F.  She has some chills, body aches, fatigue.  No URI symptoms.  Had 1 episode of vomiting last night after taking Advil.  No other episodes of vomiting.  Is able to drink water without issue.        ROS      PMH:  has a past medical history of Breath shortness (04/25/2018), Heart attack (HCC) (11/03/2017), Heart burn (04/25/2018), High cholesterol, Pain (04/2018), Pain (04/25/2018), Renal disorder, Renal disorder (04/25/2018), Snoring (04/25/2018), and Urinary incontinence.  MEDS: Current Medications[1]  ALLERGIES: Allergies[2]  SURGHX: Past Surgical History[3]  SOCHX:  reports that she quit smoking about 12 years ago. Her smoking use included cigarettes. She started smoking about 22 years ago. She has a 2.5 pack-year smoking history. She has never used smokeless tobacco. She reports that she does not currently use alcohol. She reports that she does not use drugs.  FH: Family history was reviewed, no pertinent findings to report        Objective     /84 (BP Location: Right arm, Patient Position: Sitting, BP Cuff Size: Adult)   Pulse (!) 107   Temp (!) 38.4 °C (101.1 °F) (Temporal)   Resp 18   Ht 1.702 m (5' 7\")   Wt 95.3 kg (210 lb)   LMP 09/01/2009   SpO2 93% Comment: nailpolish  BMI 32.89 kg/m²      Physical Exam  Constitutional:       General: She is not " in acute distress.     Appearance: She is ill-appearing. She is not diaphoretic.   HENT:      Head: Normocephalic and atraumatic.      Right Ear: External ear normal.      Left Ear: External ear normal.   Eyes:      Conjunctiva/sclera: Conjunctivae normal.      Pupils: Pupils are equal, round, and reactive to light.   Cardiovascular:      Rate and Rhythm: Tachycardia present.   Pulmonary:      Effort: Pulmonary effort is normal. No respiratory distress.      Breath sounds: Normal breath sounds. No wheezing.   Abdominal:      Tenderness: There is no abdominal tenderness. There is no right CVA tenderness or left CVA tenderness.   Musculoskeletal:      Cervical back: Normal range of motion.   Skin:     Findings: No rash.   Neurological:      Mental Status: She is alert and oriented to person, place, and time.   Psychiatric:         Mood and Affect: Mood and affect normal.         Cognition and Memory: Memory normal.         Judgment: Judgment normal.         POCT Urinalysis  Lab Results   Component Value Date/Time    POCCOLOR dark yellow 07/05/2025 09:35 AM    POCAPPEAR slightly cloudy 07/05/2025 09:35 AM    POCLEUKEST trace 07/05/2025 09:35 AM    POCNITRITE negative 07/05/2025 09:35 AM    POCUROBILIGE 0.2 07/05/2025 09:35 AM    POCPROTEIN 30 07/05/2025 09:35 AM    POCURPH 7.0 07/05/2025 09:35 AM    POCBLOOD small 07/05/2025 09:35 AM    POCSPGRV 1.020 07/05/2025 09:35 AM    POCKETONES 15 07/05/2025 09:35 AM    POCBILIRUBIN negative 07/05/2025 09:35 AM    POCGLUCUA negative 07/05/2025 09:35 AM            Assessment & Plan     1. Pyelonephritis (Primary)  - POCT Urinalysis  - cefTRIAXone (Rocephin) 1 g in lidocaine (Xylocaine) 1 % 4 mL for IM use  - sulfamethoxazole-trimethoprim (BACTRIM DS) 800-160 MG tablet; Take 1 Tablet by mouth 2 times a day for 14 days.  Dispense: 28 Tablet; Refill: 0  - URINE CULTURE(NEW); Future  Symptoms and urinalysis findings are concerning for acute pyelonephritis.  She was empirically  treated with 1 g Rocephin IM in clinic and will be started on a 14-day course of oral Bactrim.  If symptoms do not start to improve in the next 24 hours she was directed to go to the emergency department for higher level of care and possible sepsis workup.  She also notes that she will contact her urologist on Monday for follow-up.          Differential Diagnosis, natural history, and supportive care discussed. Return to the Urgent Care or follow up with your PCP if symptoms fail to resolve, or for any new or worsening symptoms. Emergency room precautions discussed. Patient and/or family appears understanding of information.         [1]   Current Outpatient Medications:     sulfamethoxazole-trimethoprim (BACTRIM DS) 800-160 MG tablet, Take 1 Tablet by mouth 2 times a day for 14 days., Disp: 28 Tablet, Rfl: 0    acetaminophen (TYLENOL) 500 MG Tab, Take 1,000 mg by mouth every 6 hours as needed for Moderate Pain., Disp: , Rfl:     mupirocin calcium (BACTROBAN) 2 % Cream, Apply 1 Application to affected area(s) 2 times a day. (Patient not taking: Reported on 7/5/2025), Disp: 1 Tube, Rfl: 1    oxyCODONE HCl 5 MG Tablet Abuse-Deterrent, Take 5 mg by mouth every 6 hours as needed. (Patient not taking: Reported on 7/5/2025), Disp: , Rfl:     Pseudoephedrine-guaiFENesin (MUCINEX D MAX STRENGTH PO), Take 1 tablet by mouth 2 Times a Day. (Patient not taking: Reported on 7/5/2025), Disp: , Rfl:     Probiotic Product (PROBIOTIC PO), Take 1 Cap by mouth every day. (Patient not taking: Reported on 7/5/2025), Disp: , Rfl:     estradiol (MINIVELLE) 0.05 MG/24HR PATCH BIWEEKLY, Apply 1 Patch to skin as directed every 7 days. On Mon (Patient not taking: Reported on 7/5/2025), Disp: , Rfl:     Current Facility-Administered Medications:     cefTRIAXone (Rocephin) 1 g in lidocaine (Xylocaine) 1 % 4 mL for IM use, 1 g, Intramuscular, Once,   [2]   Allergies  Allergen Reactions    Levofloxacin Hives, Shortness of Breath and Itching      Itching, pruritis      Morphine Itching          [3]   Past Surgical History:  Procedure Laterality Date    WOUND IRRIGATION & DEBRIDEMENT Right 11/3/2018    Procedure: IRRIGATION & DEBRIDEMENT GENERAL ABDOMINAL WALL;  Surgeon: Rogerio Crowder M.D.;  Location: SURGERY [facility];  Service: General    EXPLORATORY LAPAROTOMY  9/26/2018    Procedure: DRAINAGE OF ABDOMINAL WALL SEROMA;  Surgeon: Ross Mendoza M.D.;  Location: SURGERY [facility];  Service: General    EXPLORATORY LAPAROTOMY Right 4/30/2018    Procedure: EXPLORATORY LAPAROTOMY W/ILEAL URETER;  Surgeon: Kapil Rangel M.D.;  Location: SURGERY [facility];  Service: Urology    LYSIS ADHESIONS GENERAL  4/30/2018    Procedure: LYSIS ADHESIONS GENERAL;  Surgeon: Kapil Rangel M.D.;  Location: SURGERY [facility];  Service: Urology    CYSTOSCOPY STENT PLACEMENT Right 3/21/2018    Procedure: CYSTOSCOPY - STENT REMOVAL;  Surgeon: Terry Maldonado M.D.;  Location: SURGERY [facility];  Service: Urology    URETEROSCOPY Right 3/21/2018    Procedure: URETEROSCOPY- DIAGNOSTIC;  Surgeon: Terry Maldonado M.D.;  Location: SURGERY [facility];  Service: Urology    RETROGRADES Right 3/21/2018    Procedure: RETROGRADES;  Surgeon: Terry Maldonado M.D.;  Location: SURGERY [facility];  Service: Urology    PYELOGRAM Right 3/21/2018    Procedure: PYELOGRAM;  Surgeon: Terry Maldonado M.D.;  Location: SURGERY [facility];  Service: Urology    CYSTOSCOPY N/A 1/31/2018    Procedure: CYSTOSCOPY;  Surgeon: Terry Maldonado M.D.;  Location: SURGERY [facility];  Service: Urology    RETROGRADES Right 1/31/2018    Procedure: RETROGRADES;  Surgeon: Terry Maldonado M.D.;  Location: SURGERY [facility];  Service: Urology    PYELOGRAM Right 1/31/2018    Procedure: PYELOGRAM;  Surgeon: Terry Maldonado M.D.;  Location: SURGERY [facility];  Service: Urology    URETEROSCOPY Right 1/31/2018    Procedure: URETEROSCOPY, INCISIONAL OR DILATION OF  STRICTURE  ;  Surgeon: Terry Maldonado M.D.;  Location: SURGERY [facility];  Service: Urology    LASERTRIPSY Right 1/31/2018    Procedure: LASERTRIPSY;  Surgeon: Terry Maldonado M.D.;  Location: SURGERY [facility];  Service: Urology    STENT PLACEMENT Right 1/31/2018    Procedure: STENT PLACEMENT;  Surgeon: Terry Maldonado M.D.;  Location: SURGERY [facility];  Service: Urology    URETEROSCOPY Right 11/7/2017    Procedure: URETEROSCOPY;  Surgeon: Kiet Eid M.D.;  Location: SURGERY [facility];  Service: Urology    LASERTRIPSY Right 11/7/2017    Procedure: LASERTRIPSY- LITHO;  Surgeon: Keit Eid M.D.;  Location: SURGERY [facility];  Service: Urology    URETHRAL DILATATION Right 11/7/2017    Procedure: URETHRAL DILATATION;  Surgeon: Kiet Eid M.D.;  Location: SURGERY [facility];  Service: Urology    CYSTOSCOPY STENT PLACEMENT Right 11/7/2017    Procedure: CYSTOSCOPY STENT PLACEMENT;  Surgeon: Kiet Eid M.D.;  Location: SURGERY [facility];  Service: Urology    CYSTOSCOPY Right 9/17/2017    Procedure: CYSTOSCOPY;  Surgeon: Kiet Eid M.D.;  Location: SURGERY [facility];  Service: Urology    URETEROSCOPY Right 9/17/2017    Procedure: URETEROSCOPY;  Surgeon: Kiet Eid M.D.;  Location: SURGERY [facility];  Service: Urology    LASERTRIPSY Right 9/17/2017    Procedure: LASERTRIPSY;  Surgeon: Keit Eid M.D.;  Location: SURGERY [facility];  Service: Urology    STENT PLACEMENT Right 9/17/2017    Procedure: STENT PLACEMENT;  Surgeon: Kiet Eid M.D.;  Location: SURGERY [facility];  Service: Urology    ABDOMINAL HYSTERECTOMY TOTAL  2009    OTHER ABDOMINAL SURGERY  2009    abdominal tumor     OTHER ABDOMINAL SURGERY  2009    ABDOMINAL EXPLORATION      OTHER      eye surgeries X6    OTHER      ear surgeries X5    PRIMARY C SECTION      1989/1991/1997/1999    TONSILLECTOMY

## 2025-07-06 ENCOUNTER — APPOINTMENT (OUTPATIENT)
Dept: RADIOLOGY | Facility: MEDICAL CENTER | Age: 55
End: 2025-07-06
Attending: EMERGENCY MEDICINE
Payer: COMMERCIAL

## 2025-07-06 ENCOUNTER — HOSPITAL ENCOUNTER (OUTPATIENT)
Facility: MEDICAL CENTER | Age: 55
End: 2025-07-07
Attending: EMERGENCY MEDICINE | Admitting: STUDENT IN AN ORGANIZED HEALTH CARE EDUCATION/TRAINING PROGRAM
Payer: COMMERCIAL

## 2025-07-06 DIAGNOSIS — N12 PYELONEPHRITIS: Primary | ICD-10-CM

## 2025-07-06 DIAGNOSIS — R10.9 RIGHT FLANK PAIN: ICD-10-CM

## 2025-07-06 PROBLEM — R11.2 INTRACTABLE NAUSEA AND VOMITING: Status: ACTIVE | Noted: 2025-07-06

## 2025-07-06 PROBLEM — N10 ACUTE PYELONEPHRITIS: Status: ACTIVE | Noted: 2025-07-06

## 2025-07-06 PROBLEM — B96.89 ACUTE BACTERIAL PYELONEPHRITIS: Status: ACTIVE | Noted: 2025-07-06

## 2025-07-06 PROBLEM — N10 ACUTE PYELONEPHRITIS: Status: RESOLVED | Noted: 2025-07-06 | Resolved: 2025-07-06

## 2025-07-06 PROBLEM — E66.811 CLASS 1 OBESITY DUE TO EXCESS CALORIES IN ADULT: Status: ACTIVE | Noted: 2025-07-06

## 2025-07-06 PROBLEM — E66.09 CLASS 1 OBESITY DUE TO EXCESS CALORIES IN ADULT: Status: ACTIVE | Noted: 2025-07-06

## 2025-07-06 LAB
ALBUMIN SERPL BCP-MCNC: 4 G/DL (ref 3.2–4.9)
ALBUMIN/GLOB SERPL: 1.3 G/DL
ALP SERPL-CCNC: 128 U/L (ref 30–99)
ALT SERPL-CCNC: 27 U/L (ref 2–50)
AMPHET UR QL SCN: NEGATIVE
ANION GAP SERPL CALC-SCNC: 17 MMOL/L (ref 7–16)
APPEARANCE UR: CLEAR
AST SERPL-CCNC: 34 U/L (ref 12–45)
BACTERIA #/AREA URNS HPF: ABNORMAL /HPF
BARBITURATES UR QL SCN: NEGATIVE
BASOPHILS # BLD AUTO: 0.3 % (ref 0–1.8)
BASOPHILS # BLD: 0.03 K/UL (ref 0–0.12)
BENZODIAZ UR QL SCN: NEGATIVE
BILIRUB SERPL-MCNC: 0.7 MG/DL (ref 0.1–1.5)
BILIRUB UR QL STRIP.AUTO: NEGATIVE
BUN SERPL-MCNC: 14 MG/DL (ref 8–22)
BZE UR QL SCN: NEGATIVE
CALCIUM ALBUM COR SERPL-MCNC: 9 MG/DL (ref 8.5–10.5)
CALCIUM SERPL-MCNC: 9 MG/DL (ref 8.5–10.5)
CANNABINOIDS UR QL SCN: POSITIVE
CASTS URNS QL MICRO: ABNORMAL /LPF (ref 0–2)
CHLORIDE SERPL-SCNC: 99 MMOL/L (ref 96–112)
CO2 SERPL-SCNC: 15 MMOL/L (ref 20–33)
COLOR UR: YELLOW
CREAT SERPL-MCNC: 1.21 MG/DL (ref 0.5–1.4)
CREAT UR-MCNC: 22.7 MG/DL
EOSINOPHIL # BLD AUTO: 0.05 K/UL (ref 0–0.51)
EOSINOPHIL NFR BLD: 0.4 % (ref 0–6.9)
EPITHELIAL CELLS 1715: ABNORMAL /HPF (ref 0–5)
ERYTHROCYTE [DISTWIDTH] IN BLOOD BY AUTOMATED COUNT: 39.8 FL (ref 35.9–50)
FENTANYL UR QL: NEGATIVE
GFR SERPLBLD CREATININE-BSD FMLA CKD-EPI: 52 ML/MIN/1.73 M 2
GLOBULIN SER CALC-MCNC: 3.2 G/DL (ref 1.9–3.5)
GLUCOSE SERPL-MCNC: 105 MG/DL (ref 65–99)
GLUCOSE UR STRIP.AUTO-MCNC: NEGATIVE MG/DL
HCT VFR BLD AUTO: 39 % (ref 37–47)
HGB BLD-MCNC: 13.3 G/DL (ref 12–16)
IMM GRANULOCYTES # BLD AUTO: 0.04 K/UL (ref 0–0.11)
IMM GRANULOCYTES NFR BLD AUTO: 0.3 % (ref 0–0.9)
KETONES UR STRIP.AUTO-MCNC: 40 MG/DL
LACTATE SERPL-SCNC: 0.9 MMOL/L (ref 0.5–2)
LEUKOCYTE ESTERASE UR QL STRIP.AUTO: ABNORMAL
LYMPHOCYTES # BLD AUTO: 0.92 K/UL (ref 1–4.8)
LYMPHOCYTES NFR BLD: 7.8 % (ref 22–41)
MCH RBC QN AUTO: 30.4 PG (ref 27–33)
MCHC RBC AUTO-ENTMCNC: 34.1 G/DL (ref 32.2–35.5)
MCV RBC AUTO: 89 FL (ref 81.4–97.8)
METHADONE UR QL SCN: NEGATIVE
MICRO URNS: ABNORMAL
MONOCYTES # BLD AUTO: 1.06 K/UL (ref 0–0.85)
MONOCYTES NFR BLD AUTO: 9 % (ref 0–13.4)
NEUTROPHILS # BLD AUTO: 9.69 K/UL (ref 1.82–7.42)
NEUTROPHILS NFR BLD: 82.2 % (ref 44–72)
NITRITE UR QL STRIP.AUTO: NEGATIVE
NRBC # BLD AUTO: 0 K/UL
NRBC BLD-RTO: 0 /100 WBC (ref 0–0.2)
OPIATES UR QL SCN: NEGATIVE
OXYCODONE UR QL SCN: NEGATIVE
PCP UR QL SCN: NEGATIVE
PH UR STRIP.AUTO: 6 [PH] (ref 5–8)
PLATELET # BLD AUTO: 286 K/UL (ref 164–446)
PMV BLD AUTO: 11.2 FL (ref 9–12.9)
POTASSIUM SERPL-SCNC: 4.3 MMOL/L (ref 3.6–5.5)
PROPOXYPH UR QL SCN: NEGATIVE
PROT SERPL-MCNC: 7.2 G/DL (ref 6–8.2)
PROT UR QL STRIP: 30 MG/DL
RBC # BLD AUTO: 4.38 M/UL (ref 4.2–5.4)
RBC # URNS HPF: ABNORMAL /HPF (ref 0–2)
RBC UR QL AUTO: ABNORMAL
SODIUM SERPL-SCNC: 131 MMOL/L (ref 135–145)
SODIUM UR-SCNC: <20 MMOL/L
SP GR UR STRIP.AUTO: 1.02
UROBILINOGEN UR STRIP.AUTO-MCNC: 1 EU/DL
WBC # BLD AUTO: 11.8 K/UL (ref 4.8–10.8)
WBC #/AREA URNS HPF: ABNORMAL /HPF

## 2025-07-06 PROCEDURE — 700102 HCHG RX REV CODE 250 W/ 637 OVERRIDE(OP): Performed by: STUDENT IN AN ORGANIZED HEALTH CARE EDUCATION/TRAINING PROGRAM

## 2025-07-06 PROCEDURE — 85025 COMPLETE CBC W/AUTO DIFF WBC: CPT

## 2025-07-06 PROCEDURE — 81001 URINALYSIS AUTO W/SCOPE: CPT

## 2025-07-06 PROCEDURE — 80307 DRUG TEST PRSMV CHEM ANLYZR: CPT

## 2025-07-06 PROCEDURE — 99285 EMERGENCY DEPT VISIT HI MDM: CPT

## 2025-07-06 PROCEDURE — 83605 ASSAY OF LACTIC ACID: CPT

## 2025-07-06 PROCEDURE — 700111 HCHG RX REV CODE 636 W/ 250 OVERRIDE (IP): Mod: JZ | Performed by: EMERGENCY MEDICINE

## 2025-07-06 PROCEDURE — 36415 COLL VENOUS BLD VENIPUNCTURE: CPT

## 2025-07-06 PROCEDURE — 700111 HCHG RX REV CODE 636 W/ 250 OVERRIDE (IP)

## 2025-07-06 PROCEDURE — A9270 NON-COVERED ITEM OR SERVICE: HCPCS | Performed by: STUDENT IN AN ORGANIZED HEALTH CARE EDUCATION/TRAINING PROGRAM

## 2025-07-06 PROCEDURE — 87086 URINE CULTURE/COLONY COUNT: CPT

## 2025-07-06 PROCEDURE — A9270 NON-COVERED ITEM OR SERVICE: HCPCS

## 2025-07-06 PROCEDURE — 96374 THER/PROPH/DIAG INJ IV PUSH: CPT

## 2025-07-06 PROCEDURE — 84300 ASSAY OF URINE SODIUM: CPT

## 2025-07-06 PROCEDURE — 700105 HCHG RX REV CODE 258: Performed by: EMERGENCY MEDICINE

## 2025-07-06 PROCEDURE — 700111 HCHG RX REV CODE 636 W/ 250 OVERRIDE (IP): Mod: JZ | Performed by: STUDENT IN AN ORGANIZED HEALTH CARE EDUCATION/TRAINING PROGRAM

## 2025-07-06 PROCEDURE — 80053 COMPREHEN METABOLIC PANEL: CPT

## 2025-07-06 PROCEDURE — 770006 HCHG ROOM/CARE - MED/SURG/GYN SEMI*

## 2025-07-06 PROCEDURE — 87040 BLOOD CULTURE FOR BACTERIA: CPT | Mod: 91

## 2025-07-06 PROCEDURE — 700102 HCHG RX REV CODE 250 W/ 637 OVERRIDE(OP)

## 2025-07-06 PROCEDURE — 700105 HCHG RX REV CODE 258: Performed by: STUDENT IN AN ORGANIZED HEALTH CARE EDUCATION/TRAINING PROGRAM

## 2025-07-06 PROCEDURE — 96375 TX/PRO/DX INJ NEW DRUG ADDON: CPT

## 2025-07-06 PROCEDURE — 74176 CT ABD & PELVIS W/O CONTRAST: CPT

## 2025-07-06 PROCEDURE — 99223 1ST HOSP IP/OBS HIGH 75: CPT | Performed by: STUDENT IN AN ORGANIZED HEALTH CARE EDUCATION/TRAINING PROGRAM

## 2025-07-06 PROCEDURE — 71045 X-RAY EXAM CHEST 1 VIEW: CPT

## 2025-07-06 PROCEDURE — 82570 ASSAY OF URINE CREATININE: CPT

## 2025-07-06 RX ORDER — OXYCODONE HYDROCHLORIDE 5 MG/1
5 TABLET ORAL
Refills: 0 | Status: DISCONTINUED | OUTPATIENT
Start: 2025-07-06 | End: 2025-07-07 | Stop reason: HOSPADM

## 2025-07-06 RX ORDER — AMOXICILLIN 250 MG
2 CAPSULE ORAL EVERY EVENING
Status: DISCONTINUED | OUTPATIENT
Start: 2025-07-06 | End: 2025-07-07 | Stop reason: HOSPADM

## 2025-07-06 RX ORDER — IBUPROFEN 600 MG/1
600 TABLET, FILM COATED ORAL ONCE
Status: DISCONTINUED | OUTPATIENT
Start: 2025-07-06 | End: 2025-07-06

## 2025-07-06 RX ORDER — SODIUM CHLORIDE, SODIUM LACTATE, POTASSIUM CHLORIDE, AND CALCIUM CHLORIDE .6; .31; .03; .02 G/100ML; G/100ML; G/100ML; G/100ML
30 INJECTION, SOLUTION INTRAVENOUS ONCE
Status: COMPLETED | OUTPATIENT
Start: 2025-07-06 | End: 2025-07-06

## 2025-07-06 RX ORDER — SODIUM CHLORIDE 9 MG/ML
1000 INJECTION, SOLUTION INTRAVENOUS ONCE
Status: DISCONTINUED | OUTPATIENT
Start: 2025-07-06 | End: 2025-07-06

## 2025-07-06 RX ORDER — PROCHLORPERAZINE EDISYLATE 5 MG/ML
5-10 INJECTION INTRAMUSCULAR; INTRAVENOUS EVERY 4 HOURS PRN
Status: DISCONTINUED | OUTPATIENT
Start: 2025-07-06 | End: 2025-07-07 | Stop reason: HOSPADM

## 2025-07-06 RX ORDER — HEPARIN SODIUM 5000 [USP'U]/ML
5000 INJECTION, SOLUTION INTRAVENOUS; SUBCUTANEOUS EVERY 8 HOURS
Status: DISCONTINUED | OUTPATIENT
Start: 2025-07-06 | End: 2025-07-07

## 2025-07-06 RX ORDER — HYDROMORPHONE HYDROCHLORIDE 1 MG/ML
0.5 INJECTION, SOLUTION INTRAMUSCULAR; INTRAVENOUS; SUBCUTANEOUS
Status: DISCONTINUED | OUTPATIENT
Start: 2025-07-06 | End: 2025-07-07 | Stop reason: HOSPADM

## 2025-07-06 RX ORDER — ACETAMINOPHEN 325 MG/1
650 TABLET ORAL EVERY 6 HOURS PRN
Status: DISCONTINUED | OUTPATIENT
Start: 2025-07-06 | End: 2025-07-06

## 2025-07-06 RX ORDER — ONDANSETRON 4 MG/1
4 TABLET, ORALLY DISINTEGRATING ORAL EVERY 4 HOURS PRN
Status: DISCONTINUED | OUTPATIENT
Start: 2025-07-06 | End: 2025-07-07 | Stop reason: HOSPADM

## 2025-07-06 RX ORDER — LABETALOL HYDROCHLORIDE 5 MG/ML
10 INJECTION, SOLUTION INTRAVENOUS EVERY 4 HOURS PRN
Status: DISCONTINUED | OUTPATIENT
Start: 2025-07-06 | End: 2025-07-07 | Stop reason: HOSPADM

## 2025-07-06 RX ORDER — GUAIFENESIN/DEXTROMETHORPHAN 100-10MG/5
10 SYRUP ORAL EVERY 6 HOURS PRN
Status: DISCONTINUED | OUTPATIENT
Start: 2025-07-06 | End: 2025-07-07 | Stop reason: HOSPADM

## 2025-07-06 RX ORDER — ACETAMINOPHEN 325 MG/1
650 TABLET ORAL EVERY 6 HOURS PRN
Status: DISCONTINUED | OUTPATIENT
Start: 2025-07-12 | End: 2025-07-07 | Stop reason: HOSPADM

## 2025-07-06 RX ORDER — POLYETHYLENE GLYCOL 3350 17 G/17G
1 POWDER, FOR SOLUTION ORAL
Status: DISCONTINUED | OUTPATIENT
Start: 2025-07-06 | End: 2025-07-07 | Stop reason: HOSPADM

## 2025-07-06 RX ORDER — ONDANSETRON 2 MG/ML
4 INJECTION INTRAMUSCULAR; INTRAVENOUS ONCE
Status: COMPLETED | OUTPATIENT
Start: 2025-07-06 | End: 2025-07-06

## 2025-07-06 RX ORDER — ACETAMINOPHEN 325 MG/1
650 TABLET ORAL ONCE
Status: COMPLETED | OUTPATIENT
Start: 2025-07-06 | End: 2025-07-06

## 2025-07-06 RX ORDER — ONDANSETRON 4 MG/1
4 TABLET, ORALLY DISINTEGRATING ORAL ONCE
Status: COMPLETED | OUTPATIENT
Start: 2025-07-06 | End: 2025-07-06

## 2025-07-06 RX ORDER — SODIUM CHLORIDE, SODIUM LACTATE, POTASSIUM CHLORIDE, CALCIUM CHLORIDE 600; 310; 30; 20 MG/100ML; MG/100ML; MG/100ML; MG/100ML
INJECTION, SOLUTION INTRAVENOUS CONTINUOUS
Status: DISCONTINUED | OUTPATIENT
Start: 2025-07-06 | End: 2025-07-07 | Stop reason: HOSPADM

## 2025-07-06 RX ORDER — OXYCODONE HYDROCHLORIDE 10 MG/1
10 TABLET ORAL
Refills: 0 | Status: DISCONTINUED | OUTPATIENT
Start: 2025-07-06 | End: 2025-07-07 | Stop reason: HOSPADM

## 2025-07-06 RX ORDER — ACETAMINOPHEN 325 MG/1
650 TABLET ORAL EVERY 6 HOURS
Status: DISCONTINUED | OUTPATIENT
Start: 2025-07-07 | End: 2025-07-07 | Stop reason: HOSPADM

## 2025-07-06 RX ORDER — PROMETHAZINE HYDROCHLORIDE 25 MG/1
12.5-25 SUPPOSITORY RECTAL EVERY 4 HOURS PRN
Status: DISCONTINUED | OUTPATIENT
Start: 2025-07-06 | End: 2025-07-07 | Stop reason: HOSPADM

## 2025-07-06 RX ORDER — ONDANSETRON 2 MG/ML
4 INJECTION INTRAMUSCULAR; INTRAVENOUS EVERY 4 HOURS PRN
Status: DISCONTINUED | OUTPATIENT
Start: 2025-07-06 | End: 2025-07-07 | Stop reason: HOSPADM

## 2025-07-06 RX ORDER — KETOROLAC TROMETHAMINE 15 MG/ML
15 INJECTION, SOLUTION INTRAMUSCULAR; INTRAVENOUS EVERY 6 HOURS
Status: DISCONTINUED | OUTPATIENT
Start: 2025-07-07 | End: 2025-07-06

## 2025-07-06 RX ORDER — IPRATROPIUM BROMIDE AND ALBUTEROL SULFATE 2.5; .5 MG/3ML; MG/3ML
3 SOLUTION RESPIRATORY (INHALATION)
Status: DISCONTINUED | OUTPATIENT
Start: 2025-07-06 | End: 2025-07-07 | Stop reason: HOSPADM

## 2025-07-06 RX ORDER — SODIUM CHLORIDE, SODIUM LACTATE, POTASSIUM CHLORIDE, AND CALCIUM CHLORIDE .6; .31; .03; .02 G/100ML; G/100ML; G/100ML; G/100ML
500 INJECTION, SOLUTION INTRAVENOUS
Status: DISCONTINUED | OUTPATIENT
Start: 2025-07-06 | End: 2025-07-07 | Stop reason: HOSPADM

## 2025-07-06 RX ORDER — PROMETHAZINE HYDROCHLORIDE 25 MG/1
12.5-25 TABLET ORAL EVERY 4 HOURS PRN
Status: DISCONTINUED | OUTPATIENT
Start: 2025-07-06 | End: 2025-07-07 | Stop reason: HOSPADM

## 2025-07-06 RX ORDER — CEFAZOLIN 2 G/1
2 INJECTION, POWDER, FOR SOLUTION INTRAMUSCULAR; INTRAVENOUS ONCE
Status: COMPLETED | OUTPATIENT
Start: 2025-07-06 | End: 2025-07-06

## 2025-07-06 RX ADMIN — OXYCODONE HYDROCHLORIDE 10 MG: 10 TABLET ORAL at 21:44

## 2025-07-06 RX ADMIN — SODIUM CHLORIDE, POTASSIUM CHLORIDE, SODIUM LACTATE AND CALCIUM CHLORIDE 1848 ML: 600; 310; 30; 20 INJECTION, SOLUTION INTRAVENOUS at 20:25

## 2025-07-06 RX ADMIN — ONDANSETRON 4 MG: 2 INJECTION INTRAMUSCULAR; INTRAVENOUS at 20:24

## 2025-07-06 RX ADMIN — CEFAZOLIN 2 G: 2 INJECTION, POWDER, FOR SOLUTION INTRAVENOUS at 20:09

## 2025-07-06 RX ADMIN — SODIUM CHLORIDE, POTASSIUM CHLORIDE, SODIUM LACTATE AND CALCIUM CHLORIDE: 600; 310; 30; 20 INJECTION, SOLUTION INTRAVENOUS at 22:33

## 2025-07-06 RX ADMIN — FAMOTIDINE 20 MG: 10 INJECTION, SOLUTION INTRAVENOUS at 22:34

## 2025-07-06 RX ADMIN — ONDANSETRON 4 MG: 4 TABLET, ORALLY DISINTEGRATING ORAL at 17:43

## 2025-07-06 RX ADMIN — ACETAMINOPHEN 650 MG: 325 TABLET ORAL at 17:43

## 2025-07-06 SDOH — ECONOMIC STABILITY: TRANSPORTATION INSECURITY
IN THE PAST 12 MONTHS, HAS LACK OF RELIABLE TRANSPORTATION KEPT YOU FROM MEDICAL APPOINTMENTS, MEETINGS, WORK OR FROM GETTING THINGS NEEDED FOR DAILY LIVING?: NO

## 2025-07-06 SDOH — ECONOMIC STABILITY: TRANSPORTATION INSECURITY
IN THE PAST 12 MONTHS, HAS THE LACK OF TRANSPORTATION KEPT YOU FROM MEDICAL APPOINTMENTS OR FROM GETTING MEDICATIONS?: NO

## 2025-07-06 ASSESSMENT — COGNITIVE AND FUNCTIONAL STATUS - GENERAL
SUGGESTED CMS G CODE MODIFIER DAILY ACTIVITY: CJ
SUGGESTED CMS G CODE MODIFIER MOBILITY: CJ
CLIMB 3 TO 5 STEPS WITH RAILING: A LITTLE
DAILY ACTIVITIY SCORE: 22
MOVING TO AND FROM BED TO CHAIR: A LITTLE
TOILETING: A LITTLE
WALKING IN HOSPITAL ROOM: A LITTLE
MOBILITY SCORE: 20
STANDING UP FROM CHAIR USING ARMS: A LITTLE
HELP NEEDED FOR BATHING: A LITTLE

## 2025-07-06 ASSESSMENT — ENCOUNTER SYMPTOMS
VOMITING: 1
WEAKNESS: 1
MYALGIAS: 1
BRUISES/BLEEDS EASILY: 0
ABDOMINAL PAIN: 1
PALPITATIONS: 0
DIZZINESS: 0
BLURRED VISION: 0
HEARTBURN: 1
FEVER: 1
COUGH: 0
SHORTNESS OF BREATH: 0
FLANK PAIN: 1
BACK PAIN: 1
HEADACHES: 0
DEPRESSION: 0
CHILLS: 1
NAUSEA: 1
DOUBLE VISION: 0

## 2025-07-06 ASSESSMENT — SOCIAL DETERMINANTS OF HEALTH (SDOH)
WITHIN THE PAST 12 MONTHS, YOU WORRIED THAT YOUR FOOD WOULD RUN OUT BEFORE YOU GOT THE MONEY TO BUY MORE: NEVER TRUE
WITHIN THE LAST YEAR, HAVE YOU BEEN HUMILIATED OR EMOTIONALLY ABUSED IN OTHER WAYS BY YOUR PARTNER OR EX-PARTNER?: NO
WITHIN THE PAST 12 MONTHS, THE FOOD YOU BOUGHT JUST DIDN'T LAST AND YOU DIDN'T HAVE MONEY TO GET MORE: NEVER TRUE
WITHIN THE LAST YEAR, HAVE TO BEEN RAPED OR FORCED TO HAVE ANY KIND OF SEXUAL ACTIVITY BY YOUR PARTNER OR EX-PARTNER?: NO
WITHIN THE LAST YEAR, HAVE YOU BEEN AFRAID OF YOUR PARTNER OR EX-PARTNER?: NO
WITHIN THE LAST YEAR, HAVE YOU BEEN KICKED, HIT, SLAPPED, OR OTHERWISE PHYSICALLY HURT BY YOUR PARTNER OR EX-PARTNER?: NO
IN THE PAST 12 MONTHS, HAS THE ELECTRIC, GAS, OIL, OR WATER COMPANY THREATENED TO SHUT OFF SERVICE IN YOUR HOME?: NO

## 2025-07-06 ASSESSMENT — LIFESTYLE VARIABLES: SUBSTANCE_ABUSE: 0

## 2025-07-06 ASSESSMENT — FIBROSIS 4 INDEX: FIB4 SCORE: 1.04

## 2025-07-06 ASSESSMENT — PAIN DESCRIPTION - PAIN TYPE
TYPE: ACUTE PAIN

## 2025-07-07 VITALS
DIASTOLIC BLOOD PRESSURE: 72 MMHG | HEART RATE: 66 BPM | RESPIRATION RATE: 17 BRPM | HEIGHT: 67 IN | BODY MASS INDEX: 32.96 KG/M2 | TEMPERATURE: 97.6 F | OXYGEN SATURATION: 96 % | SYSTOLIC BLOOD PRESSURE: 102 MMHG | WEIGHT: 210 LBS

## 2025-07-07 LAB
ALBUMIN SERPL BCP-MCNC: 3.5 G/DL (ref 3.2–4.9)
ALBUMIN/GLOB SERPL: 1.2 G/DL
ALP SERPL-CCNC: 114 U/L (ref 30–99)
ALT SERPL-CCNC: 24 U/L (ref 2–50)
ANION GAP SERPL CALC-SCNC: 12 MMOL/L (ref 7–16)
AST SERPL-CCNC: 27 U/L (ref 12–45)
BACTERIA UR CULT: ABNORMAL
BACTERIA UR CULT: ABNORMAL
BASOPHILS # BLD AUTO: 0.3 % (ref 0–1.8)
BASOPHILS # BLD: 0.03 K/UL (ref 0–0.12)
BILIRUB SERPL-MCNC: 0.4 MG/DL (ref 0.1–1.5)
BUN SERPL-MCNC: 13 MG/DL (ref 8–22)
CALCIUM ALBUM COR SERPL-MCNC: 9 MG/DL (ref 8.5–10.5)
CALCIUM SERPL-MCNC: 8.6 MG/DL (ref 8.5–10.5)
CHLORIDE SERPL-SCNC: 102 MMOL/L (ref 96–112)
CO2 SERPL-SCNC: 18 MMOL/L (ref 20–33)
CREAT SERPL-MCNC: 1.09 MG/DL (ref 0.5–1.4)
EOSINOPHIL # BLD AUTO: 0.12 K/UL (ref 0–0.51)
EOSINOPHIL NFR BLD: 1.3 % (ref 0–6.9)
ERYTHROCYTE [DISTWIDTH] IN BLOOD BY AUTOMATED COUNT: 41 FL (ref 35.9–50)
GFR SERPLBLD CREATININE-BSD FMLA CKD-EPI: 60 ML/MIN/1.73 M 2
GLOBULIN SER CALC-MCNC: 3 G/DL (ref 1.9–3.5)
GLUCOSE SERPL-MCNC: 99 MG/DL (ref 65–99)
HCT VFR BLD AUTO: 34.2 % (ref 37–47)
HGB BLD-MCNC: 11.9 G/DL (ref 12–16)
IMM GRANULOCYTES # BLD AUTO: 0.03 K/UL (ref 0–0.11)
IMM GRANULOCYTES NFR BLD AUTO: 0.3 % (ref 0–0.9)
LYMPHOCYTES # BLD AUTO: 1.63 K/UL (ref 1–4.8)
LYMPHOCYTES NFR BLD: 18.1 % (ref 22–41)
MAGNESIUM SERPL-MCNC: 2.1 MG/DL (ref 1.5–2.5)
MCH RBC QN AUTO: 31.1 PG (ref 27–33)
MCHC RBC AUTO-ENTMCNC: 34.8 G/DL (ref 32.2–35.5)
MCV RBC AUTO: 89.3 FL (ref 81.4–97.8)
MONOCYTES # BLD AUTO: 1.16 K/UL (ref 0–0.85)
MONOCYTES NFR BLD AUTO: 12.9 % (ref 0–13.4)
NEUTROPHILS # BLD AUTO: 6.03 K/UL (ref 1.82–7.42)
NEUTROPHILS NFR BLD: 67.1 % (ref 44–72)
NRBC # BLD AUTO: 0 K/UL
NRBC BLD-RTO: 0 /100 WBC (ref 0–0.2)
PHOSPHATE SERPL-MCNC: 3.2 MG/DL (ref 2.5–4.5)
PLATELET # BLD AUTO: 275 K/UL (ref 164–446)
PMV BLD AUTO: 11.1 FL (ref 9–12.9)
POTASSIUM SERPL-SCNC: 3.9 MMOL/L (ref 3.6–5.5)
PROT SERPL-MCNC: 6.5 G/DL (ref 6–8.2)
RBC # BLD AUTO: 3.83 M/UL (ref 4.2–5.4)
SIGNIFICANT IND 70042: ABNORMAL
SITE SITE: ABNORMAL
SODIUM SERPL-SCNC: 132 MMOL/L (ref 135–145)
SOURCE SOURCE: ABNORMAL
WBC # BLD AUTO: 9 K/UL (ref 4.8–10.8)

## 2025-07-07 PROCEDURE — 700111 HCHG RX REV CODE 636 W/ 250 OVERRIDE (IP): Mod: JZ | Performed by: STUDENT IN AN ORGANIZED HEALTH CARE EDUCATION/TRAINING PROGRAM

## 2025-07-07 PROCEDURE — 80053 COMPREHEN METABOLIC PANEL: CPT

## 2025-07-07 PROCEDURE — G0378 HOSPITAL OBSERVATION PER HR: HCPCS

## 2025-07-07 PROCEDURE — 83735 ASSAY OF MAGNESIUM: CPT

## 2025-07-07 PROCEDURE — A9270 NON-COVERED ITEM OR SERVICE: HCPCS | Performed by: STUDENT IN AN ORGANIZED HEALTH CARE EDUCATION/TRAINING PROGRAM

## 2025-07-07 PROCEDURE — 84100 ASSAY OF PHOSPHORUS: CPT

## 2025-07-07 PROCEDURE — 700102 HCHG RX REV CODE 250 W/ 637 OVERRIDE(OP): Performed by: STUDENT IN AN ORGANIZED HEALTH CARE EDUCATION/TRAINING PROGRAM

## 2025-07-07 PROCEDURE — 85025 COMPLETE CBC W/AUTO DIFF WBC: CPT

## 2025-07-07 PROCEDURE — 700105 HCHG RX REV CODE 258: Performed by: STUDENT IN AN ORGANIZED HEALTH CARE EDUCATION/TRAINING PROGRAM

## 2025-07-07 PROCEDURE — 99239 HOSP IP/OBS DSCHRG MGMT >30: CPT | Performed by: INTERNAL MEDICINE

## 2025-07-07 RX ORDER — ACETAMINOPHEN 325 MG/1
650 TABLET ORAL EVERY 4 HOURS PRN
COMMUNITY
Start: 2025-07-07

## 2025-07-07 RX ADMIN — ACETAMINOPHEN 650 MG: 325 TABLET ORAL at 06:31

## 2025-07-07 RX ADMIN — ACETAMINOPHEN 650 MG: 325 TABLET ORAL at 00:20

## 2025-07-07 RX ADMIN — FAMOTIDINE 20 MG: 10 INJECTION, SOLUTION INTRAVENOUS at 05:06

## 2025-07-07 RX ADMIN — SODIUM CHLORIDE, POTASSIUM CHLORIDE, SODIUM LACTATE AND CALCIUM CHLORIDE: 600; 310; 30; 20 INJECTION, SOLUTION INTRAVENOUS at 06:34

## 2025-07-07 RX ADMIN — CEFAZOLIN 2 G: 2 INJECTION, POWDER, FOR SOLUTION INTRAVENOUS at 05:08

## 2025-07-07 ASSESSMENT — LIFESTYLE VARIABLES
HOW MANY TIMES IN THE PAST YEAR HAVE YOU HAD 5 OR MORE DRINKS IN A DAY: 0
ALCOHOL_USE: YES
ON A TYPICAL DAY WHEN YOU DRINK ALCOHOL HOW MANY DRINKS DO YOU HAVE: 1
CONSUMPTION TOTAL: NEGATIVE
AVERAGE NUMBER OF DAYS PER WEEK YOU HAVE A DRINK CONTAINING ALCOHOL: 0
EVER FELT BAD OR GUILTY ABOUT YOUR DRINKING: NO
TOTAL SCORE: 0
EVER HAD A DRINK FIRST THING IN THE MORNING TO STEADY YOUR NERVES TO GET RID OF A HANGOVER: NO
HAVE YOU EVER FELT YOU SHOULD CUT DOWN ON YOUR DRINKING: NO
TOTAL SCORE: 0
HAVE PEOPLE ANNOYED YOU BY CRITICIZING YOUR DRINKING: NO
DOES PATIENT WANT TO STOP DRINKING: NO
TOTAL SCORE: 0

## 2025-07-07 ASSESSMENT — PAIN DESCRIPTION - PAIN TYPE
TYPE: ACUTE PAIN

## 2025-07-07 NOTE — PROGRESS NOTES
Pt IV removed. Pt dressed and ready for discharge. Pt talked with Dr. Goss about medications and discharge plan.    Color consistent with ethnicity/race, warm, dry intact, resilient.

## 2025-07-07 NOTE — CARE PLAN
The patient is Stable - Low risk of patient condition declining or worsening    Shift Goals  Clinical Goals: Pain control to comfort level less than 5/10 within 12 hour shift  Patient Goals: Pain control, Rest, Get Better  Family Goals: DESTINEY    Progress made toward(s) clinical / shift goals:  Patient able to communicate needs, alert and oriented. Medicated per MAR for complaints of pain, with effective relief to comfort level verbalized. With urinary frequency and urgency, patient ambulates steadily to the bathroom.     Patient is not progressing towards the following goals:      Problem: Pain - Standard  Goal: Alleviation of pain or a reduction in pain to the patient’s comfort goal  Description: Target End Date:  Prior to discharge or change in level of care    Document on Vitals flowsheet    1.  Document pain using the appropriate pain scale per order or unit policy  2.  Educate and implement non-pharmacologic comfort measures (i.e. relaxation, distraction, massage, cold/heat therapy, etc.)  3.  Pain management medications as ordered  4.  Reassess pain after pain med administration per policy  5.  If opiods administered assess patient's response to pain medication is appropriate per POSS sedation scale  6.  Follow pain management plan developed in collaboration with patient and interdisciplinary team (including palliative care or pain specialists if applicable)  Outcome: Not Progressing     Problem: Physical Regulation  Goal: Diagnostic test results will improve  Description: Target End Date:  Prior to discharge or change in level of care     1.  Monitor diagnostic test results  Outcome: Not Progressing  Goal: Signs and symptoms of infection will decrease  Description: Target End Date:  Prior to discharge or change in level of care    1.  Remove potential routes of infection, such as central lines and urinary catheter  2.  Follow facility protocol for changing IV tubing and sites  3.  Collaborate with Infectious  Disease  4.  Antibiotic therapy per provider order  5.  Note drug effects and monitor for antibiotic toxicity  Outcome: Not Progressing

## 2025-07-07 NOTE — PROGRESS NOTES
Discharge orders received.  Patient arrived to the discharge lounge.  PIV removed by bedside RN prior to arrival. AVS instructions given, medications reviewed and general discharge education provided to patient.  Follow up appointments discussed.  Patient verbalized understanding of dc instructions and prescriptions.  Patient signed discharge instructions.  Patient verbalized understanding and had all belongings with her.  Patient pending ride. Wished patient a speedy recovery.

## 2025-07-07 NOTE — PROGRESS NOTES
Patient scores High Risk for Fall. Alert and oriented x 4, refused bed alarms despite education and encouragement. Reiterated use of call light when in need of assistance and getting OOB. Call light placed within easy reach. Charge RN notified.

## 2025-07-07 NOTE — ASSESSMENT & PLAN NOTE
Likely due to pyelonephritis  No acute findings on CTAP  IVF  Advance diet as tolerated  Trial of IV Pepcid and as needed antiemetic

## 2025-07-07 NOTE — ASSESSMENT & PLAN NOTE
This is Sepsis Present on admission  SIRS criteria identified on my evaluation include: Fever, with temperature greater than 100.9 deg F, Tachycardia, with heart rate greater than 90 BPM, Tachypnea, with respirations greater than 20 per minute, and Bandemia, greater than 10% bands  Clinical indicators of end organ dysfunction include Acute On Chronic Renal Failure, with creatinine >0.5 above baseline level  Source is pyelonephritis  Sepsis protocol initiated  Crystalloid Fluid Administration: Fluid resuscitation ordered per standard protocol - 30 mL/kg per current or ideal body weight  IV antibiotics as appropriate for source of sepsis  Reassessment: I have reassessed the patient's hemodynamic status

## 2025-07-07 NOTE — ED TRIAGE NOTES
Patient to ED with complaints of fever, chills, vomiting, flank pain. She has an hx of ileo-ureter replacement 4/30/18 by Dr. Rangel. She was seen for UTI 7/5/2025 at [facility].. She started to have urinary  incontinence that was new and fever last week.  She was given rocephin and started on oral a bactrim. She continues to have fever, chills, nausea today.     Pt requested to place await in [facility] room. There is current no pt care there so she has been placed [facility] to await rooming.     Pt educated on ED process and asked to wait in lobby. Patient educated on importance of alerting staff to new or worsening symptoms or concerns.

## 2025-07-07 NOTE — ED NOTES
Pt transported to [id number] with transport tech via w/c.  Pt transported with all belongings, on [facility]. Pt awake and oriented.

## 2025-07-07 NOTE — ED NOTES
Med rec updated and complete. Allergies reviewed    Confirmed name and date of birth.      Pt was started on Bactrim DS  on 07/05/25.  Pt denies any other medications for any type of infection.    No anticoagulant or antiplatelet medications.      Preferred Pharmacy  [facility]    [phone]

## 2025-07-07 NOTE — H&P
Hospital Medicine History & Physical Note    Date of Service  7/6/2025    Primary Care Physician  Pcp Pt States None    Consultants  None    Code Status  Full Code    Chief Complaint  Chief Complaint   Patient presents with    Flank Pain    Fever    Painful Urination       History of Presenting Illness  Aurora Tan is a 55 y.o. female who presented 7/6/2025 with evaluation for right flank pain, nausea vomiting.  Patient presented to urgent care yesterday for right flank pain with nausea vomiting.  She received treatment with ceftriaxone 1 g.  Her urine culture grew E. coli, final sensitivity pending.  She presented to emergency room today due to persistent pain, stated she had vomited prescribed Bactrim, unable to tolerate.  In ER, no acute findings on CTAP.  She does have WBC of 11.8k, UA pyuria.  Due to concern of pyelonephritis, admission requested by ERP.  Admitted to medicine service for further evaluation and treatment.    I discussed the plan of care with patient, bedside RN, charge RN, and pharmacy.    Review of Systems  Review of Systems   Constitutional:  Positive for chills, fever and malaise/fatigue.   HENT:  Negative for hearing loss and tinnitus.    Eyes:  Negative for blurred vision and double vision.   Respiratory:  Negative for cough and shortness of breath.    Cardiovascular:  Negative for chest pain and palpitations.   Gastrointestinal:  Positive for abdominal pain, heartburn, nausea and vomiting.   Genitourinary:  Positive for dysuria, flank pain, frequency and urgency. Negative for hematuria.   Musculoskeletal:  Positive for back pain and myalgias.   Skin:  Negative for itching and rash.   Neurological:  Positive for weakness. Negative for dizziness and headaches.   Endo/Heme/Allergies:  Negative for environmental allergies. Does not bruise/bleed easily.   Psychiatric/Behavioral:  Negative for depression and substance abuse.    All other systems reviewed and are negative.      Past  Medical History   has a past medical history of Breath shortness (04/25/2018), Heart attack (HCC) (11/03/2017), Heart burn (04/25/2018), High cholesterol, Pain (04/2018), Pain (04/25/2018), Renal disorder, Renal disorder (04/25/2018), Snoring (04/25/2018), and Urinary incontinence.    Surgical History   has a past surgical history that includes abdominal exploration; cystoscopy (Right, 9/17/2017); ureteroscopy (Right, 9/17/2017); lasertripsy (Right, 9/17/2017); stent placement (Right, 9/17/2017); ureteroscopy (Right, 11/7/2017); lasertripsy (Right, 11/7/2017); urethral dilatation (Right, 11/7/2017); cystoscopy stent placement (Right, 11/7/2017); cystoscopy (N/A, 1/31/2018); retrogrades (Right, 1/31/2018); pyelogram (Right, 1/31/2018); ureteroscopy (Right, 1/31/2018); lasertripsy (Right, 1/31/2018); stent placement (Right, 1/31/2018); cystoscopy stent placement (Right, 3/21/2018); ureteroscopy (Right, 3/21/2018); retrogrades (Right, 3/21/2018); pyelogram (Right, 3/21/2018); tonsillectomy; abdominal hysterectomy total (2009); primary c section; other; other; other abdominal surgery (2009); other abdominal surgery (2009); exploratory laparotomy (Right, 4/30/2018); lysis adhesions general (4/30/2018); exploratory laparotomy (9/26/2018); and wound irrigation & debridement (Right, 11/3/2018).     Family History  Family History   Problem Relation Age of Onset    Cancer Mother     Cancer Father     Heart Disease Brother     Diabetes Maternal Grandmother     Cancer Maternal Grandfather     Cancer Paternal Grandmother     Cancer Paternal Grandfather         Family history reviewed with patient. There is family history that is pertinent to the chief complaint.     Social History   reports that she quit smoking about 12 years ago. Her smoking use included cigarettes. She started smoking about 22 years ago. She has a 2.5 pack-year smoking history. She has never used smokeless tobacco. She reports that she does not currently use  alcohol. She reports that she does not use drugs.    Allergies  Allergies[1]    Medications  Prior to Admission Medications   Prescriptions Last Dose Informant Patient Reported? Taking?   Probiotic Product (PROBIOTIC PO)  Patient Yes No   Sig: Take 1 Cap by mouth every day.   Patient not taking: Reported on 7/5/2025   Pseudoephedrine-guaiFENesin (MUCINEX D MAX STRENGTH PO)   Yes No   Sig: Take 1 tablet by mouth 2 Times a Day.   Patient not taking: Reported on 7/5/2025   acetaminophen (TYLENOL) 500 MG Tab  Patient Yes No   Sig: Take 1,000 mg by mouth every 6 hours as needed for Moderate Pain.   estradiol (MINIVELLE) 0.05 MG/24HR PATCH BIWEEKLY  Patient Yes No   Sig: Apply 1 Patch to skin as directed every 7 days. On Mon   Patient not taking: Reported on 7/5/2025   mupirocin calcium (BACTROBAN) 2 % Cream   No No   Sig: Apply 1 Application to affected area(s) 2 times a day.   Patient not taking: Reported on 7/5/2025   oxyCODONE HCl 5 MG Tablet Abuse-Deterrent   Yes No   Sig: Take 5 mg by mouth every 6 hours as needed.   Patient not taking: Reported on 7/5/2025   sulfamethoxazole-trimethoprim (BACTRIM DS) 800-160 MG tablet   No No   Sig: Take 1 Tablet by mouth 2 times a day for 14 days.      Facility-Administered Medications: None       Physical Exam  Temp:  [37.6 °C (99.6 °F)-40.1 °C (104.2 °F)] 37.6 °C (99.6 °F)  Pulse:  [] 83  Resp:  [] 20  BP: (111-146)/(59-84) 111/59  SpO2:  [94 %-96 %] 94 %  Blood Pressure: 111/59   Temperature: 37.6 °C (99.6 °F)   Pulse: 84   Respiration: (!) 113   Pulse Oximetry: 96 %       Physical Exam  Vitals and nursing note reviewed.   Constitutional:       General: She is not in acute distress.     Appearance: She is obese.   HENT:      Head: Normocephalic and atraumatic.      Nose: Nose normal.      Mouth/Throat:      Mouth: Mucous membranes are dry.      Pharynx: Oropharynx is clear.   Eyes:      General: No scleral icterus.     Extraocular Movements: Extraocular movements  "intact.   Cardiovascular:      Rate and Rhythm: Regular rhythm. Tachycardia present.      Pulses: Normal pulses.      Heart sounds:      No friction rub.   Pulmonary:      Effort: No respiratory distress.      Breath sounds: No stridor. Rales present. No wheezing.   Chest:      Chest wall: No tenderness.   Abdominal:      General: There is distension.      Tenderness: There is no abdominal tenderness. There is no guarding or rebound.   Genitourinary:     Comments: +right CVA tenderness  Musculoskeletal:         General: Normal range of motion.      Cervical back: Neck supple. No tenderness.      Right lower leg: No edema.      Left lower leg: No edema.   Skin:     General: Skin is warm and dry.      Capillary Refill: Capillary refill takes less than 2 seconds.   Neurological:      General: No focal deficit present.      Mental Status: She is alert and oriented to person, place, and time.   Psychiatric:         Mood and Affect: Mood normal.         Laboratory:  Recent Labs     07/06/25  1821   WBC 11.8*   RBC 4.38   HEMOGLOBIN 13.3   HEMATOCRIT 39.0   MCV 89.0   MCH 30.4   MCHC 34.1   RDW 39.8   PLATELETCT 286   MPV 11.2     Recent Labs     07/06/25  1821   SODIUM 131*   POTASSIUM 4.3   CHLORIDE 99   CO2 15*   GLUCOSE 105*   BUN 14   CREATININE 1.21   CALCIUM 9.0     Recent Labs     07/06/25  1821   ALTSGPT 27   ASTSGOT 34   ALKPHOSPHAT 128*   TBILIRUBIN 0.7   GLUCOSE 105*         No results for input(s): \"NTPROBNP\" in the last 72 hours.      No results for input(s): \"TROPONINT\" in the last 72 hours.    Imaging:  CT-RENAL COLIC EVALUATION(A/P W/O)   Final Result         1.  No acute intra-abdominal abnormality identified.   2.  Diverticulosis      DX-CHEST-PORTABLE (1 VIEW)   Final Result         1. No acute cardiopulmonary abnormalities are identified.          X-Ray:  I have personally reviewed the images and compared with prior images.    Assessment/Plan:  Justification for Admission Status  I anticipate this " patient will require at least 2 midnights hospitalization, therefore appropriate for inpatient status.      * Acute bacterial pyelonephritis- (present on admission)  Assessment & Plan  Right flank pain, UA pyuria  Urine culture yesterday 7/5 grew E. Coli  IVF  Supportive pain control  Antibiotic: Ancef  Follow final culture sensitivity    Intractable nausea and vomiting  Assessment & Plan  Likely due to pyelonephritis  No acute findings on CTAP  IVF  Advance diet as tolerated  Trial of IV Pepcid and as needed antiemetic    Sepsis (HCC)- (present on admission)  Assessment & Plan  This is Sepsis Present on admission  SIRS criteria identified on my evaluation include: Fever, with temperature greater than 100.9 deg F, Tachycardia, with heart rate greater than 90 BPM, Tachypnea, with respirations greater than 20 per minute, and Bandemia, greater than 10% bands  Clinical indicators of end organ dysfunction include Acute On Chronic Renal Failure, with creatinine >0.5 above baseline level  Source is pyelonephritis  Sepsis protocol initiated  Crystalloid Fluid Administration: Fluid resuscitation ordered per standard protocol - 30 mL/kg per current or ideal body weight  IV antibiotics as appropriate for source of sepsis  Reassessment: I have reassessed the patient's hemodynamic status    CECILIO (acute kidney injury) (HCC)- (present on admission)  Assessment & Plan  Cr 1.21  IVF  Minimize nephrotoxic agents, renally dose meds  Check FeNa    Class 1 obesity due to excess calories in adult  Assessment & Plan  Diet and lifestyle modification  Body mass index is 32.89 kg/m².        VTE prophylaxis: heparin ppx         [1]   Allergies  Allergen Reactions    Morphine Itching

## 2025-07-07 NOTE — ASSESSMENT & PLAN NOTE
Right flank pain, UA pyuria  Urine culture yesterday 7/5 grew E. Coli  IVF  Supportive pain control  Antibiotic: Ancef  Follow final culture sensitivity

## 2025-07-07 NOTE — ED PROVIDER NOTES
"ER Provider Note    Scribed for Jeff Owens M.d. by Jason Newman. 7/6/2025  7:27 PM    Primary Care Provider: Pcp Pt States None    CHIEF COMPLAINT   Chief Complaint   Patient presents with    Flank Pain    Fever    Painful Urination     EXTERNAL RECORDS REVIEWED  Patient was seen at [facility] yesterday, had questionable urine, gave IM rocephin and started her on Bactrim. Urine culture did grow E. coli but sensitivity is not back yet.    HPI/ROS  LIMITATION TO HISTORY   None noted   OUTSIDE HISTORIAN(S):  Family present at bedside.     Aurora Tan is a 55 y.o. female who presents to the ED complaining of dysuria, flank pain, and fever for the past 3 days. Patient reports her flank pain is right sided. Patent states her pain \"shoots around from he back to the front.\" Patient denies hematuria. Fever reaching a high of 104 °F today. Patient denies abdominal pain. Patient reports vomiting today. Patient has a history of kidney stone requiring an ileal ureter surgery. Patient denies frequent UTIs since her surgery.  Patient took tylenol upon arrival here today.    PAST MEDICAL HISTORY  Past Medical History[1]    SURGICAL HISTORY  Past Surgical History[2]    FAMILY HISTORY  Family History   Problem Relation Age of Onset    Cancer Mother     Cancer Father     Heart Disease Brother     Diabetes Maternal Grandmother     Cancer Maternal Grandfather     Cancer Paternal Grandmother     Cancer Paternal Grandfather        SOCIAL HISTORY   reports that she quit smoking about 12 years ago. Her smoking use included cigarettes. She started smoking about 22 years ago. She has a 2.5 pack-year smoking history. She has never used smokeless tobacco. She reports that she does not currently use alcohol. She reports that she does not use drugs.    CURRENT MEDICATIONS  Previous Medications    SULFAMETHOXAZOLE-TRIMETHOPRIM (BACTRIM DS) 800-160 MG TABLET    Take 1 Tablet by mouth 2 times a day for 14 days. " "      ALLERGIES  Levofloxacin and Morphine    PHYSICAL EXAM  BP (!) 146/84   Pulse (!) 109   Temp (!) 38.3 °C (101 °F) (Oral)   Resp 20   Ht 1.702 m (5' 7\")   Wt 95.3 kg (210 lb)   LMP 09/01/2009   SpO2 96%   BMI 32.89 kg/m²   Constitutional: Well developed, Well nourished, mild distress.   HENT: Normocephalic, Atraumatic  Eyes: Conjunctiva normal, No discharge.   Cardiovascular: Tachycardic heart rate, Normal rhythm, No murmurs, equal pulses.   Pulmonary: Normal breath sounds, No respiratory distress, No wheezing, No rales, No rhonchi.  Abdomen: Soft, No tenderness, No masses, no rebound, no guarding.   Back: Right sided CVA tenderness  Musculoskeletal: No major deformities noted,  Skin: Warm, Dry, No erythema, No rash.   Neurologic: Alert & oriented x 3, Normal motor function,  No focal deficits noted.   Psychiatric: Affect normal, Judgment normal, Mood normal.      DIAGNOSTIC STUDIES    EKG/LABS  Results for orders placed or performed during the hospital encounter of 07/06/25   Lactic Acid    Collection Time: 07/06/25  6:21 PM   Result Value Ref Range    Lactic Acid 0.9 0.5 - 2.0 mmol/L   CBC with Differential    Collection Time: 07/06/25  6:21 PM   Result Value Ref Range    WBC 11.8 (H) 4.8 - 10.8 K/uL    RBC 4.38 4.20 - 5.40 M/uL    Hemoglobin 13.3 12.0 - 16.0 g/dL    Hematocrit 39.0 37.0 - 47.0 %    MCV 89.0 81.4 - 97.8 fL    MCH 30.4 27.0 - 33.0 pg    MCHC 34.1 32.2 - 35.5 g/dL    RDW 39.8 35.9 - 50.0 fL    Platelet Count 286 164 - 446 K/uL    MPV 11.2 9.0 - 12.9 fL    Neutrophils-Polys 82.20 (H) 44.00 - 72.00 %    Lymphocytes 7.80 (L) 22.00 - 41.00 %    Monocytes 9.00 0.00 - 13.40 %    Eosinophils 0.40 0.00 - 6.90 %    Basophils 0.30 0.00 - 1.80 %    Immature Granulocytes 0.30 0.00 - 0.90 %    Nucleated RBC 0.00 0.00 - 0.20 /100 WBC    Neutrophils (Absolute) 9.69 (H) 1.82 - 7.42 K/uL    Lymphs (Absolute) 0.92 (L) 1.00 - 4.80 K/uL    Monos (Absolute) 1.06 (H) 0.00 - 0.85 K/uL    Eos (Absolute) 0.05 " 0.00 - 0.51 K/uL    Baso (Absolute) 0.03 0.00 - 0.12 K/uL    Immature Granulocytes (abs) 0.04 0.00 - 0.11 K/uL    NRBC (Absolute) 0.00 K/uL   Complete Metabolic Panel    Collection Time: 07/06/25  6:21 PM   Result Value Ref Range    Sodium 131 (L) 135 - 145 mmol/L    Potassium 4.3 3.6 - 5.5 mmol/L    Chloride 99 96 - 112 mmol/L    Co2 15 (L) 20 - 33 mmol/L    Anion Gap 17.0 (H) 7.0 - 16.0    Glucose 105 (H) 65 - 99 mg/dL    Bun 14 8 - 22 mg/dL    Creatinine 1.21 0.50 - 1.40 mg/dL    Calcium 9.0 8.5 - 10.5 mg/dL    Correct Calcium 9.0 8.5 - 10.5 mg/dL    AST(SGOT) 34 12 - 45 U/L    ALT(SGPT) 27 2 - 50 U/L    Alkaline Phosphatase 128 (H) 30 - 99 U/L    Total Bilirubin 0.7 0.1 - 1.5 mg/dL    Albumin 4.0 3.2 - 4.9 g/dL    Total Protein 7.2 6.0 - 8.2 g/dL    Globulin 3.2 1.9 - 3.5 g/dL    A-G Ratio 1.3 g/dL   ESTIMATED GFR    Collection Time: 07/06/25  6:21 PM   Result Value Ref Range    GFR (CKD-EPI) 52 (A) >60 mL/min/1.73 m 2   Urinalysis    Collection Time: 07/06/25  6:50 PM    Specimen: Urine   Result Value Ref Range    Color Yellow     Character Clear     Specific Gravity 1.018 <1.035    Ph 6.0 5.0 - 8.0    Glucose Negative Negative mg/dL    Ketones 40 (A) Negative mg/dL    Protein 30 (A) Negative mg/dL    Bilirubin Negative Negative    Urobilinogen, Urine 1.0 <=1.0 EU/dL    Nitrite Negative Negative    Leukocyte Esterase Small (A) Negative    Occult Blood Small (A) Negative    Micro Urine Req Microscopic    URINE MICROSCOPIC (W/UA)    Collection Time: 07/06/25  6:50 PM   Result Value Ref Range    WBC 6-10 (A) /hpf    RBC 3-5 (A) 0 - 2 /hpf    Bacteria None Seen None /hpf    Epithelial Cells 3-5 0 - 5 /hpf    Urine Casts 0-2 0 - 2 /lpf     RADIOLOGY/PROCEDURES   The attending emergency physician has independently interpreted the diagnostic imaging associated with this visit and am waiting the final reading from the radiologist.   My preliminary interpretation is a follows: CT no obvious kidney stone.    Radiologist  interpretation:  CT-RENAL COLIC EVALUATION(A/P W/O)   Final Result         1.  No acute intra-abdominal abnormality identified.   2.  Diverticulosis      DX-CHEST-PORTABLE (1 VIEW)   Final Result         1. No acute cardiopulmonary abnormalities are identified.          COURSE & MEDICAL DECISION MAKING     ASSESSMENT, COURSE AND PLAN  Care Narrative:   Sepsis: Infection was suspected 7:27 PM (Time). Sepsis pathway was initiated. Fluids not needed (no hypotension or lactate greater than or equal to 4). Antibiotics were given per protocol.          7:27 PM - Patient presents to the ED for flank pain, fever, and dysuria. Patient seen and examined at bedside. Discussed plan of care, including plan to further evaluate. Patient agrees to the plan of care. The patient will be medicated as ordered. Ordered for labs and imaging to evaluate their symptoms. Differentials include but not limited to: Pyelonephritis, Kidney stone    8:41 PM - I discussed the patient's case and the above findings with Dr. Morales (Hospitalist) who agreed to hospitalize the patient. Patient's care was transferred at this time.     PROBLEM LIST  Problem 1 flank pain at this point time I think the patient's flank pain is secondary to pyelonephritis.  She does have a history of kidney stones and given the amount of pain she was having and the description of it going towards her groin I was concerned that she may have an infected stone.  Therefore CT was obtained.  Likely this does not show any stone but patient does have positive urine culture.  Patient has been started on broad-spectrum antibiotics I think should benefit from hospitalization.  Given the fact that she had already received Rocephin and Bactrim and continue to get worse    DISPOSITION AND DISCUSSIONS  I have discussed management of the patient with the following physicians and RAYSHAWN's:  Dr. Morales (Hospitalist)    Discussion of management with other [facility] or appropriate source(s): Pharmacy  "antibiotic     DISPOSITION:  Patient will be hospitalized by Dr. Morales in guarded condition.     FINAL DIANGOSIS  1. Pyelonephritis    2. Right flank pain            Jason DAIGLE (Scribsisi), am scribing for, and in the presence of, Jeff Owens M.D.    Electronically signed by: Jason Newman ([place]), 7/6/2025    Jeff DAIGLE M.D personally performed the services described in this documentation, as scribed by Jason Newman in my presence, and it is both accurate and complete.     The note accurately reflects work and decisions made by me.  Jeff Owens M.D.  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    Renal disorder 04/25/2018    \"Nephrostomy tube in place\"    Snoring 04/25/2018    Has not had a sleep study    Urinary incontinence    [2]   Past Surgical History:  Procedure Laterality Date    WOUND IRRIGATION & DEBRIDEMENT Right 11/3/2018    Procedure: IRRIGATION & DEBRIDEMENT GENERAL ABDOMINAL WALL;  Surgeon: Rogerio Crowder M.D.;  Location: [facility];  Service: General    EXPLORATORY LAPAROTOMY  9/26/2018    Procedure: DRAINAGE OF ABDOMINAL WALL SEROMA;  Surgeon: Ross Mendoza M.D.;  Location: SURGERY [facility];  Service: General    EXPLORATORY LAPAROTOMY Right 4/30/2018    Procedure: EXPLORATORY LAPAROTOMY W/ILEAL URETER;  Surgeon: Kapil Rangel M.D.;  Location: SURGERY [facility];  Service: Urology    LYSIS ADHESIONS GENERAL  4/30/2018    Procedure: LYSIS ADHESIONS GENERAL;  Surgeon: Kapil Rangel M.D.;  Location: [facility];  Service: Urology    CYSTOSCOPY STENT PLACEMENT Right " 3/21/2018    Procedure: CYSTOSCOPY - STENT REMOVAL;  Surgeon: Terry Maldonado M.D.;  [facility];  Service: Urology    URETEROSCOPY Right 3/21/2018    Procedure: URETEROSCOPY- DIAGNOSTIC;  Surgeon: Terry Maldonado M.D.;  Location: SURGERY [facility];  Service: Urology    RETROGRADES Right 3/21/2018    Procedure: RETROGRADES;  Surgeon: Terry Maldonado M.D.;  Location: SURGERY [facility];  Service: Urology    PYELOGRAM Right 3/21/2018    Procedure: PYELOGRAM;  Surgeon: Terry Maldonado M.D.;  Location: SURGERY [facility];  Service: Urology    CYSTOSCOPY N/A 1/31/2018    Procedure: CYSTOSCOPY;  Surgeon: Terry Maldonado M.D.;  Location: SURGERY [facility];  Service: Urology    RETROGRADES Right 1/31/2018    Procedure: RETROGRADES;  Surgeon: Terry Maldonado M.D.;  Location: SURGERY [facility];  Service: Urology    PYELOGRAM Right 1/31/2018    Procedure: PYELOGRAM;  Surgeon: Terry Maldonado M.D.;  Location: SURGERY [facility];  Service: Urology    URETEROSCOPY Right 1/31/2018    Procedure: URETEROSCOPY, INCISIONAL OR DILATION OF STRICTURE  ;  Surgeon: Terry Maldonado M.D.;  Location: SURGERY [facility];  Service: Urology    LASERTRIPSY Right 1/31/2018    Procedure: LASERTRIPSY;  Surgeon: Terry Maldonado M.D.;  Location: SURGERY [facility];  Service: Urology    STENT PLACEMENT Right 1/31/2018    Procedure: STENT PLACEMENT;  Surgeon: Terry Maldonado M.D.;  Location: SURGERY [facility];  Service: Urology    URETEROSCOPY Right 11/7/2017    Procedure: URETEROSCOPY;  Surgeon: Kiet Eid M.D.;  Location: SURGERY [facility];  Service: Urology    LASERTRIPSY Right 11/7/2017    Procedure: LASERTRIPSY- LITHO;  Surgeon: Kiet Eid M.D.;  Location: SURGERY [facility];  Service: Urology    URETHRAL DILATATION Right 11/7/2017    Procedure: URETHRAL DILATATION;  Surgeon: Kiet Eid M.D.;  Location: SURGERY [facility];   Service: Urology    CYSTOSCOPY STENT PLACEMENT Right 11/7/2017    Procedure: CYSTOSCOPY STENT PLACEMENT;  Surgeon: Kiet Eid M.D.;  Location: [facility];  Service: Urology    CYSTOSCOPY Right 9/17/2017    Procedure: CYSTOSCOPY;  Surgeon: Kiet Eid M.D.;  Location: [facility];  Service: Urology    URETEROSCOPY Right 9/17/2017    Procedure: URETEROSCOPY;  Surgeon: Kiet Eid M.D.;  Location: [facility];  Service: Urology    LASERTRIPSY Right 9/17/2017    Procedure: LASERTRIPSY;  Surgeon: Kiet Eid M.D.;  Location: [facility];  Service: Urology    STENT PLACEMENT Right 9/17/2017    Procedure: STENT PLACEMENT;  Surgeon: Kiet Eid M.D.;  Location: [facility];  Service: Urology    ABDOMINAL HYSTERECTOMY TOTAL  2009    OTHER ABDOMINAL SURGERY  2009    abdominal tumor     OTHER ABDOMINAL SURGERY  2009    ABDOMINAL EXPLORATION      OTHER      eye surgeries X6    OTHER      ear surgeries X5    PRIMARY C SECTION      1989/1991/1997/1999    TONSILLECTOMY

## 2025-07-07 NOTE — DISCHARGE SUMMARY
Discharge Summary    CHIEF COMPLAINT ON ADMISSION  Chief Complaint   Patient presents with    Flank Pain    Fever    Painful Urination       Reason for Admission  painful urination     Admission Date  7/6/2025    CODE STATUS  Full Code    HPI & HOSPITAL COURSE  This is a 55 y.o. female here with flank pain nausea and vomiting.    55 y.o. female who presented 7/6/2025 with evaluation for right flank pain, nausea vomiting. Patient presented to urgent care yesterday for right flank pain with nausea vomiting. She received treatment with ceftriaxone 1 g. Her urine culture grew E. coli, final sensitivity pending. She presented to emergency room today due to persistent pain, stated she had vomited prescribed Bactrim, unable to tolerate. In ER, no acute findings on CTAP. She does have WBC of 11.8k, UA pyuria. Due to concern of pyelonephritis, admission requested by ERP. Admitted to medicine service for further evaluation and treatment.  Patient was started on IV antibiotics with improvement in fever.  Patient's nausea and vomiting have resolved and she has been tolerating an oral diet.  E. coli in the urine was pansensitive and she already has a 14-day course of Bactrim that was prescribed previously.  She does have 1/2 blood cultures that came back possibly positive however triggered by CO2 and possibly a contaminant.  She does have an adequate course of antibiotics and is requesting to be discharged home.  She has to follow-up with her outpatient urologist and primary care physician and should return to the ER if her condition worsens.    Therefore, she is discharged in good and stable condition to home with close outpatient follow-up.      Discharge Date  7/7/2025    FOLLOW UP ITEMS POST DISCHARGE  Follow-up with primary care and urology    DISCHARGE DIAGNOSES  Principal Problem:    Acute bacterial pyelonephritis (POA: Yes)  Active Problems:    CECILIO (acute kidney injury) (HCC) (POA: Yes)    Sepsis (HCC) (POA: Yes)     Intractable nausea and vomiting (POA: Unknown)    Class 1 obesity due to excess calories in adult (POA: Unknown)  Resolved Problems:    * No resolved hospital problems. *      FOLLOW UP  No future appointments.  Urology and primary care    Follow up  Follow up post hospitalization with primary care and urology      MEDICATIONS ON DISCHARGE     Medication List        START taking these medications        Instructions   acetaminophen 325 MG Tabs  Commonly known as: Tylenol   Take 2 Tablets by mouth every four hours as needed for Fever, Moderate Pain or Mild Pain.  Dose: 650 mg            CONTINUE taking these medications        Instructions   sulfamethoxazole-trimethoprim 800-160 MG tablet  Commonly known as: Bactrim DS   Take 1 Tablet by mouth 2 times a day for 14 days.  Dose: 1 Tablet              Allergies  Allergies[1]    DIET  Orders Placed This Encounter   Procedures    Diet Order Diet: Regular     Standing Status:   Standing     Number of Occurrences:   1     Diet::   Regular [1]       ACTIVITY  As tolerated.  Weight bearing as tolerated    CONSULTATIONS  None    PROCEDURES  None    LABORATORY  Lab Results   Component Value Date    SODIUM 132 (L) 07/07/2025    POTASSIUM 3.9 07/07/2025    CHLORIDE 102 07/07/2025    CO2 18 (L) 07/07/2025    GLUCOSE 99 07/07/2025    BUN 13 07/07/2025    CREATININE 1.09 07/07/2025        Lab Results   Component Value Date    WBC 9.0 07/07/2025    HEMOGLOBIN 11.9 (L) 07/07/2025    HEMATOCRIT 34.2 (L) 07/07/2025    PLATELETCT 275 07/07/2025        Total time of the discharge process exceeds 34 minutes.       [1]   Allergies  Allergen Reactions    Morphine Itching

## 2025-07-07 NOTE — DISCHARGE PLANNING
Patient rolled back to observation/outpatient status per attending MD determination (Kayleigh Goss DO) and UR committee MD secondary review (James Tee MD). Update Patient Status completed.

## 2025-07-07 NOTE — PROGRESS NOTES
4 Eyes Skin Assessment Completed by SHANTANU Tello and SHANTANU Lopez.    Skin assessment is primarily focused on high risk bony prominences. Pay special attention to skin beneath and around medical devices, high risk bony prominences, skin to skin areas and areas where the patient lacks sensation to feel pain and areas where the patient previously had breakdown.     Head (Occipital):  WDL   Ears (Under Medical Devices): WDL   Nose (Under Medical Devices): WDL   Mouth:  WDL   Neck: WDL   Breast/Chest:  WDL   Shoulder Blades:  WDL   Spine:   WDL   (R) Arm/Elbow/Hand: Bruising   (L) Arm/Elbow/Hand: Bruising   Abdomen: Scar   Pannus/Groin:  WDL   Sacrum/Coccyx:   WDL   (R) Ischial Tuberosity (Sit Bones):  Pink and Blanching, Bruising/Injection site with bandaid   (L) Ischial Tuberosity (Sit Bones):  Bruising/Injextion site with bandaid   (R) Leg:  WDL   (L) Leg:  WDL   (R) Heel:  Callus, Cracking   (R) Foot/Toe: WDL   (L) Heel: Callus, Cracking   (L) Foot/Toe:  WDL                     DEVICES IN USE:   Respiratory Devices:  NA, patient on room air  Feeding Devices:  N/A   Lines & BP Monitoring Devices:  Peripheral IV and Pulse ox    Orthopedic Devices:  N/A  Miscellaneous Devices:  N/A    PROTOCOL INTERVENTIONS:   Standard/Trauma Bed:  Already in place  Float Heels with Pillows:  Applied this assessment    WOUND PHOTOS:   Completed and in EPIC     WOUND CONSULT:   N/A, no advanced wound care needs identified

## 2025-07-08 LAB
BACTERIA UR CULT: NORMAL
SIGNIFICANT IND 70042: NORMAL
SITE SITE: NORMAL
SOURCE SOURCE: NORMAL

## 2025-07-11 LAB
BACTERIA BLD CULT: NORMAL
SIGNIFICANT IND 70042: NORMAL
SITE SITE: NORMAL
SOURCE SOURCE: NORMAL

## 2025-07-12 LAB
BACTERIA BLD CULT: NORMAL
SIGNIFICANT IND 70042: NORMAL
SITE SITE: NORMAL
SOURCE SOURCE: NORMAL

## 2025-07-18 ENCOUNTER — HOSPITAL ENCOUNTER (OUTPATIENT)
Facility: MEDICAL CENTER | Age: 55
End: 2025-07-18
Payer: COMMERCIAL

## 2025-07-18 PROCEDURE — 87186 SC STD MICRODIL/AGAR DIL: CPT

## 2025-07-18 PROCEDURE — 87086 URINE CULTURE/COLONY COUNT: CPT

## 2025-07-18 PROCEDURE — 87077 CULTURE AEROBIC IDENTIFY: CPT

## 2025-07-21 LAB
BACTERIA UR CULT: ABNORMAL
BACTERIA UR CULT: ABNORMAL
SIGNIFICANT IND 70042: ABNORMAL
SITE SITE: ABNORMAL
SOURCE SOURCE: ABNORMAL

## (undated) DEVICE — GOWN WARMING STANDARD FLEX - (30/CA)

## (undated) DEVICE — DRAPE MAYO STAND - (30/CA)

## (undated) DEVICE — BAG URODRAIN WITH TUBING - (20/CA)

## (undated) DEVICE — PROTECTOR ULNA NERVE - (36PR/CA)

## (undated) DEVICE — SUCTION INSTRUMENT YANKAUER BULBOUS TIP W/O VENT (50EA/CA)

## (undated) DEVICE — SLEEVE, VASO, THIGH, MED

## (undated) DEVICE — GLOVE BIOGEL PI INDICATOR SZ 7.5 SURGICAL PF LF -(50/BX 4BX/CA)

## (undated) DEVICE — SUTURE 3-0 VICRYL PLUS SH - 27 INCH (36/BX)

## (undated) DEVICE — ELECTRODE 850 FOAM ADHESIVE - HYDROGEL RADIOTRNSPRNT (50/PK)

## (undated) DEVICE — DRAIN J-VAC 10MM FLAT - (10/CA)

## (undated) DEVICE — SET EXTENSION WITH 2 PORTS (48EA/CA) ***PART #2C8610 IS A SUBSTITUTE*****

## (undated) DEVICE — TOWELS CLOTH SURGICAL - (4/PK 20PK/CA)

## (undated) DEVICE — STAPLER SKIN DISP - (6/BX 10BX/CA) VISISTAT

## (undated) DEVICE — LIGASURE TISSUE FUSION  - SINGLE USE (6/CA)

## (undated) DEVICE — CHLORAPREP 26 ML APPLICATOR - ORANGE TINT(25/CA)

## (undated) DEVICE — SET LEADWIRE 5 LEAD BEDSIDE DISPOSABLE ECG (1SET OF 5/EA)

## (undated) DEVICE — SODIUM CHL. IRRIGATION 0.9% 3000ML (4EA/CA 65CA/PF)

## (undated) DEVICE — GLOVE BIOGEL SZ 8.5 SURGICAL PF LTX - (50PR/BX 4BX/CA)

## (undated) DEVICE — GLOVE BIOGEL PI ORTHO SZ 7 PF LF (40PR/BX)

## (undated) DEVICE — GLOVE BIOGEL SZ 7.5 SURGICAL PF LTX - (50PR/BX 4BX/CA)

## (undated) DEVICE — LACTATED RINGERS INJ 1000 ML - (14EA/CA 60CA/PF)

## (undated) DEVICE — SYRINGE 30 ML LL (56/BX)

## (undated) DEVICE — GLOVE BIOGEL SZ 6 PF LATEX - (50EA/BX 4BX/CA)

## (undated) DEVICE — TRAY ANESTHESIA - CONTINUOUS EPIDURAL (10EA/CA) THIS IS A CUSTOM PACK

## (undated) DEVICE — TUBE CONNECT SUCTION CLEAR 120 X 1/4" (50EA/CA)"

## (undated) DEVICE — TUBING CLEARLINK DUO-VENT - C-FLO (48EA/CA)

## (undated) DEVICE — MASK ANESTHESIA ADULT  - (100/CA)

## (undated) DEVICE — SPONGE GAUZESTER 4 X 4 4PLY - (128PK/CA)

## (undated) DEVICE — ELECTRODE DUAL RETURN W/ CORD - (50/PK)

## (undated) DEVICE — TUBE FEEDING 8 FR 40CM PURPLE ENFIT COMPLIANT(10/PK)

## (undated) DEVICE — CATHETER URETHRAL FOLEY SILICONE OD16 FR 10 ML (10EA/CA)

## (undated) DEVICE — CATHETER URET OPEN END 6FR (10EA/BX)

## (undated) DEVICE — KIT ANESTHESIA W/CIRCUIT & 3/LT BAG W/FILTER (20EA/CA)

## (undated) DEVICE — SYRINGE TOOMEY (50EA/CA)

## (undated) DEVICE — SYRINGE DISP. 12 CC LL - (100/BX)

## (undated) DEVICE — PACK CYSTOSCOPY - (14/CA)

## (undated) DEVICE — SYRINGE 10 ML CONTROL LL (25EA/BX 4BX/CA)

## (undated) DEVICE — GOWN SURGICAL X-LARGE ULTRA - FILM-REINFORCED (20/CA)

## (undated) DEVICE — CLIP LG INTNL HRZN TI ESCP LGT - (20/BX)

## (undated) DEVICE — COVER FOOT UNIVERSAL DISP. - (25EA/CA)

## (undated) DEVICE — WIRE GUIDE SENSOR DUAL FLEX - 5/BX

## (undated) DEVICE — PACK MAJOR BASIN - (2EA/CA)

## (undated) DEVICE — POUCH FLUID COLLECTION INVISISHIELD - (10/BX)

## (undated) DEVICE — SUTURE GENERAL

## (undated) DEVICE — SYRINGE 20 ML LL (50EA/BX 4BX/CA)

## (undated) DEVICE — CONTAINER SPECIMEN BAG OR - STERILE 4 OZ W/LID (100EA/CA)

## (undated) DEVICE — DRAPE UNDER BUTTOCKS FLUID - (20/CA)

## (undated) DEVICE — BLADE SURGICAL #15 - (50/BX 3BX/CA)

## (undated) DEVICE — WATER IRRIG. STER 3000 ML - (4/CA)

## (undated) DEVICE — KIT ROOM DECONTAMINATION

## (undated) DEVICE — BLANKET WARMING UPPER BODY - (10/CA)

## (undated) DEVICE — GLOVE BIOGEL INDICATOR SZ 7SURGICAL PF LTX - (50/BX 4BX/CA)

## (undated) DEVICE — JELLY, KY 2 0Z STERILE

## (undated) DEVICE — BOVIE  BLADE 6 EXTENDED - (50/PK)

## (undated) DEVICE — CANISTER SUCTION 3000ML MECHANICAL FILTER AUTO SHUTOFF MEDI-VAC NONSTERILE LF DISP  (40EA/CA)

## (undated) DEVICE — GLOVE BIOGEL SZ 7 SURGICAL PF LTX - (50PR/BX 4BX/CA)

## (undated) DEVICE — SENSOR SPO2 NEO LNCS ADHESIVE (20/BX) SEE USER NOTES

## (undated) DEVICE — WATER IRRIG. STER. 1500 ML - (9/CA)

## (undated) DEVICE — HEAD HOLDER JUNIOR/ADULT

## (undated) DEVICE — BLANKET WARMING LOWER BODY - (10/CA) INACTIVE USE #8585

## (undated) DEVICE — SET IRRIGATION CYSTOSCOPY Y-TYPE L81 IN (20EA/CA)

## (undated) DEVICE — SODIUM CHL IRRIGATION 0.9% 1000ML (12EA/CA)

## (undated) DEVICE — SUTURE 3-0 SILK SH C/R 18 IN - (12/BX)

## (undated) DEVICE — CATHETER URET DUAL LUMEN

## (undated) DEVICE — TUBE E-T HI-LO CUFF 7.0MM (10EA/PK)

## (undated) DEVICE — STAPLE 55MM LINEAR BLUE 3.8MM (12EA/BX)

## (undated) DEVICE — DRAPE MEDI-SLUSH STERILE  - (40/CA)

## (undated) DEVICE — GLOVE BIOGEL ECLIPSE PF LATEX SIZE 8.0  (50PR/BX)

## (undated) DEVICE — PACK CYSTOSCOPY III - (8/CA)

## (undated) DEVICE — SUTURE 3-0 CHROMIC GUT SH 27 (36PK/BX)"

## (undated) DEVICE — LASER FIBER HOLM 365 MICRON 100 WATT (5EA/BX)

## (undated) DEVICE — SHEET PEDIATRIC LAPAROTOMY - (10/CA)

## (undated) DEVICE — GLOVE BIOGEL SZ 6.5 SURGICAL PF LTX (50PR/BX 4BX/CA)

## (undated) DEVICE — GLOVE SZ 8 BIOGEL PI MICRO - PF LF (50PR/BX)

## (undated) DEVICE — PAD LAP STERILE 18 X 18 - (5/PK 40PK/CA)

## (undated) DEVICE — BLADE SURGICAL CLIPPER - (50EA/CA)

## (undated) DEVICE — ZIPWIRE .038 ANGLED BENTSON TIP (5EA/BX)

## (undated) DEVICE — CLIP MED LG INTNL HRZN TI ESCP - (20/BX)

## (undated) DEVICE — NEPTUNE 4 PORT MANIFOLD - (20/PK)

## (undated) DEVICE — SUTURE 2-0 SILK FS (12EA/BX)

## (undated) DEVICE — BAG DRAINAGE LEG TWIST-VALVE STRAP LARGE 32OZ (48EA/CA)

## (undated) DEVICE — GOWN SURGEONS X-LARGE - DISP. (30/CA)

## (undated) DEVICE — Device

## (undated) DEVICE — VESSELOOP MAXI BLUE STERILE- SURG-I-LOOP (10EA/BX)

## (undated) DEVICE — DETERGENT RENUZYME PLUS 10 OZ PACKET (50/BX)

## (undated) DEVICE — MASK, LARYNGEAL AIRWAY #4

## (undated) DEVICE — CATHETER URETHRAL OPEN END AXXCESS (10EA/BX)

## (undated) DEVICE — GLOVE BIOGEL ECLIPSE  PF LATEX SIZE 6.5 (50PR/BX)

## (undated) DEVICE — STAPLE 60MM BLUE 3.5MM - ECHELON (12/BX)

## (undated) DEVICE — DRAPE LAPAROTOMY T SHEET - (12EA/CA)

## (undated) DEVICE — CONNECTOR HOSE NEPTUNE FOR CYSTO ROOM

## (undated) DEVICE — ZIPWIRE .038 STRAIGHT BENTSON TIP (5EA/BX)

## (undated) DEVICE — SCOPE DIGITAL URETEROSCOPE DISPOSABLE

## (undated) DEVICE — BLOCK

## (undated) DEVICE — CLIP MED INTNL HRZN TI ESCP - (25/BX)

## (undated) DEVICE — GLOVE BIOGEL PI INDICATOR SZ 6.5 SURGICAL PF LF - (50/BX 4BX/CA)

## (undated) DEVICE — GLOVE SZ 6 BIOGEL PI MICRO - PF LF (50PR/BX 4BX/CA)

## (undated) DEVICE — DRAPE LARGE 3 QUARTER - (20/CA)

## (undated) DEVICE — SYRINGE DISP. 60 CC LL - (30/BX, 12BX/CA)**WHEN THESE ARE GONE ORDER #500206**

## (undated) DEVICE — SUTURE 3-0 VICRYL PLUS SH - 8X 18 INCH (12/BX)

## (undated) DEVICE — GLOVE SZ 6.5 BIOGEL PI MICRO - PF LF (50PR/BX)

## (undated) DEVICE — DRESSING VAC SIMPLACE MED - (10EA/CA)

## (undated) DEVICE — GLOVE BIOGEL PI INDICATOR SZ 7.0 SURGICAL PF LF - (50/BX 4BX/CA)

## (undated) DEVICE — DILATOR NOTTINGHAM 6F-10FRX70CM

## (undated) DEVICE — GLOVE BIOGEL PI ORTHO SZ 6 1/2 SURGICAL PF LF (40PR/BX)

## (undated) DEVICE — PACK MINOR BASIN - (2EA/CA)

## (undated) DEVICE — DRAPE C-ARM LARGE 41IN X 74 IN - (10/BX 2BX/CA)

## (undated) DEVICE — KIT SURGIFLO W/OUT THROMBIN - (6EA/CA)

## (undated) DEVICE — TUBE E-T HI-LO CUFF 6.5MM (10EA/BX)

## (undated) DEVICE — STAPLER 55MM LINEAR OPEN BLUE 3.8MM W/STAPLE (3EA/BX)

## (undated) DEVICE — DRAPE MAGNETIC (INSTRA-MAG) - (30/CA)

## (undated) DEVICE — RESERVOIR SUCTION 100 CC - SILICONE (20EA/CA)

## (undated) DEVICE — GLOVE BIOGEL SZ 8 SURGICAL PF LTX - (50PR/BX 4BX/CA)

## (undated) DEVICE — SUTURE 4-0 PROLENE RB-1 D/A 36 (36PK/BX)"

## (undated) DEVICE — SUTURE 1 PDS II PLUS TP-1 - (12PK/BX)

## (undated) DEVICE — GLOVE BIOGEL PI ORTHO SZ 8 PF LF (40PR/BX)

## (undated) DEVICE — SYRINGE 50ML CATHETER TIP (40EA/BX 4BX/CA)

## (undated) DEVICE — SPONGE XRAY 8X4 STERL. 12PL - (10EA/TY 80TY/CA)

## (undated) DEVICE — SUTURE  0 ETHIBOND CT-1 30 IN (36PK/BX)

## (undated) DEVICE — SPONGE DRAIN 4 X 4IN 6-PLY - (2/PK25PK/BX12BX/CS)

## (undated) DEVICE — LASER TRAC TIP 200 MIRCON FOR 100 WATT LASER

## (undated) DEVICE — GRAFT MESH SEPRAFILM PRO PACK - 5/BX CONTAINS 6 3X5 PIECES

## (undated) DEVICE — SUTURE 4-0 VICRYL PLUS RB-1 - 27 INCH (36/BX)